# Patient Record
Sex: FEMALE | Race: BLACK OR AFRICAN AMERICAN | NOT HISPANIC OR LATINO | Employment: OTHER | URBAN - METROPOLITAN AREA
[De-identification: names, ages, dates, MRNs, and addresses within clinical notes are randomized per-mention and may not be internally consistent; named-entity substitution may affect disease eponyms.]

---

## 2017-01-10 ENCOUNTER — TELEPHONE (OUTPATIENT)
Dept: HEMATOLOGY/ONCOLOGY | Facility: CLINIC | Age: 67
End: 2017-01-10

## 2017-01-10 ENCOUNTER — OFFICE VISIT (OUTPATIENT)
Dept: INTERNAL MEDICINE | Facility: CLINIC | Age: 67
End: 2017-01-10
Payer: MEDICARE

## 2017-01-10 VITALS
HEART RATE: 66 BPM | DIASTOLIC BLOOD PRESSURE: 65 MMHG | SYSTOLIC BLOOD PRESSURE: 117 MMHG | WEIGHT: 202 LBS | BODY MASS INDEX: 31.71 KG/M2 | HEIGHT: 67 IN

## 2017-01-10 DIAGNOSIS — G47.30 SLEEP APNEA, UNSPECIFIED TYPE: Primary | ICD-10-CM

## 2017-01-10 DIAGNOSIS — D64.9 ANEMIA, UNSPECIFIED TYPE: ICD-10-CM

## 2017-01-10 DIAGNOSIS — F32.A DEPRESSION, UNSPECIFIED DEPRESSION TYPE: Chronic | ICD-10-CM

## 2017-01-10 DIAGNOSIS — R87.629 ABNORMAL PAP SMEAR OF VAGINA: ICD-10-CM

## 2017-01-10 DIAGNOSIS — F41.9 ANXIETY: Chronic | ICD-10-CM

## 2017-01-10 DIAGNOSIS — Z13.9 SCREENING: ICD-10-CM

## 2017-01-10 DIAGNOSIS — I10 ESSENTIAL HYPERTENSION: Chronic | ICD-10-CM

## 2017-01-10 DIAGNOSIS — R87.613 HGSIL (HIGH GRADE SQUAMOUS INTRAEPITHELIAL LESION) ON PAP SMEAR OF CERVIX: ICD-10-CM

## 2017-01-10 PROCEDURE — 1160F RVW MEDS BY RX/DR IN RCRD: CPT | Mod: S$GLB,,, | Performed by: INTERNAL MEDICINE

## 2017-01-10 PROCEDURE — 1159F MED LIST DOCD IN RCRD: CPT | Mod: S$GLB,,, | Performed by: INTERNAL MEDICINE

## 2017-01-10 PROCEDURE — 99214 OFFICE O/P EST MOD 30 MIN: CPT | Mod: S$GLB,,, | Performed by: INTERNAL MEDICINE

## 2017-01-10 PROCEDURE — 3074F SYST BP LT 130 MM HG: CPT | Mod: S$GLB,,, | Performed by: INTERNAL MEDICINE

## 2017-01-10 PROCEDURE — 1126F AMNT PAIN NOTED NONE PRSNT: CPT | Mod: S$GLB,,, | Performed by: INTERNAL MEDICINE

## 2017-01-10 PROCEDURE — 99999 PR PBB SHADOW E&M-EST. PATIENT-LVL IV: CPT | Mod: PBBFAC,,, | Performed by: INTERNAL MEDICINE

## 2017-01-10 PROCEDURE — 3078F DIAST BP <80 MM HG: CPT | Mod: S$GLB,,, | Performed by: INTERNAL MEDICINE

## 2017-01-10 PROCEDURE — 1157F ADVNC CARE PLAN IN RCRD: CPT | Mod: S$GLB,,, | Performed by: INTERNAL MEDICINE

## 2017-01-10 RX ORDER — LOSARTAN POTASSIUM 50 MG/1
50 TABLET ORAL DAILY
Qty: 90 TABLET | Refills: 3 | Status: SHIPPED | OUTPATIENT
Start: 2017-01-10 | End: 2017-12-22

## 2017-01-10 RX ORDER — ESCITALOPRAM OXALATE 20 MG/1
TABLET ORAL
Qty: 30 TABLET | Refills: 1 | Status: SHIPPED | OUTPATIENT
Start: 2017-01-10 | End: 2017-12-22 | Stop reason: SDUPTHER

## 2017-01-10 RX ORDER — METOPROLOL SUCCINATE 50 MG/1
50 TABLET, EXTENDED RELEASE ORAL DAILY
Qty: 90 TABLET | Refills: 3 | Status: SHIPPED | OUTPATIENT
Start: 2017-01-10 | End: 2017-12-22 | Stop reason: SDUPTHER

## 2017-01-10 RX ORDER — LORAZEPAM 0.5 MG/1
0.5 TABLET ORAL DAILY PRN
Qty: 30 TABLET | Refills: 0 | Status: SHIPPED | OUTPATIENT
Start: 2017-01-10 | End: 2017-10-19 | Stop reason: SDUPTHER

## 2017-01-10 RX ORDER — MELOXICAM 7.5 MG/1
TABLET ORAL
Qty: 60 TABLET | Refills: 0 | Status: SHIPPED | OUTPATIENT
Start: 2017-01-10 | End: 2017-12-22 | Stop reason: SDUPTHER

## 2017-01-10 NOTE — TELEPHONE ENCOUNTER
----- Message from Marleny Bridges MA sent at 1/10/2017  8:55 AM CST -----  Contact: [phone]    BRIDGET CANTU Provider: Bridget Cantu MD  Diagnosis: Anemia, unspecified type      Coded Diagnosis: Anemia, unspecified type [D64.9]     Nurse-Simi ext  [phone]

## 2017-01-10 NOTE — MR AVS SNAPSHOT
[facility] [address]  [address]  [address]  [facility] [address]  Phone: [phone]                  Marleny Guzman   1/10/2017 7:30 AM   Office Visit    Description:  Female : 1950   Provider:  Bridget Cantu MD   Department:  [facility] [address]           Reason for Visit     Follow-up           Diagnoses this Visit        Comments    Sleep apnea, unspecified type    -  Primary     Depression, unspecified depression type         Essential hypertension         Abnormal Pap smear of vagina         HGSIL (high grade squamous intraepithelial lesion) on Pap smear of cervix         Anxiety         Anemia, unspecified type         Screening                To Do List           Future Appointments        Provider Department Dept Phone    2017 8:30 AM MD Jose R Reeves [facility] - GYN Oncology [phone]    2017 1:30 PM EUSEBIO [facility] - Optometry [phone]    2017 7:30 AM Bridget Cantu MD [facility] [address] [phone]      Goals (5 Years of Data)     None      Follow-Up and Disposition     Return in about 4 months (around 5/10/2017).       These Medications        Disp Refills Start End    losartan (COZAAR) 50 MG tablet 90 tablet 3 1/10/2017 1/10/2018    Take 1 tablet (50 mg total) by mouth once daily. - Oral    Pharmacy: [facility]/pharmacy #[id number] [place] [address] [address] Ph #: [phone]       escitalopram oxalate (LEXAPRO) 20 MG tablet 30 tablet 1 1/10/2017     TAKE 1 TABLET ONE TIME DAILY    Pharmacy: [facility]/pharmacy #[address] [place] [address] [address] Ph #: [phone]       meloxicam (MOBIC) 7.5 MG tablet 60 tablet 0 1/10/2017     One twice daily as needed for neck pain    Pharmacy: [facility]/pharmacy #[address] [place] [address] [address] Ph #: [phone]       lorazepam (ATIVAN) 0.5 MG tablet 30 tablet 0 1/10/2017     Take 1 tablet (0.5 mg total) by mouth daily as  needed. - Oral    [id number] [address] Ph #: [phone]       metoprolol succinate (TOPROL-XL) 50 MG 24 hr tablet 90 tablet 3 1/10/2017     Take 1 tablet (50 mg total) by mouth once daily. - Oral    Pharmacy: [facility][id number] [address] Ph #: [phone]       Notes to Pharmacy: **Patient requests 90 day supply**      [facility]     [facility] On Call Nurse Care Line - 24/7 Assistance  Registered nurses in the [facility] On Call Center provide clinical advisement, health education, appointment booking, and other advisory services.  Call for this free service at [phone].             Medications           Message regarding Medications     Verify the changes and/or additions to your medication regime listed below are the same as discussed with your clinician today.  If any of these changes or additions are incorrect, please notify your healthcare provider.        START taking these NEW medications        Refills    losartan (COZAAR) 50 MG tablet 3    Sig: Take 1 tablet (50 mg total) by mouth once daily.    Class: Normal    Route: Oral      STOP taking these medications     triamterene-hydrochlorothiazide 37.5-25 mg (MAXZIDE-25) 37.5-25 mg per tablet Take 1 tablet by mouth once daily.           Verify that the below list of medications is an accurate representation of the medications you are currently taking.  If none reported, the list may be blank. If incorrect, please contact your healthcare provider. Carry this list with you in case of emergency.           Current Medications     aspirin 81 MG Chew Take 81 mg by mouth once daily.    cetirizine (ZYRTEC) 10 MG tablet Take 1 tablet (10 mg total) by mouth once daily.    ergocalciferol (ERGOCALCIFEROL) 50,000 unit Cap Take 1 capsule (50,000 Units total) by mouth every 30 days.    escitalopram oxalate (LEXAPRO) 20 MG tablet TAKE 1 TABLET ONE TIME DAILY    estradiol (ESTRACE) 0.01 %  "(0.1 mg/gram) vaginal cream Place 1 g vaginally twice a week.    IRON, FERROUS SULFATE, ORAL Take 1 tablet by mouth once daily.    lorazepam (ATIVAN) 0.5 MG tablet Take 1 tablet (0.5 mg total) by mouth daily as needed.    losartan (COZAAR) 50 MG tablet Take 1 tablet (50 mg total) by mouth once daily.    meloxicam (MOBIC) 7.5 MG tablet One twice daily as needed for neck pain    metoprolol succinate (TOPROL-XL) 50 MG 24 hr tablet Take 1 tablet (50 mg total) by mouth once daily.           Clinical Reference Information           Vital Signs - Last Recorded  Most recent update: 1/10/2017  7:50 AM by Simi Chavez MA    BP Pulse Ht Wt BMI    117/65 66 5' 7" (1.702 m) 91.6 kg (202 lb) 31.64 kg/m2      Blood Pressure          Most Recent Value    BP  117/65      Allergies as of 1/10/2017     No Known Allergies      Immunizations Administered on Date of Encounter - 1/10/2017     Name Date Dose VIS Date Route    Zoster  Incomplete 0.65 mL 10/6/2009 Subcutaneous      Orders Placed During Today's Visit      Normal Orders This Visit    Ambulatory consult to Hematology     Ambulatory consult to Optometry     Zoster Vaccine - Live       "

## 2017-01-18 ENCOUNTER — PATIENT MESSAGE (OUTPATIENT)
Dept: HEMATOLOGY/ONCOLOGY | Facility: CLINIC | Age: 67
End: 2017-01-18

## 2017-01-18 ENCOUNTER — LAB VISIT (OUTPATIENT)
Dept: LAB | Facility: HOSPITAL | Age: 67
End: 2017-01-18
Attending: INTERNAL MEDICINE
Payer: MEDICARE

## 2017-01-18 ENCOUNTER — INITIAL CONSULT (OUTPATIENT)
Dept: HEMATOLOGY/ONCOLOGY | Facility: CLINIC | Age: 67
End: 2017-01-18
Payer: MEDICARE

## 2017-01-18 VITALS
SYSTOLIC BLOOD PRESSURE: 133 MMHG | DIASTOLIC BLOOD PRESSURE: 65 MMHG | HEIGHT: 70 IN | HEART RATE: 64 BPM | OXYGEN SATURATION: 95 % | BODY MASS INDEX: 28.72 KG/M2 | WEIGHT: 200.63 LBS | TEMPERATURE: 98 F

## 2017-01-18 DIAGNOSIS — D50.9 IRON DEFICIENCY ANEMIA, UNSPECIFIED IRON DEFICIENCY ANEMIA TYPE: Primary | ICD-10-CM

## 2017-01-18 DIAGNOSIS — D50.9 IRON DEFICIENCY ANEMIA, UNSPECIFIED IRON DEFICIENCY ANEMIA TYPE: ICD-10-CM

## 2017-01-18 LAB
BASOPHILS # BLD AUTO: 0.02 K/UL
BASOPHILS NFR BLD: 0.2 %
DIFFERENTIAL METHOD: ABNORMAL
EOSINOPHIL # BLD AUTO: 0.4 K/UL
EOSINOPHIL NFR BLD: 4.5 %
ERYTHROCYTE [DISTWIDTH] IN BLOOD BY AUTOMATED COUNT: 14.6 %
FERRITIN SERPL-MCNC: 62 NG/ML
HCT VFR BLD AUTO: 35.7 %
HGB BLD-MCNC: 12 G/DL
LYMPHOCYTES # BLD AUTO: 2.3 K/UL
LYMPHOCYTES NFR BLD: 29.2 %
MCH RBC QN AUTO: 28.5 PG
MCHC RBC AUTO-ENTMCNC: 33.6 %
MCV RBC AUTO: 85 FL
MONOCYTES # BLD AUTO: 0.3 K/UL
MONOCYTES NFR BLD: 3.6 %
NEUTROPHILS # BLD AUTO: 5 K/UL
NEUTROPHILS NFR BLD: 62.4 %
PLATELET # BLD AUTO: 308 K/UL
PMV BLD AUTO: 10.4 FL
RBC # BLD AUTO: 4.21 M/UL
WBC # BLD AUTO: 8.02 K/UL

## 2017-01-18 PROCEDURE — 3078F DIAST BP <80 MM HG: CPT | Mod: S$GLB,,, | Performed by: INTERNAL MEDICINE

## 2017-01-18 PROCEDURE — 85025 COMPLETE CBC W/AUTO DIFF WBC: CPT

## 2017-01-18 PROCEDURE — 99999 PR PBB SHADOW E&M-EST. PATIENT-LVL III: CPT | Mod: PBBFAC,,, | Performed by: INTERNAL MEDICINE

## 2017-01-18 PROCEDURE — 1157F ADVNC CARE PLAN IN RCRD: CPT | Mod: S$GLB,,, | Performed by: INTERNAL MEDICINE

## 2017-01-18 PROCEDURE — 99203 OFFICE O/P NEW LOW 30 MIN: CPT | Mod: S$GLB,,, | Performed by: INTERNAL MEDICINE

## 2017-01-18 PROCEDURE — 3075F SYST BP GE 130 - 139MM HG: CPT | Mod: S$GLB,,, | Performed by: INTERNAL MEDICINE

## 2017-01-18 PROCEDURE — 1160F RVW MEDS BY RX/DR IN RCRD: CPT | Mod: S$GLB,,, | Performed by: INTERNAL MEDICINE

## 2017-01-18 PROCEDURE — 1159F MED LIST DOCD IN RCRD: CPT | Mod: S$GLB,,, | Performed by: INTERNAL MEDICINE

## 2017-01-18 PROCEDURE — 36415 COLL VENOUS BLD VENIPUNCTURE: CPT

## 2017-01-18 PROCEDURE — 1126F AMNT PAIN NOTED NONE PRSNT: CPT | Mod: S$GLB,,, | Performed by: INTERNAL MEDICINE

## 2017-01-18 PROCEDURE — 82728 ASSAY OF FERRITIN: CPT

## 2017-01-18 NOTE — PROGRESS NOTES
Subjective:       Patient ID: Marleny Guzman is a 66 y.o. female.    Chief Complaint: Anemia    Marleny presents today for evaluation of anemia present since surgery, total abdominal hysterectomy, in April/May 2015. Minimal blood loss is described in the operative report but the pattern of anemia is consistent with mari-operative blood loss. Hemoglobin was normal before surgery and low immediately after surgery. Marleny's iron levels and dietary heme iron content are moderate. She was placed in oral iron, once daily, tablets from September to Nov/December 2016. She denies and GI upset with replacement therapy. She eats white meat chicken, thought dietary heme content is lower than usual for the patient. She has a history of iron deficiency anemia also when younger and remembers taking iron supplements previously. This may have been related to menstrual cycles at the time. Her hysterectomy was performed for cervical dysplasia, not dysfunctional uterine bleeding. She denies any current blood loss. GI endoscopy was performed in March 2016.    HPI  Review of Systems   Constitutional: Negative.    HENT: Negative.    Eyes: Negative.    Respiratory: Negative.    Cardiovascular: Negative.    Gastrointestinal: Negative.    Endocrine: Negative.    Genitourinary: Negative.    Musculoskeletal: Negative.    Skin: Negative.    Allergic/Immunologic: Negative for environmental allergies, food allergies and immunocompromised state.   Neurological: Negative.    Hematological: Negative for adenopathy. Does not bruise/bleed easily.   Psychiatric/Behavioral: Negative.        Objective:      Physical Exam   Constitutional: She is oriented to person, place, and time. She appears well-developed and well-nourished.   HENT:   Head: Normocephalic and atraumatic.   Eyes: Conjunctivae are normal. Pupils are equal, round, and reactive to light. No scleral icterus.   Neck: Normal range of motion.   Cardiovascular: Normal rate and intact distal  pulses.    Pulmonary/Chest: Effort normal. No respiratory distress.   Abdominal: She exhibits no distension. There is no tenderness.   Musculoskeletal: Normal range of motion.   Neurological: She is alert and oriented to person, place, and time. No cranial nerve deficit.   Skin: Skin is warm and dry. No rash noted.   Psychiatric: She has a normal mood and affect. Her behavior is normal. Judgment and thought content normal.   Nursing note and vitals reviewed.      Assessment:       1. Iron deficiency anemia, unspecified iron deficiency anemia type        Plan:       Anemia consistent with operative loss and prolonged recovery  Iron levels including ferritin are currently normal.  CBC is improving over past year  Recommend updated CBC today  Recommended increased dietary heme iron    CBC is now normal.  Patient may continue normal dietary heme iron.

## 2017-01-18 NOTE — LETTER
January 18, 2017      Bridget Cantu MD  [address]  [facility] [address]           [facility]  [address]  [facility] [address]  Phone: [phone]          Patient: Marleny Guzman   MR Number: [id number]   YOB: 1950   Date of Visit: 1/18/2017       Dear Dr. Bridget Cantu:    Thank you for referring Marleny Guzman to me for evaluation. Attached you will find relevant portions of my assessment and plan of care.    If you have questions, please do not hesitate to call me. I look forward to following Marleny Guzman along with you.    Sincerely,    Agata Hodgson MD    Enclosure  CC:  No Recipients    If you would like to receive this communication electronically, please contact [email or web address] or [phone] to request more information on [employer] Link access.    For providers and/or their staff who would like to refer a patient to [facility], please contact us through our one-stop-shop provider referral line, [facility], at [phone].    If you feel you have received this communication in error or would no longer like to receive these types of communications, please e-mail [email or web address]

## 2017-01-23 NOTE — PROGRESS NOTES
Subjective:       Patient ID: Marleny Guzman is a 66 y.o. female.    Chief Complaint: Follow-up    HPIPt is feeling well with no CP or SOB.  Review of Systems   Respiratory: Negative for shortness of breath (PND or orthopnea).    Cardiovascular: Negative for chest pain (arm pain or jaw pain).   Gastrointestinal: Negative for abdominal pain, diarrhea, nausea and vomiting.   Genitourinary: Negative for dysuria.   Neurological: Negative for seizures, syncope and headaches.       Objective:      Physical Exam   Constitutional: She is oriented to person, place, and time. She appears well-developed and well-nourished. No distress.   HENT:   Head: Normocephalic.   Mouth/Throat: Oropharynx is clear and moist.   Neck: Neck supple. No JVD present. No thyromegaly present.   Cardiovascular: Normal rate, regular rhythm, normal heart sounds and intact distal pulses.  Exam reveals no gallop and no friction rub.    No murmur heard.  Pulmonary/Chest: Effort normal and breath sounds normal. She has no wheezes. She has no rales.   Abdominal: Soft. Bowel sounds are normal. She exhibits no distension and no mass. There is no tenderness. There is no rebound and no guarding.   Musculoskeletal: She exhibits no edema.   Lymphadenopathy:     She has no cervical adenopathy.   Neurological: She is alert and oriented to person, place, and time.   Skin: Skin is warm and dry.   Psychiatric: She has a normal mood and affect. Her behavior is normal. Judgment and thought content normal.       Assessment:       1. Sleep apnea, unspecified type    2. Depression, unspecified depression type    3. Essential hypertension    4. Abnormal Pap smear of vagina    5. HGSIL (high grade squamous intraepithelial lesion) on Pap smear of cervix    6. Anxiety    7. Anemia, unspecified type    8. Screening        Plan:   Sleep apnea, unspecified type  Pt uses faithfully and derives great benenfit  Depression, unspecified depression type  -     escitalopram oxalate  (LEXAPRO) 20 MG tablet; TAKE 1 TABLET ONE TIME DAILY  Dispense: 30 tablet; Refill: 1  Controlled - continue current meds    Essential hypertension  -     metoprolol succinate (TOPROL-XL) 50 MG 24 hr tablet; Take 1 tablet (50 mg total) by mouth once daily.  Dispense: 90 tablet; Refill: 3  Controlled - continue current meds    Abnormal Pap smear of vagina/HGSIL (high grade squamous intraepithelial lesion) on Pap smear of cervix  Follows closely with gyn  Anxiety  -     escitalopram oxalate (LEXAPRO) 20 MG tablet; TAKE 1 TABLET ONE TIME DAILY  Dispense: 30 tablet; Refill: 1  -     lorazepam (ATIVAN) 0.5 MG tablet; Take 1 tablet (0.5 mg total) by mouth daily as needed.  Dispense: 30 tablet; Refill: 0    Anemia, unspecified type  -     Ambulatory consult to Hematology    Screening  -     Ambulatory consult to Optometry    Other orders  -     Zoster Vaccine - Live  -     losartan (COZAAR) 50 MG tablet; Take 1 tablet (50 mg total) by mouth once daily.  Dispense: 90 tablet; Refill: 3  -     meloxicam (MOBIC) 7.5 MG tablet; One twice daily as needed for neck pain  Dispense: 60 tablet; Refill: 0

## 2017-01-30 ENCOUNTER — OFFICE VISIT (OUTPATIENT)
Dept: GYNECOLOGIC ONCOLOGY | Facility: CLINIC | Age: 67
End: 2017-01-30
Payer: MEDICARE

## 2017-01-30 VITALS
DIASTOLIC BLOOD PRESSURE: 59 MMHG | HEART RATE: 74 BPM | BODY MASS INDEX: 28.37 KG/M2 | WEIGHT: 198.19 LBS | HEIGHT: 70 IN | SYSTOLIC BLOOD PRESSURE: 127 MMHG

## 2017-01-30 DIAGNOSIS — N89.3 VAGINAL DYSPLASIA: ICD-10-CM

## 2017-01-30 DIAGNOSIS — R87.613 HGSIL (HIGH GRADE SQUAMOUS INTRAEPITHELIAL LESION) ON PAP SMEAR OF CERVIX: Primary | ICD-10-CM

## 2017-01-30 DIAGNOSIS — R87.629 ABNORMAL PAP SMEAR OF VAGINA: ICD-10-CM

## 2017-01-30 PROCEDURE — 1126F AMNT PAIN NOTED NONE PRSNT: CPT | Mod: S$GLB,,, | Performed by: OBSTETRICS & GYNECOLOGY

## 2017-01-30 PROCEDURE — 1157F ADVNC CARE PLAN IN RCRD: CPT | Mod: S$GLB,,, | Performed by: OBSTETRICS & GYNECOLOGY

## 2017-01-30 PROCEDURE — 88175 CYTOPATH C/V AUTO FLUID REDO: CPT | Performed by: PATHOLOGY

## 2017-01-30 PROCEDURE — 3078F DIAST BP <80 MM HG: CPT | Mod: S$GLB,,, | Performed by: OBSTETRICS & GYNECOLOGY

## 2017-01-30 PROCEDURE — 99999 PR PBB SHADOW E&M-EST. PATIENT-LVL III: CPT | Mod: PBBFAC,,, | Performed by: OBSTETRICS & GYNECOLOGY

## 2017-01-30 PROCEDURE — 99214 OFFICE O/P EST MOD 30 MIN: CPT | Mod: S$GLB,,, | Performed by: OBSTETRICS & GYNECOLOGY

## 2017-01-30 PROCEDURE — 88141 CYTOPATH C/V INTERPRET: CPT | Mod: ,,, | Performed by: PATHOLOGY

## 2017-01-30 PROCEDURE — 1159F MED LIST DOCD IN RCRD: CPT | Mod: S$GLB,,, | Performed by: OBSTETRICS & GYNECOLOGY

## 2017-01-30 PROCEDURE — 1160F RVW MEDS BY RX/DR IN RCRD: CPT | Mod: S$GLB,,, | Performed by: OBSTETRICS & GYNECOLOGY

## 2017-01-30 PROCEDURE — 3074F SYST BP LT 130 MM HG: CPT | Mod: S$GLB,,, | Performed by: OBSTETRICS & GYNECOLOGY

## 2017-01-30 RX ORDER — ESTRADIOL 0.1 MG/G
1 CREAM VAGINAL
Qty: 1 TUBE | Refills: 3 | Status: SHIPPED | OUTPATIENT
Start: 2017-01-30 | End: 2017-07-24 | Stop reason: SDUPTHER

## 2017-01-30 NOTE — PROGRESS NOTES
Subjective:      Patient ID: Marleny Guzman is a 66 y.o. female.    Chief Complaint: Vaginal Dysplasia (6 month follow up)      HPI  Patient s/p RATLH/BSO for persistent HGSIL of cervix 5/12/15 with benign pathology with Dr. Gonzalez. Vaginal pap smear 6/15/16 HSIL. She was referred for consult by Dr. Valles. 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    Presents today for follow up. Has not been using vaginal estrogen cream as prescribed. Reports doing well. Denies vaginal bleeding, pruritis, or other concerning symptoms.       Review of Systems   Constitutional: Negative for appetite change, chills, fatigue and fever.   HENT: Negative for mouth sores.    Respiratory: Negative for cough and shortness of breath.    Cardiovascular: Negative for leg swelling.   Gastrointestinal: Negative for abdominal pain, blood in stool, constipation and diarrhea.   Endocrine: Negative for cold intolerance.   Genitourinary: Negative for dysuria and vaginal bleeding.   Musculoskeletal: Negative for myalgias.   Skin: Negative for rash.   Allergic/Immunologic: Negative.    Neurological: Negative for weakness and numbness.   Hematological: Negative for adenopathy. Does not bruise/bleed easily.   Psychiatric/Behavioral: Negative for confusion.        Objective:   Physical Exam:   Constitutional: She is oriented to person, place, and time. She appears well-developed and well-nourished.    HENT:   Head: Normocephalic and atraumatic.    Eyes: EOM are normal. Pupils are equal, round, and reactive to light.    Neck: Normal range of motion. Neck supple. No thyromegaly present.    Cardiovascular: Normal rate, regular rhythm and intact distal pulses.     Pulmonary/Chest: Effort normal and breath sounds normal. No respiratory distress. She has no wheezes.        Abdominal: Soft. Bowel sounds are normal. She exhibits no distension, no ascites and no mass. There is no tenderness.     Genitourinary: Rectum normal and vagina  normal. Pelvic exam was performed with patient supine. There is no lesion on the right labia. There is no lesion on the left labia. Uterus is absent. There is an absent adnexa. Right adnexum displays no mass. Left adnexum displays no mass. Vaginal cuff normal.Cervix exhibits absence. Additional cervical findings: pap smear done          Musculoskeletal: Normal range of motion and moves all extremeties.      Lymphadenopathy:     She has no cervical adenopathy.        Right: No inguinal and no supraclavicular adenopathy present.        Left: No inguinal and no supraclavicular adenopathy present.    Neurological: She is alert and oriented to person, place, and time.    Skin: Skin is warm and dry. No rash noted.    Psychiatric: She has a normal mood and affect.       Assessment:     1. HGSIL (high grade squamous intraepithelial lesion) on Pap smear of cervix    2. Abnormal Pap smear of vagina    3. Vaginal dysplasia      - there are no gross abnormalities noted on today's exam  - pap collected  Plan:   No orders of the defined types were placed in this encounter.    1. RTC 6 months for follow up or sooner if needed pending pap results.

## 2017-02-02 ENCOUNTER — OFFICE VISIT (OUTPATIENT)
Dept: OPTOMETRY | Facility: CLINIC | Age: 67
End: 2017-02-02
Payer: MEDICARE

## 2017-02-02 DIAGNOSIS — H26.493 PCO (POSTERIOR CAPSULAR OPACIFICATION), BILATERAL: ICD-10-CM

## 2017-02-02 DIAGNOSIS — H52.4 PRESBYOPIA: ICD-10-CM

## 2017-02-02 DIAGNOSIS — Z46.0 FITTING AND ADJUSTMENT OF SPECTACLES AND CONTACT LENSES: ICD-10-CM

## 2017-02-02 DIAGNOSIS — H10.13 CONJUNCTIVITIS, ALLERGIC, BILATERAL: ICD-10-CM

## 2017-02-02 DIAGNOSIS — Z96.1 PSEUDOPHAKIA OF BOTH EYES: ICD-10-CM

## 2017-02-02 DIAGNOSIS — I10 ESSENTIAL HYPERTENSION: Primary | Chronic | ICD-10-CM

## 2017-02-02 DIAGNOSIS — Z46.0 FITTING AND ADJUSTMENT OF SPECTACLES AND CONTACT LENSES: Primary | ICD-10-CM

## 2017-02-02 PROCEDURE — 92310 CONTACT LENS FITTING OU: CPT | Mod: ,,, | Performed by: OPTOMETRIST

## 2017-02-02 PROCEDURE — 99999 PR PBB SHADOW E&M-EST. PATIENT-LVL II: CPT | Mod: PBBFAC,,, | Performed by: OPTOMETRIST

## 2017-02-02 PROCEDURE — 92015 DETERMINE REFRACTIVE STATE: CPT | Mod: S$GLB,,, | Performed by: OPTOMETRIST

## 2017-02-02 PROCEDURE — 92014 COMPRE OPH EXAM EST PT 1/>: CPT | Mod: S$GLB,,, | Performed by: OPTOMETRIST

## 2017-02-02 NOTE — LETTER
February 3, 2017      Bridget Cantu MD  [address]  [facility] [address]           [facility]  [address]  [facility] [address]  Phone: [phone]  Fax: [phone]          Patient: Marleny Guzman   MR Number: [id number]   YOB: 1950   Date of Visit: 2/2/2017       Dear Dr. Bridget Cantu:    Thank you for referring Marleny Guzman to me for evaluation. Attached you will find relevant portions of my assessment and plan of care.    If you have questions, please do not hesitate to call me. I look forward to following Marleny Guzman along with you.    Sincerely,    Ivy Borrego, OD    Enclosure  CC:  No Recipients    If you would like to receive this communication electronically, please contact [email or web address] or [phone] to request more information on [employer] Link access.    For providers and/or their staff who would like to refer a patient to [facility], please contact us through our one-stop-shop provider referral line, [facility], at [phone].    If you feel you have received this communication in error or would no longer like to receive these types of communications, please e-mail [email or web address]

## 2017-02-02 NOTE — PROGRESS NOTES
Contact lens exam done, see exam of same date for documentation.   Contact lens exam done, see exam of same date for documentation.

## 2017-02-03 NOTE — PROGRESS NOTES
HPI     66 year old female   DLS: 02/29/2016    Complete and update contact lens  Daily wear/colored contact lens   Pt not happy with current contact lens  States that the are uncomfortable and that her eyes are always itchy  Pt does not have any glasses  PC IOL OU       Last edited by Valentine Abdi on 2/2/2017  8:44 AM.         Assessment /Plan     For exam results, see Encounter Report.        Essential hypertension  No retinopathy, monitor yearly     Pseudophakia of both eyes  PCO (posterior capsular opacification), bilateral  Stable     Dry eye syndrome, bilateral  Conjunctivitis, allergic, bilateral  Use Zaditor or Alaway BID OU     Refractive error  Presbyopia  Fitting and adjustment of spectacles and contact lenses    Rx specs  CL fit today: dispensed trials   OK to order in colors if happy     RTC 1 year, sooner PRN

## 2017-02-09 ENCOUNTER — TELEPHONE (OUTPATIENT)
Dept: GYNECOLOGIC ONCOLOGY | Facility: CLINIC | Age: 67
End: 2017-02-09

## 2017-02-09 NOTE — TELEPHONE ENCOUNTER
Inform pt per dr araujo that pap smear was abnormal and that pt needs a colpo pt is scheduled for 02/13/217 at 8:15 am pt knows to come for 8:30

## 2017-02-09 NOTE — TELEPHONE ENCOUNTER
----- Message from Francia Mosley MD sent at 2/9/2017  3:47 PM CST -----  Pap smear was abnormal as it has been in the past. Please bring patient in for colposcopy. Thank you.

## 2017-02-13 ENCOUNTER — TELEPHONE (OUTPATIENT)
Dept: INTERNAL MEDICINE | Facility: CLINIC | Age: 67
End: 2017-02-13

## 2017-02-13 ENCOUNTER — OFFICE VISIT (OUTPATIENT)
Dept: GYNECOLOGIC ONCOLOGY | Facility: CLINIC | Age: 67
End: 2017-02-13
Payer: MEDICARE

## 2017-02-13 VITALS
BODY MASS INDEX: 28 KG/M2 | DIASTOLIC BLOOD PRESSURE: 58 MMHG | SYSTOLIC BLOOD PRESSURE: 122 MMHG | HEIGHT: 70 IN | HEART RATE: 65 BPM | WEIGHT: 195.56 LBS

## 2017-02-13 DIAGNOSIS — R87.629 ABNORMAL PAP SMEAR OF VAGINA: Primary | ICD-10-CM

## 2017-02-13 DIAGNOSIS — N89.3 VAGINAL DYSPLASIA: ICD-10-CM

## 2017-02-13 DIAGNOSIS — G47.30 SLEEP APNEA, UNSPECIFIED TYPE: Primary | ICD-10-CM

## 2017-02-13 PROCEDURE — 57421 EXAM/BIOPSY OF VAG W/SCOPE: CPT | Mod: S$GLB,,, | Performed by: OBSTETRICS & GYNECOLOGY

## 2017-02-13 PROCEDURE — 1157F ADVNC CARE PLAN IN RCRD: CPT | Mod: S$GLB,,, | Performed by: OBSTETRICS & GYNECOLOGY

## 2017-02-13 PROCEDURE — 1159F MED LIST DOCD IN RCRD: CPT | Mod: S$GLB,,, | Performed by: OBSTETRICS & GYNECOLOGY

## 2017-02-13 PROCEDURE — 99214 OFFICE O/P EST MOD 30 MIN: CPT | Mod: 25,S$GLB,, | Performed by: OBSTETRICS & GYNECOLOGY

## 2017-02-13 PROCEDURE — 3074F SYST BP LT 130 MM HG: CPT | Mod: S$GLB,,, | Performed by: OBSTETRICS & GYNECOLOGY

## 2017-02-13 PROCEDURE — 88305 TISSUE EXAM BY PATHOLOGIST: CPT | Performed by: PATHOLOGY

## 2017-02-13 PROCEDURE — 3078F DIAST BP <80 MM HG: CPT | Mod: S$GLB,,, | Performed by: OBSTETRICS & GYNECOLOGY

## 2017-02-13 PROCEDURE — 88342 IMHCHEM/IMCYTCHM 1ST ANTB: CPT | Mod: 26,,, | Performed by: PATHOLOGY

## 2017-02-13 PROCEDURE — 1126F AMNT PAIN NOTED NONE PRSNT: CPT | Mod: S$GLB,,, | Performed by: OBSTETRICS & GYNECOLOGY

## 2017-02-13 PROCEDURE — 1160F RVW MEDS BY RX/DR IN RCRD: CPT | Mod: S$GLB,,, | Performed by: OBSTETRICS & GYNECOLOGY

## 2017-02-13 PROCEDURE — 88305 TISSUE EXAM BY PATHOLOGIST: CPT | Mod: 26,,, | Performed by: PATHOLOGY

## 2017-02-13 PROCEDURE — 99999 PR PBB SHADOW E&M-EST. PATIENT-LVL III: CPT | Mod: PBBFAC,,, | Performed by: OBSTETRICS & GYNECOLOGY

## 2017-02-13 NOTE — PROGRESS NOTES
Subjective:      Patient ID: Marleny Guzman is a 66 y.o. female.    Chief Complaint: Cervical Dysplasia (POSS COLPO)      HPI  Patient s/p RATLH/BSO for persistent HGSIL of cervix 5/12/15 with benign pathology with Dr. Gonzalez. Vaginal pap smear 6/15/16 HSIL. She was referred for consult by Dr. Valles. 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 Has not been using vaginal estrogen cream as prescribed.     Seen for follow up and pap 1/30/17 HSIL. No gross visible lesions on exam. Presents today for colposcopy.      Review of Systems   Constitutional: Negative for appetite change, chills, fatigue and fever.   HENT: Negative for mouth sores.    Respiratory: Negative for cough and shortness of breath.    Cardiovascular: Negative for leg swelling.   Gastrointestinal: Negative for abdominal pain, blood in stool, constipation and diarrhea.   Endocrine: Negative for cold intolerance.   Genitourinary: Negative for dysuria and vaginal bleeding.   Musculoskeletal: Negative for myalgias.   Skin: Negative for rash.   Allergic/Immunologic: Negative.    Neurological: Negative for weakness and numbness.   Hematological: Negative for adenopathy. Does not bruise/bleed easily.   Psychiatric/Behavioral: Negative for confusion.        Objective:   Physical Exam:   Constitutional: She is oriented to person, place, and time. She appears well-developed and well-nourished.    HENT:   Head: Normocephalic and atraumatic.    Eyes: EOM are normal. Pupils are equal, round, and reactive to light.    Neck: Normal range of motion. Neck supple. No thyromegaly present.    Cardiovascular: Normal rate, regular rhythm and intact distal pulses.     Pulmonary/Chest: Effort normal and breath sounds normal. No respiratory distress. She has no wheezes.        Abdominal: Soft. Bowel sounds are normal. She exhibits no distension, no ascites and no mass. There is no tenderness.     Genitourinary: Rectum normal and vagina normal. Pelvic  exam was performed with patient supine. There is no lesion on the right labia. There is no lesion on the left labia. Uterus is absent. There is an absent adnexa. Right adnexum displays no mass. Left adnexum displays no mass. Vaginal cuff normal.Cervix exhibits absence.           Musculoskeletal: Normal range of motion and moves all extremeties.      Lymphadenopathy:     She has no cervical adenopathy.        Right: No inguinal and no supraclavicular adenopathy present.        Left: No inguinal and no supraclavicular adenopathy present.    Neurological: She is alert and oriented to person, place, and time.    Skin: Skin is warm and dry. No rash noted.    Psychiatric: She has a normal mood and affect.       Assessment:     1. Abnormal Pap smear of vagina    2. Vaginal dysplasia      - colposcopy of the vagina performed today, thin aceotwhite changes, no gross lesions, biopsy taken    Plan:     Orders Placed This Encounter   Procedures    Colposcopy     1. RTC 6 months for follow up or sooner if needed pending biopsy results

## 2017-02-13 NOTE — PROCEDURES
Colposcopy  Date/Time: 2/13/2017 1:25 PM  Performed by: PATTI HARDIN  Authorized by: PATTI HARDIN     Consent Done?:  Yes (Written)  Assistants?: No      Colposcopy Site:  Vagina  Position:  Supine  Acrowhite Lesion? Yes (thin acetowhite changes at the vaginal apex, no gross lesions)    Atypical Vessels: No    Biopsy?: Yes       Location:  Vagina ()  Estimated blood loss (cc):  0   Patient tolerated the procedure well with no immediate complications.   Post-operative instructions were provided for the patient.   Patient was discharged and will follow up if any complications occur

## 2017-02-13 NOTE — TELEPHONE ENCOUNTER
----- Message from Willie Steven sent at 2/13/2017 10:42 AM CST -----  Contact: Alejandra - Soicial Worker w/ [employer] at [phone]  Alejandra requesting orders to be put in for pt CPAP supplies. Pt is currently out of CPAP supplies.     Fax number if needed: [phone]

## 2017-02-21 ENCOUNTER — TELEPHONE (OUTPATIENT)
Dept: GYNECOLOGIC ONCOLOGY | Facility: CLINIC | Age: 67
End: 2017-02-21

## 2017-02-21 NOTE — TELEPHONE ENCOUNTER
Called and discussed pathology results with patient showing VAIN1. Recommend follow up in 6 months. She voiced understanding.

## 2017-04-17 ENCOUNTER — PATIENT MESSAGE (OUTPATIENT)
Dept: INTERNAL MEDICINE | Facility: CLINIC | Age: 67
End: 2017-04-17

## 2017-04-17 ENCOUNTER — TELEPHONE (OUTPATIENT)
Dept: INTERNAL MEDICINE | Facility: CLINIC | Age: 67
End: 2017-04-17

## 2017-04-17 RX ORDER — ERGOCALCIFEROL 1.25 MG/1
50000 CAPSULE ORAL
Qty: 3 CAPSULE | Refills: 3 | Status: SHIPPED | OUTPATIENT
Start: 2017-04-17 | End: 2018-01-29 | Stop reason: SDUPTHER

## 2017-04-17 RX ORDER — CYCLOBENZAPRINE HCL 10 MG
10 TABLET ORAL 3 TIMES DAILY PRN
Qty: 30 TABLET | Refills: 0 | Status: SHIPPED | OUTPATIENT
Start: 2017-04-17 | End: 2017-04-27

## 2017-05-09 ENCOUNTER — OFFICE VISIT (OUTPATIENT)
Dept: INTERNAL MEDICINE | Facility: CLINIC | Age: 67
End: 2017-05-09
Payer: MEDICARE

## 2017-05-09 VITALS
HEART RATE: 62 BPM | SYSTOLIC BLOOD PRESSURE: 136 MMHG | WEIGHT: 196 LBS | HEIGHT: 70 IN | DIASTOLIC BLOOD PRESSURE: 75 MMHG | BODY MASS INDEX: 28.06 KG/M2

## 2017-05-09 DIAGNOSIS — M25.50 ARTHRALGIA, UNSPECIFIED JOINT: ICD-10-CM

## 2017-05-09 DIAGNOSIS — G47.30 SLEEP APNEA, UNSPECIFIED TYPE: Primary | ICD-10-CM

## 2017-05-09 DIAGNOSIS — I10 ESSENTIAL HYPERTENSION: Chronic | ICD-10-CM

## 2017-05-09 PROCEDURE — 99999 PR PBB SHADOW E&M-EST. PATIENT-LVL III: CPT | Mod: PBBFAC,,, | Performed by: INTERNAL MEDICINE

## 2017-05-09 PROCEDURE — 1126F AMNT PAIN NOTED NONE PRSNT: CPT | Mod: S$GLB,,, | Performed by: INTERNAL MEDICINE

## 2017-05-09 PROCEDURE — 3078F DIAST BP <80 MM HG: CPT | Mod: S$GLB,,, | Performed by: INTERNAL MEDICINE

## 2017-05-09 PROCEDURE — 1159F MED LIST DOCD IN RCRD: CPT | Mod: S$GLB,,, | Performed by: INTERNAL MEDICINE

## 2017-05-09 PROCEDURE — 1157F ADVNC CARE PLAN IN RCRD: CPT | Mod: S$GLB,,, | Performed by: INTERNAL MEDICINE

## 2017-05-09 PROCEDURE — 1160F RVW MEDS BY RX/DR IN RCRD: CPT | Mod: S$GLB,,, | Performed by: INTERNAL MEDICINE

## 2017-05-09 PROCEDURE — 3075F SYST BP GE 130 - 139MM HG: CPT | Mod: S$GLB,,, | Performed by: INTERNAL MEDICINE

## 2017-05-09 PROCEDURE — 99214 OFFICE O/P EST MOD 30 MIN: CPT | Mod: S$GLB,,, | Performed by: INTERNAL MEDICINE

## 2017-05-09 NOTE — MR AVS SNAPSHOT
[facility] [address]  [address]  [address]  [facility] [address]  Phone: [phone]                  Marleny Guzman   2017 7:30 AM   Office Visit    Description:  Female : 1950   Provider:  Bridget Cantu MD   Department:  [facility] [address]           Reason for Visit     Follow-up           Diagnoses this Visit        Comments    Sleep apnea, unspecified type    -  Primary     Arthralgia, unspecified joint         Essential hypertension                To Do List           Future Appointments        Provider Department Dept Phone    2017 7:30 AM LAB, APPOINTMENT [place] [facility] [phone]    2017 8:00 AM [facility] [id number] 485 LB LIMIT [facility] [phone]      Goals (5 Years of Data)     None      Follow-Up and Disposition     Return in about 6 months (around 2017).      [facility]     [facility] Care [facility] -  Assistance  Unless otherwise directed by your provider, please contact [facility], our nurse care line that is available for  assistance.     Registered nurses in the [facility] provide: appointment scheduling, clinical advisement, health education, and other advisory services.  Call: [phone] (toll free)               Medications           Message regarding Medications     Verify the changes and/or additions to your medication regime listed below are the same as discussed with your clinician today.  If any of these changes or additions are incorrect, please notify your healthcare provider.             Verify that the below list of medications is an accurate representation of the medications you are currently taking.  If none reported, the list may be blank. If incorrect, please contact your healthcare provider. Carry this list with you in case of emergency.           Current Medications     aspirin 81 MG Chew Take 81 mg by mouth once  "daily.    ergocalciferol (ERGOCALCIFEROL) 50,000 unit Cap Take 1 capsule (50,000 Units total) by mouth every 30 days.    escitalopram oxalate (LEXAPRO) 20 MG tablet TAKE 1 TABLET ONE TIME DAILY    estradiol (ESTRACE) 0.01 % (0.1 mg/gram) vaginal cream Place 1 g vaginally twice a week.    IRON, FERROUS SULFATE, ORAL Take 1 tablet by mouth once daily.    lorazepam (ATIVAN) 0.5 MG tablet Take 1 tablet (0.5 mg total) by mouth daily as needed.    losartan (COZAAR) 50 MG tablet Take 1 tablet (50 mg total) by mouth once daily.    meloxicam (MOBIC) 7.5 MG tablet One twice daily as needed for neck pain    metoprolol succinate (TOPROL-XL) 50 MG 24 hr tablet Take 1 tablet (50 mg total) by mouth once daily.           Clinical Reference Information           Your Vitals Were     BP Pulse Height Weight BMI    136/75 62 5' 10" (1.778 m) 88.9 kg (196 lb) 28.12 kg/m2      Blood Pressure          Most Recent Value    BP  136/75      Allergies as of 5/9/2017     No Known Allergies      Immunizations Administered on Date of Encounter - 5/9/2017     None      Orders Placed During Today's Visit     Future Labs/Procedures Expected by Expires    KEE  5/9/2017 5/9/2018    C-reactive protein  5/9/2017 5/9/2018    Cyclic citrul peptide antibody, IgG  5/9/2017 5/9/2018    Rheumatoid factor  5/9/2017 5/9/2018    Sedimentation rate, manual  5/9/2017 5/9/2018    X-Ray Wrist 2 View Left  5/9/2017 8/7/2017      Language Assistance Services     ATTENTION: Language assistance services are available, free of charge. Please call [phone].      ATENCIÓN: Si habla español, tiene a giron disposición servicios gratuitos de asistencia lingüística. Llame al [phone].     CHÚ Ý: N?u b?n nói Ti?ng Vi?t, có các d?ch v? h? tr? ngôn ng? mi?n phí dành cho b?n. G?i s? [phone].         [facility] [address] complies with applicable Federal civil rights laws and does not discriminate on the basis of race, color, national origin, age, " disability, or sex.

## 2017-05-18 ENCOUNTER — HOSPITAL ENCOUNTER (OUTPATIENT)
Dept: RADIOLOGY | Facility: HOSPITAL | Age: 67
Discharge: HOME | End: 2017-05-18
Attending: INTERNAL MEDICINE
Payer: MEDICARE

## 2017-05-18 DIAGNOSIS — M25.50 ARTHRALGIA, UNSPECIFIED JOINT: ICD-10-CM

## 2017-05-18 PROCEDURE — 73100 X-RAY EXAM OF WRIST: CPT | Mod: 26,LT,, | Performed by: RADIOLOGY

## 2017-05-18 PROCEDURE — 73100 X-RAY EXAM OF WRIST: CPT | Mod: TC,LT

## 2017-05-20 NOTE — PROGRESS NOTES
Subjective:       Patient ID: Marleny Guzman is a 66 y.o. female.    Chief Complaint: Follow-up    HPIPt is feeling well with no CP or SOB.  She is working.  She has some arthralgia.    Review of Systems   Respiratory: Negative for shortness of breath (PND or orthopnea).    Cardiovascular: Negative for chest pain (arm pain or jaw pain).   Gastrointestinal: Negative for abdominal pain, diarrhea, nausea and vomiting.   Genitourinary: Negative for dysuria.   Neurological: Negative for seizures, syncope and headaches.       Objective:      Physical Exam   Constitutional: She is oriented to person, place, and time. She appears well-developed and well-nourished. No distress.   HENT:   Head: Normocephalic.   Mouth/Throat: Oropharynx is clear and moist.   Neck: Neck supple. No JVD present. No thyromegaly present.   Cardiovascular: Normal rate, regular rhythm, normal heart sounds and intact distal pulses.  Exam reveals no gallop and no friction rub.    No murmur heard.  Pulmonary/Chest: Effort normal and breath sounds normal. She has no wheezes. She has no rales.   Abdominal: Soft. Bowel sounds are normal. She exhibits no distension and no mass. There is no tenderness. There is no rebound and no guarding.   Musculoskeletal: She exhibits no edema.   Lymphadenopathy:     She has no cervical adenopathy.   Neurological: She is alert and oriented to person, place, and time.   Skin: Skin is warm and dry.   Psychiatric: She has a normal mood and affect. Her behavior is normal. Judgment and thought content normal.       Assessment:       1. Sleep apnea, unspecified type    2. Arthralgia, unspecified joint    3. Essential hypertension        Plan:   Sleep apnea, unspecified type  Pt uses faithfully with great benefit  Arthralgia, unspecified joint  -     Sedimentation rate, manual; Future; Expected date: 5/9/17  -     C-reactive protein; Future; Expected date: 5/9/17  -     KEE; Future; Expected date: 5/9/17  -     Rheumatoid  factor; Future; Expected date: 5/9/17  -     Cyclic citrul peptide antibody, IgG; Future; Expected date: 5/9/17  -     X-Ray Wrist 2 View Left; Future; Expected date: 5/9/17    Essential hypertension  Controlled - continue current meds

## 2017-05-23 ENCOUNTER — TELEPHONE (OUTPATIENT)
Dept: INTERNAL MEDICINE | Facility: CLINIC | Age: 67
End: 2017-05-23

## 2017-05-23 NOTE — TELEPHONE ENCOUNTER
----- Message from Bear Barragan sent at 5/23/2017 10:11 AM CDT -----  Contact: Self [phone]  Pt called and stated that she got stung by Wasp, the area is red and swollen and have fever, please call and advice    Thanks

## 2017-07-06 ENCOUNTER — LAB VISIT (OUTPATIENT)
Dept: LAB | Facility: HOSPITAL | Age: 67
End: 2017-07-06
Attending: INTERNAL MEDICINE
Payer: MEDICARE

## 2017-07-06 ENCOUNTER — TELEPHONE (OUTPATIENT)
Dept: INTERNAL MEDICINE | Facility: CLINIC | Age: 67
End: 2017-07-06

## 2017-07-06 ENCOUNTER — OFFICE VISIT (OUTPATIENT)
Dept: INTERNAL MEDICINE | Facility: CLINIC | Age: 67
End: 2017-07-06
Payer: MEDICARE

## 2017-07-06 VITALS
WEIGHT: 199.31 LBS | HEART RATE: 66 BPM | TEMPERATURE: 99 F | BODY MASS INDEX: 28.53 KG/M2 | SYSTOLIC BLOOD PRESSURE: 174 MMHG | DIASTOLIC BLOOD PRESSURE: 92 MMHG | HEIGHT: 70 IN | OXYGEN SATURATION: 98 %

## 2017-07-06 DIAGNOSIS — M54.50 ACUTE BILATERAL LOW BACK PAIN WITHOUT SCIATICA: Primary | ICD-10-CM

## 2017-07-06 DIAGNOSIS — N28.9 ABNORMAL RENAL FUNCTION: ICD-10-CM

## 2017-07-06 LAB
ALBUMIN SERPL BCP-MCNC: 3.3 G/DL
ALP SERPL-CCNC: 113 U/L
ALT SERPL W/O P-5'-P-CCNC: 17 U/L
ANION GAP SERPL CALC-SCNC: 5 MMOL/L
AST SERPL-CCNC: 20 U/L
BILIRUB SERPL-MCNC: 0.2 MG/DL
BUN SERPL-MCNC: 21 MG/DL
CALCIUM SERPL-MCNC: 9 MG/DL
CHLORIDE SERPL-SCNC: 105 MMOL/L
CO2 SERPL-SCNC: 28 MMOL/L
CREAT SERPL-MCNC: 1 MG/DL
EST. GFR  (AFRICAN AMERICAN): >60 ML/MIN/1.73 M^2
EST. GFR  (NON AFRICAN AMERICAN): 58.4 ML/MIN/1.73 M^2
GLUCOSE SERPL-MCNC: 83 MG/DL
POTASSIUM SERPL-SCNC: 4.3 MMOL/L
PROT SERPL-MCNC: 7 G/DL
SODIUM SERPL-SCNC: 138 MMOL/L

## 2017-07-06 PROCEDURE — 99213 OFFICE O/P EST LOW 20 MIN: CPT | Mod: S$GLB,,, | Performed by: INTERNAL MEDICINE

## 2017-07-06 PROCEDURE — 36415 COLL VENOUS BLD VENIPUNCTURE: CPT

## 2017-07-06 PROCEDURE — 99999 PR PBB SHADOW E&M-EST. PATIENT-LVL IV: CPT | Mod: PBBFAC,,, | Performed by: INTERNAL MEDICINE

## 2017-07-06 PROCEDURE — 1159F MED LIST DOCD IN RCRD: CPT | Mod: S$GLB,,, | Performed by: INTERNAL MEDICINE

## 2017-07-06 PROCEDURE — 1125F AMNT PAIN NOTED PAIN PRSNT: CPT | Mod: S$GLB,,, | Performed by: INTERNAL MEDICINE

## 2017-07-06 PROCEDURE — 80053 COMPREHEN METABOLIC PANEL: CPT

## 2017-07-06 RX ORDER — OXYCODONE AND ACETAMINOPHEN 10; 325 MG/1; MG/1
1 TABLET ORAL EVERY 4 HOURS PRN
Qty: 30 TABLET | Refills: 0 | Status: SHIPPED | OUTPATIENT
Start: 2017-07-06 | End: 2017-07-16

## 2017-07-06 RX ORDER — CYCLOBENZAPRINE HCL 10 MG
10 TABLET ORAL NIGHTLY
Qty: 30 TABLET | Refills: 1 | Status: SHIPPED | OUTPATIENT
Start: 2017-07-06 | End: 2017-08-05

## 2017-07-06 NOTE — TELEPHONE ENCOUNTER
Called pt back and she states that she will go to UC today with Dr Cash at 640pm. But she would also like to have Dr Cantu place an order in for her to get an epidural injection again. Told her she should go to UC today and get a torodal shot for the pain and I would send this request to Dr Cantu and see what she suggests.

## 2017-07-06 NOTE — TELEPHONE ENCOUNTER
Pt experienced a fall she is having severe back pain she is requesting injections to help should she see UC or specialist no openings this week please advise thanks

## 2017-07-06 NOTE — PROGRESS NOTES
Subjective:       Patient ID: Marleny Guzman is a 66 y.o. female.    Chief Complaint: Fall (x 2 wks- back pain- ibuprofen provided minimal relief )    Has history of back problems requiring epidural injections about 2 years ago      Fall   The accident occurred more than 1 week ago. The fall occurred while walking. She landed on concrete. There was no blood loss. The point of impact was the buttocks. The pain is present in the buttocks. The pain is at a severity of 4/10. The pain is moderate. The symptoms are aggravated by flexion and ambulation. Pertinent negatives include no abdominal pain, fever, headaches, nausea or vomiting. She has tried ice and NSAID for the symptoms. The treatment provided no relief.     Review of Systems   Constitutional: Negative for activity change, chills, fatigue and fever.   HENT: Negative for congestion, ear pain, nosebleeds, postnasal drip, sinus pressure and sore throat.    Eyes: Negative.  Negative for visual disturbance.   Respiratory: Negative for cough, chest tightness, shortness of breath and wheezing.    Cardiovascular: Negative for chest pain.   Gastrointestinal: Negative for abdominal pain, diarrhea, nausea and vomiting.   Genitourinary: Negative for difficulty urinating, dysuria, frequency and urgency.   Musculoskeletal: Negative for arthralgias and neck stiffness.   Skin: Negative for rash.   Neurological: Negative for dizziness, weakness and headaches.   Psychiatric/Behavioral: Negative for sleep disturbance. The patient is not nervous/anxious.        Objective:      Physical Exam   Musculoskeletal:        Arms:      Assessment:       1. Acute bilateral low back pain without sciatica    2. Abnormal renal function        Plan:   Marleny was seen today for fall.    Diagnoses and all orders for this visit:    Acute bilateral low back pain without sciatica  -     Ambulatory Consult to [facility]  -     Ambulatory consult to Physical Therapy    Abnormal renal  function  -     Comprehensive metabolic panel; Future    Other orders  -     cyclobenzaprine (FLEXERIL) 10 MG tablet; Take 1 tablet (10 mg total) by mouth every evening.  -     oxycodone-acetaminophen (PERCOCET)  mg per tablet; Take 1 tablet by mouth every 4 (four) hours as needed for Pain.

## 2017-07-06 NOTE — TELEPHONE ENCOUNTER
----- Message from Mansi Peres sent at 7/6/2017 10:54 AM CDT -----  Contact: Patient, phone [phone]  Type: Sooner appointment than  is able to schedule    When is the first available appointment? 9/14/17    What is the nature of the appointment? Patient fell a week ago and hurt her back    What appointment type: EP     Comments: The patient needs an early morning appointment.  She says she has had trouble with her back and the fall reinjured her.  She would like to get injections in her back.  Please give her a call.     Thanks!

## 2017-07-14 ENCOUNTER — TELEPHONE (OUTPATIENT)
Dept: INTERNAL MEDICINE | Facility: CLINIC | Age: 67
End: 2017-07-14

## 2017-07-14 NOTE — TELEPHONE ENCOUNTER
----- Message from Marizol Merida sent at 7/14/2017  3:07 PM CDT -----  Contact: patient- [phone]  Sooner appointment than the  can schedule.  When is the first available appointment: September  What is the nature of the appointment: back pain severe  What visit type: ep  Patient preference of timeframe to be scheduled:  asap emergency  Comments: patient is having intense back pain

## 2017-07-14 NOTE — TELEPHONE ENCOUNTER
Patient would like to know if you are able to set her up to get injections in her back. She states she was seen last week, and given pain pills. She feels like the pain pills give her minimal relief, and make her sleepy all the time. Please advise

## 2017-07-18 ENCOUNTER — TELEPHONE (OUTPATIENT)
Dept: GYNECOLOGIC ONCOLOGY | Facility: CLINIC | Age: 67
End: 2017-07-18

## 2017-07-18 ENCOUNTER — TELEPHONE (OUTPATIENT)
Dept: INTERNAL MEDICINE | Facility: CLINIC | Age: 67
End: 2017-07-18

## 2017-07-18 NOTE — TELEPHONE ENCOUNTER
----- Message from Gillian Ferris sent at 7/17/2017  4:53 PM CDT -----  Contact: Self/[phone]  Requesting a Referral    Requesting to see: Orthopedics    Reason for appt: Pain in Back    Please advise.    Thank You

## 2017-07-18 NOTE — TELEPHONE ENCOUNTER
----- Message from Tova Bustamante sent at 7/17/2017  4:51 PM CDT -----  Contact: Self  Good afternoon,     Pt is requesting an appt due to 6 month f/u.    Pt can be reached at [phone].    Thank you!

## 2017-07-18 NOTE — TELEPHONE ENCOUNTER
Left voice message for return call, pt is requesting to see ortho , should pt make appt  With us please advise thanks

## 2017-07-18 NOTE — TELEPHONE ENCOUNTER
----- Message from Marcelle Noble sent at 7/18/2017 11:43 AM CDT -----  Contact: patient [phone]  Pt called to ask for a referral for a back doctor asap. She is experiencing a lot of pain and has been calling since last week. Pt would like to have the epidural as this is a re-injury. Please call pt today .

## 2017-07-20 NOTE — PROGRESS NOTES
Subjective:      Patient ID: Marleny Guzman is a 67 y.o. female.    Chief Complaint: Low-back Pain    Ms Guzman is a 66 yo female here for evaluation of low back pain.  She has had back pain off and on for years.  She has had L4-5 intralaminar CHANTALE with IR in 2011, 2012, and 2013.  She had a fall 6 weeks ago when she slipped on a ramp and slipped on her buttock.  She was doing well after her last injection.  She feels like her pain is worse at this point.  The pain is across the back.  The pain is constant.  She has increased pain with sitting on a soft surface, bending, standing.  Transition from sit to stand is the worst.  She feels better walking.  The pain is an achy pain.  There is no leg pain.  There is no numbness and no tingling.  She has been taking flexeril and mobic with some relief of the pain.  She is sleeping good.  She has been to PT for her back in 2013, but did not continue her HEP.  Pain is 4/10 now, worst 9/10 getting out of bed turning, best 2/10 walking and with med.  She will take flexeril nightly.  She has not been taking mobic.  She has been taking percocet and that helps.      She has neck pain, that has been constant.  She has pain across the neck.  She has left arm swelling.  She has been dealing with neck pain for many years.  The back pain is what is bothering ehr the most.      X-ray cervical 2014  Vertebral body height and alignment appear well-maintained.  Bulky anterior disk osteophyte complexes appear unchanged with minimal associated disk space narrowing.  Posterior elements appear intact.  No acute fractures identified.  Right C4-5   neuroforaminal narrowing again demonstrated.  No osseous neuroforaminal narrowing on the left.  The odontoid process and atlantoaxial articulations are within normal limits.  Prevertebral soft tissues are within normal limits.    MRI 2008 multilevel degenerative changes worse at L4-5     Past Medical History:  No date: Abnormal Pap smear  No date:  Abnormal Pap smear of cervix  7/20/2016: Abnormal Pap smear of vagina  No date: Allergy  No date: Anemia  No date: Anxiety  No date: Cataract  No date: Depression  No date: Depression with anxiety  No date: Hypertension  6/20/2013: Osteoarthritis  No date: Right rotator cuff tear  No date: Sleep apnea      Comment: Cipap machine at home  No date: Trouble in sleeping  No date: Urinary incontinence      Comment: Leaks urine with laughing/ coughing/ sneezing    Past Surgical History:  6/18/12: CATARACT EXTRACTION      Comment: OS  7/16/12: CATARACT EXTRACTION      Comment: OD  No date: CERVIX SURGERY      Comment: [facility]  No date: COLONOSCOPY  3/4/2016: COLONOSCOPY N/A      Comment: Procedure: COLONOSCOPY;  Surgeon: Tenzin Thakkar MD;  Location: [address]);                 Service: Endoscopy;  Laterality: N/A;  No date: EYE SURGERY Bilateral      Comment: cataract  5-12-15: HYSTERECTOMY  No date: LEEP  No date: LUMBAR EPIDURAL INJECTION  5-12-15: WA REMOVAL OF OVARY/TUBE(S)  No date: SALPINGECTOMY Left  No date: SHOULDER SURGERY Right    Review of patient's family history indicates:    Hypertension                   Father                    Cataracts                      Father                    Cancer                         Mother                      Comment: THYROID CANCER    Stroke                         Mother                    Breast cancer                  Cousin                    Heart disease                  Sister                    Pacemaker/defibrilator         Sister                    Hypertension                   Sister                    Deep vein thrombosis           Sister                    Heart disease                  Sister                      Comment: CABG    Osteoarthritis                 Sister                    Edema                          Sister                    Eczema                         Sister                    Hypertension                    "Sister                    Anuerysm                       Sister                    Drug abuse                     Sister                    Hypertension                   Sister                    No Known Problems              Brother                   No Known Problems              Sister                    Cancer                         Sister                      Comment: Cancer cells or "growth in her stomach"    No Known Problems              Sister                    Hypertension                   Daughter                  Other                          Daughter                    Comment: Heart palpitations    Depression                     Maternal Grandmother      Ulcers                         Maternal Grandfather        Comment: Legs    No Known Problems              Paternal Grandmother      No Known Problems              Paternal Grandfather      Cancer                         Maternal Aunt             ADD / ADHD                     Neg Hx                    Alcohol abuse                  Neg Hx                    Anxiety disorder               Neg Hx                    Bipolar disorder               Neg Hx                    Dementia                       Neg Hx                    OCD                            Neg Hx                    Paranoid behavior              Neg Hx                    Physical abuse                 Neg Hx                    Schizophrenia                  Neg Hx                    Seizures                       Neg Hx                    Sexual abuse                   Neg Hx                    Amblyopia                      Neg Hx                    Blindness                      Neg Hx                    Glaucoma                       Neg Hx                    Macular degeneration           Neg Hx                    Retinal detachment             Neg Hx                    Strabismus                     Neg Hx                      Social History    Marital status:        "      Spouse name:                       Years of education:                 Number of children:               Occupational History  Occupation          Employer            Comment               Disability                              Depression                      DISABLED                Social History Main Topics    Smoking status: Former Smoker                                                                Packs/day: 0.50      Years: 22.00          Types: Cigarettes       Quit date: 2/7/1998    Smokeless tobacco: Never Used                        Alcohol use: Yes           0.0 oz/week       Comment: Occassionally for social events will have                a glass of wine    Drug use: No              Sexual activity: Not Currently           Birth control/protection: None, Surgical    Other Topics            Concern  Caffeine Use            Yes  Financial Status: Disa* Yes  Firearms: Does patient* No  Home situation: lives * Yes  Spirituality: Active P* Yes  Education: Unfinished * Yes  Spirituality: Organize* Yes  Legal: Arrest history   No    Social History Narrative    Marleny currently does operate an automobile.        Current Outpatient Prescriptions:  aspirin 81 MG Chew, Take 81 mg by mouth once daily., Disp: , Rfl:   cyclobenzaprine (FLEXERIL) 10 MG tablet, Take 1 tablet (10 mg total) by mouth every evening., Disp: 30 tablet, Rfl: 1  ergocalciferol (ERGOCALCIFEROL) 50,000 unit Cap, Take 1 capsule (50,000 Units total) by mouth every 30 days., Disp: 3 capsule, Rfl: 3  escitalopram oxalate (LEXAPRO) 20 MG tablet, TAKE 1 TABLET ONE TIME DAILY, Disp: 30 tablet, Rfl: 1  estradiol (ESTRACE) 0.01 % (0.1 mg/gram) vaginal cream, Place 1 g vaginally twice a week., Disp: 1 Tube, Rfl: 3  IRON, FERROUS SULFATE, ORAL, Take 1 tablet by mouth once daily., Disp: , Rfl:   lorazepam (ATIVAN) 0.5 MG tablet, Take 1 tablet (0.5 mg total) by mouth daily as needed., Disp: 30 tablet, Rfl: 0  losartan (COZAAR) 50 MG tablet, Take 1  tablet (50 mg total) by mouth once daily., Disp: 90 tablet, Rfl: 3  meloxicam (MOBIC) 7.5 MG tablet, One twice daily as needed for neck pain, Disp: 60 tablet, Rfl: 0  metoprolol succinate (TOPROL-XL) 50 MG 24 hr tablet, Take 1 tablet (50 mg total) by mouth once daily., Disp: 90 tablet, Rfl: 3    No current facility-administered medications for this visit.       Review of patient's allergies indicates:  No Known Allergies'        Review of Systems   Constitution: Negative for weight gain and weight loss.   Cardiovascular: Negative for chest pain.   Respiratory: Negative for shortness of breath.    Musculoskeletal: Positive for back pain and neck pain. Negative for joint pain and joint swelling.   Gastrointestinal: Negative for abdominal pain and bowel incontinence.   Genitourinary: Negative for bladder incontinence.   Neurological: Negative for numbness.         Objective:        General: Marleny is well-developed, well-nourished, appears stated age, in no acute distress, alert and oriented to time, place and person.     General    Vitals reviewed.  Constitutional: She is oriented to person, place, and time. She appears well-developed and well-nourished.   HENT:   Head: Normocephalic and atraumatic.   Pulmonary/Chest: Effort normal.   Neurological: She is alert and oriented to person, place, and time.   Psychiatric: She has a normal mood and affect. Her behavior is normal. Judgment and thought content normal.     General Musculoskeletal Exam   Gait: normal     Right Ankle/Foot Exam     Tests   Heel Walk: able to perform  Tiptoe Walk: able to perform    Left Ankle/Foot Exam     Tests   Heel Walk: able to perform  Tiptoe Walk: able to perform  Back (L-Spine & T-Spine) / Neck (C-Spine) Exam     Tenderness   The patient is tender to palpation of the right trapezial and left trapezial.     Back (L-Spine & T-Spine) Range of Motion   Extension: 10   Flexion: 40   Lateral Bend Right: 10   Lateral Bend Left: 10   Rotation  Right: 20   Rotation Left: 20     Spinal Sensation   Right Side Sensation  C-Spine Level: normal   L-Spine Level: normal  S-Spine Level: normal  Left Side Sensation  C-Spine Level: normal  L-Spine Level: normal  S-Spine Level: normal    Back (L-Spine & T-Spine) Tests   Right Side Tests  Straight leg raise:      Sitting SLR: > 70 degrees      Left Side Tests  Straight leg raise:     Sitting SLR: > 70 degrees          Other She has no scoliosis .  Spinal Kyphosis:  Absent    Comments:  No lumbar tenderness    Pos ophelia bilateral back pain      Muscle Strength   Right Upper Extremity   Biceps: 5/5/5   Deltoid:  5/5  Triceps:  5/5  Wrist Extension: 5/5/5   Finger Flexors:  5/5  Left Upper Extremity  Biceps: 5/5/5   Deltoid:  5/5  Triceps:  5/5  Wrist Extension: 5/5/5   Finger Flexors:  5/5  Right Lower Extremity   Hip Flexion: 5/5   Quadriceps:  5/5   Anterior tibial:  5/5/5  EHL:  5/5  Left Lower Extremity   Hip Flexion: 5/5   Quadriceps:  5/5   Anterior tibial:  5/5/5   EHL:  5/5    Reflexes     Left Side  Biceps:  2+  Triceps:  2+  Brachioradialis:  2+  Quadriceps:  2+  Achilles:  2+  Left Auguste's Sign:  Absent  Babinski Sign:  absent    Right Side   Biceps:  2+  Triceps:  2+  Brachioradialis:  2+  Quadriceps:  2+  Achilles:  2+  Right Auguste's Sign:  absent  Babinski Sign:  absent    Vascular Exam     Right Pulses        Carotid:                  2+    Left Pulses        Carotid:                  2+              Assessment:       1. DDD (degenerative disc disease), lumbar    2. Chronic bilateral low back pain without sciatica    3. Neck pain    4. Muscle spasm           Plan:       Orders Placed This Encounter    Procedure Order to [facility]    X-Ray Lumbar Complete With Flex And Ext    Ambulatory Referral to Physical/Occupational Therapy     More than 50% of the total time of 45 minutes was spent in counseling on diagnosis and treatment options.  We discussed back pain and the nature of back  pain.  We discussed that it will likely improve and that it is not one thing that causes the pain but an accumulation of multiple things that we do.  We discussed different treatment options.  We discussed posture sitting and the importance of trying to sit better.  We discussed the benefits of therapy and exercise and continuing to move.  She does not like therapy but we discussed it is important to move and do the exercises even when she does not have the pain.  We discussed that there are several different injections.  She ahs not had a recent MRI, however she has gotten great relief in past from L4-5 Intralaminar.  If no relief might need to repeat MRI, but she would like to proceed with the injection first, because it has helped  1.  L4-5 intralaminar CHANTALE  2.  X-ray lof the lumbar spine  3.  PT for her neck and abck.  Posture training, scapula stabilization, retraction, extension exercised, core strengthening, Si joint mobilization at vets  4.  Encourage her to take mobic  5.  We discussed taking half or 1/4 flexeril during the day  6.  RTC 6 weeks        Follow-up: Return in about 6 weeks (around 9/1/2017). If there are any questions prior to this, the patient was instructed to contact the office.

## 2017-07-21 ENCOUNTER — OFFICE VISIT (OUTPATIENT)
Dept: SPINE | Facility: CLINIC | Age: 67
End: 2017-07-21
Attending: PHYSICAL MEDICINE & REHABILITATION
Payer: MEDICARE

## 2017-07-21 ENCOUNTER — HOSPITAL ENCOUNTER (OUTPATIENT)
Dept: RADIOLOGY | Facility: OTHER | Age: 67
Discharge: HOME | End: 2017-07-21
Attending: PHYSICAL MEDICINE & REHABILITATION
Payer: MEDICARE

## 2017-07-21 VITALS
BODY MASS INDEX: 28.35 KG/M2 | HEART RATE: 63 BPM | WEIGHT: 198 LBS | HEIGHT: 70 IN | SYSTOLIC BLOOD PRESSURE: 171 MMHG | DIASTOLIC BLOOD PRESSURE: 70 MMHG

## 2017-07-21 DIAGNOSIS — M62.838 MUSCLE SPASM: ICD-10-CM

## 2017-07-21 DIAGNOSIS — G89.29 CHRONIC BILATERAL LOW BACK PAIN WITHOUT SCIATICA: ICD-10-CM

## 2017-07-21 DIAGNOSIS — M54.2 NECK PAIN: ICD-10-CM

## 2017-07-21 DIAGNOSIS — M51.36 DDD (DEGENERATIVE DISC DISEASE), LUMBAR: Primary | ICD-10-CM

## 2017-07-21 DIAGNOSIS — M51.36 DDD (DEGENERATIVE DISC DISEASE), LUMBAR: ICD-10-CM

## 2017-07-21 DIAGNOSIS — M54.50 CHRONIC BILATERAL LOW BACK PAIN WITHOUT SCIATICA: ICD-10-CM

## 2017-07-21 PROCEDURE — 1125F AMNT PAIN NOTED PAIN PRSNT: CPT | Mod: S$GLB,,, | Performed by: PHYSICAL MEDICINE & REHABILITATION

## 2017-07-21 PROCEDURE — 72114 X-RAY EXAM L-S SPINE BENDING: CPT | Mod: TC

## 2017-07-21 PROCEDURE — 99999 PR PBB SHADOW E&M-EST. PATIENT-LVL III: CPT | Mod: PBBFAC,,, | Performed by: PHYSICAL MEDICINE & REHABILITATION

## 2017-07-21 PROCEDURE — 99214 OFFICE O/P EST MOD 30 MIN: CPT | Mod: S$GLB,,, | Performed by: PHYSICAL MEDICINE & REHABILITATION

## 2017-07-21 PROCEDURE — 72114 X-RAY EXAM L-S SPINE BENDING: CPT | Mod: 26,,, | Performed by: RADIOLOGY

## 2017-07-21 PROCEDURE — 1159F MED LIST DOCD IN RCRD: CPT | Mod: S$GLB,,, | Performed by: PHYSICAL MEDICINE & REHABILITATION

## 2017-07-24 ENCOUNTER — OFFICE VISIT (OUTPATIENT)
Dept: GYNECOLOGIC ONCOLOGY | Facility: CLINIC | Age: 67
End: 2017-07-24
Payer: MEDICARE

## 2017-07-24 VITALS
DIASTOLIC BLOOD PRESSURE: 70 MMHG | SYSTOLIC BLOOD PRESSURE: 150 MMHG | BODY MASS INDEX: 28.28 KG/M2 | WEIGHT: 197.06 LBS | HEART RATE: 61 BPM

## 2017-07-24 DIAGNOSIS — Z01.419 WELL FEMALE EXAM WITH ROUTINE GYNECOLOGICAL EXAM: ICD-10-CM

## 2017-07-24 DIAGNOSIS — R87.629 ABNORMAL PAP SMEAR OF VAGINA: ICD-10-CM

## 2017-07-24 DIAGNOSIS — N89.3 VAGINAL DYSPLASIA: Primary | ICD-10-CM

## 2017-07-24 PROCEDURE — 1126F AMNT PAIN NOTED NONE PRSNT: CPT | Mod: S$GLB,,, | Performed by: OBSTETRICS & GYNECOLOGY

## 2017-07-24 PROCEDURE — 99999 PR PBB SHADOW E&M-EST. PATIENT-LVL III: CPT | Mod: PBBFAC,,, | Performed by: OBSTETRICS & GYNECOLOGY

## 2017-07-24 PROCEDURE — 99214 OFFICE O/P EST MOD 30 MIN: CPT | Mod: S$GLB,,, | Performed by: OBSTETRICS & GYNECOLOGY

## 2017-07-24 PROCEDURE — 88175 CYTOPATH C/V AUTO FLUID REDO: CPT | Performed by: PATHOLOGY

## 2017-07-24 PROCEDURE — 87624 HPV HI-RISK TYP POOLED RSLT: CPT

## 2017-07-24 PROCEDURE — 88141 CYTOPATH C/V INTERPRET: CPT | Mod: ,,, | Performed by: PATHOLOGY

## 2017-07-24 PROCEDURE — 1159F MED LIST DOCD IN RCRD: CPT | Mod: S$GLB,,, | Performed by: OBSTETRICS & GYNECOLOGY

## 2017-07-24 RX ORDER — ESTRADIOL 0.1 MG/G
1 CREAM VAGINAL
Qty: 1 TUBE | Refills: 3 | Status: SHIPPED | OUTPATIENT
Start: 2017-07-24 | End: 2018-01-22 | Stop reason: SDUPTHER

## 2017-07-24 NOTE — PROGRESS NOTES
Subjective:      Patient ID: Marleny Guzman is a 67 y.o. female.    Chief Complaint: Follow-up (6mth)      HPI  Patient s/p RATLH/BSO for persistent HGSIL of cervix 5/12/15 with benign pathology with Dr. Gonzalez. Vaginal pap smear 6/15/16 HSIL. She was referred for consult by Dr. Valles. 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 Noncompliant with vaginal estrogen cream as prescribed.      Follow up pap 1/30/17 HSIL. 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     Presents today for follow up. Without gynecologic complaints. Denies bleeding.     MMG 10/26/16     Review of Systems   Constitutional: Negative for appetite change, chills, fatigue and fever.   HENT: Negative for mouth sores.    Respiratory: Negative for cough and shortness of breath.    Cardiovascular: Negative for leg swelling.   Gastrointestinal: Negative for abdominal pain, blood in stool, constipation and diarrhea.   Endocrine: Negative for cold intolerance.   Genitourinary: Negative for dysuria and vaginal bleeding.   Musculoskeletal: Negative for myalgias.   Skin: Negative for rash.   Allergic/Immunologic: Negative.    Neurological: Negative for weakness and numbness.   Hematological: Negative for adenopathy. Does not bruise/bleed easily.   Psychiatric/Behavioral: Negative for confusion.        Objective:   Physical Exam:   Constitutional: She is oriented to person, place, and time. She appears well-developed and well-nourished.    HENT:   Head: Normocephalic and atraumatic.    Eyes: EOM are normal. Pupils are equal, round, and reactive to light.    Neck: Normal range of motion. Neck supple. No thyromegaly present.    Cardiovascular: Normal rate, regular rhythm and intact distal pulses.     Pulmonary/Chest: Effort normal and breath sounds normal. No respiratory distress. She has no wheezes.        Abdominal: Soft. Bowel sounds are normal. She exhibits no distension, no ascites and no mass. There is no tenderness.      Genitourinary: Rectum normal and vagina normal. Pelvic exam was performed with patient supine. There is no lesion on the right labia. There is no lesion on the left labia. Uterus is absent. There is an absent adnexa. Right adnexum displays no mass. Left adnexum displays no mass. No bleeding in the vagina. Vaginal cuff normal.Cervix exhibits absence.   Genitourinary Comments: No lesions noted on exam today.            Musculoskeletal: Normal range of motion and moves all extremeties.      Lymphadenopathy:     She has no cervical adenopathy.        Right: No inguinal and no supraclavicular adenopathy present.        Left: No inguinal and no supraclavicular adenopathy present.    Neurological: She is alert and oriented to person, place, and time.    Skin: Skin is warm and dry. No rash noted.    Psychiatric: She has a normal mood and affect.       Assessment:     1. Vaginal dysplasia    2. Abnormal Pap smear of vagina    3. Well female exam with routine gynecological exam      - no gross evidence of disease today  - pap smear collected    Plan:     Orders Placed This Encounter   Procedures    Mammo Digital Screening Bilat with [employer]os[employer]_CAD     1. Continue vaginal estrogen twice weekly  2. Will communicate results of pap smear with patient  3. Order placed for routine MMG screening due in October 4. RTC 6 months for surveillance of vaginal dysplasia

## 2017-08-03 ENCOUNTER — HOSPITAL ENCOUNTER (OUTPATIENT)
Facility: OTHER | Age: 67
Discharge: HOME | End: 2017-08-03
Attending: ANESTHESIOLOGY | Admitting: ANESTHESIOLOGY
Payer: MEDICARE

## 2017-08-03 ENCOUNTER — SURGERY (OUTPATIENT)
Age: 67
End: 2017-08-03

## 2017-08-03 VITALS
HEIGHT: 70 IN | SYSTOLIC BLOOD PRESSURE: 190 MMHG | BODY MASS INDEX: 28.35 KG/M2 | RESPIRATION RATE: 18 BRPM | WEIGHT: 198 LBS | DIASTOLIC BLOOD PRESSURE: 86 MMHG | TEMPERATURE: 98 F | HEART RATE: 60 BPM | OXYGEN SATURATION: 97 %

## 2017-08-03 DIAGNOSIS — M51.36 DDD (DEGENERATIVE DISC DISEASE), LUMBAR: Primary | ICD-10-CM

## 2017-08-03 PROBLEM — M51.369 DDD (DEGENERATIVE DISC DISEASE), LUMBAR: Status: ACTIVE | Noted: 2017-08-03

## 2017-08-03 PROCEDURE — 62322 NJX INTERLAMINAR LMBR/SAC: CPT | Performed by: ANESTHESIOLOGY

## 2017-08-03 PROCEDURE — 62323 NJX INTERLAMINAR LMBR/SAC: CPT | Mod: ,,, | Performed by: ANESTHESIOLOGY

## 2017-08-03 PROCEDURE — 63600175 PHARM REV CODE 636 W HCPCS: Performed by: ANESTHESIOLOGY

## 2017-08-03 PROCEDURE — 25500020 PHARM REV CODE 255: Performed by: ANESTHESIOLOGY

## 2017-08-03 PROCEDURE — 25000003 PHARM REV CODE 250: Performed by: ANESTHESIOLOGY

## 2017-08-03 PROCEDURE — 62323 NJX INTERLAMINAR LMBR/SAC: CPT | Performed by: ANESTHESIOLOGY

## 2017-08-03 RX ORDER — METHYLPREDNISOLONE ACETATE 80 MG/ML
80 INJECTION, SUSPENSION INTRA-ARTICULAR; INTRALESIONAL; INTRAMUSCULAR; SOFT TISSUE ONCE
Status: COMPLETED | OUTPATIENT
Start: 2017-08-04 | End: 2017-08-03

## 2017-08-03 RX ORDER — LIDOCAINE HYDROCHLORIDE 10 MG/ML
10 INJECTION INFILTRATION; PERINEURAL ONCE
Status: COMPLETED | OUTPATIENT
Start: 2017-08-04 | End: 2017-08-03

## 2017-08-03 RX ORDER — BUPIVACAINE HYDROCHLORIDE 2.5 MG/ML
10 INJECTION, SOLUTION EPIDURAL; INFILTRATION; INTRACAUDAL ONCE
Status: DISCONTINUED | OUTPATIENT
Start: 2017-08-04 | End: 2017-08-03 | Stop reason: HOSPADM

## 2017-08-03 RX ORDER — BUPIVACAINE HYDROCHLORIDE 2.5 MG/ML
INJECTION, SOLUTION EPIDURAL; INFILTRATION; INTRACAUDAL
Status: DISCONTINUED | OUTPATIENT
Start: 2017-08-03 | End: 2017-08-03 | Stop reason: HOSPADM

## 2017-08-03 RX ORDER — ALPRAZOLAM 0.5 MG/1
0.5 TABLET, ORALLY DISINTEGRATING ORAL ONCE
Status: COMPLETED | OUTPATIENT
Start: 2017-08-03 | End: 2017-08-03

## 2017-08-03 RX ADMIN — ALPRAZOLAM 0.5 MG: 0.5 TABLET, ORALLY DISINTEGRATING ORAL at 09:08

## 2017-08-03 RX ADMIN — METHYLPREDNISOLONE ACETATE 80 MG: 80 INJECTION, SUSPENSION INTRA-ARTICULAR; INTRALESIONAL; INTRAMUSCULAR; SOFT TISSUE at 10:08

## 2017-08-03 RX ADMIN — BUPIVACAINE HYDROCHLORIDE 10 ML: 2.5 INJECTION, SOLUTION EPIDURAL; INFILTRATION; INTRACAUDAL; PERINEURAL at 10:08

## 2017-08-03 RX ADMIN — SODIUM BICARBONATE 4 MEQ: 0.2 INJECTION, SOLUTION INTRAVENOUS at 10:08

## 2017-08-03 RX ADMIN — IOHEXOL 5 ML: 300 INJECTION, SOLUTION INTRAVENOUS at 10:08

## 2017-08-03 RX ADMIN — LIDOCAINE HYDROCHLORIDE 10 ML: 10 INJECTION, SOLUTION INFILTRATION; PERINEURAL at 10:08

## 2017-08-03 NOTE — OP NOTE
Date of Procedure: 08/03/2017    Procedure: L4/5 Interlaminar Epidural Steroid Injection    Referring Provider: Dr. Gill    Pre-op diagnosis: Lumbar radiculopathy [M54.16]    Post-op diagnosis: Lumbar radiculopathy [M54.16]    Physician: Dr. Luzmaria Feldman     Assistant: Dr. Hollingsworth    Anesthestia: local    EBL: None    Specimens: None    All medications, allergies, and relevant histories were reviewed. No recent antibiotics or infections.  A time-out was taken to verify the correct patient, procedure, laterality, and appropriate medications/allergies.    Fluoroscopically-Guided, Contrast-Controlled Lumbar Interlaminar Epidural Steroid Injection:     Following denial of allergy and review of potential side effects and complications including but not necessarily limited to infection, allergic reaction, local tissue breakdown, nerve injury, paresis, paralysis, spinal cord injury, and seizure, the patient indicated they understood and agreed to proceed.     Patient was placed in prone position. Lumbar area was prepped and draped in sterile manner. Local Xylocaine 1% was given subcutaneously at the level L4/5. An 18-gauge Tuohy needle was introduced atraumatically in the interspinous space and advanced up to the epidural space using fluoroscopic guidance in the AP position. Loss of resistance to air was used to identify the epidural space using fluoroscopic guidance in the lateral position. Following negative aspiration for blood and CSF and confirming the absence of paresthesias, injection of approximately 1 cc of Omnipaque 300 demonstrated excellent epidural spread without vascular or intrathecal uptake. At this point, 2cc of 0.25% marcaine with 80 mg of Depo-Medrol was injected without complication. The needle was flushed with 1% lidocaine and withdrawn.     Patient tolerated the procedure well without any side effects. No noted complications.     The patient was followed post procedure and discharged under their  own power in excellent condition in the company of a responsible adult.     Future Management:   If helpful, can repeat as needed.    Follow up with Dr. Crain    I certify that I provided the above services.  I was present for the entire procedure, which was performed by myself with the assistance of the resident physician.  There were no parts of the procedure that were performed not by myself or without my direct supervision.

## 2017-08-03 NOTE — DISCHARGE INSTRUCTIONS
Home Care Instructions Pain Management:    1. DIET:   You may resume your normal diet today.   2. BATHING:   You may shower with luke warm water. No tub baths or anything that will soak injection sites under water for 24 hours.  3. DRESSING:   You may remove your bandage today.   4. ACTIVITY LEVEL:   You may resume your normal activities 24 hrs after your procedure. Nothing strenuous today.  5. MEDICATIONS:   You may resume your normal medications today. To restart blood thinners, ask your doctor.  6. SPECIAL INSTRUCTIONS:   No heat to the injection site for 24 hrs including, bath or shower, heating pad, moist heat, or hot tubs.    Use ice pack to injection site for any pain or discomfort.  Apply ice packs for 20 minute intervals as needed.     If you have received any sedatives by mouth today you may not drive for 12 hours.    If you have received any sedation through your IV, you may not drive for 24 hrs.     PLEASE CALL YOUR DOCTOR IF:  1. Redness or swelling around the injection site.  2. Fever of 101 degrees or more  3. Drainage (pus) from the injection site.  4. For any continuous bleeding (some dried blood over the incision is normal.)    FOR EMERGENCIES:   If any unusual problems or difficulties occur during clinic hours, call [phone] or [phone].

## 2017-08-03 NOTE — PLAN OF CARE
Problem: Patient Care Overview  Goal: Plan of Care Review  Pt tolerated procedure well. Pt reports 6/10 pain after procedure. Complains of pain at injection site. Assisted pt up for first time. Steady on feet. All discharge instructions given.

## 2017-08-08 LAB
HPV HR 12 DNA CVX QL NAA+PROBE: POSITIVE
HPV16 DNA SPEC QL NAA+PROBE: NEGATIVE
HPV18 DNA SPEC QL NAA+PROBE: POSITIVE

## 2017-08-28 ENCOUNTER — OFFICE VISIT (OUTPATIENT)
Dept: INTERNAL MEDICINE | Facility: CLINIC | Age: 67
End: 2017-08-28
Payer: MEDICARE

## 2017-08-28 ENCOUNTER — HOSPITAL ENCOUNTER (OUTPATIENT)
Dept: RADIOLOGY | Facility: HOSPITAL | Age: 67
Discharge: HOME | End: 2017-08-28
Attending: INTERNAL MEDICINE
Payer: MEDICARE

## 2017-08-28 VITALS
HEART RATE: 86 BPM | HEIGHT: 70 IN | TEMPERATURE: 99 F | WEIGHT: 194.44 LBS | SYSTOLIC BLOOD PRESSURE: 134 MMHG | DIASTOLIC BLOOD PRESSURE: 64 MMHG | BODY MASS INDEX: 27.84 KG/M2

## 2017-08-28 DIAGNOSIS — R05.9 COUGH: ICD-10-CM

## 2017-08-28 DIAGNOSIS — J20.9 ACUTE BRONCHITIS, UNSPECIFIED ORGANISM: ICD-10-CM

## 2017-08-28 DIAGNOSIS — J32.9 SINUSITIS, UNSPECIFIED CHRONICITY, UNSPECIFIED LOCATION: Primary | ICD-10-CM

## 2017-08-28 PROCEDURE — 3075F SYST BP GE 130 - 139MM HG: CPT | Mod: S$GLB,,, | Performed by: INTERNAL MEDICINE

## 2017-08-28 PROCEDURE — 99213 OFFICE O/P EST LOW 20 MIN: CPT | Mod: S$GLB,,, | Performed by: INTERNAL MEDICINE

## 2017-08-28 PROCEDURE — 71020 XR CHEST PA AND LATERAL: CPT | Mod: 26,,, | Performed by: RADIOLOGY

## 2017-08-28 PROCEDURE — 71020 XR CHEST PA AND LATERAL: CPT | Mod: TC

## 2017-08-28 PROCEDURE — 3008F BODY MASS INDEX DOCD: CPT | Mod: S$GLB,,, | Performed by: INTERNAL MEDICINE

## 2017-08-28 PROCEDURE — 3078F DIAST BP <80 MM HG: CPT | Mod: S$GLB,,, | Performed by: INTERNAL MEDICINE

## 2017-08-28 PROCEDURE — 1159F MED LIST DOCD IN RCRD: CPT | Mod: S$GLB,,, | Performed by: INTERNAL MEDICINE

## 2017-08-28 PROCEDURE — 99999 PR PBB SHADOW E&M-EST. PATIENT-LVL III: CPT | Mod: PBBFAC,,, | Performed by: INTERNAL MEDICINE

## 2017-08-28 PROCEDURE — 1125F AMNT PAIN NOTED PAIN PRSNT: CPT | Mod: S$GLB,,, | Performed by: INTERNAL MEDICINE

## 2017-08-28 RX ORDER — AMOXICILLIN AND CLAVULANATE POTASSIUM 875; 125 MG/1; MG/1
1 TABLET, FILM COATED ORAL 2 TIMES DAILY
Qty: 20 TABLET | Refills: 0 | Status: SHIPPED | OUTPATIENT
Start: 2017-08-28 | End: 2018-01-08

## 2017-08-28 RX ORDER — ALBUTEROL SULFATE 90 UG/1
2 AEROSOL, METERED RESPIRATORY (INHALATION) EVERY 6 HOURS PRN
Qty: 6.7 G | Refills: 1 | Status: SHIPPED | OUTPATIENT
Start: 2017-08-28 | End: 2018-01-08

## 2017-08-28 RX ORDER — CODEINE PHOSPHATE AND GUAIFENESIN 10; 100 MG/5ML; MG/5ML
5 SOLUTION ORAL EVERY 8 HOURS PRN
Qty: 120 ML | Refills: 0 | Status: SHIPPED | OUTPATIENT
Start: 2017-08-28 | End: 2017-09-04

## 2017-08-28 NOTE — PROGRESS NOTES
"Subjective:       Patient ID: Marleny Guzman is a 67 y.o. female.    Chief Complaint: Cough and Chills   HPI   This is a 67-year-old who presents today with upper respiratory symptoms. she reported started with a sore throat sinus congestion and cough over the weekend.  Now the cough is becoming increasingly productive .she's had some fever and chills at time some increased fatigue associated with her symptoms.  She reports she was around her sister who was sick recently with some ALLERGIES.  Patient has had some nasal congestion facial pressure and initially had a her sore throat has improved she is taking some over-the-counter medications for her cough with minimal improvement    Review of Systems   Constitutional: Positive for chills, fatigue and fever.   HENT: Positive for congestion.    Respiratory: Positive for cough and wheezing.        Objective:     Blood pressure 134/64, pulse 86, temperature 98.9 °F (37.2 °C), height 5' 10" (1.778 m), weight 88.2 kg (194 lb 7.1 oz).    Physical Exam   Constitutional: No distress.   HENT:   Head: Normocephalic.   Mouth/Throat: Oropharynx is clear and moist.   Congestion    Eyes: No scleral icterus.   Neck: Neck supple.   Cardiovascular: Normal rate, regular rhythm and normal heart sounds.  Exam reveals no gallop and no friction rub.    No murmur heard.  Pulmonary/Chest: Effort normal. No respiratory distress.   Course rare wheeze    Abdominal: Soft. Bowel sounds are normal. She exhibits no mass. There is no tenderness.   Musculoskeletal: She exhibits no edema.   Neurological: She is alert.   Skin: No erythema.   Vitals reviewed.      Assessment:       1. Sinusitis, unspecified chronicity, unspecified location    2. Cough    3. Acute bronchitis, unspecified organism        Plan:       Marleny was seen today for cough and chills.    Diagnoses and all orders for this visit:    Sinusitis, unspecified chronicity, unspecified location  Will treat     Cough  -     X-Ray Chest PA " And Lateral; Future    Acute bronchitis, unspecified organism  rx risk benefits reviewed   -     guaifenesin-codeine 100-10 mg/5 ml (TUSSI-ORGANIDIN NR)  mg/5 mL syrup; Take 5 mLs by mouth every 8 (eight) hours as needed for Cough.      -     albuterol 90 mcg/actuation inhaler; Inhale 2 puffs into the lungs every 6 (six) hours as needed for Wheezing. Rescue  -     amoxicillin-clavulanate 875-125mg (AUGMENTIN) 875-125 mg per tablet; Take 1 tablet by mouth 2 (two) times daily.    Chest xray no sign of pneumonia she will call if no improvement or symptoms progress

## 2017-08-31 ENCOUNTER — TELEPHONE (OUTPATIENT)
Dept: INTERNAL MEDICINE | Facility: CLINIC | Age: 67
End: 2017-08-31

## 2017-08-31 NOTE — TELEPHONE ENCOUNTER
----- Message from Shelia Vazquez sent at 8/30/2017 12:27 PM CDT -----  Contact: Self/ [phone]   Pt want to have the doctor call in a medication for a cough. Please call and advise     Thank you

## 2017-08-31 NOTE — TELEPHONE ENCOUNTER
Pt is requesting so medication for the cough she is experiencing please advise thanks no fever or chills

## 2017-09-04 ENCOUNTER — NURSE TRIAGE (OUTPATIENT)
Dept: ADMINISTRATIVE | Facility: CLINIC | Age: 67
End: 2017-09-04

## 2017-09-04 NOTE — TELEPHONE ENCOUNTER
Reason for Disposition   Caller has medication question about med not prescribed by PCP and triager unable to answer question (e.g., compatibility with other med, storage)    Protocols used: ST MEDICATION QUESTION CALL-A-    Calling to request recommendations for OTC meds.  Will discuss with pharmacist.

## 2017-10-19 ENCOUNTER — TELEPHONE (OUTPATIENT)
Dept: INTERNAL MEDICINE | Facility: CLINIC | Age: 67
End: 2017-10-19

## 2017-10-19 DIAGNOSIS — F41.9 ANXIETY: Chronic | ICD-10-CM

## 2017-10-19 RX ORDER — LORAZEPAM 0.5 MG/1
0.5 TABLET ORAL DAILY PRN
Qty: 30 TABLET | Refills: 0 | Status: SHIPPED | OUTPATIENT
Start: 2017-10-19 | End: 2018-07-23

## 2017-10-19 NOTE — TELEPHONE ENCOUNTER
----- Message from Marcelle Goodman sent at 10/18/2017  4:52 PM CDT -----  Contact: self/[phone]  Pt called in regards to talking to the office about having anxiety.         Please advise

## 2017-10-25 ENCOUNTER — TELEPHONE (OUTPATIENT)
Dept: INTERNAL MEDICINE | Facility: CLINIC | Age: 67
End: 2017-10-25

## 2017-10-25 NOTE — TELEPHONE ENCOUNTER
----- Message from Gillian Ferris sent at 10/25/2017 11:58 AM CDT -----  Lab Orders Needed    I have scheduled the above patient's annual physical. Lab orders need to be placed and scheduled.    Date of Annual Physical: 01/29/2018    Thank You

## 2017-10-28 ENCOUNTER — OFFICE VISIT (OUTPATIENT)
Dept: URGENT CARE | Facility: CLINIC | Age: 67
End: 2017-10-28
Payer: MEDICARE

## 2017-10-28 VITALS
SYSTOLIC BLOOD PRESSURE: 144 MMHG | HEART RATE: 94 BPM | HEIGHT: 70 IN | BODY MASS INDEX: 27.2 KG/M2 | WEIGHT: 190 LBS | DIASTOLIC BLOOD PRESSURE: 83 MMHG | TEMPERATURE: 99 F

## 2017-10-28 DIAGNOSIS — J40 BRONCHITIS: Primary | ICD-10-CM

## 2017-10-28 PROCEDURE — 99214 OFFICE O/P EST MOD 30 MIN: CPT | Mod: S$GLB,,, | Performed by: FAMILY MEDICINE

## 2017-10-28 RX ORDER — DOXYCYCLINE 100 MG/1
100 CAPSULE ORAL 2 TIMES DAILY
Qty: 14 CAPSULE | Refills: 0 | Status: SHIPPED | OUTPATIENT
Start: 2017-10-28 | End: 2017-11-04

## 2017-10-28 NOTE — PATIENT INSTRUCTIONS
Take a probiotic daily while on the antibiotic to prevent diarrhea and vaginitis. For example Align, Culturelle, etc. They are available over the counter.    Take doxycycline with breakfast and dinner. Do not lie down for an hour after taking it. Avoid sun exposure.        Acute Bronchitis  Your healthcare provider has told you that you have acute bronchitis. Bronchitis is infection or inflammation of the bronchial tubes (airways in the lungs). Normally, air moves easily in and out of the airways. Bronchitis narrows the airways, making it harder for air to flow in and out of the lungs. This causes symptoms such as shortness of breath, coughing up yellow or green mucus, and wheezing. Bronchitis can be acute or chronic. Acute means the condition comes on quickly and goes away in a short time, usually within 3 to 10 days. Chronic means a condition lasts a long time and often comes back.    What causes acute bronchitis?  Acute bronchitis almost always starts as a viral respiratory infection, such as a cold or the flu. Certain factors make it more likely for a cold or flu to turn into bronchitis. These include being very young, being elderly, having a heart or lung problem, or having a weak immune system. Cigarette smoking also makes bronchitis more likely.  When bronchitis develops, the airways become swollen. The airways may also become infected with bacteria. This is known as a secondary infection.  Diagnosing acute bronchitis  Your healthcare provider will examine you and ask about your symptoms and health history. You may also have a sputum culture to test the fluid in your lungs. Chest X-rays may be done to look for infection in the lungs.  Treating acute bronchitis  Bronchitis usually clears up as the cold or flu goes away. You can help feel better faster by doing the following:  · Take medicine as directed. You may be told to take ibuprofen or other over-the-counter medicines. These help relieve inflammation in  your bronchial tubes. Your healthcare provider may prescribe an inhaler to help open up the bronchial tubes. Most of the time, acute bronchitis is caused by a viral infection. Antibiotics are usually not prescribed for viral infections.  · Drink plenty of fluids, such as water, juice, or warm soup. Fluids loosen mucus so that you can cough it up. This helps you breathe more easily. Fluids also prevent dehydration.  · Make sure you get plenty of rest.  · Do not smoke. Do not allow anyone else to smoke in your home.  Recovery and follow-up  Follow up with your doctor as you are told. You will likely feel better in a week or two. But a dry cough can linger beyond that time. Let your doctor know if you still have symptoms (other than a dry cough) after 2 weeks, or if youre prone to getting bronchial infections. Take steps to protect yourself from future infections. These steps include stopping smoking and avoiding tobacco smoke, washing your hands often, and getting a yearly flu shot.  When to call your healthcare provider  Call the healthcare provider if you have any of the following:  · Fever of 100.4°F (38.0°C) or higher, or as advised  · Symptoms that get worse, or new symptoms  · Trouble breathing  · Symptoms that dont start to improve within a week, or within 3 days of taking antibiotics   Date Last Reviewed: 12/1/2016  © 7408-7597 [employer]?. [address] [place] [address], [place] [address]. All rights reserved. This information is not intended as a substitute for professional medical care. Always follow your healthcare professional's instructions.

## 2017-10-28 NOTE — PROGRESS NOTES
"Subjective:       Patient ID: Marleny Guzman is a 67 y.o. female.    Vitals:  height is 5' 10" (1.778 m) and weight is 86.2 kg (190 lb). Her temperature is 98.9 °F (37.2 °C). Her blood pressure is 144/83 (abnormal) and her pulse is 94.     Chief Complaint: Cough    1 weeks of cough nonproductive without dyspnea or chest pain.  Nonsmoker.      Cough   This is a new problem. The current episode started in the past 7 days. The problem has been gradually worsening. The problem occurs every few minutes. The cough is non-productive. Associated symptoms include chills, postnasal drip and wheezing. Pertinent negatives include no chest pain, fever, headaches, rash, sore throat or shortness of breath. The symptoms are aggravated by lying down. She has tried nothing for the symptoms. The treatment provided no relief.     Review of Systems   Constitution: Positive for chills. Negative for fever.   HENT: Positive for postnasal drip. Negative for sore throat.    Eyes: Negative for blurred vision.   Cardiovascular: Negative for chest pain.   Respiratory: Positive for cough, sputum production and wheezing. Negative for shortness of breath.    Skin: Negative for rash.   Musculoskeletal: Negative for back pain and joint pain.   Gastrointestinal: Negative for abdominal pain, diarrhea, nausea and vomiting.   Neurological: Negative for headaches.   Psychiatric/Behavioral: The patient is not nervous/anxious.        Objective:      Physical Exam   Constitutional: She appears well-developed and well-nourished. No distress.   HENT:   Head: Normocephalic.   Right Ear: External ear normal.   Left Ear: External ear normal.   Nose: Nose normal.   Mouth/Throat: Oropharynx is clear and moist. No oropharyngeal exudate.   TM's normal.   Eyes: Conjunctivae are normal.   Neck: No thyromegaly present.   Cardiovascular: Normal rate and regular rhythm.    No murmur heard.  Pulmonary/Chest: Effort normal and breath sounds normal. No respiratory " distress. She has no wheezes. She has no rales.   Lymphadenopathy:     She has no cervical adenopathy.   Skin: She is not diaphoretic.   Psychiatric: She has a normal mood and affect. Her behavior is normal.   Nursing note and vitals reviewed.      Assessment:       1. Bronchitis        Plan:         Bronchitis  -     doxycycline (VIBRAMYCIN) 100 MG Cap; Take 1 capsule (100 mg total) by mouth 2 (two) times daily.  Dispense: 14 capsule; Refill: 0

## 2017-12-12 ENCOUNTER — HOSPITAL ENCOUNTER (OUTPATIENT)
Dept: RADIOLOGY | Facility: HOSPITAL | Age: 67
Discharge: HOME | End: 2017-12-12
Attending: OBSTETRICS & GYNECOLOGY
Payer: MEDICARE

## 2017-12-12 VITALS — HEIGHT: 70 IN | WEIGHT: 190 LBS | BODY MASS INDEX: 27.2 KG/M2

## 2017-12-12 DIAGNOSIS — Z12.31 ENCOUNTER FOR SCREENING MAMMOGRAM FOR BREAST CANCER: ICD-10-CM

## 2017-12-12 PROCEDURE — 77067 SCR MAMMO BI INCL CAD: CPT | Mod: 26,,, | Performed by: RADIOLOGY

## 2017-12-12 PROCEDURE — 77063 BREAST TOMOSYNTHESIS BI: CPT | Mod: 26,,, | Performed by: RADIOLOGY

## 2017-12-12 PROCEDURE — 77067 SCR MAMMO BI INCL CAD: CPT | Mod: TC

## 2017-12-22 ENCOUNTER — OFFICE VISIT (OUTPATIENT)
Dept: INTERNAL MEDICINE | Facility: CLINIC | Age: 67
End: 2017-12-22
Payer: MEDICARE

## 2017-12-22 ENCOUNTER — LAB VISIT (OUTPATIENT)
Dept: LAB | Facility: HOSPITAL | Age: 67
End: 2017-12-22
Attending: INTERNAL MEDICINE
Payer: MEDICARE

## 2017-12-22 VITALS
DIASTOLIC BLOOD PRESSURE: 85 MMHG | SYSTOLIC BLOOD PRESSURE: 168 MMHG | HEIGHT: 70 IN | WEIGHT: 198 LBS | HEART RATE: 58 BPM | BODY MASS INDEX: 28.35 KG/M2

## 2017-12-22 DIAGNOSIS — R73.9 HYPERGLYCEMIA: ICD-10-CM

## 2017-12-22 DIAGNOSIS — M79.602 PAIN OF LEFT UPPER EXTREMITY: ICD-10-CM

## 2017-12-22 DIAGNOSIS — M54.9 BACK PAIN, UNSPECIFIED BACK LOCATION, UNSPECIFIED BACK PAIN LATERALITY, UNSPECIFIED CHRONICITY: Primary | ICD-10-CM

## 2017-12-22 DIAGNOSIS — I10 ESSENTIAL HYPERTENSION: Chronic | ICD-10-CM

## 2017-12-22 DIAGNOSIS — E55.9 VITAMIN D DEFICIENCY: ICD-10-CM

## 2017-12-22 DIAGNOSIS — F32.A DEPRESSION, UNSPECIFIED DEPRESSION TYPE: Chronic | ICD-10-CM

## 2017-12-22 DIAGNOSIS — E78.2 HYPERLIPIDEMIA, MIXED: ICD-10-CM

## 2017-12-22 DIAGNOSIS — F41.9 ANXIETY: Chronic | ICD-10-CM

## 2017-12-22 LAB
25(OH)D3+25(OH)D2 SERPL-MCNC: 25 NG/ML
ALBUMIN SERPL BCP-MCNC: 3.4 G/DL
ALP SERPL-CCNC: 114 U/L
ALT SERPL W/O P-5'-P-CCNC: 14 U/L
ANION GAP SERPL CALC-SCNC: 6 MMOL/L
AST SERPL-CCNC: 18 U/L
BASOPHILS # BLD AUTO: 0.04 K/UL
BASOPHILS NFR BLD: 0.4 %
BILIRUB SERPL-MCNC: 0.6 MG/DL
BUN SERPL-MCNC: 17 MG/DL
CALCIUM SERPL-MCNC: 9.3 MG/DL
CHLORIDE SERPL-SCNC: 105 MMOL/L
CHOLEST SERPL-MCNC: 134 MG/DL
CHOLEST/HDLC SERPL: 3 {RATIO}
CO2 SERPL-SCNC: 30 MMOL/L
CREAT SERPL-MCNC: 0.9 MG/DL
DIFFERENTIAL METHOD: ABNORMAL
EOSINOPHIL # BLD AUTO: 0.2 K/UL
EOSINOPHIL NFR BLD: 1.7 %
ERYTHROCYTE [DISTWIDTH] IN BLOOD BY AUTOMATED COUNT: 15.3 %
EST. GFR  (AFRICAN AMERICAN): >60 ML/MIN/1.73 M^2
EST. GFR  (NON AFRICAN AMERICAN): >60 ML/MIN/1.73 M^2
ESTIMATED AVG GLUCOSE: 111 MG/DL
GLUCOSE SERPL-MCNC: 85 MG/DL
HBA1C MFR BLD HPLC: 5.5 %
HCT VFR BLD AUTO: 38.3 %
HDLC SERPL-MCNC: 44 MG/DL
HDLC SERPL: 32.8 %
HGB BLD-MCNC: 12.3 G/DL
LDLC SERPL CALC-MCNC: 79 MG/DL
LYMPHOCYTES # BLD AUTO: 2.9 K/UL
LYMPHOCYTES NFR BLD: 30.5 %
MCH RBC QN AUTO: 28 PG
MCHC RBC AUTO-ENTMCNC: 32.1 G/DL
MCV RBC AUTO: 87 FL
MONOCYTES # BLD AUTO: 0.5 K/UL
MONOCYTES NFR BLD: 5.5 %
NEUTROPHILS # BLD AUTO: 6 K/UL
NEUTROPHILS NFR BLD: 61.9 %
NONHDLC SERPL-MCNC: 90 MG/DL
PLATELET # BLD AUTO: 313 K/UL
PMV BLD AUTO: 10.4 FL
POTASSIUM SERPL-SCNC: 4.2 MMOL/L
PROT SERPL-MCNC: 7.2 G/DL
RBC # BLD AUTO: 4.39 M/UL
SODIUM SERPL-SCNC: 141 MMOL/L
TRIGL SERPL-MCNC: 55 MG/DL
TSH SERPL DL<=0.005 MIU/L-ACNC: 1.63 UIU/ML
WBC # BLD AUTO: 9.65 K/UL

## 2017-12-22 PROCEDURE — 84443 ASSAY THYROID STIM HORMONE: CPT

## 2017-12-22 PROCEDURE — 80061 LIPID PANEL: CPT

## 2017-12-22 PROCEDURE — 99999 PR PBB SHADOW E&M-EST. PATIENT-LVL IV: CPT | Mod: PBBFAC,,, | Performed by: INTERNAL MEDICINE

## 2017-12-22 PROCEDURE — 85025 COMPLETE CBC W/AUTO DIFF WBC: CPT | Mod: PO

## 2017-12-22 PROCEDURE — 82306 VITAMIN D 25 HYDROXY: CPT

## 2017-12-22 PROCEDURE — 36415 COLL VENOUS BLD VENIPUNCTURE: CPT | Mod: PO

## 2017-12-22 PROCEDURE — 80053 COMPREHEN METABOLIC PANEL: CPT

## 2017-12-22 PROCEDURE — 83036 HEMOGLOBIN GLYCOSYLATED A1C: CPT

## 2017-12-22 PROCEDURE — 99214 OFFICE O/P EST MOD 30 MIN: CPT | Mod: S$GLB,,, | Performed by: INTERNAL MEDICINE

## 2017-12-22 RX ORDER — LOSARTAN POTASSIUM 100 MG/1
100 TABLET ORAL DAILY
Qty: 90 TABLET | Refills: 3 | Status: SHIPPED | OUTPATIENT
Start: 2017-12-22 | End: 2018-01-11

## 2017-12-22 RX ORDER — METOPROLOL SUCCINATE 50 MG/1
50 TABLET, EXTENDED RELEASE ORAL DAILY
Qty: 90 TABLET | Refills: 3 | Status: SHIPPED | OUTPATIENT
Start: 2017-12-22 | End: 2019-01-07 | Stop reason: SDUPTHER

## 2017-12-22 RX ORDER — MELOXICAM 7.5 MG/1
TABLET ORAL
Qty: 60 TABLET | Refills: 0 | Status: SHIPPED | OUTPATIENT
Start: 2017-12-22 | End: 2018-01-23 | Stop reason: SDUPTHER

## 2017-12-22 RX ORDER — GABAPENTIN 100 MG/1
CAPSULE ORAL
Qty: 90 CAPSULE | Refills: 6 | Status: SHIPPED | OUTPATIENT
Start: 2017-12-22 | End: 2018-07-23

## 2017-12-22 RX ORDER — ESCITALOPRAM OXALATE 20 MG/1
TABLET ORAL
Qty: 30 TABLET | Refills: 1 | Status: SHIPPED | OUTPATIENT
Start: 2017-12-22 | End: 2018-02-22 | Stop reason: SDUPTHER

## 2017-12-31 NOTE — PROGRESS NOTES
Subjective:       Patient ID: Marleny Guzman is a 67 y.o. female.    Chief Complaint: Follow-up    HPI  Pt is feeling well except for worsening back pain.  No new numbness or weakness - no bowel or bladder issues.  Pt reports some L wrist or arm pain.  Review of Systems   Respiratory: Negative for shortness of breath (PND or orthopnea).    Cardiovascular: Negative for chest pain (arm pain or jaw pain).   Gastrointestinal: Negative for abdominal pain, diarrhea, nausea and vomiting.   Genitourinary: Negative for dysuria.   Neurological: Negative for seizures, syncope and headaches.       Objective:      Physical Exam   Constitutional: She is oriented to person, place, and time. She appears well-developed and well-nourished. No distress.   HENT:   Head: Normocephalic.   Mouth/Throat: Oropharynx is clear and moist.   Neck: Neck supple. No JVD present. No thyromegaly present.   Cardiovascular: Normal rate, regular rhythm, normal heart sounds and intact distal pulses.  Exam reveals no gallop and no friction rub.    No murmur heard.  Pulmonary/Chest: Effort normal and breath sounds normal. She has no wheezes. She has no rales.   Abdominal: Soft. Bowel sounds are normal. She exhibits no distension and no mass. There is no tenderness. There is no rebound and no guarding.   Musculoskeletal: She exhibits no edema.   Lymphadenopathy:     She has no cervical adenopathy.   Neurological: She is alert and oriented to person, place, and time. She has normal reflexes.   Skin: Skin is warm and dry.   Psychiatric: She has a normal mood and affect. Her behavior is normal. Judgment and thought content normal.       Assessment:       1. Back pain, unspecified back location, unspecified back pain laterality, unspecified chronicity    2. Essential hypertension    3. Anxiety    4. Depression, unspecified depression type    5. Pain of left upper extremity    6. Hyperglycemia    7. Hyperlipidemia, mixed    8. Vitamin D deficiency        Plan:    Back pain, unspecified back location, unspecified back pain laterality, unspecified chronicity  -     Ambulatory consult to [facility]    Essential hypertension  -     metoprolol succinate (TOPROL-XL) 50 MG 24 hr tablet; Take 1 tablet (50 mg total) by mouth once daily.  Dispense: 90 tablet; Refill: 3  -     CBC auto differential; Future; Expected date: 12/22/2017  -     Comprehensive metabolic panel; Future; Expected date: 12/22/2017  -     TSH; Future; Expected date: 12/22/2017  Uncontrolled increase losartan  Anxiety  -     escitalopram oxalate (LEXAPRO) 20 MG tablet; TAKE 1 TABLET ONE TIME DAILY  Dispense: 30 tablet; Refill: 1    Depression, unspecified depression type  -     escitalopram oxalate (LEXAPRO) 20 MG tablet; TAKE 1 TABLET ONE TIME DAILY  Dispense: 30 tablet; Refill: 1    Pain of left upper extremity  -     X-Ray Radius Ulna Bilateral AP And Lateral; Future; Expected date: 12/22/2017    Hyperglycemia  -     Hemoglobin A1c; Future; Expected date: 12/22/2017    Hyperlipidemia, mixed  -     Lipid panel; Future; Expected date: 12/22/2017    Vitamin D deficiency  -     Vitamin D; Future; Expected date: 12/22/2017    Other orders  -     losartan (COZAAR) 100 MG tablet; Take 1 tablet (100 mg total) by mouth once daily.  Dispense: 90 tablet; Refill: 3  -     meloxicam (MOBIC) 7.5 MG tablet; One twice daily as needed for neck pain  Dispense: 60 tablet; Refill: 0  -     gabapentin (NEURONTIN) 100 MG capsule; One at bedtime for 4 days then two at bedtime for 4 days then three at bedtime indefinitely  Dispense: 90 capsule; Refill: 6

## 2018-01-08 ENCOUNTER — HOSPITAL ENCOUNTER (EMERGENCY)
Facility: OTHER | Age: 68
Discharge: HOME | End: 2018-01-08
Attending: EMERGENCY MEDICINE
Payer: MEDICARE

## 2018-01-08 ENCOUNTER — TELEPHONE (OUTPATIENT)
Dept: INTERNAL MEDICINE | Facility: CLINIC | Age: 68
End: 2018-01-08

## 2018-01-08 ENCOUNTER — NURSE TRIAGE (OUTPATIENT)
Dept: ADMINISTRATIVE | Facility: CLINIC | Age: 68
End: 2018-01-08

## 2018-01-08 VITALS
HEIGHT: 70 IN | DIASTOLIC BLOOD PRESSURE: 91 MMHG | HEART RATE: 68 BPM | RESPIRATION RATE: 18 BRPM | TEMPERATURE: 98 F | SYSTOLIC BLOOD PRESSURE: 194 MMHG | WEIGHT: 196 LBS | BODY MASS INDEX: 28.06 KG/M2 | OXYGEN SATURATION: 98 %

## 2018-01-08 DIAGNOSIS — I10 HYPERTENSION, UNSPECIFIED TYPE: Primary | ICD-10-CM

## 2018-01-08 DIAGNOSIS — I10 HYPERTENSION: ICD-10-CM

## 2018-01-08 LAB
BACTERIA #/AREA URNS HPF: ABNORMAL /HPF
BILIRUB UR QL STRIP: NEGATIVE
CLARITY UR: CLEAR
COLOR UR: YELLOW
GLUCOSE UR QL STRIP: NEGATIVE
HGB UR QL STRIP: ABNORMAL
KETONES UR QL STRIP: ABNORMAL
LEUKOCYTE ESTERASE UR QL STRIP: NEGATIVE
MICROSCOPIC COMMENT: ABNORMAL
NITRITE UR QL STRIP: NEGATIVE
PH UR STRIP: 6 [PH] (ref 5–8)
PROT UR QL STRIP: ABNORMAL
RBC #/AREA URNS HPF: 2 /HPF (ref 0–4)
SP GR UR STRIP: 1.02 (ref 1–1.03)
SQUAMOUS #/AREA URNS HPF: 9 /HPF
URN SPEC COLLECT METH UR: ABNORMAL
UROBILINOGEN UR STRIP-ACNC: 1 EU/DL

## 2018-01-08 PROCEDURE — 93010 ELECTROCARDIOGRAM REPORT: CPT | Mod: ,,, | Performed by: INTERNAL MEDICINE

## 2018-01-08 PROCEDURE — 25000003 PHARM REV CODE 250: Performed by: EMERGENCY MEDICINE

## 2018-01-08 PROCEDURE — 99284 EMERGENCY DEPT VISIT MOD MDM: CPT

## 2018-01-08 PROCEDURE — 81000 URINALYSIS NONAUTO W/SCOPE: CPT

## 2018-01-08 RX ORDER — LOSARTAN POTASSIUM 50 MG/1
100 TABLET ORAL
Status: COMPLETED | OUTPATIENT
Start: 2018-01-08 | End: 2018-01-08

## 2018-01-08 RX ADMIN — LOSARTAN POTASSIUM 100 MG: 50 TABLET, FILM COATED ORAL at 10:01

## 2018-01-08 NOTE — TELEPHONE ENCOUNTER
----- Message from Virginia Armando sent at 1/8/2018 12:22 PM CST -----  Contact: self [phone]  Patient is calling asking if someone can call her, she would like to be seen for her blood pressure which it's been running high this morning it read 189/110. Please call patient back and advise    Thank you

## 2018-01-08 NOTE — TELEPHONE ENCOUNTER
Pt stated she has been checking bp everday her sergio has been in 180/110 and also been up 200/ 100 please advise thanks

## 2018-01-09 NOTE — ED PROVIDER NOTES
Encounter Date: 1/8/2018    SCRIBE #1 NOTE: I, Edilma Doshi, am scribing for, and in the presence of, Dr. Dias.       History     Chief Complaint   Patient presents with    Hypertension     Has been checking it at home... states that it has been up to 210/130.   States that front of head and sides of head are hurting.      Time seen by provider: 9:19 PM    This is a 67 y.o. female who presents with complaint of high blood pressure. She saw her doctor for same complaint two weeks ago, and her Losartin was incrased from 50 mg to 100 mg. She also takes Metoprolol (50mg). She called her doctor today after taking her blood pressure and was advised to come here. She reports headache, but denies fever, chills, SOB, chest pain, or light headedness.           Review of patient's allergies indicates:  No Known Allergies  Past Medical History:   Diagnosis Date    Abnormal Pap smear     Abnormal Pap smear of cervix     Abnormal Pap smear of vagina 7/20/2016    Allergy     Anemia     Anxiety     Cataract     Depression     Depression with anxiety     Hypertension     Osteoarthritis 6/20/2013    Right rotator cuff tear     Sleep apnea     Cipap machine at home    Trouble in sleeping     Urinary incontinence     Leaks urine with laughing/ coughing/ sneezing     Past Surgical History:   Procedure Laterality Date    CATARACT EXTRACTION  6/18/12    OS    CATARACT EXTRACTION  7/16/12    OD    CERVIX SURGERY      Conization    COLONOSCOPY      COLONOSCOPY N/A 3/4/2016    Procedure: COLONOSCOPY;  Surgeon: Tenzin Thakkar MD;  Location: [address];  Service: Endoscopy;  Laterality: N/A;    EYE SURGERY Bilateral     cataract    HYSTERECTOMY  5-12-15    LEEP      LUMBAR EPIDURAL INJECTION      [place] REMOVAL OF OVARY/TUBE(S)  5-12-15    SALPINGECTOMY Left     SHOULDER SURGERY Right      Family History   Problem Relation Age of Onset    Hypertension Father     Cataracts Father     Cancer Mother       "THYROID CANCER    Stroke Mother     Breast cancer Cousin     Heart disease Sister     Pacemaker/defibrilator Sister     Hypertension Sister     Deep vein thrombosis Sister     Heart disease Sister      CABG    Osteoarthritis Sister     Edema Sister     Eczema Sister     Hypertension Sister     Anuerysm Sister     Drug abuse Sister     Hypertension Sister     No Known Problems Brother     No Known Problems Sister     Cancer Sister      Cancer cells or "growth in her stomach"    No Known Problems Sister     Hypertension Daughter     Other Daughter      Heart palpitations    Depression Maternal Grandmother     Ulcers Maternal Grandfather      Legs    No Known Problems Paternal Grandmother     No Known Problems Paternal Grandfather     Cancer Maternal Aunt     ADD / ADHD Neg Hx     Alcohol abuse Neg Hx     Anxiety disorder Neg Hx     Bipolar disorder Neg Hx     Dementia Neg Hx     OCD Neg Hx     Paranoid behavior Neg Hx     Physical abuse Neg Hx     Schizophrenia Neg Hx     Seizures Neg Hx     Sexual abuse Neg Hx     Amblyopia Neg Hx     Blindness Neg Hx     Glaucoma Neg Hx     Macular degeneration Neg Hx     Retinal detachment Neg Hx     Strabismus Neg Hx      Social History   Substance Use Topics    Smoking status: Former Smoker     Packs/day: 0.50     Years: 22.00     Types: Cigarettes     Quit date: 2/7/1998    Smokeless tobacco: Never Used    Alcohol use 0.0 oz/week      Comment: Occassionally for social events will have a glass of wine     Review of Systems   Constitutional: Negative for chills, diaphoresis and fever.        Positive for high blood pressure.   HENT: Negative for sore throat.    Respiratory: Negative for shortness of breath.    Cardiovascular: Negative for chest pain.   Gastrointestinal: Negative for nausea.   Genitourinary: Negative for dysuria.   Musculoskeletal: Negative for back pain.   Skin: Negative for rash.   Neurological: Positive for headaches. " Negative for weakness and light-headedness.   Hematological: Does not bruise/bleed easily.       Physical Exam     Initial Vitals [01/08/18 1937]   BP Pulse Resp Temp SpO2   (!) 176/79 65 19 98.8 °F (37.1 °C) 97 %      MAP       111.33         Physical Exam    Nursing note and vitals reviewed.  Constitutional: She appears well-developed and well-nourished. She is not diaphoretic. No distress.   HENT:   Head: Atraumatic.   Eyes: Conjunctivae and EOM are normal.   Neck: Normal range of motion. Neck supple.   Cardiovascular: Normal rate, regular rhythm and normal heart sounds. Exam reveals no gallop and no friction rub.    No murmur heard.  Pulmonary/Chest: Breath sounds normal. No respiratory distress. She has no wheezes. She has no rhonchi. She has no rales.   Abdominal: Soft. Bowel sounds are normal. She exhibits no distension. There is no tenderness. There is no rebound and no guarding.   Musculoskeletal: Normal range of motion. She exhibits no edema or tenderness.   Neurological: She is alert and oriented to person, place, and time.   Cranial nerves II through XII grossly intact.  5/5 motor strength all 4 extremities.  Sensation is normal.  Finger to nose normal.  Gait normal.  Speech and cognition is normal.  No focal neurologic deficit.   Skin: No rash noted. No pallor.   Psychiatric: She has a normal mood and affect. Her behavior is normal. Judgment and thought content normal.         ED Course   Procedures  Labs Reviewed   URINALYSIS - Abnormal; Notable for the following:        Result Value    Protein, UA Trace (*)     Ketones, UA Trace (*)     Occult Blood UA 1+ (*)     All other components within normal limits   URINALYSIS MICROSCOPIC - Abnormal; Notable for the following:     Bacteria, UA Few (*)     All other components within normal limits     EKG Readings: (Independently Interpreted)   Initial Reading: No STEMI.   Normal sinus rhythm with a rate of 62 bpm. Enlarged P waves diffusely.          Medical  Decision Making:   Clinical Tests:   Lab Tests: Ordered and Reviewed  Medical Tests: Ordered and Reviewed  ED Management:  Asymptomatic patient presents complaining of hypertension.  EKG without acute concerning findings.  Neuro exam normal.  Blood pressure improved without intervention.  Given extra dose of her blood pressure medicine.  Encouraged follow-up with primary care morning return here if worse.    I did have an extensive talk regarding signs to return for and need for follow up. Patient expressed understanding and will monitor symptoms closely and follow-up as needed.    JIMMIE Dias M.D.  01/09/2018  5:51 AM              Scribe Attestation:   Scribe #1: I performed the above scribed service and the documentation accurately describes the services I performed. I attest to the accuracy of the note.    Attending Attestation:           Physician Attestation for Scribe:  Physician Attestation Statement for Scribe #1: I, Dr. Dias, reviewed documentation, as scribed by Edilma Doshi in my presence, and it is both accurate and complete.                 ED Course      Clinical Impression:     1. Hypertension, unspecified type    2. Hypertension                                 Chan Dias MD  01/09/18 0551

## 2018-01-09 NOTE — ED TRIAGE NOTES
Patient presents to ED with c/o elevated BP today. She reports having losartan increased to 100 mg 2 weeks ago. She denies chest pain, dizziness, or SOB. Does report mild intermittent headache she describes as a squeezing pain.

## 2018-01-09 NOTE — TELEPHONE ENCOUNTER
"  Reason for Disposition   [1] BP  >= 200/120  AND [2] having NO cardiac or neurologic symptoms    Answer Assessment - Initial Assessment Questions  1. BLOOD PRESSURE: "What is the blood pressure?" "Did you take at least two measurements 5 minutes apart?"    210/130 about an hr ago- current is 202/121 HR 71  2. ONSET: "When did you take your blood pressure?"      As above  3. HOW: "How did you obtain the blood pressure?" (e.g., visiting nurse, automatic home BP monitor)      Home b/p   4. HISTORY: "Do you have a history of high blood pressure?"      yes  5. MEDICATIONS: "Are you taking any medications for blood pressure?" "Have you missed any doses recently?"      denied  6. OTHER SYMPTOMS: "Do you have any symptoms?" (e.g., headache, chest pain, blurred vision, difficulty breathing, weakness)      No sx's at this time  7. PREGNANCY: "Is there any chance you are pregnant?" "When was your last menstrual period?"      n/a    Protocols used: ST HIGH BLOOD PRESSURE-A-    "

## 2018-01-10 ENCOUNTER — TELEPHONE (OUTPATIENT)
Dept: INTERNAL MEDICINE | Facility: CLINIC | Age: 68
End: 2018-01-10

## 2018-01-10 NOTE — TELEPHONE ENCOUNTER
Pt would like to speak with you in regards to her ER visit please advise thanks she states her bp was 200/110

## 2018-01-10 NOTE — TELEPHONE ENCOUNTER
----- Message from Kiara Wilder sent at 1/10/2018 11:57 AM CST -----  Contact: Self [phone]  Pt states she had to go to the ER regarding her BP has been high and she would like to discuss it with her PCP,please call

## 2018-01-11 RX ORDER — VALSARTAN AND HYDROCHLOROTHIAZIDE 160; 12.5 MG/1; MG/1
1 TABLET, FILM COATED ORAL DAILY
Qty: 90 TABLET | Refills: 3 | Status: SHIPPED | OUTPATIENT
Start: 2018-01-11 | End: 2018-04-30

## 2018-01-12 NOTE — TELEPHONE ENCOUNTER
Pt informed - stop losartan change to valsartan HCTZ 160-12.5 - appt in 2 weeks - digital HTn program.

## 2018-01-14 RX ORDER — LOSARTAN POTASSIUM 50 MG/1
TABLET ORAL
Qty: 90 TABLET | Refills: 2 | OUTPATIENT
Start: 2018-01-14

## 2018-01-19 ENCOUNTER — HOSPITAL ENCOUNTER (EMERGENCY)
Facility: OTHER | Age: 68
Discharge: HOME | End: 2018-01-19
Attending: EMERGENCY MEDICINE
Payer: MEDICARE

## 2018-01-19 VITALS
DIASTOLIC BLOOD PRESSURE: 89 MMHG | WEIGHT: 192 LBS | BODY MASS INDEX: 27.49 KG/M2 | HEART RATE: 81 BPM | SYSTOLIC BLOOD PRESSURE: 119 MMHG | HEIGHT: 70 IN | TEMPERATURE: 98 F | OXYGEN SATURATION: 97 % | RESPIRATION RATE: 18 BRPM
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
## 2018-01-19 DIAGNOSIS — S92.415A CLOSED NONDISPLACED FRACTURE OF PROXIMAL PHALANX OF LEFT GREAT TOE, INITIAL ENCOUNTER: Primary | ICD-10-CM

## 2018-01-19 DIAGNOSIS — M79.672 LEFT FOOT PAIN: ICD-10-CM

## 2018-01-19 DIAGNOSIS — S93.401A SPRAIN OF RIGHT ANKLE, UNSPECIFIED LIGAMENT, INITIAL ENCOUNTER: ICD-10-CM

## 2018-01-19 DIAGNOSIS — M25.571 RIGHT ANKLE PAIN: ICD-10-CM

## 2018-01-19 DIAGNOSIS — M79.672 FOOT PAIN, BILATERAL: ICD-10-CM

## 2018-01-19 DIAGNOSIS — M79.671 FOOT PAIN, BILATERAL: ICD-10-CM

## 2018-01-19 PROCEDURE — 28490 TREAT BIG TOE FRACTURE: CPT | Mod: TA

## 2018-01-19 PROCEDURE — 99284 EMERGENCY DEPT VISIT MOD MDM: CPT | Mod: 25

## 2018-01-19 PROCEDURE — 96372 THER/PROPH/DIAG INJ SC/IM: CPT | Mod: 59

## 2018-01-19 PROCEDURE — 25000003 PHARM REV CODE 250: Performed by: EMERGENCY MEDICINE

## 2018-01-19 PROCEDURE — 63600175 PHARM REV CODE 636 W HCPCS: Performed by: EMERGENCY MEDICINE

## 2018-01-19 RX ORDER — OXYCODONE AND ACETAMINOPHEN 5; 325 MG/1; MG/1
1 TABLET ORAL
Status: COMPLETED | OUTPATIENT
Start: 2018-01-19 | End: 2018-01-19

## 2018-01-19 RX ORDER — KETOROLAC TROMETHAMINE 30 MG/ML
30 INJECTION, SOLUTION INTRAMUSCULAR; INTRAVENOUS
Status: COMPLETED | OUTPATIENT
Start: 2018-01-19 | End: 2018-01-19

## 2018-01-19 RX ORDER — OXYCODONE AND ACETAMINOPHEN 5; 325 MG/1; MG/1
1 TABLET ORAL EVERY 8 HOURS PRN
Qty: 15 TABLET | Refills: 0 | Status: SHIPPED | OUTPATIENT
Start: 2018-01-19 | End: 2018-04-30

## 2018-01-19 RX ADMIN — KETOROLAC TROMETHAMINE 30 MG: 30 INJECTION, SOLUTION INTRAMUSCULAR at 11:01

## 2018-01-19 RX ADMIN — OXYCODONE AND ACETAMINOPHEN 1 TABLET: 5; 325 TABLET ORAL at 01:01

## 2018-01-19 NOTE — ED NOTES
NEURO: Pt AAO x 4. Behavior and speech appropriate to situation.   CARDIAC: Regular rhythm auscultated  RESPIRATORY: Respirations even and unlabored.   MUSCULOSKELETAL: Active ROM noted to extremities. Edema noted to rt ankle.

## 2018-01-19 NOTE — ED PROVIDER NOTES
Encounter Date: 1/19/2018    SCRIBE #1 NOTE: I, Anabell Love, am scribing for, and in the presence of, Dr. Campbell.       History     Chief Complaint   Patient presents with    Fall     + slip and fall last night, + right ankle pain, left great toe pain, right wrist and right third finger pain, + reported of hitting posterior oh head, denies LOC or N/V after fall. Pt reports taking 81mg aspirin daily     Time seen by provider: 11:12 AM    This is a 67 y.o. Female with a history of HTN who presents with complaint of fall that occurred yesterday. Patient reports she was walking down a staircase in the dark when she missed a step and fell, landing on her R shoulder and buttocks; she also hit her R posterior head but denies any LOC. She reports requiring assistance to get up after the fall. Patient reports associated headache, L neck pain and back pain, right ankle pain, left great toe pain, and right wrist pain. She states right ankle pain and left great toe pain worsen with weight bearing. Patient reports taking a muscle relaxer for symptoms, she has chronic neck and back pain, with relief of this but not HA or extremity pain. She denies any blurred vision, nausea, vomiting, dizziness, numbness, weakness, or confusion. Was at normal baseline prior to fall      The history is provided by the patient.     Review of patient's allergies indicates:  No Known Allergies  Past Medical History:   Diagnosis Date    Abnormal Pap smear     Abnormal Pap smear of cervix     Abnormal Pap smear of vagina 7/20/2016    Allergy     Anemia     Anxiety     Cataract     Depression     Depression with anxiety     Hypertension     Osteoarthritis 6/20/2013    Right rotator cuff tear     Sleep apnea     Cipap machine at home    Trouble in sleeping     Urinary incontinence     Leaks urine with laughing/ coughing/ sneezing     Past Surgical History:   Procedure Laterality Date    CATARACT EXTRACTION  6/18/12    OS     "CATARACT EXTRACTION  7/16/12    OD    CERVIX SURGERY      Conization    COLONOSCOPY      COLONOSCOPY N/A 3/4/2016    Procedure: COLONOSCOPY;  Surgeon: Tenzin Thakkar MD;  Location: [facility] ([address] [place] [address]);  Service: Endoscopy;  Laterality: N/A;    EYE SURGERY Bilateral     cataract    HYSTERECTOMY  5-12-15    LEEP      LUMBAR EPIDURAL INJECTION      HI REMOVAL OF OVARY/TUBE(S)  5-12-15    SALPINGECTOMY Left     SHOULDER SURGERY Right      Family History   Problem Relation Age of Onset    Hypertension Father     Cataracts Father     Cancer Mother      THYROID CANCER    Stroke Mother     Breast cancer Cousin     Heart disease Sister     Pacemaker/defibrilator Sister     Hypertension Sister     Deep vein thrombosis Sister     Heart disease Sister      CABG    Osteoarthritis Sister     Edema Sister     Eczema Sister     Hypertension Sister     Anuerysm Sister     Drug abuse Sister     Hypertension Sister     No Known Problems Brother     No Known Problems Sister     Cancer Sister      Cancer cells or "growth in her stomach"    No Known Problems Sister     Hypertension Daughter     Other Daughter      Heart palpitations    Depression Maternal Grandmother     Ulcers Maternal Grandfather      Legs    No Known Problems Paternal Grandmother     No Known Problems Paternal Grandfather     Cancer Maternal Aunt     ADD / ADHD Neg Hx     Alcohol abuse Neg Hx     Anxiety disorder Neg Hx     Bipolar disorder Neg Hx     Dementia Neg Hx     OCD Neg Hx     Paranoid behavior Neg Hx     Physical abuse Neg Hx     Schizophrenia Neg Hx     Seizures Neg Hx     Sexual abuse Neg Hx     Amblyopia Neg Hx     Blindness Neg Hx     Glaucoma Neg Hx     Macular degeneration Neg Hx     Retinal detachment Neg Hx     Strabismus Neg Hx      Social History   Substance Use Topics    Smoking status: Former Smoker     Packs/day: 0.50     Years: 22.00     Types: Cigarettes     Quit date: 2/7/1998    " Smokeless tobacco: Never Used    Alcohol use 0.0 oz/week      Comment: Occassionally for social events will have a glass of wine     Review of Systems   Constitutional: Negative for fever.   HENT: Negative for sore throat.    Eyes: Negative for visual disturbance.   Respiratory: Negative for shortness of breath.    Cardiovascular: Negative for chest pain.   Gastrointestinal: Negative for nausea and vomiting.   Genitourinary: Negative for dysuria.   Musculoskeletal: Positive for back pain and neck pain.        Positive for right wrist pain. Positive for right ankle pain. Positive for left great toe pain.    Skin: Negative for rash.   Neurological: Positive for headaches. Negative for dizziness, weakness, light-headedness and numbness.   Hematological: Does not bruise/bleed easily.   Psychiatric/Behavioral: Negative for confusion.       Physical Exam     Initial Vitals [01/19/18 0935]   BP Pulse Resp Temp SpO2   130/74 84 16 98.3 °F (36.8 °C) 98 %      MAP       92.67         Physical Exam    Nursing note and vitals reviewed.  Constitutional: She appears well-developed and well-nourished. She is not diaphoretic. No distress.   HENT:   Head: Normocephalic and atraumatic.   Right Ear: External ear normal.   Left Ear: External ear normal.   Eyes: EOM are normal. Right eye exhibits no discharge. Left eye exhibits no discharge.   Neck: Normal range of motion.   Cardiovascular: Normal rate, regular rhythm and normal heart sounds. Exam reveals no gallop and no friction rub.    No murmur heard.  Pulmonary/Chest: Breath sounds normal. No respiratory distress. She has no wheezes. She has no rhonchi. She has no rales.   Abdominal: Soft. There is no tenderness. There is no rebound and no guarding.   Musculoskeletal: Normal range of motion. She exhibits no edema or tenderness.   Left sided paraspinal cervical and lumbar tenderness. No midline tenderness. No right shoulder bony tenderness with full ROM. Right ankle lateral  malleolus edema and tenderness extending to midfoot. Left 1 st toe diffuse tenderness without ecchymosis.    Neurological: She is alert and oriented to person, place, and time.   Skin: Skin is warm and dry. No rash and no abscess noted. No erythema. No pallor.   Psychiatric: She has a normal mood and affect. Her behavior is normal. Judgment and thought content normal.         ED Course   Procedures  Labs Reviewed - No data to display         X-Rays:   Independently Interpreted Readings:   Other Readings:  Left foot: fracture of 1 st toe proximal phalanx.     Medical Decision Making:   Initial Assessment:       67F with HTN presents s/p mechanical fall yesterday, slipped down stairs with minor head trauma no LOC, landing on buttocks, with multiple areas of pain. She has exacerbation of chronic L neck and back pain, no midline tenderness or neuro deficits to suggest fracture or spinal cord injury, likely paraspinal muscle strain. She has mild HA, with no sign external head injury or concerning exam findings; Ct head (-). No sign R shoulder fracture where she had some impact, she c/o R wrist and hand pain with no sign fracture on xray.   Also with R ankle pain and diffuse edema over lateral malleolus and proximal lateral foot, with no sign fracture on Xray; likely ligament strain, will place in ACE, take NSAIDs and RICE therapy.     Finally, she has L 1st toe tenderness, with Xray showing proximal phalanx fracture. She will be placed in walking boot, with WBAT and pain control. She was given crutches as well and will limit ambulation due to R ankle sprain and L 1st toe fx, f/u PCP or Ortho within 2 weeks.      Independently Interpreted Test(s):   I have ordered and independently interpreted X-rays - see prior notes.  Clinical Tests:   Radiological Study: Ordered and Reviewed              Attending Attestation:           Physician Attestation for Scribe:  Physician Attestation Statement for Scribe #1: Dr. GRACIELA  Shannan, reviewed documentation, as scribed by Anabell Love in my presence, and it is both accurate and complete.                 ED Course      Clinical Impression:     1. Closed nondisplaced fracture of proximal phalanx of left great toe, initial encounter    2. Right ankle pain    3. Left foot pain    4. Foot pain, bilateral    5. Sprain of right ankle, unspecified ligament, initial encounter    6. Fall, initial encounter                                 Lambert Campbell MD  01/19/18 5985

## 2018-01-19 NOTE — ED TRIAGE NOTES
Pt presents with fall, onset last night. Pt reports she missed a step and fell and landed on right side. reports right sided head shoulder ankle, foot and hip pain. Pt denies LOC. Pt denies lacerations or abrasions, ecchymosis. Pt rates pain 10/10, described as constant and aching. Pt denies NV. Pt reports she urinated on self at time of incident. Pt reports blurry vision. Pt in wheelchair. Pt in NAD.

## 2018-01-22 ENCOUNTER — OFFICE VISIT (OUTPATIENT)
Dept: GYNECOLOGIC ONCOLOGY | Facility: CLINIC | Age: 68
End: 2018-01-22
Payer: MEDICARE

## 2018-01-22 VITALS
WEIGHT: 202.81 LBS | SYSTOLIC BLOOD PRESSURE: 144 MMHG | DIASTOLIC BLOOD PRESSURE: 65 MMHG | BODY MASS INDEX: 29.1 KG/M2 | HEART RATE: 60 BPM

## 2018-01-22 DIAGNOSIS — N89.0 MILD VAGINAL DYSPLASIA: Primary | ICD-10-CM

## 2018-01-22 DIAGNOSIS — R87.629 ABNORMAL PAP SMEAR OF VAGINA: ICD-10-CM

## 2018-01-22 PROCEDURE — 88141 CYTOPATH C/V INTERPRET: CPT | Mod: ,,, | Performed by: PATHOLOGY

## 2018-01-22 PROCEDURE — 99999 PR PBB SHADOW E&M-EST. PATIENT-LVL III: CPT | Mod: PBBFAC,,, | Performed by: OBSTETRICS & GYNECOLOGY

## 2018-01-22 PROCEDURE — 88175 CYTOPATH C/V AUTO FLUID REDO: CPT | Performed by: PATHOLOGY

## 2018-01-22 PROCEDURE — 99213 OFFICE O/P EST LOW 20 MIN: CPT | Mod: S$GLB,,, | Performed by: OBSTETRICS & GYNECOLOGY

## 2018-01-22 RX ORDER — ESTRADIOL 0.1 MG/G
1 CREAM VAGINAL
Qty: 1 TUBE | Refills: 3 | Status: SHIPPED | OUTPATIENT
Start: 2018-01-22 | End: 2019-08-14 | Stop reason: SDUPTHER

## 2018-01-23 RX ORDER — MELOXICAM 7.5 MG/1
TABLET ORAL
Qty: 60 TABLET | Refills: 0 | Status: SHIPPED | OUTPATIENT
Start: 2018-01-23 | End: 2019-02-25

## 2018-01-29 ENCOUNTER — HOSPITAL ENCOUNTER (OUTPATIENT)
Dept: RADIOLOGY | Facility: HOSPITAL | Age: 68
Discharge: HOME | End: 2018-01-29
Attending: INTERNAL MEDICINE
Payer: MEDICARE

## 2018-01-29 ENCOUNTER — CLINICAL SUPPORT (OUTPATIENT)
Dept: INFECTIOUS DISEASES | Facility: CLINIC | Age: 68
End: 2018-01-29
Payer: MEDICARE

## 2018-01-29 ENCOUNTER — PATIENT MESSAGE (OUTPATIENT)
Dept: INTERNAL MEDICINE | Facility: CLINIC | Age: 68
End: 2018-01-29

## 2018-01-29 ENCOUNTER — OFFICE VISIT (OUTPATIENT)
Dept: INTERNAL MEDICINE | Facility: CLINIC | Age: 68
End: 2018-01-29
Payer: MEDICARE

## 2018-01-29 ENCOUNTER — OFFICE VISIT (OUTPATIENT)
Dept: ORTHOPEDICS | Facility: CLINIC | Age: 68
End: 2018-01-29
Payer: MEDICARE

## 2018-01-29 VITALS
HEART RATE: 63 BPM | WEIGHT: 200 LBS | SYSTOLIC BLOOD PRESSURE: 126 MMHG | BODY MASS INDEX: 28.63 KG/M2 | HEIGHT: 70 IN | DIASTOLIC BLOOD PRESSURE: 78 MMHG

## 2018-01-29 DIAGNOSIS — M54.50 CHRONIC MIDLINE LOW BACK PAIN WITHOUT SCIATICA: ICD-10-CM

## 2018-01-29 DIAGNOSIS — G89.29 CHRONIC MIDLINE LOW BACK PAIN WITHOUT SCIATICA: ICD-10-CM

## 2018-01-29 DIAGNOSIS — M25.532 LEFT WRIST PAIN: ICD-10-CM

## 2018-01-29 DIAGNOSIS — M79.602 PAIN OF LEFT UPPER EXTREMITY: ICD-10-CM

## 2018-01-29 DIAGNOSIS — H53.9 VISUAL DISTURBANCE: ICD-10-CM

## 2018-01-29 DIAGNOSIS — I10 ESSENTIAL HYPERTENSION: Chronic | ICD-10-CM

## 2018-01-29 DIAGNOSIS — S93.401A SPRAIN OF RIGHT ANKLE, UNSPECIFIED LIGAMENT, INITIAL ENCOUNTER: ICD-10-CM

## 2018-01-29 DIAGNOSIS — S92.492A OTHER FRACTURE OF LEFT GREAT TOE, INITIAL ENCOUNTER FOR CLOSED FRACTURE: Primary | ICD-10-CM

## 2018-01-29 DIAGNOSIS — G47.30 SLEEP APNEA, UNSPECIFIED TYPE: ICD-10-CM

## 2018-01-29 DIAGNOSIS — I25.10 CVD (CARDIOVASCULAR DISEASE): Primary | ICD-10-CM

## 2018-01-29 PROCEDURE — 99999 PR PBB SHADOW E&M-EST. PATIENT-LVL II: CPT | Mod: PBBFAC,,,

## 2018-01-29 PROCEDURE — 99203 OFFICE O/P NEW LOW 30 MIN: CPT | Mod: S$GLB,,, | Performed by: PHYSICIAN ASSISTANT

## 2018-01-29 PROCEDURE — 90736 HZV VACCINE LIVE SUBQ: CPT | Mod: S$GLB,,, | Performed by: INTERNAL MEDICINE

## 2018-01-29 PROCEDURE — 73090 X-RAY EXAM OF FOREARM: CPT | Mod: 50,TC

## 2018-01-29 PROCEDURE — 99999 PR PBB SHADOW E&M-EST. PATIENT-LVL V: CPT | Mod: PBBFAC,,, | Performed by: INTERNAL MEDICINE

## 2018-01-29 PROCEDURE — 99214 OFFICE O/P EST MOD 30 MIN: CPT | Mod: S$GLB,,, | Performed by: INTERNAL MEDICINE

## 2018-01-29 PROCEDURE — 90471 IMMUNIZATION ADMIN: CPT | Mod: S$GLB,,, | Performed by: INTERNAL MEDICINE

## 2018-01-29 PROCEDURE — 73090 X-RAY EXAM OF FOREARM: CPT | Mod: 26,RT,, | Performed by: RADIOLOGY

## 2018-01-29 PROCEDURE — 99999 PR PBB SHADOW E&M-EST. PATIENT-LVL II: CPT | Mod: PBBFAC,,, | Performed by: PHYSICIAN ASSISTANT

## 2018-01-29 PROCEDURE — 3008F BODY MASS INDEX DOCD: CPT | Mod: S$GLB,,, | Performed by: PHYSICIAN ASSISTANT

## 2018-01-29 PROCEDURE — 1159F MED LIST DOCD IN RCRD: CPT | Mod: S$GLB,,, | Performed by: PHYSICIAN ASSISTANT

## 2018-01-29 PROCEDURE — 73090 X-RAY EXAM OF FOREARM: CPT | Mod: 26,LT,, | Performed by: RADIOLOGY

## 2018-01-29 RX ORDER — ERGOCALCIFEROL 1.25 MG/1
50000 CAPSULE ORAL
Qty: 3 CAPSULE | Refills: 3 | Status: SHIPPED | OUTPATIENT
Start: 2018-01-29 | End: 2019-04-07 | Stop reason: SDUPTHER

## 2018-01-29 NOTE — PROGRESS NOTES
Subjective:       Patient ID: Marleny Guzman is a 67 y.o. female.    Chief Complaint: Follow-up    HPIPt is slipped on steps and fractured toe - seeing Ortho today.  R ankle and R wrist are much better.  No CP or SOB.  BP improved.  L wrist has been painful for about 6 months intermittent - burning pain.  Back is better with gabapentin.  Review of Systems   Respiratory: Negative for shortness of breath (PND or orthopnea).    Cardiovascular: Negative for chest pain (arm pain or jaw pain).   Gastrointestinal: Negative for abdominal pain, diarrhea, nausea and vomiting.   Genitourinary: Negative for dysuria.   Neurological: Negative for seizures, syncope and headaches.       Objective:      Physical Exam   Constitutional: She is oriented to person, place, and time. She appears well-developed and well-nourished. No distress.   HENT:   Head: Normocephalic.   Mouth/Throat: Oropharynx is clear and moist.   Neck: Neck supple. No JVD present. No thyromegaly present.   Cardiovascular: Normal rate, regular rhythm, normal heart sounds and intact distal pulses.  Exam reveals no gallop and no friction rub.    No murmur heard.  Pulmonary/Chest: Effort normal and breath sounds normal. She has no wheezes. She has no rales.   Abdominal: Soft. Bowel sounds are normal. She exhibits no distension and no mass. There is no tenderness. There is no rebound and no guarding.   Genitourinary: Vagina normal and uterus normal. Rectal exam shows guaiac negative stool.   Musculoskeletal: She exhibits no edema.   L wrist no overt synovitis and FROM   Lymphadenopathy:     She has no cervical adenopathy.   Neurological: She is alert and oriented to person, place, and time. She has normal reflexes.   Skin: Skin is warm and dry.   Psychiatric: She has a normal mood and affect. Her behavior is normal. Judgment and thought content normal.       Assessment:       1. CVD (cardiovascular disease)    2. Visual disturbance    3. Essential hypertension    4.  Sleep apnea, unspecified type    5. Chronic midline low back pain without sciatica    6. Left wrist pain        Plan:   CVD (cardiovascular disease)  -     US Carotid Bilateral; Future; Expected date: 01/29/2018    Visual disturbance  -     Ambulatory consult to Optometry    Essential hypertension  -     Basic metabolic panel; Future; Expected date: 01/29/2018  Controlled - continue current meds    Sleep apnea, unspecified type  Patient is using faithfully and derives great benefit from it    Chronic midline low back pain without sciatica  -     Ambulatory consult to Pain Clinic  Continue gabapentin  Left wrist pain  -     Ambulatory consult to Rheumatology  If they do not find anything will do MRI cervical spine  Other orders  -     (In Office Administered) Zoster Vaccine - Live  -     ergocalciferol (ERGOCALCIFEROL) 50,000 unit Cap; Take 1 capsule (50,000 Units total) by mouth every 30 days.  Dispense: 3 capsule; Refill: 3

## 2018-01-29 NOTE — PROGRESS NOTES
Subjective:      Patient ID: Marleny Guzman is a 67 y.o. female.    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 Gonzalez. Vaginal pap smear 6/15/16 HSIL. She was referred for consult by Dr. Valles. 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 Noncompliant with vaginal estrogen cream as prescribed.      Follow up pap 1/30/17 HSIL. 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     Pap 7/24/17 ASCUS HPV 18+      Presents today for follow up. Without gynecologic complaints. Denies bleeding. Using vaginal estrogen.      MMG 12/12/17   Review of Systems   Constitutional: Negative for appetite change, chills, fatigue and fever.   HENT: Negative for mouth sores.    Respiratory: Negative for cough and shortness of breath.    Cardiovascular: Negative for leg swelling.   Gastrointestinal: Negative for abdominal pain, blood in stool, constipation and diarrhea.   Endocrine: Negative for cold intolerance.   Genitourinary: Negative for dysuria and vaginal bleeding.   Musculoskeletal: Negative for myalgias.   Skin: Negative for rash.   Allergic/Immunologic: Negative.    Neurological: Negative for weakness and numbness.   Hematological: Negative for adenopathy. Does not bruise/bleed easily.   Psychiatric/Behavioral: Negative for confusion.       Objective:   Physical Exam:   Constitutional: She is oriented to person, place, and time. She appears well-developed and well-nourished.    HENT:   Head: Normocephalic and atraumatic.    Eyes: EOM are normal. Pupils are equal, round, and reactive to light.    Neck: Normal range of motion. Neck supple. No thyromegaly present.    Cardiovascular: Normal rate, regular rhythm and intact distal pulses.     Pulmonary/Chest: Effort normal and breath sounds normal. No respiratory distress. She has no wheezes.        Abdominal: Soft. Bowel sounds are normal. She exhibits no distension, no  ascites and no mass. There is no tenderness.     Genitourinary: Rectum normal and vagina normal. Pelvic exam was performed with patient supine. There is no lesion on the right labia. There is no lesion on the left labia. Uterus is absent. Vaginal cuff normal.Cervix exhibits absence.   Genitourinary Comments: No gross lesions on exam today.            Musculoskeletal: Normal range of motion and moves all extremeties.      Lymphadenopathy:     She has no cervical adenopathy.        Right: No inguinal and no supraclavicular adenopathy present.        Left: No inguinal and no supraclavicular adenopathy present.    Neurological: She is alert and oriented to person, place, and time.    Skin: Skin is warm and dry. No rash noted.    Psychiatric: She has a normal mood and affect.       Assessment:     1. Mild vaginal dysplasia    2. Abnormal Pap smear of vagina        Plan:   No orders of the defined types were placed in this encounter.    Continue monitoring. No gross lesions today. Pap collected. RTC 6 months or sooner if needed.

## 2018-01-30 NOTE — PROGRESS NOTES
Subjective:      Patient ID: Marleny Guzamn is a 67 y.o. female.    Chief Complaint: No chief complaint on file.    HPI    Patient is a 67 year old female who presents to clinic for follow up from the ED for left great toe fracture and right ankle sprain that occurred on 01/19/2018 secondary to mis-setpping while walking down some stairs. Patient has been treated with weight bearing as tolerated and range of motion as tolerated in a regular shoe. She was given a tall CAM boot, but reports this increased her pain. She has elevated and iced to reduce swelling and pain which helps. She denied numbness or tingling.     Review of Systems   Constitution: Negative for chills and fever.   Cardiovascular: Negative for chest pain.   Respiratory: Negative for cough and shortness of breath.    Skin: Negative for color change, dry skin, itching, nail changes, poor wound healing and rash.   Musculoskeletal:        Left great toe.    Neurological: Negative for dizziness.   Psychiatric/Behavioral: Negative for altered mental status. The patient is not nervous/anxious.    All other systems reviewed and are negative.        Objective:      General    Constitutional: She is oriented to person, place, and time. She appears well-developed and well-nourished. No distress.   HENT:   Head: Atraumatic.   Eyes: Conjunctivae are normal.   Cardiovascular: Normal rate.    Pulmonary/Chest: Effort normal.   Neurological: She is alert and oriented to person, place, and time.   Psychiatric: She has a normal mood and affect. Her behavior is normal.         Right Ankle/Foot Exam     Tenderness   The patient is tender to palpation of the ATF and peroneals.    Range of Motion   Ankle Joint   Dorsiflexion: abnormal   Plantar flexion: abnormal   Subtalar Joint   Inversion: abnormal   Eversion: abnormal     Other   Sensation: normal    Left Ankle/Foot Exam     Tenderness   The patient is tender to palpation of the great toe interphalangeal  joint.    Range of Motion   The patient has normal left ankle ROM.     Other   Sensation: normal      Vascular Exam     Right Pulses  Dorsalis Pedis:      2+          Left Pulses  Dorsalis Pedis:      2+              RADS:  TOE LEFT:  Nondisplaced fracture proximal phalanx left great toe  ANKLE RIGHT: There is no evidence of displaced fracture, dislocation, or bone destruction.  Degenerative changes with spurring of the calcaneus.  No abnormal radiopaque retained foreign body.  Findings suggest mild soft tissue swelling over the needle malleolus.  Osseous density adjacent to the cuboid on the lateral view may represent an accessory ossicle.        Assessment:       Encounter Diagnoses   Name Primary?    Other fracture of left great toe, initial encounter for closed fracture Yes    Sprain of right ankle, unspecified ligament, initial encounter           Plan:       Discussed treatment plan with patient,.   - rest ice and elevation to reduce swelling  LEFT: post op shoe, weight bearing as tolerated, range of motion as tolerated  RIGHT: lace up ankle brace, will remove for daily range of motion and weight bearing as tolerated  - return to clinic in 4 weeks at time x-ray of her left foot is needed,.

## 2018-02-09 ENCOUNTER — TELEPHONE (OUTPATIENT)
Dept: PAIN MEDICINE | Facility: CLINIC | Age: 68
End: 2018-02-09

## 2018-02-09 NOTE — TELEPHONE ENCOUNTER
----- Message from Nurys Leija sent at 2/9/2018 12:13 PM CST -----  Contact: pt  _  1st Request  _  2nd Request  _  3rd Request        Who: pt    Why: Requesting a call back in regards to pt requests to schedule US on 2/2018. Nothing available to schedule. Please call pt     What Number to Call Back:[phone]    When to Expect a call back: (Within 24 hours)    Please return the call at earliest convenience. Thanks!

## 2018-02-19 DIAGNOSIS — M79.672 LEFT FOOT PAIN: Primary | ICD-10-CM

## 2018-02-21 ENCOUNTER — OFFICE VISIT (OUTPATIENT)
Dept: ORTHOPEDICS | Facility: CLINIC | Age: 68
End: 2018-02-21
Payer: MEDICARE

## 2018-02-21 ENCOUNTER — HOSPITAL ENCOUNTER (OUTPATIENT)
Dept: RADIOLOGY | Facility: HOSPITAL | Age: 68
Discharge: HOME | End: 2018-02-21
Attending: PHYSICIAN ASSISTANT
Payer: MEDICARE

## 2018-02-21 DIAGNOSIS — S92.405D: Primary | ICD-10-CM

## 2018-02-21 DIAGNOSIS — M79.672 LEFT FOOT PAIN: ICD-10-CM

## 2018-02-21 DIAGNOSIS — S93.401A SPRAIN OF RIGHT ANKLE, UNSPECIFIED LIGAMENT, INITIAL ENCOUNTER: ICD-10-CM

## 2018-02-21 PROCEDURE — 1159F MED LIST DOCD IN RCRD: CPT | Mod: S$GLB,,, | Performed by: PHYSICIAN ASSISTANT

## 2018-02-21 PROCEDURE — 73630 X-RAY EXAM OF FOOT: CPT | Mod: TC,LT

## 2018-02-21 PROCEDURE — 99213 OFFICE O/P EST LOW 20 MIN: CPT | Mod: S$GLB,,, | Performed by: PHYSICIAN ASSISTANT

## 2018-02-21 PROCEDURE — 3008F BODY MASS INDEX DOCD: CPT | Mod: S$GLB,,, | Performed by: PHYSICIAN ASSISTANT

## 2018-02-21 PROCEDURE — 99999 PR PBB SHADOW E&M-EST. PATIENT-LVL II: CPT | Mod: PBBFAC,,, | Performed by: PHYSICIAN ASSISTANT

## 2018-02-21 PROCEDURE — 73630 X-RAY EXAM OF FOOT: CPT | Mod: 26,LT,, | Performed by: RADIOLOGY

## 2018-02-21 NOTE — PROGRESS NOTES
Subjective:      Patient ID: Marleny Guzman is a 67 y.o. female.    Chief Complaint: No chief complaint on file.    HPI    Patient is a 67 year old female who presents to clinic for follow up for left great toe fracture and right ankle sprain that occurred on 01/19/2018 secondary to mis-setpping while walking down some stairs. Patient has been treated with weight bearing as tolerated and range of motion as tolerated in a regular shoe on the right and a post op shoe on the left. She has elevated and iced to reduce swelling and pain which helps.  She stated that she is doing much better and can wear regular shoes on boot feet without much pain. She denied numbness or tingling.     Review of Systems   Constitution: Negative for chills and fever.   Cardiovascular: Negative for chest pain.   Respiratory: Negative for cough and shortness of breath.    Skin: Negative for color change, dry skin, itching, nail changes, poor wound healing and rash.   Musculoskeletal:        Left great toe.    Neurological: Negative for dizziness.   Psychiatric/Behavioral: Negative for altered mental status. The patient is not nervous/anxious.    All other systems reviewed and are negative.        Objective:      General    Constitutional: She is oriented to person, place, and time. She appears well-developed and well-nourished. No distress.   HENT:   Head: Atraumatic.   Eyes: Conjunctivae are normal.   Cardiovascular: Normal rate.    Pulmonary/Chest: Effort normal.   Neurological: She is alert and oriented to person, place, and time.   Psychiatric: She has a normal mood and affect. Her behavior is normal.         Right Ankle/Foot Exam     Tenderness   The patient is tender to palpation of the ATF and peroneals.    Range of Motion   Ankle Joint   Dorsiflexion: normal   Plantar flexion: normal   Subtalar Joint   Inversion: normal   Eversion: normal     Muscle Strength   The patient has normal right ankle strength.    Other   Sensation:  normal    Left Ankle/Foot Exam     Tenderness   The patient is tender to palpation of the great toe interphalangeal joint.    Range of Motion   The patient has normal left ankle ROM.   First MTP Joint: normal    Muscle Strength   The patient has normal left ankle strength.    Other   Sensation: normal      Vascular Exam     Right Pulses  Dorsalis Pedis:      2+          Left Pulses  Dorsalis Pedis:      2+              RADS:  TOE LEFT:  Nondisplaced fracture proximal phalanx left great toe similar to previous exams.         Assessment:       Encounter Diagnoses   Name Primary?    Nondisplaced unspecified fracture of left great toe, subsequent encounter for fracture with routine healing Yes    Sprain of right ankle, unspecified ligament, initial encounter           Plan:       Discussed treatment plan with patient,.   - rest ice and elevation to reduce swelling  LEFT: post op shoe, weight bearing as tolerated, range of motion as tolerated, may wean to regular shoe as pain allows  RIGHT: activity as tolerated, ice and elevate to reduce swelling.   - return to clinic in 6 weeks if continued pain at time x-ray of her left foot is needed,.

## 2018-02-22 DIAGNOSIS — F41.9 ANXIETY: Chronic | ICD-10-CM

## 2018-02-22 DIAGNOSIS — F32.A DEPRESSION, UNSPECIFIED DEPRESSION TYPE: Chronic | ICD-10-CM

## 2018-02-22 RX ORDER — ESCITALOPRAM OXALATE 20 MG/1
TABLET ORAL
Qty: 30 TABLET | Refills: 1 | Status: SHIPPED | OUTPATIENT
Start: 2018-02-22 | End: 2018-04-30 | Stop reason: SDUPTHER

## 2018-02-28 ENCOUNTER — OFFICE VISIT (OUTPATIENT)
Dept: PAIN MEDICINE | Facility: CLINIC | Age: 68
End: 2018-02-28
Attending: ANESTHESIOLOGY
Payer: MEDICARE

## 2018-02-28 VITALS
BODY MASS INDEX: 28.75 KG/M2 | DIASTOLIC BLOOD PRESSURE: 61 MMHG | WEIGHT: 200.81 LBS | HEART RATE: 73 BPM | TEMPERATURE: 98 F | SYSTOLIC BLOOD PRESSURE: 106 MMHG | HEIGHT: 70 IN

## 2018-02-28 DIAGNOSIS — M54.9 INTRACTABLE BACK PAIN: ICD-10-CM

## 2018-02-28 DIAGNOSIS — M47.816 SPONDYLOSIS OF LUMBAR REGION WITHOUT MYELOPATHY OR RADICULOPATHY: Primary | ICD-10-CM

## 2018-02-28 DIAGNOSIS — M53.3 SACROILIAC DYSFUNCTION: ICD-10-CM

## 2018-02-28 PROCEDURE — 99999 PR PBB SHADOW E&M-EST. PATIENT-LVL III: CPT | Mod: PBBFAC,,, | Performed by: ANESTHESIOLOGY

## 2018-02-28 PROCEDURE — 99204 OFFICE O/P NEW MOD 45 MIN: CPT | Mod: S$GLB,,, | Performed by: ANESTHESIOLOGY

## 2018-02-28 NOTE — PROGRESS NOTES
Subjective:      Patient ID: Marleny Guzman is a 67 y.o. female.    Chief Complaint: Low-back Pain    Referred by: Self, Aaareferral     Pain Scales  Best: 8/10  Worst: 10/10  Usually: 7/10  Today: 8/10    Low-back Pain   Pertinent negatives include no abdominal pain, chest pain, numbness, pelvic pain or weakness.     HPI  Patient is a 67 year old female here today for evaluation of low back pain.  Patient received L4-L5 ILESI on 8/3/17, which provided 100% relief until she suffered a fall in January.  She presented to the Emergency department, where imaging revealed a fracture in her left foot.  No imaging of the spine or pelvis was done at that time.  Patient states that she is now having low back pain again, but it feels different than before.  She describes it as an aching pain in the lumbar region, left worse than right.  It is intermittent, worse with sitting, bending.  Patient is taking meloxicam for pain relief.  She is also using heat packs for her back pain.  She denies any radicular pain in BLE.      Patient denies new onset numbness/weakness in BLE, saddle anesthesia, bowel/bladder incontinence.  Patient does note increased urgency with her bowel and bladder however.    Interventional Pain History  8/3/17 L4-L5 ILESI    Review of Systems   Constitution: Negative for weakness, malaise/fatigue and weight gain.   HENT: Negative for ear pain.    Eyes: Negative for blurred vision, double vision and pain.   Cardiovascular: Negative for chest pain and leg swelling.   Respiratory: Negative for cough, shortness of breath and wheezing.    Hematologic/Lymphatic: Does not bruise/bleed easily.   Skin: Negative for itching and rash.   Musculoskeletal: Positive for back pain. Negative for neck pain and stiffness.   Gastrointestinal: Negative for abdominal pain, diarrhea, nausea and vomiting.   Genitourinary: Negative for flank pain, frequency, pelvic pain and urgency.   Neurological: Negative for dizziness, loss of  balance, numbness and vertigo.   Psychiatric/Behavioral: Negative for depression and hallucinations.     Past Medical History:   Diagnosis Date    Abnormal Pap smear     Abnormal Pap smear of cervix     Abnormal Pap smear of vagina 7/20/2016    Allergy     Anemia     Anxiety     Cataract     Depression     Depression with anxiety     Hypertension     Osteoarthritis 6/20/2013    Right rotator cuff tear     Sleep apnea     Cipap machine at home    Trouble in sleeping     Urinary incontinence     Leaks urine with laughing/ coughing/ sneezing       Past Surgical History:   Procedure Laterality Date    CATARACT EXTRACTION  6/18/12    OS    CATARACT EXTRACTION  7/16/12    OD    CERVIX SURGERY      Conization    COLONOSCOPY      COLONOSCOPY N/A 3/4/2016    Procedure: COLONOSCOPY;  Surgeon: Tenzin Thakkar MD;  Location: [address]);  Service: Endoscopy;  Laterality: N/A;    EYE SURGERY Bilateral     cataract    HYSTERECTOMY  5-12-15    LEEP      LUMBAR EPIDURAL INJECTION      [place] REMOVAL OF OVARY/TUBE(S)  5-12-15    SALPINGECTOMY Left     SHOULDER SURGERY Right        Review of patient's allergies indicates:  No Known Allergies    Current Outpatient Prescriptions   Medication Sig Dispense Refill    aspirin 81 MG Chew Take 81 mg by mouth once daily.      ergocalciferol (ERGOCALCIFEROL) 50,000 unit Cap Take 1 capsule (50,000 Units total) by mouth every 30 days. 3 capsule 3    escitalopram oxalate (LEXAPRO) 20 MG tablet TAKE 1 TABLET BY MOUTH EVERY DAY 30 tablet 1    estradiol (ESTRACE) 0.01 % (0.1 mg/gram) vaginal cream Place 1 g vaginally twice a week. 1 Tube 3    lorazepam (ATIVAN) 0.5 MG tablet Take 1 tablet (0.5 mg total) by mouth daily as needed. 30 tablet 0    meloxicam (MOBIC) 7.5 MG tablet TAKE 1 TABLET BY MOUTH TWICE A DAY AS NEEDED FOR NECK PAIN 60 tablet 0    metoprolol succinate (TOPROL-XL) 50 MG 24 hr tablet Take 1 tablet (50 mg total) by mouth once daily. 90 tablet 3  "   oxyCODONE-acetaminophen (PERCOCET) 5-325 mg per tablet Take 1 tablet by mouth every 8 (eight) hours as needed for Pain. 15 tablet 0    valsartan-hydrochlorothiazide (DIOVAN-HCT) 160-12.5 mg per tablet Take 1 tablet by mouth once daily. 90 tablet 3    gabapentin (NEURONTIN) 100 MG capsule One at bedtime for 4 days then two at bedtime for 4 days then three at bedtime indefinitely 90 capsule 6     No current facility-administered medications for this visit.        Family History   Problem Relation Age of Onset    Hypertension Father     Cataracts Father     Cancer Mother      THYROID CANCER    Stroke Mother     Breast cancer Cousin     Heart disease Sister     Pacemaker/defibrilator Sister     Hypertension Sister     Deep vein thrombosis Sister     Heart disease Sister      CABG    Osteoarthritis Sister     Edema Sister     Eczema Sister     Hypertension Sister     Anuerysm Sister     Drug abuse Sister     Hypertension Sister     No Known Problems Brother     No Known Problems Sister     Cancer Sister      Cancer cells or "growth in her stomach"    No Known Problems Sister     Hypertension Daughter     Other Daughter      Heart palpitations    Depression Maternal Grandmother     Ulcers Maternal Grandfather      Legs    No Known Problems Paternal Grandmother     No Known Problems Paternal Grandfather     Cancer Maternal Aunt     ADD / ADHD Neg Hx     Alcohol abuse Neg Hx     Anxiety disorder Neg Hx     Bipolar disorder Neg Hx     Dementia Neg Hx     OCD Neg Hx     Paranoid behavior Neg Hx     Physical abuse Neg Hx     Schizophrenia Neg Hx     Seizures Neg Hx     Sexual abuse Neg Hx     Amblyopia Neg Hx     Blindness Neg Hx     Glaucoma Neg Hx     Macular degeneration Neg Hx     Retinal detachment Neg Hx     Strabismus Neg Hx        Social History     Social History    Marital status:      Spouse name: N/A    Number of children: N/A    Years of education: N/A " "    Occupational History    Disability      Depression     Disabled     Social History Main Topics    Smoking status: Former Smoker     Packs/day: 0.50     Years: 22.00     Types: Cigarettes     Quit date: 2/7/1998    Smokeless tobacco: Never Used    Alcohol use 0.0 oz/week      Comment: Occassionally for social events will have a glass of wine    Drug use: No    Sexual activity: Not on file     Other Topics Concern    Caffeine Use Yes    Financial Status: Disabled Yes    Firearms: Does Patient Have Access To A Firearm? No    Home Situation: Lives Alone Yes    Spirituality: Active Participation Yes    Education: Unfinished College Yes    Spirituality: Organized [employer] Yes    Legal: Arrest History No     Social History Narrative    Marleny currently does operate an automobile.             Objective:      /61   Pulse 73   Temp 98.2 °F (36.8 °C)   Ht 5' 10" (1.778 m)   Wt 91.1 kg (200 lb 13.4 oz)   BMI 28.82 kg/m²       Ortho/SPM Exam      GEN:  Well developed, well nourished.  No acute distress.   HEENT:  No trauma.  Mucous membranes moist.  Nares patent bilaterally.  PSYCH: Normal affect. Thought content appropriate.  CHEST:  Breathing symmetric.  No audible wheezing.  ABD: Soft, non-distended.  SKIN:  Warm, pink, dry.  No rash on exposed areas.    EXT:  No cyanosis, clubbing, or edema.  No color change or changes in nail or hair growth.  NEURO/MUSCULOSKELETAL:  Fully alert, oriented, and appropriate. Speech normal peng. No cranial nerve deficits.     Motor Strength: 5/5 motor strength throughout lower extremities.   Sensory: No sensory deficit in the lower extremities.   Reflexes:  2+ and symmetric throughout.   No clonus on right, left foot and ankle is in brace due to a fracture  L-Spine:  Full ROM with pain on flexion. + facet loading bilaterally.  No SLR bilaterally.    SI Joint/Hip: No JAYLEEN bilaterally.  No FADIR bilaterally.  + TTP over lumbar paraspinals, bilateral SI " joints    Imaging:  [facility]- Outpatient Diagnostic Testing DDD (degenerative disc disease), lumbar [M51.36 (ICD-10-CM)]  Chronic bilateral low back pain without sciatica [M54.5, G89.29 (ICD-10-CM)]   Procedure:  Exam Date: Reason for Exam:   X-Ray Lumbar Complete With Flex And Ext 07/21/2017 None Specified             RESULTS:  Technique: AP, oblique, and lateral views including flexion/extension views of the lumbar spine.     Comparison: None     Findings:   There is transitional anatomy at S1. Mild lumbar levoscoliosis. Multilevel degenerative disc disease throughout the lumbar spine. No instability. Multilevel facet arthropathy, which is most significant at L5-S1 bilaterally. Vertebral body heights are maintained. Paravertebral soft tissues are unremarkable.  IMPRESSION:      Multilevel degenerative disc disease and facet arthropathy without instability.       Assessment:       Encounter Diagnoses   Name Primary?    Spondylosis of lumbar region without myelopathy or radiculopathy Yes    Intractable back pain     Sacroiliac dysfunction          Plan:       Marleny was seen today for low-back pain.    Diagnoses and all orders for this visit:    Spondylosis of lumbar region without myelopathy or radiculopathy  -     X-Ray Pelvis Complete min 3 views; Future  -     X-Ray Lumbar Complete With Flex And Ext; Future    Intractable back pain  -     X-Ray Pelvis Complete min 3 views; Future  -     X-Ray Lumbar Complete With Flex And Ext; Future    Sacroiliac dysfunction  -     X-Ray Pelvis Complete min 3 views; Future  -     X-Ray Lumbar Complete With Flex And Ext; Future         We discussed with the patient the assessment and recommendations. The following is the plan we agreed on:    1. Due to patient's back pain changing after a fall in January, will order xray of lumbar spine and pelvis for further evaluation  2. Patient to follow up in 1 week to review Xray results.    Ludwig Duarte MD  PGY 2 PMR  Resident    I have personally taken the history and examined this patient and agree with the resident's note as stated above.

## 2018-03-01 ENCOUNTER — HOSPITAL ENCOUNTER (OUTPATIENT)
Dept: RADIOLOGY | Facility: OTHER | Age: 68
Discharge: HOME | End: 2018-03-01
Attending: ANESTHESIOLOGY
Payer: MEDICARE

## 2018-03-01 DIAGNOSIS — M53.3 SACROILIAC DYSFUNCTION: ICD-10-CM

## 2018-03-01 DIAGNOSIS — M54.9 INTRACTABLE BACK PAIN: ICD-10-CM

## 2018-03-01 DIAGNOSIS — M47.816 SPONDYLOSIS OF LUMBAR REGION WITHOUT MYELOPATHY OR RADICULOPATHY: ICD-10-CM

## 2018-03-01 PROCEDURE — 73521 X-RAY EXAM HIPS BI 2 VIEWS: CPT | Mod: 26,,, | Performed by: RADIOLOGY

## 2018-03-01 PROCEDURE — 72114 X-RAY EXAM L-S SPINE BENDING: CPT | Mod: TC,FY

## 2018-03-01 PROCEDURE — 73521 X-RAY EXAM HIPS BI 2 VIEWS: CPT | Mod: TC,FY

## 2018-03-01 PROCEDURE — 72114 X-RAY EXAM L-S SPINE BENDING: CPT | Mod: 26,,, | Performed by: RADIOLOGY

## 2018-03-06 ENCOUNTER — OFFICE VISIT (OUTPATIENT)
Dept: PAIN MEDICINE | Facility: CLINIC | Age: 68
End: 2018-03-06
Attending: ANESTHESIOLOGY
Payer: MEDICARE

## 2018-03-06 VITALS
SYSTOLIC BLOOD PRESSURE: 132 MMHG | HEIGHT: 70 IN | HEART RATE: 65 BPM | DIASTOLIC BLOOD PRESSURE: 61 MMHG | BODY MASS INDEX: 28.41 KG/M2 | WEIGHT: 198.44 LBS | TEMPERATURE: 98 F | RESPIRATION RATE: 18 BRPM

## 2018-03-06 DIAGNOSIS — M54.9 INTRACTABLE BACK PAIN: ICD-10-CM

## 2018-03-06 DIAGNOSIS — M53.3 SACROILIAC DYSFUNCTION: Primary | ICD-10-CM

## 2018-03-06 PROCEDURE — 99999 PR PBB SHADOW E&M-EST. PATIENT-LVL III: CPT | Mod: PBBFAC,,, | Performed by: ANESTHESIOLOGY

## 2018-03-06 PROCEDURE — 3075F SYST BP GE 130 - 139MM HG: CPT | Mod: S$GLB,,, | Performed by: ANESTHESIOLOGY

## 2018-03-06 PROCEDURE — 3078F DIAST BP <80 MM HG: CPT | Mod: S$GLB,,, | Performed by: ANESTHESIOLOGY

## 2018-03-06 PROCEDURE — 99214 OFFICE O/P EST MOD 30 MIN: CPT | Mod: S$GLB,,, | Performed by: ANESTHESIOLOGY

## 2018-03-06 NOTE — PROGRESS NOTES
Subjective:      Patient ID: Marleny Guzman is a 67 y.o. female.    Chief Complaint: Back Pain and Hip Pain    Referred by: No ref. provider found     67 year old female here today for follow up low back pain. Patient received L4-L5 ILESI on 8/3/17, which provided 100% relief until she suffered a fall in January.  She presented to the Emergency department, where imaging revealed a fracture in her left foot. Continued to have low back pain again, and x rays obtained to rule out fracture (read as normal). She describes pain as an aching pain in the lumbar region, left worse than right.  It is intermittent, worse with sitting, bending.  Patient is taking meloxicam for pain relief.  She is also using heat packs for her back pain.  She denies any radicular pain in BLE.       Patient denies new onset numbness/weakness in BLE, saddle anesthesia, bowel/bladder incontinence.  Patient does note increased urgency with her bowel and bladder however.     Interventional Pain History  8/3/17 L4-L5 ILESI       Pain Scales  Best:/10  Worst:  Usually: 2/10  Today: 3/10    Back Pain   Pertinent negatives include no chest pain, fever, headaches or weight loss.   Hip Pain    Pertinent negatives include no fever or itching.       Interventional Pain History  L4-5 ILESI  - good benefit    Past Medical History:   Diagnosis Date    Abnormal Pap smear     Abnormal Pap smear of cervix     Abnormal Pap smear of vagina 7/20/2016    Allergy     Anemia     Anxiety     Cataract     Depression     Depression with anxiety     Hypertension     Osteoarthritis 6/20/2013    Right rotator cuff tear     Sleep apnea     Cipap machine at home    Trouble in sleeping     Urinary incontinence     Leaks urine with laughing/ coughing/ sneezing       Past Surgical History:   Procedure Laterality Date    CATARACT EXTRACTION  6/18/12    OS    CATARACT EXTRACTION  7/16/12    OD    CERVIX SURGERY      Conization    COLONOSCOPY      COLONOSCOPY  N/A 3/4/2016    Procedure: COLONOSCOPY;  Surgeon: Tenzin Thakkar MD;  Location: [facility] ([address] [place] [address]);  Service: Endoscopy;  Laterality: N/A;    EYE SURGERY Bilateral     cataract    HYSTERECTOMY  5-12-15    LEEP      LUMBAR EPIDURAL INJECTION      [place] REMOVAL OF OVARY/TUBE(S)  5-12-15    SALPINGECTOMY Left     SHOULDER SURGERY Right        Review of patient's allergies indicates:  No Known Allergies    Current Outpatient Prescriptions   Medication Sig Dispense Refill    aspirin 81 MG Chew Take 81 mg by mouth once daily.      ergocalciferol (ERGOCALCIFEROL) 50,000 unit Cap Take 1 capsule (50,000 Units total) by mouth every 30 days. 3 capsule 3    escitalopram oxalate (LEXAPRO) 20 MG tablet TAKE 1 TABLET BY MOUTH EVERY DAY 30 tablet 1    estradiol (ESTRACE) 0.01 % (0.1 mg/gram) vaginal cream Place 1 g vaginally twice a week. 1 Tube 3    gabapentin (NEURONTIN) 100 MG capsule One at bedtime for 4 days then two at bedtime for 4 days then three at bedtime indefinitely 90 capsule 6    lorazepam (ATIVAN) 0.5 MG tablet Take 1 tablet (0.5 mg total) by mouth daily as needed. 30 tablet 0    meloxicam (MOBIC) 7.5 MG tablet TAKE 1 TABLET BY MOUTH TWICE A DAY AS NEEDED FOR NECK PAIN 60 tablet 0    metoprolol succinate (TOPROL-XL) 50 MG 24 hr tablet Take 1 tablet (50 mg total) by mouth once daily. 90 tablet 3    oxyCODONE-acetaminophen (PERCOCET) 5-325 mg per tablet Take 1 tablet by mouth every 8 (eight) hours as needed for Pain. 15 tablet 0    valsartan-hydrochlorothiazide (DIOVAN-HCT) 160-12.5 mg per tablet Take 1 tablet by mouth once daily. 90 tablet 3     No current facility-administered medications for this visit.        Family History   Problem Relation Age of Onset    Hypertension Father     Cataracts Father     Cancer Mother      THYROID CANCER    Stroke Mother     Breast cancer Cousin     Heart disease Sister     Pacemaker/defibrilator Sister     Hypertension Sister     Deep vein thrombosis Sister  "    Heart disease Sister      CABG    Osteoarthritis Sister     Edema Sister     Eczema Sister     Hypertension Sister     Anuerysm Sister     Drug abuse Sister     Hypertension Sister     No Known Problems Brother     No Known Problems Sister     Cancer Sister      Cancer cells or "growth in her stomach"    No Known Problems Sister     Hypertension Daughter     Other Daughter      Heart palpitations    Depression Maternal Grandmother     Ulcers Maternal Grandfather      Legs    No Known Problems Paternal Grandmother     No Known Problems Paternal Grandfather     Cancer Maternal Aunt     ADD / ADHD Neg Hx     Alcohol abuse Neg Hx     Anxiety disorder Neg Hx     Bipolar disorder Neg Hx     Dementia Neg Hx     OCD Neg Hx     Paranoid behavior Neg Hx     Physical abuse Neg Hx     Schizophrenia Neg Hx     Seizures Neg Hx     Sexual abuse Neg Hx     Amblyopia Neg Hx     Blindness Neg Hx     Glaucoma Neg Hx     Macular degeneration Neg Hx     Retinal detachment Neg Hx     Strabismus Neg Hx        Social History     Social History    Marital status:      Spouse name: N/A    Number of children: N/A    Years of education: N/A     Occupational History    Disability      Depression     Disabled     Social History Main Topics    Smoking status: Former Smoker     Packs/day: 0.50     Years: 22.00     Types: Cigarettes     Quit date: 2/7/1998    Smokeless tobacco: Never Used    Alcohol use 0.0 oz/week      Comment: Occassionally for social events will have a glass of wine    Drug use: No    Sexual activity: Not on file     Other Topics Concern    Caffeine Use Yes    Financial Status: Disabled Yes    Firearms: Does Patient Have Access To A Firearm? No    Home Situation: Lives Alone Yes    Spirituality: Active Participation Yes    Education: Unfinished College Yes    Spirituality: Organized Worship Yes    Legal: Arrest History No     Social History Narrative    Marleny " "currently does operate an automobile.           Review of Systems   Constitution: Negative for chills, fever, malaise/fatigue, weight gain and weight loss.   HENT: Negative for ear pain and hoarse voice.    Eyes: Negative for blurred vision, pain and visual disturbance.   Cardiovascular: Negative for chest pain, dyspnea on exertion and irregular heartbeat.   Respiratory: Negative for cough, shortness of breath and wheezing.    Endocrine: Negative for cold intolerance and heat intolerance.   Hematologic/Lymphatic: Negative for adenopathy and bleeding problem. Does not bruise/bleed easily.   Skin: Negative for color change, itching and rash.   Musculoskeletal: Positive for back pain. Negative for neck pain.   Gastrointestinal: Negative for change in bowel habit, diarrhea, hematemesis, hematochezia, melena and vomiting.   Genitourinary: Negative for flank pain, frequency, hematuria and urgency.   Neurological: Negative for difficulty with concentration, dizziness, headaches, loss of balance and seizures.   Psychiatric/Behavioral: Negative for altered mental status, depression and suicidal ideas. The patient is not nervous/anxious.    Allergic/Immunologic: Negative for HIV exposure.           Objective:      /61   Pulse 65   Temp 98.3 °F (36.8 °C) (Oral)   Resp 18   Ht 5' 10" (1.778 m)   Wt 90 kg (198 lb 6.6 oz)   BMI 28.47 kg/m²   GEN: No apparent distress. Affect normal.   HEENT: Normocephalic, Atraumatic  CV: S1 and S2 present  Resp:  no increased work of breathing  SKIN: intact, No rashes    Lumbar Spine Exam:  INSPECTION: skin intact. No kyphosis, lordosis or scoliosis noted  PALPATION:    Lumbar paraspinals: + TTP bilaterally   SIJ:     Right: + TTP    Left: + TTP  ROM:    LUMBAR FLEXION:  Limited due to pain   LUMBAR EXTENSION: FROM   FACET LOADING:  Neg bilaterally   HIP EXAM: no pain with internal and external rotation of the hip joint.   MOTOR:    RLE: 5 / 5    LLE: 5 / 5; patient with ankle boot " on Left foot.   SENSORY: grossly Intact to light touch in bilateral lower extremities   DEEP TENDON REFLEXES:   PATELLAR: 2+ and symmetrical.    ACHILLES: 2+ and symmetrical.   PULSES: 2+ distal Bilateral lower extremities  BABINSKI:  down going toes bilat   ANKLE CLONUS:   Absent.     STRAIGHT LEG RAISE: neg right side; neg left side  PIRIFORMIS MUSCLE STRETCH: neg right side; neg left side  FEMORAL NERVE STRETCH TEST:  neg right side; neg left side  SPHINX TEST:  Causes bilateral SIJ pain  FABERE'S TEST:   Causes right sided SIJ pain when performed on right side; mild pain left SIJ when performed on left side  MILGRAM'S TEST: causes LBP    GAIT AND KRISTEN:  normal  STEP LENGTH:  normal    Ortho/SPM Exam        Imaging:  L spine x ray:  FINDINGS:  There is mild levoconvex scoliotic curvature of the lumbar spine with apex at L3-L4. Sagittal alignment is normal without instability on flexion/extension positioning. Multilevel disc/endplate degeneration is unchanged. Facet degeneration throughout the lower lumbar spine is unchanged.   Impression       Unchanged disc/endplate degeneration throughout the lumbar spine and facet degeneration in the lower lumbar spine.         Hip x ray:  FINDINGS:  Femoral heads are well aligned within the acetabula. No fracture or dislocation. Remainder of the imaged bony pelvis is intact.  Facet degeneration of the lower lumbar spine is partially imaged.   Impression       No acute abnormality.       Assessment:       Encounter Diagnoses   Name Primary?    Sacroiliac dysfunction Yes    Intractable back pain          Plan:       Marleny was seen today for back pain and hip pain.    Diagnoses and all orders for this visit:    Sacroiliac dysfunction  -     Ambulatory consult to Physical Therapy  -     NM Bone Scan Spect; Future    Intractable back pain  -     Ambulatory consult to Physical Therapy  -     NM Bone Scan Spect; Future         We discussed with the patient the assessment and  recommendations. The following is the plan we agreed on:    1. Reviewed x rays hips/pelvis and L spine - both read as normal. However, given her history of a fall, will obtain SPECT scan of pelvis to rule out fracture as patient continues to have pain.     2. IF SPECT scan negative for fracture, will perform bilateral sacroiliac joint injections with fluoro guidance. Consent obtained today.     3. RTC 2 weeks after sacroiliac joint injections for follow up.     Mick Fang MD, Pain Fellow      I have personally taken the history and examined this patient and agree with the fellow's note as stated above.

## 2018-03-13 ENCOUNTER — CLINICAL SUPPORT (OUTPATIENT)
Dept: REHABILITATION | Facility: HOSPITAL | Age: 68
End: 2018-03-13
Attending: ANESTHESIOLOGY
Payer: MEDICARE

## 2018-03-13 DIAGNOSIS — G89.29 CHRONIC BILATERAL LOW BACK PAIN WITHOUT SCIATICA: ICD-10-CM

## 2018-03-13 DIAGNOSIS — M54.50 CHRONIC BILATERAL LOW BACK PAIN WITHOUT SCIATICA: ICD-10-CM

## 2018-03-13 DIAGNOSIS — R29.898 WEAKNESS OF LEFT LOWER EXTREMITY: ICD-10-CM

## 2018-03-13 PROCEDURE — G8978 MOBILITY CURRENT STATUS: HCPCS | Mod: CL,PO

## 2018-03-13 PROCEDURE — 97161 PT EVAL LOW COMPLEX 20 MIN: CPT | Mod: PO

## 2018-03-13 PROCEDURE — G8979 MOBILITY GOAL STATUS: HCPCS | Mod: CK,PO

## 2018-03-13 PROCEDURE — 97110 THERAPEUTIC EXERCISES: CPT | Mod: PO

## 2018-03-13 NOTE — PLAN OF CARE
OUTPATIENT PHYSICAL THERAPY  PHYSICAL THERAPY EVALUATION    Name: Marleny Guzman  Clinic Number: [id number]    Diagnosis:   1. Chronic bilateral low back pain without sciatica     2. Weakness of left lower extremity        Physician: Rishabh Dimas MD  Treatment Orders: PT Eval and Treat  Past Medical History:   Diagnosis Date    Abnormal Pap smear     Abnormal Pap smear of cervix     Abnormal Pap smear of vagina 7/20/2016    Allergy     Anemia     Anxiety     Cataract     Chronic bilateral low back pain without sciatica 3/13/2018    Depression     Depression with anxiety     Hypertension     Osteoarthritis 6/20/2013    Right rotator cuff tear     Sleep apnea     Cipap machine at home    Trouble in sleeping     Urinary incontinence     Leaks urine with laughing/ coughing/ sneezing     Current Outpatient Prescriptions   Medication Sig    aspirin 81 MG Chew Take 81 mg by mouth once daily.    ergocalciferol (ERGOCALCIFEROL) 50,000 unit Cap Take 1 capsule (50,000 Units total) by mouth every 30 days.    escitalopram oxalate (LEXAPRO) 20 MG tablet TAKE 1 TABLET BY MOUTH EVERY DAY    estradiol (ESTRACE) 0.01 % (0.1 mg/gram) vaginal cream Place 1 g vaginally twice a week.    gabapentin (NEURONTIN) 100 MG capsule One at bedtime for 4 days then two at bedtime for 4 days then three at bedtime indefinitely    lorazepam (ATIVAN) 0.5 MG tablet Take 1 tablet (0.5 mg total) by mouth daily as needed.    meloxicam (MOBIC) 7.5 MG tablet TAKE 1 TABLET BY MOUTH TWICE A DAY AS NEEDED FOR NECK PAIN    metoprolol succinate (TOPROL-XL) 50 MG 24 hr tablet Take 1 tablet (50 mg total) by mouth once daily.    oxyCODONE-acetaminophen (PERCOCET) 5-325 mg per tablet Take 1 tablet by mouth every 8 (eight) hours as needed for Pain.    valsartan-hydrochlorothiazide (DIOVAN-HCT) 160-12.5 mg per tablet Take 1 tablet by mouth once daily.     No current facility-administered medications for this visit.      Review of patient's  "allergies indicates:  No Known Allergies    Time in: 3:00 pm   Time Out: 4:00 pm   Total Treatment Time: 60 minutes    Date of eval: 3/13/2018  Visit #: 1/1  Auth expiration:3/6/19  POC expiration: 5/31/18     Precautions: standard    Subjective   History of Present Illness: pt reports having back pain for a long time but in the past 4 yrs but then in 2015, she tripped and fell on a sidewalk. She reports falling again going down on a wet step in 2016. Finally, the pt reports falling going down steps due to missing a step this past January. Following this fall, the pt reports having sever back and neck pain since and having a broken L great toe and finally getting out of the boot last week. Pt reports that with each fall, her back pain gets worse. Pt describes this pain as "achey" and "burning." Pt reports that it hurts on the L > R but will stretch across her low back.  Onset: gradual   Patient c/o: intermittent symptoms  Pain Scale: Marleny rates pain on a scale of 0-10 to be5 currently; 9 at worst; 0 at best .  Aggravating factors: sitting, driving, vacuuming, mopping  Relieving factors: heating pad, aleve, meloxicam    Previous treatment: lumbar injections 3-4 yrs ago, currently scheduled for additional   Imaging: bone scan scheduled  Past surgical history: rotator cuff repair in L shoulder, hysterectomy     Prior level of function: same  Functional deficits: none noted  Occupation: care taker, work duties include: currently on light duty  Environment: single story home with no steps to enter    No cultural or spiritual barriers identified to treatment or learning.  Patient's goals: The patients goal is to return to PLOF without pain or risk of re-injury.      Objective   Mental status: oriented x3  Posture/ Alignment: Fair, Protruded Head, Protracted Scapula    GAIT DEVIATIONS: Marleny amb with decreased peng. Ambulates with majorit of weightbearing towards lateral side of L foot due to recent L great toe " "fracture    FUNCTION:   - Trunk Flexion: WFL, "pulling" present  - Trunk Ext: WFL, repeated motion improves symptoms  - Trunk Rot R: WFL, pain free  - Trunk Rot L: WFL, pain free  - SLS R: 8 sec  - SLS L: 2 sec  - Sit <--> Stand: uses B UE support throughout transfer  - Bed Mobility: mod I, increased time required for movement  - Stairs: able to perform s/ UE support, demonstrates decreased stability and symptom reproduction upon descent    ROM:  B LE ROM grossly pain-free, WFL    Strength:      Right Left Comment   Hip Flexion: 4/5 4/5    Hip ABD: 4+/5 4-/5    Hip ADD: 4+/5 4+/5    Hip Extension: 4+/5 4+/5    Knee Ext: 5/5 5/5    Knee Flex: 5/5 5/5    B ankle strength grossly 4+/5      Special Tests:    - JAYLEEN: R negative, L negative  - FADIR: R negative, L positive  - Scour: R negative, L negative    Palpation: tenderness upon palpation of B glute med region L>R, tenderness and bruising visible at base of 1st metatarsal and tarsal in L LE    Joint Play: hypomobilie thoracic spine and hypermobile lumbar spine c/ [place]    Treatment: pt participated in therapeutic exercises including figure 4 stretch, piriformis stretch, and clamshells c/ OTB    Pt/family was provided educational information, including: role of glute med in ambulation and SLS, self-trigger point release with lacrosse ball, importance of exercise in symptom management, role of PT, goals for PT, scheduling - pt verbalized understanding. Discussed insurance plan with pt.     Assessment   Marleny is a 67 y.o. female referred to outpatient physical therapy with a medical diagnosis of low back pain due to chronic LE mm imbalance. Pt demonstrates symptoms and deficits consistent with gluteus medius mm dysfunction. Demonstrates impairments including limitations as described in the problem list. Pt prognosis is Good. Positive prognostic factors include motivation to improve, reproduction of symptoms during initial evaluation. Negative prognostic factors " include chronicity of symptoms. Pt will benefit from skilled outpatient physical therapy to address the above stated deficits, provide pt/family education, and to maximize pt's level of independence.     History  Co-morbidities and personal factors that may impact the plan of care Examination  Body Structures and Functions, activity limitations and participation restrictions that may impact the plan of care    Clinical Presentation   Co-morbidities:   chronic back and neck pain        Personal Factors:   coping style  lifestyle Body Regions:   back  lower extremities    Body Systems:   ROM  strength  gait  motor control        Participation Restrictions:   None noted     Activity limitations:   Mobility  driving (bike, car, motorcycle)    Self care  no deficits    Domestic Life  doing house work (cleaning house, washing dishes, laundry)    Community and Social Life  community life  recreation and leisure         evolving clinical presentation with changing clinical characteristics                      moderate   low  high Decision Making/ Complexity Score:  low     Pt's spiritual, cultural and educational needs considered and pt agreeable to plan of care and goals as stated below:     Anticipated Barriers for physical therapy: none noted    GOALS:  Pt will be independent with HEP and symptom management.   Long term goal development pending pt progress.    G-Codes:  Intake 32% 68% Current Status CL - At least 60 percent but less than 80 percent  Predicted 46% 54% Goal Status+ CK - At least 40 percent but less than 60 percent      Plan   Pt scheduled to receive injection for pain relief in next few weeks. Pt has been instructed to perform HEP daily leading up to injection and to resume HEP once cleared from referring provider to continue. Pt has been instructed to call and schedule further appointments I if symptoms remain following lumbar injection. Outpatient physical therapy 1- 2 times weekly to include: pt ed,  HEP, therapeutic exercises, therapeutic activities, neuromuscular re-education/ balance exercises, manual therapy, and modalities prn. Cont PT for 2-3 months. Pt may be seen by PTA as part of the rehabilitation team.     I certify the need for these services furnished under this plan of treatment and while under my care.    Bonilla Naqvi, PT

## 2018-03-13 NOTE — PATIENT INSTRUCTIONS
Outer Hip Stretch: Figure Four (Chair)        Place your Left ankle on top of your Right knee and gently push your Left knee down towards the floor until you feel a stretch in your Left hip.   Hold for __30 secs. Repeat __2-3__ times.      Piriformis Stretch - Supine        Pull your Left knee across body toward your Right shoulder. Hold this stretch for _30__ seconds. Repeat _2-3__ times.          Clam Shells    Lie on your Right side with knees bent. Raise top leg, keeping knees bent and ankles together. Do not let your hips roll back as you raise your leg.  Repeat 10 times per set each side. Do 2-3 sets per session.         Copyright © I. All rights reserved.

## 2018-03-15 ENCOUNTER — PATIENT MESSAGE (OUTPATIENT)
Dept: OPTOMETRY | Facility: CLINIC | Age: 68
End: 2018-03-15

## 2018-03-20 ENCOUNTER — HOSPITAL ENCOUNTER (OUTPATIENT)
Dept: RADIOLOGY | Facility: HOSPITAL | Age: 68
Discharge: HOME | End: 2018-03-20
Attending: ANESTHESIOLOGY
Payer: MEDICARE

## 2018-03-20 DIAGNOSIS — M54.9 INTRACTABLE BACK PAIN: ICD-10-CM

## 2018-03-20 DIAGNOSIS — M53.3 SACROILIAC DYSFUNCTION: ICD-10-CM

## 2018-03-20 PROCEDURE — A9503 TC99M MEDRONATE: HCPCS

## 2018-03-20 PROCEDURE — 78320 NM BONE SCAN SPECT: CPT | Mod: 26,,, | Performed by: RADIOLOGY

## 2018-03-22 ENCOUNTER — SURGERY (OUTPATIENT)
Age: 68
End: 2018-03-22

## 2018-03-22 ENCOUNTER — HOSPITAL ENCOUNTER (OUTPATIENT)
Facility: OTHER | Age: 68
Discharge: HOME | End: 2018-03-22
Attending: ANESTHESIOLOGY | Admitting: ANESTHESIOLOGY
Payer: MEDICARE

## 2018-03-22 VITALS
SYSTOLIC BLOOD PRESSURE: 181 MMHG | WEIGHT: 198 LBS | HEART RATE: 61 BPM | RESPIRATION RATE: 18 BRPM | BODY MASS INDEX: 28.35 KG/M2 | HEIGHT: 70 IN | DIASTOLIC BLOOD PRESSURE: 88 MMHG | TEMPERATURE: 98 F | OXYGEN SATURATION: 97 %

## 2018-03-22 DIAGNOSIS — G89.29 CHRONIC PAIN: ICD-10-CM

## 2018-03-22 DIAGNOSIS — M46.1 BILATERAL SACROILIITIS: Primary | ICD-10-CM

## 2018-03-22 PROCEDURE — 27096 INJECT SACROILIAC JOINT: CPT | Mod: 50 | Performed by: ANESTHESIOLOGY

## 2018-03-22 PROCEDURE — 63600175 PHARM REV CODE 636 W HCPCS: Performed by: ANESTHESIOLOGY

## 2018-03-22 PROCEDURE — 25500020 PHARM REV CODE 255: Performed by: ANESTHESIOLOGY

## 2018-03-22 PROCEDURE — 25000003 PHARM REV CODE 250: Performed by: ANESTHESIOLOGY

## 2018-03-22 PROCEDURE — 27096 INJECT SACROILIAC JOINT: CPT | Mod: 50,,, | Performed by: ANESTHESIOLOGY

## 2018-03-22 RX ORDER — SODIUM CHLORIDE 9 MG/ML
500 INJECTION, SOLUTION INTRAVENOUS CONTINUOUS
Status: DISCONTINUED | OUTPATIENT
Start: 2018-03-22 | End: 2018-03-22 | Stop reason: HOSPADM

## 2018-03-22 RX ORDER — BUPIVACAINE HYDROCHLORIDE 2.5 MG/ML
INJECTION, SOLUTION EPIDURAL; INFILTRATION; INTRACAUDAL
Status: DISCONTINUED | OUTPATIENT
Start: 2018-03-22 | End: 2018-03-22 | Stop reason: HOSPADM

## 2018-03-22 RX ORDER — LIDOCAINE HYDROCHLORIDE 10 MG/ML
INJECTION INFILTRATION; PERINEURAL
Status: DISCONTINUED | OUTPATIENT
Start: 2018-03-22 | End: 2018-03-22 | Stop reason: HOSPADM

## 2018-03-22 RX ORDER — ALPRAZOLAM 0.5 MG/1
0.5 TABLET, ORALLY DISINTEGRATING ORAL
Status: COMPLETED | OUTPATIENT
Start: 2018-03-22 | End: 2018-03-22

## 2018-03-22 RX ORDER — TRIAMCINOLONE ACETONIDE 40 MG/ML
INJECTION, SUSPENSION INTRA-ARTICULAR; INTRAMUSCULAR
Status: DISCONTINUED | OUTPATIENT
Start: 2018-03-22 | End: 2018-03-22 | Stop reason: HOSPADM

## 2018-03-22 RX ADMIN — TRIAMCINOLONE ACETONIDE 40 MG: 40 INJECTION, SUSPENSION INTRA-ARTICULAR; INTRAMUSCULAR at 11:03

## 2018-03-22 RX ADMIN — ALPRAZOLAM 0.5 MG: 0.5 TABLET, ORALLY DISINTEGRATING ORAL at 11:03

## 2018-03-22 RX ADMIN — LIDOCAINE HYDROCHLORIDE 10 ML: 10 INJECTION, SOLUTION INFILTRATION; PERINEURAL at 11:03

## 2018-03-22 RX ADMIN — IOHEXOL 5 ML: 300 INJECTION, SOLUTION INTRAVENOUS at 11:03

## 2018-03-22 RX ADMIN — BUPIVACAINE HYDROCHLORIDE 10 ML: 2.5 INJECTION, SOLUTION EPIDURAL; INFILTRATION; INTRACAUDAL; PERINEURAL at 11:03

## 2018-03-22 NOTE — DISCHARGE INSTRUCTIONS
Thank you for allowing us to care for you today. You may receive a survey about the care we provided. Your feedback is valuable and helps us provide excellent care throughout the community. Home Care Instructions Pain Management:    1. DIET:   You may resume your normal diet today.   2. BATHING:   You may shower with luke warm water. No soaking in tub.  3. DRESSING:   You may remove your bandage today.   4. ACTIVITY LEVEL:   You may resume your normal activities 24 hrs after your procedure.  5. MEDICATIONS:   You may resume your normal medications today.   6. SPECIAL INSTRUCTIONS:   No heat to the injection site for 24 hrs including, bath or shower, heating pad, moist heat, or hot tubs.    Use ice pack to injection site for any pain or discomfort.  Apply ice packs for 20 minute intervals as needed.   If you have received any sedatives by mouth today you may not drive for 12 hours.    If you have received any sedation through your IV, you may not drive for 24 hrs.     PLEASE CALL YOUR DOCTOR IF:  1. Redness or swelling around the injection site.  2. Fever of 101 degrees  3. Drainage (pus) from the injection site.  4. For any continuous bleeding (some dried blood over the incision is normal.)  5. For severe headache that is relieved when lying flat.    FOR EMERGENCIES:   If any unusual problems or difficulties occur during clinic hours, call [phone] or [phone].

## 2018-03-22 NOTE — OP NOTE
Sacroiliac Joint Injection under Fluoroscopy  Time-out taken to identify patient and procedure side prior to starting the procedure.   I attest that I have reviewed the patient's home medications prior to the procedure and no contraindication have been identified. I  re-evaluated the patient after the patient was positioned for the procedure in the procedure room immediately before the procedural time-out. The vital signs are current and represent the current state of the patient which has not significantly changed since the preprocedure assessment.               Date of Service: 03/22/2018    PCP: Bridget Cantu MD                                                    PROCEDURE:  bilateralsacroiliac joint injection under fluoroscopy.    REASON FOR PROCEDURE: Bilateral sacroiliitis [M46.1]  1. Bilateral sacroiliitis    2. Chronic pain        PHYSICIAN: Rishabh Dimas MD  ASSISTANTS: Esau Colindres MD    MEDICATIONS INJECTED:  Kenalog 20mg and Bupivacaine 0.25% (1.5ml of bupivicaine per side).    LOCAL ANESTHETIC USED: Xylocaine 1% 9ml with Sodium Bicarbonate 1ml. 3ml per site.  SEDATION MEDICATIONS: none    ESTIMATED BLOOD LOSS:  None.    COMPLICATIONS:  None.    TECHNIQUE:   Laying in the prone position, the patient was prepped and draped in the usual sterile fashion using ChloraPrep and fenestrated drape.  The area was determined under fluoroscopy.  Local Xylocaine was injected by raising a wheel and going down to the periosteum using a 27-gauge hypodermic needle.  The 3.5 inch 22-gauge spinal needle was introduce into the bilateral sacroiliac joint.  Negative pressure applied to confirm no intravascular placement.  Omnipaque was injected to confirm placement and to confirm that there was no vascular runoff.  The medication was then injected slowly.  The patient tolerated the procedure well.    PAIN BEFORE THE PROCEDURE:  5/10.    PAIN AFTER THE PROCEDURE: 0/10.    The patient was monitored for approximately 30  minutes after the procedure.  Patient was given post procedure and discharge instructions to follow at home.  We will see the patient back in two weeks or the patient may call to inform of status. The patient was discharged in a stable condition    Esau Colindres MD, PGY-2  03/22/2018

## 2018-04-30 ENCOUNTER — OFFICE VISIT (OUTPATIENT)
Dept: INTERNAL MEDICINE | Facility: CLINIC | Age: 68
End: 2018-04-30
Payer: MEDICARE

## 2018-04-30 ENCOUNTER — HOSPITAL ENCOUNTER (OUTPATIENT)
Dept: RADIOLOGY | Facility: HOSPITAL | Age: 68
Discharge: HOME | End: 2018-04-30
Attending: INTERNAL MEDICINE
Payer: MEDICARE

## 2018-04-30 ENCOUNTER — TELEPHONE (OUTPATIENT)
Dept: GYNECOLOGIC ONCOLOGY | Facility: CLINIC | Age: 68
End: 2018-04-30

## 2018-04-30 VITALS
TEMPERATURE: 100 F | SYSTOLIC BLOOD PRESSURE: 138 MMHG | HEART RATE: 68 BPM | BODY MASS INDEX: 29.07 KG/M2 | HEIGHT: 70 IN | DIASTOLIC BLOOD PRESSURE: 66 MMHG | OXYGEN SATURATION: 98 % | WEIGHT: 203.06 LBS

## 2018-04-30 DIAGNOSIS — F41.9 ANXIETY: Chronic | ICD-10-CM

## 2018-04-30 DIAGNOSIS — M25.551 PAIN OF RIGHT HIP JOINT: ICD-10-CM

## 2018-04-30 DIAGNOSIS — G47.30 SLEEP APNEA, UNSPECIFIED TYPE: ICD-10-CM

## 2018-04-30 DIAGNOSIS — F32.A DEPRESSION, UNSPECIFIED DEPRESSION TYPE: Chronic | ICD-10-CM

## 2018-04-30 DIAGNOSIS — I10 ESSENTIAL HYPERTENSION: Primary | Chronic | ICD-10-CM

## 2018-04-30 PROCEDURE — 3075F SYST BP GE 130 - 139MM HG: CPT | Mod: CPTII,S$GLB,, | Performed by: INTERNAL MEDICINE

## 2018-04-30 PROCEDURE — 3008F BODY MASS INDEX DOCD: CPT | Mod: CPTII,S$GLB,, | Performed by: INTERNAL MEDICINE

## 2018-04-30 PROCEDURE — 73502 X-RAY EXAM HIP UNI 2-3 VIEWS: CPT | Mod: TC,FY,PO,RT

## 2018-04-30 PROCEDURE — 99214 OFFICE O/P EST MOD 30 MIN: CPT | Mod: S$GLB,,, | Performed by: INTERNAL MEDICINE

## 2018-04-30 PROCEDURE — 3078F DIAST BP <80 MM HG: CPT | Mod: CPTII,S$GLB,, | Performed by: INTERNAL MEDICINE

## 2018-04-30 PROCEDURE — 73502 X-RAY EXAM HIP UNI 2-3 VIEWS: CPT | Mod: 26,RT,, | Performed by: RADIOLOGY

## 2018-04-30 PROCEDURE — 99999 PR PBB SHADOW E&M-EST. PATIENT-LVL V: CPT | Mod: PBBFAC,,, | Performed by: INTERNAL MEDICINE

## 2018-04-30 RX ORDER — LORAZEPAM 0.5 MG/1
TABLET ORAL
Qty: 30 TABLET | Refills: 0 | Status: SHIPPED | OUTPATIENT
Start: 2018-04-30 | End: 2018-10-16 | Stop reason: SDUPTHER

## 2018-04-30 RX ORDER — VALSARTAN AND HYDROCHLOROTHIAZIDE 320; 12.5 MG/1; MG/1
1 TABLET, FILM COATED ORAL DAILY
Qty: 90 TABLET | Refills: 3 | Status: SHIPPED | OUTPATIENT
Start: 2018-04-30 | End: 2018-07-23

## 2018-04-30 RX ORDER — ESCITALOPRAM OXALATE 20 MG/1
20 TABLET ORAL DAILY
Qty: 90 TABLET | Refills: 1 | Status: SHIPPED | OUTPATIENT
Start: 2018-04-30 | End: 2019-04-03 | Stop reason: SDUPTHER

## 2018-04-30 NOTE — TELEPHONE ENCOUNTER
----- Message from Francia Mosley MD sent at 4/30/2018 10:02 AM CDT -----  Contact: Bridget Cantu MD  Hi--thank you for message. A 6 months follow from Jan is appropriate. I will have my staff reach out to her as I don't see her scheduled.     Angy, please schedule patient for 6 month follow up from Jan.     ----- Message -----  From: Bridget Cantu MD  Sent: 4/30/2018   9:54 AM  To: Francia Mosley MD    Hi,    Does this patient need to see you before 6 months for a LGSIL on pap Jan 2018?  Thanks have a great week!    Bridget

## 2018-05-02 NOTE — PROGRESS NOTES
Subjective:       Patient ID: Marleny Guzman is a 67 y.o. female.    Chief Complaint: Follow-up    HPIPt is feeling somewhat more depressed.  Not suicidal or homicidal.  Has new R hip pain.  Denies CP or SOB.  No GI sx.  Review of Systems   Respiratory: Negative for shortness of breath (PND or orthopnea).    Cardiovascular: Negative for chest pain (arm pain or jaw pain).   Gastrointestinal: Negative for abdominal pain, diarrhea, nausea and vomiting.   Genitourinary: Negative for dysuria.   Neurological: Negative for seizures, syncope and headaches.       Objective:      Physical Exam   Constitutional: She is oriented to person, place, and time. She appears well-developed and well-nourished. No distress.   HENT:   Head: Normocephalic.   Mouth/Throat: Oropharynx is clear and moist.   Neck: Neck supple. No JVD present. No thyromegaly present.   Cardiovascular: Normal rate, regular rhythm, normal heart sounds and intact distal pulses.  Exam reveals no gallop and no friction rub.    No murmur heard.  Pulmonary/Chest: Effort normal and breath sounds normal. She has no wheezes. She has no rales.   Abdominal: Soft. Bowel sounds are normal. She exhibits no distension and no mass. There is no tenderness. There is no rebound and no guarding.   Musculoskeletal: She exhibits no edema.   Lymphadenopathy:     She has no cervical adenopathy.   Neurological: She is alert and oriented to person, place, and time. She has normal reflexes.   Skin: Skin is warm and dry.   Psychiatric: She has a normal mood and affect. Her behavior is normal. Judgment and thought content normal.     Some mild pain on ROM of R hip  Assessment:       1. Essential hypertension    2. Anxiety    3. Depression, unspecified depression type    4. Sleep apnea, unspecified type    5. Pain of right hip joint        Plan:   Essential hypertension  -     Ambulatory consult to Optometry  -     Hypertension Digital Medicine (HDMP) Enrollment Order  -     Hypertension  Digital Medicine (HDMP): Assign Onboarding Questionnaires  Uncontrolled - increase valsartan to 320/12.5 daily    Anxiety  -     escitalopram oxalate (LEXAPRO) 20 MG tablet; Take 1 tablet (20 mg total) by mouth once daily.  Dispense: 90 tablet; Refill: 1  See psych as cannot push dose  Depression, unspecified depression type  -     escitalopram oxalate (LEXAPRO) 20 MG tablet; Take 1 tablet (20 mg total) by mouth once daily.  Dispense: 90 tablet; Refill: 1  -     Ambulatory consult to Psychiatry    Sleep apnea, unspecified type    Pain of right hip joint  -     X-Ray Hip 2 or 3 views Right; Future; Expected date: 04/30/2018  -     Ambulatory consult to Orthopedics    Other orders  -     valsartan-hydrochlorothiazide (DIOVAN-HCT) 320-12.5 mg per tablet; Take 1 tablet by mouth once daily.  Dispense: 90 tablet; Refill: 3  -     LORazepam (ATIVAN) 0.5 MG tablet; Half - one daily as needed for anxiety  Dispense: 30 tablet; Refill: 0

## 2018-05-11 ENCOUNTER — DOCUMENTATION ONLY (OUTPATIENT)
Dept: REHABILITATION | Facility: HOSPITAL | Age: 68
End: 2018-05-11

## 2018-05-11 NOTE — PROGRESS NOTES
Pt did not present for scheduled appointments. Pt was seen for 1 visit on 3/13/18. Goal achievement unable to be assessed secondary to patient not returning. Pt discharged from plan of care at this time.

## 2018-05-16 ENCOUNTER — PATIENT MESSAGE (OUTPATIENT)
Dept: OPTOMETRY | Facility: CLINIC | Age: 68
End: 2018-05-16

## 2018-05-29 ENCOUNTER — TELEPHONE (OUTPATIENT)
Dept: INTERNAL MEDICINE | Facility: CLINIC | Age: 68
End: 2018-05-29

## 2018-07-23 ENCOUNTER — LAB VISIT (OUTPATIENT)
Dept: LAB | Facility: HOSPITAL | Age: 68
End: 2018-07-23
Attending: INTERNAL MEDICINE
Payer: MEDICARE

## 2018-07-23 ENCOUNTER — OFFICE VISIT (OUTPATIENT)
Dept: INTERNAL MEDICINE | Facility: CLINIC | Age: 68
End: 2018-07-23
Payer: MEDICARE

## 2018-07-23 VITALS
OXYGEN SATURATION: 94 % | TEMPERATURE: 99 F | SYSTOLIC BLOOD PRESSURE: 124 MMHG | BODY MASS INDEX: 29.63 KG/M2 | HEIGHT: 70 IN | WEIGHT: 207 LBS | HEART RATE: 68 BPM | DIASTOLIC BLOOD PRESSURE: 60 MMHG

## 2018-07-23 DIAGNOSIS — D22.9 NEVUS: ICD-10-CM

## 2018-07-23 DIAGNOSIS — R73.9 HYPERGLYCEMIA: ICD-10-CM

## 2018-07-23 DIAGNOSIS — I10 HYPERTENSION, UNSPECIFIED TYPE: ICD-10-CM

## 2018-07-23 DIAGNOSIS — I10 HYPERTENSION, UNSPECIFIED TYPE: Primary | ICD-10-CM

## 2018-07-23 LAB
ALBUMIN SERPL BCP-MCNC: 3.4 G/DL
ALP SERPL-CCNC: 109 U/L
ALT SERPL W/O P-5'-P-CCNC: 14 U/L
ANION GAP SERPL CALC-SCNC: 8 MMOL/L
AST SERPL-CCNC: 24 U/L
BILIRUB SERPL-MCNC: 0.3 MG/DL
BUN SERPL-MCNC: 20 MG/DL
CALCIUM SERPL-MCNC: 9.4 MG/DL
CHLORIDE SERPL-SCNC: 105 MMOL/L
CO2 SERPL-SCNC: 29 MMOL/L
CREAT SERPL-MCNC: 1 MG/DL
EST. GFR  (AFRICAN AMERICAN): >60 ML/MIN/1.73 M^2
EST. GFR  (NON AFRICAN AMERICAN): 57.6 ML/MIN/1.73 M^2
ESTIMATED AVG GLUCOSE: 120 MG/DL
GLUCOSE SERPL-MCNC: 102 MG/DL
HBA1C MFR BLD HPLC: 5.8 %
POTASSIUM SERPL-SCNC: 3.8 MMOL/L
PROT SERPL-MCNC: 7.1 G/DL
SODIUM SERPL-SCNC: 142 MMOL/L

## 2018-07-23 PROCEDURE — 99213 OFFICE O/P EST LOW 20 MIN: CPT | Mod: S$GLB,,, | Performed by: INTERNAL MEDICINE

## 2018-07-23 PROCEDURE — 3074F SYST BP LT 130 MM HG: CPT | Mod: CPTII,S$GLB,, | Performed by: INTERNAL MEDICINE

## 2018-07-23 PROCEDURE — 99999 PR PBB SHADOW E&M-EST. PATIENT-LVL IV: CPT | Mod: PBBFAC,,, | Performed by: INTERNAL MEDICINE

## 2018-07-23 PROCEDURE — 80053 COMPREHEN METABOLIC PANEL: CPT

## 2018-07-23 PROCEDURE — 83036 HEMOGLOBIN GLYCOSYLATED A1C: CPT

## 2018-07-23 PROCEDURE — 36415 COLL VENOUS BLD VENIPUNCTURE: CPT | Mod: PO

## 2018-07-23 PROCEDURE — 3078F DIAST BP <80 MM HG: CPT | Mod: CPTII,S$GLB,, | Performed by: INTERNAL MEDICINE

## 2018-07-23 RX ORDER — IRBESARTAN AND HYDROCHLOROTHIAZIDE 300; 12.5 MG/1; MG/1
1 TABLET, FILM COATED ORAL DAILY
Qty: 90 TABLET | Refills: 3 | Status: SHIPPED | OUTPATIENT
Start: 2018-07-23 | End: 2019-07-14 | Stop reason: SDUPTHER

## 2018-07-25 ENCOUNTER — TELEPHONE (OUTPATIENT)
Dept: GYNECOLOGIC ONCOLOGY | Facility: CLINIC | Age: 68
End: 2018-07-25

## 2018-07-31 NOTE — PROGRESS NOTES
Subjective:       Patient ID: Marleny Guzman is a 68 y.o. female.    Chief Complaint: Follow-up    HPIPt is doing well - no CP or SOB.  BP controlled.  Review of Systems   Respiratory: Negative for shortness of breath (PND or orthopnea).    Cardiovascular: Negative for chest pain (arm pain or jaw pain).   Gastrointestinal: Negative for abdominal pain, diarrhea, nausea and vomiting.   Genitourinary: Negative for dysuria.   Neurological: Negative for seizures, syncope and headaches.       Objective:      Physical Exam   Constitutional: She is oriented to person, place, and time. She appears well-developed and well-nourished. No distress.   HENT:   Head: Normocephalic.   Mouth/Throat: Oropharynx is clear and moist.   Neck: Neck supple. No JVD present. No thyromegaly present.   Cardiovascular: Normal rate, regular rhythm, normal heart sounds and intact distal pulses.  Exam reveals no gallop and no friction rub.    No murmur heard.  Pulmonary/Chest: Effort normal and breath sounds normal. She has no wheezes. She has no rales.   Abdominal: Soft. Bowel sounds are normal. She exhibits no distension and no mass. There is no tenderness. There is no rebound and no guarding.   Musculoskeletal: She exhibits no edema.   Lymphadenopathy:     She has no cervical adenopathy.   Neurological: She is alert and oriented to person, place, and time. She has normal reflexes.   Skin: Skin is warm and dry.   Psychiatric: She has a normal mood and affect. Her behavior is normal. Judgment and thought content normal.       Assessment:       1. Hypertension, unspecified type    2. Hyperglycemia    3. Nevus        Plan:   Hypertension, unspecified type  -     Comprehensive metabolic panel; Future; Expected date: 07/23/2018  Controlled - continue current meds  Change valsartan to irbesartan due to recall  Hyperglycemia  -     Hemoglobin A1c; Future; Expected date: 07/23/2018    Nevus  -     Ambulatory consult to Dermatology    Other orders  -      irbesartan-hydrochlorothiazide (AVALIDE) 300-12.5 mg per tablet; Take 1 tablet by mouth once daily.  Dispense: 90 tablet; Refill: 3

## 2018-08-07 ENCOUNTER — TELEPHONE (OUTPATIENT)
Dept: GYNECOLOGIC ONCOLOGY | Facility: CLINIC | Age: 68
End: 2018-08-07

## 2018-08-07 NOTE — TELEPHONE ENCOUNTER
Spoke with pt. Pt has been rescheduled to Monday 8/20/18 at 10:45 at the main location. She voiced understanding

## 2018-08-07 NOTE — TELEPHONE ENCOUNTER
----- Message from Mayank Francisco sent at 8/7/2018  4:03 PM CDT -----  Contact: pt   Pt would like a call back regarding  Rescheduling appointment   Pt can be reached at [phone]

## 2018-08-24 ENCOUNTER — TELEPHONE (OUTPATIENT)
Dept: GYNECOLOGIC ONCOLOGY | Facility: CLINIC | Age: 68
End: 2018-08-24

## 2018-08-27 ENCOUNTER — OFFICE VISIT (OUTPATIENT)
Dept: GYNECOLOGIC ONCOLOGY | Facility: CLINIC | Age: 68
End: 2018-08-27
Payer: MEDICARE

## 2018-08-27 VITALS
DIASTOLIC BLOOD PRESSURE: 61 MMHG | WEIGHT: 202.81 LBS | HEART RATE: 66 BPM | BODY MASS INDEX: 29.03 KG/M2 | HEIGHT: 70 IN | SYSTOLIC BLOOD PRESSURE: 125 MMHG

## 2018-08-27 DIAGNOSIS — N89.3 VAGINAL DYSPLASIA: Primary | ICD-10-CM

## 2018-08-27 DIAGNOSIS — L29.2 VULVOVAGINAL PRURITUS: ICD-10-CM

## 2018-08-27 PROCEDURE — 99213 OFFICE O/P EST LOW 20 MIN: CPT | Mod: PBBFAC | Performed by: OBSTETRICS & GYNECOLOGY

## 2018-08-27 PROCEDURE — 99213 OFFICE O/P EST LOW 20 MIN: CPT | Mod: S$PBB,,, | Performed by: OBSTETRICS & GYNECOLOGY

## 2018-08-27 PROCEDURE — 3078F DIAST BP <80 MM HG: CPT | Mod: CPTII,,, | Performed by: OBSTETRICS & GYNECOLOGY

## 2018-08-27 PROCEDURE — 1101F PT FALLS ASSESS-DOCD LE1/YR: CPT | Mod: CPTII,,, | Performed by: OBSTETRICS & GYNECOLOGY

## 2018-08-27 PROCEDURE — 88175 CYTOPATH C/V AUTO FLUID REDO: CPT

## 2018-08-27 PROCEDURE — 99999 PR PBB SHADOW E&M-EST. PATIENT-LVL III: CPT | Mod: PBBFAC,,, | Performed by: OBSTETRICS & GYNECOLOGY

## 2018-08-27 PROCEDURE — 3074F SYST BP LT 130 MM HG: CPT | Mod: CPTII,,, | Performed by: OBSTETRICS & GYNECOLOGY

## 2018-08-27 RX ORDER — NYSTATIN AND TRIAMCINOLONE ACETONIDE 100000; 1 [USP'U]/G; MG/G
OINTMENT TOPICAL 2 TIMES DAILY PRN
Qty: 30 G | Refills: 1 | Status: ON HOLD | OUTPATIENT
Start: 2018-08-27 | End: 2020-02-01

## 2018-08-30 ENCOUNTER — TELEPHONE (OUTPATIENT)
Dept: GYNECOLOGIC ONCOLOGY | Facility: CLINIC | Age: 68
End: 2018-08-30

## 2018-08-30 NOTE — TELEPHONE ENCOUNTER
----- Message from Francia Mosley MD sent at 8/30/2018 10:35 AM CDT -----  Please let patient know pap is normal.

## 2018-09-08 ENCOUNTER — PATIENT MESSAGE (OUTPATIENT)
Dept: GYNECOLOGIC ONCOLOGY | Facility: CLINIC | Age: 68
End: 2018-09-08

## 2018-09-09 NOTE — PROGRESS NOTES
Subjective:      Patient ID: Marleny Guzman is a 68 y.o. female.    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 Gonzalez. Vaginal pap smear 6/15/16 HSIL. She was referred for consult by Dr. Valles. 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 Noncompliant with vaginal estrogen cream as prescribed.      Follow up pap 1/30/17 HSIL. 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      Pap 7/24/17 ASCUS HPV 18+   Pap 1/22/18 LSIL, normal exam     Presents today for follow up. Without gynecologic complaints. Denies bleeding. Using vaginal estrogen.      MMG 12/12/17   Review of Systems   Constitutional: Negative for appetite change, chills, fatigue and fever.   HENT: Negative for mouth sores.    Respiratory: Negative for cough and shortness of breath.    Cardiovascular: Negative for leg swelling.   Gastrointestinal: Negative for abdominal pain, blood in stool, constipation and diarrhea.   Endocrine: Negative for cold intolerance.   Genitourinary: Negative for dysuria and vaginal bleeding.   Musculoskeletal: Negative for myalgias.   Skin: Negative for rash.   Allergic/Immunologic: Negative.    Neurological: Negative for weakness and numbness.   Hematological: Negative for adenopathy. Does not bruise/bleed easily.   Psychiatric/Behavioral: Negative for confusion.       Objective:   Physical Exam:   Constitutional: She is oriented to person, place, and time. She appears well-developed and well-nourished.    HENT:   Head: Normocephalic and atraumatic.    Eyes: EOM are normal. Pupils are equal, round, and reactive to light.    Neck: Normal range of motion. Neck supple. No thyromegaly present.    Cardiovascular: Normal rate, regular rhythm and intact distal pulses.     Pulmonary/Chest: Effort normal and breath sounds normal. No respiratory distress. She has no wheezes.        Abdominal: Soft. Bowel sounds are normal.  She exhibits no distension, no ascites and no mass. There is no tenderness.     Genitourinary: Rectum normal and vagina normal. Pelvic exam was performed with patient supine. There is no lesion on the right labia. There is no lesion on the left labia. Uterus is absent. There is an absent adnexa. Right adnexum displays no mass. Left adnexum displays no mass. Vaginal cuff normal.Cervix exhibits absence. Additional cervical findings: pap smear done  Genitourinary Comments: No visible lesions           Musculoskeletal: Normal range of motion and moves all extremeties.      Lymphadenopathy:     She has no cervical adenopathy.        Right: No inguinal and no supraclavicular adenopathy present.        Left: No inguinal and no supraclavicular adenopathy present.    Neurological: She is alert and oriented to person, place, and time.    Skin: Skin is warm and dry. No rash noted.    Psychiatric: She has a normal mood and affect.       Assessment:     1. Vaginal dysplasia    2. Vulvovaginal pruritus        Plan:   No orders of the defined types were placed in this encounter.    Recommend continued surveillance. No gross lesions on exam today. Pap collected. RTC 6 months or sooner if needed.     I spent approximately 15 minutes reviewing the available records and evaluating the patient, out of which over 50% of the time was spent face to face with the patient in counseling and coordinating this patient's care.

## 2018-10-16 ENCOUNTER — IMMUNIZATION (OUTPATIENT)
Dept: INTERNAL MEDICINE | Facility: CLINIC | Age: 68
End: 2018-10-16
Payer: MEDICARE

## 2018-10-16 ENCOUNTER — LAB VISIT (OUTPATIENT)
Dept: LAB | Facility: HOSPITAL | Age: 68
End: 2018-10-16
Attending: INTERNAL MEDICINE
Payer: MEDICARE

## 2018-10-16 ENCOUNTER — OFFICE VISIT (OUTPATIENT)
Dept: INTERNAL MEDICINE | Facility: CLINIC | Age: 68
End: 2018-10-16
Payer: MEDICARE

## 2018-10-16 VITALS
TEMPERATURE: 98 F | BODY MASS INDEX: 29.35 KG/M2 | DIASTOLIC BLOOD PRESSURE: 64 MMHG | WEIGHT: 205 LBS | OXYGEN SATURATION: 98 % | SYSTOLIC BLOOD PRESSURE: 124 MMHG | HEART RATE: 64 BPM | HEIGHT: 70 IN

## 2018-10-16 DIAGNOSIS — Z12.31 SCREENING MAMMOGRAM, ENCOUNTER FOR: ICD-10-CM

## 2018-10-16 DIAGNOSIS — R07.9 CHEST PAIN: ICD-10-CM

## 2018-10-16 DIAGNOSIS — R07.9 CHEST PAIN, UNSPECIFIED TYPE: Primary | ICD-10-CM

## 2018-10-16 DIAGNOSIS — R49.0 HOARSENESS: ICD-10-CM

## 2018-10-16 LAB
ALBUMIN SERPL BCP-MCNC: 3.5 G/DL
ALP SERPL-CCNC: 109 U/L
ALT SERPL W/O P-5'-P-CCNC: 18 U/L
ANION GAP SERPL CALC-SCNC: 8 MMOL/L
AST SERPL-CCNC: 20 U/L
BASOPHILS # BLD AUTO: 0.07 K/UL
BASOPHILS NFR BLD: 0.7 %
BILIRUB SERPL-MCNC: 0.3 MG/DL
BUN SERPL-MCNC: 25 MG/DL
CALCIUM SERPL-MCNC: 9.5 MG/DL
CHLORIDE SERPL-SCNC: 106 MMOL/L
CK MB SERPL-MCNC: 3.3 NG/ML
CK MB SERPL-RTO: 1.2 %
CK SERPL-CCNC: 285 U/L
CO2 SERPL-SCNC: 27 MMOL/L
CREAT SERPL-MCNC: 1.1 MG/DL
DIFFERENTIAL METHOD: ABNORMAL
EOSINOPHIL # BLD AUTO: 0.3 K/UL
EOSINOPHIL NFR BLD: 3 %
ERYTHROCYTE [DISTWIDTH] IN BLOOD BY AUTOMATED COUNT: 15.1 %
EST. GFR  (AFRICAN AMERICAN): 59.2 ML/MIN/1.73 M^2
EST. GFR  (NON AFRICAN AMERICAN): 51.3 ML/MIN/1.73 M^2
GLUCOSE SERPL-MCNC: 81 MG/DL
HCT VFR BLD AUTO: 38.5 %
HGB BLD-MCNC: 12 G/DL
IMM GRANULOCYTES # BLD AUTO: 0.02 K/UL
IMM GRANULOCYTES NFR BLD AUTO: 0.2 %
LYMPHOCYTES # BLD AUTO: 3.5 K/UL
LYMPHOCYTES NFR BLD: 32.8 %
MCH RBC QN AUTO: 28.4 PG
MCHC RBC AUTO-ENTMCNC: 31.2 G/DL
MCV RBC AUTO: 91 FL
MONOCYTES # BLD AUTO: 0.6 K/UL
MONOCYTES NFR BLD: 5.6 %
NEUTROPHILS # BLD AUTO: 6.2 K/UL
NEUTROPHILS NFR BLD: 57.7 %
NRBC BLD-RTO: 0 /100 WBC
PLATELET # BLD AUTO: 346 K/UL
PMV BLD AUTO: 10.7 FL
POTASSIUM SERPL-SCNC: 3.8 MMOL/L
PROT SERPL-MCNC: 7.1 G/DL
RBC # BLD AUTO: 4.22 M/UL
SODIUM SERPL-SCNC: 141 MMOL/L
TROPONIN I SERPL DL<=0.01 NG/ML-MCNC: <0.006 NG/ML
WBC # BLD AUTO: 10.68 K/UL

## 2018-10-16 PROCEDURE — 90662 IIV NO PRSV INCREASED AG IM: CPT | Mod: PBBFAC,PO

## 2018-10-16 PROCEDURE — 93005 ELECTROCARDIOGRAM TRACING: CPT | Mod: PBBFAC,PO | Performed by: INTERNAL MEDICINE

## 2018-10-16 PROCEDURE — 99215 OFFICE O/P EST HI 40 MIN: CPT | Mod: PBBFAC,25,PO | Performed by: INTERNAL MEDICINE

## 2018-10-16 PROCEDURE — 3074F SYST BP LT 130 MM HG: CPT | Mod: CPTII,,, | Performed by: INTERNAL MEDICINE

## 2018-10-16 PROCEDURE — 84484 ASSAY OF TROPONIN QUANT: CPT

## 2018-10-16 PROCEDURE — 99214 OFFICE O/P EST MOD 30 MIN: CPT | Mod: S$PBB,,, | Performed by: INTERNAL MEDICINE

## 2018-10-16 PROCEDURE — 99999 PR PBB SHADOW E&M-EST. PATIENT-LVL V: CPT | Mod: PBBFAC,,, | Performed by: INTERNAL MEDICINE

## 2018-10-16 PROCEDURE — 3078F DIAST BP <80 MM HG: CPT | Mod: CPTII,,, | Performed by: INTERNAL MEDICINE

## 2018-10-16 PROCEDURE — 36415 COLL VENOUS BLD VENIPUNCTURE: CPT | Mod: PO

## 2018-10-16 PROCEDURE — 80053 COMPREHEN METABOLIC PANEL: CPT

## 2018-10-16 PROCEDURE — 82553 CREATINE MB FRACTION: CPT

## 2018-10-16 PROCEDURE — 93010 ELECTROCARDIOGRAM REPORT: CPT | Mod: ,,, | Performed by: INTERNAL MEDICINE

## 2018-10-16 PROCEDURE — 85025 COMPLETE CBC W/AUTO DIFF WBC: CPT

## 2018-10-16 PROCEDURE — 82550 ASSAY OF CK (CPK): CPT

## 2018-10-16 PROCEDURE — 1101F PT FALLS ASSESS-DOCD LE1/YR: CPT | Mod: CPTII,,, | Performed by: INTERNAL MEDICINE

## 2018-10-16 RX ORDER — LORAZEPAM 0.5 MG/1
TABLET ORAL
Qty: 30 TABLET | Refills: 0 | Status: SHIPPED | OUTPATIENT
Start: 2018-10-16 | End: 2019-04-03 | Stop reason: SDUPTHER

## 2018-10-18 NOTE — PROGRESS NOTES
Subjective:       Patient ID: Marleny Guzman is a 68 y.o. female.    Chief Complaint: Medication Refill and Anxiety    HPI Pt reports some CP not with activity but when she thinks about her dad who  last year at 89.  There is some strife among her siblings regarding his care and his will and this is upsetting to her.  She does report some chronic hoarseness that is new.  Review of Systems   Respiratory: Negative for shortness of breath (PND or orthopnea).    Cardiovascular: Positive for chest pain (arm pain or jaw pain).   Gastrointestinal: Negative for abdominal pain, diarrhea, nausea and vomiting.   Genitourinary: Negative for dysuria.   Neurological: Negative for seizures, syncope and headaches.       Objective:      Physical Exam   Constitutional: She is oriented to person, place, and time. She appears well-developed and well-nourished. No distress.   HENT:   Head: Normocephalic.   Mouth/Throat: Oropharynx is clear and moist.   Neck: Neck supple. No JVD present. No thyromegaly present.   Cardiovascular: Normal rate, regular rhythm, normal heart sounds and intact distal pulses. Exam reveals no gallop and no friction rub.   No murmur heard.  Pulmonary/Chest: Effort normal and breath sounds normal. She has no wheezes. She has no rales.   Abdominal: Soft. Bowel sounds are normal. She exhibits no distension and no mass. There is no tenderness. There is no rebound and no guarding.   Musculoskeletal: She exhibits no edema.   Lymphadenopathy:     She has no cervical adenopathy.   Neurological: She is alert and oriented to person, place, and time. She has normal reflexes.   Skin: Skin is warm and dry.   Psychiatric: She has a normal mood and affect. Her behavior is normal. Judgment and thought content normal.       Assessment:       1. Chest pain, unspecified type    2. Chest pain    3. Screening mammogram, encounter for    4. Hoarseness        Plan:   Chest pain, unspecified type  -     EKG 12-lead - no acute  changes    Chest pain  -     EKG 12-lead  -     Cardiac PET Scan Stress; Future  -     CBC auto differential; Future; Expected date: 10/16/2018  -     Comprehensive metabolic panel; Future; Expected date: 10/16/2018  -     CK-MB; Future; Expected date: 10/16/2018  -     Troponin I; Future; Expected date: 10/16/2018    Screening mammogram, encounter for  -     Mammo Digital Screening Bilat with Tomosynthesis CAD; Future; Expected date: 10/16/2018    Hoarseness  -     Ambulatory consult to ENT    Other orders  -     LORazepam (ATIVAN) 0.5 MG tablet; Half - one daily as needed for anxiety  Dispense: 30 tablet; Refill: 0

## 2018-11-06 ENCOUNTER — TELEPHONE (OUTPATIENT)
Dept: CARDIOLOGY | Facility: CLINIC | Age: 68
End: 2018-11-06

## 2018-11-07 ENCOUNTER — OFFICE VISIT (OUTPATIENT)
Dept: INTERNAL MEDICINE | Facility: CLINIC | Age: 68
End: 2018-11-07
Payer: MEDICARE

## 2018-11-07 ENCOUNTER — HOSPITAL ENCOUNTER (OUTPATIENT)
Dept: RADIOLOGY | Facility: HOSPITAL | Age: 68
Discharge: HOME | End: 2018-11-07
Attending: INTERNAL MEDICINE
Payer: MEDICARE

## 2018-11-07 ENCOUNTER — CLINICAL SUPPORT (OUTPATIENT)
Dept: CARDIOLOGY | Facility: CLINIC | Age: 68
End: 2018-11-07
Attending: INTERNAL MEDICINE
Payer: MEDICARE

## 2018-11-07 VITALS
TEMPERATURE: 98 F | BODY MASS INDEX: 29.42 KG/M2 | DIASTOLIC BLOOD PRESSURE: 60 MMHG | WEIGHT: 205.5 LBS | HEIGHT: 70 IN | OXYGEN SATURATION: 96 % | SYSTOLIC BLOOD PRESSURE: 130 MMHG | HEART RATE: 67 BPM

## 2018-11-07 DIAGNOSIS — J18.9 PNEUMONIA OF RIGHT LOWER LOBE DUE TO INFECTIOUS ORGANISM: ICD-10-CM

## 2018-11-07 DIAGNOSIS — J18.9 PNEUMONIA OF RIGHT LOWER LOBE DUE TO INFECTIOUS ORGANISM: Primary | ICD-10-CM

## 2018-11-07 DIAGNOSIS — R07.9 CHEST PAIN: ICD-10-CM

## 2018-11-07 DIAGNOSIS — R07.9 CHEST PAIN, UNSPECIFIED TYPE: ICD-10-CM

## 2018-11-07 PROCEDURE — 99499 UNLISTED E&M SERVICE: CPT | Mod: S$GLB,,, | Performed by: INTERNAL MEDICINE

## 2018-11-07 PROCEDURE — 3078F DIAST BP <80 MM HG: CPT | Mod: CPTII,S$GLB,, | Performed by: INTERNAL MEDICINE

## 2018-11-07 PROCEDURE — 99999 PR PBB SHADOW E&M-EST. PATIENT-LVL IV: CPT | Mod: PBBFAC,,, | Performed by: INTERNAL MEDICINE

## 2018-11-07 PROCEDURE — 71046 X-RAY EXAM CHEST 2 VIEWS: CPT | Mod: TC

## 2018-11-07 PROCEDURE — 99214 OFFICE O/P EST MOD 30 MIN: CPT | Mod: S$GLB,,, | Performed by: INTERNAL MEDICINE

## 2018-11-07 PROCEDURE — 3075F SYST BP GE 130 - 139MM HG: CPT | Mod: CPTII,S$GLB,, | Performed by: INTERNAL MEDICINE

## 2018-11-07 PROCEDURE — 1101F PT FALLS ASSESS-DOCD LE1/YR: CPT | Mod: CPTII,S$GLB,, | Performed by: INTERNAL MEDICINE

## 2018-11-07 PROCEDURE — 71046 X-RAY EXAM CHEST 2 VIEWS: CPT | Mod: 26,,, | Performed by: RADIOLOGY

## 2018-11-07 RX ORDER — ALBUTEROL SULFATE 90 UG/1
2 AEROSOL, METERED RESPIRATORY (INHALATION) EVERY 6 HOURS PRN
Qty: 1 EACH | Refills: 11 | Status: SHIPPED | OUTPATIENT
Start: 2018-11-07 | End: 2020-02-08

## 2018-11-07 RX ORDER — CODEINE PHOSPHATE AND GUAIFENESIN 10; 100 MG/5ML; MG/5ML
5 SOLUTION ORAL 3 TIMES DAILY PRN
Qty: 118 ML | Refills: 0 | Status: SHIPPED | OUTPATIENT
Start: 2018-11-07 | End: 2018-11-17

## 2018-11-07 RX ORDER — AMOXICILLIN AND CLAVULANATE POTASSIUM 875; 125 MG/1; MG/1
1 TABLET, FILM COATED ORAL 2 TIMES DAILY
Qty: 20 TABLET | Refills: 1 | Status: SHIPPED | OUTPATIENT
Start: 2018-11-07 | End: 2019-02-25

## 2018-11-14 NOTE — PROGRESS NOTES
Subjective:       Patient ID: Marleny Guzman is a 68 y.o. female.    Chief Complaint: Follow-up (since 10-); Cough; Headache; Nausea; Nasal Congestion; and Sore Throat   Urgent visit  The patient complains of fever sensations, chills and sweats for the past 2 nights.  She has been coughing producing yellow sputum.  All started with cough.  She has some right-sided chest pain. She feels very fatigued.  HPI  Review of Systems   Constitutional: Positive for appetite change, chills and fatigue. Negative for activity change, fever and unexpected weight change.   HENT: Negative for hearing loss.    Eyes: Negative for visual disturbance.   Respiratory: Positive for cough. Negative for chest tightness, shortness of breath and wheezing.    Cardiovascular: Negative for chest pain, palpitations and leg swelling.   Gastrointestinal: Negative for abdominal pain, constipation, nausea and vomiting.   Genitourinary: Negative for dysuria, frequency and urgency.   Musculoskeletal: Negative for arthralgias, back pain, gait problem, joint swelling and myalgias.   Skin: Negative for rash.   Neurological: Positive for weakness. Negative for light-headedness and headaches.   Psychiatric/Behavioral: Negative for dysphoric mood and sleep disturbance. The patient is not nervous/anxious.        Objective:      Physical Exam   Constitutional: She is oriented to person, place, and time. She appears well-developed and well-nourished. No distress.   HENT:   Head: Normocephalic and atraumatic.   Right Ear: External ear normal.   Left Ear: External ear normal.   Nose: Nose normal.   Mouth/Throat: Oropharynx is clear and moist. No oropharyngeal exudate.   Eyes: Conjunctivae and EOM are normal. Pupils are equal, round, and reactive to light. Right eye exhibits no discharge. No scleral icterus.   Neck: Normal range of motion and full passive range of motion without pain. Neck supple. No spinous process tenderness and no muscular tenderness  present. Carotid bruit is not present. No thyromegaly present.   Cardiovascular: Normal rate, regular rhythm, S1 normal, S2 normal, normal heart sounds and intact distal pulses. Exam reveals no gallop and no friction rub.   No murmur heard.  Pulmonary/Chest: Effort normal. No respiratory distress. She has no wheezes. She has rales. She exhibits no tenderness.   Rales RLL   Abdominal: Soft. Bowel sounds are normal. She exhibits no distension and no mass. There is no tenderness. There is no rebound and no guarding.   Genitourinary: Pelvic exam was performed with patient supine. Uterus is not deviated, not enlarged, not fixed and not tender. Cervix exhibits no motion tenderness, no discharge and no friability. Right adnexum displays no mass, no tenderness and no fullness. Left adnexum displays no mass, no tenderness and no fullness.   Musculoskeletal: Normal range of motion. She exhibits no edema or tenderness.   Lymphadenopathy:        Head (right side): No submental and no submandibular adenopathy present.        Head (left side): No submental and no submandibular adenopathy present.     She has no cervical adenopathy.        Right cervical: No superficial cervical, no deep cervical and no posterior cervical adenopathy present.       Left cervical: No superficial cervical, no deep cervical and no posterior cervical adenopathy present.        Right axillary: No pectoral and no lateral adenopathy present.        Left axillary: No pectoral and no lateral adenopathy present.       Right: No supraclavicular adenopathy present.        Left: No supraclavicular adenopathy present.   Neurological: She is alert and oriented to person, place, and time. She has normal reflexes. No cranial nerve deficit. She exhibits normal muscle tone. Coordination normal.   Skin: Skin is warm and dry. No rash noted.   Psychiatric: She has a normal mood and affect. Her behavior is normal. Her mood appears not anxious. Her speech is not rapid  and/or pressured. She is not agitated. She does not exhibit a depressed mood.   Normal behavior, thought content, insight and judgement.       Assessment:       1. Pneumonia of right lower lobe due to infectious organism        Plan:   Marleny was seen today for follow-up, cough, headache, nausea, nasal congestion and sore throat.    Diagnoses and all orders for this visit:    Pneumonia of right lower lobe due to infectious organism.  If not better within 2 days, call  -     X-Ray Chest PA And Lateral; Future    Other orders  -     amoxicillin-clavulanate 875-125mg (AUGMENTIN) 875-125 mg per tablet; Take 1 tablet by mouth 2 (two) times daily.  -     guaifenesin-codeine 100-10 mg/5 ml (TUSSI-ORGANIDIN NR)  mg/5 mL syrup; Take 5 mLs by mouth 3 (three) times daily as needed for Cough.  -     albuterol (PROVENTIL/VENTOLIN HFA) 90 mcg/actuation inhaler; Inhale 2 puffs into the lungs every 6 (six) hours as needed for Wheezing.

## 2018-11-16 ENCOUNTER — TELEPHONE (OUTPATIENT)
Dept: CARDIOLOGY | Facility: CLINIC | Age: 68
End: 2018-11-16

## 2018-11-16 NOTE — TELEPHONE ENCOUNTER
Left message on voice mail regarding stress test scheduled for Monday fasting instructions and arrival time

## 2018-11-19 ENCOUNTER — CLINICAL SUPPORT (OUTPATIENT)
Dept: CARDIOLOGY | Facility: CLINIC | Age: 68
End: 2018-11-19
Attending: INTERNAL MEDICINE
Payer: MEDICARE

## 2018-11-19 VITALS — WEIGHT: 205 LBS | HEIGHT: 70 IN | BODY MASS INDEX: 29.35 KG/M2

## 2018-11-19 LAB
CV STRESS BASE HR: 65
DIASTOLIC BLOOD PRESSURE: 67
NUC REST DIASTOLIC VOLUME INDEX: 77
NUC REST EJECTION FRACTION: 79
NUC REST SYSTOLIC VOLUME INDEX: 14
NUC STRESS DIASTOLIC VOLUME INDEX: 71
NUC STRESS EJECTION FRACTION: 79 %
NUC STRESS SYSTOLIC VOLUME INDEX: 15
OHS CV CPX 85 PERCENT MAX PREDICTED HEART RATE MALE: 123
OHS CV CPX MAX PREDICTED HEART RATE: 145
OHS CV CPX PATIENT IS FEMALE: 1
OHS CV CPX PATIENT IS MALE: 0
OHS CV CPX PEAK DIASTOLIC BLOOD PRESSURE: 52 MMHG
OHS CV CPX PEAK HEAR RATE: 78
OHS CV CPX PEAK RATE PRESSURE PRODUCT: NORMAL
OHS CV CPX PEAK SYSTOLIC BLOOD PRESSURE: 148
OHS CV CPX PERCENT MAX PREDICTED HEART RATE ACHIEVED: 54
OHS CV CPX RATE PRESSURE PRODUCT PRESENTING: 9425
SYSTOLIC BLOOD PRESSURE: 145

## 2018-11-19 PROCEDURE — 78492 MYOCRD IMG PET MLT RST&STRS: CPT | Mod: S$GLB,,, | Performed by: INTERNAL MEDICINE

## 2018-11-19 PROCEDURE — A9555 RB82 RUBIDIUM: HCPCS | Mod: S$GLB,,, | Performed by: INTERNAL MEDICINE

## 2018-11-19 PROCEDURE — 99999 PR PBB SHADOW E&M-EST. PATIENT-LVL I: CPT | Mod: PBBFAC,,,

## 2018-11-19 RX ORDER — DIPYRIDAMOLE 5 MG/ML
52.18 INJECTION INTRAVENOUS
Status: COMPLETED | OUTPATIENT
Start: 2018-11-19 | End: 2018-11-19

## 2018-11-19 RX ADMIN — DIPYRIDAMOLE 52.2 MG: 5 INJECTION INTRAVENOUS at 03:11

## 2019-01-07 DIAGNOSIS — I10 ESSENTIAL HYPERTENSION: Chronic | ICD-10-CM

## 2019-01-07 RX ORDER — METOPROLOL SUCCINATE 50 MG/1
50 TABLET, EXTENDED RELEASE ORAL DAILY
Qty: 90 TABLET | Refills: 3 | Status: SHIPPED | OUTPATIENT
Start: 2019-01-07 | End: 2019-11-13 | Stop reason: SDUPTHER

## 2019-01-22 ENCOUNTER — HOSPITAL ENCOUNTER (OUTPATIENT)
Dept: RADIOLOGY | Facility: HOSPITAL | Age: 69
Discharge: HOME | End: 2019-01-22
Attending: INTERNAL MEDICINE
Payer: MEDICARE

## 2019-01-22 ENCOUNTER — OFFICE VISIT (OUTPATIENT)
Dept: OTOLARYNGOLOGY | Facility: CLINIC | Age: 69
End: 2019-01-22
Payer: MEDICARE

## 2019-01-22 VITALS
TEMPERATURE: 98 F | BODY MASS INDEX: 28.98 KG/M2 | WEIGHT: 202 LBS | DIASTOLIC BLOOD PRESSURE: 79 MMHG | SYSTOLIC BLOOD PRESSURE: 149 MMHG | HEART RATE: 62 BPM

## 2019-01-22 DIAGNOSIS — Z91.81 HISTORY OF FALLING: ICD-10-CM

## 2019-01-22 DIAGNOSIS — J38.3 SULCUS VOCALIS OF VOCAL FOLD: ICD-10-CM

## 2019-01-22 DIAGNOSIS — G47.33 OSA ON CPAP: ICD-10-CM

## 2019-01-22 DIAGNOSIS — R49.0 HOARSENESS: Primary | ICD-10-CM

## 2019-01-22 DIAGNOSIS — Z12.31 SCREENING MAMMOGRAM, ENCOUNTER FOR: ICD-10-CM

## 2019-01-22 DIAGNOSIS — Z87.01 HISTORY OF PNEUMONIA: ICD-10-CM

## 2019-01-22 DIAGNOSIS — L29.9 ITCHING OF EAR: ICD-10-CM

## 2019-01-22 PROCEDURE — 3288F PR FALLS RISK ASSESSMENT DOCUMENTED: ICD-10-PCS | Mod: CPTII,S$GLB,, | Performed by: OTOLARYNGOLOGY

## 2019-01-22 PROCEDURE — 99203 PR OFFICE/OUTPT VISIT, NEW, LEVL III, 30-44 MIN: ICD-10-PCS | Mod: 25,S$GLB,, | Performed by: OTOLARYNGOLOGY

## 2019-01-22 PROCEDURE — 99499 UNLISTED E&M SERVICE: CPT | Mod: S$GLB,,, | Performed by: OTOLARYNGOLOGY

## 2019-01-22 PROCEDURE — 31575 PR LARYNGOSCOPY, FLEXIBLE; DIAGNOSTIC: ICD-10-PCS | Mod: S$GLB,,, | Performed by: OTOLARYNGOLOGY

## 2019-01-22 PROCEDURE — 77063 MAMMO DIGITAL SCREENING BILAT WITH TOMOSYNTHESIS_CAD: ICD-10-PCS | Mod: 26,,, | Performed by: RADIOLOGY

## 2019-01-22 PROCEDURE — 77063 BREAST TOMOSYNTHESIS BI: CPT | Mod: 26,,, | Performed by: RADIOLOGY

## 2019-01-22 PROCEDURE — 99999 PR PBB SHADOW E&M-EST. PATIENT-LVL IV: ICD-10-PCS | Mod: PBBFAC,,, | Performed by: OTOLARYNGOLOGY

## 2019-01-22 PROCEDURE — 99203 OFFICE O/P NEW LOW 30 MIN: CPT | Mod: 25,S$GLB,, | Performed by: OTOLARYNGOLOGY

## 2019-01-22 PROCEDURE — 1100F PR PT FALLS ASSESS DOC 2+ FALLS/FALL W/INJURY/YR: ICD-10-PCS | Mod: CPTII,S$GLB,, | Performed by: OTOLARYNGOLOGY

## 2019-01-22 PROCEDURE — 77067 SCR MAMMO BI INCL CAD: CPT | Mod: 26,,, | Performed by: RADIOLOGY

## 2019-01-22 PROCEDURE — 77067 MAMMO DIGITAL SCREENING BILAT WITH TOMOSYNTHESIS_CAD: ICD-10-PCS | Mod: 26,,, | Performed by: RADIOLOGY

## 2019-01-22 PROCEDURE — 1100F PTFALLS ASSESS-DOCD GE2>/YR: CPT | Mod: CPTII,S$GLB,, | Performed by: OTOLARYNGOLOGY

## 2019-01-22 PROCEDURE — 31575 DIAGNOSTIC LARYNGOSCOPY: CPT | Mod: S$GLB,,, | Performed by: OTOLARYNGOLOGY

## 2019-01-22 PROCEDURE — 3288F FALL RISK ASSESSMENT DOCD: CPT | Mod: CPTII,S$GLB,, | Performed by: OTOLARYNGOLOGY

## 2019-01-22 PROCEDURE — 99999 PR PBB SHADOW E&M-EST. PATIENT-LVL IV: CPT | Mod: PBBFAC,,, | Performed by: OTOLARYNGOLOGY

## 2019-01-22 PROCEDURE — 99499 RISK ADDL DX/OHS AUDIT: ICD-10-PCS | Mod: S$GLB,,, | Performed by: OTOLARYNGOLOGY

## 2019-01-22 PROCEDURE — 77067 SCR MAMMO BI INCL CAD: CPT | Mod: TC

## 2019-01-22 RX ORDER — AZELASTINE 1 MG/ML
SPRAY, METERED NASAL
Qty: 30 ML | Refills: 1 | Status: SHIPPED | OUTPATIENT
Start: 2019-01-22 | End: 2019-03-06

## 2019-01-22 NOTE — PROGRESS NOTES
Subjective:       Patient ID: Marleny Guzman is a 68 y.o. female.    Chief Complaint: Hoarse and Sore Throat (itchy)    HPI: Ms. Guzman is a 68-year-old  female who speaks with hoarse voicing today.  Her hand written reason for the visit today is throat (hoarseness itching ears itching pain pressure).  She has been hoarse for many years now. She was 1 of 10 or 11 children.    She denies any significant history of neck surgery or anterior neck trauma.  She denies a history of neck radiation.  She denies a history of recent intubations.  She indicates rare GERD symptoms.   She indicates a history of ALVAREZ treated with CPAP.  She was treated one month ago with Augmentin antibiotics as well as albuterol and testing again in by Dr. Lacey Mobley.  She was diagnosed with right lower lobe pneumonia her symptoms included cough, headache, nausea, nasal congestion and sore throat as well as fever, chills and sweats for 2 nights.  She has been coughing/expect during yellow sputum.  She indicates a probable history of allergies especially when her sinuses act up.    She denies previous allergy testing.  She indicates constant itching in her throat.  She lives with 2 dogs.  She also indicates an itching sensation in her ears.  She utilizes cotton swabs for cleaning/scratching.    PMH:  High blood pressure, hearing loss, imbalance/vertigo  Past Surgical History:   Procedure Laterality Date    CATARACT EXTRACTION  6/18/12    OS    CATARACT EXTRACTION  7/16/12    OD    CERVIX SURGERY      Conization    COLONOSCOPY      COLONOSCOPY N/A 3/4/2016    Performed by [facility]kar [facility] at [facility] ENDO (4TH FLR)    CONIZATION-CERVIX ([facility]) N/A 8/22/2014    Performed by Marizol Gonzalez MD at [facility] OR 2ND FLR    ESOPHAGOGASTRODUODENOSCOPY (EGD) N/A 3/4/2016    Performed by Tenzin Thakkar MD at [facility] ENDO (4TH FLR)    EYE SURGERY Bilateral     cataract    HYSTERECTOMY  5-12-15    INJECTION-JOINT Bilateral 3/22/2018     Performed by [facility]h [facility]as MD at [facility] PAIN T    INJECTION-STEROID-EPIDURAL-LUMBAR N/A 8/3/2017    Performed by Luzmaria Feldman MD at [facility] PAIN T    LEEP      LEEP (LOOP ELECTROSURGICAL EXCISION PROCEDURE) N/A 11/27/2013    Performed by Betsey Valles MD at [facility] OR    LUMBAR EPIDURAL INJECTION      OOPHORECTOMY      MS REMOVAL OF OVARY/TUBE(S)  5-12-15    REPAIR ROTATOR CUFF ARTHROSCOPIC Right 6/20/2014    Performed by Whit Mazariegos MD at [facility] OR    ROBOT ASSISTED LAPAROSCOPIC SALPINGO-OOPHERECTOMY Bilateral 5/12/2015    [facility] 2ND F[facility]    ROBOTIC ASSISTED LAPAROSCOPIC HYSTERECTOMY N/A 5/12/2015    [facility] OR 2ND FLR    SALPINGECTOMY Left     SHOULDER SURGERY Right     Family history:  High blood pressure, thyroid disease  Allergies:  None known  Occupation:  Caregiver    Review of Systems   Ears: Positive for hearing loss, ear pain, ear pressure and family history of hearing loss.    Nose:  Positive for snoring.    Mouth/Throat: Positive for hoarse voice.   Cardiovascular:  Positive for history of high blood pressure.   Other:  Positive for depression and anxiety. Negative for rash.     Current Outpatient Medications on File Prior to Visit   Medication Sig Dispense Refill    albuterol (PROVENTIL/VENTOLIN HFA) 90 mcg/actuation inhaler Inhale 2 puffs into the lungs every 6 (six) hours as needed for Wheezing. 1 each 11    amoxicillin-clavulanate 875-125mg (AUGMENTIN) 875-125 mg per tablet Take 1 tablet by mouth 2 (two) times daily. 20 tablet 1    aspirin 81 MG Chew Take 81 mg by mouth once daily.      ergocalciferol (ERGOCALCIFEROL) 50,000 unit Cap Take 1 capsule (50,000 Units total) by mouth every 30 days. 3 capsule 3    escitalopram oxalate (LEXAPRO) 20 MG tablet Take 1 tablet (20 mg total) by mouth once daily. 90 tablet 1    estradiol (ESTRACE) 0.01 % (0.1 mg/gram) vaginal cream Place 1 g vaginally twice a  week. 1 Tube 3    irbesartan-hydrochlorothiazide (AVALIDE) 300-12.5 mg per tablet Take 1 tablet by mouth once daily. 90 tablet 3    LORazepam (ATIVAN) 0.5 MG tablet Half - one daily as needed for anxiety 30 tablet 0    meloxicam (MOBIC) 7.5 MG tablet TAKE 1 TABLET BY MOUTH TWICE A DAY AS NEEDED FOR NECK PAIN 60 tablet 0    metoprolol succinate (TOPROL-XL) 50 MG 24 hr tablet TAKE 1 TABLET (50 MG TOTAL) BY MOUTH ONCE DAILY. 90 tablet 3    nystatin-triamcinolone (MYCOLOG) ointment Apply topically 2 (two) times daily as needed. 30 g 1     No current facility-administered medications on file prior to visit.              Objective:     Blood pressure 149/79 pulse 62 temperature 97.5° height 5 ft 10 in weight 202 lb  General:  Alert and oriented lady in no acute distress  The patient is consented to an endoscopic study today which has been explained to her in detail.  She is transferred into the endoscopy suite.  Scope 1.  909964 was used.  Pictures are recorded through the device.  Procedure:  4% xylocaine and Perry-Synephrine spray in the right nasal passage x2.  Cetacaine was applied to the posterior pharynx x2.  After waiting several minutes scoping is performed.  There is evidence for copious significant adenoid tissue in the nasopharynx, bulkier and more prominent on the right side right side.   Laryngoscopy reveals evidence slightly thickened vocal cords with what appears to be a sulcus deformity of each.  There is no specific evidence of true vocal cord paresis or paralysis pole of the right cord appears to a duct to the midline easier than the left.    Piriform sinuses are clear.  The supraglottic tissues appear within normal limits.  There is evidence for a left superior septal deviation with narrowing of the left nasal passage.  There is no evidence of polypoid disease or purulent discharge in either passage.  The scope is withdrawn.    Both ears were examined under the microscope in the micro procedure room.   Both ear canals are devoid of wax.  There is no evidence of otitis externa of either canal.    Physical Exam   Constitutional: She is oriented to person, place, and time. She appears well-developed and well-nourished.   HENT:   Head: Normocephalic.   Right Ear: Tympanic membrane and external ear normal. No drainage. No foreign bodies. No mastoid tenderness. Tympanic membrane is not perforated. No decreased hearing is noted.   Left Ear: Tympanic membrane and external ear normal. No drainage. No foreign bodies. No mastoid tenderness. Tympanic membrane is not perforated. No decreased hearing is noted.   Nose: Nose normal. No nasal deformity, septal deviation or nasal septal hematoma. No epistaxis. Right sinus exhibits no maxillary sinus tenderness and no frontal sinus tenderness. Left sinus exhibits no maxillary sinus tenderness and no frontal sinus tenderness.   Mouth/Throat: Uvula is midline, oropharynx is clear and moist and mucous membranes are normal. No oral lesions. No trismus in the jaw. No uvula swelling. No oropharyngeal exudate or tonsillar abscesses.   Neck: Neck supple. No tracheal deviation present. No thyromegaly present.   Pulmonary/Chest: Effort normal. No stridor.   Lymphadenopathy:     She has no cervical adenopathy.   Neurological: She is alert and oriented to person, place, and time.   Skin: No rash noted.       Assessment:       1. Hoarseness    2. History of pneumonia    3. History of falling    4. ALVAREZ on CPAP    5. Sulcus vocalis of vocal fold    6. Itching of ear      7.    Adenoid hypertrophy, right np > left  Plan:     Office endoscopy performed   Anti GERD regimen may help  Vocal hygiene instruction sheets provided  Trial of otc Allegra 180 mg once a day for allergies  Trial of Astelin nasal spray( Rx prescribed) ; 1 spray @ nostril BID prn allergic rhinitis  Speech evaluation ordered;   Consider follow up consultation [facility] laryngologist Dr. Jose R Valera pending course  Dropper supplied for  ear canal instillation of baby oil or mineral oil or white vinegar ( 1-2 drops prn)   Avoid cotton swab use in canals

## 2019-01-22 NOTE — PATIENT INSTRUCTIONS
Office endoscopy performed   Anti GERD regimen may help  Vocal hygiene instruction sheets provided  Trial of otc Allegra 180 mg once a day for allergies  Trial of Astelin nasal spray( Rx prescribed) ; 1 spray @ nostril BID prn allergic rhinitis  Speech evaluation ordered;   Consider follow up consultation [facility] laryngologist Dr. Jose R Valera pending course  Dropper supplied for ear canal instillation of baby oil or mineral oil or white vinegar ( 1-2 drops prn)   Avoid cotton swab use in canals

## 2019-01-22 NOTE — LETTER
January 23, 2019      Bridget Cantu MD  [address]  [facility] [address]           [facility] - Otorhinolaryngology  [address]  [facility] [address]  Phone: [phone]  Fax: [phone]          Patient: Marleny Guzman   MR Number: [id number]   YOB: 1950   Date of Visit: 1/22/2019       Dear Dr. Bridget Cantu:    Thank you for referring Marleny Guzman to me for evaluation. Attached you will find relevant portions of my assessment and plan of care.    If you have questions, please do not hesitate to call me. I look forward to following Marleny Guzman along with you.    Sincerely,    Balwinder Varela III, MD    Enclosure  CC:  No Recipients    If you would like to receive this communication electronically, please contact [email or web address] or [phone] to request more information on [facility] Link access.    For providers and/or their staff who would like to refer a patient to [facility], please contact us through our one-stop-shop provider referral line, [facility], at [phone].    If you feel you have received this communication in error or would no longer like to receive these types of communications, please e-mail [email or web address]

## 2019-01-31 ENCOUNTER — TELEPHONE (OUTPATIENT)
Dept: GYNECOLOGIC ONCOLOGY | Facility: CLINIC | Age: 69
End: 2019-01-31

## 2019-01-31 ENCOUNTER — TELEPHONE (OUTPATIENT)
Dept: SPEECH THERAPY | Facility: HOSPITAL | Age: 69
End: 2019-01-31

## 2019-01-31 ENCOUNTER — TELEPHONE (OUTPATIENT)
Dept: INTERNAL MEDICINE | Facility: CLINIC | Age: 69
End: 2019-01-31

## 2019-01-31 NOTE — TELEPHONE ENCOUNTER
----- Message from Marcelle Goodman sent at 1/31/2019  7:40 AM CST -----  Contact: self/[phone]  Pt called in regards to getting her epp lab orders put into the system. Pt would like her appointment on April 11 at 11:00 am.      Please advise

## 2019-01-31 NOTE — TELEPHONE ENCOUNTER
----- Message from Angy Simon MA sent at 1/31/2019  8:52 AM CST -----  Contact: pt  Can you schedule this pt for a 6 month follow up      ----- Message -----  From: Kelly Thomas  Sent: 1/31/2019   7:48 AM  To: Dimit[facility] Staff    Pt would like to be called back regarding scheduling an appt for 6months  follow up    Pt can be reached at [phone]

## 2019-01-31 NOTE — TELEPHONE ENCOUNTER
----- Message from Kelly Thomas sent at 1/31/2019  7:48 AM CST -----  Contact: pt  Pt would like to be called back regarding scheduling an appt for 6months  follow up    Pt can be reached at [phone]

## 2019-02-22 ENCOUNTER — TELEPHONE (OUTPATIENT)
Dept: GYNECOLOGIC ONCOLOGY | Facility: CLINIC | Age: 69
End: 2019-02-22

## 2019-02-25 ENCOUNTER — OFFICE VISIT (OUTPATIENT)
Dept: GYNECOLOGIC ONCOLOGY | Facility: CLINIC | Age: 69
End: 2019-02-25
Payer: MEDICARE

## 2019-02-25 VITALS
BODY MASS INDEX: 29.61 KG/M2 | DIASTOLIC BLOOD PRESSURE: 63 MMHG | HEART RATE: 71 BPM | WEIGHT: 206.81 LBS | SYSTOLIC BLOOD PRESSURE: 132 MMHG | HEIGHT: 70 IN

## 2019-02-25 DIAGNOSIS — N39.3 STRESS INCONTINENCE OF URINE: Primary | ICD-10-CM

## 2019-02-25 DIAGNOSIS — N89.3 VAGINAL DYSPLASIA: ICD-10-CM

## 2019-02-25 DIAGNOSIS — N89.8 VAGINAL PRURITUS: ICD-10-CM

## 2019-02-25 PROCEDURE — 3078F PR MOST RECENT DIASTOLIC BLOOD PRESSURE < 80 MM HG: ICD-10-PCS | Mod: CPTII,S$GLB,, | Performed by: OBSTETRICS & GYNECOLOGY

## 2019-02-25 PROCEDURE — 1100F PTFALLS ASSESS-DOCD GE2>/YR: CPT | Mod: CPTII,S$GLB,, | Performed by: OBSTETRICS & GYNECOLOGY

## 2019-02-25 PROCEDURE — 3288F PR FALLS RISK ASSESSMENT DOCUMENTED: ICD-10-PCS | Mod: CPTII,S$GLB,, | Performed by: OBSTETRICS & GYNECOLOGY

## 2019-02-25 PROCEDURE — 3288F FALL RISK ASSESSMENT DOCD: CPT | Mod: CPTII,S$GLB,, | Performed by: OBSTETRICS & GYNECOLOGY

## 2019-02-25 PROCEDURE — 99999 PR PBB SHADOW E&M-EST. PATIENT-LVL III: CPT | Mod: PBBFAC,,, | Performed by: OBSTETRICS & GYNECOLOGY

## 2019-02-25 PROCEDURE — 99214 PR OFFICE/OUTPT VISIT, EST, LEVL IV, 30-39 MIN: ICD-10-PCS | Mod: S$GLB,,, | Performed by: OBSTETRICS & GYNECOLOGY

## 2019-02-25 PROCEDURE — 3078F DIAST BP <80 MM HG: CPT | Mod: CPTII,S$GLB,, | Performed by: OBSTETRICS & GYNECOLOGY

## 2019-02-25 PROCEDURE — 1100F PR PT FALLS ASSESS DOC 2+ FALLS/FALL W/INJURY/YR: ICD-10-PCS | Mod: CPTII,S$GLB,, | Performed by: OBSTETRICS & GYNECOLOGY

## 2019-02-25 PROCEDURE — 3075F PR MOST RECENT SYSTOLIC BLOOD PRESS GE 130-139MM HG: ICD-10-PCS | Mod: CPTII,S$GLB,, | Performed by: OBSTETRICS & GYNECOLOGY

## 2019-02-25 PROCEDURE — 99999 PR PBB SHADOW E&M-EST. PATIENT-LVL III: ICD-10-PCS | Mod: PBBFAC,,, | Performed by: OBSTETRICS & GYNECOLOGY

## 2019-02-25 PROCEDURE — 3075F SYST BP GE 130 - 139MM HG: CPT | Mod: CPTII,S$GLB,, | Performed by: OBSTETRICS & GYNECOLOGY

## 2019-02-25 PROCEDURE — 99214 OFFICE O/P EST MOD 30 MIN: CPT | Mod: S$GLB,,, | Performed by: OBSTETRICS & GYNECOLOGY

## 2019-02-25 RX ORDER — CLOBETASOL PROPIONATE 0.5 MG/G
OINTMENT TOPICAL 2 TIMES DAILY
Qty: 45 G | Refills: 1 | Status: SHIPPED | OUTPATIENT
Start: 2019-02-25 | End: 2019-08-14 | Stop reason: SDUPTHER

## 2019-02-25 NOTE — PROGRESS NOTES
Subjective:      Patient ID: Marleny Guzman is a 68 y.o. female.    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 Gonzalez. Vaginal pap smear 6/15/16 HSIL. She was referred for consult by Dr. Valles. 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 Noncompliant with vaginal estrogen cream as prescribed.      Follow up pap 1/30/17 HSIL. 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      Pap 7/24/17 ASCUS HPV 18+   Pap 1/22/18 LSIL, normal exam  Pap 8/2018 normal, normal exam.      Presents today for follow up. Denies bleeding. Using vaginal estrogen. Endorses vulvar pruritis and urinary incontinence that is bothersome.     Review of Systems   Constitutional: Negative for appetite change, chills, fatigue and fever.   HENT: Negative for mouth sores.    Respiratory: Negative for cough and shortness of breath.    Cardiovascular: Negative for leg swelling.   Gastrointestinal: Negative for abdominal pain, blood in stool, constipation and diarrhea.   Endocrine: Negative for cold intolerance.   Genitourinary: Negative for dysuria and vaginal bleeding.   Musculoskeletal: Negative for myalgias.   Skin: Negative for rash.   Allergic/Immunologic: Negative.    Neurological: Negative for weakness and numbness.   Hematological: Negative for adenopathy. Does not bruise/bleed easily.   Psychiatric/Behavioral: Negative for confusion.       Objective:   Physical Exam:   Constitutional: She is oriented to person, place, and time. She appears well-developed and well-nourished.    HENT:   Head: Normocephalic and atraumatic.    Eyes: EOM are normal. Pupils are equal, round, and reactive to light.    Neck: Normal range of motion. Neck supple. No thyromegaly present.    Cardiovascular: Normal rate, regular rhythm and intact distal pulses.     Pulmonary/Chest: Effort normal and breath sounds normal. No respiratory distress. She has  no wheezes.        Abdominal: Soft. Bowel sounds are normal. She exhibits no distension, no ascites and no mass. There is no tenderness.     Genitourinary: Rectum normal and vagina normal. Pelvic exam was performed with patient supine. There is no lesion on the right labia. There is no lesion on the left labia. Uterus is absent. There is an absent adnexa. Right adnexum displays no mass. Left adnexum displays no mass. Vaginal cuff normal.Cervix exhibits absence.           Musculoskeletal: Normal range of motion and moves all extremeties.      Lymphadenopathy:     She has no cervical adenopathy.        Right: No inguinal and no supraclavicular adenopathy present.        Left: No inguinal and no supraclavicular adenopathy present.    Neurological: She is alert and oriented to person, place, and time.    Skin: Skin is warm and dry. No rash noted.    Psychiatric: She has a normal mood and affect.       Assessment:     1. Stress incontinence of urine    2. Vaginal pruritus    3. Vaginal dysplasia        Plan:     Orders Placed This Encounter   Procedures    Ambulatory Referral to Urogynecology     No evidence of disease on today's exam.   Refer to urogyn for evaluation of incontinence.   Vulvar pruritis without lesion on exam, will trial clobetasol.   RTC 6 months for follow up.

## 2019-03-06 ENCOUNTER — OFFICE VISIT (OUTPATIENT)
Dept: URGENT CARE | Facility: CLINIC | Age: 69
End: 2019-03-06
Payer: MEDICARE

## 2019-03-06 VITALS
HEART RATE: 97 BPM | HEIGHT: 70 IN | DIASTOLIC BLOOD PRESSURE: 70 MMHG | RESPIRATION RATE: 16 BRPM | WEIGHT: 205 LBS | BODY MASS INDEX: 29.35 KG/M2 | OXYGEN SATURATION: 99 % | TEMPERATURE: 100 F | SYSTOLIC BLOOD PRESSURE: 147 MMHG

## 2019-03-06 DIAGNOSIS — R50.9 FEVER, UNSPECIFIED FEVER CAUSE: ICD-10-CM

## 2019-03-06 DIAGNOSIS — R09.81 NASAL SINUS CONGESTION: ICD-10-CM

## 2019-03-06 DIAGNOSIS — J06.9 UPPER RESPIRATORY TRACT INFECTION, UNSPECIFIED TYPE: ICD-10-CM

## 2019-03-06 DIAGNOSIS — R05.9 COUGH: ICD-10-CM

## 2019-03-06 LAB
CTP QC/QA: YES
FLUAV AG NPH QL: NEGATIVE
FLUBV AG NPH QL: NEGATIVE

## 2019-03-06 PROCEDURE — 3078F PR MOST RECENT DIASTOLIC BLOOD PRESSURE < 80 MM HG: ICD-10-PCS | Mod: CPTII,S$GLB,, | Performed by: PHYSICIAN ASSISTANT

## 2019-03-06 PROCEDURE — 3288F FALL RISK ASSESSMENT DOCD: CPT | Mod: CPTII,S$GLB,, | Performed by: PHYSICIAN ASSISTANT

## 2019-03-06 PROCEDURE — 3077F PR MOST RECENT SYSTOLIC BLOOD PRESSURE >= 140 MM HG: ICD-10-PCS | Mod: CPTII,S$GLB,, | Performed by: PHYSICIAN ASSISTANT

## 2019-03-06 PROCEDURE — 1100F PTFALLS ASSESS-DOCD GE2>/YR: CPT | Mod: CPTII,S$GLB,, | Performed by: PHYSICIAN ASSISTANT

## 2019-03-06 PROCEDURE — 3077F SYST BP >= 140 MM HG: CPT | Mod: CPTII,S$GLB,, | Performed by: PHYSICIAN ASSISTANT

## 2019-03-06 PROCEDURE — 87804 POCT INFLUENZA A/B: ICD-10-PCS | Mod: QW,S$GLB,, | Performed by: PHYSICIAN ASSISTANT

## 2019-03-06 PROCEDURE — 99213 PR OFFICE/OUTPT VISIT, EST, LEVL III, 20-29 MIN: ICD-10-PCS | Mod: S$GLB,,, | Performed by: PHYSICIAN ASSISTANT

## 2019-03-06 PROCEDURE — 99213 OFFICE O/P EST LOW 20 MIN: CPT | Mod: S$GLB,,, | Performed by: PHYSICIAN ASSISTANT

## 2019-03-06 PROCEDURE — 3288F PR FALLS RISK ASSESSMENT DOCUMENTED: ICD-10-PCS | Mod: CPTII,S$GLB,, | Performed by: PHYSICIAN ASSISTANT

## 2019-03-06 PROCEDURE — 1100F PR PT FALLS ASSESS DOC 2+ FALLS/FALL W/INJURY/YR: ICD-10-PCS | Mod: CPTII,S$GLB,, | Performed by: PHYSICIAN ASSISTANT

## 2019-03-06 PROCEDURE — 87804 INFLUENZA ASSAY W/OPTIC: CPT | Mod: QW,S$GLB,, | Performed by: PHYSICIAN ASSISTANT

## 2019-03-06 PROCEDURE — 3078F DIAST BP <80 MM HG: CPT | Mod: CPTII,S$GLB,, | Performed by: PHYSICIAN ASSISTANT

## 2019-03-06 RX ORDER — PROMETHAZINE HYDROCHLORIDE 6.25 MG/5ML
6.25 SYRUP ORAL 4 TIMES DAILY PRN
Qty: 120 ML | Refills: 0 | Status: SHIPPED | OUTPATIENT
Start: 2019-03-06 | End: 2020-03-05

## 2019-03-06 RX ORDER — FLUTICASONE PROPIONATE 50 MCG
1 SPRAY, SUSPENSION (ML) NASAL DAILY
Qty: 1 BOTTLE | Refills: 0 | Status: SHIPPED | OUTPATIENT
Start: 2019-03-06 | End: 2020-09-16

## 2019-03-06 RX ORDER — LORATADINE 10 MG/1
10 TABLET ORAL DAILY
Qty: 30 TABLET | Refills: 0 | Status: SHIPPED | OUTPATIENT
Start: 2019-03-06 | End: 2020-09-16

## 2019-03-06 RX ORDER — GUAIFENESIN 600 MG/1
600 TABLET, EXTENDED RELEASE ORAL 2 TIMES DAILY
Qty: 60 TABLET | Refills: 2 | Status: SHIPPED | OUTPATIENT
Start: 2019-03-06 | End: 2020-03-05

## 2019-03-06 RX ORDER — BENZONATATE 100 MG/1
100 CAPSULE ORAL EVERY 6 HOURS PRN
Qty: 30 CAPSULE | Refills: 0 | Status: SHIPPED | OUTPATIENT
Start: 2019-03-06 | End: 2019-03-11

## 2019-03-06 NOTE — PROGRESS NOTES
"Subjective:       Patient ID: Marleny Guzman is a 68 y.o. female.    Vitals:  height is 5' 10" (1.778 m) and weight is 93 kg (205 lb). Her oral temperature is 100.3 °F (37.9 °C). Her blood pressure is 147/70 (abnormal) and her pulse is 97. Her respiration is 16 and oxygen saturation is 99%.     Chief Complaint: Cough    Patient presents cough & chest congestion that started 2 days ago.  She has been taking OTC benadryl, and states no relief. Lying down makes her cough worse.       Cough   This is a new problem. The current episode started in the past 7 days. The problem has been gradually worsening. The problem occurs constantly. The cough is productive of sputum. Associated symptoms include chills, a fever, headaches, myalgias, nasal congestion, rhinorrhea and wheezing. Pertinent negatives include no chest pain, ear congestion, ear pain, eye redness, heartburn, hemoptysis, postnasal drip, rash, sore throat, shortness of breath, sweats or weight loss. The symptoms are aggravated by lying down. Treatments tried: Antihistamines. The treatment provided no relief. Her past medical history is significant for bronchitis and pneumonia. There is no history of asthma, bronchiectasis, COPD, emphysema or environmental allergies.       Constitution: Positive for chills, fatigue and fever. Negative for sweating.   HENT: Positive for congestion and voice change. Negative for ear pain, postnasal drip, sinus pain, sinus pressure and sore throat.    Neck: Negative for painful lymph nodes.   Cardiovascular: Negative for chest pain.   Eyes: Negative for eye redness.   Respiratory: Positive for chest tightness (from coughing), cough, sputum production and wheezing. Negative for bloody sputum, COPD, shortness of breath, stridor and asthma.    Gastrointestinal: Negative for nausea, vomiting and heartburn.   Musculoskeletal: Positive for muscle ache.   Skin: Negative for rash.   Allergic/Immunologic: Negative for environmental allergies, " seasonal allergies and asthma.   Neurological: Positive for headaches.   Hematologic/Lymphatic: Negative for swollen lymph nodes.       Patient c/o head congestion with headache, runny nose, NP cough, and fatigue for the past few days. Denies fevers, chills, or body aches.    Objective:       Results for orders placed or performed in visit on 03/06/19   POCT Influenza A/B   Result Value Ref Range    Rapid Influenza A Ag Negative Negative    Rapid Influenza B Ag Negative Negative     Acceptable Yes        Physical Exam    Assessment:       1. Upper respiratory tract infection, unspecified type    2. Fever, unspecified fever cause    3. Cough    4. Nasal sinus congestion        Plan:         Upper respiratory tract infection, unspecified type    Fever, unspecified fever cause  -     POCT Influenza A/B    Cough  -     guaiFENesin (MUCINEX) 600 mg 12 hr tablet; Take 1 tablet (600 mg total) by mouth 2 (two) times daily.  Dispense: 60 tablet; Refill: 2  -     promethazine (PHENERGAN) 6.25 mg/5 mL syrup; Take 5 mLs (6.25 mg total) by mouth 4 (four) times daily as needed.  Dispense: 120 mL; Refill: 0  -     vit A-vit C-zinc-propolis (ZINC, WITH A AND C, LOZENGES) Lozg; Take 1 lozenge by mouth every 2 (two) hours as needed.  -     benzonatate (TESSALON PERLES) 100 MG capsule; Take 1 capsule (100 mg total) by mouth every 6 (six) hours as needed for Cough.  Dispense: 30 capsule; Refill: 0    Nasal sinus congestion  -     fluticasone (FLONASE ALLERGY RELIEF) 50 mcg/actuation nasal spray; 1 spray (50 mcg total) by Each Nare route once daily.  Dispense: 1 Bottle; Refill: 0  -     loratadine (CLARITIN) 10 mg tablet; Take 1 tablet (10 mg total) by mouth once daily.  Dispense: 30 tablet; Refill: 0    Patient will be treated supportively for her sx.    Patient Instructions     Viral Upper Respiratory Illness (Adult)  You have a viral upper respiratory illness (URI), which is another term for the common cold. This  illness is contagious during the first few days. It is spread through the air by coughing and sneezing. It may also be spread by direct contact (touching the sick person and then touching your own eyes, nose, or mouth). Frequent handwashing will decrease risk of spread. Most viral illnesses go away within 7 to 10 days with rest and simple home remedies. Sometimes the illness may last for several weeks. Antibiotics will not kill a virus, and they are generally not prescribed for this condition.    Home care  · If symptoms are severe, rest at home for the first 2 to 3 days. When you resume activity, don't let yourself get too tired.  · Avoid being exposed to cigarette smoke (yours or others).  · You may use acetaminophen or ibuprofen to control pain and fever, unless another medicine was prescribed. (Note: If you have chronic liver or kidney disease, have ever had a stomach ulcer or gastrointestinal bleeding, or are taking blood-thinning medicines, talk with your healthcare provider before using these medicines.) Aspirin should never be given to anyone under 18 years of age who is ill with a viral infection or fever. It may cause severe liver or brain damage.  · Your appetite may be poor, so a light diet is fine. Avoid dehydration by drinking 6 to 8 glasses of fluids per day (water, soft drinks, juices, tea, or soup). Extra fluids will help loosen secretions in the nose and lungs.  · Over-the-counter cold medicines will not shorten the length of time youre sick, but they may be helpful for the following symptoms: cough, sore throat, and nasal and sinus congestion. (Note: Do not use decongestants if you have high blood pressure.)  Follow-up care  Follow up with your healthcare provider, or as advised.  When to seek medical advice  Call your healthcare provider right away if any of these occur:  · Cough with lots of colored sputum (mucus)  · Severe headache; face, neck, or ear pain  · Difficulty swallowing due to  throat pain  · Fever of 100.4°F (38°C)  Call 911, or get immediate medical care  Call emergency services right away if any of these occur:  · Chest pain, shortness of breath, wheezing, or difficulty breathing  · Coughing up blood  · Inability to swallow due to throat pain  Date Last Reviewed: 9/13/2015  © 3149-0552 [employer]. [address] [place] [address]. All rights reserved. This information is not intended as a substitute for professional medical care. Always follow your healthcare professional's instructions.          Please follow up with your Primary care provider within 2-5 days if your signs and symptoms have not resolved or worsen.     If your condition worsens or fails to improve we recommend that you receive another evaluation at the emergency room immediately or contact your primary medical clinic to discuss your concerns.   You must understand that you have received an Urgent Care treatment only and that you may be released before all of your medical problems are known or treated. You, the patient, will arrange for follow up care as instructed.     RED FLAGS/WARNING SYMPTOMS DISCUSSED WITH PATIENT THAT WOULD WARRANT EMERGENT MEDICAL ATTENTION. PATIENT VERBALIZED UNDERSTANDING.

## 2019-03-06 NOTE — PATIENT INSTRUCTIONS
Viral Upper Respiratory Illness (Adult)  You have a viral upper respiratory illness (URI), which is another term for the common cold. This illness is contagious during the first few days. It is spread through the air by coughing and sneezing. It may also be spread by direct contact (touching the sick person and then touching your own eyes, nose, or mouth). Frequent handwashing will decrease risk of spread. Most viral illnesses go away within 7 to 10 days with rest and simple home remedies. Sometimes the illness may last for several weeks. Antibiotics will not kill a virus, and they are generally not prescribed for this condition.    Home care  · If symptoms are severe, rest at home for the first 2 to 3 days. When you resume activity, don't let yourself get too tired.  · Avoid being exposed to cigarette smoke (yours or others).  · You may use acetaminophen or ibuprofen to control pain and fever, unless another medicine was prescribed. (Note: If you have chronic liver or kidney disease, have ever had a stomach ulcer or gastrointestinal bleeding, or are taking blood-thinning medicines, talk with your healthcare provider before using these medicines.) Aspirin should never be given to anyone under 18 years of age who is ill with a viral infection or fever. It may cause severe liver or brain damage.  · Your appetite may be poor, so a light diet is fine. Avoid dehydration by drinking 6 to 8 glasses of fluids per day (water, soft drinks, juices, tea, or soup). Extra fluids will help loosen secretions in the nose and lungs.  · Over-the-counter cold medicines will not shorten the length of time youre sick, but they may be helpful for the following symptoms: cough, sore throat, and nasal and sinus congestion. (Note: Do not use decongestants if you have high blood pressure.)  Follow-up care  Follow up with your healthcare provider, or as advised.  When to seek medical advice  Call your healthcare provider right away if any  of these occur:  · Cough with lots of colored sputum (mucus)  · Severe headache; face, neck, or ear pain  · Difficulty swallowing due to throat pain  · Fever of 100.4°F (38°C)  Call 911, or get immediate medical care  Call emergency services right away if any of these occur:  · Chest pain, shortness of breath, wheezing, or difficulty breathing  · Coughing up blood  · Inability to swallow due to throat pain  Date Last Reviewed: 9/13/2015  © 9198-2634 [employer]. [address] [place] [address]. All rights reserved. This information is not intended as a substitute for professional medical care. Always follow your healthcare professional's instructions.

## 2019-03-26 ENCOUNTER — TELEPHONE (OUTPATIENT)
Dept: INTERNAL MEDICINE | Facility: CLINIC | Age: 69
End: 2019-03-26

## 2019-04-03 ENCOUNTER — OFFICE VISIT (OUTPATIENT)
Dept: INTERNAL MEDICINE | Facility: CLINIC | Age: 69
End: 2019-04-03
Payer: MEDICARE

## 2019-04-03 VITALS
TEMPERATURE: 100 F | DIASTOLIC BLOOD PRESSURE: 70 MMHG | BODY MASS INDEX: 29.35 KG/M2 | HEART RATE: 71 BPM | OXYGEN SATURATION: 98 % | SYSTOLIC BLOOD PRESSURE: 126 MMHG | HEIGHT: 70 IN | WEIGHT: 205 LBS

## 2019-04-03 DIAGNOSIS — I10 ESSENTIAL HYPERTENSION: Chronic | ICD-10-CM

## 2019-04-03 DIAGNOSIS — E55.9 MILD VITAMIN D DEFICIENCY: ICD-10-CM

## 2019-04-03 DIAGNOSIS — G47.33 OBSTRUCTIVE SLEEP APNEA: ICD-10-CM

## 2019-04-03 DIAGNOSIS — F41.9 ANXIETY: Chronic | ICD-10-CM

## 2019-04-03 DIAGNOSIS — M89.9 DISORDER OF BONE: Primary | ICD-10-CM

## 2019-04-03 DIAGNOSIS — R73.9 HYPERGLYCEMIA: ICD-10-CM

## 2019-04-03 DIAGNOSIS — N39.0 URINARY TRACT INFECTION WITHOUT HEMATURIA, SITE UNSPECIFIED: ICD-10-CM

## 2019-04-03 DIAGNOSIS — F32.A DEPRESSION, UNSPECIFIED DEPRESSION TYPE: Chronic | ICD-10-CM

## 2019-04-03 DIAGNOSIS — E78.5 HYPERLIPIDEMIA, UNSPECIFIED HYPERLIPIDEMIA TYPE: ICD-10-CM

## 2019-04-03 DIAGNOSIS — R06.00 DYSPNEA, UNSPECIFIED TYPE: ICD-10-CM

## 2019-04-03 LAB
BILIRUB UR QL STRIP: NEGATIVE
CLARITY UR REFRACT.AUTO: CLEAR
COLOR UR AUTO: YELLOW
GLUCOSE UR QL STRIP: NEGATIVE
HGB UR QL STRIP: NEGATIVE
KETONES UR QL STRIP: NEGATIVE
LEUKOCYTE ESTERASE UR QL STRIP: NEGATIVE
MICROSCOPIC COMMENT: NORMAL
NITRITE UR QL STRIP: NEGATIVE
PH UR STRIP: 6 [PH] (ref 5–8)
PROT UR QL STRIP: NEGATIVE
SP GR UR STRIP: 1.03 (ref 1–1.03)
URN SPEC COLLECT METH UR: NORMAL

## 2019-04-03 PROCEDURE — 81001 URINALYSIS AUTO W/SCOPE: CPT

## 2019-04-03 PROCEDURE — 3074F PR MOST RECENT SYSTOLIC BLOOD PRESSURE < 130 MM HG: ICD-10-PCS | Mod: CPTII,S$GLB,, | Performed by: INTERNAL MEDICINE

## 2019-04-03 PROCEDURE — 99999 PR PBB SHADOW E&M-EST. PATIENT-LVL V: ICD-10-PCS | Mod: PBBFAC,,, | Performed by: INTERNAL MEDICINE

## 2019-04-03 PROCEDURE — 82043 UR ALBUMIN QUANTITATIVE: CPT

## 2019-04-03 PROCEDURE — 1100F PTFALLS ASSESS-DOCD GE2>/YR: CPT | Mod: CPTII,S$GLB,, | Performed by: INTERNAL MEDICINE

## 2019-04-03 PROCEDURE — 87086 URINE CULTURE/COLONY COUNT: CPT

## 2019-04-03 PROCEDURE — 99999 PR PBB SHADOW E&M-EST. PATIENT-LVL V: CPT | Mod: PBBFAC,,, | Performed by: INTERNAL MEDICINE

## 2019-04-03 PROCEDURE — 99214 PR OFFICE/OUTPT VISIT, EST, LEVL IV, 30-39 MIN: ICD-10-PCS | Mod: S$GLB,,, | Performed by: INTERNAL MEDICINE

## 2019-04-03 PROCEDURE — 99499 UNLISTED E&M SERVICE: CPT | Mod: S$GLB,,, | Performed by: INTERNAL MEDICINE

## 2019-04-03 PROCEDURE — 3074F SYST BP LT 130 MM HG: CPT | Mod: CPTII,S$GLB,, | Performed by: INTERNAL MEDICINE

## 2019-04-03 PROCEDURE — 3288F PR FALLS RISK ASSESSMENT DOCUMENTED: ICD-10-PCS | Mod: CPTII,S$GLB,, | Performed by: INTERNAL MEDICINE

## 2019-04-03 PROCEDURE — 3078F DIAST BP <80 MM HG: CPT | Mod: CPTII,S$GLB,, | Performed by: INTERNAL MEDICINE

## 2019-04-03 PROCEDURE — 99499 RISK ADDL DX/OHS AUDIT: ICD-10-PCS | Mod: S$GLB,,, | Performed by: INTERNAL MEDICINE

## 2019-04-03 PROCEDURE — 99214 OFFICE O/P EST MOD 30 MIN: CPT | Mod: S$GLB,,, | Performed by: INTERNAL MEDICINE

## 2019-04-03 PROCEDURE — 3288F FALL RISK ASSESSMENT DOCD: CPT | Mod: CPTII,S$GLB,, | Performed by: INTERNAL MEDICINE

## 2019-04-03 PROCEDURE — 3078F PR MOST RECENT DIASTOLIC BLOOD PRESSURE < 80 MM HG: ICD-10-PCS | Mod: CPTII,S$GLB,, | Performed by: INTERNAL MEDICINE

## 2019-04-03 PROCEDURE — 1100F PR PT FALLS ASSESS DOC 2+ FALLS/FALL W/INJURY/YR: ICD-10-PCS | Mod: CPTII,S$GLB,, | Performed by: INTERNAL MEDICINE

## 2019-04-03 RX ORDER — LORAZEPAM 0.5 MG/1
TABLET ORAL
Qty: 30 TABLET | Refills: 0 | Status: SHIPPED | OUTPATIENT
Start: 2019-04-03 | End: 2019-08-19 | Stop reason: SDUPTHER

## 2019-04-03 RX ORDER — ESCITALOPRAM OXALATE 20 MG/1
TABLET ORAL
Qty: 135 TABLET | Refills: 3 | Status: ON HOLD | OUTPATIENT
Start: 2019-04-03 | End: 2021-06-10 | Stop reason: SDUPTHER

## 2019-04-04 ENCOUNTER — TELEPHONE (OUTPATIENT)
Dept: INTERNAL MEDICINE | Facility: CLINIC | Age: 69
End: 2019-04-04

## 2019-04-04 LAB
ALBUMIN/CREAT UR: 4.2 UG/MG (ref 0–30)
BACTERIA UR CULT: NO GROWTH
CREAT UR-MCNC: 238 MG/DL (ref 15–325)
MICROALBUMIN UR DL<=1MG/L-MCNC: 10 UG/ML

## 2019-04-04 NOTE — TELEPHONE ENCOUNTER
----- Message from Eliana Anderson sent at 4/3/2019  4:52 PM CDT -----  Contact: Patient [phone]  Pt states that she is calling to speak with someone in the office.She states that she would like for someone to give her a call back. When asked what the call was in regards to she stated that she just wants someone to give her a call. Please advise.      Thanks

## 2019-04-05 ENCOUNTER — OFFICE VISIT (OUTPATIENT)
Dept: OPTOMETRY | Facility: CLINIC | Age: 69
End: 2019-04-05
Payer: MEDICARE

## 2019-04-05 ENCOUNTER — OFFICE VISIT (OUTPATIENT)
Dept: OPTOMETRY | Facility: CLINIC | Age: 69
End: 2019-04-05

## 2019-04-05 DIAGNOSIS — H52.13 MYOPIA, BILATERAL: ICD-10-CM

## 2019-04-05 DIAGNOSIS — H10.13 CONJUNCTIVITIS, ALLERGIC, BILATERAL: Primary | ICD-10-CM

## 2019-04-05 DIAGNOSIS — Z96.1 PSEUDOPHAKIA OF BOTH EYES: ICD-10-CM

## 2019-04-05 DIAGNOSIS — Z46.0 FITTING AND ADJUSTMENT OF SPECTACLES AND CONTACT LENSES: ICD-10-CM

## 2019-04-05 DIAGNOSIS — Z46.0 FITTING AND ADJUSTMENT OF SPECTACLES AND CONTACT LENSES: Primary | ICD-10-CM

## 2019-04-05 PROCEDURE — 92310 PR CONTACT LENS FITTING (NO CHANGE): ICD-10-PCS | Mod: S$GLB,,, | Performed by: OPTOMETRIST

## 2019-04-05 PROCEDURE — 92012 PR EYE EXAM, EST PATIENT,INTERMED: ICD-10-PCS | Mod: S$GLB,,, | Performed by: OPTOMETRIST

## 2019-04-05 PROCEDURE — 99999 PR PBB SHADOW E&M-EST. PATIENT-LVL II: ICD-10-PCS | Mod: PBBFAC,,, | Performed by: OPTOMETRIST

## 2019-04-05 PROCEDURE — 92012 INTRM OPH EXAM EST PATIENT: CPT | Mod: S$GLB,,, | Performed by: OPTOMETRIST

## 2019-04-05 PROCEDURE — 99999 PR PBB SHADOW E&M-EST. PATIENT-LVL II: CPT | Mod: PBBFAC,,, | Performed by: OPTOMETRIST

## 2019-04-05 PROCEDURE — 92310 CONTACT LENS FITTING OU: CPT | Mod: S$GLB,,, | Performed by: OPTOMETRIST

## 2019-04-05 PROCEDURE — 92015 PR REFRACTION: ICD-10-PCS | Mod: S$GLB,,, | Performed by: OPTOMETRIST

## 2019-04-05 PROCEDURE — 92015 DETERMINE REFRACTIVE STATE: CPT | Mod: S$GLB,,, | Performed by: OPTOMETRIST

## 2019-04-05 NOTE — PROGRESS NOTES
HPI     Pt here for annual eye exam. She says she has noticed some decrease in   near vision since last visit. She hasn't noticed a change in distance. She   has been using +2.00 otc readers. Pt says she is out of contacts right now   and plan to get more after today with her new rx. Pt has some itching OU   about once a week and she uses AT's OU prn. Pt denies flashes/floaters,   burning, eye pain, or double vision.     Last edited by Clau Akhtar on 4/5/2019  1:59 PM. (History)            Assessment /Plan     For exam results, see Encounter Report.    Conjunctivitis, allergic, bilateral   Zaditor BID OU (before and after contacts)    Pseudophakia of both eyes  Myopia, bilateral   Rx specs    Fitting and adjustment of spectacles and contact lenses   CL fit today: dispensed trials of monovision OD distance OS near   `Remove nightly, replace monthly   OK to order in colors  if happy with comfort and vision    RTC 1 year, DFE

## 2019-04-08 DIAGNOSIS — F32.A DEPRESSION, UNSPECIFIED DEPRESSION TYPE: Chronic | ICD-10-CM

## 2019-04-08 DIAGNOSIS — F41.9 ANXIETY: Chronic | ICD-10-CM

## 2019-04-08 NOTE — PROGRESS NOTES
Subjective:       Patient ID: Marleny Guzman is a 68 y.o. female.    Chief Complaint: Annual Exam    HPIPt is feeling somewhat depressed since the death of her sister and cousin.  Not wearing sleep apnea mask as much.  Has some mild dyspnea.  Review of Systems   Respiratory: Negative for shortness of breath (PND or orthopnea).    Cardiovascular: Negative for chest pain (arm pain or jaw pain).   Gastrointestinal: Negative for abdominal pain, diarrhea, nausea and vomiting.   Genitourinary: Negative for dysuria.   Neurological: Negative for seizures, syncope and headaches.       Objective:      Physical Exam   Constitutional: She is oriented to person, place, and time. She appears well-developed and well-nourished. No distress.   HENT:   Head: Normocephalic.   Mouth/Throat: Oropharynx is clear and moist.   Neck: Neck supple. No JVD present. No thyromegaly present.   Cardiovascular: Normal rate, regular rhythm, normal heart sounds and intact distal pulses. Exam reveals no gallop and no friction rub.   No murmur heard.  Pulmonary/Chest: Effort normal and breath sounds normal. She has no wheezes. She has no rales.   Abdominal: Soft. Bowel sounds are normal. She exhibits no distension and no mass. There is no tenderness. There is no rebound and no guarding.   Musculoskeletal: She exhibits no edema.   Lymphadenopathy:     She has no cervical adenopathy.   Neurological: She is alert and oriented to person, place, and time. She has normal reflexes.   Skin: Skin is warm and dry.   Psychiatric: She has a normal mood and affect. Her behavior is normal. Judgment and thought content normal.       Assessment:       1. Disorder of bone    2. Essential hypertension    3. Hyperlipidemia, unspecified hyperlipidemia type    4. Hyperglycemia    5. Mild vitamin D deficiency    6. Urinary tract infection without hematuria, site unspecified    7. Dyspnea, unspecified type    8. Obstructive sleep apnea    9. Anxiety    10. Depression,  unspecified depression type        Plan:   Disorder of bone  -     DXA Bone Density Spine And Hip; Future; Expected date: 04/03/2019    Essential hypertension  -     CBC auto differential; Future; Expected date: 04/03/2019  -     Comprehensive metabolic panel; Future; Expected date: 04/03/2019  -     TSH; Future; Expected date: 04/03/2019  -     Urinalysis  -     Microalbumin/creatinine urine ratio  Controlled - continue current meds    Hyperlipidemia, unspecified hyperlipidemia type  -     Lipid panel; Future; Expected date: 04/03/2019    Hyperglycemia  -     Hemoglobin A1c; Future; Expected date: 04/03/2019    Mild vitamin D deficiency  -     Vitamin D; Future; Expected date: 04/03/2019    Urinary tract infection without hematuria, site unspecified  -     Urine culture    Dyspnea, unspecified type  -     Complete PFT with bronchodilator; Future    Obstructive sleep apnea  -     CPAP/BIPAP SUPPLIES    Anxiety  -     escitalopram oxalate (LEXAPRO) 20 MG tablet; 1.5 tablets daily  Dispense: 135 tablet; Refill: 3    Depression, unspecified depression type  -     escitalopram oxalate (LEXAPRO) 20 MG tablet; 1.5 tablets daily  Dispense: 135 tablet; Refill: 3  Not suicidal or homicidal increase to 30mg daily  Other orders  -     LORazepam (ATIVAN) 0.5 MG tablet; Half - one daily as needed for anxiety  Dispense: 30 tablet; Refill: 0  -     Urinalysis Microscopic

## 2019-04-10 RX ORDER — ERGOCALCIFEROL 1.25 MG/1
CAPSULE ORAL
Qty: 3 CAPSULE | Refills: 3 | Status: SHIPPED | OUTPATIENT
Start: 2019-04-10 | End: 2020-04-27

## 2019-04-10 RX ORDER — ESCITALOPRAM OXALATE 20 MG/1
TABLET ORAL
Qty: 90 TABLET | Refills: 1 | OUTPATIENT
Start: 2019-04-10

## 2019-04-23 ENCOUNTER — TELEPHONE (OUTPATIENT)
Dept: INTERNAL MEDICINE | Facility: CLINIC | Age: 69
End: 2019-04-23

## 2019-04-23 NOTE — TELEPHONE ENCOUNTER
----- Message from Eliana Anderson sent at 4/23/2019  9:27 AM CDT -----  Contact: Patient [phone]  Type: Returning a call    Who left a message?'s Office    When did the practice call?Today    Comments:Please call back.      Thanks

## 2019-04-23 NOTE — TELEPHONE ENCOUNTER
patient thought someone from the office called her, I advised I did not see anything in her chart that would indicate some one called. She understood and is not concerned.

## 2019-05-02 ENCOUNTER — HOSPITAL ENCOUNTER (OUTPATIENT)
Dept: PULMONOLOGY | Facility: CLINIC | Age: 69
Discharge: HOME | End: 2019-05-02
Payer: MEDICARE

## 2019-05-02 ENCOUNTER — HOSPITAL ENCOUNTER (OUTPATIENT)
Dept: RADIOLOGY | Facility: CLINIC | Age: 69
Discharge: HOME | End: 2019-05-02
Attending: INTERNAL MEDICINE
Payer: MEDICARE

## 2019-05-02 DIAGNOSIS — M89.9 DISORDER OF BONE: ICD-10-CM

## 2019-05-02 DIAGNOSIS — R06.00 DYSPNEA, UNSPECIFIED TYPE: ICD-10-CM

## 2019-05-02 LAB
POST FEV1 FVC: 0.77
POST FEV1: 1.79
POST FVC: 2.33
PRE FEV1 FVC: 72
PRE FEV1: 1.76
PRE FVC: 2.44
PREDICTED FEV1 FVC: 75
PREDICTED FEV1: 2.71
PREDICTED FVC: 3.52

## 2019-05-02 PROCEDURE — 94060 EVALUATION OF WHEEZING: CPT | Mod: S$GLB,,, | Performed by: INTERNAL MEDICINE

## 2019-05-02 PROCEDURE — 77080 DEXA BONE DENSITY SPINE HIP: ICD-10-PCS | Mod: 26,,, | Performed by: INTERNAL MEDICINE

## 2019-05-02 PROCEDURE — 94729 PR C02/MEMBANE DIFFUSE CAPACITY: ICD-10-PCS | Mod: S$GLB,,, | Performed by: INTERNAL MEDICINE

## 2019-05-02 PROCEDURE — 94060 PR EVAL OF BRONCHOSPASM: ICD-10-PCS | Mod: S$GLB,,, | Performed by: INTERNAL MEDICINE

## 2019-05-02 PROCEDURE — 77080 DXA BONE DENSITY AXIAL: CPT | Mod: 26,,, | Performed by: INTERNAL MEDICINE

## 2019-05-02 PROCEDURE — 77080 DXA BONE DENSITY AXIAL: CPT | Mod: TC

## 2019-05-02 PROCEDURE — 94729 DIFFUSING CAPACITY: CPT | Mod: S$GLB,,, | Performed by: INTERNAL MEDICINE

## 2019-06-03 ENCOUNTER — TELEPHONE (OUTPATIENT)
Dept: GYNECOLOGIC ONCOLOGY | Facility: CLINIC | Age: 69
End: 2019-06-03

## 2019-07-14 RX ORDER — IRBESARTAN AND HYDROCHLOROTHIAZIDE 300; 12.5 MG/1; MG/1
TABLET, FILM COATED ORAL
Qty: 90 TABLET | Refills: 3 | Status: SHIPPED | OUTPATIENT
Start: 2019-07-14 | End: 2019-08-19

## 2019-07-30 ENCOUNTER — TELEPHONE (OUTPATIENT)
Dept: INTERNAL MEDICINE | Facility: CLINIC | Age: 69
End: 2019-07-30

## 2019-07-30 NOTE — TELEPHONE ENCOUNTER
----- Message from Sarahi Cho sent at 7/30/2019 11:15 AM CDT -----  Contact: Self            Name of Who is Calling: DOMINICK SENA [[id number]]      What is the request in detail: Pt states she missed her appt on 07/8 and needs to reschedule an appt sooner than the available on 10/28. Please contact to further discuss and advise.        Can the clinic reply by [facility]: N      What Number to [facility] Back [facility]: [phone]

## 2019-08-02 ENCOUNTER — TELEPHONE (OUTPATIENT)
Dept: INTERNAL MEDICINE | Facility: CLINIC | Age: 69
End: 2019-08-02

## 2019-08-02 RX ORDER — BENZONATATE 100 MG/1
100 CAPSULE ORAL 3 TIMES DAILY PRN
Qty: 30 CAPSULE | Refills: 0 | Status: SHIPPED | OUTPATIENT
Start: 2019-08-02 | End: 2019-08-12

## 2019-08-02 RX ORDER — DOXYCYCLINE HYCLATE 100 MG
TABLET ORAL
Qty: 20 TABLET | Refills: 0 | Status: SHIPPED | OUTPATIENT
Start: 2019-08-02 | End: 2019-08-19

## 2019-08-02 NOTE — TELEPHONE ENCOUNTER
No fever or chills but cough and congestion, no SOB, doxy and tessalon sent - appt/ED for worsening sx.

## 2019-08-02 NOTE — TELEPHONE ENCOUNTER
----- Message from Alisa Manzanares sent at 8/2/2019  9:32 AM CDT -----  Contact: Pt [phone]  Patient would like to get medical advice.  Symptoms (please be specific):  Cough up yellow mucous, nasal. chest congestion. hoarse  How long has patient had these symptoms:  3 days  Pharmacy name and phone #[id number] - [place], [place] - [address] [phone] (Phone) [phone] (Fax)    Comments:  This is a different pharmacy

## 2019-08-02 NOTE — TELEPHONE ENCOUNTER
----- Message from Elianaroland Anderson sent at 8/2/2019  9:05 AM CDT -----  Contact: Patient [phone]  Pt states that she is calling to speak with someone in the office in regards to getting a prescription for a cold and cough that she has. Please call back and advise.      Thanks

## 2019-08-14 ENCOUNTER — INITIAL CONSULT (OUTPATIENT)
Dept: UROGYNECOLOGY | Facility: CLINIC | Age: 69
End: 2019-08-14
Payer: MEDICARE

## 2019-08-14 VITALS
WEIGHT: 198.88 LBS | BODY MASS INDEX: 28.47 KG/M2 | HEIGHT: 70 IN | SYSTOLIC BLOOD PRESSURE: 134 MMHG | DIASTOLIC BLOOD PRESSURE: 60 MMHG

## 2019-08-14 DIAGNOSIS — N39.46 MIXED STRESS AND URGE URINARY INCONTINENCE: Primary | ICD-10-CM

## 2019-08-14 DIAGNOSIS — R27.8 DYSSYNERGIA: ICD-10-CM

## 2019-08-14 DIAGNOSIS — N95.2 VAGINAL ATROPHY: ICD-10-CM

## 2019-08-14 DIAGNOSIS — R87.629 ABNORMAL PAP SMEAR OF VAGINA: ICD-10-CM

## 2019-08-14 DIAGNOSIS — N90.89 VULVAR IRRITATION: ICD-10-CM

## 2019-08-14 DIAGNOSIS — N81.89 PELVIC FLOOR WEAKNESS: ICD-10-CM

## 2019-08-14 DIAGNOSIS — N89.8 VAGINAL PRURITUS: ICD-10-CM

## 2019-08-14 PROCEDURE — 51701 PR INSERTION OF NON-INDWELLING BLADDER CATHETERIZATION FOR RESIDUAL UR: ICD-10-PCS | Mod: S$GLB,,, | Performed by: NURSE PRACTITIONER

## 2019-08-14 PROCEDURE — 3078F PR MOST RECENT DIASTOLIC BLOOD PRESSURE < 80 MM HG: ICD-10-PCS | Mod: CPTII,S$GLB,, | Performed by: NURSE PRACTITIONER

## 2019-08-14 PROCEDURE — 1101F PT FALLS ASSESS-DOCD LE1/YR: CPT | Mod: CPTII,S$GLB,, | Performed by: NURSE PRACTITIONER

## 2019-08-14 PROCEDURE — 99999 PR PBB SHADOW E&M-EST. PATIENT-LVL IV: CPT | Mod: PBBFAC,,, | Performed by: NURSE PRACTITIONER

## 2019-08-14 PROCEDURE — 99214 PR OFFICE/OUTPT VISIT, EST, LEVL IV, 30-39 MIN: ICD-10-PCS | Mod: 25,S$GLB,, | Performed by: NURSE PRACTITIONER

## 2019-08-14 PROCEDURE — 3075F PR MOST RECENT SYSTOLIC BLOOD PRESS GE 130-139MM HG: ICD-10-PCS | Mod: CPTII,S$GLB,, | Performed by: NURSE PRACTITIONER

## 2019-08-14 PROCEDURE — 1101F PR PT FALLS ASSESS DOC 0-1 FALLS W/OUT INJ PAST YR: ICD-10-PCS | Mod: CPTII,S$GLB,, | Performed by: NURSE PRACTITIONER

## 2019-08-14 PROCEDURE — 87086 URINE CULTURE/COLONY COUNT: CPT

## 2019-08-14 PROCEDURE — 51701 INSERT BLADDER CATHETER: CPT | Mod: S$GLB,,, | Performed by: NURSE PRACTITIONER

## 2019-08-14 PROCEDURE — 99214 OFFICE O/P EST MOD 30 MIN: CPT | Mod: 25,S$GLB,, | Performed by: NURSE PRACTITIONER

## 2019-08-14 PROCEDURE — 99999 PR PBB SHADOW E&M-EST. PATIENT-LVL IV: ICD-10-PCS | Mod: PBBFAC,,, | Performed by: NURSE PRACTITIONER

## 2019-08-14 PROCEDURE — 3075F SYST BP GE 130 - 139MM HG: CPT | Mod: CPTII,S$GLB,, | Performed by: NURSE PRACTITIONER

## 2019-08-14 PROCEDURE — 3078F DIAST BP <80 MM HG: CPT | Mod: CPTII,S$GLB,, | Performed by: NURSE PRACTITIONER

## 2019-08-14 RX ORDER — ESTRADIOL 0.1 MG/G
CREAM VAGINAL
Qty: 1 TUBE | Refills: 3 | Status: SHIPPED | OUTPATIENT
Start: 2019-08-14 | End: 2019-11-15 | Stop reason: SDUPTHER

## 2019-08-14 RX ORDER — CLOBETASOL PROPIONATE 0.5 MG/G
OINTMENT TOPICAL
Qty: 45 G | Refills: 3 | Status: SHIPPED | OUTPATIENT
Start: 2019-08-14 | End: 2019-11-15 | Stop reason: SDUPTHER

## 2019-08-14 NOTE — PROGRESS NOTES
[facility] UROGYN [facility] 4   [address] [address]    Marleny Guzman  [id number]  1950 August 17, 2019    Consulting Physician: Francia Mosley MD   Gyn: Dr. Reena Mike MJH.: Bridget Cantu MD    Chief Complaint   Patient presents with    Consult    Urinary Incontinence    Vulvar Itch       HPI:     1)  UI:  (+) RACHELLE < (+) UUI  X 2years.  (+) pads:4/day, usually severe wetness and 1/night usually minimum wetness.  Daytime frequency: Q 2 hours.  Nocturia: Yes: 3-4/night.  Does not limit fluids prior to bedtime.  (--) dysuria,  (--) hematuria,  (--) frequent UTIs.  (+) complete bladder emptying. Not using CPAP machine.    2)  POP:  Absent.  Symptoms:(--)  .  (--) vaginal bleeding. (--) vaginal discharge. (--) sexually active. (+)  Vaginal dryness.  (--) vaginal estrogen use.     3)  BM:  (--) constipation/straining.  (--) chronic diarrhea. (--) hematochezia.  (--) fecal incontinence.  (--) fecal smearing/urgency.  (+) complete evacuation.      4)vulvar itching  --has not used clobetasol-- did not seem to help    5)Vain  --followed by Dr. Mosley  --02/25/2019  No evidence of disease on today's exam.     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 Gonzlaez. Vaginal pap smear 6/15/16 HSIL. She was referred for consult by Dr. Valles. 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 Noncompliant with vaginal estrogen cream as prescribed.      Follow up pap 1/30/17 HSIL. 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      Pap 7/24/17 ASCUS HPV 18+   Pap 1/22/18 LSIL, normal exam  Pap 8/2018 normal, normal exam.     Past Medical History  Past Medical History:   Diagnosis Date    Abnormal Pap smear     Abnormal Pap smear of cervix     Abnormal Pap smear of vagina 7/20/2016    Allergy     Anemia     Anxiety     Cataract     Chronic bilateral low back pain without sciatica 3/13/2018    Depression     Depression  with anxiety     Hypertension     Osteoarthritis 2013    Right rotator cuff tear     Sleep apnea     Cipap machine at home    Trouble in sleeping     Urinary incontinence     Leaks urine with laughing/ coughing/ sneezing        Past Surgical History  Past Surgical History:   Procedure Laterality Date    CATARACT EXTRACTION  12    OS    CATARACT EXTRACTION  12    OD    CERVIX SURGERY      Conization    COLONOSCOPY      COLONOSCOPY N/A 3/4/2016    Performed by [facility]in Thakkar MD at [facility] ENDO (4TH FLR)    CONIZATION-CERVIX ([facility]) N/A 2014    Performed by Marizol Gonzalez MD at [facility] OR 2ND FLR    ESOPHAGOGASTRODUODENOSCOPY (EGD) N/A 3/4/2016    Performed by T[facility]in [facility]akkar MD at [facility] ENDO (4TH FLR)    EYE SURGERY Bilateral     cataract    HYSTERECTOMY  5-12-15    INJECTION-JOINT Bilateral 3/22/2018    Performed by [facility]h [facility]as MD at [facility] PAIN MGT    INJECTION-STEROID-EPIDURAL-LUMBAR N/A 8/3/2017    Performed by Luzmaria Feldman MD at [facility] PAIN MGT    LEEP      LEEP (LOOP ELECTROSURGICAL EXCISION PROCEDURE) N/A 2013    Performed by Betsey Valles MD at [facility] OR    LUMBAR EPIDURAL INJECTION      OOPHORECTOMY      MI REMOVAL OF OVARY/TUBE(S)  5-12-15    REPAIR ROTATOR CUFF ARTHROSCOPIC Right 2014    Performed by Whit [facility]azariegos MD at [facility] OR    ROBOT ASSISTED LAPAROSCOPIC SALPINGO-OOPHERECTOMY Bilateral 2015    Performed by [facility]ol Gonzalez MD at [facility] OR 2ND FLR    ROBOTIC ASSISTED LAPAROSCOPIC HYSTERECTOMY N/A 2015    Performed by Marizol Gonzalez MD at [facility] OR 2ND FLR    SALPINGECTOMY Left     SHOULDER SURGERY Right         Hysterectomy: Yes -   Date: 2015.  Indication: persistant hgsil.    Type: robotic  Cervix present: No  Ovaries present: Yes   Other procedures at time of hysterectomy:  none    Past Ob History     x 1.    Largest infant weight: 7 # 16 oz  no FAVD. no episiotomy.      Gynecologic History  LMP: No LMP recorded.  "Patient has had a hysterectomy.  Age of menarche: 13  Age of menopause: 37  Menstrual history: normal Pap test:8/2018 normal  History of abnormal paps: Yes - post hyst hx vain.  History of STIs:  No  Mammogram: Date of last: 01/2019.  Result: Normal  Colonoscopy: Date of last: 03/2016.  Result:  Melanosis in the colon.                        - The examined portion of the ileum was normal.                        - One 2 mm polyp in the descending colon. Resected                         and retrieved.                        - One 3 mm polyp in the sigmoid colon. Resected and                         retrieved..    Repeat due:  5 years.    DEXA:  Date of last: 05/2019    Result:  Elevated BMD of the lumbar spine.  Elevated BMD may be associated with elevated BMI.  RECOMMENDATIONS of [facility]:  1.  Calcium 1691-2443 mg daily and vitamin D 800-1000 units daily, adequate exercise.  2.  Repeat BMD in 4-5 years       Family History  Family History   Problem Relation Age of Onset    Hypertension Father     Cataracts Father     Cancer Mother         THYROID CANCER    Stroke Mother     Breast cancer Cousin     Heart disease Sister     Pacemaker/defibrilator Sister     Hypertension Sister     Deep vein thrombosis Sister     Heart disease Sister         CABG    Osteoarthritis Sister     Edema Sister     Eczema Sister     Hypertension Sister     Anuerysm Sister     Drug abuse Sister     Hypertension Sister     No Known Problems Brother     No Known Problems Sister     Cancer Sister         Cancer cells or "growth in her stomach"    No Known Problems Sister     Hypertension Daughter     Other Daughter         Heart palpitations    Depression Maternal Grandmother     Ulcers Maternal Grandfather         Legs    No Known Problems Paternal Grandmother     No Known Problems Paternal Grandfather     Cancer Maternal Aunt     ADD / ADHD Neg Hx     Alcohol abuse Neg Hx  "    Anxiety disorder Neg Hx     Bipolar disorder Neg Hx     Dementia Neg Hx     OCD Neg Hx     Paranoid behavior Neg Hx     Physical abuse Neg Hx     Schizophrenia Neg Hx     Seizures Neg Hx     Sexual abuse Neg Hx     Amblyopia Neg Hx     Blindness Neg Hx     Glaucoma Neg Hx     Macular degeneration Neg Hx     Retinal detachment Neg Hx     Strabismus Neg Hx         Social History  Social History     Tobacco Use   Smoking Status Former Smoker    Packs/day: 0.50    Years: 22.00    Pack years: 11.00    Types: Cigarettes    Last attempt to quit: 1998    Years since quittin.5   Smokeless Tobacco Never Used   .  Social History     Substance and Sexual Activity   Alcohol Use Yes    Alcohol/week: 0.0 oz    Comment: Occassionally for social events will have a glass of wine   .    Social History     Substance and Sexual Activity   Drug Use No   .  The patient is .  Resides in [place] [address].  Employment status: currently employed in patient care.    Allergies  Review of patient's allergies indicates:  No Known Allergies    Medications  Current Outpatient Medications on File Prior to Visit   Medication Sig Dispense Refill    aspirin 81 MG Chew Take 81 mg by mouth once daily.      doxycycline (VIBRA-TABS) 100 MG tablet One tablet twice daily with food and a full glass of water 20 tablet 0    escitalopram oxalate (LEXAPRO) 20 MG tablet 1.5 tablets daily 135 tablet 3    irbesartan-hydrochlorothiazide (AVALIDE) 300-12.5 mg per tablet TAKE 1 TABLET BY MOUTH EVERY DAY 90 tablet 3    LORazepam (ATIVAN) 0.5 MG tablet Half - one daily as needed for anxiety 30 tablet 0    metoprolol succinate (TOPROL-XL) 50 MG 24 hr tablet TAKE 1 TABLET (50 MG TOTAL) BY MOUTH ONCE DAILY. 90 tablet 3    VITAMIN D2 50,000 unit capsule TAKE 1 CAPSULE (50,000 UNITS TOTAL) BY MOUTH EVERY 30 DAYS. 3 capsule 3    albuterol (PROVENTIL/VENTOLIN HFA) 90 mcg/actuation inhaler Inhale 2 puffs into the lungs every 6  "(six) hours as needed for Wheezing. 1 each 11    fluticasone (FLONASE ALLERGY RELIEF) 50 mcg/actuation nasal spray 1 spray (50 mcg total) by Each Nare route once daily. 1 Bottle 0    guaiFENesin (MUCINEX) 600 mg 12 hr tablet Take 1 tablet (600 mg total) by mouth 2 (two) times daily. 60 tablet 2    loratadine (CLARITIN) 10 mg tablet Take 1 tablet (10 mg total) by mouth once daily. 30 tablet 0    nystatin-triamcinolone (MYCOLOG) ointment Apply topically 2 (two) times daily as needed. 30 g 1    promethazine (PHENERGAN) 6.25 mg/5 mL syrup Take 5 mLs (6.25 mg total) by mouth 4 (four) times daily as needed. 120 mL 0    vit A-vit C-zinc-propolis (ZINC, WITH A AND C, LOZENGES) Lozg Take 1 lozenge by mouth every 2 (two) hours as needed.       No current facility-administered medications on file prior to visit.        Review of Systems A 14 point ROS was reviewed with pertinent positives as noted above in the history of present illness.      Constitutional: negative  Eyes: negative  Endocrine: negative  Gastrointestinal: negative  Cardiovascular: negative  Respiratory: negative  Allergic/Immunologic: negative  Integumentary: negative  Psychiatric: negative  Musculoskeletal: negative   Ear/Nose/Throat: negative  Neurologic: negative  Genitourinary: SEE HPI  Hematologic/Lymphatic: negative   Breast: negative    Urogynecologic Exam  /60 (BP Location: Right arm, Patient Position: Sitting, BP Method: Large (Manual))   Ht 5' 10" (1.778 m)   Wt 90.2 kg (198 lb 13.7 oz)   BMI 28.53 kg/m²     GENERAL APPEARANCE:  The patient is well-developed, well-nourished.   Neck:  Supple with no thyromegaly, no carotid bruits.  Heart:  Regular rate and rhythm, no murmurs, rubs or gallops.  Lungs:  Clear.  No CVA tenderness.  Abdomen:  Soft, nontender, nondistended, no hepatosplenomegaly.      PELVIC:    External genitalia:  Normal Bartholins, Skenes and labia bilaterally.  Labia minora and major fuses especially near clitoral mcgowan. "   Urethra:  No caruncle, diverticulum or masses.  (+) hypermobility.    Vagina:  Atrophy (+) , no bladder masses or tender, no discharge.    Cervix:  absent  Uterus: uterus absent  Adnexa: Not palpable.    POP-Q:  No obvious POP present with valsalva.     NEUROLOGIC:  Cranial nerves 2 through 12 intact.  Strength 5/5.  DTRs 2+ lower extremities.  S2 through 4 normal.  Sacral reflexes intact.    EXT: DAHL, 2+ pulses bilaterally, no C/C/E    COUGH STRESS TEST:  negative  KEGEL: 1 /5--valsalva prior to kegel    RECTAL:    External:  Normal, (--) hemorrhoids, (--) dovetailing.   Internal:  deferred.    PVR: 30 mL    Impression    1. Mixed stress and urge urinary incontinence    2. Pelvic floor weakness    3. Dyssynergia    4. Abnormal Pap smear of vagina    5. Vaginal pruritus    6. Vulvar irritation    7. Vaginal atrophy        Initial Plan  The patient was counseled regarding these issues. The patient was given a summary sheet containing each of these issues with possible options for evaluation and management. When appropriate, we also reviewed computer-generated diagrams specific to their diagnoses..  All questions were addressed to the patient's satisfaction.     1)  Mixed urinary incontinence, urge > stress:    --Empty bladder every 3 hours.  Empty well: wait a minute, lean forward on toilet.    --Avoid dietary irritants (see sheet).  Keep diary x 3-5 days to determine your irritants.  --KEGELS: do 10 in AM and 10 in PM, holding each x 10 seconds.  When you feel urge to go, STOP, KEGEL, and when urge has passed, then go to bathroom.    --start pelvic floor PT with  Emily Keita ([facility] [place]): (p) [phone].  (f) [phone].  --URGE: consider anticholinergic.  Takes 2-4 weeks to see if will have effect.  For dry mouth: get sour, sugar free lozenge or gum.    --STRESS:  Pessary vs. Sling.   --urine culture today    2)vulvar itching  --clobetasol every AM x 2 weeks then twice  weekly  --estrogen cream every PM x 2 weeks then twice weekly    3)Vaginal atrophy (dryness):  Use 1 gram of estrogen cream in vagina nightly x 2 weeks, then twice a week thereafter.      4)--Nocturia (nighttime urination): stop fluids 2 hours before bed.  If have leg swelling:  Elevate feet above chest x 1 hour before bed to get excess fluid off.  Can also use support hose (knee highs).      5)pelvic floor weakness/dyssynergia  --pelvic floor PT will help    6)RTC 4 months for follow up      Approximately 30 min were spent in consult, 90 % in discussion.     Thank you for requesting consultation of your patient.  I look forward to participating in their care.    Carrie Read  Female Pelvic Medicine and Reconstructive Surgery  [facility] [place], [place]

## 2019-08-14 NOTE — PATIENT INSTRUCTIONS
1)  Mixed urinary incontinence, urge > stress:    --Empty bladder every 3 hours.  Empty well: wait a minute, lean forward on toilet.    --Avoid dietary irritants (see sheet).  Keep diary x 3-5 days to determine your irritants.  --KEGELS: do 10 in AM and 10 in PM, holding each x 10 seconds.  When you feel urge to go, STOP, KEGEL, and when urge has passed, then go to bathroom.    --start pelvic floor PT with  Emily Keita ([facility] [place]): (p) [phone].  (f) [phone].  --URGE: consider anticholinergic.  Takes 2-4 weeks to see if will have effect.  For dry mouth: get sour, sugar free lozenge or gum.    --STRESS:  Pessary vs. Sling.   --urine culture today    2)vulvar itching  --clobetasol every AM x 2 weeks then twice weekly  --estrogen cream every PM x 2 weeks then twice weekly    3)Vaginal atrophy (dryness):  Use 1 gram of estrogen cream in vagina nightly x 2 weeks, then twice a week thereafter.      4)--Nocturia (nighttime urination): stop fluids 2 hours before bed.  If have leg swelling:  Elevate feet above chest x 1 hour before bed to get excess fluid off.  Can also use support hose (knee highs).      5)pelvic floor weakness/dyssynergia  --pelvic floor PT will help    6)RTC 4 months for follow up      ____________________________________________________________________________    Bladder Irritants  Certain foods and drinks have been associated with worsening symptoms of urinary frequency, urgency, urge incontinence, or  bladder pain. If you suffer from any of these conditions, you may wish to try eliminating one or more of these foods from your  diet and see if your symptoms improve. If bladder symptoms are related to dietary factors, strict adherence to a diet that  eliminates the food should bring marked relief in 10 days. Once you are feeling better, you can begin to add foods back into  your diet, one at a time. If symptoms return, you will be able to identify the irritant. As  you add foods back to your diet it is  very important that you drink significant amounts of water.  Low-acid fruit substitutions include apricots, papaya, pears and watermelon. Coffee drinkers can drink Kava or other lowacid  instant drinks. Tea drinkers can substitute non-citrus herbal and sun brewed teas. Calcium carbonate co-buffered with  calcium ascorbate can be substituted for Vitamin C. Prelief is a dietary supplement that works as an acid blocker for the  bladder.  Where to get more information:   Overcoming Bladder Disorders by Sparkle Miranda and Gill Nieto, 1990   You Dont Have to Live with Cystitis! By Kassy Bhandari, 1988  List of Common Bladder Irritants*  Alcoholic beverages  Apples and apple juice  Cantaloupe  Carbonated beverages  Chili and spicy foods  Chocolate  Citrus fruit  Coffee (including decaffeinated)  Cranberries and cranberry juice  Grapes  Guava  Milk Products: milk, cheese, cottage cheese, yogurt, ice cream  Peaches  Pineapple  Plums  Strawberries  Sugar especially artificial sweeteners, saccharin, aspartame, corn sweeteners, honey, fructose, sucrose, lactose  Tea  Tomatoes and tomato juice  Vitamin B complex  Vinegar  *Most people are not sensitive to ALL of these products; your goal is to find the foods that make YOUR  symptoms worse

## 2019-08-14 NOTE — LETTER
August 17, 2019      Francia Mosley MD  [address]  [facility] [address]           [facility] [place], operated by [facility] 4   [address] [place] [id number]  Phone: [phone]          Patient: Marleny Guzman   MR Number: [id number]   YOB: 1950   Date of Visit: 8/14/2019       Dear Dr. Francia Mosley:    Thank you for referring Marleny Guzman to me for evaluation. Attached you will find relevant portions of my assessment and plan of care.    If you have questions, please do not hesitate to call me. I look forward to following Marleny Guzman along with you.    Sincerely,    Carrie Read, ELVIA    Enclosure  CC:  No Recipients    If you would like to receive this communication electronically, please contact [email or web address] or [phone] to request more information on [facility] access.    For providers and/or their staff who would like to refer a patient to [facility], please contact us through our one-stop-shop provider referral line, [facility], [place], at [phone].    If you feel you have received this communication in error or would no longer like to receive these types of communications, please e-mail [email or web address]

## 2019-08-15 LAB — BACTERIA UR CULT: NO GROWTH

## 2019-08-16 ENCOUNTER — TELEPHONE (OUTPATIENT)
Dept: UROGYNECOLOGY | Facility: CLINIC | Age: 69
End: 2019-08-16

## 2019-08-16 NOTE — TELEPHONE ENCOUNTER
----- Message from Julia Beard sent at 8/16/2019  1:53 PM CDT -----  Contact: [facility]   [facility] pharmacy called to request an alternative to recent prescription (Clobetasol) because the patient's Insurance does not cover this prescription. [facility] can be reached at [phone].

## 2019-08-19 ENCOUNTER — OFFICE VISIT (OUTPATIENT)
Dept: INTERNAL MEDICINE | Facility: CLINIC | Age: 69
End: 2019-08-19
Payer: MEDICARE

## 2019-08-19 VITALS
OXYGEN SATURATION: 97 % | TEMPERATURE: 99 F | SYSTOLIC BLOOD PRESSURE: 136 MMHG | HEIGHT: 70 IN | BODY MASS INDEX: 28.41 KG/M2 | DIASTOLIC BLOOD PRESSURE: 72 MMHG | WEIGHT: 198.44 LBS | HEART RATE: 62 BPM

## 2019-08-19 DIAGNOSIS — G47.33 OBSTRUCTIVE SLEEP APNEA: Primary | ICD-10-CM

## 2019-08-19 DIAGNOSIS — R73.9 HYPERGLYCEMIA: ICD-10-CM

## 2019-08-19 DIAGNOSIS — I10 ESSENTIAL HYPERTENSION: Chronic | ICD-10-CM

## 2019-08-19 DIAGNOSIS — R60.9 EDEMA, UNSPECIFIED TYPE: ICD-10-CM

## 2019-08-19 PROCEDURE — 3078F PR MOST RECENT DIASTOLIC BLOOD PRESSURE < 80 MM HG: ICD-10-PCS | Mod: CPTII,S$GLB,, | Performed by: INTERNAL MEDICINE

## 2019-08-19 PROCEDURE — 3078F DIAST BP <80 MM HG: CPT | Mod: CPTII,S$GLB,, | Performed by: INTERNAL MEDICINE

## 2019-08-19 PROCEDURE — 1101F PT FALLS ASSESS-DOCD LE1/YR: CPT | Mod: CPTII,S$GLB,, | Performed by: INTERNAL MEDICINE

## 2019-08-19 PROCEDURE — 99214 OFFICE O/P EST MOD 30 MIN: CPT | Mod: S$GLB,,, | Performed by: INTERNAL MEDICINE

## 2019-08-19 PROCEDURE — 99214 PR OFFICE/OUTPT VISIT, EST, LEVL IV, 30-39 MIN: ICD-10-PCS | Mod: S$GLB,,, | Performed by: INTERNAL MEDICINE

## 2019-08-19 PROCEDURE — 1101F PR PT FALLS ASSESS DOC 0-1 FALLS W/OUT INJ PAST YR: ICD-10-PCS | Mod: CPTII,S$GLB,, | Performed by: INTERNAL MEDICINE

## 2019-08-19 PROCEDURE — 3075F SYST BP GE 130 - 139MM HG: CPT | Mod: CPTII,S$GLB,, | Performed by: INTERNAL MEDICINE

## 2019-08-19 PROCEDURE — 99999 PR PBB SHADOW E&M-EST. PATIENT-LVL V: CPT | Mod: PBBFAC,,, | Performed by: INTERNAL MEDICINE

## 2019-08-19 PROCEDURE — 99999 PR PBB SHADOW E&M-EST. PATIENT-LVL V: ICD-10-PCS | Mod: PBBFAC,,, | Performed by: INTERNAL MEDICINE

## 2019-08-19 PROCEDURE — 3075F PR MOST RECENT SYSTOLIC BLOOD PRESS GE 130-139MM HG: ICD-10-PCS | Mod: CPTII,S$GLB,, | Performed by: INTERNAL MEDICINE

## 2019-08-19 RX ORDER — IRBESARTAN AND HYDROCHLOROTHIAZIDE 300; 12.5 MG/1; MG/1
1 TABLET, FILM COATED ORAL DAILY
Qty: 90 TABLET | Refills: 3
Start: 2019-08-19 | End: 2020-06-18

## 2019-08-19 RX ORDER — HYDROCHLOROTHIAZIDE 12.5 MG/1
12.5 TABLET ORAL DAILY
Qty: 90 TABLET | Refills: 3 | Status: SHIPPED | OUTPATIENT
Start: 2019-08-19 | End: 2020-02-20

## 2019-08-19 RX ORDER — LORAZEPAM 0.5 MG/1
TABLET ORAL
Qty: 30 TABLET | Refills: 0 | Status: SHIPPED | OUTPATIENT
Start: 2019-08-19 | End: 2019-11-26 | Stop reason: SDUPTHER

## 2019-08-26 ENCOUNTER — TELEPHONE (OUTPATIENT)
Dept: UROGYNECOLOGY | Facility: CLINIC | Age: 69
End: 2019-08-26

## 2019-08-26 DIAGNOSIS — N90.89 VULVAR IRRITATION: Primary | ICD-10-CM

## 2019-08-26 RX ORDER — BETAMETHASONE DIPROPIONATE 0.5 MG/G
OINTMENT TOPICAL
Qty: 45 G | Refills: 3 | Status: SHIPPED | OUTPATIENT
Start: 2019-08-26 | End: 2020-09-16

## 2019-08-26 NOTE — TELEPHONE ENCOUNTER
----- Message from Kamlesh Chairez sent at 8/26/2019 10:10 AM CDT -----  Contact: DOMINICK SENA [[id number]]  Name of Who is Calling: DOMINICK SENA [[id number]]      What is the request in detail: Would like to follow up on alternative for clobetasol 0.05% (TEMOVATE) 0.05 % Oint.      Can the clinic reply by [facility]: yes      What Number to Call Back if not in [facility]: [phone]

## 2019-08-26 NOTE — TELEPHONE ENCOUNTER
Pt is requesting an alternative for clobetasol 0.05% ointment. Spoke with [facility] pharmacist in which she stated the betamethasone 0.05% is approved by insurance.

## 2019-08-27 ENCOUNTER — HOSPITAL ENCOUNTER (OUTPATIENT)
Dept: CARDIOLOGY | Facility: CLINIC | Age: 69
Discharge: HOME | End: 2019-08-27
Attending: INTERNAL MEDICINE
Payer: MEDICARE

## 2019-08-27 ENCOUNTER — HOSPITAL ENCOUNTER (OUTPATIENT)
Dept: RADIOLOGY | Facility: HOSPITAL | Age: 69
Discharge: HOME | End: 2019-08-27
Attending: INTERNAL MEDICINE
Payer: MEDICARE

## 2019-08-27 VITALS
HEIGHT: 70 IN | BODY MASS INDEX: 28.35 KG/M2 | HEART RATE: 83 BPM | SYSTOLIC BLOOD PRESSURE: 112 MMHG | DIASTOLIC BLOOD PRESSURE: 70 MMHG | WEIGHT: 198 LBS

## 2019-08-27 DIAGNOSIS — R60.9 EDEMA, UNSPECIFIED TYPE: ICD-10-CM

## 2019-08-27 LAB
ASCENDING AORTA: 3.01 CM
AV INDEX (PROSTH): 0.9
AV MEAN GRADIENT: 3 MMHG
AV PEAK GRADIENT: 5 MMHG
AV VALVE AREA: 3.2 CM2
AV VELOCITY RATIO: 0.95
BSA FOR ECHO PROCEDURE: 2.11 M2
CV ECHO LV RWT: 0.6 CM
DOP CALC AO PEAK VEL: 1.11 M/S
DOP CALC AO VTI: 25.39 CM
DOP CALC LVOT AREA: 3.6 CM2
DOP CALC LVOT DIAMETER: 2.13 CM
DOP CALC LVOT PEAK VEL: 1.05 M/S
DOP CALC LVOT STROKE VOLUME: 81.34 CM3
DOP CALCLVOT PEAK VEL VTI: 22.84 CM
E WAVE DECELERATION TIME: 194.78 MSEC
E/A RATIO: 1.06
E/E' RATIO: 10.13 M/S
ECHO LV POSTERIOR WALL: 1.31 CM (ref 0.6–1.1)
FRACTIONAL SHORTENING: 34 % (ref 28–44)
INTERVENTRICULAR SEPTUM: 0.78 CM (ref 0.6–1.1)
IVRT: 0.1 MSEC
LA MAJOR: 5.48 CM
LA MINOR: 5.61 CM
LA WIDTH: 4.16 CM
LEFT ATRIUM SIZE: 3.7 CM
LEFT ATRIUM VOLUME INDEX: 34.9 ML/M2
LEFT ATRIUM VOLUME: 72.54 CM3
LEFT INTERNAL DIMENSION IN SYSTOLE: 2.86 CM (ref 2.1–4)
LEFT VENTRICLE DIASTOLIC VOLUME INDEX: 41.18 ML/M2
LEFT VENTRICLE DIASTOLIC VOLUME: 85.58 ML
LEFT VENTRICLE MASS INDEX: 74 G/M2
LEFT VENTRICLE SYSTOLIC VOLUME INDEX: 14.9 ML/M2
LEFT VENTRICLE SYSTOLIC VOLUME: 31 ML
LEFT VENTRICULAR INTERNAL DIMENSION IN DIASTOLE: 4.35 CM (ref 3.5–6)
LEFT VENTRICULAR MASS: 154.33 G
LV LATERAL E/E' RATIO: 9.5 M/S
LV SEPTAL E/E' RATIO: 10.86 M/S
MV PEAK A VEL: 0.72 M/S
MV PEAK E VEL: 0.76 M/S
PISA TR MAX VEL: 2.92 M/S
PULM VEIN S/D RATIO: 1.14
PV PEAK D VEL: 0.49 M/S
PV PEAK S VEL: 0.56 M/S
PV PEAK VELOCITY: 0.92 CM/S
RA MAJOR: 5.21 CM
RA PRESSURE: 3 MMHG
RA WIDTH: 4.03 CM
RIGHT VENTRICULAR END-DIASTOLIC DIMENSION: 3.39 CM
RV TISSUE DOPPLER FREE WALL SYSTOLIC VELOCITY 1 (APICAL 4 CHAMBER VIEW): 15.33 CM/S
SINUS: 2.93 CM
STJ: 2.39 CM
TDI LATERAL: 0.08 M/S
TDI SEPTAL: 0.07 M/S
TDI: 0.08 M/S
TR MAX PG: 34 MMHG
TRICUSPID ANNULAR PLANE SYSTOLIC EXCURSION: 2.64 CM
TV REST PULMONARY ARTERY PRESSURE: 37 MMHG

## 2019-08-27 PROCEDURE — 93306 TTE W/DOPPLER COMPLETE: CPT | Mod: S$GLB,,, | Performed by: INTERNAL MEDICINE

## 2019-08-27 PROCEDURE — 93970 EXTREMITY STUDY: CPT | Mod: TC

## 2019-08-27 PROCEDURE — 93970 US LOWER EXTREMITY VEINS BILATERAL: ICD-10-PCS | Mod: 26,,, | Performed by: RADIOLOGY

## 2019-08-27 PROCEDURE — 93970 EXTREMITY STUDY: CPT | Mod: 26,,, | Performed by: RADIOLOGY

## 2019-08-27 PROCEDURE — 93306 TRANSTHORACIC ECHO (TTE) COMPLETE (CUPID ONLY): ICD-10-PCS | Mod: S$GLB,,, | Performed by: INTERNAL MEDICINE

## 2019-08-27 NOTE — NURSING
Patient identified by 2 identifiers. Denies previous reactions to blood transfusions, allergies reviewed & procedure explained.  22 g IV placed to Rt AC, flushed w/ 10cc NS pre & post contrast administration.  3cc Optison administered per echo tech, echo images obtained.  Pt tolerated procedure well.  IV D/C'ed, preasure dsg applied.  Pt D/C'ed to home.

## 2019-09-03 ENCOUNTER — TELEPHONE (OUTPATIENT)
Dept: ADMINISTRATIVE | Facility: OTHER | Age: 69
End: 2019-09-03

## 2019-09-03 NOTE — PROGRESS NOTES
Subjective:       Patient ID: Marleny Guzman is a 69 y.o. female.    Chief Complaint: Follow-up    HPIPt is feeling well - no CP or SOB.  Just had a 69th birthday.    Review of Systems   Respiratory: Negative for shortness of breath (PND or orthopnea).    Cardiovascular: Negative for chest pain (arm pain or jaw pain).   Gastrointestinal: Negative for abdominal pain, diarrhea, nausea and vomiting.   Genitourinary: Negative for dysuria.   Neurological: Negative for seizures, syncope and headaches.       Objective:      Physical Exam   Constitutional: She is oriented to person, place, and time. She appears well-developed and well-nourished. No distress.   HENT:   Head: Normocephalic.   Mouth/Throat: Oropharynx is clear and moist.   Neck: Neck supple. No JVD present. No thyromegaly present.   Cardiovascular: Normal rate, regular rhythm, normal heart sounds and intact distal pulses. Exam reveals no gallop and no friction rub.   No murmur heard.  Pulmonary/Chest: Effort normal and breath sounds normal. She has no wheezes. She has no rales.   Abdominal: Soft. Bowel sounds are normal. She exhibits no distension and no mass. There is no tenderness. There is no rebound and no guarding.   Musculoskeletal: She exhibits no edema.   Lymphadenopathy:     She has no cervical adenopathy.   Neurological: She is alert and oriented to person, place, and time. She has normal reflexes.   Skin: Skin is warm and dry.   Psychiatric: She has a normal mood and affect. Her behavior is normal. Judgment and thought content normal.       Assessment:       1. Obstructive sleep apnea    2. Essential hypertension    3. Edema, unspecified type    4. Hyperglycemia         Plan:   Obstructive sleep apnea  -     Polysomnography 4 or more parameters with CPAP; Future  -     CPAP FOR HOME USE    Essential hypertension  Controlled - continue current meds    Edema, unspecified type  -     Comprehensive metabolic panel; Future; Expected date: 08/19/2019  -      TSH; Future; Expected date: 08/19/2019  -     Hemoglobin A1c; Future; Expected date: 08/19/2019  -     Brain natriuretic peptide; Future; Expected date: 08/19/2019  -     Transthoracic echo (TTE) 2D with Color Flow; Future  -     US Lower Extremity Veins Bilateral; Future; Expected date: 08/19/2019    Hyperglycemia   -     Hemoglobin A1c; Future; Expected date: 08/19/2019    Other orders  -     varicella-zoster [id number], PF, (SHINGRIX, PF,) 50 mcg/0.5 mL injection; Inject 0.5 mLs into the muscle once. for 1 dose  Dispense: 0.5 mL; Refill: 0  -     irbesartan-hydrochlorothiazide (AVALIDE) 300-12.5 mg per tablet; Take 1 tablet by mouth once daily.  Dispense: 90 tablet; Refill: 3  -     hydroCHLOROthiazide (HYDRODIURIL) 12.5 MG Tab; Take 1 tablet (12.5 mg total) by mouth once daily.  Dispense: 90 tablet; Refill: 3  -     LORazepam (ATIVAN) 0.5 MG tablet; Half - one daily as needed for anxiety  Dispense: 30 tablet; Refill: 0

## 2019-09-04 ENCOUNTER — OFFICE VISIT (OUTPATIENT)
Dept: GYNECOLOGIC ONCOLOGY | Facility: CLINIC | Age: 69
End: 2019-09-04
Payer: MEDICARE

## 2019-09-04 VITALS
SYSTOLIC BLOOD PRESSURE: 150 MMHG | WEIGHT: 202.38 LBS | HEART RATE: 80 BPM | HEIGHT: 70 IN | DIASTOLIC BLOOD PRESSURE: 83 MMHG | BODY MASS INDEX: 28.97 KG/M2

## 2019-09-04 DIAGNOSIS — N89.3 VAGINAL DYSPLASIA: Primary | ICD-10-CM

## 2019-09-04 PROCEDURE — 3077F PR MOST RECENT SYSTOLIC BLOOD PRESSURE >= 140 MM HG: ICD-10-PCS | Mod: CPTII,S$GLB,, | Performed by: OBSTETRICS & GYNECOLOGY

## 2019-09-04 PROCEDURE — 99999 PR PBB SHADOW E&M-EST. PATIENT-LVL III: CPT | Mod: PBBFAC,,, | Performed by: OBSTETRICS & GYNECOLOGY

## 2019-09-04 PROCEDURE — 3077F SYST BP >= 140 MM HG: CPT | Mod: CPTII,S$GLB,, | Performed by: OBSTETRICS & GYNECOLOGY

## 2019-09-04 PROCEDURE — 88175 CYTOPATH C/V AUTO FLUID REDO: CPT | Performed by: PATHOLOGY

## 2019-09-04 PROCEDURE — 87624 HPV HI-RISK TYP POOLED RSLT: CPT

## 2019-09-04 PROCEDURE — 3079F DIAST BP 80-89 MM HG: CPT | Mod: CPTII,S$GLB,, | Performed by: OBSTETRICS & GYNECOLOGY

## 2019-09-04 PROCEDURE — 88141 LIQUID-BASED PAP SMEAR, SCREENING: ICD-10-PCS | Mod: ,,, | Performed by: PATHOLOGY

## 2019-09-04 PROCEDURE — 99999 PR PBB SHADOW E&M-EST. PATIENT-LVL III: ICD-10-PCS | Mod: PBBFAC,,, | Performed by: OBSTETRICS & GYNECOLOGY

## 2019-09-04 PROCEDURE — 99214 OFFICE O/P EST MOD 30 MIN: CPT | Mod: S$GLB,,, | Performed by: OBSTETRICS & GYNECOLOGY

## 2019-09-04 PROCEDURE — 99214 PR OFFICE/OUTPT VISIT, EST, LEVL IV, 30-39 MIN: ICD-10-PCS | Mod: S$GLB,,, | Performed by: OBSTETRICS & GYNECOLOGY

## 2019-09-04 PROCEDURE — 88141 CYTOPATH C/V INTERPRET: CPT | Mod: ,,, | Performed by: PATHOLOGY

## 2019-09-04 PROCEDURE — 1101F PT FALLS ASSESS-DOCD LE1/YR: CPT | Mod: CPTII,S$GLB,, | Performed by: OBSTETRICS & GYNECOLOGY

## 2019-09-04 PROCEDURE — 3079F PR MOST RECENT DIASTOLIC BLOOD PRESSURE 80-89 MM HG: ICD-10-PCS | Mod: CPTII,S$GLB,, | Performed by: OBSTETRICS & GYNECOLOGY

## 2019-09-04 PROCEDURE — 1101F PR PT FALLS ASSESS DOC 0-1 FALLS W/OUT INJ PAST YR: ICD-10-PCS | Mod: CPTII,S$GLB,, | Performed by: OBSTETRICS & GYNECOLOGY

## 2019-09-07 NOTE — PROGRESS NOTES
Subjective:      Patient ID: Marleny Guzman is a 69 y.o. female.    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 Gonzalez. Vaginal pap smear 6/15/16 HSIL. She was referred for consult by Dr. Valles. 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 Noncompliant with vaginal estrogen cream as prescribed.      Follow up pap 1/30/17 HSIL. 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      Pap 7/24/17 ASCUS HPV 18+   Pap 1/22/18 LSIL, normal exam  Pap 8/2018 normal, normal exam.      Presents today for follow up. Denies bleeding. Using vaginal estrogen. Has established with urogyn and appreciate their assistance.      Review of Systems   Constitutional: Negative for appetite change, chills, fatigue and fever.   HENT: Negative for mouth sores.    Respiratory: Negative for cough and shortness of breath.    Cardiovascular: Negative for leg swelling.   Gastrointestinal: Negative for abdominal pain, blood in stool, constipation and diarrhea.   Endocrine: Negative for cold intolerance.   Genitourinary: Negative for dysuria and vaginal bleeding.   Musculoskeletal: Negative for myalgias.   Skin: Negative for rash.   Allergic/Immunologic: Negative.    Neurological: Negative for weakness and numbness.   Hematological: Negative for adenopathy. Does not bruise/bleed easily.   Psychiatric/Behavioral: Negative for confusion.       Objective:   Physical Exam:   Constitutional: She is oriented to person, place, and time. She appears well-developed and well-nourished.    HENT:   Head: Normocephalic and atraumatic.    Eyes: Pupils are equal, round, and reactive to light. EOM are normal.    Neck: Normal range of motion. Neck supple. No thyromegaly present.    Cardiovascular: Normal rate, regular rhythm and intact distal pulses.     Pulmonary/Chest: Effort normal and breath sounds normal. No respiratory distress. She has no wheezes.         Abdominal: Soft. Bowel sounds are normal. She exhibits no distension, no ascites and no mass. There is no tenderness.     Genitourinary: Rectum normal and vagina normal. Pelvic exam was performed with patient supine. There is no lesion on the right labia. There is no lesion on the left labia. Uterus is absent. There is an absent adnexa. Right adnexum displays no mass. Left adnexum displays no mass. Vaginal cuff normal.Cervix exhibits absence. Additional cervical findings: pap smear done          Musculoskeletal: Normal range of motion and moves all extremeties.      Lymphadenopathy:     She has no cervical adenopathy.        Right: No inguinal and no supraclavicular adenopathy present.        Left: No inguinal and no supraclavicular adenopathy present.    Neurological: She is alert and oriented to person, place, and time.    Skin: Skin is warm and dry. No rash noted.    Psychiatric: She has a normal mood and affect.       Assessment:     1. Vaginal dysplasia        Plan:   No orders of the defined types were placed in this encounter.    No evidence of disease on today's exam.   Surveillance pap collected.  RTC 6 months for follow up or sooner if needed.

## 2019-09-17 LAB
HPV HR 12 DNA CVX QL NAA+PROBE: NEGATIVE
HPV16 AG SPEC QL: NEGATIVE
HPV18 DNA SPEC QL NAA+PROBE: POSITIVE

## 2019-09-19 ENCOUNTER — TELEPHONE (OUTPATIENT)
Dept: GYNECOLOGIC ONCOLOGY | Facility: CLINIC | Age: 69
End: 2019-09-19

## 2019-09-19 NOTE — TELEPHONE ENCOUNTER
Called to review pap ASCUS and HR HPV 18 remains +. HR HPV other has cleared. Vaginal exam is normal. Will plan for continued follow up in 6 months as scheduled.    No answer. Unable to leave voicemail.

## 2019-09-26 ENCOUNTER — TELEPHONE (OUTPATIENT)
Dept: INTERNAL MEDICINE | Facility: CLINIC | Age: 69
End: 2019-09-26

## 2019-09-26 NOTE — TELEPHONE ENCOUNTER
Patient was calling to try and get her sister an appointment. With pcp. I advised her sister did not have a record or chart with us, and she would need to set up account with phone staff first. patient agreed to call and set up. She will call back when ready to scheduled.

## 2019-09-26 NOTE — TELEPHONE ENCOUNTER
----- Message from Shwetha Kat sent at 9/26/2019  2:06 PM CDT -----  Contact: [phone]  Caller is requesting a sooner appointment. Caller declined first available appointment listed below. Caller will not accept being placed on the wait list and is requesting a message be sent to the provider.    When is the next available appointment:    Did you offer to schedule the next available appt and put the patient on the wait list?:     What visit type: ep  Symptoms:  Follow up  Patient preference of timeframe to be scheduled:  asap  What is the reason the patient is requesting a sooner appointment? (insurance terminating, changing jobs):    Would the patient rather a call back or a response via [facility]?: call back   Comments:  Please advise, thanks

## 2019-11-01 ENCOUNTER — TELEPHONE (OUTPATIENT)
Dept: INTERNAL MEDICINE | Facility: CLINIC | Age: 69
End: 2019-11-01

## 2019-11-01 NOTE — TELEPHONE ENCOUNTER
Appointment scheduled----- Message from Marcelle Goodman sent at 11/1/2019  1:47 PM CDT -----  Contact: self/[phone]  Pt called to talking to the office about a sooner appointment.       Please advise

## 2019-11-11 ENCOUNTER — PATIENT OUTREACH (OUTPATIENT)
Dept: ADMINISTRATIVE | Facility: OTHER | Age: 69
End: 2019-11-11

## 2019-11-12 ENCOUNTER — PATIENT OUTREACH (OUTPATIENT)
Dept: ADMINISTRATIVE | Facility: HOSPITAL | Age: 69
End: 2019-11-12

## 2019-11-13 DIAGNOSIS — I10 ESSENTIAL HYPERTENSION: Chronic | ICD-10-CM

## 2019-11-14 RX ORDER — METOPROLOL SUCCINATE 50 MG/1
TABLET, EXTENDED RELEASE ORAL
Qty: 90 TABLET | Refills: 3 | Status: SHIPPED | OUTPATIENT
Start: 2019-11-14 | End: 2020-10-25

## 2019-11-15 ENCOUNTER — OFFICE VISIT (OUTPATIENT)
Dept: UROGYNECOLOGY | Facility: CLINIC | Age: 69
End: 2019-11-15
Payer: MEDICARE

## 2019-11-15 VITALS
SYSTOLIC BLOOD PRESSURE: 132 MMHG | DIASTOLIC BLOOD PRESSURE: 60 MMHG | BODY MASS INDEX: 29.35 KG/M2 | HEIGHT: 70 IN | WEIGHT: 205 LBS

## 2019-11-15 DIAGNOSIS — N89.8 VAGINAL PRURITUS: ICD-10-CM

## 2019-11-15 DIAGNOSIS — N39.46 MIXED STRESS AND URGE URINARY INCONTINENCE: ICD-10-CM

## 2019-11-15 DIAGNOSIS — N95.2 VAGINAL ATROPHY: ICD-10-CM

## 2019-11-15 DIAGNOSIS — R87.629 ABNORMAL PAP SMEAR OF VAGINA: ICD-10-CM

## 2019-11-15 DIAGNOSIS — N90.89 VULVAR IRRITATION: Primary | ICD-10-CM

## 2019-11-15 PROCEDURE — 1101F PT FALLS ASSESS-DOCD LE1/YR: CPT | Mod: CPTII,S$GLB,, | Performed by: NURSE PRACTITIONER

## 2019-11-15 PROCEDURE — 99999 PR PBB SHADOW E&M-EST. PATIENT-LVL IV: CPT | Mod: PBBFAC,,, | Performed by: NURSE PRACTITIONER

## 2019-11-15 PROCEDURE — 1101F PR PT FALLS ASSESS DOC 0-1 FALLS W/OUT INJ PAST YR: ICD-10-PCS | Mod: CPTII,S$GLB,, | Performed by: NURSE PRACTITIONER

## 2019-11-15 PROCEDURE — 99214 OFFICE O/P EST MOD 30 MIN: CPT | Mod: S$GLB,,, | Performed by: NURSE PRACTITIONER

## 2019-11-15 PROCEDURE — 3078F DIAST BP <80 MM HG: CPT | Mod: CPTII,S$GLB,, | Performed by: NURSE PRACTITIONER

## 2019-11-15 PROCEDURE — 99214 PR OFFICE/OUTPT VISIT, EST, LEVL IV, 30-39 MIN: ICD-10-PCS | Mod: S$GLB,,, | Performed by: NURSE PRACTITIONER

## 2019-11-15 PROCEDURE — 99999 PR PBB SHADOW E&M-EST. PATIENT-LVL IV: ICD-10-PCS | Mod: PBBFAC,,, | Performed by: NURSE PRACTITIONER

## 2019-11-15 PROCEDURE — 3078F PR MOST RECENT DIASTOLIC BLOOD PRESSURE < 80 MM HG: ICD-10-PCS | Mod: CPTII,S$GLB,, | Performed by: NURSE PRACTITIONER

## 2019-11-15 PROCEDURE — 3075F PR MOST RECENT SYSTOLIC BLOOD PRESS GE 130-139MM HG: ICD-10-PCS | Mod: CPTII,S$GLB,, | Performed by: NURSE PRACTITIONER

## 2019-11-15 PROCEDURE — 3075F SYST BP GE 130 - 139MM HG: CPT | Mod: CPTII,S$GLB,, | Performed by: NURSE PRACTITIONER

## 2019-11-15 RX ORDER — ESTRADIOL 0.1 MG/G
CREAM VAGINAL
Qty: 42.5 G | Refills: 4 | Status: SHIPPED | OUTPATIENT
Start: 2019-11-15 | End: 2021-09-23 | Stop reason: SDUPTHER

## 2019-11-15 RX ORDER — CLOBETASOL PROPIONATE 0.5 MG/G
OINTMENT TOPICAL
Qty: 45 G | Refills: 3 | Status: SHIPPED | OUTPATIENT
Start: 2019-11-15 | End: 2020-09-16

## 2019-11-15 NOTE — PATIENT INSTRUCTIONS
1)  Mixed urinary incontinence, urge > stress:    --Empty bladder every 3 hours.  Empty well: wait a minute, lean forward on toilet.    --Avoid dietary irritants (see sheet).  Keep diary x 3-5 days to determine your irritants.  --KEGELS: do 10 in AM and 10 in PM, holding each x 10 seconds.  When you feel urge to go, STOP, KEGEL, and when urge has passed, then go to bathroom.    --URGE: consider anticholinergic.  Takes 2-4 weeks to see if will have effect.  For dry mouth: get sour, sugar free lozenge or gum.    --STRESS:  Pessary vs. Sling.      2)vulvar itching  --clobetasol every AM twice weekly  --estrogen cream every PM x  twice weekly     3)Vaginal atrophy (dryness):  Use 1 gram of estrogen cream in vagina twice a week thereafter.       4)--Nocturia (nighttime urination): stop fluids 2 hours before bed.  If have leg swelling:  Elevate feet above chest x 1 hour before bed to get excess fluid off.  Can also use support hose (knee highs).       5)RTC 1 year for follow up

## 2019-11-15 NOTE — PROGRESS NOTES
Urogyn follow up  11/15/2019  .  [facility] UROGYN [facility] [address]   [address] [address]    Marleny Guzman  [id number]  1950      Marleny Guzman is a 69 y.o.  here for a urogyn follow up for vulvar irritation.    Last HPI from 08/14/2019  1)  UI:  (+) RACHELLE < (+) UUI  X 2years.  (+) pads:4/day, usually severe wetness and 1/night usually minimum wetness.  Daytime frequency: Q 2 hours.  Nocturia: Yes: 3-4/night.  Does not limit fluids prior to bedtime.  (--) dysuria,  (--) hematuria,  (--) frequent UTIs.  (+) complete bladder emptying. Not using CPAP machine.     2)  POP:  Absent.  Symptoms:(--)  .  (--) vaginal bleeding. (--) vaginal discharge. (--) sexually active. (+)  Vaginal dryness.  (--) vaginal estrogen use.      3)  BM:  (--) constipation/straining.  (--) chronic diarrhea. (--) hematochezia.  (--) fecal incontinence.  (--) fecal smearing/urgency.  (+) complete evacuation.       4)vulvar itching  --has not used clobetasol-- did not seem to help     5)Vain  --followed by Dr. Mosley  --02/25/2019  No evidence of disease on today's exam.      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 Gonzalez. Vaginal pap smear 6/15/16 HSIL. She was referred for consult by Dr. Valles. 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 Noncompliant with vaginal estrogen cream as prescribed.      Follow up pap 1/30/17 HSIL. 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      Pap 7/24/17 ASCUS HPV 18+   Pap 1/22/18 LSIL, normal exam  Pap 8/2018 normal, normal exam.     Changes from last visit:  1)  Mixed urinary incontinence, urge > stress:    --denies sofía  --did not go to pelvic floor PT       2)vulvar itching  --mostly improved  --occasioanally has irritation     3)Vaginal atrophy (dryness):    --has not been using vaginal estrogen      4)pelvic floor weakness/dyssynergia  --did not go to pelvic floor PT      Past Medical History:   Diagnosis  Date    Abnormal Pap smear     Abnormal Pap smear of cervix     Abnormal Pap smear of vagina 7/20/2016    Allergy     Anemia     Anxiety     Cataract     Chronic bilateral low back pain without sciatica 3/13/2018    Depression     Depression with anxiety     Hypertension     Osteoarthritis 6/20/2013    Right rotator cuff tear     Sleep apnea     Cipap machine at home    Trouble in sleeping     Urinary incontinence     Leaks urine with laughing/ coughing/ sneezing       Past Surgical History:   Procedure Laterality Date    CATARACT EXTRACTION  6/18/12    OS    CATARACT EXTRACTION  7/16/12    OD    CERVIX SURGERY      Conization    COLONOSCOPY      COLONOSCOPY N/A 3/4/2016    Procedure: COLONOSCOPY;  Surgeon: Tenzin Thakkar MD;  Location: [address]);  Service: Endoscopy;  Laterality: N/A;    EYE SURGERY Bilateral     cataract    HYSTERECTOMY  5-12-15    LEEP      LUMBAR EPIDURAL INJECTION      OOPHORECTOMY      MI REMOVAL OF OVARY/TUBE(S)  5-12-15    SALPINGECTOMY Left     SHOULDER SURGERY Right        Current Outpatient Medications   Medication Sig    aspirin 81 MG Chew Take 81 mg by mouth once daily.    clobetasol 0.05% (TEMOVATE) 0.05 % Oint Use daily in AM twice weekly    escitalopram oxalate (LEXAPRO) 20 MG tablet 1.5 tablets daily    estradiol (ESTRACE) 0.01 % (0.1 mg/gram) vaginal cream Use 1 GM nightly vaginally and dime size amount on vulvatwice weekly    hydroCHLOROthiazide (HYDRODIURIL) 12.5 MG Tab Take 1 tablet (12.5 mg total) by mouth once daily.    irbesartan-hydrochlorothiazide (AVALIDE) 300-12.5 mg per tablet Take 1 tablet by mouth once daily.    LORazepam (ATIVAN) 0.5 MG tablet Half - one daily as needed for anxiety    metoprolol succinate (TOPROL-XL) 50 MG 24 hr tablet TAKE 1 TABLET BY MOUTH EVERY DAY    VITAMIN D2 50,000 unit capsule TAKE 1 CAPSULE (50,000 UNITS TOTAL) BY MOUTH EVERY 30 DAYS.    albuterol (PROVENTIL/VENTOLIN HFA) 90 mcg/actuation  inhaler Inhale 2 puffs into the lungs every 6 (six) hours as needed for Wheezing.    betamethasone dipropionate (DIPROLENE) 0.05 % ointment Use every AM x 2 weeks, then twice weekly (Patient not taking: Reported on 11/15/2019)    fluticasone (FLONASE ALLERGY RELIEF) 50 mcg/actuation nasal spray 1 spray (50 mcg total) by Each Nare route once daily. (Patient not taking: Reported on 11/15/2019)    guaiFENesin (MUCINEX) 600 mg 12 hr tablet Take 1 tablet (600 mg total) by mouth 2 (two) times daily. (Patient not taking: Reported on 11/15/2019)    loratadine (CLARITIN) 10 mg tablet Take 1 tablet (10 mg total) by mouth once daily.    nystatin-triamcinolone (MYCOLOG) ointment Apply topically 2 (two) times daily as needed. (Patient not taking: Reported on 11/15/2019)    promethazine (PHENERGAN) 6.25 mg/5 mL syrup Take 5 mLs (6.25 mg total) by mouth 4 (four) times daily as needed. (Patient not taking: Reported on 11/15/2019)    vit A-vit C-zinc-propolis (ZINC, WITH A AND C, LOZENGES) Lozg Take 1 lozenge by mouth every 2 (two) hours as needed. (Patient not taking: Reported on 11/15/2019)     No current facility-administered medications for this visit.        Well woman:  Pap test:09/2019 ASCUS + hpv-- followed by Dr. Mosley  History of abnormal paps: Yes - post hyst hx vain.  History of STIs:  No  Mammogram: Date of last: 01/2019.  Result: Normal  Colonoscopy: Date of last: 03/2016.  Result:  Melanosis in the colon.                        - The examined portion of the ileum was normal.                        - One 2 mm polyp in the descending colon. Resected                         and retrieved.                        - One 3 mm polyp in the sigmoid colon. Resected and                         retrieved..    Repeat due:  5 years.    DEXA:  Date of last: 05/2019    Result:  Elevated BMD of the lumbar spine.  Elevated BMD may be associated with elevated BMI.  RECOMMENDATIONS of [facility]  "Departments:  1.  Calcium 6052-9138 mg daily and vitamin D 800-1000 units daily, adequate exercise.  2.  Repeat BMD in 4-5 years    ROS:  As per HPI.      Exam  /60 (BP Location: Right arm, Patient Position: Sitting, BP Method: Large (Manual))   Ht 5' 10" (1.778 m)   Wt 93 kg (205 lb 0.4 oz)   BMI 29.42 kg/m²   General: alert and oriented, no acute distress  Respiratory: normal respiratory effort  Abd: soft, non-tender, non-distended    Pelvic  Ext. Genitalia: normal external genitalia. Normal bartholin's and skeens glands  Vagina: + atrophy. Normal vaginal mucosa without lesions. No discharge noted.   Non-tender bladder base without palpable mass.  Cervix: absent  Uterus:  absent   Urethra: no masses or tenderness  Urethral meatus: no lesions, caruncle or prolapse.      Impression  1. Vulvar irritation  estradiol (ESTRACE) 0.01 % (0.1 mg/gram) vaginal cream    clobetasol 0.05% (TEMOVATE) 0.05 % Oint   2. Vaginal atrophy  estradiol (ESTRACE) 0.01 % (0.1 mg/gram) vaginal cream   3. Vaginal pruritus  clobetasol 0.05% (TEMOVATE) 0.05 % Oint   4. Mixed stress and urge urinary incontinence     5. Abnormal Pap smear of vagina  estradiol (ESTRACE) 0.01 % (0.1 mg/gram) vaginal cream     We reviewed the above issues and discussed options for short-term versus long-term management of her problems.   Plan:   1)  Mixed urinary incontinence, urge > stress:    --Empty bladder every 3 hours.  Empty well: wait a minute, lean forward on toilet.    --Avoid dietary irritants (see sheet).  Keep diary x 3-5 days to determine your irritants.  --KEGELS: do 10 in AM and 10 in PM, holding each x 10 seconds.  When you feel urge to go, STOP, KEGEL, and when urge has passed, then go to bathroom.    --URGE: consider anticholinergic.  Takes 2-4 weeks to see if will have effect.  For dry mouth: get sour, sugar free lozenge or gum.    --STRESS:  Pessary vs. Sling.      2)vulvar itching  --clobetasol every AM twice weekly  --estrogen cream " every PM x  twice weekly     3)Vaginal atrophy (dryness):  Use 1 gram of estrogen cream in vagina twice a week thereafter.       4)--Nocturia (nighttime urination): stop fluids 2 hours before bed.  If have leg swelling:  Elevate feet above chest x 1 hour before bed to get excess fluid off.  Can also use support hose (knee highs).       5)RTC 1 year for follow up    30 minutes were spent in face to face time with this patient  90 % of this time was spent in counseling and/or coordination of care      MARGIE Bower-BC [facility]  Division of Female Pelvic Medicine and Reconstructive Surgery  Department of Obstetrics & Gynecology

## 2019-11-26 ENCOUNTER — OFFICE VISIT (OUTPATIENT)
Dept: INTERNAL MEDICINE | Facility: CLINIC | Age: 69
End: 2019-11-26
Payer: MEDICARE

## 2019-11-26 ENCOUNTER — HOSPITAL ENCOUNTER (OUTPATIENT)
Dept: RADIOLOGY | Facility: HOSPITAL | Age: 69
Discharge: HOME | End: 2019-11-26
Attending: INTERNAL MEDICINE
Payer: MEDICARE

## 2019-11-26 ENCOUNTER — IMMUNIZATION (OUTPATIENT)
Dept: INTERNAL MEDICINE | Facility: CLINIC | Age: 69
End: 2019-11-26
Payer: MEDICARE

## 2019-11-26 VITALS
TEMPERATURE: 99 F | HEART RATE: 70 BPM | DIASTOLIC BLOOD PRESSURE: 70 MMHG | BODY MASS INDEX: 29.03 KG/M2 | SYSTOLIC BLOOD PRESSURE: 130 MMHG | HEIGHT: 70 IN | OXYGEN SATURATION: 97 % | WEIGHT: 202.81 LBS

## 2019-11-26 DIAGNOSIS — M54.2 NECK PAIN: ICD-10-CM

## 2019-11-26 DIAGNOSIS — E55.9 MILD VITAMIN D DEFICIENCY: ICD-10-CM

## 2019-11-26 DIAGNOSIS — R73.9 HYPERGLYCEMIA: ICD-10-CM

## 2019-11-26 DIAGNOSIS — N39.0 URINARY TRACT INFECTION WITHOUT HEMATURIA, SITE UNSPECIFIED: Primary | ICD-10-CM

## 2019-11-26 DIAGNOSIS — I10 ESSENTIAL HYPERTENSION: Chronic | ICD-10-CM

## 2019-11-26 LAB
BILIRUB UR QL STRIP: NEGATIVE
CLARITY UR REFRACT.AUTO: CLEAR
COLOR UR AUTO: YELLOW
GLUCOSE UR QL STRIP: NEGATIVE
HGB UR QL STRIP: NEGATIVE
KETONES UR QL STRIP: NEGATIVE
LEUKOCYTE ESTERASE UR QL STRIP: NEGATIVE
NITRITE UR QL STRIP: NEGATIVE
PH UR STRIP: 7 [PH] (ref 5–8)
PROT UR QL STRIP: NEGATIVE
SP GR UR STRIP: 1.02 (ref 1–1.03)
URN SPEC COLLECT METH UR: NORMAL

## 2019-11-26 PROCEDURE — 72070 X-RAY EXAM THORAC SPINE 2VWS: CPT | Mod: TC

## 2019-11-26 PROCEDURE — 99214 PR OFFICE/OUTPT VISIT, EST, LEVL IV, 30-39 MIN: ICD-10-PCS | Mod: 25,S$GLB,, | Performed by: INTERNAL MEDICINE

## 2019-11-26 PROCEDURE — 90662 FLU VACCINE - HIGH DOSE (65+) PRESERVATIVE FREE IM: ICD-10-PCS | Mod: S$GLB,,, | Performed by: INTERNAL MEDICINE

## 2019-11-26 PROCEDURE — 99999 PR PBB SHADOW E&M-EST. PATIENT-LVL V: CPT | Mod: PBBFAC,,, | Performed by: INTERNAL MEDICINE

## 2019-11-26 PROCEDURE — 72040 XR CERVICAL SPINE AP LATERAL: ICD-10-PCS | Mod: 26,,, | Performed by: RADIOLOGY

## 2019-11-26 PROCEDURE — 99999 PR PBB SHADOW E&M-EST. PATIENT-LVL V: ICD-10-PCS | Mod: PBBFAC,,, | Performed by: INTERNAL MEDICINE

## 2019-11-26 PROCEDURE — 87086 URINE CULTURE/COLONY COUNT: CPT

## 2019-11-26 PROCEDURE — 3075F SYST BP GE 130 - 139MM HG: CPT | Mod: CPTII,S$GLB,, | Performed by: INTERNAL MEDICINE

## 2019-11-26 PROCEDURE — 81003 URINALYSIS AUTO W/O SCOPE: CPT

## 2019-11-26 PROCEDURE — G0008 ADMIN INFLUENZA VIRUS VAC: HCPCS | Mod: S$GLB,,, | Performed by: INTERNAL MEDICINE

## 2019-11-26 PROCEDURE — 1159F PR MEDICATION LIST DOCUMENTED IN MEDICAL RECORD: ICD-10-PCS | Mod: S$GLB,,, | Performed by: INTERNAL MEDICINE

## 2019-11-26 PROCEDURE — 1101F PR PT FALLS ASSESS DOC 0-1 FALLS W/OUT INJ PAST YR: ICD-10-PCS | Mod: CPTII,S$GLB,, | Performed by: INTERNAL MEDICINE

## 2019-11-26 PROCEDURE — 96372 THER/PROPH/DIAG INJ SC/IM: CPT | Mod: S$GLB,,, | Performed by: INTERNAL MEDICINE

## 2019-11-26 PROCEDURE — 1101F PT FALLS ASSESS-DOCD LE1/YR: CPT | Mod: CPTII,S$GLB,, | Performed by: INTERNAL MEDICINE

## 2019-11-26 PROCEDURE — 72070 X-RAY EXAM THORAC SPINE 2VWS: CPT | Mod: 26,,, | Performed by: RADIOLOGY

## 2019-11-26 PROCEDURE — 1125F AMNT PAIN NOTED PAIN PRSNT: CPT | Mod: S$GLB,,, | Performed by: INTERNAL MEDICINE

## 2019-11-26 PROCEDURE — 1125F PR PAIN SEVERITY QUANTIFIED, PAIN PRESENT: ICD-10-PCS | Mod: S$GLB,,, | Performed by: INTERNAL MEDICINE

## 2019-11-26 PROCEDURE — 72040 X-RAY EXAM NECK SPINE 2-3 VW: CPT | Mod: 26,,, | Performed by: RADIOLOGY

## 2019-11-26 PROCEDURE — 72040 X-RAY EXAM NECK SPINE 2-3 VW: CPT | Mod: TC

## 2019-11-26 PROCEDURE — G0008 FLU VACCINE - HIGH DOSE (65+) PRESERVATIVE FREE IM: ICD-10-PCS | Mod: S$GLB,,, | Performed by: INTERNAL MEDICINE

## 2019-11-26 PROCEDURE — 99214 OFFICE O/P EST MOD 30 MIN: CPT | Mod: 25,S$GLB,, | Performed by: INTERNAL MEDICINE

## 2019-11-26 PROCEDURE — 3078F PR MOST RECENT DIASTOLIC BLOOD PRESSURE < 80 MM HG: ICD-10-PCS | Mod: CPTII,S$GLB,, | Performed by: INTERNAL MEDICINE

## 2019-11-26 PROCEDURE — 72070 XR THORACIC SPINE AP LATERAL: ICD-10-PCS | Mod: 26,,, | Performed by: RADIOLOGY

## 2019-11-26 PROCEDURE — 96372 PR INJECTION,THERAP/PROPH/DIAG2ST, IM OR SUBCUT: ICD-10-PCS | Mod: S$GLB,,, | Performed by: INTERNAL MEDICINE

## 2019-11-26 PROCEDURE — 3078F DIAST BP <80 MM HG: CPT | Mod: CPTII,S$GLB,, | Performed by: INTERNAL MEDICINE

## 2019-11-26 PROCEDURE — 3075F PR MOST RECENT SYSTOLIC BLOOD PRESS GE 130-139MM HG: ICD-10-PCS | Mod: CPTII,S$GLB,, | Performed by: INTERNAL MEDICINE

## 2019-11-26 PROCEDURE — 90662 IIV NO PRSV INCREASED AG IM: CPT | Mod: S$GLB,,, | Performed by: INTERNAL MEDICINE

## 2019-11-26 PROCEDURE — 1159F MED LIST DOCD IN RCRD: CPT | Mod: S$GLB,,, | Performed by: INTERNAL MEDICINE

## 2019-11-26 RX ORDER — LORAZEPAM 0.5 MG/1
TABLET ORAL
Qty: 30 TABLET | Refills: 0 | Status: SHIPPED | OUTPATIENT
Start: 2019-11-26 | End: 2020-02-20 | Stop reason: SDUPTHER

## 2019-11-26 RX ORDER — TRIAMCINOLONE ACETONIDE 40 MG/ML
40 INJECTION, SUSPENSION INTRA-ARTICULAR; INTRAMUSCULAR
Status: COMPLETED | OUTPATIENT
Start: 2019-11-26 | End: 2019-11-26

## 2019-11-26 RX ORDER — MELOXICAM 15 MG/1
15 TABLET ORAL DAILY
Qty: 15 TABLET | Refills: 0 | Status: ON HOLD | OUTPATIENT
Start: 2019-11-26 | End: 2020-10-13 | Stop reason: HOSPADM

## 2019-11-26 RX ORDER — TIZANIDINE 2 MG/1
TABLET ORAL
Qty: 20 TABLET | Refills: 0 | Status: SHIPPED | OUTPATIENT
Start: 2019-11-26 | End: 2020-02-20

## 2019-11-26 RX ADMIN — TRIAMCINOLONE ACETONIDE 40 MG: 40 INJECTION, SUSPENSION INTRA-ARTICULAR; INTRAMUSCULAR at 01:11

## 2019-11-28 LAB
BACTERIA UR CULT: NORMAL
BACTERIA UR CULT: NORMAL

## 2019-12-02 ENCOUNTER — TELEPHONE (OUTPATIENT)
Dept: INTERNAL MEDICINE | Facility: CLINIC | Age: 69
End: 2019-12-02

## 2019-12-02 DIAGNOSIS — E78.5 HYPERLIPIDEMIA, UNSPECIFIED HYPERLIPIDEMIA TYPE: ICD-10-CM

## 2019-12-02 DIAGNOSIS — R73.9 HYPERGLYCEMIA: ICD-10-CM

## 2019-12-02 DIAGNOSIS — I10 ESSENTIAL HYPERTENSION: Primary | Chronic | ICD-10-CM

## 2019-12-02 DIAGNOSIS — E55.9 MILD VITAMIN D DEFICIENCY: ICD-10-CM

## 2019-12-02 NOTE — PROGRESS NOTES
Subjective:       Patient ID: Marleny Guzman is a 69 y.o. female.    Chief Complaint: Follow-up    HPIPt with increased neck and R arm pain. No numbness or weakness. No bowel or bladder incontinence.  No CP or SOB.  Review of Systems   Respiratory: Negative for shortness of breath (PND or orthopnea).    Cardiovascular: Negative for chest pain (arm pain or jaw pain).   Gastrointestinal: Negative for abdominal pain, diarrhea, nausea and vomiting.   Genitourinary: Negative for dysuria.   Neurological: Negative for seizures, syncope and headaches.       Objective:      Physical Exam   Constitutional: She is oriented to person, place, and time. She appears well-developed and well-nourished. No distress.   HENT:   Head: Normocephalic.   Mouth/Throat: Oropharynx is clear and moist.   Neck: Neck supple. No JVD present. No thyromegaly present.   Cardiovascular: Normal rate, regular rhythm, normal heart sounds and intact distal pulses. Exam reveals no gallop and no friction rub.   No murmur heard.  Pulmonary/Chest: Effort normal and breath sounds normal. She has no wheezes. She has no rales.   Abdominal: Soft. Bowel sounds are normal. She exhibits no distension and no mass. There is no tenderness. There is no rebound and no guarding.   Musculoskeletal: She exhibits no edema.   5/5 strength in BUE, 2+reflexes at biceps and brachioradialis bilaterally   Lymphadenopathy:     She has no cervical adenopathy.   Neurological: She is alert and oriented to person, place, and time. She has normal reflexes.   Skin: Skin is warm and dry.   Psychiatric: She has a normal mood and affect. Her behavior is normal. Judgment and thought content normal.       Assessment:       1. Urinary tract infection without hematuria, site unspecified    2. Hyperglycemia    3. Essential hypertension    4. Mild vitamin D deficiency    5. Neck pain        Plan:   Urinary tract infection without hematuria, site unspecified  -     Urinalysis  -     Urine  culture    Hyperglycemia  -     Hemoglobin A1c; Future; Expected date: 11/26/2019    Essential hypertension  -     TSH; Future; Expected date: 11/26/2019  -     Comprehensive metabolic panel; Future; Expected date: 11/26/2019  Controlled - continue current meds    Mild vitamin D deficiency  -     Vitamin D; Future; Expected date: 11/26/2019    Neck pain  -     X-Ray Cervical Spine AP And Lateral; Future; Expected date: 11/26/2019  -     X-Ray Thoracic Spine AP Lateral; Future; Expected date: 11/26/2019  -     triamcinolone acetonide injection 40 mg  -     meloxicam (MOBIC) 15 MG tablet; Take 1 tablet (15 mg total) by mouth once daily.  Dispense: 15 tablet; Refill: 0  -     tiZANidine (ZANAFLEX) 2 MG tablet; One at bedtime as needed for neck pain  Dispense: 20 tablet; Refill: 0  If sx do not iris over next 2 weeks - do MRI  Anxiety  -     LORazepam (ATIVAN) 0.5 MG tablet; Half - one daily as needed for anxiety  Dispense: 30 tablet; Refill: 0

## 2019-12-02 NOTE — TELEPHONE ENCOUNTER
----- Message from Bessie Gaston sent at 12/2/2019 10:20 AM CST -----  Contact: Self [phone]  Patient is returning a phone call.  Who left a message for the patient: Unknown  Does patient know what this is regarding:  NORMA full  Comments:

## 2019-12-04 ENCOUNTER — TELEPHONE (OUTPATIENT)
Dept: UROGYNECOLOGY | Facility: CLINIC | Age: 69
End: 2019-12-04

## 2019-12-04 NOTE — TELEPHONE ENCOUNTER
"----- Message from Guillermo White sent at 12/4/2019  2:46 PM CST -----  Contact: DOMINICK SENA [[id number]]  Name of Who is Calling:       What is the request in detail: Pt is requesting a call back from clinical team in regard to   clobetasol 0.05% (TEMOVATE) 0.05 % Oint.Pt state medication is 300 dollars an she can"t afford it right Now  Please contact to further discuss and advise.          Can the clinic reply by [facility]:       What Number to [facility] Back [facility]: [phone]                                    "

## 2019-12-05 ENCOUNTER — TELEPHONE (OUTPATIENT)
Dept: INTERNAL MEDICINE | Facility: CLINIC | Age: 69
End: 2019-12-05

## 2020-01-31 ENCOUNTER — HOSPITAL ENCOUNTER (OUTPATIENT)
Facility: HOSPITAL | Age: 70
Discharge: HOME | End: 2020-02-02
Attending: EMERGENCY MEDICINE | Admitting: EMERGENCY MEDICINE
Payer: MEDICARE

## 2020-01-31 DIAGNOSIS — S01.111A LACERATION OF RIGHT EYEBROW, INITIAL ENCOUNTER: ICD-10-CM

## 2020-01-31 DIAGNOSIS — R79.89 ELEVATED TROPONIN: ICD-10-CM

## 2020-01-31 DIAGNOSIS — R55 SYNCOPE: Primary | ICD-10-CM

## 2020-01-31 DIAGNOSIS — R40.20 LOSS OF CONSCIOUSNESS: ICD-10-CM

## 2020-01-31 DIAGNOSIS — R65.10 SIRS (SYSTEMIC INFLAMMATORY RESPONSE SYNDROME): ICD-10-CM

## 2020-01-31 DIAGNOSIS — S09.90XA TRAUMATIC INJURY OF HEAD, INITIAL ENCOUNTER: ICD-10-CM

## 2020-01-31 DIAGNOSIS — D72.829 LEUKOCYTOSIS, UNSPECIFIED TYPE: ICD-10-CM

## 2020-01-31 LAB
ALBUMIN SERPL BCP-MCNC: 3.2 G/DL (ref 3.5–5.2)
ALP SERPL-CCNC: 129 U/L (ref 55–135)
ALT SERPL W/O P-5'-P-CCNC: 21 U/L (ref 10–44)
ANION GAP SERPL CALC-SCNC: 11 MMOL/L (ref 8–16)
AST SERPL-CCNC: 22 U/L (ref 10–40)
BACTERIA #/AREA URNS AUTO: ABNORMAL /HPF
BASOPHILS # BLD AUTO: 0.03 K/UL (ref 0–0.2)
BASOPHILS NFR BLD: 0.2 % (ref 0–1.9)
BILIRUB SERPL-MCNC: 0.5 MG/DL (ref 0.1–1)
BILIRUB UR QL STRIP: NEGATIVE
BUN SERPL-MCNC: 17 MG/DL (ref 8–23)
CALCIUM SERPL-MCNC: 9.4 MG/DL (ref 8.7–10.5)
CHLORIDE SERPL-SCNC: 102 MMOL/L (ref 95–110)
CLARITY UR REFRACT.AUTO: ABNORMAL
CO2 SERPL-SCNC: 26 MMOL/L (ref 23–29)
COLOR UR AUTO: ABNORMAL
CREAT SERPL-MCNC: 1 MG/DL (ref 0.5–1.4)
D DIMER PPP IA.FEU-MCNC: 1.29 MG/L FEU
DIFFERENTIAL METHOD: ABNORMAL
EOSINOPHIL # BLD AUTO: 0.3 K/UL (ref 0–0.5)
EOSINOPHIL NFR BLD: 1.5 % (ref 0–8)
ERYTHROCYTE [DISTWIDTH] IN BLOOD BY AUTOMATED COUNT: 15.3 % (ref 11.5–14.5)
EST. GFR  (AFRICAN AMERICAN): >60 ML/MIN/1.73 M^2
EST. GFR  (NON AFRICAN AMERICAN): 57.2 ML/MIN/1.73 M^2
GLUCOSE SERPL-MCNC: 144 MG/DL (ref 70–110)
GLUCOSE UR QL STRIP: NEGATIVE
HCT VFR BLD AUTO: 37.7 % (ref 37–48.5)
HGB BLD-MCNC: 12 G/DL (ref 12–16)
HGB UR QL STRIP: NEGATIVE
HYALINE CASTS UR QL AUTO: 28 /LPF
IMM GRANULOCYTES # BLD AUTO: 0.06 K/UL (ref 0–0.04)
IMM GRANULOCYTES NFR BLD AUTO: 0.3 % (ref 0–0.5)
INR PPP: 1.1 (ref 0.8–1.2)
KETONES UR QL STRIP: NEGATIVE
LEUKOCYTE ESTERASE UR QL STRIP: NEGATIVE
LYMPHOCYTES # BLD AUTO: 0.6 K/UL (ref 1–4.8)
LYMPHOCYTES NFR BLD: 3.2 % (ref 18–48)
MCH RBC QN AUTO: 28.4 PG (ref 27–31)
MCHC RBC AUTO-ENTMCNC: 31.8 G/DL (ref 32–36)
MCV RBC AUTO: 89 FL (ref 82–98)
MICROSCOPIC COMMENT: ABNORMAL
MONOCYTES # BLD AUTO: 0.7 K/UL (ref 0.3–1)
MONOCYTES NFR BLD: 3.4 % (ref 4–15)
NEUTROPHILS # BLD AUTO: 17.8 K/UL (ref 1.8–7.7)
NEUTROPHILS NFR BLD: 91.4 % (ref 38–73)
NITRITE UR QL STRIP: NEGATIVE
NRBC BLD-RTO: 0 /100 WBC
PH UR STRIP: 7 [PH] (ref 5–8)
PLATELET # BLD AUTO: 432 K/UL (ref 150–350)
PMV BLD AUTO: 10.4 FL (ref 9.2–12.9)
POCT GLUCOSE: 107 MG/DL (ref 70–110)
POTASSIUM SERPL-SCNC: 3.5 MMOL/L (ref 3.5–5.1)
PROT SERPL-MCNC: 7.7 G/DL (ref 6–8.4)
PROT UR QL STRIP: ABNORMAL
PROTHROMBIN TIME: 11.2 SEC (ref 9–12.5)
RBC # BLD AUTO: 4.22 M/UL (ref 4–5.4)
RBC #/AREA URNS AUTO: 4 /HPF (ref 0–4)
SODIUM SERPL-SCNC: 139 MMOL/L (ref 136–145)
SP GR UR STRIP: 1.01 (ref 1–1.03)
SQUAMOUS #/AREA URNS AUTO: 10 /HPF
TROPONIN I SERPL DL<=0.01 NG/ML-MCNC: 0.17 NG/ML (ref 0–0.03)
TSH SERPL DL<=0.005 MIU/L-ACNC: 1.92 UIU/ML (ref 0.4–4)
URN SPEC COLLECT METH UR: ABNORMAL
WBC # BLD AUTO: 19.43 K/UL (ref 3.9–12.7)
WBC #/AREA URNS AUTO: 6 /HPF (ref 0–5)

## 2020-01-31 PROCEDURE — 63600175 PHARM REV CODE 636 W HCPCS: Performed by: STUDENT IN AN ORGANIZED HEALTH CARE EDUCATION/TRAINING PROGRAM

## 2020-01-31 PROCEDURE — 85379 FIBRIN DEGRADATION QUANT: CPT

## 2020-01-31 PROCEDURE — 93010 ELECTROCARDIOGRAM REPORT: CPT | Mod: 76,,, | Performed by: INTERNAL MEDICINE

## 2020-01-31 PROCEDURE — 81001 URINALYSIS AUTO W/SCOPE: CPT

## 2020-01-31 PROCEDURE — 96360 HYDRATION IV INFUSION INIT: CPT | Mod: 59

## 2020-01-31 PROCEDURE — 93005 ELECTROCARDIOGRAM TRACING: CPT

## 2020-01-31 PROCEDURE — 85025 COMPLETE CBC W/AUTO DIFF WBC: CPT

## 2020-01-31 PROCEDURE — 99285 EMERGENCY DEPT VISIT HI MDM: CPT | Mod: 25

## 2020-01-31 PROCEDURE — 25500020 PHARM REV CODE 255: Performed by: EMERGENCY MEDICINE

## 2020-01-31 PROCEDURE — G0378 HOSPITAL OBSERVATION PER HR: HCPCS

## 2020-01-31 PROCEDURE — 96361 HYDRATE IV INFUSION ADD-ON: CPT

## 2020-01-31 PROCEDURE — 85610 PROTHROMBIN TIME: CPT

## 2020-01-31 PROCEDURE — 84443 ASSAY THYROID STIM HORMONE: CPT

## 2020-01-31 PROCEDURE — 82962 GLUCOSE BLOOD TEST: CPT

## 2020-01-31 PROCEDURE — 84484 ASSAY OF TROPONIN QUANT: CPT

## 2020-01-31 PROCEDURE — 93010 ELECTROCARDIOGRAM REPORT: CPT | Mod: ,,, | Performed by: INTERNAL MEDICINE

## 2020-01-31 PROCEDURE — 80053 COMPREHEN METABOLIC PANEL: CPT

## 2020-01-31 PROCEDURE — 93010 EKG 12-LEAD: ICD-10-PCS | Mod: ,,, | Performed by: INTERNAL MEDICINE

## 2020-01-31 PROCEDURE — 99285 PR EMERGENCY DEPT VISIT,LEVEL V: ICD-10-PCS | Mod: ,,, | Performed by: EMERGENCY MEDICINE

## 2020-01-31 PROCEDURE — 99285 EMERGENCY DEPT VISIT HI MDM: CPT | Mod: ,,, | Performed by: EMERGENCY MEDICINE

## 2020-01-31 RX ORDER — BACITRACIN ZINC 500 UNIT/G
OINTMENT IN PACKET (EA) TOPICAL
Status: COMPLETED | OUTPATIENT
Start: 2020-01-31 | End: 2020-02-01

## 2020-01-31 RX ORDER — LIDOCAINE HYDROCHLORIDE 10 MG/ML
10 INJECTION INFILTRATION; PERINEURAL
Status: DISCONTINUED | OUTPATIENT
Start: 2020-01-31 | End: 2020-02-01

## 2020-01-31 RX ORDER — SODIUM CHLORIDE 0.9 % (FLUSH) 0.9 %
10 SYRINGE (ML) INJECTION
Status: DISCONTINUED | OUTPATIENT
Start: 2020-01-31 | End: 2020-02-02 | Stop reason: HOSPADM

## 2020-01-31 RX ADMIN — IOHEXOL 100 ML: 350 INJECTION, SOLUTION INTRAVENOUS at 06:01

## 2020-01-31 RX ADMIN — SODIUM CHLORIDE, SODIUM LACTATE, POTASSIUM CHLORIDE, AND CALCIUM CHLORIDE 1000 ML: .6; .31; .03; .02 INJECTION, SOLUTION INTRAVENOUS at 05:01

## 2020-01-31 NOTE — ED TRIAGE NOTES
Marleny Guzman, a 69 y.o. female presents to the ED w/ complaint of syncopal episode at home and hit the right temple and pt is not on blood thinner.  Pt c/o right sided CP that radiates to the neck and down the arm.  Pt endorse SOB for the last 3-6 months.  Pt denies fever but states chills since arrival.  No n/v, dysuria and constipation.      Triage note:  Chief Complaint   Patient presents with    Fatigue     Pt with generalized weakness x one week, syncopal episode today, lac to head (bleeding controlled). Patient denies anticoagulants.    Fall    Loss of Consciousness     Review of patient's allergies indicates:  No Known Allergies  Past Medical History:   Diagnosis Date    Abnormal Pap smear     Abnormal Pap smear of cervix     Abnormal Pap smear of vagina 7/20/2016    Allergy     Anemia     Anxiety     Cataract     Chronic bilateral low back pain without sciatica 3/13/2018    Depression     Depression with anxiety     Hypertension     Osteoarthritis 6/20/2013    Right rotator cuff tear     Sleep apnea     Cipap machine at home    Trouble in sleeping     Urinary incontinence     Leaks urine with laughing/ coughing/ sneezing

## 2020-01-31 NOTE — ED NOTES
Patient identifiers verified and correct for Marleny Guzman.    LOC: The patient is awake, alert and aware of environment with an appropriate affect, the patient is oriented x 3 and speaking appropriately.  APPEARANCE: Patient resting comfortably and in no acute distress, patient is clean and well groomed, patient's clothing is properly fastened.  SKIN: The skin is warm and dry, color consistent with ethnicity, patient has normal skin turgor and moist mucus membranes, skin intact, no breakdown or bruising noted. Lac to the right eyebrow.   MUSCULOSKELETAL: Patient moving all extremities spontaneously, no obvious swelling or deformities noted. + generalized weakness and fatigue  RESPIRATORY: Airway is open and patent, respirations are spontaneous, patient has a normal effort and rate, no accessory muscle use noted, bilateral breath sounds clear but endorses SOB at rest and exertion.   CARDIAC: Patient has a normal rate and regular rhythm, no periphreal edema noted, capillary refill < 3 seconds.  Right sided chest pain that radiates to the neck and down the arm.   ABDOMEN: Soft and non tender to palpation, no distention noted, normoactive bowel sounds present in all four quadrants.  NEUROLOGIC: Eyes open spontaneously, behavior appropriate to situation, follows commands.  +syncopal episode.

## 2020-02-01 PROBLEM — R65.10 SIRS (SYSTEMIC INFLAMMATORY RESPONSE SYNDROME): Status: ACTIVE | Noted: 2020-02-01

## 2020-02-01 PROBLEM — D72.829 LEUKOCYTOSIS: Status: ACTIVE | Noted: 2020-02-01

## 2020-02-01 LAB
BASOPHILS # BLD AUTO: 0.07 K/UL (ref 0–0.2)
BASOPHILS NFR BLD: 0.5 % (ref 0–1.9)
DIFFERENTIAL METHOD: ABNORMAL
EOSINOPHIL # BLD AUTO: 1.3 K/UL (ref 0–0.5)
EOSINOPHIL NFR BLD: 8.6 % (ref 0–8)
ERYTHROCYTE [DISTWIDTH] IN BLOOD BY AUTOMATED COUNT: 15.3 % (ref 11.5–14.5)
HCT VFR BLD AUTO: 32.6 % (ref 37–48.5)
HGB BLD-MCNC: 10.6 G/DL (ref 12–16)
IMM GRANULOCYTES # BLD AUTO: 0.07 K/UL (ref 0–0.04)
IMM GRANULOCYTES NFR BLD AUTO: 0.5 % (ref 0–0.5)
INFLUENZA A, MOLECULAR: NEGATIVE
INFLUENZA B, MOLECULAR: NEGATIVE
LACTATE SERPL-SCNC: 0.8 MMOL/L (ref 0.5–2.2)
LYMPHOCYTES # BLD AUTO: 1.6 K/UL (ref 1–4.8)
LYMPHOCYTES NFR BLD: 10.9 % (ref 18–48)
MCH RBC QN AUTO: 28.2 PG (ref 27–31)
MCHC RBC AUTO-ENTMCNC: 32.5 G/DL (ref 32–36)
MCV RBC AUTO: 87 FL (ref 82–98)
MONOCYTES # BLD AUTO: 0.9 K/UL (ref 0.3–1)
MONOCYTES NFR BLD: 5.8 % (ref 4–15)
NEUTROPHILS # BLD AUTO: 11.1 K/UL (ref 1.8–7.7)
NEUTROPHILS NFR BLD: 73.7 % (ref 38–73)
NRBC BLD-RTO: 0 /100 WBC
PLATELET # BLD AUTO: 385 K/UL (ref 150–350)
PMV BLD AUTO: 10.2 FL (ref 9.2–12.9)
PROCALCITONIN SERPL IA-MCNC: 1.06 NG/ML
RBC # BLD AUTO: 3.76 M/UL (ref 4–5.4)
SPECIMEN SOURCE: NORMAL
TROPONIN I SERPL DL<=0.01 NG/ML-MCNC: 0.25 NG/ML (ref 0–0.03)
TROPONIN I SERPL DL<=0.01 NG/ML-MCNC: 0.26 NG/ML (ref 0–0.03)
WBC # BLD AUTO: 15.1 K/UL (ref 3.9–12.7)

## 2020-02-01 PROCEDURE — G0378 HOSPITAL OBSERVATION PER HR: HCPCS

## 2020-02-01 PROCEDURE — 93005 ELECTROCARDIOGRAM TRACING: CPT

## 2020-02-01 PROCEDURE — 99226 PR SUBSEQUENT OBSERVATION CARE,LEVEL III: CPT | Mod: ,,, | Performed by: PHYSICIAN ASSISTANT

## 2020-02-01 PROCEDURE — 87651 STREP A DNA AMP PROBE: CPT

## 2020-02-01 PROCEDURE — 99226 PR SUBSEQUENT OBSERVATION CARE,LEVEL III: ICD-10-PCS | Mod: ,,, | Performed by: PHYSICIAN ASSISTANT

## 2020-02-01 PROCEDURE — 84145 PROCALCITONIN (PCT): CPT

## 2020-02-01 PROCEDURE — 25000003 PHARM REV CODE 250: Performed by: PHYSICIAN ASSISTANT

## 2020-02-01 PROCEDURE — 99900035 HC TECH TIME PER 15 MIN (STAT)

## 2020-02-01 PROCEDURE — 87040 BLOOD CULTURE FOR BACTERIA: CPT | Mod: 59

## 2020-02-01 PROCEDURE — 85025 COMPLETE CBC W/AUTO DIFF WBC: CPT

## 2020-02-01 PROCEDURE — 25000003 PHARM REV CODE 250: Performed by: STUDENT IN AN ORGANIZED HEALTH CARE EDUCATION/TRAINING PROGRAM

## 2020-02-01 PROCEDURE — 27000190 HC CPAP FULL FACE MASK W/VALVE

## 2020-02-01 PROCEDURE — 83605 ASSAY OF LACTIC ACID: CPT

## 2020-02-01 PROCEDURE — 93010 ELECTROCARDIOGRAM REPORT: CPT | Mod: ,,, | Performed by: INTERNAL MEDICINE

## 2020-02-01 PROCEDURE — 63600175 PHARM REV CODE 636 W HCPCS: Performed by: STUDENT IN AN ORGANIZED HEALTH CARE EDUCATION/TRAINING PROGRAM

## 2020-02-01 PROCEDURE — 96361 HYDRATE IV INFUSION ADD-ON: CPT

## 2020-02-01 PROCEDURE — 87502 INFLUENZA DNA AMP PROBE: CPT

## 2020-02-01 PROCEDURE — 63600175 PHARM REV CODE 636 W HCPCS: Performed by: PHYSICIAN ASSISTANT

## 2020-02-01 PROCEDURE — 94660 CPAP INITIATION&MGMT: CPT

## 2020-02-01 PROCEDURE — 90471 IMMUNIZATION ADMIN: CPT | Performed by: STUDENT IN AN ORGANIZED HEALTH CARE EDUCATION/TRAINING PROGRAM

## 2020-02-01 PROCEDURE — 90715 TDAP VACCINE 7 YRS/> IM: CPT | Performed by: STUDENT IN AN ORGANIZED HEALTH CARE EDUCATION/TRAINING PROGRAM

## 2020-02-01 PROCEDURE — 93010 EKG 12-LEAD: ICD-10-PCS | Mod: ,,, | Performed by: INTERNAL MEDICINE

## 2020-02-01 PROCEDURE — 84484 ASSAY OF TROPONIN QUANT: CPT

## 2020-02-01 RX ORDER — DEXTROSE MONOHYDRATE 100 MG/ML
25 INJECTION, SOLUTION INTRAVENOUS
Status: DISCONTINUED | OUTPATIENT
Start: 2020-02-01 | End: 2020-02-02 | Stop reason: HOSPADM

## 2020-02-01 RX ORDER — POLYETHYLENE GLYCOL 3350 17 G/17G
17 POWDER, FOR SOLUTION ORAL DAILY
Status: DISCONTINUED | OUTPATIENT
Start: 2020-02-01 | End: 2020-02-02 | Stop reason: HOSPADM

## 2020-02-01 RX ORDER — IPRATROPIUM BROMIDE AND ALBUTEROL SULFATE 2.5; .5 MG/3ML; MG/3ML
3 SOLUTION RESPIRATORY (INHALATION) EVERY 4 HOURS PRN
Status: DISCONTINUED | OUTPATIENT
Start: 2020-02-01 | End: 2020-02-02 | Stop reason: HOSPADM

## 2020-02-01 RX ORDER — METOPROLOL SUCCINATE 25 MG/1
50 TABLET, EXTENDED RELEASE ORAL DAILY
Status: DISCONTINUED | OUTPATIENT
Start: 2020-02-01 | End: 2020-02-01

## 2020-02-01 RX ORDER — METOPROLOL SUCCINATE 25 MG/1
25 TABLET, EXTENDED RELEASE ORAL DAILY
Status: DISCONTINUED | OUTPATIENT
Start: 2020-02-01 | End: 2020-02-02 | Stop reason: HOSPADM

## 2020-02-01 RX ORDER — ACETAMINOPHEN 325 MG/1
650 TABLET ORAL EVERY 4 HOURS PRN
Status: DISCONTINUED | OUTPATIENT
Start: 2020-02-01 | End: 2020-02-02 | Stop reason: HOSPADM

## 2020-02-01 RX ORDER — ONDANSETRON 4 MG/1
4 TABLET, ORALLY DISINTEGRATING ORAL EVERY 8 HOURS PRN
Status: DISCONTINUED | OUTPATIENT
Start: 2020-02-01 | End: 2020-02-02 | Stop reason: HOSPADM

## 2020-02-01 RX ORDER — LORAZEPAM 0.5 MG/1
0.5 TABLET ORAL DAILY PRN
Status: DISCONTINUED | OUTPATIENT
Start: 2020-02-01 | End: 2020-02-02 | Stop reason: HOSPADM

## 2020-02-01 RX ORDER — IBUPROFEN 200 MG
24 TABLET ORAL
Status: DISCONTINUED | OUTPATIENT
Start: 2020-02-01 | End: 2020-02-02 | Stop reason: HOSPADM

## 2020-02-01 RX ORDER — IBUPROFEN 200 MG
16 TABLET ORAL
Status: DISCONTINUED | OUTPATIENT
Start: 2020-02-01 | End: 2020-02-02 | Stop reason: HOSPADM

## 2020-02-01 RX ORDER — NAPROXEN SODIUM 220 MG/1
81 TABLET, FILM COATED ORAL DAILY
Status: DISCONTINUED | OUTPATIENT
Start: 2020-02-01 | End: 2020-02-02 | Stop reason: HOSPADM

## 2020-02-01 RX ORDER — AMOXICILLIN 250 MG
1 CAPSULE ORAL 2 TIMES DAILY PRN
Status: DISCONTINUED | OUTPATIENT
Start: 2020-02-01 | End: 2020-02-02 | Stop reason: HOSPADM

## 2020-02-01 RX ORDER — SODIUM CHLORIDE 9 MG/ML
INJECTION, SOLUTION INTRAVENOUS CONTINUOUS
Status: DISCONTINUED | OUTPATIENT
Start: 2020-02-01 | End: 2020-02-01

## 2020-02-01 RX ORDER — HYDROCODONE BITARTRATE AND ACETAMINOPHEN 5; 325 MG/1; MG/1
1 TABLET ORAL EVERY 6 HOURS PRN
Status: DISCONTINUED | OUTPATIENT
Start: 2020-02-01 | End: 2020-02-02 | Stop reason: HOSPADM

## 2020-02-01 RX ORDER — ERGOCALCIFEROL 1.25 MG/1
50000 CAPSULE ORAL ONCE
Status: COMPLETED | OUTPATIENT
Start: 2020-02-01 | End: 2020-02-01

## 2020-02-01 RX ORDER — ACETAMINOPHEN 325 MG/1
650 TABLET ORAL EVERY 8 HOURS PRN
Status: DISCONTINUED | OUTPATIENT
Start: 2020-02-01 | End: 2020-02-02 | Stop reason: HOSPADM

## 2020-02-01 RX ORDER — ACETAMINOPHEN 500 MG
1000 TABLET ORAL
Status: COMPLETED | OUTPATIENT
Start: 2020-02-01 | End: 2020-02-01

## 2020-02-01 RX ORDER — ESTRADIOL 0.1 MG/G
1 CREAM VAGINAL NIGHTLY
Status: DISCONTINUED | OUTPATIENT
Start: 2020-02-01 | End: 2020-02-01

## 2020-02-01 RX ORDER — TALC
6 POWDER (GRAM) TOPICAL NIGHTLY PRN
Status: DISCONTINUED | OUTPATIENT
Start: 2020-02-01 | End: 2020-02-02 | Stop reason: HOSPADM

## 2020-02-01 RX ORDER — HYDROCHLOROTHIAZIDE 12.5 MG/1
12.5 TABLET ORAL DAILY
Status: DISCONTINUED | OUTPATIENT
Start: 2020-02-01 | End: 2020-02-01

## 2020-02-01 RX ORDER — DEXTROSE MONOHYDRATE 100 MG/ML
12.5 INJECTION, SOLUTION INTRAVENOUS
Status: DISCONTINUED | OUTPATIENT
Start: 2020-02-01 | End: 2020-02-02 | Stop reason: HOSPADM

## 2020-02-01 RX ORDER — MELOXICAM 15 MG/1
15 TABLET ORAL DAILY
Status: DISCONTINUED | OUTPATIENT
Start: 2020-02-01 | End: 2020-02-02 | Stop reason: HOSPADM

## 2020-02-01 RX ORDER — GLUCAGON 1 MG
1 KIT INJECTION
Status: DISCONTINUED | OUTPATIENT
Start: 2020-02-01 | End: 2020-02-02 | Stop reason: HOSPADM

## 2020-02-01 RX ORDER — SODIUM CHLORIDE 0.9 % (FLUSH) 0.9 %
5 SYRINGE (ML) INJECTION
Status: DISCONTINUED | OUTPATIENT
Start: 2020-02-01 | End: 2020-02-02 | Stop reason: HOSPADM

## 2020-02-01 RX ADMIN — CLOSTRIDIUM TETANI TOXOID ANTIGEN (FORMALDEHYDE INACTIVATED), CORYNEBACTERIUM DIPHTHERIAE TOXOID ANTIGEN (FORMALDEHYDE INACTIVATED), BORDETELLA PERTUSSIS TOXOID ANTIGEN (GLUTARALDEHYDE INACTIVATED), BORDETELLA PERTUSSIS FILAMENTOUS HEMAGGLUTININ ANTIGEN (FORMALDEHYDE INACTIVATED), BORDETELLA PERTUSSIS PERTACTIN ANTIGEN, AND BORDETELLA PERTUSSIS FIMBRIAE 2/3 ANTIGEN 0.5 ML: 5; 2; 2.5; 5; 3; 5 INJECTION, SUSPENSION INTRAMUSCULAR at 12:02

## 2020-02-01 RX ADMIN — SODIUM CHLORIDE, SODIUM LACTATE, POTASSIUM CHLORIDE, AND CALCIUM CHLORIDE 1000 ML: 600; 310; 30; 20 INJECTION, SOLUTION INTRAVENOUS at 07:02

## 2020-02-01 RX ADMIN — ERGOCALCIFEROL 50000 UNITS: 1.25 CAPSULE ORAL at 10:02

## 2020-02-01 RX ADMIN — ACETAMINOPHEN 650 MG: 325 TABLET ORAL at 09:02

## 2020-02-01 RX ADMIN — SODIUM CHLORIDE, SODIUM LACTATE, POTASSIUM CHLORIDE, AND CALCIUM CHLORIDE 1000 ML: 600; 310; 30; 20 INJECTION, SOLUTION INTRAVENOUS at 04:02

## 2020-02-01 RX ADMIN — ASPIRIN 81 MG CHEWABLE TABLET 81 MG: 81 TABLET CHEWABLE at 10:02

## 2020-02-01 RX ADMIN — BACITRACIN 1 EACH: 500 OINTMENT TOPICAL at 12:02

## 2020-02-01 RX ADMIN — ACETAMINOPHEN 1000 MG: 500 TABLET ORAL at 12:02

## 2020-02-01 RX ADMIN — METOPROLOL SUCCINATE 25 MG: 25 TABLET, EXTENDED RELEASE ORAL at 10:02

## 2020-02-01 RX ADMIN — ACETAMINOPHEN 650 MG: 325 TABLET ORAL at 02:02

## 2020-02-01 RX ADMIN — MELOXICAM 15 MG: 15 TABLET ORAL at 11:02

## 2020-02-01 NOTE — ED PROVIDER NOTES
[place] of Care Note  I assumed care of this patient from Dr. Jeffery at shift change pending CT PE study, CT head and CT max face. See initial note for full details.     In brief, this is a 69 y.o. F who presented on prior shift after a syncopal episode. On arrival was tachypneic to mid 20s, on workup was found to have elevated troponin to 0.165.     Physical Exam: ***    Shasta Medina MD  1/31/20, 7:30PM

## 2020-02-01 NOTE — H&P
ED Observation Unit  History and Physical      I assumed care of this patient from the Main ED at onset of observation time, 10:17 on 02/01/2020.       History of Present Illness:    70 yo female presenting to ER after unwitnessed syncopal episode today with reported 2 weeks of generalized weakness. History pertinent for weakness after dental procedure two weeks ago, syncope without preceded chest pain, palpitations, or shortness of breath. Syncopal episode preceded by vaso vagal symptoms of warmth, nausea, and lightheadedness. On arrival patient is AAO x3, speaking in full sentences, afebrile, normal HR, mildly hypertensive (secondary to not taking her antihypertensives this morning), small forehead laceration, right chest wall tenderness that follows costal margin to back, no signs of infection at site of tooth extraction, no focal neuro deficits, cardiopulmonary exam wnl, no extremity tenderness. EKG shows no ischemic changes. Positive lab findings of leukocytosis at 19 and elevated troponin 0.165. Plan to repeat troponin and monitor. Small forehead laceration repaired with dermabond.       I reviewed the ED Provider Note dated 1/31/20 prior to my evaluation of this patient.  I reviewed all labs and imaging performed in the Main ED, prior to patient being placed in Observation. Patient was placed in the ED Observation Unit for syncope, weakness, and elevated troponin.     PMHx   Past Medical History:   Diagnosis Date    Abnormal Pap smear     Abnormal Pap smear of cervix     Abnormal Pap smear of vagina 7/20/2016    Allergy     Anemia     Anxiety     Cataract     Chronic bilateral low back pain without sciatica 3/13/2018    Depression     Depression with anxiety     Hypertension     Osteoarthritis 6/20/2013    Right rotator cuff tear     Sleep apnea     Cipap machine at home    Trouble in sleeping     Urinary incontinence     Leaks urine with laughing/ coughing/ sneezing      Past Surgical  "History:   Procedure Laterality Date    CATARACT EXTRACTION  6/18/12    OS    CATARACT EXTRACTION  7/16/12    OD    CERVIX SURGERY      Conization    COLONOSCOPY      COLONOSCOPY N/A 3/4/2016    Procedure: COLONOSCOPY;  Surgeon: Tenzin Thakkar MD;  Location: [facility] ([address] [place] [address]);  Service: Endoscopy;  Laterality: N/A;    EYE SURGERY Bilateral     cataract    HYSTERECTOMY  5-12-15    LEEP      LUMBAR EPIDURAL INJECTION      OOPHORECTOMY      [place] REMOVAL OF OVARY/TUBE(S)  5-12-15    SALPINGECTOMY Left     SHOULDER SURGERY Right         Family Hx   Family History   Problem Relation Age of Onset    Hypertension Father     Cataracts Father     Cancer Mother         THYROID CANCER    Stroke Mother     Breast cancer Cousin     Heart disease Sister     Pacemaker/defibrilator Sister     Hypertension Sister     Deep vein thrombosis Sister     Heart disease Sister         CABG    Osteoarthritis Sister     Edema Sister     Eczema Sister     Hypertension Sister     Anuerysm Sister     Drug abuse Sister     Hypertension Sister     No Known Problems Brother     No Known Problems Sister     Cancer Sister         Cancer cells or "growth in her stomach"    No Known Problems Sister     Hypertension Daughter     Other Daughter         Heart palpitations    Depression Maternal Grandmother     Ulcers Maternal Grandfather         Legs    No Known Problems Paternal Grandmother     No Known Problems Paternal Grandfather     Cancer Maternal Aunt     ADD / ADHD Neg Hx     Alcohol abuse Neg Hx     Anxiety disorder Neg Hx     Bipolar disorder Neg Hx     Dementia Neg Hx     OCD Neg Hx     Paranoid behavior Neg Hx     Physical abuse Neg Hx     Schizophrenia Neg Hx     Seizures Neg Hx     Sexual abuse Neg Hx     Amblyopia Neg Hx     Blindness Neg Hx     Glaucoma Neg Hx     Macular degeneration Neg Hx     Retinal detachment Neg Hx     Strabismus Neg Hx         Social Hx   Social History "     Socioeconomic History    Marital status:      Spouse name: Not on file    Number of children: Not on file    Years of education: Not on file    Highest education level: Not on file   Occupational History    Occupation: Disability     Comment: Depression     Employer: DISABLED   Social Needs    Financial resource strain: Not on file    Food insecurity:     Worry: Not on file     Inability: Not on file    Transportation needs:     Medical: Not on file     Non-medical: Not on file   Tobacco Use    Smoking status: Former Smoker     Packs/day: 0.50     Years: 22.00     Pack years: 11.00     Types: Cigarettes     Last attempt to quit: 1998     Years since quittin.9    Smokeless tobacco: Never Used   Substance and Sexual Activity    Alcohol use: Yes     Alcohol/week: 0.0 standard drinks     Comment: Occassionally for social events will have a glass of wine    Drug use: No    Sexual activity: Not on file   Lifestyle    Physical activity:     Days per week: Not on file     Minutes per session: Not on file    Stress: Not on file   Relationships    Social connections:     Talks on phone: Not on file     Gets together: Not on file     Attends Mu-ism service: Not on file     Active member of club or organization: Not on file     Attends meetings of clubs or organizations: Not on file     Relationship status: Not on file   Other Topics Concern    Caffeine Use Yes    Financial Status: Disabled Yes    Legal: Involved in criminal litigation Not Asked    Caffeine Use: Frequent Not Asked    Financial Status: Employed Not Asked    Legal: Other Not Asked    Caffeine Use: Moderate Not Asked    Financial Status: Unemployed Not Asked    Leisure: Exercise Not Asked    Caffeine Use: Substantial Not Asked    Financial Status: Other Not Asked    Leisure: Fishing Not Asked    Childhood History: Adopted Not Asked    Firearms: Does patient have access to a firearm? No    Leisure: Hunting Not  Asked    Childhood History: Early trauma Not Asked    Home situation: homeless Not Asked    Leisure: Movie Watching Not Asked    Childhood History: Raised by parents Not Asked    Home situation: lives alone Yes    Leisure: Shopping Not Asked    Childhood History: Uneventful Not Asked    Home situation: lives in group home Not Asked    Leisure: Sports Not Asked    Childhood History: Other Not Asked    Home situation: lives in nursing home Not Asked    Leisure: Time with family Not Asked    Education: Unfinished High School Not Asked    Home situation: lives in shelter Not Asked     Service Not Asked    Education: High School Graduate Not Asked    Home situation: lives with family Not Asked    Spirituality: Active Participation Yes    Education: Unfinished college Yes    Home situation: lives with friends Not Asked    Spirituality: Organized Oriental orthodox Yes    Education: Trade School Not Asked    Home situation: lives with significant other Not Asked    Spirituality: Private Participation Not Asked    Education: Associate's Degree Not Asked    Home situation: lives with spouse Not Asked    Patient feels they ought to cut down on drinking/drug use Not Asked    Education: Bachelor's Degree Not Asked    Legal consequences of chemical use Not Asked    Patient annoyed by others criticizing their drinking/drug use Not Asked    Education: More than one Bachelor's or Professional Not Asked    Legal: Arrest history No    Patient has felt bad or guilty about drinking/drug use Not Asked    Education: Master's, PhD Not Asked    Legal: Involved in civil litigation Not Asked    Patient has had a drink/used drugs as an eye opener in the AM Not Asked   Social History Narrative    Marleny currently does operate an automobile.        Vital Signs   Vitals:    01/31/20 1732 01/31/20 1837 01/31/20 1932 01/31/20 2304   BP: (!) 148/68 (!) 150/66 (!) 156/66 (!) 151/67   BP Location:    Right leg    Patient Position:    Lying   Pulse: 91 92 91 96   Resp: 20 20 (!) 28 18   Temp:    98.8 °F (37.1 °C)   TempSrc:    Oral   SpO2: 98% 95% 100% 100%   Weight:       Height:            Review of Systems  Review of Systems   Constitutional: Positive for malaise/fatigue. Negative for chills, fever and weight loss.   HENT: Negative for congestion and sore throat.    Eyes: Negative for blurred vision, double vision, pain and redness.   Respiratory: Negative for cough, shortness of breath and wheezing.    Cardiovascular: Negative for chest pain, palpitations and leg swelling.   Gastrointestinal: Negative for blood in stool, diarrhea and vomiting.   Genitourinary: Negative for dysuria, flank pain and urgency.   Musculoskeletal: Negative for back pain, myalgias and neck pain.   Skin: Negative for rash.   Neurological: Positive for loss of consciousness. Negative for dizziness, sensory change, speech change, focal weakness, seizures and headaches.   Psychiatric/Behavioral: The patient is not nervous/anxious.        Physical Exam  Physical Exam   Constitutional: She is oriented to person, place, and time and well-developed, well-nourished, and in no distress. No distress.   HENT:   Head: Normocephalic.   Acute superficial laceration measuring approximately 1.5 cm to left brow closed with dermabond.  No swelling of face/jaw. No obvious dental abscess. No induration to oral floor. No drooling. No gingival edema, fluctuance, or purulent drainage. Normal oropharyngeal exam. No muffled voice.    Eyes: Pupils are equal, round, and reactive to light. Conjunctivae are normal. Right eye exhibits no discharge. Left eye exhibits no discharge.   Neck: Normal range of motion. Neck supple.   Cardiovascular: Normal rate, regular rhythm and intact distal pulses.   Pulmonary/Chest: Effort normal and breath sounds normal. No respiratory distress. She has no wheezes. She has no rales.   Abdominal: Soft. She exhibits no distension. There is no  tenderness. There is no rebound and no guarding.   Benign abdomen.    Musculoskeletal: Normal range of motion. She exhibits no edema or tenderness.   Neurological: She is alert and oriented to person, place, and time. Coordination normal. GCS score is 15.   Skin: Skin is warm and dry. She is not diaphoretic.   Psychiatric: Judgment normal.   Nursing note and vitals reviewed.      Medications:   Scheduled Meds:   bacitracin zinc   Topical (Top) ED 1 Time    lidocaine HCL 10 mg/ml (1%)  10 mL Infiltration ED 1 Time     Continuous Infusions:  PRN Meds:.sodium chloride 0.9%, DIPH,PERTUS (ADACEL),TETANUS PF VAC (ADULT)      Assessment/Plan:  Syncopal episodes, Leukocytosis, and generalized weakness of unknown etiology   CT head, face, and chest unremarkable   Initial Troponin elevated - will trend   Cardiology evaluated pt and completed bedside Echo   Will keep patient overnight for serial troponin and cardiac monitoring        Case was discussed with the ED provider, Dr Lauren

## 2020-02-01 NOTE — ASSESSMENT & PLAN NOTE
- Patient seen and examined  - Likely secondary to supply demand mismatch from suspected sepsis (WBC 19K Left shift subjective fevers, tooth etc)  - Would not trend further and would NOT treat for ACS

## 2020-02-01 NOTE — CONSULTS
[facility]  Cardiology  Consult Note    Patient Name: Marleny Guzman  MRN: [id number]  Admission Date: 1/31/2020  Hospital Length of Stay: 0 days  Code Status: No Order   Attending Provider: Geovany Panda MD   Consulting Provider: Rodolfo Galloway MD  Primary Care Physician: Bridget Cantu MD  Principal Problem:<principal problem not specified>    Patient information was obtained from patient and ER records.     Consults  Subjective:     Chief Complaint:  Syncope     HPI:   68 yo F with PMH of HTN, Anxiety, presenting with syncopal episode x 2. She has been having chills and subjective fevers since tooth fixed with draining from nose. 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 She has some right sided chest pain from falling and is tender on touch. CTA PE is -ve. Troponin 0.165, Bedside TTE 65% EF, IVC flat. , Tachycardic sinus. Non-specific TWI.     Past Medical History:   Diagnosis Date    Abnormal Pap smear     Abnormal Pap smear of cervix     Abnormal Pap smear of vagina 7/20/2016    Allergy     Anemia     Anxiety     Cataract     Chronic bilateral low back pain without sciatica 3/13/2018    Depression     Depression with anxiety     Hypertension     Osteoarthritis 6/20/2013    Right rotator cuff tear     Sleep apnea     Cipap machine at home    Trouble in sleeping     Urinary incontinence     Leaks urine with laughing/ coughing/ sneezing       Past Surgical History:   Procedure Laterality Date    CATARACT EXTRACTION  6/18/12    OS    CATARACT EXTRACTION  7/16/12    OD    CERVIX SURGERY      Conization    COLONOSCOPY      COLONOSCOPY N/A 3/4/2016    Procedure: COLONOSCOPY;  Surgeon: Tenzin Thakkar MD;  Location: [address];  Service: Endoscopy;  Laterality: N/A;    EYE SURGERY Bilateral     cataract    HYSTERECTOMY  5-12-15    LEEP      LUMBAR EPIDURAL INJECTION       OOPHORECTOMY      [place] REMOVAL OF OVARY/TUBE(S)  5-12-15    SALPINGECTOMY Left     SHOULDER SURGERY Right        Review of patient's allergies indicates:  No Known Allergies    No current facility-administered medications on file prior to encounter.      Current Outpatient Medications on File Prior to Encounter   Medication Sig    aspirin 81 MG Chew Take 81 mg by mouth once daily.    escitalopram oxalate (LEXAPRO) 20 MG tablet 1.5 tablets daily    estradiol (ESTRACE) 0.01 % (0.1 mg/gram) vaginal cream Use 1 GM nightly vaginally and dime size amount on vulvatwice weekly    hydroCHLOROthiazide (HYDRODIURIL) 12.5 MG Tab Take 1 tablet (12.5 mg total) by mouth once daily.    irbesartan-hydrochlorothiazide (AVALIDE) 300-12.5 mg per tablet Take 1 tablet by mouth once daily.    LORazepam (ATIVAN) 0.5 MG tablet Half - one daily as needed for anxiety    metoprolol succinate (TOPROL-XL) 50 MG 24 hr tablet TAKE 1 TABLET BY MOUTH EVERY DAY    tiZANidine (ZANAFLEX) 2 MG tablet One at bedtime as needed for neck pain    vit A-vit C-zinc-propolis (ZINC, WITH A AND C, LOZENGES) Lozg Take 1 lozenge by mouth every 2 (two) hours as needed.    VITAMIN D2 50,000 unit capsule TAKE 1 CAPSULE (50,000 UNITS TOTAL) BY MOUTH EVERY 30 DAYS.    albuterol (PROVENTIL/VENTOLIN HFA) 90 mcg/actuation inhaler Inhale 2 puffs into the lungs every 6 (six) hours as needed for Wheezing. (Patient not taking: Reported on 11/26/2019)    betamethasone dipropionate (DIPROLENE) 0.05 % ointment Use every AM x 2 weeks, then twice weekly    clobetasol 0.05% (TEMOVATE) 0.05 % Oint Use daily in AM twice weekly    fluticasone (FLONASE ALLERGY RELIEF) 50 mcg/actuation nasal spray 1 spray (50 mcg total) by Each Nare route once daily. (Patient not taking: Reported on 11/15/2019)    guaiFENesin (MUCINEX) 600 mg 12 hr tablet Take 1 tablet (600 mg total) by mouth 2 (two) times daily. (Patient not taking: Reported on 11/15/2019)    loratadine (CLARITIN)  10 mg tablet Take 1 tablet (10 mg total) by mouth once daily. (Patient not taking: Reported on 2019)    meloxicam (MOBIC) 15 MG tablet Take 1 tablet (15 mg total) by mouth once daily.    nystatin-triamcinolone (MYCOLOG) ointment Apply topically 2 (two) times daily as needed. (Patient not taking: Reported on 11/15/2019)    promethazine (PHENERGAN) 6.25 mg/5 mL syrup Take 5 mLs (6.25 mg total) by mouth 4 (four) times daily as needed. (Patient not taking: Reported on 11/15/2019)     Family History     Problem Relation (Age of Onset)    Anuerysm Sister    Breast cancer Cousin    Cancer Mother, Sister, Maternal Aunt    Cataracts Father    Deep vein thrombosis Sister    Depression Maternal Grandmother    Drug abuse Sister    Eczema Sister    Edema Sister    Heart disease Sister, Sister    Hypertension Father, Sister, Sister, Sister, Daughter    No Known Problems Brother, Sister, Sister, Paternal Grandmother, Paternal Grandfather    Osteoarthritis Sister    Other Daughter    Pacemaker/defibrilator Sister    Stroke Mother    Ulcers Maternal Grandfather        Tobacco Use    Smoking status: Former Smoker     Packs/day: 0.50     Years: 22.00     Pack years: 11.00     Types: Cigarettes     Last attempt to quit: 1998     Years since quittin.9    Smokeless tobacco: Never Used   Substance and Sexual Activity    Alcohol use: Yes     Alcohol/week: 0.0 standard drinks     Comment: Occassionally for social events will have a glass of wine    Drug use: No    Sexual activity: Not on file     Review of Systems   Constitution: Negative for chills, decreased appetite and diaphoresis.   HENT: Negative for congestion and ear discharge.    Eyes: Negative for blurred vision and discharge.   Cardiovascular: Negative for chest pain, dyspnea on exertion, irregular heartbeat, leg swelling and paroxysmal nocturnal dyspnea.   Respiratory: Negative for cough, hemoptysis and shortness of breath.    Gastrointestinal: Negative  for abdominal pain.     Objective:     Vital Signs (Most Recent):  Temp: 98 °F (36.7 °C) (01/31/20 1455)  Pulse: 91 (01/31/20 1932)  Resp: (!) 28 (01/31/20 1932)  BP: (!) 156/66 (01/31/20 1932)  SpO2: 100 % (01/31/20 1932) Vital Signs (24h Range):  Temp:  [98 °F (36.7 °C)] 98 °F (36.7 °C)  Pulse:  [90-95] 91  Resp:  [14-28] 28  SpO2:  [95 %-100 %] 100 %  BP: (145-161)/(65-73) 156/66     Weight: 93 kg (205 lb)  Body mass index is 29.41 kg/m².    SpO2: 100 %  O2 Device (Oxygen Therapy): room air    No intake or output data in the 24 hours ending 01/31/20 2017    Lines/Drains/Airways     Peripheral Intravenous Line                 Peripheral IV - Single Lumen 01/29/16 1107 Left Hand 1463 days         Peripheral IV - Single Lumen 01/31/20 1544 18 G Left Antecubital less than 1 day                Physical Exam   Constitutional: She is oriented to person, place, and time. She appears well-developed and well-nourished. No distress.   Eyes: Pupils are equal, round, and reactive to light. Conjunctivae are normal.   Neck: No tracheal deviation present. No thyromegaly present.   Cardiovascular: Normal rate, regular rhythm, normal heart sounds and intact distal pulses. Exam reveals no gallop and no friction rub.   No murmur heard.  Pulses:       Radial pulses are 2+ on the right side, and 2+ on the left side.        Femoral pulses are 2+ on the right side, and 2+ on the left side.  Pulmonary/Chest: Effort normal and breath sounds normal. No respiratory distress. She has no wheezes. She has no rales.   Abdominal: Soft. Bowel sounds are normal. She exhibits no distension. There is no tenderness.   Musculoskeletal: She exhibits no edema or deformity.   Neurological: She is alert and oriented to person, place, and time. No cranial nerve deficit. Coordination normal.   Skin: Skin is warm and dry. She is not diaphoretic.   Psychiatric: She has a normal mood and affect. Her behavior is normal.       Significant Labs:   BMP:   Recent  Labs   Lab 01/31/20  1627   *      K 3.5      CO2 26   BUN 17   CREATININE 1.0   CALCIUM 9.4       Significant Imaging: Echocardiogram:   Transthoracic echo (TTE) complete (Cupid Only):   Results for orders placed or performed during the hospital encounter of 08/27/19   Transthoracic echo (TTE) 2D with Color Flow   Result Value Ref Range    Ascending aorta 3.01 cm    STJ 2.39 cm    AV mean gradient 3 mmHg    Ao peak yahir 1.11 m/s    Ao VTI 25.39 cm    IVRT 0.10 msec    IVS 0.78 0.6 - 1.1 cm    LA size 3.70 cm    Left Atrium Major Axis 5.48 cm    Left Atrium Minor Axis 5.61 cm    LVIDD 4.35 3.5 - 6.0 cm    LVIDS 2.86 2.1 - 4.0 cm    LVOT diameter 2.13 cm    LVOT peak VTI 22.84 cm    PW 1.31 (A) 0.6 - 1.1 cm    MV Peak A Yahir 0.72 m/s    E wave decelartion time 194.78 msec    MV Peak E Yahir 0.76 m/s    PV Peak D Yahir 0.49 m/s    PV Peak S Yahir 0.56 m/s    RA Major Axis 5.21 cm    RA Width 4.03 cm    RVDD 3.39 cm    Sinus 2.93 cm    TAPSE 2.64 cm    TR Max Yahir 2.92 m/s    TDI LATERAL 0.08 m/s    TDI SEPTAL 0.07 m/s    LA WIDTH 4.16 cm    PV PEAK VELOCITY 0.92 cm/s    LV Diastolic Volume 85.58 mL    LV Systolic Volume 31.00 mL    RV S' 15.33 cm/s    LVOT peak yahir 1.05 m/s    LV LATERAL E/E' RATIO 9.50 m/s    LV SEPTAL E/E' RATIO 10.86 m/s    FS 34 %    LA volume 72.54 cm3    LV mass 154.33 g    Left Ventricle Relative Wall Thickness 0.60 cm    AV valve area 3.20 cm2    AV Velocity Ratio 0.95     AV index (prosthetic) 0.90     E/A ratio 1.06     Mean e' 0.08 m/s    Pulm vein S/D ratio 1.14     LVOT area 3.6 cm2    LVOT stroke volume 81.34 cm3    AV peak gradient 5 mmHg    E/E' ratio 10.13 m/s    Triscuspid Valve Regurgitation Peak Gradient 34 mmHg    BSA 2.11 m2    LV Systolic Volume Index 14.9 mL/m2    LV Diastolic Volume Index 41.18 mL/m2    LA Volume Index 34.9 mL/m2    LV Mass Index 74 g/m2    Right Atrial Pressure (from IVC) 3 mmHg    TV rest pulmonary artery pressure 37 mmHg    Narrative    · Normal  left ventricular systolic function. The estimated ejection   fraction is 65%  · Concentric left ventricular remodeling.  · No wall motion abnormalities.  · Grade II (moderate) left ventricular diastolic dysfunction consistent   with pseudonormalization.  · Elevated left atrial pressure.  · Normal right ventricular systolic function.  · Mild left atrial enlargement.  · Mild right atrial enlargement.  · Mild mitral regurgitation.  · Mild pulmonic regurgitation.  · Normal central venous pressure (3 mm Hg).  · The estimated PA systolic pressure is 37 mm Hg        Assessment and Plan:     Syncope  - Likely vasovagal syncope and orthostatic from dehydration and sepsis  - Get orthostatics  - Give IV fluids  - PE CTA -ve  - Unlikeyl arrhythmic from History and physical    Elevated troponin  - Patient seen and examined  - Likely secondary to supply demand mismatch from suspected sepsis (WBC 19K Left shift subjective fevers, tooth etc)  - Would not trend further and would NOT treat for ACS          VTE Risk Mitigation (From admission, onward)    None          Thank you for your consult. I will sign off. Please contact us if you have any additional questions.    Rodolfo Galloway MD  Cardiology   [facility]

## 2020-02-01 NOTE — ASSESSMENT & PLAN NOTE
- Likely vasovagal syncope and orthostatic from dehydration and sepsis  - Get orthostatics  - Give IV fluids  - PE CTA -ve  - Unlikeyl arrhythmic from History and physical

## 2020-02-01 NOTE — ASSESSMENT & PLAN NOTE
- troponin .165--> .254-->.259   - D-dimer elevated, CTA negative  - Cardiology evaluated patient in ED  - likely 2/2 to supply demand mismatch from suspected sepsis  - do not recommending trending or treating for ACS

## 2020-02-01 NOTE — HOSPITAL COURSE
Mrs. Guzman was admitted to observation for ED OU for elevated troponin and syncope. Cardiology evaluated patient for elevated troponin (.165-->.254-->.259), do not suspect ACS, do not recommend continuing to trend. CTA negative for PE. Suspect 2/2 to supply demand mismatch from suspected Sepsis. Syncope likely vasovagal in setting of dehydration and sepsis. Orthostatics negative. Given IVF. Leukocytosis 19--> 15, but suspect hemoconcentrated on admit. Patient spiked temperature of 100.5F. Given 2L of IVF. No evidence of infection, abscess, swelling in mouth from recent dental work. Blood cultures NGTD, CXR clear, UA clean, flu negative. Patient with one episode of loose stool but none since. Leukocytosis resolved, no further fevers. Suspect caused by viral syndrome. Patient discharged on home medications. PCP follow up in 1 week. Patient verbalized understanding. All questions answered.

## 2020-02-01 NOTE — ED PROVIDER NOTES
Encounter Date: 1/31/2020       History     Chief Complaint   Patient presents with    Fatigue     Pt with generalized weakness x one week, syncopal episode today, lac to head (bleeding controlled). Patient denies anticoagulants.    Fall    Loss of Consciousness     HPI     70 yo F with history of HTN and tooth extraction 2 weeks ago presenting after syncopal episode today. She reports that for the past 2 weeks she has been feeling weak with subjective fevers and decreased appetite. Today the weakness worsened while at work and she experienced an unwitnessed syncopal episode (described as feeling hot and nauseated before everything going black) hitting her right forehead on a door handle. After the fall she had a right sided headache and right sided thoracic pain. She preceded to go to the [facility] for an appointment and while there someone called her daughter to let her know that the patient was having trouble balancing. At that point she was brought to the ED. On arrival she endorses headache, weakness, right sided chest pain that follows the costal margin to the back. She denies visual changes, pressure like chest pain, abdominal pain, bloody or dark stool, vomiting, shortness of breath, worsening lower extremity edema.     Of note after the tooth (upper right sided molar) extraction she has had drainage of consumed liquids out of her nose. No purulent drainage. She completed a course of antibiotics after the procedure.     Review of patient's allergies indicates:  No Known Allergies  Past Medical History:   Diagnosis Date    Abnormal Pap smear     Abnormal Pap smear of cervix     Abnormal Pap smear of vagina 7/20/2016    Allergy     Anemia     Anxiety     Cataract     Chronic bilateral low back pain without sciatica 3/13/2018    Depression     Depression with anxiety     Hypertension     Osteoarthritis 6/20/2013    Right rotator cuff tear     Sleep apnea     Cipap machine at home    Trouble in  "sleeping     Urinary incontinence     Leaks urine with laughing/ coughing/ sneezing     Past Surgical History:   Procedure Laterality Date    CATARACT EXTRACTION  6/18/12    OS    CATARACT EXTRACTION  7/16/12    OD    CERVIX SURGERY      Conization    COLONOSCOPY      COLONOSCOPY N/A 3/4/2016    Procedure: COLONOSCOPY;  Surgeon: Tenzin Thakkar MD;  Location: [facility] ([address] [place] [address]);  Service: Endoscopy;  Laterality: N/A;    EYE SURGERY Bilateral     cataract    HYSTERECTOMY  5-12-15    LEEP      LUMBAR EPIDURAL INJECTION      OOPHORECTOMY      [place] REMOVAL OF OVARY/TUBE(S)  5-12-15    SALPINGECTOMY Left     SHOULDER SURGERY Right      Family History   Problem Relation Age of Onset    Hypertension Father     Cataracts Father     Cancer Mother         THYROID CANCER    Stroke Mother     Breast cancer Cousin     Heart disease Sister     Pacemaker/defibrilator Sister     Hypertension Sister     Deep vein thrombosis Sister     Heart disease Sister         CABG    Osteoarthritis Sister     Edema Sister     Eczema Sister     Hypertension Sister     Anuerysm Sister     Drug abuse Sister     Hypertension Sister     No Known Problems Brother     No Known Problems Sister     Cancer Sister         Cancer cells or "growth in her stomach"    No Known Problems Sister     Hypertension Daughter     Other Daughter         Heart palpitations    Depression Maternal Grandmother     Ulcers Maternal Grandfather         Legs    No Known Problems Paternal Grandmother     No Known Problems Paternal Grandfather     Cancer Maternal Aunt     ADD / ADHD Neg Hx     Alcohol abuse Neg Hx     Anxiety disorder Neg Hx     Bipolar disorder Neg Hx     Dementia Neg Hx     OCD Neg Hx     Paranoid behavior Neg Hx     Physical abuse Neg Hx     Schizophrenia Neg Hx     Seizures Neg Hx     Sexual abuse Neg Hx     Amblyopia Neg Hx     Blindness Neg Hx     Glaucoma Neg Hx     Macular degeneration Neg Hx     " Retinal detachment Neg Hx     Strabismus Neg Hx      Social History     Tobacco Use    Smoking status: Former Smoker     Packs/day: 0.50     Years: 22.00     Pack years: 11.00     Types: Cigarettes     Last attempt to quit: 1998     Years since quittin.0    Smokeless tobacco: Never Used   Substance Use Topics    Alcohol use: Yes     Alcohol/week: 0.0 standard drinks     Comment: Occassionally for social events will have a glass of wine    Drug use: No     Review of Systems   Constitutional: Positive for appetite change and diaphoresis. Negative for fever.   HENT: Positive for dental problem. Negative for facial swelling and sore throat.    Eyes: Negative for photophobia.   Respiratory: Positive for cough. Negative for shortness of breath.    Cardiovascular: Positive for chest pain. Negative for palpitations.   Gastrointestinal: Positive for nausea. Negative for abdominal pain, blood in stool and vomiting.   Genitourinary: Negative for dysuria and hematuria.   Musculoskeletal: Negative for back pain and neck pain.   Skin: Negative for rash.   Neurological: Positive for syncope, light-headedness and headaches. Negative for dizziness, facial asymmetry, speech difficulty, weakness and numbness.   Hematological: Does not bruise/bleed easily.       Physical Exam     Initial Vitals [20 1455]   BP Pulse Resp Temp SpO2   (!) 159/67 95 14 98 °F (36.7 °C) 99 %      MAP       --         Physical Exam    Nursing note and vitals reviewed.  Constitutional: She appears well-developed and well-nourished. No distress.   HENT:   Head: Normocephalic.   Mouth/Throat: Oropharynx is clear and moist. No oropharyngeal exudate.   Small 0.5 cm laceration to right brow.    No abscess or drainage visualized from area of tooth extraction   Eyes: EOM are normal. Pupils are equal, round, and reactive to light.   Neck: Neck supple.   No midline c-spine tenderness   Cardiovascular: Normal rate, regular rhythm, normal heart sounds  and intact distal pulses. Exam reveals no gallop and no friction rub.    No murmur heard.  Pulmonary/Chest: Breath sounds normal. No respiratory distress. She has no wheezes. She has no rhonchi. She has no rales. She exhibits tenderness.   Reproducible chest wall tenderness over right sternal border   Abdominal: Soft. Bowel sounds are normal. She exhibits no distension. There is no tenderness. There is no rebound and no guarding.   Musculoskeletal: Normal range of motion. She exhibits no tenderness.   Neurological: She is alert and oriented to person, place, and time. She has normal strength. No cranial nerve deficit or sensory deficit. Coordination normal. GCS eye subscore is 4. GCS verbal subscore is 5. GCS motor subscore is 6.   Skin: Skin is warm and dry. Capillary refill takes less than 2 seconds.         ED Course   Procedures  Labs Reviewed   CBC W/ AUTO DIFFERENTIAL - Abnormal; Notable for the following components:       Result Value    WBC 19.43 (*)     Mean Corpuscular Hemoglobin Conc 31.8 (*)     RDW 15.3 (*)     Platelets 432 (*)     Gran # (ANC) 17.8 (*)     Immature Grans (Abs) 0.06 (*)     Lymph # 0.6 (*)     Gran% 91.4 (*)     Lymph% 3.2 (*)     Mono% 3.4 (*)     All other components within normal limits   COMPREHENSIVE METABOLIC PANEL - Abnormal; Notable for the following components:    Glucose 144 (*)     Albumin 3.2 (*)     eGFR if non  57.2 (*)     All other components within normal limits   TROPONIN I - Abnormal; Notable for the following components:    Troponin I 0.165 (*)     All other components within normal limits   URINALYSIS, REFLEX TO URINE CULTURE - Abnormal; Notable for the following components:    Appearance, UA Cloudy (*)     Protein, UA 1+ (*)     All other components within normal limits    Narrative:     Preferred Collection Type->Urine, Clean Catch   URINALYSIS MICROSCOPIC - Abnormal; Notable for the following components:    WBC, UA 6 (*)     Hyaline Casts, UA 28  (*)     All other components within normal limits    Narrative:     Preferred Collection Type->Urine, Clean Catch   TROPONIN I - Abnormal; Notable for the following components:    Troponin I 0.254 (*)     All other components within normal limits   D DIMER, QUANTITATIVE - Abnormal; Notable for the following components:    D-Dimer 1.29 (*)     All other components within normal limits    Narrative:     ADD-ON DDIMR #[id number] PER GORDO HANSEN MD 17:56  01/31/2020    TROPONIN I - Abnormal; Notable for the following components:    Troponin I 0.259 (*)     All other components within normal limits   PROCALCITONIN - Abnormal; Notable for the following components:    Procalcitonin 1.06 (*)     All other components within normal limits   CBC W/ AUTO DIFFERENTIAL - Abnormal; Notable for the following components:    WBC 15.10 (*)     RBC 3.76 (*)     Hemoglobin 10.6 (*)     Hematocrit 32.6 (*)     RDW 15.3 (*)     Platelets 385 (*)     Gran # (ANC) 11.1 (*)     Immature Grans (Abs) 0.07 (*)     Eos # 1.3 (*)     Gran% 73.7 (*)     Lymph% 10.9 (*)     Eosinophil% 8.6 (*)     All other components within normal limits   TSH   PROTIME-INR   D DIMER, QUANTITATIVE   LACTIC ACID, PLASMA   POCT GLUCOSE        ECG Results          EKG 12-lead (Final result)  Result time 02/01/20 12:12:13    Final result by Interface, Lab In [facility] (02/01/20 12:12:13)                 Narrative:    Test Reason : R55,    Vent. Rate : 089 BPM     Atrial Rate : 089 BPM     P-R Int : 160 ms          QRS Dur : 080 ms      QT Int : 368 ms       P-R-T Axes : 087 039 024 degrees     QTc Int : 447 ms    Normal sinus rhythm with sinus arrhythmia  Possible Left atrial abnormality/enlargement  Voltage criteria for left ventricular hypertrophy  When compared with ECG of 31-JAN-2020 18:34,  No significant change was found  Confirmed by Wayne Felix MD ([id number]) on 2/1/2020 12:12:08 PM    Referred By: [email or web address]   SELF           Confirmed By:Wayne Felix MD                              EKG 12-lead (Final result)  Result time 02/01/20 11:42:18    Final result by Interface, Lab In [facility] (02/01/20 11:42:18)                 Narrative:    Test Reason : R55,    Vent. Rate : 087 BPM     Atrial Rate : 087 BPM     P-R Int : 158 ms          QRS Dur : 074 ms      QT Int : 362 ms       P-R-T Axes : 086 044 019 degrees     QTc Int : 435 ms    Normal sinus rhythm  Possible Left atrial enlargement  Voltage criteria for left ventricular hypertrophy  Abnormal ECG  When compared with ECG of 31-JAN-2020 15:04,  Premature atrial complexes are no longer Present  Confirmed by Wayne Felix MD ([id number]) on 2/1/2020 11:42:11 AM    Referred By: [email or web address]   SELF           Confirmed By:Wayne Felix MD                             EKG 12-lead (Final result)  Result time 01/31/20 16:26:43    Final result by Interface, Lab In [facility] (01/31/20 16:26:43)                 Narrative:    Test Reason : R40.20,    Vent. Rate : 102 BPM     Atrial Rate : 102 BPM     P-R Int : 142 ms          QRS Dur : 080 ms      QT Int : 346 ms       P-R-T Axes : 081 042 028 degrees     QTc Int : 450 ms    Sinus tachycardia with Premature atrial complexes  Possible Left atrial enlargement  Borderline Abnormal ECG  When compared with ECG of 19-NOV-2018 14:44,  Premature atrial complexes Now present  Nonspecific T wave abnormality now evident in Inferior leads  Confirmed by Wayne Felix MD ([id number]) on 1/31/2020 4:26:37 PM    Referred By:             Confirmed By:Wayne Felix MD                            Imaging Results          CT Head Without Contrast (Final result)  Result time 01/31/20 19:18:29    Final result by Briana Morales MD (01/31/20 19:18:29)                 Impression:      No evidence of acute intracranial pathology.  Chronic microangiopathic ischemic changes, slightly progressed compared to prior exam.    No evidence of an acute displaced fracture.      Electronically signed by: Briana Morales  Date:    01/31/2020  Time:    19:18              Narrative:    EXAMINATION:  CT HEAD WITHOUT CONTRAST; CT MAXILLOFACIAL WITHOUT CONTRAST    CLINICAL HISTORY:  Syncope/fainting;; Facial fracture(s);    TECHNIQUE:  Low dose CT images throughout the region of the head and facial bones.  Axial, sagittal and coronal reformations were obtained.  Contrast was not administered.    COMPARISON:  CT head 01/19/2018    FINDINGS:  Head:    Ventricles are normal in size for age.  No hydrocephalus.    Periventricular and patchy white hypoattenuation slightly worse compared to prior exam, in keeping with chronic microangiopathic ischemic changes.  No parenchymal mass, hemorrhage or recent or remote major vascular distribution infarct.    No extra-axial blood or fluid collections.    The calvarium appears intact, without evidence of depressed skull fracture.  Atherosclerotic calcification the cavernous and supraclinoid ICAs.    Facial bones:    No focal soft tissue swelling identified.  The globes and intraorbital contents are within normal limits.  No orbital fracture.    The remainder of the facial bones appear intact without evidence of an acute displaced fracture.  No osseous destructive lesions.  Edentulous maxilla.  Circumferential right maxillary mucosal thickening.  Otherwise the paranasal and mastoid air cells are clear.    Temporomandibular joints appropriately position without evidence of dislocation.                               CT Maxillofacial Without Contrast (Final result)  Result time 01/31/20 19:18:29   Procedure changed from CT Maxillofacial With Contrast     Final result by Briana Morales MD (01/31/20 19:18:29)                 Impression:      No evidence of acute intracranial pathology.  Chronic microangiopathic ischemic changes, slightly progressed compared to prior exam.    No evidence of an acute displaced fracture.      Electronically signed by: Briana Morales  Date:    01/31/2020  Time:    19:18             Narrative:    EXAMINATION:  CT HEAD WITHOUT  CONTRAST; CT MAXILLOFACIAL WITHOUT CONTRAST    CLINICAL HISTORY:  Syncope/fainting;; Facial fracture(s);    TECHNIQUE:  Low dose CT images throughout the region of the head and facial bones.  Axial, sagittal and coronal reformations were obtained.  Contrast was not administered.    COMPARISON:  CT head 01/19/2018    FINDINGS:  Head:    Ventricles are normal in size for age.  No hydrocephalus.    Periventricular and patchy white hypoattenuation slightly worse compared to prior exam, in keeping with chronic microangiopathic ischemic changes.  No parenchymal mass, hemorrhage or recent or remote major vascular distribution infarct.    No extra-axial blood or fluid collections.    The calvarium appears intact, without evidence of depressed skull fracture.  Atherosclerotic calcification the cavernous and supraclinoid ICAs.    Facial bones:    No focal soft tissue swelling identified.  The globes and intraorbital contents are within normal limits.  No orbital fracture.    The remainder of the facial bones appear intact without evidence of an acute displaced fracture.  No osseous destructive lesions.  Edentulous maxilla.  Circumferential right maxillary mucosal thickening.  Otherwise the paranasal and mastoid air cells are clear.    Temporomandibular joints appropriately position without evidence of dislocation.                               CTA Chest Non-Coronary (PE Study) (Final result)  Result time 01/31/20 18:38:15    Final result by Noe Connor MD (01/31/20 18:38:15)                 Impression:      1. No evidence of pulmonary embolism.  2. No acute abnormality.      Electronically signed by: Noe Connor  Date:    01/31/2020  Time:    18:38             Narrative:    EXAMINATION:  CTA CHEST NON CORONARY    CLINICAL HISTORY:  Chest pain, acute, PE suspected, low pretest prob;    TECHNIQUE:  Low dose axial images, sagittal and coronal reformations were obtained from the thoracic inlet to the lung bases following  the IV administration of 100 mL of Omnipaque 350.  Contrast timing was optimized to evaluate the pulmonary arteries.  MIP images were performed.    COMPARISON:  None    FINDINGS:  Pulmonary arteries are suboptimally enhanced.  No filling defects to indicate pulmonary embolism.    Heart is normal size.    No focal mass, significant infiltrate or consolidation.  No effusion or pneumothorax.  No acute osseous abnormality.                               X-Ray Chest PA And Lateral (Final result)  Result time 01/31/20 16:59:31    Final result by Noe Connor MD (01/31/20 16:59:31)                 Impression:      No acute radiographic abnormality with no significant change.      Electronically signed by: Noe Connor  Date:    01/31/2020  Time:    16:59             Narrative:    EXAMINATION:  XR CHEST PA AND LATERAL    CLINICAL HISTORY:  Syncope and collapse    TECHNIQUE:  PA and lateral views of the chest were performed.    COMPARISON:  11/07/2018    FINDINGS:  Based on multiple other prior studies I suspect the image is inverted in regards to the left and right.  Recommend clinical correlation.    Heart is normal size.  No edema.  Mild aortic atherosclerosis.    No focal mass, significant infiltrate or consolidation.  No acute osseous abnormality.    Mild apical pleural thickening or scarring similar to the prior study.    Marginal bridging osteophytes in the thoracic spine.    No significant change.                                 Medical Decision Making:   History:   Old Medical Records: I decided to obtain old medical records.  Initial Assessment:   68 yo F with PMH of HTN and recent tooth extraction presenting with syncope   Differential Diagnosis:   Includes but is not limited to intracranial ischemia, intracranial hemorrhage, ACS, cardiac arrhythmia, PE, sepsis, hypoglycemia, vasovagal syncope   Clinical Tests:   Lab Tests: Ordered and Reviewed  Radiological Study: Ordered and Reviewed  ED Management:  68 yo  presenting after unwitnessed syncopal episode today with 2 weeks of generalized weakness. History pertinent for weakness after dental procedure two weeks ago, syncope without preceded chest pain, palpitations, or shortness of breath. Syncopal episode preceded by vaso vagal symptoms of warmth, nausea, and lightheadedness. On arrival patient is AAO x3, speaking in full sentences, afebrile, normal HR, mildly hypertensive (secondary to not taking her antihypertensives this morning), small forehead laceration, right chest wall tenderness that follows costal margin to back, no signs of infection at site of tooth extraction, no focal neuro deficits, cardiopulmonary exam wnl, no extremity tenderness. EKG shows no ischemic changes. Positive lab findings of leukocytosis at 19 and elevated troponin 0.165. Plan to repeat troponin and EKG at 20:30. Small forehead laceration to be repaired with dermabond. Anticipate admission. Final dispo pending CT PE, CT head, CT Maxface.       HO-I Update:   All imaging without signs of acute changes or disease process. Cardiology consulted for evaluation. Plan to admit. Final dispo pending cardiology recommendations. Patient care handed off to oncoming team.               Attending Attestation:   Physician Attestation Statement for Resident:  As the supervising MD   Physician Attestation Statement: I have personally seen and examined this patient.   I agree with the above history. -:   As the supervising MD I agree with the above PE.    As the supervising MD I agree with the above treatment, course, plan, and disposition.            Attending ED Notes:   STAFF ATTENDING PHYSICIAN NOTE:  I have individually/jointly evaluated Marleny Guzman and discussed their ED management with the resident physician. I have also reviewed their notes, assessments, and procedures documented.  I was present during all critical portions of any procedure(s) performed on Marleny Guzman.    ____________________  Mehrdad Panda MD, [facility]  Emergency Medicine Staff                        Clinical Impression:       ICD-10-CM ICD-9-CM   1. Syncope R55 780.2   2. Loss of consciousness R40.20 780.09   3. Traumatic injury of head, initial encounter S09.90XA 959.01   4. Laceration of right eyebrow, initial encounter S01.111A 873.42   5. Elevated troponin R79.89 790.6   6. Leukocytosis, unspecified type D72.829 288.60   7. SIRS (systemic inflammatory response syndrome) R65.10 995.90         Disposition:   Disposition: Admitted  Condition: Stable                     Azucena Jeffery MD  Resident  01/31/20 1934       Geovany Panda MD  02/03/20 0633

## 2020-02-01 NOTE — ED NOTES
Pt resting quietly on hospital bed; remains on continuous cardiac   VS stable; NSR noted. Bed locked in lowest position; side rails up and locked x 2; call light, bedside table, and personal belongings within reach.  Pt instructed to alert nurse for assistance and before attempting to get out of bed; verbalizes understanding.  Pt denies needs or complaints at this time; will continue to monitor.

## 2020-02-01 NOTE — SUBJECTIVE & OBJECTIVE
Past Medical History:   Diagnosis Date    Abnormal Pap smear     Abnormal Pap smear of cervix     Abnormal Pap smear of vagina 7/20/2016    Allergy     Anemia     Anxiety     Cataract     Chronic bilateral low back pain without sciatica 3/13/2018    Depression     Depression with anxiety     Hypertension     Osteoarthritis 6/20/2013    Right rotator cuff tear     Sleep apnea     Cipap machine at home    Trouble in sleeping     Urinary incontinence     Leaks urine with laughing/ coughing/ sneezing       Past Surgical History:   Procedure Laterality Date    CATARACT EXTRACTION  6/18/12    OS    CATARACT EXTRACTION  7/16/12    OD    CERVIX SURGERY      Conization    COLONOSCOPY      COLONOSCOPY N/A 3/4/2016    Procedure: COLONOSCOPY;  Surgeon: Tenzin Thakkar MD;  Location: [address]);  Service: Endoscopy;  Laterality: N/A;    EYE SURGERY Bilateral     cataract    HYSTERECTOMY  5-12-15    LEEP      LUMBAR EPIDURAL INJECTION      OOPHORECTOMY      VT REMOVAL OF OVARY/TUBE(S)  5-12-15    SALPINGECTOMY Left     SHOULDER SURGERY Right        Review of patient's allergies indicates:  No Known Allergies    No current facility-administered medications on file prior to encounter.      Current Outpatient Medications on File Prior to Encounter   Medication Sig    aspirin 81 MG Chew Take 81 mg by mouth once daily.    escitalopram oxalate (LEXAPRO) 20 MG tablet 1.5 tablets daily    estradiol (ESTRACE) 0.01 % (0.1 mg/gram) vaginal cream Use 1 GM nightly vaginally and dime size amount on vulvatwice weekly    hydroCHLOROthiazide (HYDRODIURIL) 12.5 MG Tab Take 1 tablet (12.5 mg total) by mouth once daily.    irbesartan-hydrochlorothiazide (AVALIDE) 300-12.5 mg per tablet Take 1 tablet by mouth once daily.    LORazepam (ATIVAN) 0.5 MG tablet Half - one daily as needed for anxiety    metoprolol succinate (TOPROL-XL) 50 MG 24 hr tablet TAKE 1 TABLET BY MOUTH EVERY DAY    tiZANidine  (ZANAFLEX) 2 MG tablet One at bedtime as needed for neck pain    vit A-vit C-zinc-propolis (ZINC, WITH A AND C, LOZENGES) Lozg Take 1 lozenge by mouth every 2 (two) hours as needed.    VITAMIN D2 50,000 unit capsule TAKE 1 CAPSULE (50,000 UNITS TOTAL) BY MOUTH EVERY 30 DAYS.    albuterol (PROVENTIL/VENTOLIN HFA) 90 mcg/actuation inhaler Inhale 2 puffs into the lungs every 6 (six) hours as needed for Wheezing. (Patient not taking: Reported on 11/26/2019)    betamethasone dipropionate (DIPROLENE) 0.05 % ointment Use every AM x 2 weeks, then twice weekly    clobetasol 0.05% (TEMOVATE) 0.05 % Oint Use daily in AM twice weekly    fluticasone (FLONASE ALLERGY RELIEF) 50 mcg/actuation nasal spray 1 spray (50 mcg total) by Each Nare route once daily. (Patient not taking: Reported on 11/15/2019)    guaiFENesin (MUCINEX) 600 mg 12 hr tablet Take 1 tablet (600 mg total) by mouth 2 (two) times daily. (Patient not taking: Reported on 11/15/2019)    loratadine (CLARITIN) 10 mg tablet Take 1 tablet (10 mg total) by mouth once daily. (Patient not taking: Reported on 11/26/2019)    meloxicam (MOBIC) 15 MG tablet Take 1 tablet (15 mg total) by mouth once daily.    nystatin-triamcinolone (MYCOLOG) ointment Apply topically 2 (two) times daily as needed. (Patient not taking: Reported on 11/15/2019)    promethazine (PHENERGAN) 6.25 mg/5 mL syrup Take 5 mLs (6.25 mg total) by mouth 4 (four) times daily as needed. (Patient not taking: Reported on 11/15/2019)     Family History     Problem Relation (Age of Onset)    Anuerysm Sister    Breast cancer Cousin    Cancer Mother, Sister, Maternal Aunt    Cataracts Father    Deep vein thrombosis Sister    Depression Maternal Grandmother    Drug abuse Sister    Eczema Sister    Edema Sister    Heart disease Sister, Sister    Hypertension Father, Sister, Sister, Sister, Daughter    No Known Problems Brother, Sister, Sister, Paternal Grandmother, Paternal Grandfather    Osteoarthritis  Sister    Other Daughter    Pacemaker/defibrilator Sister    Stroke Mother    Ulcers Maternal Grandfather        Tobacco Use    Smoking status: Former Smoker     Packs/day: 0.50     Years: 22.00     Pack years: 11.00     Types: Cigarettes     Last attempt to quit: 1998     Years since quittin.9    Smokeless tobacco: Never Used   Substance and Sexual Activity    Alcohol use: Yes     Alcohol/week: 0.0 standard drinks     Comment: Occassionally for social events will have a glass of wine    Drug use: No    Sexual activity: Not on file     Review of Systems   Constitution: Negative for chills, decreased appetite and diaphoresis.   HENT: Negative for congestion and ear discharge.    Eyes: Negative for blurred vision and discharge.   Cardiovascular: Negative for chest pain, dyspnea on exertion, irregular heartbeat, leg swelling and paroxysmal nocturnal dyspnea.   Respiratory: Negative for cough, hemoptysis and shortness of breath.    Gastrointestinal: Negative for abdominal pain.     Objective:     Vital Signs (Most Recent):  Temp: 98 °F (36.7 °C) (20 1455)  Pulse: 91 (20)  Resp: (!) 28 (20)  BP: (!) 156/66 (20)  SpO2: 100 % (20) Vital Signs (24h Range):  Temp:  [98 °F (36.7 °C)] 98 °F (36.7 °C)  Pulse:  [90-95] 91  Resp:  [14-28] 28  SpO2:  [95 %-100 %] 100 %  BP: (145-161)/(65-73) 156/66     Weight: 93 kg (205 lb)  Body mass index is 29.41 kg/m².    SpO2: 100 %  O2 Device (Oxygen Therapy): room air    No intake or output data in the 24 hours ending 20    Lines/Drains/Airways     Peripheral Intravenous Line                 Peripheral IV - Single Lumen 16 1107 Left Hand 1463 days         Peripheral IV - Single Lumen 20 1544 18 G Left Antecubital less than 1 day                Physical Exam   Constitutional: She is oriented to person, place, and time. She appears well-developed and well-nourished. No distress.   Eyes: Pupils are equal,  round, and reactive to light. Conjunctivae are normal.   Neck: No tracheal deviation present. No thyromegaly present.   Cardiovascular: Normal rate, regular rhythm, normal heart sounds and intact distal pulses. Exam reveals no gallop and no friction rub.   No murmur heard.  Pulses:       Radial pulses are 2+ on the right side, and 2+ on the left side.        Femoral pulses are 2+ on the right side, and 2+ on the left side.  Pulmonary/Chest: Effort normal and breath sounds normal. No respiratory distress. She has no wheezes. She has no rales.   Abdominal: Soft. Bowel sounds are normal. She exhibits no distension. There is no tenderness.   Musculoskeletal: She exhibits no edema or deformity.   Neurological: She is alert and oriented to person, place, and time. No cranial nerve deficit. Coordination normal.   Skin: Skin is warm and dry. She is not diaphoretic.   Psychiatric: She has a normal mood and affect. Her behavior is normal.       Significant Labs:   BMP:   Recent Labs   Lab 01/31/20  1627   *      K 3.5      CO2 26   BUN 17   CREATININE 1.0   CALCIUM 9.4       Significant Imaging: Echocardiogram:   Transthoracic echo (TTE) complete (Cupid Only):   Results for orders placed or performed during the hospital encounter of 08/27/19   Transthoracic echo (TTE) 2D with Color Flow   Result Value Ref Range    Ascending aorta 3.01 cm    STJ 2.39 cm    AV mean gradient 3 mmHg    Ao peak yahir 1.11 m/s    Ao VTI 25.39 cm    IVRT 0.10 msec    IVS 0.78 0.6 - 1.1 cm    LA size 3.70 cm    Left Atrium Major Axis 5.48 cm    Left Atrium Minor Axis 5.61 cm    LVIDD 4.35 3.5 - 6.0 cm    LVIDS 2.86 2.1 - 4.0 cm    LVOT diameter 2.13 cm    LVOT peak VTI 22.84 cm    PW 1.31 (A) 0.6 - 1.1 cm    MV Peak A Yahir 0.72 m/s    E wave decelartion time 194.78 msec    MV Peak E Yahir 0.76 m/s    PV Peak D Yahir 0.49 m/s    PV Peak S Yahir 0.56 m/s    RA Major Axis 5.21 cm    RA Width 4.03 cm    RVDD 3.39 cm    Sinus 2.93 cm    TAPSE  2.64 cm    TR Max Yahir 2.92 m/s    TDI LATERAL 0.08 m/s    TDI SEPTAL 0.07 m/s    LA WIDTH 4.16 cm    PV PEAK VELOCITY 0.92 cm/s    LV Diastolic Volume 85.58 mL    LV Systolic Volume 31.00 mL    RV S' 15.33 cm/s    LVOT peak yahir 1.05 m/s    LV LATERAL E/E' RATIO 9.50 m/s    LV SEPTAL E/E' RATIO 10.86 m/s    FS 34 %    LA volume 72.54 cm3    LV mass 154.33 g    Left Ventricle Relative Wall Thickness 0.60 cm    AV valve area 3.20 cm2    AV Velocity Ratio 0.95     AV index (prosthetic) 0.90     E/A ratio 1.06     Mean e' 0.08 m/s    Pulm vein S/D ratio 1.14     LVOT area 3.6 cm2    LVOT stroke volume 81.34 cm3    AV peak gradient 5 mmHg    E/E' ratio 10.13 m/s    Triscuspid Valve Regurgitation Peak Gradient 34 mmHg    BSA 2.11 m2    LV Systolic Volume Index 14.9 mL/m2    LV Diastolic Volume Index 41.18 mL/m2    LA Volume Index 34.9 mL/m2    LV Mass Index 74 g/m2    Right Atrial Pressure (from IVC) 3 mmHg    TV rest pulmonary artery pressure 37 mmHg    Narrative    · Normal left ventricular systolic function. The estimated ejection   fraction is 65%  · Concentric left ventricular remodeling.  · No wall motion abnormalities.  · Grade II (moderate) left ventricular diastolic dysfunction consistent   with pseudonormalization.  · Elevated left atrial pressure.  · Normal right ventricular systolic function.  · Mild left atrial enlargement.  · Mild right atrial enlargement.  · Mild mitral regurgitation.  · Mild pulmonic regurgitation.  · Normal central venous pressure (3 mm Hg).  · The estimated PA systolic pressure is 37 mm Hg

## 2020-02-01 NOTE — HPI
70 yo F with history of HTN and tooth extraction 2 weeks ago presenting after syncopal episode today. She reports that for the past 2 weeks she has been feeling weak with subjective fevers and decreased appetite. Today the weakness worsened while at work and she experienced an unwitnessed syncopal episode (described as feeling hot and nauseated before everything going black) hitting her right forehead on a door handle. After the fall she had a right sided headache and right sided thoracic pain. She preceded to go to the [facility] for an appointment and while there someone called her daughter to let her know that the patient was having trouble balancing. At that point she was brought to the ED. On arrival she endorses headache, weakness, right sided chest pain that follows the costal margin to the back. She denies visual changes, pressure like chest pain, abdominal pain, bloody or dark stool, vomiting, shortness of breath, worsening lower extremity edema.      Of note after the tooth (upper right sided molar) extraction she has had drainage of consumed liquids out of her nose. No purulent drainage. She completed a course of antibiotics after the procedure.

## 2020-02-01 NOTE — ASSESSMENT & PLAN NOTE
Patient with recent dental work (tooth extraction, edentulous now) 3 weeks ago. She completed 7 day course of ampicillin post procedure. Since procedure, patient has been experiencing chills, fatigue, subjective fevers, decreased appetite, resulting in two syncopal episodes prompting ED visit.    - 2/4 SIRS (WBC 17-->15, Temp 100.5)  - Giving 2 L IVF, will hold off on antibiotics for now unless temp > 101F  - Unclear eitology  - CTA maxillary sinuses unremarkable, No abscess, discharge, swelling noted in mouth  - Given recent dental work, always concern for bacteremia/ endocarditis but no new murmur, or physical exam findings  - Blood cultures NGTD, if positive will get TTE  - CXR/ CTA without evidence of infection  - Procal elevated at 1.06 but limited utility as CXR negative  - Lactate WNL  - UA clear   - Flu, respiratory infection panel, strep throat pending  - 1 episode of foul smelling loose stool today, if any further episodes will order C diff (recent ampicillin use) and stool studies  - continue to monitor

## 2020-02-01 NOTE — ASSESSMENT & PLAN NOTE
Patient with two episodes of syncope with aura of tunnel vision, diaphoresis, nausea, and heat prior to episodes, consistent with vasovagal.   - likely from dehydration and sepsis  - orthostatics negative  - small .5cm  laceration on right brow, no stitches needed  - continue IVF

## 2020-02-01 NOTE — PROVIDER PROGRESS NOTES - EMERGENCY DEPT.
"Encounter Date: 1/31/2020    ED Physician Progress Notes        Physician Note:   ED Physician Hand-off Note:    ED Course: I assumed care of patient from off-going ED physician team. Briefly, Patient is a 69 F with generalized weakenss for 1 week with syncopal episode today. No preceding symptoms. Likely vasovagal as patient has had decreased PO intake from recent tooth extraction. Of note, Labs reviewed with elevated WBC at 19,000- No obvious source of infection. NO gum abscess.  UA negative. CXR negative.  Elevated trop with TWI.       At the time of signout plan was pending ct head, max face, and ct pe.    Imaging negative.     Cards consult:    "Syncope  - Likely vasovagal syncope and orthostatic from dehydration and sepsis  - Get orthostatics  - Give IV fluids  - PE CTA -ve  - Unlikeyl arrhythmic from History and physical     Elevated troponin  - Patient seen and examined  - Likely secondary to supply demand mismatch from suspected sepsis (WBC 19K Left shift subjective fevers, tooth etc)  - Would not trend further and would NOT treat for ACS"    Medications given in the ED:    Medications  lidocaine HCL 10 mg/ml (1%) injection 10 mL (10 mLs Infiltration Not Given 1/31/20 1630)  bacitracin zinc ointment (has no administration in time range)  Tdap vaccine injection 0.5 mL (has no administration in time range)  lactated ringers bolus 1,000 mL (1,000 mLs Intravenous New Bag 1/31/20 1715)   iohexol (OMNIPAQUE 350) injection 100 mL (100 mLs Intravenous Given 1/31/20 1819)      Agree with holding treatment for ACS but given hx of age, PMHx and syncopal event, will trend trop.    Place on EDOU for repeat trop.  Cardiac monitoring, IVF.           Disposition: observation      Impression:syncope      3:00 AM    Repeat trop upward trending to 0.245.  Case discussed with Dr. Galloway, Cardiology fellow.  Plan is to repeat trop in 6 hrs, if elevated consult interventional.          "

## 2020-02-01 NOTE — PLAN OF CARE
Plan of care discussed with patient. Respiratory and strep throat pending cultures. Patient had a temp of 100.4 administered LR running at 500 ml/hr. Patient is free of fall/trauma/injury. Denies CP, SOB, or pain/discomfort. All questions addressed. Will continue to monitor

## 2020-02-01 NOTE — PROGRESS NOTES
[facility] Hospital Medicine  Progress Note    Patient Name: Marleny Guzman  MRN: [id number]  Patient Class: OP- Observation   Admission Date: 1/31/2020  Length of Stay: 0 days  Attending Physician: Christine Abel MD  Primary Care Provider: Bridget Cantu MD    [facility] Medicine Team: [facility] F Ana Smith PA-C    Subjective:     Principal Problem:SIRS (systemic inflammatory response syndrome)        HPI:  68 yo F with history of HTN and tooth extraction 2 weeks ago presenting after syncopal episode today. She reports that for the past 2 weeks she has been feeling weak with subjective fevers and decreased appetite. Today the weakness worsened while at work and she experienced an unwitnessed syncopal episode (described as feeling hot and nauseated before everything going black) hitting her right forehead on a door handle. After the fall she had a right sided headache and right sided thoracic pain. She preceded to go to the salon for an appointment and while there someone called her daughter to let her know that the patient was having trouble balancing. At that point she was brought to the ED. On arrival she endorses headache, weakness, right sided chest pain that follows the costal margin to the back. She denies visual changes, pressure like chest pain, abdominal pain, bloody or dark stool, vomiting, shortness of breath, worsening lower extremity edema.      Of note after the tooth (upper right sided molar) extraction she has had drainage of consumed liquids out of her nose. No purulent drainage. She completed a course of antibiotics after the procedure.      Overview/Hospital Course:  Mrs. Guzman was admitted to observation for ED OU for elevated troponin and syncope. Cardiology evaluated patient for elevated troponin (.165-->.254-->.259), do not suspect ACS, do not recommend continuing to trend. CTA negative for PE. Suspect 2/2 to supply demand mismatch from suspected Sepsis.  Syncope likely vasovagal in setting of dehydration and sepsis. Orthostatics negative. Given IVF. Leukocytosis 19--> 15, but suspect hemoconcentrated on admit.     Interval History: Patient with fever of 100.5, leukocytosis 15. Given 1L IVF, will continue. Will hold off on antibiotics unless fever over 101. Unclear etiology. Patient reports generalized fatigue, weakness, decreased appetite since dental procedure 3 weeks prior. Flu, respiratory panel, strep, blood cultures pending. Patient with 1 episode of foul smelling liquidy stool, will order C diff and stool studies if any further episodes.     Review of Systems   Constitutional: Positive for activity change, chills, fatigue and fever.   HENT: Positive for dental problem and sore throat. Negative for facial swelling, sinus pressure and sinus pain.    Respiratory: Positive for cough (dry). Negative for chest tightness, shortness of breath and wheezing.    Cardiovascular: Positive for leg swelling. Negative for chest pain and palpitations.   Gastrointestinal: Positive for diarrhea and nausea. Negative for abdominal distention, abdominal pain, blood in stool and vomiting.   Genitourinary: Negative for difficulty urinating, dysuria, flank pain, hematuria and urgency.   Musculoskeletal: Positive for neck pain (chronic). Negative for arthralgias and back pain.   Neurological: Positive for weakness. Negative for syncope, light-headedness and headaches.   Psychiatric/Behavioral: Negative for agitation, behavioral problems, confusion and decreased concentration.     Objective:     Vital Signs (Most Recent):  Temp: (!) 100.5 °F (38.1 °C) (02/01/20 1143)  Pulse: 90 (02/01/20 1143)  Resp: 18 (02/01/20 1143)  BP: 131/62 (02/01/20 1143)  SpO2: 100 % (02/01/20 1143) Vital Signs (24h Range):  Temp:  [96.9 °F (36.1 °C)-100.5 °F (38.1 °C)] 100.5 °F (38.1 °C)  Pulse:  [85-96] 90  Resp:  [14-28] 18  SpO2:  [93 %-100 %] 100 %  BP: (106-161)/(52-73) 131/62     Weight: 88.2 kg (194 lb  7.1 oz)  Body mass index is 27.9 kg/m².    Intake/Output Summary (Last 24 hours) at 2/1/2020 1336  Last data filed at 1/31/2020 1915  Gross per 24 hour   Intake 0 ml   Output --   Net 0 ml      Physical Exam   Constitutional: She is oriented to person, place, and time. She appears well-developed and well-nourished. No distress.   HENT:   Head: Normocephalic and atraumatic.   Mouth/Throat: Uvula is midline. She has dentures. Abnormal dentition. No uvula swelling.   edentulous after recent denture procedure  Open pocket on right upper gum from recent tooth extraction, no discharge, erythema, abscess, obvious infection  .5cm laceration on right brow   Cardiovascular: Normal rate and regular rhythm.   No murmur heard.  Pulmonary/Chest: Effort normal and breath sounds normal. No stridor. No respiratory distress. She has no wheezes.   Abdominal: Soft. Bowel sounds are normal. She exhibits no distension and no mass. There is no tenderness. There is no guarding.   Musculoskeletal: Normal range of motion. She exhibits edema (faint non-pitting BL ankles). She exhibits no tenderness.   Lymphadenopathy:     She has cervical adenopathy (mild, L sided).   Neurological: She is alert and oriented to person, place, and time.   Skin: Skin is warm and dry. Capillary refill takes less than 2 seconds. No rash noted. She is not diaphoretic. No erythema.   Psychiatric: She has a normal mood and affect. Her behavior is normal.   Nursing note and vitals reviewed.      Significant Labs:   Blood Culture:   Recent Labs   Lab 02/01/20  0442   LABBLOO No Growth to date  No Growth to date     CBC:   Recent Labs   Lab 01/31/20  1627 02/01/20  0544   WBC 19.43* 15.10*   HGB 12.0 10.6*   HCT 37.7 32.6*   * 385*     CMP:   Recent Labs   Lab 01/31/20  1627      K 3.5      CO2 26   *   BUN 17   CREATININE 1.0   CALCIUM 9.4   PROT 7.7   ALBUMIN 3.2*   BILITOT 0.5   ALKPHOS 129   AST 22   ALT 21   ANIONGAP 11   EGFRNONAA 57.2*      Lactic Acid:   Recent Labs   Lab 02/01/20  0544   LACTATE 0.8     Magnesium: No results for input(s): MG in the last 48 hours.  POCT Glucose:   Recent Labs   Lab 01/31/20  1458   POCTGLUCOSE 107     Urine Studies:   Recent Labs   Lab 01/31/20  1628   COLORU Tish   APPEARANCEUA Cloudy*   PHUR 7.0   SPECGRAV 1.015   PROTEINUA 1+*   GLUCUA Negative   KETONESU Negative   BILIRUBINUA Negative   OCCULTUA Negative   NITRITE Negative   LEUKOCYTESUR Negative   RBCUA 4   WBCUA 6*   BACTERIA Rare   SQUAMEPITHEL 10   HYALINECASTS 28*       Significant Imaging: I have reviewed all pertinent imaging results/findings within the past 24 hours.      Assessment/Plan:      * SIRS (systemic inflammatory response syndrome)  Patient with recent dental work (tooth extraction, edentulous now) 3 weeks ago. She completed 7 day course of ampicillin post procedure. Since procedure, patient has been experiencing chills, fatigue, subjective fevers, decreased appetite, resulting in two syncopal episodes prompting ED visit.    - 2/4 SIRS (WBC 17-->15, Temp 100.5)  - Giving 2 L IVF, will hold off on antibiotics for now unless temp > 101F  - Unclear eitology  - CTA maxillary sinuses unremarkable, No abscess, discharge, swelling noted in mouth  - Given recent dental work, always concern for bacteremia/ endocarditis but no new murmur, or physical exam findings  - Blood cultures NGTD, if positive will get TTE  - CXR/ CTA without evidence of infection  - Procal elevated at 1.06 but limited utility as CXR negative  - Lactate WNL  - UA clear   - Flu, respiratory infection panel, strep throat pending  - 1 episode of foul smelling loose stool today, if any further episodes will order C diff (recent ampicillin use) and stool studies  - continue to monitor      Syncope  Patient with two episodes of syncope with aura of tunnel vision, diaphoresis, nausea, and heat prior to episodes, consistent with vasovagal.   - likely from dehydration and sepsis  -  orthostatics negative  - small .5cm  laceration on right brow, no stitches needed  - continue IVF    Elevated troponin  - troponin .165--> .254-->.259   - D-dimer elevated, CTA negative  - Cardiology evaluated patient in ED  - likely 2/2 to supply demand mismatch from suspected sepsis  - do not recommending trending or treating for ACS    Sleep apnea  - CPAP qhs    Depression  - continue lexapro daily    HTN (hypertension)  - continue metoprolol 50 daily  - will hold HCTZ-irbesartan in setting of SIRS    Anxiety  - ativan PRN      VTE Risk Mitigation (From admission, onward)         Ordered     IP VTE LOW RISK PATIENT  Once      01/31/20 2728                      Ana Smith PA-C  Department of Hospital Medicine   [facility]

## 2020-02-01 NOTE — SUBJECTIVE & OBJECTIVE
Interval History: Patient with fever of 100.5, leukocytosis 15. Given 1L IVF, will continue. Will hold off on antibiotics unless fever over 101. Unclear etiology. Patient reports generalized fatigue, weakness, decreased appetite since dental procedure 3 weeks prior. Flu, respiratory panel, strep, blood cultures pending. Patient with 1 episode of foul smelling liquidy stool, will order C diff and stool studies if any further episodes.     Review of Systems   Constitutional: Positive for activity change, chills, fatigue and fever.   HENT: Positive for dental problem and sore throat. Negative for facial swelling, sinus pressure and sinus pain.    Respiratory: Positive for cough (dry). Negative for chest tightness, shortness of breath and wheezing.    Cardiovascular: Positive for leg swelling. Negative for chest pain and palpitations.   Gastrointestinal: Positive for diarrhea and nausea. Negative for abdominal distention, abdominal pain, blood in stool and vomiting.   Genitourinary: Negative for difficulty urinating, dysuria, flank pain, hematuria and urgency.   Musculoskeletal: Positive for neck pain (chronic). Negative for arthralgias and back pain.   Neurological: Positive for weakness. Negative for syncope, light-headedness and headaches.   Psychiatric/Behavioral: Negative for agitation, behavioral problems, confusion and decreased concentration.     Objective:     Vital Signs (Most Recent):  Temp: (!) 100.5 °F (38.1 °C) (02/01/20 1143)  Pulse: 90 (02/01/20 1143)  Resp: 18 (02/01/20 1143)  BP: 131/62 (02/01/20 1143)  SpO2: 100 % (02/01/20 1143) Vital Signs (24h Range):  Temp:  [96.9 °F (36.1 °C)-100.5 °F (38.1 °C)] 100.5 °F (38.1 °C)  Pulse:  [85-96] 90  Resp:  [14-28] 18  SpO2:  [93 %-100 %] 100 %  BP: (106-161)/(52-73) 131/62     Weight: 88.2 kg (194 lb 7.1 oz)  Body mass index is 27.9 kg/m².    Intake/Output Summary (Last 24 hours) at 2/1/2020 1336  Last data filed at 1/31/2020 1915  Gross per 24 hour   Intake 0  ml   Output --   Net 0 ml      Physical Exam   Constitutional: She is oriented to person, place, and time. She appears well-developed and well-nourished. No distress.   HENT:   Head: Normocephalic and atraumatic.   Mouth/Throat: Uvula is midline. She has dentures. Abnormal dentition. No uvula swelling.   edentulous after recent denture procedure  Open pocket on right upper gum from recent tooth extraction, no discharge, erythema, abscess, obvious infection  .5cm laceration on right brow   Cardiovascular: Normal rate and regular rhythm.   No murmur heard.  Pulmonary/Chest: Effort normal and breath sounds normal. No stridor. No respiratory distress. She has no wheezes.   Abdominal: Soft. Bowel sounds are normal. She exhibits no distension and no mass. There is no tenderness. There is no guarding.   Musculoskeletal: Normal range of motion. She exhibits edema (faint non-pitting BL ankles). She exhibits no tenderness.   Lymphadenopathy:     She has cervical adenopathy (mild, L sided).   Neurological: She is alert and oriented to person, place, and time.   Skin: Skin is warm and dry. Capillary refill takes less than 2 seconds. No rash noted. She is not diaphoretic. No erythema.   Psychiatric: She has a normal mood and affect. Her behavior is normal.   Nursing note and vitals reviewed.      Significant Labs:   Blood Culture:   Recent Labs   Lab 02/01/20  0442   LABBLOO No Growth to date  No Growth to date     CBC:   Recent Labs   Lab 01/31/20  1627 02/01/20  0544   WBC 19.43* 15.10*   HGB 12.0 10.6*   HCT 37.7 32.6*   * 385*     CMP:   Recent Labs   Lab 01/31/20  1627      K 3.5      CO2 26   *   BUN 17   CREATININE 1.0   CALCIUM 9.4   PROT 7.7   ALBUMIN 3.2*   BILITOT 0.5   ALKPHOS 129   AST 22   ALT 21   ANIONGAP 11   EGFRNONAA 57.2*     Lactic Acid:   Recent Labs   Lab 02/01/20  0544   LACTATE 0.8     Magnesium: No results for input(s): MG in the last 48 hours.  POCT Glucose:   Recent Labs    Lab 01/31/20  1458   POCTGLUCOSE 107     Urine Studies:   Recent Labs   Lab 01/31/20  1628   COLORU Tish   APPEARANCEUA Cloudy*   PHUR 7.0   SPECGRAV 1.015   PROTEINUA 1+*   GLUCUA Negative   KETONESU Negative   BILIRUBINUA Negative   OCCULTUA Negative   NITRITE Negative   LEUKOCYTESUR Negative   RBCUA 4   WBCUA 6*   BACTERIA Rare   SQUAMEPITHEL 10   HYALINECASTS 28*       Significant Imaging: I have reviewed all pertinent imaging results/findings within the past 24 hours.

## 2020-02-01 NOTE — HPI
68 yo F with PMH of HTN, Anxiety, presenting with syncopal episode x 2. She has been having chills and subjective fevers since tooth fixed with draining from nose. 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 She has some right sided chest pain from falling and is tender on touch. CTA PE is -ve. Troponin 0.165, Bedside TTE 65% EF, IVC flat. , Tachycardic sinus. Non-specific TWI.

## 2020-02-01 NOTE — ED NOTES
Pt resting quietly on hospital bed; remains on continuous cardiac and pulse ox monitoring.  VS stable; NSR noted. Bed locked in lowest position; side rails up and locked x 2; call light, bedside table, and personal belongings within reach.  Pt instructed to alert nurse for assistance and before attempting to get out of bed; verbalizes understanding.  Pt denies needs or complaints at this time; will continue to monitor.

## 2020-02-02 VITALS
HEART RATE: 67 BPM | OXYGEN SATURATION: 96 % | HEIGHT: 70 IN | TEMPERATURE: 98 F | RESPIRATION RATE: 17 BRPM | DIASTOLIC BLOOD PRESSURE: 71 MMHG | BODY MASS INDEX: 27.84 KG/M2 | WEIGHT: 194.44 LBS | SYSTOLIC BLOOD PRESSURE: 159 MMHG

## 2020-02-02 LAB
ANION GAP SERPL CALC-SCNC: 5 MMOL/L (ref 8–16)
BASOPHILS # BLD AUTO: 0.06 K/UL (ref 0–0.2)
BASOPHILS NFR BLD: 0.7 % (ref 0–1.9)
BUN SERPL-MCNC: 11 MG/DL (ref 8–23)
CALCIUM SERPL-MCNC: 8.4 MG/DL (ref 8.7–10.5)
CHLORIDE SERPL-SCNC: 107 MMOL/L (ref 95–110)
CO2 SERPL-SCNC: 30 MMOL/L (ref 23–29)
CREAT SERPL-MCNC: 0.9 MG/DL (ref 0.5–1.4)
DIFFERENTIAL METHOD: ABNORMAL
EOSINOPHIL # BLD AUTO: 1.5 K/UL (ref 0–0.5)
EOSINOPHIL NFR BLD: 17 % (ref 0–8)
ERYTHROCYTE [DISTWIDTH] IN BLOOD BY AUTOMATED COUNT: 15.7 % (ref 11.5–14.5)
EST. GFR  (AFRICAN AMERICAN): >60 ML/MIN/1.73 M^2
EST. GFR  (NON AFRICAN AMERICAN): >60 ML/MIN/1.73 M^2
GLUCOSE SERPL-MCNC: 95 MG/DL (ref 70–110)
HCT VFR BLD AUTO: 33 % (ref 37–48.5)
HGB BLD-MCNC: 10.4 G/DL (ref 12–16)
IMM GRANULOCYTES # BLD AUTO: 0.02 K/UL (ref 0–0.04)
IMM GRANULOCYTES NFR BLD AUTO: 0.2 % (ref 0–0.5)
LYMPHOCYTES # BLD AUTO: 2.2 K/UL (ref 1–4.8)
LYMPHOCYTES NFR BLD: 25.3 % (ref 18–48)
MAGNESIUM SERPL-MCNC: 1.8 MG/DL (ref 1.6–2.6)
MCH RBC QN AUTO: 27.8 PG (ref 27–31)
MCHC RBC AUTO-ENTMCNC: 31.5 G/DL (ref 32–36)
MCV RBC AUTO: 88 FL (ref 82–98)
MONOCYTES # BLD AUTO: 1 K/UL (ref 0.3–1)
MONOCYTES NFR BLD: 11 % (ref 4–15)
NEUTROPHILS # BLD AUTO: 4 K/UL (ref 1.8–7.7)
NEUTROPHILS NFR BLD: 45.8 % (ref 38–73)
NRBC BLD-RTO: 0 /100 WBC
PHOSPHATE SERPL-MCNC: 3.3 MG/DL (ref 2.7–4.5)
PLATELET # BLD AUTO: 408 K/UL (ref 150–350)
PMV BLD AUTO: 10.4 FL (ref 9.2–12.9)
POTASSIUM SERPL-SCNC: 4 MMOL/L (ref 3.5–5.1)
RBC # BLD AUTO: 3.74 M/UL (ref 4–5.4)
SODIUM SERPL-SCNC: 142 MMOL/L (ref 136–145)
WBC # BLD AUTO: 8.69 K/UL (ref 3.9–12.7)

## 2020-02-02 PROCEDURE — 25000003 PHARM REV CODE 250: Performed by: PHYSICIAN ASSISTANT

## 2020-02-02 PROCEDURE — 25000003 PHARM REV CODE 250: Performed by: STUDENT IN AN ORGANIZED HEALTH CARE EDUCATION/TRAINING PROGRAM

## 2020-02-02 PROCEDURE — G0378 HOSPITAL OBSERVATION PER HR: HCPCS

## 2020-02-02 PROCEDURE — 99217 PR OBSERVATION CARE DISCHARGE: CPT | Mod: ,,, | Performed by: PHYSICIAN ASSISTANT

## 2020-02-02 PROCEDURE — 99900035 HC TECH TIME PER 15 MIN (STAT)

## 2020-02-02 PROCEDURE — 84100 ASSAY OF PHOSPHORUS: CPT

## 2020-02-02 PROCEDURE — 94761 N-INVAS EAR/PLS OXIMETRY MLT: CPT

## 2020-02-02 PROCEDURE — 99217 PR OBSERVATION CARE DISCHARGE: ICD-10-PCS | Mod: ,,, | Performed by: PHYSICIAN ASSISTANT

## 2020-02-02 PROCEDURE — 85025 COMPLETE CBC W/AUTO DIFF WBC: CPT

## 2020-02-02 PROCEDURE — 80048 BASIC METABOLIC PNL TOTAL CA: CPT

## 2020-02-02 PROCEDURE — 83735 ASSAY OF MAGNESIUM: CPT

## 2020-02-02 RX ADMIN — MELOXICAM 15 MG: 15 TABLET ORAL at 09:02

## 2020-02-02 RX ADMIN — ASPIRIN 81 MG CHEWABLE TABLET 81 MG: 81 TABLET CHEWABLE at 09:02

## 2020-02-02 RX ADMIN — METOPROLOL SUCCINATE 25 MG: 25 TABLET, EXTENDED RELEASE ORAL at 09:02

## 2020-02-02 NOTE — DISCHARGE SUMMARY
[facility] Hospital Medicine  Discharge Summary      Patient Name: Marleny Guzman  MRN: [id number]  Admission Date: 1/31/2020  Hospital Length of Stay: 0 days  Discharge Date and Time:  02/02/2020 2:27 PM  Attending Physician: Christine Abel MD   Discharging Provider: Ana Smith PA-C  Primary Care Provider: Bridget Cantu MD  [facility] Medicine Team: [facility] MED F Ana Smith PA-C    HPI:   70 yo F with history of HTN and tooth extraction 2 weeks ago presenting after syncopal episode today. She reports that for the past 2 weeks she has been feeling weak with subjective fevers and decreased appetite. Today the weakness worsened while at work and she experienced an unwitnessed syncopal episode (described as feeling hot and nauseated before everything going black) hitting her right forehead on a door handle. After the fall she had a right sided headache and right sided thoracic pain. She preceded to go to the [facility] for an appointment and while there someone called her daughter to let her know that the patient was having trouble balancing. At that point she was brought to the ED. On arrival she endorses headache, weakness, right sided chest pain that follows the costal margin to the back. She denies visual changes, pressure like chest pain, abdominal pain, bloody or dark stool, vomiting, shortness of breath, worsening lower extremity edema.      Of note after the tooth (upper right sided molar) extraction she has had drainage of consumed liquids out of her nose. No purulent drainage. She completed a course of antibiotics after the procedure.      * No surgery found *      Hospital Course:   Mrs. Guzman was admitted to observation for ED OU for elevated troponin and syncope. Cardiology evaluated patient for elevated troponin (.165-->.254-->.259), do not suspect ACS, do not recommend continuing to trend. CTA negative for PE. Suspect 2/2 to supply demand mismatch from suspected  Sepsis. Syncope likely vasovagal in setting of dehydration and sepsis. Orthostatics negative. Given IVF. Leukocytosis 19--> 15, but suspect hemoconcentrated on admit. Patient spiked temperature of 100.5F. Given 2L of IVF. No evidence of infection, abscess, swelling in mouth from recent dental work. Blood cultures NGTD, CXR clear, UA clean, flu negative. Patient with one episode of loose stool but none since. Leukocytosis resolved, no further fevers. Suspect caused by viral syndrome. Patient discharged on home medications. PCP follow up in 1 week. Patient verbalized understanding. All questions answered.     Consults:     * SIRS (systemic inflammatory response syndrome)  Patient with recent dental work (tooth extraction, edentulous now) 3 weeks ago. She completed 7 day course of ampicillin post procedure. Since procedure, patient has been experiencing chills, fatigue, subjective fevers, decreased appetite, resulting in two syncopal episodes prompting ED visit.    - 2/4 SIRS (WBC 17-->15, Temp 100.5)  - Giving 2 L IVF, will hold off on antibiotics for now unless temp > 101F  - Unclear eitology  - CTA maxillary sinuses unremarkable, No abscess, discharge, swelling noted in mouth  - Given recent dental work, always concern for bacteremia/ endocarditis but no new murmur, or physical exam findings  - Blood cultures NGTD, if positive will get TTE  - CXR/ CTA without evidence of infection  - Procal elevated at 1.06 but limited utility as CXR negative  - Lactate WNL  - UA clear   - Flu negative, strep throat pending  - 1 episode of foul smelling loose stool today, if any further episodes will order C diff (recent ampicillin use) and stool studies- no further episodes  - leukocytosis resolved and no further fevers  - suspect viral infection  - PCP follow up in 1 week  - will follow up blood cultures      Syncope  Patient with two episodes of syncope with aura of tunnel vision, diaphoresis, nausea, and heat prior to episodes,  consistent with vasovagal.   - likely from dehydration and sepsis  - orthostatics negative  - small .5cm  laceration on right brow, no stitches needed  - given IVF    Elevated troponin  - troponin .165--> .254-->.259   - D-dimer elevated, CTA negative  - Cardiology evaluated patient in ED  - likely 2/2 to supply demand mismatch from suspected sepsis  - do not recommending trending or treating for ACS      Final Active Diagnoses:    Diagnosis Date Noted POA    PRINCIPAL PROBLEM:  SIRS (systemic inflammatory response syndrome) [R65.10] 02/01/2020 Yes    Elevated troponin [R79.89] 01/31/2020 Yes    Syncope [R55] 01/31/2020 Yes    Sleep apnea [G47.30] 10/22/2013 Yes    Depression [F32.9] 09/26/2013 Yes     Chronic    HTN (hypertension) [I10] 04/04/2013 Yes     Chronic    Anxiety [F41.9] 07/23/2012 Yes     Chronic      Problems Resolved During this Admission:       Discharged Condition: good    Disposition: Home or Self Care    Follow Up:  Follow-up Information     Bridget Cantu MD In 1 week.    Specialty:  Internal Medicine  Contact information:  [address]  [place] [address]  [phone]                 Patient Instructions:      Diet Cardiac     Notify your health care provider if you experience any of the following:  temperature >100.4     Notify your health care provider if you experience any of the following:  severe uncontrolled pain     Notify your health care provider if you experience any of the following:  persistent nausea and vomiting or diarrhea     Notify your health care provider if you experience any of the following:  increased confusion or weakness     Activity as tolerated       Significant Diagnostic Studies: Labs:   CMP   Recent Labs   Lab 01/31/20  1627 02/02/20  0403    142   K 3.5 4.0    107   CO2 26 30*   * 95   BUN 17 11   CREATININE 1.0 0.9   CALCIUM 9.4 8.4*   PROT 7.7  --    ALBUMIN 3.2*  --    BILITOT 0.5  --    ALKPHOS 129  --    AST 22  --     ALT 21  --    ANIONGAP 11 5*   ESTGFRAFRICA >60.0 >60.0   EGFRNONAA 57.2* >60.0   , CBC   Recent Labs   Lab 01/31/20  1627 02/01/20  0544 02/02/20  0403   WBC 19.43* 15.10* 8.69   HGB 12.0 10.6* 10.4*   HCT 37.7 32.6* 33.0*   * 385* 408*   , Troponin   Recent Labs   Lab 02/01/20  0432   TROPONINI 0.259*    and A1C:   Recent Labs   Lab 11/26/19  1344   HGBA1C 6.0*     Microbiology:   Blood Culture   Lab Results   Component Value Date    LABBLOO No Growth to date 02/01/2020    LABBLOO No Growth to date 02/01/2020    LABBLOO No Growth to date 02/01/2020    LABBLOO No Growth to date 02/01/2020       Pending Diagnostic Studies:     Procedure Component Value Units Date/Time    CT Maxillofacial Without Contrast [[id number]]     Order Status:  Sent Lab Status:  No result          Medications:  Reconciled Home Medications:      Medication List      CONTINUE taking these medications    albuterol 90 mcg/actuation inhaler  Commonly known as:  PROVENTIL/VENTOLIN HFA  Inhale 2 puffs into the lungs every 6 (six) hours as needed for Wheezing.     aspirin 81 MG Chew  Take 81 mg by mouth once daily.     betamethasone dipropionate 0.05 % ointment  Commonly known as:  DIPROLENE  Use every AM x 2 weeks, then twice weekly     clobetasol 0.05% 0.05 % Oint  Commonly known as:  TEMOVATE  Use daily in AM twice weekly     escitalopram oxalate 20 MG tablet  Commonly known as:  LEXAPRO  1.5 tablets daily     estradiol 0.01 % (0.1 mg/gram) vaginal cream  Commonly known as:  ESTRACE  Use 1 GM nightly vaginally and dime size amount on vulvatwice weekly     fluticasone propionate 50 mcg/actuation nasal spray  Commonly known as:  Flonase Allergy Relief  1 spray (50 mcg total) by Each Nare route once daily.     guaiFENesin 600 mg 12 hr tablet  Commonly known as:  Mucinex  Take 1 tablet (600 mg total) by mouth 2 (two) times daily.     hydroCHLOROthiazide 12.5 MG Tab  Commonly known as:  HYDRODIURIL  Take 1 tablet (12.5 mg total) by mouth once  daily.     irbesartan-hydrochlorothiazide 300-12.5 mg per tablet  Commonly known as:  AVALIDE  Take 1 tablet by mouth once daily.     loratadine 10 mg tablet  Commonly known as:  Claritin  Take 1 tablet (10 mg total) by mouth once daily.     LORazepam 0.5 MG tablet  Commonly known as:  ATIVAN  Half - one daily as needed for anxiety     meloxicam 15 MG tablet  Commonly known as:  MOBIC  Take 1 tablet (15 mg total) by mouth once daily.     metoprolol succinate 50 MG 24 hr tablet  Commonly known as:  TOPROL-XL  TAKE 1 TABLET BY MOUTH EVERY DAY     promethazine 6.25 mg/5 mL syrup  Commonly known as:  PHENERGAN  Take 5 mLs (6.25 mg total) by mouth 4 (four) times daily as needed.     tiZANidine 2 MG tablet  Commonly known as:  ZANAFLEX  One at bedtime as needed for neck pain     vit A-vit C-zinc-propolis Lozg  Commonly known as:  Zinc (with A and C) Lozenges  Take 1 lozenge by mouth every 2 (two) hours as needed.     Vitamin D2 50,000 unit Cap  Generic drug:  ergocalciferol  TAKE 1 CAPSULE (50,000 UNITS TOTAL) BY MOUTH EVERY 30 DAYS.            Indwelling Lines/Drains at time of discharge:   Lines/Drains/Airways     None                 Time spent on the discharge of patient: 36 minutes  Patient was seen and examined on the date of discharge and determined to be suitable for discharge.         Ana Smith PA-C  Department of Hospital Medicine  [facility]

## 2020-02-02 NOTE — ASSESSMENT & PLAN NOTE
Patient with two episodes of syncope with aura of tunnel vision, diaphoresis, nausea, and heat prior to episodes, consistent with vasovagal.   - likely from dehydration and sepsis  - orthostatics negative  - small .5cm  laceration on right brow, no stitches needed  - given IVF

## 2020-02-02 NOTE — ASSESSMENT & PLAN NOTE
Patient with recent dental work (tooth extraction, edentulous now) 3 weeks ago. She completed 7 day course of ampicillin post procedure. Since procedure, patient has been experiencing chills, fatigue, subjective fevers, decreased appetite, resulting in two syncopal episodes prompting ED visit.    - 2/4 SIRS (WBC 17-->15, Temp 100.5)  - Giving 2 L IVF, will hold off on antibiotics for now unless temp > 101F  - Unclear eitology  - CTA maxillary sinuses unremarkable, No abscess, discharge, swelling noted in mouth  - Given recent dental work, always concern for bacteremia/ endocarditis but no new murmur, or physical exam findings  - Blood cultures NGTD, if positive will get TTE  - CXR/ CTA without evidence of infection  - Procal elevated at 1.06 but limited utility as CXR negative  - Lactate WNL  - UA clear   - Flu negative, strep throat pending  - 1 episode of foul smelling loose stool today, if any further episodes will order C diff (recent ampicillin use) and stool studies- no further episodes  - leukocytosis resolved and no further fevers  - suspect viral infection  - PCP follow up in 1 week  - will follow up blood cultures

## 2020-02-03 NOTE — NURSING
Pt discharged per MD orders.  Tele discontinued and returned to station.  IV discontinued; catheter tip intact x.  Medication list and prescriptions reviewed; prescriptions sent to pt preferred pharmacy and printed prescriptions provided.  Pt verbalizes understanding of all written and verbal discharge instructions.  Pt awaiting family/escort arrival.

## 2020-02-04 ENCOUNTER — PATIENT OUTREACH (OUTPATIENT)
Dept: ADMINISTRATIVE | Facility: HOSPITAL | Age: 70
End: 2020-02-04

## 2020-02-06 LAB
BACTERIA BLD CULT: NORMAL
BACTERIA BLD CULT: NORMAL

## 2020-02-08 ENCOUNTER — HOSPITAL ENCOUNTER (EMERGENCY)
Facility: HOSPITAL | Age: 70
Discharge: HOME | End: 2020-02-08
Attending: EMERGENCY MEDICINE
Payer: MEDICARE

## 2020-02-08 VITALS
DIASTOLIC BLOOD PRESSURE: 75 MMHG | HEART RATE: 92 BPM | RESPIRATION RATE: 20 BRPM | OXYGEN SATURATION: 94 % | TEMPERATURE: 98 F | HEIGHT: 70 IN | SYSTOLIC BLOOD PRESSURE: 190 MMHG | WEIGHT: 198 LBS | BODY MASS INDEX: 28.35 KG/M2

## 2020-02-08 DIAGNOSIS — J20.9 ACUTE BRONCHITIS, UNSPECIFIED ORGANISM: Primary | ICD-10-CM

## 2020-02-08 DIAGNOSIS — R05.9 COUGH: ICD-10-CM

## 2020-02-08 LAB
ALBUMIN SERPL BCP-MCNC: 3.5 G/DL (ref 3.5–5.2)
ALP SERPL-CCNC: 94 U/L (ref 55–135)
ALT SERPL W/O P-5'-P-CCNC: 39 U/L (ref 10–44)
ANION GAP SERPL CALC-SCNC: 8 MMOL/L (ref 8–16)
APTT PPP: 32.9 SEC (ref 23.6–33.3)
AST SERPL-CCNC: 44 U/L (ref 10–40)
BACTERIA #/AREA URNS HPF: NORMAL /HPF
BASOPHILS # BLD AUTO: 0.13 K/UL (ref 0–0.2)
BASOPHILS NFR BLD: 1.3 % (ref 0–1.9)
BILIRUB SERPL-MCNC: 0.5 MG/DL (ref 0.1–1)
BILIRUB UR QL STRIP: ABNORMAL
BUN SERPL-MCNC: 20 MG/DL (ref 8–23)
CALCIUM SERPL-MCNC: 8.9 MG/DL (ref 8.7–10.5)
CHLORIDE SERPL-SCNC: 103 MMOL/L (ref 95–110)
CK MB SERPL-MCNC: 2.1 NG/ML (ref 0.1–6.5)
CK SERPL-CCNC: 90 U/L (ref 20–180)
CLARITY UR: CLEAR
CO2 SERPL-SCNC: 27 MMOL/L (ref 23–29)
COLOR UR: YELLOW
CREAT SERPL-MCNC: 1.1 MG/DL (ref 0.5–1.4)
DIFFERENTIAL METHOD: ABNORMAL
EOSINOPHIL # BLD AUTO: 0.4 K/UL (ref 0–0.5)
EOSINOPHIL NFR BLD: 4.4 % (ref 0–8)
ERYTHROCYTE [DISTWIDTH] IN BLOOD BY AUTOMATED COUNT: 15.8 % (ref 11.5–14.5)
EST. GFR  (AFRICAN AMERICAN): 58.8 ML/MIN/1.73 M^2
EST. GFR  (NON AFRICAN AMERICAN): 51 ML/MIN/1.73 M^2
GLUCOSE SERPL-MCNC: 97 MG/DL (ref 70–110)
GLUCOSE UR QL STRIP: NEGATIVE
HCT VFR BLD AUTO: 37.3 % (ref 37–48.5)
HGB BLD-MCNC: 12 G/DL (ref 12–16)
HGB UR QL STRIP: ABNORMAL
HYALINE CASTS #/AREA URNS LPF: 0 /LPF
IMM GRANULOCYTES # BLD AUTO: 0.02 K/UL (ref 0–0.04)
IMM GRANULOCYTES NFR BLD AUTO: 0.2 % (ref 0–0.5)
INFLUENZA A, MOLECULAR: NEGATIVE
INFLUENZA B, MOLECULAR: NEGATIVE
KETONES UR QL STRIP: ABNORMAL
LEUKOCYTE ESTERASE UR QL STRIP: NEGATIVE
LYMPHOCYTES # BLD AUTO: 3.1 K/UL (ref 1–4.8)
LYMPHOCYTES NFR BLD: 31.2 % (ref 18–48)
MAGNESIUM SERPL-MCNC: 1.9 MG/DL (ref 1.6–2.6)
MCH RBC QN AUTO: 27.6 PG (ref 27–31)
MCHC RBC AUTO-ENTMCNC: 32.2 G/DL (ref 32–36)
MCV RBC AUTO: 86 FL (ref 82–98)
MICROSCOPIC COMMENT: NORMAL
MONOCYTES # BLD AUTO: 0.9 K/UL (ref 0.3–1)
MONOCYTES NFR BLD: 8.9 % (ref 4–15)
NEUTROPHILS # BLD AUTO: 5.3 K/UL (ref 1.8–7.7)
NEUTROPHILS NFR BLD: 54 % (ref 38–73)
NITRITE UR QL STRIP: NEGATIVE
NRBC BLD-RTO: 0 /100 WBC
PH UR STRIP: 6 [PH] (ref 5–8)
PLATELET # BLD AUTO: 474 K/UL (ref 150–350)
PMV BLD AUTO: 9.6 FL (ref 9.2–12.9)
POTASSIUM SERPL-SCNC: 3.6 MMOL/L (ref 3.5–5.1)
PROCALCITONIN SERPL IA-MCNC: 0.17 NG/ML (ref 0–0.5)
PROT SERPL-MCNC: 7.8 G/DL (ref 6–8.4)
PROT UR QL STRIP: ABNORMAL
RBC # BLD AUTO: 4.34 M/UL (ref 4–5.4)
RBC #/AREA URNS HPF: 2 /HPF (ref 0–4)
SODIUM SERPL-SCNC: 138 MMOL/L (ref 136–145)
SP GR UR STRIP: 1.02 (ref 1–1.03)
SPECIMEN SOURCE: NORMAL
TROPONIN I SERPL DL<=0.01 NG/ML-MCNC: <0.03 NG/ML
URN SPEC COLLECT METH UR: ABNORMAL
UROBILINOGEN UR STRIP-ACNC: 1 EU/DL
WBC # BLD AUTO: 9.8 K/UL (ref 3.9–12.7)
WBC #/AREA URNS HPF: 0 /HPF (ref 0–5)

## 2020-02-08 PROCEDURE — 25000242 PHARM REV CODE 250 ALT 637 W/ HCPCS: Performed by: EMERGENCY MEDICINE

## 2020-02-08 PROCEDURE — 99284 EMERGENCY DEPT VISIT MOD MDM: CPT | Mod: 25

## 2020-02-08 PROCEDURE — 85730 THROMBOPLASTIN TIME PARTIAL: CPT

## 2020-02-08 PROCEDURE — 94640 AIRWAY INHALATION TREATMENT: CPT | Mod: 76

## 2020-02-08 PROCEDURE — 84484 ASSAY OF TROPONIN QUANT: CPT

## 2020-02-08 PROCEDURE — 85025 COMPLETE CBC W/AUTO DIFF WBC: CPT

## 2020-02-08 PROCEDURE — 84145 PROCALCITONIN (PCT): CPT

## 2020-02-08 PROCEDURE — 25000003 PHARM REV CODE 250: Performed by: EMERGENCY MEDICINE

## 2020-02-08 PROCEDURE — 87502 INFLUENZA DNA AMP PROBE: CPT

## 2020-02-08 PROCEDURE — 99900035 HC TECH TIME PER 15 MIN (STAT)

## 2020-02-08 PROCEDURE — 36415 COLL VENOUS BLD VENIPUNCTURE: CPT

## 2020-02-08 PROCEDURE — 82553 CREATINE MB FRACTION: CPT

## 2020-02-08 PROCEDURE — 94761 N-INVAS EAR/PLS OXIMETRY MLT: CPT

## 2020-02-08 PROCEDURE — 80053 COMPREHEN METABOLIC PANEL: CPT

## 2020-02-08 PROCEDURE — 63600175 PHARM REV CODE 636 W HCPCS: Performed by: EMERGENCY MEDICINE

## 2020-02-08 PROCEDURE — 81001 URINALYSIS AUTO W/SCOPE: CPT

## 2020-02-08 PROCEDURE — 83735 ASSAY OF MAGNESIUM: CPT

## 2020-02-08 PROCEDURE — 82550 ASSAY OF CK (CPK): CPT

## 2020-02-08 RX ORDER — BENZONATATE 100 MG/1
100 CAPSULE ORAL 3 TIMES DAILY PRN
Status: DISCONTINUED | OUTPATIENT
Start: 2020-02-08 | End: 2020-02-08 | Stop reason: HOSPADM

## 2020-02-08 RX ORDER — PREDNISONE 50 MG/1
50 TABLET ORAL DAILY
Qty: 10 TABLET | Refills: 0 | Status: SHIPPED | OUTPATIENT
Start: 2020-02-08 | End: 2020-02-13

## 2020-02-08 RX ORDER — ALBUTEROL SULFATE 90 UG/1
1-2 AEROSOL, METERED RESPIRATORY (INHALATION) EVERY 6 HOURS PRN
Qty: 1 G | Refills: 1 | Status: SHIPPED | OUTPATIENT
Start: 2020-02-08 | End: 2021-06-07

## 2020-02-08 RX ORDER — BENZONATATE 100 MG/1
200 CAPSULE ORAL 3 TIMES DAILY PRN
Qty: 30 CAPSULE | Refills: 1 | Status: SHIPPED | OUTPATIENT
Start: 2020-02-08 | End: 2020-03-09

## 2020-02-08 RX ORDER — LEVALBUTEROL 1.25 MG/.5ML
1.25 SOLUTION, CONCENTRATE RESPIRATORY (INHALATION)
Status: COMPLETED | OUTPATIENT
Start: 2020-02-08 | End: 2020-02-08

## 2020-02-08 RX ORDER — AZITHROMYCIN 250 MG/1
250 TABLET, FILM COATED ORAL DAILY
Qty: 5 TABLET | Refills: 0 | Status: SHIPPED | OUTPATIENT
Start: 2020-02-08 | End: 2020-02-13

## 2020-02-08 RX ORDER — AZITHROMYCIN 250 MG/1
500 TABLET, FILM COATED ORAL
Status: DISCONTINUED | OUTPATIENT
Start: 2020-02-08 | End: 2020-02-08 | Stop reason: HOSPADM

## 2020-02-08 RX ORDER — IPRATROPIUM BROMIDE AND ALBUTEROL SULFATE 2.5; .5 MG/3ML; MG/3ML
3 SOLUTION RESPIRATORY (INHALATION)
Status: COMPLETED | OUTPATIENT
Start: 2020-02-08 | End: 2020-02-08

## 2020-02-08 RX ORDER — PREDNISONE 20 MG/1
60 TABLET ORAL
Status: COMPLETED | OUTPATIENT
Start: 2020-02-08 | End: 2020-02-08

## 2020-02-08 RX ORDER — IPRATROPIUM BROMIDE 0.5 MG/2.5ML
0.5 SOLUTION RESPIRATORY (INHALATION)
Status: COMPLETED | OUTPATIENT
Start: 2020-02-08 | End: 2020-02-08

## 2020-02-08 RX ADMIN — PREDNISONE 60 MG: 20 TABLET ORAL at 07:02

## 2020-02-08 RX ADMIN — LEVALBUTEROL HYDROCHLORIDE 1.25 MG: 1.25 SOLUTION, CONCENTRATE RESPIRATORY (INHALATION) at 06:02

## 2020-02-08 RX ADMIN — IPRATROPIUM BROMIDE AND ALBUTEROL SULFATE 3 ML: .5; 3 SOLUTION RESPIRATORY (INHALATION) at 08:02

## 2020-02-08 RX ADMIN — IPRATROPIUM BROMIDE 0.5 MG: 0.5 SOLUTION RESPIRATORY (INHALATION) at 06:02

## 2020-02-08 RX ADMIN — BENZONATATE 100 MG: 100 CAPSULE ORAL at 07:02

## 2020-02-09 NOTE — ED PROVIDER NOTES
Encounter Date: 2/8/2020       History     Chief Complaint   Patient presents with    Cough     X 2 WEEKS     69-year-old female presented emergency department with 3 days productive cough.  Patient denies chest pain or abdominal pain or fever chills.  Patient has cough and wheezing.  Patient denies dysuria or hematuria weakness or numbness.        Review of patient's allergies indicates:  No Known Allergies  Past Medical History:   Diagnosis Date    Abnormal Pap smear     Abnormal Pap smear of cervix     Abnormal Pap smear of vagina 7/20/2016    Allergy     Anemia     Anxiety     Anxiety     Cataract     Chronic bilateral low back pain without sciatica 3/13/2018    Depression     Depression with anxiety     Hypertension     Osteoarthritis 6/20/2013    Right rotator cuff tear     Sleep apnea     Cipap machine at home    Trouble in sleeping     Urinary incontinence     Leaks urine with laughing/ coughing/ sneezing     Past Surgical History:   Procedure Laterality Date    CATARACT EXTRACTION  6/18/12    OS    CATARACT EXTRACTION  7/16/12    OD    CERVIX SURGERY      Conization    COLONOSCOPY      COLONOSCOPY N/A 3/4/2016    Procedure: COLONOSCOPY;  Surgeon: Tenzin Thakkar MD;  Location: [facility] ([address] [place] [address]);  Service: Endoscopy;  Laterality: N/A;    EYE SURGERY Bilateral     cataract    HYSTERECTOMY  5-12-15    LEEP      LUMBAR EPIDURAL INJECTION      OOPHORECTOMY      OR REMOVAL OF OVARY/TUBE(S)  5-12-15    SALPINGECTOMY Left     SHOULDER SURGERY Right      Family History   Problem Relation Age of Onset    Hypertension Father     Cataracts Father     Cancer Mother         THYROID CANCER    Stroke Mother     Breast cancer Cousin     Heart disease Sister     Pacemaker/defibrilator Sister     Hypertension Sister     Deep vein thrombosis Sister     Heart disease Sister         CABG    Osteoarthritis Sister     Edema Sister     Eczema Sister     Hypertension Sister      "Anuerysm Sister     Drug abuse Sister     Hypertension Sister     No Known Problems Brother     No Known Problems Sister     Cancer Sister         Cancer cells or "growth in her stomach"    No Known Problems Sister     Hypertension Daughter     Other Daughter         Heart palpitations    Depression Maternal Grandmother     Ulcers Maternal Grandfather         Legs    No Known Problems Paternal Grandmother     No Known Problems Paternal Grandfather     Cancer Maternal Aunt     ADD / ADHD Neg Hx     Alcohol abuse Neg Hx     Anxiety disorder Neg Hx     Bipolar disorder Neg Hx     Dementia Neg Hx     OCD Neg Hx     Paranoid behavior Neg Hx     Physical abuse Neg Hx     Schizophrenia Neg Hx     Seizures Neg Hx     Sexual abuse Neg Hx     Amblyopia Neg Hx     Blindness Neg Hx     Glaucoma Neg Hx     Macular degeneration Neg Hx     Retinal detachment Neg Hx     Strabismus Neg Hx      Social History     Tobacco Use    Smoking status: Former Smoker     Packs/day: 0.50     Years: 22.00     Pack years: 11.00     Types: Cigarettes     Last attempt to quit: 1998     Years since quittin.0    Smokeless tobacco: Never Used   Substance Use Topics    Alcohol use: Yes     Alcohol/week: 0.0 standard drinks     Comment: Occassionally for social events will have a glass of wine    Drug use: No     Review of Systems   Constitutional: Negative.    HENT: Negative.    Eyes: Negative.    Respiratory: Positive for choking and wheezing.    Cardiovascular: Negative.  Negative for chest pain.   Gastrointestinal: Negative.    Endocrine: Negative.    Genitourinary: Negative.    Musculoskeletal: Negative.    Skin: Negative.    Allergic/Immunologic: Negative.    Neurological: Negative.    Hematological: Negative.    Psychiatric/Behavioral: Negative.    All other systems reviewed and are negative.      Physical Exam     Initial Vitals [20 1621]   BP Pulse Resp Temp SpO2   131/71 93 18 98.3 °F (36.8 °C) " 95 %      MAP       --         Physical Exam    Nursing note and vitals reviewed.  Constitutional: She appears well-developed and well-nourished.   HENT:   Head: Normocephalic and atraumatic.   Nose: Nose normal.   Mouth/Throat: Oropharynx is clear and moist.   Eyes: Conjunctivae and EOM are normal. Pupils are equal, round, and reactive to light.   Neck: Normal range of motion. Neck supple. No thyromegaly present. No tracheal deviation present. No JVD present.   Cardiovascular: Normal rate, regular rhythm, normal heart sounds and intact distal pulses.   No murmur heard.  Pulmonary/Chest: No stridor. No respiratory distress. She has wheezes. She has no rales.   Abdominal: Soft. Bowel sounds are normal.   Musculoskeletal: Normal range of motion. She exhibits no edema.   Neurological: She is alert and oriented to person, place, and time.   Skin: Skin is warm. Capillary refill takes less than 2 seconds.   Psychiatric: She has a normal mood and affect. Thought content normal.         ED Course   Procedures  Labs Reviewed   CBC W/ AUTO DIFFERENTIAL - Abnormal; Notable for the following components:       Result Value    RDW 15.8 (*)     Platelets 474 (*)     All other components within normal limits   COMPREHENSIVE METABOLIC PANEL - Abnormal; Notable for the following components:    AST 44 (*)     eGFR if  58.8 (*)     eGFR if non  51.0 (*)     All other components within normal limits   URINALYSIS, REFLEX TO URINE CULTURE - Abnormal; Notable for the following components:    Protein, UA 1+ (*)     Ketones, UA Trace (*)     Bilirubin (UA) 1+ (*)     Occult Blood UA Trace (*)     All other components within normal limits    Narrative:     Preferred Collection Type->Urine, Clean Catch  Specimen Source->Urine   URINALYSIS MICROSCOPIC    Narrative:     Preferred Collection Type->Urine, Clean Catch  Specimen Source->Urine   PROTIME-INR   APTT   MAGNESIUM   TROPONIN I   CK-MB   CK   PROCALCITONIN    INFLUENZA A AND B ANTIGEN          Imaging Results          X-Ray Chest PA And Lateral (In process)               X-Rays:   Independently Interpreted Readings:   Other Readings:  Chest x-ray does not show any acute changes    Medical Decision Making:   Initial Assessment:   Care transferred to Dr. Vee  Differential Diagnosis:   69-year-old female with cough and wheezing and mild shortness of breath secondary to cough and wheezing.  Patient's symptoms resolved with treatment.  No clear infiltrates on chest x-ray.  Screening labs unremarkable and as patient feeling better will discharge with instructions and follow up with primary care provider.  Will start treatment with antibiotics.  Clinical Tests:   Lab Tests: Reviewed  Radiological Study: Reviewed                                 Clinical Impression:       ICD-10-CM ICD-9-CM   1. Acute bronchitis, unspecified organism J20.9 466.0   2. Cough R05 786.2                             Breanne Vee MD  02/08/20 205

## 2020-02-09 NOTE — CARE UPDATE
02/08/20 2057   Patient Assessment/Suction   Level of Consciousness (AVPU) alert   Respiratory Effort Normal;Unlabored   Expansion/Accessory Muscles/Retractions expansion symmetric;no retractions;no use of accessory muscles   All Lung Fields Breath Sounds clear   Cough Frequency infrequent   Cough Type congested;fair;nonproductive   PRE-TX-O2   O2 Device (Oxygen Therapy) room air   SpO2 (!) 94 %   Pulse Oximetry Type Intermittent   $ Pulse Oximetry - Multiple Charge Pulse Oximetry - Multiple   Pulse 91   Resp 16   Aerosol Therapy   $ Aerosol Therapy Charges Aerosol Treatment;Mouth rinsed post treatment   Daily Review of Necessity (SVN) completed   Respiratory Treatment Status (SVN) given   Treatment Route (SVN) air;mask   Patient Position (SVN) Roberson's   Post Treatment Assessment (SVN) breath sounds unchanged   Signs of Intolerance (SVN) none   Breath Sounds Post-Respiratory Treatment   Throughout All Fields Post-Treatment All Fields   Throughout All Fields Post-Treatment no change   Post-treatment Heart Rate (beats/min) 81   Post-treatment Resp Rate (breaths/min) 16   Respiratory Interventions   Cough And Deep Breathing done with encouragement   Breathing Techniques/Airway Clearance deep/controlled cough encouraged;diaphragmatic breathing promoted   Respiratory Evaluation   $ Care Plan Tech Time 15 min   Evaluation For New Orders   Admitting Diagnosis Chest pain, coughing   Pulmonary Diagnosis ALVAREZ  (Wears CPAP at home.)   Home Oxygen   Has Home Oxygen? No   Home Aerosol, MDI, DPI, and Other Treatments/Therapies   Home Respiratory Therapy Per Patient/Review of Chart No

## 2020-02-19 ENCOUNTER — LAB VISIT (OUTPATIENT)
Dept: LAB | Facility: HOSPITAL | Age: 70
End: 2020-02-19
Attending: INTERNAL MEDICINE
Payer: MEDICARE

## 2020-02-19 DIAGNOSIS — E55.9 MILD VITAMIN D DEFICIENCY: ICD-10-CM

## 2020-02-19 DIAGNOSIS — I10 ESSENTIAL HYPERTENSION: Chronic | ICD-10-CM

## 2020-02-19 DIAGNOSIS — R73.9 HYPERGLYCEMIA: ICD-10-CM

## 2020-02-19 DIAGNOSIS — E78.5 HYPERLIPIDEMIA, UNSPECIFIED HYPERLIPIDEMIA TYPE: ICD-10-CM

## 2020-02-19 LAB
25(OH)D3+25(OH)D2 SERPL-MCNC: 32 NG/ML (ref 30–96)
ALBUMIN SERPL BCP-MCNC: 2.9 G/DL (ref 3.5–5.2)
ALP SERPL-CCNC: 110 U/L (ref 55–135)
ALT SERPL W/O P-5'-P-CCNC: 21 U/L (ref 10–44)
ANION GAP SERPL CALC-SCNC: 9 MMOL/L (ref 8–16)
AST SERPL-CCNC: 18 U/L (ref 10–40)
BASOPHILS # BLD AUTO: 0.03 K/UL (ref 0–0.2)
BASOPHILS NFR BLD: 0.3 % (ref 0–1.9)
BILIRUB SERPL-MCNC: 0.6 MG/DL (ref 0.1–1)
BUN SERPL-MCNC: 17 MG/DL (ref 8–23)
CALCIUM SERPL-MCNC: 9.2 MG/DL (ref 8.7–10.5)
CHLORIDE SERPL-SCNC: 104 MMOL/L (ref 95–110)
CHOLEST SERPL-MCNC: 154 MG/DL (ref 120–199)
CHOLEST/HDLC SERPL: 4.5 {RATIO} (ref 2–5)
CO2 SERPL-SCNC: 28 MMOL/L (ref 23–29)
CREAT SERPL-MCNC: 0.9 MG/DL (ref 0.5–1.4)
DIFFERENTIAL METHOD: ABNORMAL
EOSINOPHIL # BLD AUTO: 0.5 K/UL (ref 0–0.5)
EOSINOPHIL NFR BLD: 4.6 % (ref 0–8)
ERYTHROCYTE [DISTWIDTH] IN BLOOD BY AUTOMATED COUNT: 16 % (ref 11.5–14.5)
EST. GFR  (AFRICAN AMERICAN): >60 ML/MIN/1.73 M^2
EST. GFR  (NON AFRICAN AMERICAN): >60 ML/MIN/1.73 M^2
ESTIMATED AVG GLUCOSE: 123 MG/DL (ref 68–131)
GLUCOSE SERPL-MCNC: 94 MG/DL (ref 70–110)
HBA1C MFR BLD HPLC: 5.9 % (ref 4–5.6)
HCT VFR BLD AUTO: 36 % (ref 37–48.5)
HDLC SERPL-MCNC: 34 MG/DL (ref 40–75)
HDLC SERPL: 22.1 % (ref 20–50)
HGB BLD-MCNC: 11.3 G/DL (ref 12–16)
IMM GRANULOCYTES # BLD AUTO: 0.03 K/UL (ref 0–0.04)
IMM GRANULOCYTES NFR BLD AUTO: 0.3 % (ref 0–0.5)
LDLC SERPL CALC-MCNC: 100.4 MG/DL (ref 63–159)
LYMPHOCYTES # BLD AUTO: 2.1 K/UL (ref 1–4.8)
LYMPHOCYTES NFR BLD: 20.4 % (ref 18–48)
MCH RBC QN AUTO: 28 PG (ref 27–31)
MCHC RBC AUTO-ENTMCNC: 31.4 G/DL (ref 32–36)
MCV RBC AUTO: 89 FL (ref 82–98)
MONOCYTES # BLD AUTO: 0.7 K/UL (ref 0.3–1)
MONOCYTES NFR BLD: 6.7 % (ref 4–15)
NEUTROPHILS # BLD AUTO: 7 K/UL (ref 1.8–7.7)
NEUTROPHILS NFR BLD: 67.7 % (ref 38–73)
NONHDLC SERPL-MCNC: 120 MG/DL
NRBC BLD-RTO: 0 /100 WBC
PLATELET # BLD AUTO: 399 K/UL (ref 150–350)
PMV BLD AUTO: 10.5 FL (ref 9.2–12.9)
POTASSIUM SERPL-SCNC: 3.5 MMOL/L (ref 3.5–5.1)
PROT SERPL-MCNC: 6.9 G/DL (ref 6–8.4)
RBC # BLD AUTO: 4.03 M/UL (ref 4–5.4)
SODIUM SERPL-SCNC: 141 MMOL/L (ref 136–145)
TRIGL SERPL-MCNC: 98 MG/DL (ref 30–150)
TSH SERPL DL<=0.005 MIU/L-ACNC: 2.9 UIU/ML (ref 0.4–4)
WBC # BLD AUTO: 10.26 K/UL (ref 3.9–12.7)

## 2020-02-19 PROCEDURE — 83036 HEMOGLOBIN GLYCOSYLATED A1C: CPT

## 2020-02-19 PROCEDURE — 85025 COMPLETE CBC W/AUTO DIFF WBC: CPT

## 2020-02-19 PROCEDURE — 80061 LIPID PANEL: CPT

## 2020-02-19 PROCEDURE — 82306 VITAMIN D 25 HYDROXY: CPT

## 2020-02-19 PROCEDURE — 84443 ASSAY THYROID STIM HORMONE: CPT

## 2020-02-19 PROCEDURE — 80053 COMPREHEN METABOLIC PANEL: CPT

## 2020-02-19 PROCEDURE — 36415 COLL VENOUS BLD VENIPUNCTURE: CPT

## 2020-02-20 ENCOUNTER — OFFICE VISIT (OUTPATIENT)
Dept: INTERNAL MEDICINE | Facility: CLINIC | Age: 70
End: 2020-02-20
Payer: MEDICARE

## 2020-02-20 VITALS
TEMPERATURE: 98 F | DIASTOLIC BLOOD PRESSURE: 62 MMHG | BODY MASS INDEX: 27.77 KG/M2 | WEIGHT: 194 LBS | HEART RATE: 75 BPM | SYSTOLIC BLOOD PRESSURE: 120 MMHG | OXYGEN SATURATION: 98 % | HEIGHT: 70 IN

## 2020-02-20 DIAGNOSIS — R07.89 CHEST PRESSURE: ICD-10-CM

## 2020-02-20 DIAGNOSIS — F33.0 MILD EPISODE OF RECURRENT MAJOR DEPRESSIVE DISORDER: Primary | ICD-10-CM

## 2020-02-20 DIAGNOSIS — Z12.31 SCREENING MAMMOGRAM, ENCOUNTER FOR: ICD-10-CM

## 2020-02-20 PROCEDURE — 3078F PR MOST RECENT DIASTOLIC BLOOD PRESSURE < 80 MM HG: ICD-10-PCS | Mod: CPTII,S$GLB,, | Performed by: INTERNAL MEDICINE

## 2020-02-20 PROCEDURE — 99214 OFFICE O/P EST MOD 30 MIN: CPT | Mod: S$GLB,,, | Performed by: INTERNAL MEDICINE

## 2020-02-20 PROCEDURE — 3074F PR MOST RECENT SYSTOLIC BLOOD PRESSURE < 130 MM HG: ICD-10-PCS | Mod: CPTII,S$GLB,, | Performed by: INTERNAL MEDICINE

## 2020-02-20 PROCEDURE — 99499 RISK ADDL DX/OHS AUDIT: ICD-10-PCS | Mod: S$GLB,,, | Performed by: INTERNAL MEDICINE

## 2020-02-20 PROCEDURE — 99999 PR PBB SHADOW E&M-EST. PATIENT-LVL V: CPT | Mod: PBBFAC,,, | Performed by: INTERNAL MEDICINE

## 2020-02-20 PROCEDURE — 1159F PR MEDICATION LIST DOCUMENTED IN MEDICAL RECORD: ICD-10-PCS | Mod: S$GLB,,, | Performed by: INTERNAL MEDICINE

## 2020-02-20 PROCEDURE — 93010 EKG 12-LEAD: ICD-10-PCS | Mod: S$GLB,,, | Performed by: INTERNAL MEDICINE

## 2020-02-20 PROCEDURE — 99499 UNLISTED E&M SERVICE: CPT | Mod: S$GLB,,, | Performed by: INTERNAL MEDICINE

## 2020-02-20 PROCEDURE — 93005 ELECTROCARDIOGRAM TRACING: CPT | Mod: S$GLB,,, | Performed by: INTERNAL MEDICINE

## 2020-02-20 PROCEDURE — 93010 ELECTROCARDIOGRAM REPORT: CPT | Mod: S$GLB,,, | Performed by: INTERNAL MEDICINE

## 2020-02-20 PROCEDURE — 93005 EKG 12-LEAD: ICD-10-PCS | Mod: S$GLB,,, | Performed by: INTERNAL MEDICINE

## 2020-02-20 PROCEDURE — 3074F SYST BP LT 130 MM HG: CPT | Mod: CPTII,S$GLB,, | Performed by: INTERNAL MEDICINE

## 2020-02-20 PROCEDURE — 1101F PT FALLS ASSESS-DOCD LE1/YR: CPT | Mod: CPTII,S$GLB,, | Performed by: INTERNAL MEDICINE

## 2020-02-20 PROCEDURE — 3078F DIAST BP <80 MM HG: CPT | Mod: CPTII,S$GLB,, | Performed by: INTERNAL MEDICINE

## 2020-02-20 PROCEDURE — 99999 PR PBB SHADOW E&M-EST. PATIENT-LVL V: ICD-10-PCS | Mod: PBBFAC,,, | Performed by: INTERNAL MEDICINE

## 2020-02-20 PROCEDURE — 1101F PR PT FALLS ASSESS DOC 0-1 FALLS W/OUT INJ PAST YR: ICD-10-PCS | Mod: CPTII,S$GLB,, | Performed by: INTERNAL MEDICINE

## 2020-02-20 PROCEDURE — 99214 PR OFFICE/OUTPT VISIT, EST, LEVL IV, 30-39 MIN: ICD-10-PCS | Mod: S$GLB,,, | Performed by: INTERNAL MEDICINE

## 2020-02-20 PROCEDURE — 1159F MED LIST DOCD IN RCRD: CPT | Mod: S$GLB,,, | Performed by: INTERNAL MEDICINE

## 2020-02-20 RX ORDER — BUPROPION HYDROCHLORIDE 100 MG/1
TABLET ORAL
Qty: 30 TABLET | Refills: 3 | Status: SHIPPED | OUTPATIENT
Start: 2020-02-20 | End: 2020-06-22

## 2020-02-20 RX ORDER — PROMETHAZINE HYDROCHLORIDE AND DEXTROMETHORPHAN HYDROBROMIDE 6.25; 15 MG/5ML; MG/5ML
5 SYRUP ORAL NIGHTLY PRN
Qty: 118 ML | Refills: 0 | Status: SHIPPED | OUTPATIENT
Start: 2020-02-20 | End: 2020-03-01

## 2020-02-20 RX ORDER — LORAZEPAM 0.5 MG/1
TABLET ORAL
Qty: 30 TABLET | Refills: 0 | Status: SHIPPED | OUTPATIENT
Start: 2020-02-20 | End: 2020-05-05

## 2020-02-20 RX ORDER — DOXYCYCLINE HYCLATE 100 MG
TABLET ORAL
Qty: 20 TABLET | Refills: 0 | Status: SHIPPED | OUTPATIENT
Start: 2020-02-20 | End: 2020-09-16

## 2020-03-01 NOTE — PROGRESS NOTES
Subjective:       Patient ID: Marleny Guzman is a 69 y.o. female.    Chief Complaint: Follow-up    HPIPt had syncope after extraction - was not drinking fluids and had fever and a WBC - then she went to ED for bronchitis - symptoms still present with cough and chest congestion - some intermiittent chest pressure.  Some increased depression not suicidal or homicidal - since the death of her nephew.  Review of Systems   Respiratory: Negative for shortness of breath (PND or orthopnea).    Cardiovascular: Negative for chest pain (arm pain or jaw pain).   Gastrointestinal: Negative for abdominal pain, diarrhea, nausea and vomiting.   Genitourinary: Negative for dysuria.   Neurological: Negative for seizures, syncope and headaches.       Objective:      Physical Exam   Constitutional: She is oriented to person, place, and time. She appears well-developed and well-nourished. No distress.   HENT:   Head: Normocephalic.   Mouth/Throat: Oropharynx is clear and moist.   Neck: Neck supple. No JVD present. No thyromegaly present.   Cardiovascular: Normal rate, regular rhythm, normal heart sounds and intact distal pulses. Exam reveals no gallop and no friction rub.   No murmur heard.  Pulmonary/Chest: Effort normal and breath sounds normal. She has no wheezes. She has no rales.   Abdominal: Soft. Bowel sounds are normal. She exhibits no distension and no mass. There is no tenderness. There is no rebound and no guarding.   Musculoskeletal: She exhibits no edema.   Lymphadenopathy:     She has no cervical adenopathy.   Neurological: She is alert and oriented to person, place, and time. She has normal reflexes.   Skin: Skin is warm and dry.   Psychiatric: She has a normal mood and affect. Her behavior is normal. Judgment and thought content normal.       Assessment:       1. Mild episode of recurrent major depressive disorder    2. Screening mammogram, encounter for    3. Chest pressure        Plan:   Mild episode of recurrent  major depressive disorder  -     Ambulatory referral/consult to Psychiatry; Future; Expected date: 02/27/2020    Screening mammogram, encounter for  -     Mammo Digital Screening Bilat w/ Sky; Future; Expected date: 08/20/2020    Chest pressure  -     EKG 12-lead    Bronchitis  -     doxycycline (VIBRA-TABS) 100 MG tablet; One tablet twice daily with food and a full glass of water  Dispense: 20 tablet; Refill: 0  -     promethazine-dextromethorphan (PROMETHAZINE-DM) 6.25-15 mg/5 mL Syrp; Take 5 mLs by mouth nightly as needed (cough).  Dispense: 118 mL; Refill: 0  Depression/anxiety  -     LORazepam (ATIVAN) 0.5 MG tablet; Half - one daily as needed for anxiety  Dispense: 30 tablet; Refill: 0  -     buPROPion (WELLBUTRIN) 100 MG tablet; One daily in AM  Dispense: 30 tablet; Refill: 3 - add to lexapro for enhanced effect increased energy

## 2020-03-03 ENCOUNTER — TELEPHONE (OUTPATIENT)
Dept: ADMINISTRATIVE | Facility: OTHER | Age: 70
End: 2020-03-03

## 2020-03-04 ENCOUNTER — HOSPITAL ENCOUNTER (OUTPATIENT)
Dept: RADIOLOGY | Facility: HOSPITAL | Age: 70
Discharge: HOME | End: 2020-03-04
Attending: INTERNAL MEDICINE
Payer: MEDICARE

## 2020-03-04 ENCOUNTER — OFFICE VISIT (OUTPATIENT)
Dept: GYNECOLOGIC ONCOLOGY | Facility: CLINIC | Age: 70
End: 2020-03-04
Payer: MEDICARE

## 2020-03-04 VITALS
SYSTOLIC BLOOD PRESSURE: 140 MMHG | HEIGHT: 70 IN | WEIGHT: 193.81 LBS | BODY MASS INDEX: 27.75 KG/M2 | DIASTOLIC BLOOD PRESSURE: 64 MMHG | HEART RATE: 75 BPM

## 2020-03-04 DIAGNOSIS — Z12.31 SCREENING MAMMOGRAM, ENCOUNTER FOR: ICD-10-CM

## 2020-03-04 DIAGNOSIS — N89.3 VAGINAL DYSPLASIA: Primary | ICD-10-CM

## 2020-03-04 PROCEDURE — 1101F PR PT FALLS ASSESS DOC 0-1 FALLS W/OUT INJ PAST YR: ICD-10-PCS | Mod: CPTII,S$GLB,, | Performed by: OBSTETRICS & GYNECOLOGY

## 2020-03-04 PROCEDURE — 1159F PR MEDICATION LIST DOCUMENTED IN MEDICAL RECORD: ICD-10-PCS | Mod: S$GLB,,, | Performed by: OBSTETRICS & GYNECOLOGY

## 2020-03-04 PROCEDURE — 77067 SCR MAMMO BI INCL CAD: CPT | Mod: TC

## 2020-03-04 PROCEDURE — 1101F PT FALLS ASSESS-DOCD LE1/YR: CPT | Mod: CPTII,S$GLB,, | Performed by: OBSTETRICS & GYNECOLOGY

## 2020-03-04 PROCEDURE — 1126F PR PAIN SEVERITY QUANTIFIED, NO PAIN PRESENT: ICD-10-PCS | Mod: S$GLB,,, | Performed by: OBSTETRICS & GYNECOLOGY

## 2020-03-04 PROCEDURE — 3078F DIAST BP <80 MM HG: CPT | Mod: CPTII,S$GLB,, | Performed by: OBSTETRICS & GYNECOLOGY

## 2020-03-04 PROCEDURE — 99214 PR OFFICE/OUTPT VISIT, EST, LEVL IV, 30-39 MIN: ICD-10-PCS | Mod: S$GLB,,, | Performed by: OBSTETRICS & GYNECOLOGY

## 2020-03-04 PROCEDURE — 77067 MAMMO DIGITAL SCREENING BILAT WITH TOMOSYNTHESIS_CAD: ICD-10-PCS | Mod: 26,,, | Performed by: RADIOLOGY

## 2020-03-04 PROCEDURE — 88175 CYTOPATH C/V AUTO FLUID REDO: CPT

## 2020-03-04 PROCEDURE — 77063 BREAST TOMOSYNTHESIS BI: CPT | Mod: 26,,, | Performed by: RADIOLOGY

## 2020-03-04 PROCEDURE — 1159F MED LIST DOCD IN RCRD: CPT | Mod: S$GLB,,, | Performed by: OBSTETRICS & GYNECOLOGY

## 2020-03-04 PROCEDURE — 3078F PR MOST RECENT DIASTOLIC BLOOD PRESSURE < 80 MM HG: ICD-10-PCS | Mod: CPTII,S$GLB,, | Performed by: OBSTETRICS & GYNECOLOGY

## 2020-03-04 PROCEDURE — 77067 SCR MAMMO BI INCL CAD: CPT | Mod: 26,,, | Performed by: RADIOLOGY

## 2020-03-04 PROCEDURE — 1126F AMNT PAIN NOTED NONE PRSNT: CPT | Mod: S$GLB,,, | Performed by: OBSTETRICS & GYNECOLOGY

## 2020-03-04 PROCEDURE — 3077F SYST BP >= 140 MM HG: CPT | Mod: CPTII,S$GLB,, | Performed by: OBSTETRICS & GYNECOLOGY

## 2020-03-04 PROCEDURE — 99214 OFFICE O/P EST MOD 30 MIN: CPT | Mod: S$GLB,,, | Performed by: OBSTETRICS & GYNECOLOGY

## 2020-03-04 PROCEDURE — 99999 PR PBB SHADOW E&M-EST. PATIENT-LVL III: CPT | Mod: PBBFAC,,, | Performed by: OBSTETRICS & GYNECOLOGY

## 2020-03-04 PROCEDURE — 99999 PR PBB SHADOW E&M-EST. PATIENT-LVL III: ICD-10-PCS | Mod: PBBFAC,,, | Performed by: OBSTETRICS & GYNECOLOGY

## 2020-03-04 PROCEDURE — 3077F PR MOST RECENT SYSTOLIC BLOOD PRESSURE >= 140 MM HG: ICD-10-PCS | Mod: CPTII,S$GLB,, | Performed by: OBSTETRICS & GYNECOLOGY

## 2020-03-04 PROCEDURE — 77063 MAMMO DIGITAL SCREENING BILAT WITH TOMOSYNTHESIS_CAD: ICD-10-PCS | Mod: 26,,, | Performed by: RADIOLOGY

## 2020-03-09 NOTE — PROGRESS NOTES
Subjective:      Patient ID: Marleny Guzman is a 69 y.o. female.    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 Gonzalez. Vaginal pap smear 6/15/16 HSIL. She was referred for consult by Dr. Valles. 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 Noncompliant with vaginal estrogen cream as prescribed.      Follow up pap 1/30/17 HSIL. 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      Pap 7/24/17 ASCUS HPV 18+   Pap 1/22/18 LSIL, normal exam  Pap 8/2018 normal, normal exam.   Pap 9/2019 ASCUS, HPV 18+, normal exam      Presents today for follow up. Denies bleeding. Using vaginal estrogen.     Review of Systems   Constitutional: Negative for appetite change, chills, fatigue and fever.   HENT: Negative for mouth sores.    Respiratory: Negative for cough and shortness of breath.    Cardiovascular: Negative for leg swelling.   Gastrointestinal: Negative for abdominal pain, blood in stool, constipation and diarrhea.   Endocrine: Negative for cold intolerance.   Genitourinary: Negative for dysuria and vaginal bleeding.   Musculoskeletal: Negative for myalgias.   Skin: Negative for rash.   Allergic/Immunologic: Negative.    Neurological: Negative for weakness and numbness.   Hematological: Negative for adenopathy. Does not bruise/bleed easily.   Psychiatric/Behavioral: Negative for confusion.       Objective:   Physical Exam:   Constitutional: She is oriented to person, place, and time. She appears well-developed and well-nourished.    HENT:   Head: Normocephalic and atraumatic.    Eyes: Pupils are equal, round, and reactive to light. EOM are normal.    Neck: Normal range of motion. Neck supple. No thyromegaly present.    Cardiovascular: Normal rate, regular rhythm and intact distal pulses.     Pulmonary/Chest: Effort normal and breath sounds normal. No respiratory distress. She has no wheezes.        Abdominal: Soft.  Bowel sounds are normal. She exhibits no distension, no ascites and no mass. There is no tenderness.     Genitourinary: Vagina normal. Pelvic exam was performed with patient supine. Uterus is absent. Vaginal cuff normal.Cervix exhibits absence.   Genitourinary Comments: No visible lesions.            Musculoskeletal: Normal range of motion and moves all extremeties.      Lymphadenopathy:     She has no cervical adenopathy.        Right: No supraclavicular adenopathy present.        Left: No supraclavicular adenopathy present.    Neurological: She is alert and oriented to person, place, and time.    Skin: Skin is warm and dry. No rash noted.    Psychiatric: She has a normal mood and affect.       Assessment:     1. Vaginal dysplasia        Plan:   No orders of the defined types were placed in this encounter.    No evidence of disease on today's exam.   Surveillance pap collected.  RTC 6 months for follow up or sooner if needed.

## 2020-03-30 LAB
FINAL PATHOLOGIC DIAGNOSIS: NORMAL
Lab: NORMAL

## 2020-04-13 ENCOUNTER — TELEPHONE (OUTPATIENT)
Dept: INTERNAL MEDICINE | Facility: CLINIC | Age: 70
End: 2020-04-13

## 2020-04-13 RX ORDER — AMOXICILLIN AND CLAVULANATE POTASSIUM 875; 125 MG/1; MG/1
1 TABLET, FILM COATED ORAL 2 TIMES DAILY
Qty: 20 TABLET | Refills: 0 | Status: SHIPPED | OUTPATIENT
Start: 2020-04-13 | End: 2020-04-23

## 2020-04-13 NOTE — TELEPHONE ENCOUNTER
Pt states she has headache on right side , she states her sinus is acting up , she no fever she a bad sinus infection pt is requesting prescription to help with symptoms

## 2020-04-13 NOTE — TELEPHONE ENCOUNTER
----- Message from Trena Palmer sent at 4/13/2020  8:45 AM CDT -----  Contact: self/[phone]  .1 Patient would like to get medical advice.  Symptoms (please be specific): sinus, cough, headaches, low energy  How long has patient had these symptoms:  04/11/20  Pharmacy name and phone#:[id number] - [place] - [address] [phone] (Phone) [phone] (Fax)  Any drug allergies: onfile  Comments: Patient would like to get medical advice. Offered virtual visit, but patient preferred for office to call her first. Thank you

## 2020-04-13 NOTE — TELEPHONE ENCOUNTER
Pt with [id number] nasal drainage, and HA for one week - no fever or cough - augmentin called she will call for poor result.

## 2020-04-13 NOTE — TELEPHONE ENCOUNTER
----- Message from Marcelle Noble sent at 4/13/2020  9:26 AM CDT -----  Contact: patient [phone]  Patient is returning a phone call.  Who left a message for the patient: hoda  Does patient know what this is regarding:  Returning her call about sinus headache  Comments:

## 2020-04-27 RX ORDER — ERGOCALCIFEROL 1.25 MG/1
CAPSULE ORAL
Qty: 3 CAPSULE | Refills: 3 | Status: SHIPPED | OUTPATIENT
Start: 2020-04-27 | End: 2020-10-22 | Stop reason: SDUPTHER

## 2020-05-05 RX ORDER — LORAZEPAM 0.5 MG/1
TABLET ORAL
Qty: 30 TABLET | Refills: 0 | Status: SHIPPED | OUTPATIENT
Start: 2020-05-05 | End: 2020-09-29 | Stop reason: SDUPTHER

## 2020-06-04 ENCOUNTER — PATIENT OUTREACH (OUTPATIENT)
Dept: INTERNAL MEDICINE | Facility: CLINIC | Age: 70
End: 2020-06-04

## 2020-06-22 ENCOUNTER — OFFICE VISIT (OUTPATIENT)
Dept: INTERNAL MEDICINE | Facility: CLINIC | Age: 70
End: 2020-06-22
Payer: MEDICARE

## 2020-06-22 VITALS
HEIGHT: 70 IN | OXYGEN SATURATION: 98 % | HEART RATE: 80 BPM | DIASTOLIC BLOOD PRESSURE: 80 MMHG | WEIGHT: 193.31 LBS | BODY MASS INDEX: 27.67 KG/M2 | SYSTOLIC BLOOD PRESSURE: 130 MMHG

## 2020-06-22 DIAGNOSIS — H53.8 BLURRED VISION: Primary | ICD-10-CM

## 2020-06-22 PROCEDURE — 1101F PT FALLS ASSESS-DOCD LE1/YR: CPT | Mod: CPTII,S$GLB,, | Performed by: INTERNAL MEDICINE

## 2020-06-22 PROCEDURE — 3008F BODY MASS INDEX DOCD: CPT | Mod: CPTII,S$GLB,, | Performed by: INTERNAL MEDICINE

## 2020-06-22 PROCEDURE — 96372 THER/PROPH/DIAG INJ SC/IM: CPT | Mod: S$GLB,,, | Performed by: INTERNAL MEDICINE

## 2020-06-22 PROCEDURE — 1126F AMNT PAIN NOTED NONE PRSNT: CPT | Mod: S$GLB,,, | Performed by: INTERNAL MEDICINE

## 2020-06-22 PROCEDURE — 99999 PR PBB SHADOW E&M-EST. PATIENT-LVL V: ICD-10-PCS | Mod: PBBFAC,,, | Performed by: INTERNAL MEDICINE

## 2020-06-22 PROCEDURE — 1159F MED LIST DOCD IN RCRD: CPT | Mod: S$GLB,,, | Performed by: INTERNAL MEDICINE

## 2020-06-22 PROCEDURE — 1159F PR MEDICATION LIST DOCUMENTED IN MEDICAL RECORD: ICD-10-PCS | Mod: S$GLB,,, | Performed by: INTERNAL MEDICINE

## 2020-06-22 PROCEDURE — 3075F PR MOST RECENT SYSTOLIC BLOOD PRESS GE 130-139MM HG: ICD-10-PCS | Mod: CPTII,S$GLB,, | Performed by: INTERNAL MEDICINE

## 2020-06-22 PROCEDURE — 1101F PR PT FALLS ASSESS DOC 0-1 FALLS W/OUT INJ PAST YR: ICD-10-PCS | Mod: CPTII,S$GLB,, | Performed by: INTERNAL MEDICINE

## 2020-06-22 PROCEDURE — 3075F SYST BP GE 130 - 139MM HG: CPT | Mod: CPTII,S$GLB,, | Performed by: INTERNAL MEDICINE

## 2020-06-22 PROCEDURE — 99213 OFFICE O/P EST LOW 20 MIN: CPT | Mod: 25,S$GLB,, | Performed by: INTERNAL MEDICINE

## 2020-06-22 PROCEDURE — 96372 PR INJECTION,THERAP/PROPH/DIAG2ST, IM OR SUBCUT: ICD-10-PCS | Mod: S$GLB,,, | Performed by: INTERNAL MEDICINE

## 2020-06-22 PROCEDURE — 1126F PR PAIN SEVERITY QUANTIFIED, NO PAIN PRESENT: ICD-10-PCS | Mod: S$GLB,,, | Performed by: INTERNAL MEDICINE

## 2020-06-22 PROCEDURE — 99999 PR PBB SHADOW E&M-EST. PATIENT-LVL V: CPT | Mod: PBBFAC,,, | Performed by: INTERNAL MEDICINE

## 2020-06-22 PROCEDURE — 3079F PR MOST RECENT DIASTOLIC BLOOD PRESSURE 80-89 MM HG: ICD-10-PCS | Mod: CPTII,S$GLB,, | Performed by: INTERNAL MEDICINE

## 2020-06-22 PROCEDURE — 3079F DIAST BP 80-89 MM HG: CPT | Mod: CPTII,S$GLB,, | Performed by: INTERNAL MEDICINE

## 2020-06-22 PROCEDURE — 99213 PR OFFICE/OUTPT VISIT, EST, LEVL III, 20-29 MIN: ICD-10-PCS | Mod: 25,S$GLB,, | Performed by: INTERNAL MEDICINE

## 2020-06-22 PROCEDURE — 3008F PR BODY MASS INDEX (BMI) DOCUMENTED: ICD-10-PCS | Mod: CPTII,S$GLB,, | Performed by: INTERNAL MEDICINE

## 2020-06-22 RX ORDER — LEVOFLOXACIN 500 MG/1
500 TABLET, FILM COATED ORAL DAILY
Qty: 10 TABLET | Refills: 0 | Status: SHIPPED | OUTPATIENT
Start: 2020-06-22 | End: 2020-09-16

## 2020-06-22 RX ORDER — MELOXICAM 15 MG/1
15 TABLET ORAL DAILY
Qty: 20 TABLET | Refills: 0 | Status: SHIPPED | OUTPATIENT
Start: 2020-06-22 | End: 2020-07-12

## 2020-06-22 RX ORDER — TRIAMCINOLONE ACETONIDE 40 MG/ML
40 INJECTION, SUSPENSION INTRA-ARTICULAR; INTRAMUSCULAR ONCE
Status: COMPLETED | OUTPATIENT
Start: 2020-06-22 | End: 2020-06-22

## 2020-06-22 RX ORDER — TIZANIDINE 2 MG/1
4 TABLET ORAL EVERY 8 HOURS PRN
Qty: 30 TABLET | Refills: 0 | Status: SHIPPED | OUTPATIENT
Start: 2020-06-22 | End: 2020-07-02

## 2020-06-22 RX ADMIN — TRIAMCINOLONE ACETONIDE 40 MG: 40 INJECTION, SUSPENSION INTRA-ARTICULAR; INTRAMUSCULAR at 02:06

## 2020-06-22 NOTE — PROGRESS NOTES
Subjective:       Patient ID: Marleny Guzman is a 69 y.o. female.    Chief Complaint: Follow-up    HPIPt with cough and sinus congestion.  HA and discolored nasal drainage.  No CP ro SOB. Some mild blurry vision.  Review of Systems   Respiratory: Negative for shortness of breath (PND or orthopnea).    Cardiovascular: Negative for chest pain (arm pain or jaw pain).   Gastrointestinal: Negative for abdominal pain, diarrhea, nausea and vomiting.   Genitourinary: Negative for dysuria.   Neurological: Negative for seizures, syncope and headaches.       Objective:      Physical Exam  Constitutional:       General: She is not in acute distress.     Appearance: She is well-developed.   HENT:      Head: Normocephalic.   Eyes:      Pupils: Pupils are equal, round, and reactive to light.   Neck:      Musculoskeletal: Neck supple.      Thyroid: No thyromegaly.      Vascular: No JVD.   Cardiovascular:      Rate and Rhythm: Normal rate and regular rhythm.      Heart sounds: Normal heart sounds. No murmur. No friction rub. No gallop.    Pulmonary:      Effort: Pulmonary effort is normal.      Breath sounds: Normal breath sounds. No wheezing or rales.   Abdominal:      General: Bowel sounds are normal. There is no distension.      Palpations: Abdomen is soft. There is no mass.      Tenderness: There is no abdominal tenderness. There is no guarding or rebound.   Genitourinary:     Vagina: Normal.   Lymphadenopathy:      Cervical: No cervical adenopathy.   Skin:     General: Skin is warm and dry.   Neurological:      Mental Status: She is alert and oriented to person, place, and time.      Deep Tendon Reflexes: Reflexes are normal and symmetric.   Psychiatric:         Behavior: Behavior normal.         Thought Content: Thought content normal.         Judgment: Judgment normal.         Assessment:       1. Blurred vision        Plan:   Blurred vision  -     Ambulatory referral/consult to Optometry; Future; Expected date:  06/29/2020    Sinusitis  -     triamcinolone acetonide injection 40 mg  -     levoFLOXacin (LEVAQUIN) 500 MG tablet; Take 1 tablet (500 mg total) by mouth once daily.  Dispense: 10 tablet; Refill: 0    Neck Pain  -     meloxicam (MOBIC) 15 MG tablet; Take 1 tablet (15 mg total) by mouth once daily.  Dispense: 20 tablet; Refill: 0  -     tiZANidine (ZANAFLEX) 2 MG tablet; Take 2 tablets (4 mg total) by mouth every 8 (eight) hours as needed (back pain).  Dispense: 30 tablet; Refill: 0

## 2020-07-23 ENCOUNTER — OFFICE VISIT (OUTPATIENT)
Dept: URGENT CARE | Facility: CLINIC | Age: 70
End: 2020-07-23
Payer: MEDICARE

## 2020-07-23 VITALS
RESPIRATION RATE: 18 BRPM | OXYGEN SATURATION: 97 % | HEIGHT: 70 IN | DIASTOLIC BLOOD PRESSURE: 60 MMHG | HEART RATE: 82 BPM | TEMPERATURE: 98 F | BODY MASS INDEX: 27.2 KG/M2 | SYSTOLIC BLOOD PRESSURE: 132 MMHG | WEIGHT: 190 LBS

## 2020-07-23 DIAGNOSIS — R06.02 SHORTNESS OF BREATH: ICD-10-CM

## 2020-07-23 DIAGNOSIS — R05.9 COUGH: Primary | ICD-10-CM

## 2020-07-23 PROCEDURE — 71046 X-RAY EXAM CHEST 2 VIEWS: CPT | Mod: S$GLB,,, | Performed by: RADIOLOGY

## 2020-07-23 PROCEDURE — 93005 ELECTROCARDIOGRAM TRACING: CPT | Mod: S$GLB,,, | Performed by: STUDENT IN AN ORGANIZED HEALTH CARE EDUCATION/TRAINING PROGRAM

## 2020-07-23 PROCEDURE — 93010 ELECTROCARDIOGRAM REPORT: CPT | Mod: S$GLB,,, | Performed by: INTERNAL MEDICINE

## 2020-07-23 PROCEDURE — U0003 INFECTIOUS AGENT DETECTION BY NUCLEIC ACID (DNA OR RNA); SEVERE ACUTE RESPIRATORY SYNDROME CORONAVIRUS 2 (SARS-COV-2) (CORONAVIRUS DISEASE [COVID-19]), AMPLIFIED PROBE TECHNIQUE, MAKING USE OF HIGH THROUGHPUT TECHNOLOGIES AS DESCRIBED BY CMS-2020-01-R: HCPCS

## 2020-07-23 PROCEDURE — 71046 XR CHEST PA AND LATERAL: ICD-10-PCS | Mod: S$GLB,,, | Performed by: RADIOLOGY

## 2020-07-23 PROCEDURE — 93010 EKG 12-LEAD: ICD-10-PCS | Mod: S$GLB,,, | Performed by: INTERNAL MEDICINE

## 2020-07-23 PROCEDURE — 99214 PR OFFICE/OUTPT VISIT, EST, LEVL IV, 30-39 MIN: ICD-10-PCS | Mod: S$GLB,,, | Performed by: STUDENT IN AN ORGANIZED HEALTH CARE EDUCATION/TRAINING PROGRAM

## 2020-07-23 PROCEDURE — 93005 EKG 12-LEAD: ICD-10-PCS | Mod: S$GLB,,, | Performed by: STUDENT IN AN ORGANIZED HEALTH CARE EDUCATION/TRAINING PROGRAM

## 2020-07-23 PROCEDURE — 99214 OFFICE O/P EST MOD 30 MIN: CPT | Mod: S$GLB,,, | Performed by: STUDENT IN AN ORGANIZED HEALTH CARE EDUCATION/TRAINING PROGRAM

## 2020-07-23 RX ORDER — PROMETHAZINE HYDROCHLORIDE AND DEXTROMETHORPHAN HYDROBROMIDE 6.25; 15 MG/5ML; MG/5ML
5 SYRUP ORAL EVERY 8 HOURS PRN
Qty: 100 ML | Refills: 0 | Status: SHIPPED | OUTPATIENT
Start: 2020-07-23 | End: 2020-08-02

## 2020-07-23 NOTE — PATIENT INSTRUCTIONS
Instructions for Patients with Confirmed or Suspected COVID-19    If you are awaiting your test result, you will either be called or it will be released to the patient portal.  If you have any questions about your test, please visit [email or web address] or call our COVID-19 information line at [phone].      Instructions for non-hospitalized or discharged patients with confirmed or suspected COVID-19:       Stay home except to get medical care.    Separate yourself from other people and animals in your home.    Call ahead before visiting your doctor.    Wear a face mask.    Cover your coughs and sneezes.    Clean your hands often.    Avoid sharing personal household items.    Clean all high-touch surfaces every day.    Monitor your symptoms. Seek prompt medical attention if your illness is worsening (e.g., difficulty breathing). Before seeking care, call your healthcare provider.    If you have a medical emergency and must call 911, notify the dispatcher that you have or are being evaluated for COVID-19. If possible, put on a face mask before emergency medical services arrive.    Use the following symptom-based strategy to return to normal activity following a suspected or confirmed case of COVID-19. Continue isolation until:   o At least 3 days (72 hours) have passed since recovery defined as resolution of fever without the use of fever-reducing medications and improvement in respiratory symptoms (e.g. cough, shortness of breath), and   o At least 10 days have passed since the first positive test.       As one of the next steps, you will receive a call or text from the [place] Department of Health ([facility]) COVID-19 Tracing Team. See the contact information below so you know not to ignore the health departments call. It is important that you contact them back immediately so they can help.     Contact Tracer Number:  [phone]  Caller ID for most carriers: [place] [facility]    What is  contact tracing?   Contact tracing is a process that helps identify everyone who has been in close contact with an infected person. Contact tracers let those people know they may have been exposed and guide them on next steps. Confidentiality is important for everyone; no one will be told who may have exposed them to the virus.   Your involvement is important. The more we know about where and how this virus is spreading, the better chance we have at stopping it from spreading further.  What does exposure mean?   Exposure means you have been within 6 feet for more than 15 minutes with a person who has or had COVID-19.  What kind of questions do the contact tracers ask?   A contact tracer will confirm your basic contact information including name, address, phone number, and next of kin, as well as asking about any symptoms you may have had. Theyll also ask you how you think you may have gotten sick, such as places where you may have been exposed to the virus, and people you were with. Those names will never be shared with anyone outside of that call, and will only be used to help trace and stop the spread of the virus.   I have privacy concerns. How will the state use my information?   Your privacy about your health is important. All calls are completed using call centers that use the appropriate health privacy protection measures (HIPAA compliance), meaning that your patient information is safe. No one will ever ask you any questions related to immigration status. Your health comes first.   Do I have to participate?   You do not have to participate, but we strongly encourage you to. Contact tracing can help us catch and control new outbreaks as theyre developing to keep your friends and family safe.   What if I dont hear from anyone?   If you dont receive a call within 24 hours, you can call the number above right away to inquire about your status. That line is open from 8:00 am - 8:00 p.m., 7 days a  week.  Contact tracing saves lives! Together, we have the power to beat this virus and keep our loved ones and neighbors safe.       Instructions for household members, intimate partners and caregivers in a non-healthcare setting of a patient with confirmed or suspected COVID-19:         Close contacts should monitor their health and call their healthcare provider right away if they develop symptoms suggestive of COVID-19 (e.g., fever, cough, shortness of breath).    Stay home except to get medical care. Separate yourself from other people and animals in the home.   Monitor the patients symptoms. If the patient is getting sicker, call his or her healthcare provider. If the patient has a medical emergency and you need to call 911, notify the dispatch personnel that the patient has or is being evaluated for COVID-19.    Wear a facemask when around other people such as sharing a room or vehicle and before entering a healthcare provider's office.   Cover coughs and sneezes with a tissue. Throw used tissues in a lined trash can immediately and wash hands.   Clean hands often with soap and water for at least 20 seconds or with an alcohol-based hand , rubbing hands together until they feel dry. Avoid touching your eyes, nose, and mouth with unwashed hands.   Clean all high-touch; surfaces every day, including counters, tabletops, doorknobs, bathroom fixtures, toilets, phones, keyboards, tablets, bedside tables, etc. Use a household cleaning spray or wipe according to label instructions.   Avoid sharing personal household items such as dishes, drinking glasses, cups, towels, bedding, etc. After these items are used, they should be washed thoroughly with soap and water.   Continue isolation until:   At least 3 days (72 hours) have passed since recovery defined as resolution of fever without the use of fever-reducing medications and improvement in respiratory symptoms (e.g. cough, shortness of breath),  and    At least 10 days have passed since the patients first positive test.    https://www.cdc.gov/coronavirus/2019-ncov/your-health/index.htm
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
## 2020-07-24 LAB — SARS-COV-2 RNA RESP QL NAA+PROBE: NOT DETECTED

## 2020-07-25 ENCOUNTER — TELEPHONE (OUTPATIENT)
Dept: URGENT CARE | Facility: CLINIC | Age: 70
End: 2020-07-25

## 2020-07-25 NOTE — TELEPHONE ENCOUNTER
Counseled on negative COVID swab and to continue home isolation until 24h of symptom improvement in case of false negative. Pt reports no new fevers or lower respiratory sx's.

## 2020-09-15 ENCOUNTER — PATIENT OUTREACH (OUTPATIENT)
Dept: ADMINISTRATIVE | Facility: OTHER | Age: 70
End: 2020-09-15

## 2020-09-16 ENCOUNTER — OFFICE VISIT (OUTPATIENT)
Dept: GYNECOLOGIC ONCOLOGY | Facility: CLINIC | Age: 70
End: 2020-09-16
Payer: MEDICARE

## 2020-09-16 ENCOUNTER — OFFICE VISIT (OUTPATIENT)
Dept: OPTOMETRY | Facility: CLINIC | Age: 70
End: 2020-09-16
Payer: MEDICARE

## 2020-09-16 ENCOUNTER — OFFICE VISIT (OUTPATIENT)
Dept: OPTOMETRY | Facility: CLINIC | Age: 70
End: 2020-09-16

## 2020-09-16 VITALS
BODY MASS INDEX: 27.49 KG/M2 | HEART RATE: 62 BPM | DIASTOLIC BLOOD PRESSURE: 67 MMHG | SYSTOLIC BLOOD PRESSURE: 140 MMHG | WEIGHT: 191.56 LBS

## 2020-09-16 DIAGNOSIS — H10.13 CONJUNCTIVITIS, ALLERGIC, BILATERAL: Primary | ICD-10-CM

## 2020-09-16 DIAGNOSIS — H26.491 PCO (POSTERIOR CAPSULAR OPACIFICATION), RIGHT: ICD-10-CM

## 2020-09-16 DIAGNOSIS — Z46.0 FITTING AND ADJUSTMENT OF SPECTACLES AND CONTACT LENSES: Primary | ICD-10-CM

## 2020-09-16 DIAGNOSIS — Z46.0 FITTING AND ADJUSTMENT OF SPECTACLES AND CONTACT LENSES: ICD-10-CM

## 2020-09-16 DIAGNOSIS — N89.3 VAGINAL DYSPLASIA: Primary | ICD-10-CM

## 2020-09-16 DIAGNOSIS — H52.13 MYOPIA, BILATERAL: ICD-10-CM

## 2020-09-16 DIAGNOSIS — Z96.1 PSEUDOPHAKIA OF BOTH EYES: ICD-10-CM

## 2020-09-16 PROCEDURE — 3078F PR MOST RECENT DIASTOLIC BLOOD PRESSURE < 80 MM HG: ICD-10-PCS | Mod: CPTII,S$GLB,, | Performed by: OBSTETRICS & GYNECOLOGY

## 2020-09-16 PROCEDURE — 99214 PR OFFICE/OUTPT VISIT, EST, LEVL IV, 30-39 MIN: ICD-10-PCS | Mod: S$GLB,,, | Performed by: OBSTETRICS & GYNECOLOGY

## 2020-09-16 PROCEDURE — 92310 CONTACT LENS FITTING OU: CPT | Mod: CSM,S$GLB,, | Performed by: OPTOMETRIST

## 2020-09-16 PROCEDURE — 92015 DETERMINE REFRACTIVE STATE: CPT | Mod: S$GLB,,, | Performed by: OPTOMETRIST

## 2020-09-16 PROCEDURE — 3008F PR BODY MASS INDEX (BMI) DOCUMENTED: ICD-10-PCS | Mod: CPTII,S$GLB,, | Performed by: OBSTETRICS & GYNECOLOGY

## 2020-09-16 PROCEDURE — 99999 PR PBB SHADOW E&M-EST. PATIENT-LVL III: ICD-10-PCS | Mod: PBBFAC,,, | Performed by: OPTOMETRIST

## 2020-09-16 PROCEDURE — 1101F PT FALLS ASSESS-DOCD LE1/YR: CPT | Mod: CPTII,S$GLB,, | Performed by: OBSTETRICS & GYNECOLOGY

## 2020-09-16 PROCEDURE — 1126F PR PAIN SEVERITY QUANTIFIED, NO PAIN PRESENT: ICD-10-PCS | Mod: S$GLB,,, | Performed by: OBSTETRICS & GYNECOLOGY

## 2020-09-16 PROCEDURE — 99999 PR PBB SHADOW E&M-EST. PATIENT-LVL I: CPT | Mod: PBBFAC,,, | Performed by: OPTOMETRIST

## 2020-09-16 PROCEDURE — 1126F AMNT PAIN NOTED NONE PRSNT: CPT | Mod: S$GLB,,, | Performed by: OBSTETRICS & GYNECOLOGY

## 2020-09-16 PROCEDURE — 99999 PR PBB SHADOW E&M-EST. PATIENT-LVL I: ICD-10-PCS | Mod: PBBFAC,,, | Performed by: OPTOMETRIST

## 2020-09-16 PROCEDURE — 3077F PR MOST RECENT SYSTOLIC BLOOD PRESSURE >= 140 MM HG: ICD-10-PCS | Mod: CPTII,S$GLB,, | Performed by: OBSTETRICS & GYNECOLOGY

## 2020-09-16 PROCEDURE — 99214 OFFICE O/P EST MOD 30 MIN: CPT | Mod: S$GLB,,, | Performed by: OBSTETRICS & GYNECOLOGY

## 2020-09-16 PROCEDURE — 3078F DIAST BP <80 MM HG: CPT | Mod: CPTII,S$GLB,, | Performed by: OBSTETRICS & GYNECOLOGY

## 2020-09-16 PROCEDURE — 99999 PR PBB SHADOW E&M-EST. PATIENT-LVL III: CPT | Mod: PBBFAC,,, | Performed by: OBSTETRICS & GYNECOLOGY

## 2020-09-16 PROCEDURE — 92014 COMPRE OPH EXAM EST PT 1/>: CPT | Mod: S$GLB,,, | Performed by: OPTOMETRIST

## 2020-09-16 PROCEDURE — 92310 PR CONTACT LENS FITTING (NO CHANGE): ICD-10-PCS | Mod: CSM,S$GLB,, | Performed by: OPTOMETRIST

## 2020-09-16 PROCEDURE — 3077F SYST BP >= 140 MM HG: CPT | Mod: CPTII,S$GLB,, | Performed by: OBSTETRICS & GYNECOLOGY

## 2020-09-16 PROCEDURE — 3008F BODY MASS INDEX DOCD: CPT | Mod: CPTII,S$GLB,, | Performed by: OBSTETRICS & GYNECOLOGY

## 2020-09-16 PROCEDURE — 92014 PR EYE EXAM, EST PATIENT,COMPREHESV: ICD-10-PCS | Mod: S$GLB,,, | Performed by: OPTOMETRIST

## 2020-09-16 PROCEDURE — 1159F PR MEDICATION LIST DOCUMENTED IN MEDICAL RECORD: ICD-10-PCS | Mod: S$GLB,,, | Performed by: OBSTETRICS & GYNECOLOGY

## 2020-09-16 PROCEDURE — 1159F MED LIST DOCD IN RCRD: CPT | Mod: S$GLB,,, | Performed by: OBSTETRICS & GYNECOLOGY

## 2020-09-16 PROCEDURE — 92015 PR REFRACTION: ICD-10-PCS | Mod: S$GLB,,, | Performed by: OPTOMETRIST

## 2020-09-16 PROCEDURE — 99999 PR PBB SHADOW E&M-EST. PATIENT-LVL III: ICD-10-PCS | Mod: PBBFAC,,, | Performed by: OBSTETRICS & GYNECOLOGY

## 2020-09-16 PROCEDURE — 1101F PR PT FALLS ASSESS DOC 0-1 FALLS W/OUT INJ PAST YR: ICD-10-PCS | Mod: CPTII,S$GLB,, | Performed by: OBSTETRICS & GYNECOLOGY

## 2020-09-16 PROCEDURE — 99999 PR PBB SHADOW E&M-EST. PATIENT-LVL III: CPT | Mod: PBBFAC,,, | Performed by: OPTOMETRIST

## 2020-09-16 NOTE — PROGRESS NOTES
HPI     Pt here for annual visit and cl fit   Pt says she was not happy with last cls   Felt like she saw blurry with them  Did not update rx for glasses   Been using +2.50 readers to get by  Feels like dist va is getting worse and wants a new rx  Has itching and headaches sometimes  Uses clear eyes prn  Patient denies diplopia, flashes/floaters, pain, and burning/tearing.        Last edited by Clau Akhtar on 9/16/2020  1:39 PM. (History)            Assessment /Plan     For exam results, see Encounter Report.      Conjunctivitis, allergic, bilateral              Zaditor BID OU (before and after contacts)    Refresh or systane with contacts    Pseudophakia of both eyes  PCO (posterior capsular opacification), right   Stable, monitor    Myopia, bilateral              Rx specs     Fitting and adjustment of spectacles and contact lenses              CL fit today: dispensed trials of monovision OD distance OS near plano              `Remove nightly, replace monthly              OK to order in colors  if happy with comfort and vision     RTC 1 year, DFE

## 2020-09-16 NOTE — PROGRESS NOTES
Subjective:      Patient ID: Marleny Guzman is a 70 y.o. female.    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 Gonzalez. Vaginal pap smear 6/15/16 HSIL. She was referred for consult by Dr. Valles. 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 Noncompliant with vaginal estrogen cream as prescribed.      Follow up pap 1/30/17 HSIL. 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      Pap 7/24/17 ASCUS HPV 18+   Pap 1/22/18 LSIL, normal exam  Pap 8/2018 normal, normal exam.   Pap 9/2019 ASCUS, HPV 18+, normal exam   Pap 3/2020 negative      Presents today for follow up. Denies bleeding. Using vaginal estrogen.     Review of Systems   Constitutional: Negative for appetite change, chills, fatigue and fever.   HENT: Negative for mouth sores.    Respiratory: Negative for cough and shortness of breath.    Cardiovascular: Negative for leg swelling.   Gastrointestinal: Negative for abdominal pain, blood in stool, constipation and diarrhea.   Endocrine: Negative for cold intolerance.   Genitourinary: Negative for dysuria and vaginal bleeding.   Musculoskeletal: Negative for myalgias.   Skin: Negative for rash.   Allergic/Immunologic: Negative.    Neurological: Negative for weakness and numbness.   Hematological: Negative for adenopathy. Does not bruise/bleed easily.   Psychiatric/Behavioral: Negative for confusion.       Objective:   Physical Exam:   Constitutional: She is oriented to person, place, and time. She appears well-developed and well-nourished.    HENT:   Head: Normocephalic and atraumatic.    Eyes: Pupils are equal, round, and reactive to light. EOM are normal.    Neck: Normal range of motion. Neck supple. No thyromegaly present.    Cardiovascular: Normal rate, regular rhythm and intact distal pulses.     Pulmonary/Chest: Effort normal and breath sounds normal. No respiratory distress. She has no wheezes.         Abdominal: Soft. Bowel sounds are normal. She exhibits no distension and no mass. There is no abdominal tenderness.     Genitourinary:    Vagina and rectum normal.      Pelvic exam was performed with patient supine.   There is no lesion on the right labia. There is no lesion on the left labia. Uterus is absent. Right adnexum displays no mass. Left adnexum displays no mass. Vaginal cuff normal.Cervix exhibits absence.           Musculoskeletal: Normal range of motion and moves all extremeties.      Lymphadenopathy:     She has no cervical adenopathy.        Right: No supraclavicular adenopathy present.        Left: No supraclavicular adenopathy present.    Neurological: She is alert and oriented to person, place, and time.    Skin: Skin is warm and dry. No rash noted.    Psychiatric: She has a normal mood and affect.       Assessment:     1. Vaginal dysplasia        Plan:   No orders of the defined types were placed in this encounter.    Exam without lesions today.   Pap 3/2020 negative.   Will plan for surveillance in 6 months with pap and HPV testing or sooner if needed.     I spent approximately 30 minutes reviewing the available records and evaluating the patient, out of which over 50% of the time was spent face to face with the patient in counseling and coordinating this patient's care.

## 2020-09-24 RX ORDER — IRBESARTAN AND HYDROCHLOROTHIAZIDE 300; 12.5 MG/1; MG/1
1 TABLET, FILM COATED ORAL DAILY
Qty: 90 TABLET | Refills: 3 | Status: SHIPPED | OUTPATIENT
Start: 2020-09-24 | End: 2020-10-22 | Stop reason: SDUPTHER

## 2020-10-10 ENCOUNTER — HOSPITAL ENCOUNTER (OUTPATIENT)
Facility: HOSPITAL | Age: 70
Discharge: HOME | End: 2020-10-13
Attending: EMERGENCY MEDICINE | Admitting: INTERNAL MEDICINE
Payer: MEDICARE

## 2020-10-10 DIAGNOSIS — R50.9 FEVER: ICD-10-CM

## 2020-10-10 DIAGNOSIS — R68.89 FLU-LIKE SYMPTOMS: ICD-10-CM

## 2020-10-10 DIAGNOSIS — M79.10 MYALGIA: ICD-10-CM

## 2020-10-10 DIAGNOSIS — J32.0 CHRONIC MAXILLARY SINUSITIS: ICD-10-CM

## 2020-10-10 DIAGNOSIS — I74.9 EMBOLUS: ICD-10-CM

## 2020-10-10 DIAGNOSIS — R50.9 FEVER, UNSPECIFIED FEVER CAUSE: ICD-10-CM

## 2020-10-10 DIAGNOSIS — R07.9 CHEST PAIN: ICD-10-CM

## 2020-10-10 DIAGNOSIS — J01.01 ACUTE RECURRENT MAXILLARY SINUSITIS: Primary | ICD-10-CM

## 2020-10-10 PROBLEM — B37.0 ORAL THRUSH: Status: ACTIVE | Noted: 2020-10-10

## 2020-10-10 PROBLEM — N17.9 AKI (ACUTE KIDNEY INJURY): Status: ACTIVE | Noted: 2020-10-10

## 2020-10-10 LAB
ALBUMIN SERPL BCP-MCNC: 3.3 G/DL (ref 3.5–5.2)
ALP SERPL-CCNC: 130 U/L (ref 55–135)
ALT SERPL W/O P-5'-P-CCNC: 29 U/L (ref 10–44)
ANION GAP SERPL CALC-SCNC: 13 MMOL/L (ref 8–16)
AST SERPL-CCNC: 25 U/L (ref 10–40)
BACTERIA #/AREA URNS HPF: NEGATIVE /HPF
BASOPHILS # BLD AUTO: 0.03 K/UL (ref 0–0.2)
BASOPHILS NFR BLD: 0.2 % (ref 0–1.9)
BILIRUB SERPL-MCNC: 0.6 MG/DL (ref 0.1–1)
BILIRUB UR QL STRIP: NEGATIVE
BNP SERPL-MCNC: 60 PG/ML (ref 0–99)
BNP SERPL-MCNC: 60 PG/ML (ref 0–99)
BUN SERPL-MCNC: 22 MG/DL (ref 8–23)
CALCIUM SERPL-MCNC: 9.2 MG/DL (ref 8.7–10.5)
CHLORIDE SERPL-SCNC: 99 MMOL/L (ref 95–110)
CK SERPL-CCNC: 66 U/L (ref 20–180)
CLARITY UR: ABNORMAL
CO2 SERPL-SCNC: 27 MMOL/L (ref 23–29)
COLOR UR: YELLOW
CREAT SERPL-MCNC: 1.3 MG/DL (ref 0.5–1.4)
CRP SERPL-MCNC: 7.84 MG/DL
DIFFERENTIAL METHOD: ABNORMAL
EOSINOPHIL # BLD AUTO: 1.6 K/UL (ref 0–0.5)
EOSINOPHIL NFR BLD: 10.5 % (ref 0–8)
ERYTHROCYTE [DISTWIDTH] IN BLOOD BY AUTOMATED COUNT: 14.6 % (ref 11.5–14.5)
EST. GFR  (AFRICAN AMERICAN): 47.7 ML/MIN/1.73 M^2
EST. GFR  (NON AFRICAN AMERICAN): 41.4 ML/MIN/1.73 M^2
FERRITIN SERPL-MCNC: 78 NG/ML (ref 20–300)
GLUCOSE SERPL-MCNC: 114 MG/DL (ref 70–110)
GLUCOSE UR QL STRIP: NEGATIVE
HCT VFR BLD AUTO: 35.1 % (ref 37–48.5)
HGB BLD-MCNC: 11.6 G/DL (ref 12–16)
HGB UR QL STRIP: ABNORMAL
HYALINE CASTS #/AREA URNS LPF: 16 /LPF
IMM GRANULOCYTES # BLD AUTO: 0.07 K/UL (ref 0–0.04)
IMM GRANULOCYTES NFR BLD AUTO: 0.4 % (ref 0–0.5)
INFLUENZA A, MOLECULAR: NEGATIVE
INFLUENZA B, MOLECULAR: NEGATIVE
KETONES UR QL STRIP: ABNORMAL
LACTATE SERPL-SCNC: 1.4 MMOL/L (ref 0.5–1.9)
LDH SERPL L TO P-CCNC: 131 U/L (ref 110–260)
LDH SERPL L TO P-CCNC: 131 U/L (ref 110–260)
LEUKOCYTE ESTERASE UR QL STRIP: NEGATIVE
LYMPHOCYTES # BLD AUTO: 1.4 K/UL (ref 1–4.8)
LYMPHOCYTES NFR BLD: 9.2 % (ref 18–48)
MCH RBC QN AUTO: 28.2 PG (ref 27–31)
MCHC RBC AUTO-ENTMCNC: 33 G/DL (ref 32–36)
MCV RBC AUTO: 85 FL (ref 82–98)
MICROSCOPIC COMMENT: ABNORMAL
MONOCYTES # BLD AUTO: 0.7 K/UL (ref 0.3–1)
MONOCYTES NFR BLD: 4.3 % (ref 4–15)
NEUTROPHILS # BLD AUTO: 11.8 K/UL (ref 1.8–7.7)
NEUTROPHILS NFR BLD: 75.4 % (ref 38–73)
NITRITE UR QL STRIP: NEGATIVE
NRBC BLD-RTO: 0 /100 WBC
PH UR STRIP: 7 [PH] (ref 5–8)
PLATELET # BLD AUTO: 307 K/UL (ref 150–350)
PMV BLD AUTO: 10.8 FL (ref 9.2–12.9)
POTASSIUM SERPL-SCNC: 3.9 MMOL/L (ref 3.5–5.1)
PROCALCITONIN SERPL IA-MCNC: 11.68 NG/ML (ref 0–0.5)
PROT SERPL-MCNC: 6.8 G/DL (ref 6–8.4)
PROT UR QL STRIP: ABNORMAL
RBC # BLD AUTO: 4.11 M/UL (ref 4–5.4)
RBC #/AREA URNS HPF: 11 /HPF (ref 0–4)
SARS-COV-2 RDRP RESP QL NAA+PROBE: NEGATIVE
SODIUM SERPL-SCNC: 139 MMOL/L (ref 136–145)
SP GR UR STRIP: 1.02 (ref 1–1.03)
SPECIMEN SOURCE: NORMAL
SQUAMOUS #/AREA URNS HPF: 12 /HPF
TROPONIN I SERPL DL<=0.01 NG/ML-MCNC: <0.03 NG/ML
URN SPEC COLLECT METH UR: ABNORMAL
UROBILINOGEN UR STRIP-ACNC: NEGATIVE EU/DL
WBC # BLD AUTO: 15.65 K/UL (ref 3.9–12.7)
WBC #/AREA URNS HPF: 1 /HPF (ref 0–5)

## 2020-10-10 PROCEDURE — 83880 ASSAY OF NATRIURETIC PEPTIDE: CPT

## 2020-10-10 PROCEDURE — 82550 ASSAY OF CK (CPK): CPT

## 2020-10-10 PROCEDURE — 82728 ASSAY OF FERRITIN: CPT

## 2020-10-10 PROCEDURE — 63600175 PHARM REV CODE 636 W HCPCS: Performed by: EMERGENCY MEDICINE

## 2020-10-10 PROCEDURE — 96374 THER/PROPH/DIAG INJ IV PUSH: CPT

## 2020-10-10 PROCEDURE — 80053 COMPREHEN METABOLIC PANEL: CPT

## 2020-10-10 PROCEDURE — 87502 INFLUENZA DNA AMP PROBE: CPT

## 2020-10-10 PROCEDURE — 85025 COMPLETE CBC W/AUTO DIFF WBC: CPT

## 2020-10-10 PROCEDURE — 87040 BLOOD CULTURE FOR BACTERIA: CPT | Mod: 59

## 2020-10-10 PROCEDURE — 81001 URINALYSIS AUTO W/SCOPE: CPT

## 2020-10-10 PROCEDURE — 93005 ELECTROCARDIOGRAM TRACING: CPT | Performed by: INTERNAL MEDICINE

## 2020-10-10 PROCEDURE — U0002 COVID-19 LAB TEST NON-CDC: HCPCS

## 2020-10-10 PROCEDURE — 25000003 PHARM REV CODE 250: Performed by: EMERGENCY MEDICINE

## 2020-10-10 PROCEDURE — 84145 PROCALCITONIN (PCT): CPT

## 2020-10-10 PROCEDURE — 86140 C-REACTIVE PROTEIN: CPT

## 2020-10-10 PROCEDURE — 99285 EMERGENCY DEPT VISIT HI MDM: CPT | Mod: 25

## 2020-10-10 PROCEDURE — 25000003 PHARM REV CODE 250: Performed by: NURSE PRACTITIONER

## 2020-10-10 PROCEDURE — 96361 HYDRATE IV INFUSION ADD-ON: CPT | Mod: GZ

## 2020-10-10 PROCEDURE — G0378 HOSPITAL OBSERVATION PER HR: HCPCS

## 2020-10-10 PROCEDURE — 96375 TX/PRO/DX INJ NEW DRUG ADDON: CPT

## 2020-10-10 PROCEDURE — 93010 ELECTROCARDIOGRAM REPORT: CPT | Mod: ,,, | Performed by: INTERNAL MEDICINE

## 2020-10-10 PROCEDURE — 84484 ASSAY OF TROPONIN QUANT: CPT

## 2020-10-10 PROCEDURE — 25500020 PHARM REV CODE 255: Performed by: EMERGENCY MEDICINE

## 2020-10-10 PROCEDURE — 93010 EKG 12-LEAD: ICD-10-PCS | Mod: ,,, | Performed by: INTERNAL MEDICINE

## 2020-10-10 PROCEDURE — 96365 THER/PROPH/DIAG IV INF INIT: CPT

## 2020-10-10 PROCEDURE — 83615 LACTATE (LD) (LDH) ENZYME: CPT

## 2020-10-10 PROCEDURE — 83605 ASSAY OF LACTIC ACID: CPT

## 2020-10-10 RX ORDER — IBUPROFEN 200 MG
16 TABLET ORAL
Status: DISCONTINUED | OUTPATIENT
Start: 2020-10-10 | End: 2020-10-13 | Stop reason: HOSPADM

## 2020-10-10 RX ORDER — SODIUM CHLORIDE 9 MG/ML
INJECTION, SOLUTION INTRAVENOUS CONTINUOUS
Status: DISCONTINUED | OUTPATIENT
Start: 2020-10-10 | End: 2020-10-13 | Stop reason: HOSPADM

## 2020-10-10 RX ORDER — ACETAMINOPHEN 325 MG/1
650 TABLET ORAL
Status: COMPLETED | OUTPATIENT
Start: 2020-10-10 | End: 2020-10-10

## 2020-10-10 RX ORDER — NYSTATIN 100000 [USP'U]/ML
500000 SUSPENSION ORAL 4 TIMES DAILY
Status: DISCONTINUED | OUTPATIENT
Start: 2020-10-11 | End: 2020-10-13 | Stop reason: HOSPADM

## 2020-10-10 RX ORDER — METOPROLOL SUCCINATE 25 MG/1
50 TABLET, EXTENDED RELEASE ORAL DAILY
Status: DISCONTINUED | OUTPATIENT
Start: 2020-10-11 | End: 2020-10-13 | Stop reason: HOSPADM

## 2020-10-10 RX ORDER — IBUPROFEN 200 MG
24 TABLET ORAL
Status: DISCONTINUED | OUTPATIENT
Start: 2020-10-10 | End: 2020-10-13 | Stop reason: HOSPADM

## 2020-10-10 RX ORDER — ACETAMINOPHEN 325 MG/1
650 TABLET ORAL EVERY 4 HOURS PRN
Status: DISCONTINUED | OUTPATIENT
Start: 2020-10-10 | End: 2020-10-13 | Stop reason: HOSPADM

## 2020-10-10 RX ORDER — GLUCAGON 1 MG
1 KIT INJECTION
Status: DISCONTINUED | OUTPATIENT
Start: 2020-10-10 | End: 2020-10-13 | Stop reason: HOSPADM

## 2020-10-10 RX ORDER — FLUCONAZOLE 2 MG/ML
200 INJECTION, SOLUTION INTRAVENOUS
Status: DISCONTINUED | OUTPATIENT
Start: 2020-10-11 | End: 2020-10-11

## 2020-10-10 RX ORDER — DEXAMETHASONE SODIUM PHOSPHATE 4 MG/ML
6 INJECTION, SOLUTION INTRA-ARTICULAR; INTRALESIONAL; INTRAMUSCULAR; INTRAVENOUS; SOFT TISSUE
Status: COMPLETED | OUTPATIENT
Start: 2020-10-10 | End: 2020-10-10

## 2020-10-10 RX ORDER — ONDANSETRON 2 MG/ML
4 INJECTION INTRAMUSCULAR; INTRAVENOUS EVERY 8 HOURS PRN
Status: DISCONTINUED | OUTPATIENT
Start: 2020-10-10 | End: 2020-10-13 | Stop reason: HOSPADM

## 2020-10-10 RX ORDER — LORAZEPAM 0.5 MG/1
0.5 TABLET ORAL DAILY PRN
Status: DISCONTINUED | OUTPATIENT
Start: 2020-10-10 | End: 2020-10-13 | Stop reason: HOSPADM

## 2020-10-10 RX ORDER — ENOXAPARIN SODIUM 100 MG/ML
40 INJECTION SUBCUTANEOUS EVERY 24 HOURS
Status: DISCONTINUED | OUTPATIENT
Start: 2020-10-10 | End: 2020-10-13 | Stop reason: HOSPADM

## 2020-10-10 RX ORDER — ACETAMINOPHEN 325 MG/1
650 TABLET ORAL EVERY 8 HOURS PRN
Status: DISCONTINUED | OUTPATIENT
Start: 2020-10-10 | End: 2020-10-13 | Stop reason: HOSPADM

## 2020-10-10 RX ORDER — TALC
6 POWDER (GRAM) TOPICAL NIGHTLY PRN
Status: DISCONTINUED | OUTPATIENT
Start: 2020-10-10 | End: 2020-10-13 | Stop reason: HOSPADM

## 2020-10-10 RX ORDER — SODIUM CHLORIDE 0.9 % (FLUSH) 0.9 %
10 SYRINGE (ML) INJECTION EVERY 6 HOURS PRN
Status: DISCONTINUED | OUTPATIENT
Start: 2020-10-10 | End: 2020-10-13 | Stop reason: HOSPADM

## 2020-10-10 RX ORDER — ESCITALOPRAM OXALATE 10 MG/1
20 TABLET ORAL DAILY
Status: DISCONTINUED | OUTPATIENT
Start: 2020-10-11 | End: 2020-10-13 | Stop reason: HOSPADM

## 2020-10-10 RX ORDER — VANCOMYCIN HCL IN 5 % DEXTROSE 1G/250ML
1000 PLASTIC BAG, INJECTION (ML) INTRAVENOUS ONCE
Status: DISCONTINUED | OUTPATIENT
Start: 2020-10-10 | End: 2020-10-10

## 2020-10-10 RX ADMIN — PIPERACILLIN AND TAZOBACTAM 4.5 G: 4; .5 INJECTION, POWDER, LYOPHILIZED, FOR SOLUTION INTRAVENOUS; PARENTERAL at 10:10

## 2020-10-10 RX ADMIN — SODIUM CHLORIDE, SODIUM LACTATE, POTASSIUM CHLORIDE, AND CALCIUM CHLORIDE 1000 ML: .6; .31; .03; .02 INJECTION, SOLUTION INTRAVENOUS at 09:10

## 2020-10-10 RX ADMIN — IOHEXOL 100 ML: 350 INJECTION, SOLUTION INTRAVENOUS at 11:10

## 2020-10-10 RX ADMIN — SODIUM CHLORIDE, SODIUM LACTATE, POTASSIUM CHLORIDE, AND CALCIUM CHLORIDE: .6; .31; .03; .02 INJECTION, SOLUTION INTRAVENOUS at 11:10

## 2020-10-10 RX ADMIN — SODIUM CHLORIDE 500 ML: 0.9 INJECTION, SOLUTION INTRAVENOUS at 08:10

## 2020-10-10 RX ADMIN — DEXAMETHASONE SODIUM PHOSPHATE 6 MG: 4 INJECTION, SOLUTION INTRAMUSCULAR; INTRAVENOUS at 08:10

## 2020-10-10 RX ADMIN — ACETAMINOPHEN 650 MG: 325 TABLET ORAL at 07:10

## 2020-10-10 NOTE — FIRST PROVIDER EVALUATION
Medical screening exam completed.  I have conducted a focused provider triage encounter, findings are as follows:    Brief history of present illness:  Fever and cough since yesterday  Denies SOB or chest pain   Pt also reports dizziness and weakness     There were no vitals filed for this visit.    Pertinent physical exam:  HRR Lungs CTA      Brief workup plan: chest x-ray COVID  Cardiac work up    Preliminary workup initiated; this workup will be continued and followed by the physician or advanced practice provider that is assigned to the patient when roomed.

## 2020-10-11 PROBLEM — B49 INVASIVE FUNGAL SINUSITIS: Status: ACTIVE | Noted: 2020-10-11

## 2020-10-11 PROBLEM — J32.9 INVASIVE FUNGAL SINUSITIS: Status: ACTIVE | Noted: 2020-10-11

## 2020-10-11 PROBLEM — R93.89 ABNORMAL MRI: Status: ACTIVE | Noted: 2020-10-11

## 2020-10-11 LAB
ALBUMIN SERPL BCP-MCNC: 3 G/DL (ref 3.5–5.2)
ALP SERPL-CCNC: 121 U/L (ref 55–135)
ALT SERPL W/O P-5'-P-CCNC: 29 U/L (ref 10–44)
ANION GAP SERPL CALC-SCNC: 6 MMOL/L (ref 8–16)
AST SERPL-CCNC: 20 U/L (ref 10–40)
BASOPHILS # BLD AUTO: 0.07 K/UL (ref 0–0.2)
BASOPHILS NFR BLD: 0.3 % (ref 0–1.9)
BILIRUB SERPL-MCNC: 0.7 MG/DL (ref 0.1–1)
BUN SERPL-MCNC: 15 MG/DL (ref 8–23)
CALCIUM SERPL-MCNC: 8.6 MG/DL (ref 8.7–10.5)
CHLORIDE SERPL-SCNC: 105 MMOL/L (ref 95–110)
CO2 SERPL-SCNC: 26 MMOL/L (ref 23–29)
CREAT SERPL-MCNC: 1.1 MG/DL (ref 0.5–1.4)
DIFFERENTIAL METHOD: ABNORMAL
EOSINOPHIL # BLD AUTO: 1.6 K/UL (ref 0–0.5)
EOSINOPHIL NFR BLD: 6.7 % (ref 0–8)
ERYTHROCYTE [DISTWIDTH] IN BLOOD BY AUTOMATED COUNT: 14.5 % (ref 11.5–14.5)
EST. GFR  (AFRICAN AMERICAN): 58.4 ML/MIN/1.73 M^2
EST. GFR  (NON AFRICAN AMERICAN): 50.6 ML/MIN/1.73 M^2
GLUCOSE SERPL-MCNC: 199 MG/DL (ref 70–110)
HCT VFR BLD AUTO: 33.1 % (ref 37–48.5)
HGB BLD-MCNC: 11.1 G/DL (ref 12–16)
IMM GRANULOCYTES # BLD AUTO: 0.46 K/UL (ref 0–0.04)
IMM GRANULOCYTES NFR BLD AUTO: 2 % (ref 0–0.5)
LYMPHOCYTES # BLD AUTO: 1.3 K/UL (ref 1–4.8)
LYMPHOCYTES NFR BLD: 5.8 % (ref 18–48)
MCH RBC QN AUTO: 28.4 PG (ref 27–31)
MCHC RBC AUTO-ENTMCNC: 33.5 G/DL (ref 32–36)
MCV RBC AUTO: 85 FL (ref 82–98)
MONOCYTES # BLD AUTO: 0.6 K/UL (ref 0.3–1)
MONOCYTES NFR BLD: 2.7 % (ref 4–15)
NEUTROPHILS # BLD AUTO: 19.1 K/UL (ref 1.8–7.7)
NEUTROPHILS NFR BLD: 82.5 % (ref 38–73)
NRBC BLD-RTO: 0 /100 WBC
PLATELET # BLD AUTO: 264 K/UL (ref 150–350)
PMV BLD AUTO: 10.4 FL (ref 9.2–12.9)
POTASSIUM SERPL-SCNC: 3.8 MMOL/L (ref 3.5–5.1)
PROT SERPL-MCNC: 6.5 G/DL (ref 6–8.4)
RBC # BLD AUTO: 3.91 M/UL (ref 4–5.4)
SODIUM SERPL-SCNC: 137 MMOL/L (ref 136–145)
WBC # BLD AUTO: 23.17 K/UL (ref 3.9–12.7)

## 2020-10-11 PROCEDURE — 96375 TX/PRO/DX INJ NEW DRUG ADDON: CPT | Mod: 59

## 2020-10-11 PROCEDURE — 96372 THER/PROPH/DIAG INJ SC/IM: CPT | Mod: 59

## 2020-10-11 PROCEDURE — 85025 COMPLETE CBC W/AUTO DIFF WBC: CPT

## 2020-10-11 PROCEDURE — 87103 BLOOD FUNGUS CULTURE: CPT

## 2020-10-11 PROCEDURE — 36415 COLL VENOUS BLD VENIPUNCTURE: CPT

## 2020-10-11 PROCEDURE — 80053 COMPREHEN METABOLIC PANEL: CPT

## 2020-10-11 PROCEDURE — A9585 GADOBUTROL INJECTION: HCPCS | Performed by: INTERNAL MEDICINE

## 2020-10-11 PROCEDURE — G0378 HOSPITAL OBSERVATION PER HR: HCPCS

## 2020-10-11 PROCEDURE — 96376 TX/PRO/DX INJ SAME DRUG ADON: CPT | Mod: 59

## 2020-10-11 PROCEDURE — 25000003 PHARM REV CODE 250: Performed by: STUDENT IN AN ORGANIZED HEALTH CARE EDUCATION/TRAINING PROGRAM

## 2020-10-11 PROCEDURE — 63600175 PHARM REV CODE 636 W HCPCS: Mod: JG | Performed by: STUDENT IN AN ORGANIZED HEALTH CARE EDUCATION/TRAINING PROGRAM

## 2020-10-11 PROCEDURE — 63600175 PHARM REV CODE 636 W HCPCS: Performed by: INTERNAL MEDICINE

## 2020-10-11 PROCEDURE — 25500020 PHARM REV CODE 255: Performed by: INTERNAL MEDICINE

## 2020-10-11 PROCEDURE — 25000003 PHARM REV CODE 250: Performed by: INTERNAL MEDICINE

## 2020-10-11 RX ORDER — GADOBUTROL 604.72 MG/ML
6 INJECTION INTRAVENOUS
Status: COMPLETED | OUTPATIENT
Start: 2020-10-11 | End: 2020-10-11

## 2020-10-11 RX ADMIN — MEROPENEM 1 G: 1 INJECTION, POWDER, FOR SOLUTION INTRAVENOUS at 01:10

## 2020-10-11 RX ADMIN — VANCOMYCIN HYDROCHLORIDE 1500 MG: 1 INJECTION, POWDER, LYOPHILIZED, FOR SOLUTION INTRAVENOUS at 01:10

## 2020-10-11 RX ADMIN — GADOBUTROL 6 ML: 604.72 INJECTION INTRAVENOUS at 10:10

## 2020-10-11 RX ADMIN — AMPHOTERICIN B 450 MG: 50 INJECTION, POWDER, LYOPHILIZED, FOR SOLUTION INTRAVENOUS at 03:10

## 2020-10-11 RX ADMIN — NYSTATIN 500000 UNITS: 100000 SUSPENSION ORAL at 01:10

## 2020-10-11 RX ADMIN — ENOXAPARIN SODIUM 40 MG: 40 INJECTION SUBCUTANEOUS at 05:10

## 2020-10-11 RX ADMIN — ACETAMINOPHEN 650 MG: 325 TABLET, FILM COATED ORAL at 08:10

## 2020-10-11 RX ADMIN — ENOXAPARIN SODIUM 40 MG: 40 INJECTION SUBCUTANEOUS at 12:10

## 2020-10-11 RX ADMIN — SODIUM CHLORIDE: 0.9 INJECTION, SOLUTION INTRAVENOUS at 01:10

## 2020-10-11 RX ADMIN — NYSTATIN 500000 UNITS: 100000 SUSPENSION ORAL at 09:10

## 2020-10-11 RX ADMIN — ESCITALOPRAM OXALATE 20 MG: 10 TABLET ORAL at 09:10

## 2020-10-11 RX ADMIN — MEROPENEM 1 G: 1 INJECTION, POWDER, FOR SOLUTION INTRAVENOUS at 08:10

## 2020-10-11 RX ADMIN — MEROPENEM 1 G: 1 INJECTION, POWDER, FOR SOLUTION INTRAVENOUS at 05:10

## 2020-10-11 RX ADMIN — NYSTATIN 500000 UNITS: 100000 SUSPENSION ORAL at 08:10

## 2020-10-11 RX ADMIN — NYSTATIN 500000 UNITS: 100000 SUSPENSION ORAL at 05:10

## 2020-10-11 RX ADMIN — METOPROLOL SUCCINATE 50 MG: 25 TABLET, FILM COATED, EXTENDED RELEASE ORAL at 09:10
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
## 2020-10-11 NOTE — CONSULTS
Consult Note  Infectious Disease    Reason for Consult:      HPI: Marleny Guzman is a 70 y.o. 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 Claudio at [facility] was admitted for worsening of headache sinus pain and fevers.  She reports that after that surgery she then had a a FESS done byher orthodontist 1 week later.  She took 7 days of Augmentin after her surgery.  She completed the antibiotics.  Her symptoms never went away.  She continues have swelling of her face.  Denies any eye pain however has ongoing sinus pain and headaches.  She has some clear nasal discharge.  Denies any purulence drainage.  She has some ongoing coughing due to postnasal drainage.  Denies any shortness of breath, sore throat.  She denies any nausea vomiting diarrhea.  Denies any dysuria.  She lives with her daughter and son-in-law in a relatively new house no known mold exposure or issues with mold in the house.  She is not a diabetic.   She feels better today.  In the ED she had CT head and sinus that was was suspicious for invasive fungal infection due to asymmetric haziness within the posterior periAntral soft tissues.  Blood cultures were done.  She was started on  broad-spectrum antibiotics as well as AmBisome. Chart review shows that she grew Enterobacter and another Gram-negative organism from her sinus culture- per Dr. Yun.         Review of patient's allergies indicates:  No Known Allergies  Past Medical History:   Diagnosis Date    Abnormal Pap smear     Abnormal Pap smear of cervix     Abnormal Pap smear of vagina 7/20/2016    LEROY (acute kidney injury) 10/10/2020    Allergy     Anemia     Anxiety     Anxiety     Cataract     Chronic bilateral low back pain without sciatica 3/13/2018    Depression      Depression with anxiety     Hypertension     Osteoarthritis 2013    Right rotator cuff tear     Sleep apnea     Cipap machine at home    Trouble in sleeping     Urinary incontinence     Leaks urine with laughing/ coughing/ sneezing     Past Surgical History:   Procedure Laterality Date    CATARACT EXTRACTION  12    OS    CATARACT EXTRACTION  12    OD    CERVIX SURGERY      Conization    COLONOSCOPY      COLONOSCOPY N/A 3/4/2016    Procedure: COLONOSCOPY;  Surgeon: Tenzin Thakkar MD;  Location: [address]);  Service: Endoscopy;  Laterality: N/A;    EYE SURGERY Bilateral     cataract    HYSTERECTOMY  5-12-15    LEEP      LUMBAR EPIDURAL INJECTION      OOPHORECTOMY      CO REMOVAL OF OVARY/TUBE(S)  5-12-15    SALPINGECTOMY Left     SHOULDER SURGERY Right      Social History     Tobacco Use    Smoking status: Former Smoker     Packs/day: 0.50     Years: 22.00     Pack years: 11.00     Types: Cigarettes     Quit date: 1998     Years since quittin.6    Smokeless tobacco: Never Used   Substance Use Topics    Alcohol use: Yes     Alcohol/week: 0.0 standard drinks     Comment: Occassionally for social events will have a glass of wine     Social History     Occupational History    Occupation: Disability     Comment: Depression     Employer: DISABLED     Family History   Problem Relation Age of Onset    Hypertension Father     Cataracts Father     Cancer Mother         THYROID CANCER    Stroke Mother     Breast cancer Cousin     Heart disease Sister     Pacemaker/defibrilator Sister     Hypertension Sister     Deep vein thrombosis Sister     Heart disease Sister         CABG    Osteoarthritis Sister     Edema Sister     Eczema Sister     Hypertension Sister     Breast cancer Sister 72    Anuerysm Sister     Drug abuse Sister     Hypertension Sister     No Known Problems Brother     No Known Problems Sister     Cancer Sister         Cancer cells  "or "growth in her stomach"    No Known Problems Sister     Hypertension Daughter     Other Daughter         Heart palpitations    Depression Maternal Grandmother     Ulcers Maternal Grandfather         Legs    No Known Problems Paternal Grandmother     No Known Problems Paternal Grandfather     Cancer Maternal Aunt     ADD / ADHD Neg Hx     Alcohol abuse Neg Hx     Anxiety disorder Neg Hx     Bipolar disorder Neg Hx     Dementia Neg Hx     OCD Neg Hx     Paranoid behavior Neg Hx     Physical abuse Neg Hx     Schizophrenia Neg Hx     Seizures Neg Hx     Sexual abuse Neg Hx     Amblyopia Neg Hx     Blindness Neg Hx     Glaucoma Neg Hx     Macular degeneration Neg Hx     Retinal detachment Neg Hx     Strabismus Neg Hx        Pertinent medications noted:     Review of Systems:   Ability to give history is:   Yes chills, fever, sweats, weight loss  No change in vision, loss of vision or diplopia  Yes sinus congestion, clear nasal discharge, post nasal drip and facial pain  No pain in mouth or throat.  Extensive issues with maxillary teeth and mouth pain- no ulcers  No chest pain, palpitations, syncope  Yes cough, no sputum production, shortness of breath, dyspnea on exertion, pleurisy, hemoptysis  No nausea, vomiting, diarrhea, constipation, blood in stool, or focal abd pain,  No dysphagia, odynophagia  No dysuria, hematuria, strangury, retention, incontinence, nocturia, prostatism,   No vaginal discharge, genital ulcers, risk for STD  No swelling of joints, redness of joints, injuries, or new focal pain  Yes headaches, no dizziness, vertigo, numbness, paresthesias, neuropathy, falls  No anxiety, depression, substance abuse, sleep disturbance  No diabetes, thyroid, hypogonadal conditions  No bleeding, lymphadenopathy, anemia, malignancy, unusual bruising  No new rashes, lesions, or wounds  No TB exposure    Outdoor activities:  No  Travel:  No  Implants:  No  Antibiotic History:  Augmentin times " 14 days in the last 3 weeks    EXAM & DIAGNOSTICS REVIEWED:   Vitals:     Temp:  [97.6 °F (36.4 °C)-101.8 °F (38.8 °C)]   Temp: 97.9 °F (36.6 °C) (10/11/20 1204)  Pulse: 70 (10/11/20 1204)  Resp: 18 (10/11/20 1204)  BP: (!) 151/73 (10/11/20 1204)  SpO2: 96 % (10/11/20 1204)    Intake/Output Summary (Last 24 hours) at 10/11/2020 1506  Last data filed at 10/11/2020 0533  Gross per 24 hour   Intake 1967.5 ml   Output --   Net 1967.5 ml       General:  In NAD. Alert and attentive, cooperative, comfortable, family at bedside  Eyes:  Anicteric, PERRL, no periorbital swelling  ENT:  Status post extraction of all upper teeth and multiple lower jaw teeth- No ulcers, exudates, positive thrush, nares patent, dentition is poor- cannot appreciate any ulcers in the nares or discharge- tenderness of maxillary sinuses bilateral, slight facial swelling  Neck:  supple, cervical adenopathy appreciated  Lungs: Clear, no consolidation, rales, wheezes, rub  Heart:  RRR, no gallop/murmur noted  Abd:  Soft, NT, ND, normal BS, no masses or organomegaly appreciated.  :  No flank tenderness  Musc:  Joints without effusion, swelling,  erythema, synovitis, ambulatory  Skin:  No rashes. No palmar or plantar lesions. No subungual petechiae  Wound:   Neuro: Alert, attentive, speech fluent, face symmetric, moves all extremities, no focal weakness  Psych:  Calm, cooperative  Lymphatic:     No cervical, supraclavicular, axillary, or inguinal nodes  Extrem: No edema, erythema, phlebitis, cellulitis, warm and well perfused  VAD:  PIV       Isolation:  None      General Labs reviewed:  Recent Labs   Lab 10/10/20  1928 10/11/20  0516   WBC 15.65* 23.17*   HGB 11.6* 11.1*   HCT 35.1* 33.1*    264       Recent Labs   Lab 10/10/20  1928 10/11/20  0516    137   K 3.9 3.8   CL 99 105   CO2 27 26   BUN 22 15   CREATININE 1.3 1.1   CALCIUM 9.2 8.6*   PROT 6.8 6.5   BILITOT 0.6 0.7   ALKPHOS 130 121   ALT 29 29   AST 25 20          Micro:  Microbiology Results (last 7 days)     Procedure Component Value Units Date/Time    Fungus Culture, Blood or Bone Marrow [[id number]] Collected: 10/11/20 0911    Order Status: Sent Specimen: Blood Updated: 10/11/20 0914    Blood culture x two cultures. Draw prior to antibiotics. [[id number]] Collected: 10/10/20 2005    Order Status: Completed Specimen: Blood from Peripheral, Antecubital, Right Updated: 10/11/20 0317     Blood Culture, Routine No Growth to date    Narrative:      Aerobic and anaerobic    Blood culture x two cultures. Draw prior to antibiotics. [[id number]] Collected: 10/10/20 1930    Order Status: Completed Specimen: Blood from Peripheral, Antecubital, Right Updated: 10/11/20 0158     Blood Culture, Routine No Growth to date    Narrative:      Aerobic and anaerobic        Imaging Reviewed:  CT sinuses 10/10  Prominent mucosal thickening in the right maxillary sinus, with asymmetric mucosal thickening of the right nasal cavity and bilateral ethmoid sinus disease. There has been interval partial destruction of the walls of the right ethmoid bulla. The anterior and posterolateral walls of the right maxillary sinus appear slightly thickened and somewhat ill-defined. In addition, there is mild asymmetric haziness within the posterior periantral soft tissues on the right. The nasal septum appears intact and the pre-maxillary soft tissues are unremarkable. However, findings are worrisome for right-sided acute invasive fungal sinusitis    MRI brain 10/11   1. 2 small adjacent foci of subtle diffusion restriction at the corona  radiata on the left, suggesting small subacute white matter ischemic  insults. Additional findings of moderate chronic microvascular white  matter disease are noted.  2. Changes of chronic sinusitis as described, most pronounced at the  right maxillary sinus. No acute bone destruction is identified.    CT abdomen and pelvis 10/10  1.  Ventral wall hernia containing a  knuckle of colon. There is no evidence of obstruction.  2.  Suggestion of minimal gallbladder wall thickening/pericholecystic inflammation. No calcified gallstones or ductal dilatation. If there is clinical concern for acute gallbladder pathology, findings could be further evaluated with ultrasound or HIDA scan    IMPRESSION & PLAN     70 y.o. 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 Claudio at [facility] was admitted for worsening of headache sinus pain and fevers:    1- Acute on chronic right maxillary sinusitis-   -well CT sinuses were suggestive of invasive fungal sinusitis- perhaps changes from the recent sinus surgery,  the MRI does not show any destruction of the bone- this is not consistent with invasive fungal infection such as Mucor/zygpmycets  - T-max 102° last night, WBC 20918  - search in troughs cultures 9/17- per report Enterobacter and another GNR   - no no mold exposure, NOT a diabetic  - blood cultures in progress  - on AmBisome, vancomycin and meropenem    2- LEROY- improving    Recommendations:    Given presentation and the MRI, Invasive fungal infection of the sinuses on are unlikely- however will follow-up with current antimicrobials until ENT endoscopic examination with cultures  Obtain bacterial and fungal cultures from the recent ENT procedure at [facility]  Monitor renal function electrolytes including K, Mag while on AmBisome- premedicated with Benadryl and acetaminophen as well as IV fluids  Follow-up blood cultures    Discussed with Dr. Yun and family at bedside    Dr Tapia will assume care 10/12

## 2020-10-11 NOTE — SUBJECTIVE & OBJECTIVE
Past Medical History:   Diagnosis Date    Abnormal Pap smear     Abnormal Pap smear of cervix     Abnormal Pap smear of vagina 7/20/2016    Allergy     Anemia     Anxiety     Anxiety     Cataract     Chronic bilateral low back pain without sciatica 3/13/2018    Depression     Depression with anxiety     Hypertension     Osteoarthritis 6/20/2013    Right rotator cuff tear     Sleep apnea     Cipap machine at home    Trouble in sleeping     Urinary incontinence     Leaks urine with laughing/ coughing/ sneezing       Past Surgical History:   Procedure Laterality Date    CATARACT EXTRACTION  6/18/12    OS    CATARACT EXTRACTION  7/16/12    OD    CERVIX SURGERY      Conization    COLONOSCOPY      COLONOSCOPY N/A 3/4/2016    Procedure: COLONOSCOPY;  Surgeon: Tenzin Thakkar MD;  Location: [address]);  Service: Endoscopy;  Laterality: N/A;    EYE SURGERY Bilateral     cataract    HYSTERECTOMY  5-12-15    LEEP      LUMBAR EPIDURAL INJECTION      OOPHORECTOMY      NM REMOVAL OF OVARY/TUBE(S)  5-12-15    SALPINGECTOMY Left     SHOULDER SURGERY Right        Review of patient's allergies indicates:  No Known Allergies    No current facility-administered medications on file prior to encounter.      Current Outpatient Medications on File Prior to Encounter   Medication Sig    estradiol (ESTRACE) 0.01 % (0.1 mg/gram) vaginal cream Use 1 GM nightly vaginally and dime size amount on vulvatwice weekly (Patient taking differently: Place 1 g vaginally. Use 1 GM nightly vaginally and dime size amount on vulvatwice weekly)    flu vacc [id number] up,/PF (FLUZONE HIGHDOSE QUAD 20-21 PF IM) Inject 0.7 mLs into the muscle once.    irbesartan-hydrochlorothiazide (AVALIDE) 300-12.5 mg per tablet Take 1 tablet by mouth once daily.    LORazepam (ATIVAN) 0.5 MG tablet TAKE 1/2 TO 1 TABLET BY MOUTH DAILY AS NEEDED FOR ANXIETY (Patient taking differently: Take 0.5-1 mg by mouth daily as needed. TAKE  1/2 TO 1 TABLET BY MOUTH DAILY AS NEEDED FOR ANXIETY)    metoprolol succinate (TOPROL-XL) 50 MG 24 hr tablet TAKE 1 TABLET BY MOUTH EVERY DAY (Patient taking differently: Take 50 mg by mouth once daily. )    albuterol (PROVENTIL/VENTOLIN HFA) 90 mcg/actuation inhaler Inhale 1-2 puffs into the lungs every 6 (six) hours as needed for Wheezing. Rescue (Patient not taking: Reported on 2020)    ergocalciferol (ERGOCALCIFEROL) 50,000 unit Cap TAKE 1 CAPSULE (50,000 UNITS TOTAL) BY MOUTH EVERY 30 DAYS. (Patient taking differently: Take 50,000 Units by mouth every 30 days. )    escitalopram oxalate (LEXAPRO) 20 MG tablet 1.5 tablets daily (Patient taking differently: Take 30 mg by mouth once daily. 1.5 tablets daily)    meloxicam (MOBIC) 15 MG tablet Take 1 tablet (15 mg total) by mouth once daily.     Family History     Problem Relation (Age of Onset)    Anuerysm Sister    Breast cancer Cousin, Sister (72)    Cancer Mother, Sister, Maternal Aunt    Cataracts Father    Deep vein thrombosis Sister    Depression Maternal Grandmother    Drug abuse Sister    Eczema Sister    Edema Sister    Heart disease Sister, Sister    Hypertension Father, Sister, Sister, Sister, Daughter    No Known Problems Brother, Sister, Sister, Paternal Grandmother, Paternal Grandfather    Osteoarthritis Sister    Other Daughter    Pacemaker/defibrilator Sister    Stroke Mother    Ulcers Maternal Grandfather        Tobacco Use    Smoking status: Former Smoker     Packs/day: 0.50     Years: 22.00     Pack years: 11.00     Types: Cigarettes     Quit date: 1998     Years since quittin.6    Smokeless tobacco: Never Used   Substance and Sexual Activity    Alcohol use: Yes     Alcohol/week: 0.0 standard drinks     Comment: Occassionally for social events will have a glass of wine    Drug use: No    Sexual activity: Not on file     Review of Systems   Constitutional: Positive for chills, fatigue and fever.   HENT: Negative for sinus  pressure, sinus pain and sneezing.    Respiratory: Positive for cough. Negative for shortness of breath.    Cardiovascular: Negative for chest pain.   Gastrointestinal: Positive for abdominal pain. Negative for constipation, nausea and vomiting.   Genitourinary: Negative for difficulty urinating, dysuria and urgency.   Musculoskeletal: Positive for myalgias. Negative for neck stiffness.   Skin: Negative for wound.   Allergic/Immunologic: Negative for immunocompromised state.   Neurological: Positive for weakness. Negative for headaches.   Psychiatric/Behavioral: Negative for behavioral problems.     Objective:     Vital Signs (Most Recent):  Temp: 98 °F (36.7 °C) (10/10/20 2200)  Pulse: 80 (10/10/20 2200)  Resp: 20 (10/10/20 2200)  BP: 136/71 (10/10/20 1849)  SpO2: 97 % (10/10/20 2200) Vital Signs (24h Range):  Temp:  [98 °F (36.7 °C)-101.8 °F (38.8 °C)] 98 °F (36.7 °C)  Pulse:  [80-96] 80  Resp:  [20] 20  SpO2:  [97 %] 97 %  BP: (136)/(71) 136/71        There is no height or weight on file to calculate BMI.    Physical Exam  Vitals signs and nursing note reviewed.   Constitutional:       General: She is not in acute distress.     Appearance: She is not ill-appearing, toxic-appearing or diaphoretic.   HENT:      Head: Normocephalic and atraumatic.      Nose:      Comments: No sinus tenderness to palpation     Mouth/Throat:      Mouth: Mucous membranes are moist.      Comments: + oral candidiasis, does not seem to extend to the posterior pharynx.  Edentulous upper jaw.  Eyes:      General:         Right eye: No discharge.         Left eye: No discharge.      Extraocular Movements: Extraocular movements intact.      Conjunctiva/sclera: Conjunctivae normal.      Pupils: Pupils are equal, round, and reactive to light.   Neck:      Musculoskeletal: Neck supple. No neck rigidity.   Cardiovascular:      Rate and Rhythm: Normal rate and regular rhythm.      Pulses: Normal pulses.   Pulmonary:      Effort: Pulmonary effort  is normal. No respiratory distress.      Breath sounds: Normal breath sounds. No stridor. No wheezing or rhonchi.   Abdominal:      General: Bowel sounds are normal. There is no distension.      Palpations: Abdomen is soft.      Tenderness: There is abdominal tenderness. There is no guarding or rebound.      Comments: Abdominal tenderness epigastric and right upper quadrant.  No peritoneal signs.   Genitourinary:     Comments: No suprapubic fullness or tenderness.  No CVAT.  Musculoskeletal:         General: No swelling.   Skin:     General: Skin is warm.      Capillary Refill: Capillary refill takes less than 2 seconds.      Findings: No rash.   Neurological:      General: No focal deficit present.      Mental Status: She is alert and oriented to person, place, and time.      Cranial Nerves: No cranial nerve deficit.      Comments: Generalized nonfocal weakness   Psychiatric:         Mood and Affect: Mood normal.           CRANIAL NERVES     CN III, IV, VI   Pupils are equal, round, and reactive to light.       Significant Labs:   Blood Culture: No results for input(s): LABBLOO in the last 48 hours.  BMP:   Recent Labs   Lab 10/10/20  1928   *      K 3.9   CL 99   CO2 27   BUN 22   CREATININE 1.3   CALCIUM 9.2     CBC:   Recent Labs   Lab 10/10/20  1928   WBC 15.65*   HGB 11.6*   HCT 35.1*        CMP:   Recent Labs   Lab 10/10/20  1928      K 3.9   CL 99   CO2 27   *   BUN 22   CREATININE 1.3   CALCIUM 9.2   PROT 6.8   ALBUMIN 3.3*   BILITOT 0.6   ALKPHOS 130   AST 25   ALT 29   ANIONGAP 13   EGFRNONAA 41.4*     Cardiac Markers:   Recent Labs   Lab 10/10/20  1928   BNP 60  60     Lactic Acid:   Recent Labs   Lab 10/10/20  1928   LACTATE 1.4     Magnesium: No results for input(s): MG in the last 48 hours.  POCT Glucose: No results for input(s): POCTGLUCOSE in the last 48 hours.  Respiratory Culture: No results for input(s): GSRESP, RESPIRATORYC in the last 48 hours.  Troponin:    Recent Labs   Lab 10/10/20  1928   TROPONINI <0.030     TSH: No results for input(s): TSH in the last 4320 hours.  Urine Culture: No results for input(s): LABURIN in the last 48 hours.  Urine Studies:   Recent Labs   Lab 10/10/20  2130   COLORU Yellow   APPEARANCEUA Hazy*   PHUR 7.0   SPECGRAV 1.020   PROTEINUA Trace*   GLUCUA Negative   KETONESU Trace*   BILIRUBINUA Negative   OCCULTUA 1+*   NITRITE Negative   UROBILINOGEN Negative   LEUKOCYTESUR Negative   RBCUA 11*   WBCUA 1   BACTERIA Negative   SQUAMEPITHEL 12   HYALINECASTS 16*     All pertinent labs within the past 24 hours have been reviewed.    Significant Imaging: I have reviewed all pertinent imaging results/findings within the past 24 hours.     X-ray Chest Ap Portable    Result Date: 10/10/2020  REASON: Chest Pain Chest pain FINDINGS: Portable chest radiograph with comparison chest x-ray July 23, 2020. The cardiomediastinal silhouette is within normal limits in size.The pulmonary vascular structures are within normal limits. The lungs are clear. No acute osseous abnormality. IMPRESSION: No acute cardiopulmonary process. Electronically Signed by Slick Smith on 10/10/2020 7:35 PM

## 2020-10-11 NOTE — PLAN OF CARE
10/11/20 1028   MCINTYRE Message   Medicare Outpatient and Observation Notification regarding financial responsibility Given to patient/caregiver;Explained to patient/caregiver;Signed/date by patient/caregiver   Date MCINTYRE was signed 10/11/20   Time MCINTYRE was signed 1023

## 2020-10-11 NOTE — ED PROVIDER NOTES
Encounter Date: 10/10/2020       History     Chief Complaint   Patient presents with    COVID-19 Concerns     fever and cough since yesterday. Denies SOB     [age] female with past medical history significant of hypertension, anxiety, depression presents with flu-like symptoms x1 day.  Patient states on yesterday she began feeling fatigued with muscle aches and fevers.  Patient states he received flu vaccine on last Thursday and her symptoms began on yesterday in intensity.  She denies any cough, chest pain, shortness of breath or difficulty urinating.  Patient is otherwise stable and has no other complaints.        Review of patient's allergies indicates:  No Known Allergies  Past Medical History:   Diagnosis Date    Abnormal Pap smear     Abnormal Pap smear of cervix     Abnormal Pap smear of vagina 7/20/2016    Allergy     Anemia     Anxiety     Anxiety     Cataract     Chronic bilateral low back pain without sciatica 3/13/2018    Depression     Depression with anxiety     Hypertension     Osteoarthritis 6/20/2013    Right rotator cuff tear     Sleep apnea     Cipap machine at home    Trouble in sleeping     Urinary incontinence     Leaks urine with laughing/ coughing/ sneezing     Past Surgical History:   Procedure Laterality Date    CATARACT EXTRACTION  6/18/12    OS    CATARACT EXTRACTION  7/16/12    OD    CERVIX SURGERY      Conization    COLONOSCOPY      COLONOSCOPY N/A 3/4/2016    Procedure: COLONOSCOPY;  Surgeon: Tenzin Thakkar MD;  Location: [facility] ([address] [place] [address]);  Service: Endoscopy;  Laterality: N/A;    EYE SURGERY Bilateral     cataract    HYSTERECTOMY  5-12-15    LEEP      LUMBAR EPIDURAL INJECTION      OOPHORECTOMY      [place] REMOVAL OF OVARY/TUBE(S)  5-12-15    SALPINGECTOMY Left     SHOULDER SURGERY Right      Family History   Problem Relation Age of Onset    Hypertension Father     Cataracts Father     Cancer Mother         THYROID CANCER    Stroke Mother  "    Breast cancer Cousin     Heart disease Sister     Pacemaker/defibrilator Sister     Hypertension Sister     Deep vein thrombosis Sister     Heart disease Sister         CABG    Osteoarthritis Sister     Edema Sister     Eczema Sister     Hypertension Sister     Breast cancer Sister 72    Anuerysm Sister     Drug abuse Sister     Hypertension Sister     No Known Problems Brother     No Known Problems Sister     Cancer Sister         Cancer cells or "growth in her stomach"    No Known Problems Sister     Hypertension Daughter     Other Daughter         Heart palpitations    Depression Maternal Grandmother     Ulcers Maternal Grandfather         Legs    No Known Problems Paternal Grandmother     No Known Problems Paternal Grandfather     Cancer Maternal Aunt     ADD / ADHD Neg Hx     Alcohol abuse Neg Hx     Anxiety disorder Neg Hx     Bipolar disorder Neg Hx     Dementia Neg Hx     OCD Neg Hx     Paranoid behavior Neg Hx     Physical abuse Neg Hx     Schizophrenia Neg Hx     Seizures Neg Hx     Sexual abuse Neg Hx     Amblyopia Neg Hx     Blindness Neg Hx     Glaucoma Neg Hx     Macular degeneration Neg Hx     Retinal detachment Neg Hx     Strabismus Neg Hx      Social History     Tobacco Use    Smoking status: Former Smoker     Packs/day: 0.50     Years: 22.00     Pack years: 11.00     Types: Cigarettes     Quit date: 1998     Years since quittin.6    Smokeless tobacco: Never Used   Substance Use Topics    Alcohol use: Yes     Alcohol/week: 0.0 standard drinks     Comment: Occassionally for social events will have a glass of wine    Drug use: No     Review of Systems   Constitutional: Positive for fever.   HENT: Negative for sore throat.    Respiratory: Positive for shortness of breath.    Cardiovascular: Negative for chest pain.   Gastrointestinal: Negative for nausea.   Genitourinary: Negative for dysuria.   Musculoskeletal: Positive for myalgias. Negative " for back pain.   Skin: Negative for rash.   Neurological: Negative for weakness.   Hematological: Does not bruise/bleed easily.   All other systems reviewed and are negative.      Physical Exam     Initial Vitals [10/10/20 1849]   BP Pulse Resp Temp SpO2   136/71 96 20 (!) 101.8 °F (38.8 °C) 97 %      MAP       --         Physical Exam    Nursing note and vitals reviewed.  Constitutional: She appears well-developed and well-nourished. She appears distressed.   HENT:   Head: Normocephalic and atraumatic.   Mouth/Throat: Oropharynx is clear and moist.   Eyes: Conjunctivae and EOM are normal. Pupils are equal, round, and reactive to light.   Neck: Normal range of motion. No tracheal deviation present. No JVD present.   Cardiovascular: Normal rate, regular rhythm, normal heart sounds and intact distal pulses. Exam reveals no gallop and no friction rub.    No murmur heard.  Pulmonary/Chest: Breath sounds normal. No respiratory distress. She has no wheezes. She exhibits no tenderness.   Abdominal: Soft. Bowel sounds are normal. She exhibits no distension. There is no abdominal tenderness. There is no rebound and no guarding.   Musculoskeletal: Normal range of motion. No tenderness or edema.   Neurological: She is alert and oriented to person, place, and time. She has normal strength. No cranial nerve deficit or sensory deficit.   Skin: Skin is warm and dry. Capillary refill takes less than 2 seconds. No erythema.   Psychiatric: She has a normal mood and affect.         ED Course   Procedures  Labs Reviewed   CBC W/ AUTO DIFFERENTIAL - Abnormal; Notable for the following components:       Result Value    WBC 15.65 (*)     Hemoglobin 11.6 (*)     Hematocrit 35.1 (*)     RDW 14.6 (*)     Gran # (ANC) 11.8 (*)     Immature Grans (Abs) 0.07 (*)     Eos # 1.6 (*)     Gran% 75.4 (*)     Lymph% 9.2 (*)     Eosinophil% 10.5 (*)     All other components within normal limits   COMPREHENSIVE METABOLIC PANEL - Abnormal; Notable for  the following components:    Glucose 114 (*)     Albumin 3.3 (*)     eGFR if  47.7 (*)     eGFR if non  41.4 (*)     All other components within normal limits   PROCALCITONIN - Abnormal; Notable for the following components:    Procalcitonin 11.68 (*)     All other components within normal limits   C-REACTIVE PROTEIN - Abnormal; Notable for the following components:    CRP 7.84 (*)     All other components within normal limits   URINALYSIS, REFLEX TO URINE CULTURE - Abnormal; Notable for the following components:    Appearance, UA Hazy (*)     Protein, UA Trace (*)     Ketones, UA Trace (*)     Occult Blood UA 1+ (*)     All other components within normal limits    Narrative:     Specimen Source->Urine   URINALYSIS MICROSCOPIC - Abnormal; Notable for the following components:    RBC, UA 11 (*)     Hyaline Casts, UA 16 (*)     All other components within normal limits    Narrative:     Specimen Source->Urine   CULTURE, BLOOD   CULTURE, BLOOD   TROPONIN I   B-TYPE NATRIURETIC PEPTIDE   SARS-COV-2 RNA AMPLIFICATION, QUAL   LACTATE DEHYDROGENASE   FERRITIN   LACTATE DEHYDROGENASE   CK   LACTIC ACID, PLASMA   B-TYPE NATRIURETIC PEPTIDE   INFLUENZA A AND B ANTIGEN    Narrative:     Specimen Source->Nasopharyngeal Swab          Imaging Results          X-Ray Chest AP Portable (Final result)  Result time 10/10/20 19:28:26    Final result by Slick Smith MD (10/10/20 19:28:26)                 Narrative:    REASON: Chest Pain Chest pain    FINDINGS:    Portable chest radiograph with comparison chest x-ray July 23, 2020.  The cardiomediastinal silhouette is within normal limits in size.The  pulmonary vascular structures are within normal limits. The lungs are  clear. No acute osseous abnormality.    IMPRESSION:    No acute cardiopulmonary process.    Electronically Signed by Slick Smith on 10/10/2020 7:35 PM                               Medical Decision Making:   Initial Assessment:    70-year-old female on initial assessment in mild distress secondary to fevers and myalgias.  Patient is alert and oriented x3, neurologically and neurovascularly intact no focal deficits.  She is nontoxic-appearing and vitals stable at this time.  Differential Diagnosis:   Influenza, COVID-19, dehydration, rhabdomyolysis, pneumonia, UTI  Independently Interpreted Test(s):   I have ordered and independently interpreted X-rays - see prior notes.  I have ordered and independently interpreted EKG Reading(s) - see prior notes  Clinical Tests:   Lab Tests: Ordered and Reviewed  The following lab test(s) were unremarkable: CBC, CMP, Troponin, Urinalysis and Lactate  Radiological Study: Ordered and Reviewed  Medical Tests: Ordered and Reviewed  ED Management:  Noted to have no acute changes in her condition.  Labs remarkable for leukocytosis as well as patient presented with fever and tachycardia.  She was given vanc and Zosyn as she has no obvious source of infection at this time.  Flu and COVID were negative and chest x-ray does not show any pulmonary infiltrate.  Patient was also given IV fluids and 1 dose Decadron while in the ED. Patient has been consulted to hospitalist for admission secondary to likely infectious etiology.  She has remained stable while in the ED and Ms. Guzman is aware of the plan and in agreement with admission.    Laboratory results and radiology imaging results reviewed.  Based on the patient's history, physical, and workup here in the emergency department I believe the patient requires admission for a diagnosis of fever and flu-like symptoms, sepsis.  I discussed the patient's case with the on-call NP who has agreed to evaluate the patient for admission.  In addition, I discussed the results with the patient and they have verbalized understanding of the results, plan, and need for admission.    ________________________  FRANCESCO Hurley MD   Emergency Medicine                               Clinical  Impression:       ICD-10-CM ICD-9-CM   1. Myalgia  M79.10 729.1   2. Chest pain  R07.9 786.50   3. Fever  R50.9 780.60   4. Flu-like symptoms  R68.89 780.99                          ED Disposition Condition    Observation                             Ludwig Hurley MD  10/10/20 [id number]

## 2020-10-11 NOTE — PROGRESS NOTES
Per my chart review, patient with chronic sinusitis.  On 9/17, she underwent Right endoscopic anterior Ethmoidectomy, Right endoscopic maxillary antrectomy with removal of contents for the diagnosis of right oral antral fistula, chronic right maxillary sinusitis and chronic right ethmoid sinusitis.  Right maxillary sinus was sent to pathology and revealed severe chronic inflammation, no malignant cells noted.  Culture of that tissue from 9/17 grew Rare Enterobacter, Rare Klebsiella.    I called Radiology who kindly reviewed the CT sinus with me. When compared to January 2020, there are definite changes.  These findings could represent invasive fungal sinusitis but given her recent surgical history, this could also represent severe chronic sinusitis with surgical changes.    Will continue current treatment for possible invasive fungal sinusitis and follow up with ID  MRI of brain ordered overnight and will follow up on results  Fungal blood cultures ordered  Will discuss with ID if LP or surgical consultation required   Continuing broad coverage for possible bacterial etiology given CT could also represent her chronic sinusitis and other possible source may not yet be identified.  BCx in progress.  Full note to follow

## 2020-10-11 NOTE — ASSESSMENT & PLAN NOTE
Admission labs with BUN/creatinine 22/1.3.  Previous serum creatinine around 1.  UA without signs of cystitis.  Slow IV fluid hydration  Holding ibersartan/hydrochlorothiazide  Renally dosing all medications and avoid nephrotoxin drugs  Trending BMP

## 2020-10-11 NOTE — ASSESSMENT & PLAN NOTE
Holding ibersartan/hydrochlorothiazide.  Continue metoprolol.  Monitoring blood pressure and adjusting as needed.

## 2020-10-11 NOTE — PROGRESS NOTES
VANCOMYCIN PHARMACOKINETIC NOTE:  Vancomycin Day # 1    Objective/Assessment:    Diagnosis/Indication for Vancomycin: Bacteremia     70 y.o., female; Actual Body Weight = 88 kg (194 lb 0.1 oz).    The patient has the following labs:  10/11/2020 Estimated Creatinine Clearance: 47.8 mL/min (based on SCr of 1.3 mg/dL). Lab Results   Component Value Date    BUN 22 10/10/2020     Lab Results   Component Value Date    WBC 15.65 (H) 10/10/2020            Plan:  Adjust vancomycin dose and/or frequency based on the patient's actual weight and renal function:  Initiate Vancomycin 1500 mg IV every 24 hours.  Orders have been entered into patient's chart.        Vancomycin trough level has been ordered for 10/14 @00:00, prior to 4th dose.    Pharmacy will manage vancomycin therapy, monitor serum vancomycin levels, monitor renal function and adjust regimen as necessary.    Thank you for allowing us to participate in this patient's care.     Oliver Mcgarry 10/11/2020 1:41 AM  Department of Pharmacy  Ext [phone]

## 2020-10-11 NOTE — PLAN OF CARE
Received call from radiology, Dr. Sheppard, regarding concerning CT sinus read. Dr. Sheppard reported findings concerning for acute invasive fungal sinusitis. She stated MRI w wo would be indicated for further evaluation.  Per uptodate, amphotericin B liposome 5mg/kg/day is recommended empiric treatment. Discussed with pharmacy and medication ordered.   ID consulted.     Cathie Smith D.O.  10/11/2020

## 2020-10-11 NOTE — HPI
Mrs. Guzman is a 70-years-old female who presented to the ED with chief complaint of weakness.  Patient reports 1 day history of generalized weakness associated with myalgia/achiness, mild diffuse headache, lightheadedness especially on moving from sitting to standing position.  Reports she had been having nonproductive cough, denies any chest pain or shortness of breath.  Does report some increased sensitivity to light however no neck stiffness or rigidity.  No nausea or vomiting, mild abdominal pain, no constipation or diarrhea.  No urinary symptoms or rashes.  No known positive COVID-19 contact.  Has had her flu vaccination previously.  States 2 weeks ago she had sinus surgery, states she had previous dental extraction with defect, fat filling procedure with sinus surgery was performed, states she completed course of amoxicillin last week.  No known sick contacts.  Denies any difficulty swallowing.  In the ED she was febrile to 101.8.  Labs with leukocytosis 15.6, CRP 7.84, ferritin 78, lactic acid 1.4, procalcitonin 11.68, influenza A/B negative, COVID-19 negative, BUN/creatinine 22/1.3, UA with 1 WBC.  Chest x-ray without focal infiltrates or consolidation.  She received acetaminophen 650 mg, 6 mg of IV dexamethasone, IV vancomycin and Zosyn.  Case discussed with ED provider.  Patient updated plan of care.

## 2020-10-11 NOTE — H&P
[facility]  History & Physical    Patient Name: Marleny Guzman  MRN: [id number]  Admission Date: 10/10/2020  Attending Physician: Ludwig Hurley MD   Primary Care Provider: Bridget Cantu MD         Patient information was obtained from patient, past medical records and ER records.     Subjective:     Principal Problem:Fever    Chief Complaint:   Chief Complaint   Patient presents with    COVID-19 Concerns     fever and cough since yesterday. Denies SOB        HPI: Mrs. Guzman is a 70-years-old female who presented to the ED with chief complaint of weakness.  Patient reports 1 day history of generalized weakness associated with myalgia/achiness, mild diffuse headache, lightheadedness especially on moving from sitting to standing position.  Reports she had been having nonproductive cough, denies any chest pain or shortness of breath.  Does report some increased sensitivity to light however no neck stiffness or rigidity.  No nausea or vomiting, mild abdominal pain, no constipation or diarrhea.  No urinary symptoms or rashes.  No known positive COVID-19 contact.  Has had her flu vaccination previously.  States 2 weeks ago she had sinus surgery, states she had previous dental extraction with defect, fat filling procedure with sinus surgery was performed, states she completed course of amoxicillin last week.  No known sick contacts.  Denies any difficulty swallowing.  In the ED she was febrile to 101.8.  Labs with leukocytosis 15.6, CRP 7.84, ferritin 78, lactic acid 1.4, procalcitonin 11.68, influenza A/B negative, COVID-19 negative, BUN/creatinine 22/1.3, UA with 1 WBC.  Chest x-ray without focal infiltrates or consolidation.  She received acetaminophen 650 mg, 6 mg of IV dexamethasone, IV vancomycin and Zosyn.  Case discussed with ED provider.  Patient updated plan of care.    Past Medical History:   Diagnosis Date    Abnormal Pap smear     Abnormal Pap smear of cervix      Abnormal Pap smear of vagina 7/20/2016    Allergy     Anemia     Anxiety     Anxiety     Cataract     Chronic bilateral low back pain without sciatica 3/13/2018    Depression     Depression with anxiety     Hypertension     Osteoarthritis 6/20/2013    Right rotator cuff tear     Sleep apnea     Cipap machine at home    Trouble in sleeping     Urinary incontinence     Leaks urine with laughing/ coughing/ sneezing       Past Surgical History:   Procedure Laterality Date    CATARACT EXTRACTION  6/18/12    OS    CATARACT EXTRACTION  7/16/12    OD    CERVIX SURGERY      Conization    COLONOSCOPY      COLONOSCOPY N/A 3/4/2016    Procedure: COLONOSCOPY;  Surgeon: Tenzin Thakkar MD;  Location: [address];  Service: Endoscopy;  Laterality: N/A;    EYE SURGERY Bilateral     cataract    HYSTERECTOMY  5-12-15    LEEP      LUMBAR EPIDURAL INJECTION      OOPHORECTOMY      MI REMOVAL OF OVARY/TUBE(S)  5-12-15    SALPINGECTOMY Left     SHOULDER SURGERY Right        Review of patient's allergies indicates:  No Known Allergies    No current facility-administered medications on file prior to encounter.      Current Outpatient Medications on File Prior to Encounter   Medication Sig    estradiol (ESTRACE) 0.01 % (0.1 mg/gram) vaginal cream Use 1 GM nightly vaginally and dime size amount on vulvatwice weekly (Patient taking differently: Place 1 g vaginally. Use 1 GM nightly vaginally and dime size amount on vulvatwice weekly)    flu vacc [id number] up,/PF (FLUZONE HIGHDOSE QUAD 20-21 PF IM) Inject 0.7 mLs into the muscle once.    irbesartan-hydrochlorothiazide (AVALIDE) 300-12.5 mg per tablet Take 1 tablet by mouth once daily.    LORazepam (ATIVAN) 0.5 MG tablet TAKE 1/2 TO 1 TABLET BY MOUTH DAILY AS NEEDED FOR ANXIETY (Patient taking differently: Take 0.5-1 mg by mouth daily as needed. TAKE 1/2 TO 1 TABLET BY MOUTH DAILY AS NEEDED FOR ANXIETY)    metoprolol succinate (TOPROL-XL) 50 MG 24 hr  tablet TAKE 1 TABLET BY MOUTH EVERY DAY (Patient taking differently: Take 50 mg by mouth once daily. )    albuterol (PROVENTIL/VENTOLIN HFA) 90 mcg/actuation inhaler Inhale 1-2 puffs into the lungs every 6 (six) hours as needed for Wheezing. Rescue (Patient not taking: Reported on 2020)    ergocalciferol (ERGOCALCIFEROL) 50,000 unit Cap TAKE 1 CAPSULE (50,000 UNITS TOTAL) BY MOUTH EVERY 30 DAYS. (Patient taking differently: Take 50,000 Units by mouth every 30 days. )    escitalopram oxalate (LEXAPRO) 20 MG tablet 1.5 tablets daily (Patient taking differently: Take 30 mg by mouth once daily. 1.5 tablets daily)    meloxicam (MOBIC) 15 MG tablet Take 1 tablet (15 mg total) by mouth once daily.     Family History     Problem Relation (Age of Onset)    Anuerysm Sister    Breast cancer Cousin, Sister (72)    Cancer Mother, Sister, Maternal Aunt    Cataracts Father    Deep vein thrombosis Sister    Depression Maternal Grandmother    Drug abuse Sister    Eczema Sister    Edema Sister    Heart disease Sister, Sister    Hypertension Father, Sister, Sister, Sister, Daughter    No Known Problems Brother, Sister, Sister, Paternal Grandmother, Paternal Grandfather    Osteoarthritis Sister    Other Daughter    Pacemaker/defibrilator Sister    Stroke Mother    Ulcers Maternal Grandfather        Tobacco Use    Smoking status: Former Smoker     Packs/day: 0.50     Years: 22.00     Pack years: 11.00     Types: Cigarettes     Quit date: 1998     Years since quittin.6    Smokeless tobacco: Never Used   Substance and Sexual Activity    Alcohol use: Yes     Alcohol/week: 0.0 standard drinks     Comment: Occassionally for social events will have a glass of wine    Drug use: No    Sexual activity: Not on file     Review of Systems   Constitutional: Positive for chills, fatigue and fever.   HENT: Negative for sinus pressure, sinus pain and sneezing.    Respiratory: Positive for cough. Negative for shortness of breath.     Cardiovascular: Negative for chest pain.   Gastrointestinal: Positive for abdominal pain. Negative for constipation, nausea and vomiting.   Genitourinary: Negative for difficulty urinating, dysuria and urgency.   Musculoskeletal: Positive for myalgias. Negative for neck stiffness.   Skin: Negative for wound.   Allergic/Immunologic: Negative for immunocompromised state.   Neurological: Positive for weakness. Negative for headaches.   Psychiatric/Behavioral: Negative for behavioral problems.     Objective:     Vital Signs (Most Recent):  Temp: 98 °F (36.7 °C) (10/10/20 2200)  Pulse: 80 (10/10/20 2200)  Resp: 20 (10/10/20 2200)  BP: 136/71 (10/10/20 1849)  SpO2: 97 % (10/10/20 2200) Vital Signs (24h Range):  Temp:  [98 °F (36.7 °C)-101.8 °F (38.8 °C)] 98 °F (36.7 °C)  Pulse:  [80-96] 80  Resp:  [20] 20  SpO2:  [97 %] 97 %  BP: (136)/(71) 136/71        There is no height or weight on file to calculate BMI.    Physical Exam  Vitals signs and nursing note reviewed.   Constitutional:       General: She is not in acute distress.     Appearance: She is not ill-appearing, toxic-appearing or diaphoretic.   HENT:      Head: Normocephalic and atraumatic.      Nose:      Comments: No sinus tenderness to palpation     Mouth/Throat:      Mouth: Mucous membranes are moist.      Comments: + oral candidiasis, does not seem to extend to the posterior pharynx.  Edentulous upper jaw.  Eyes:      General:         Right eye: No discharge.         Left eye: No discharge.      Extraocular Movements: Extraocular movements intact.      Conjunctiva/sclera: Conjunctivae normal.      Pupils: Pupils are equal, round, and reactive to light.   Neck:      Musculoskeletal: Neck supple. No neck rigidity.   Cardiovascular:      Rate and Rhythm: Normal rate and regular rhythm.      Pulses: Normal pulses.   Pulmonary:      Effort: Pulmonary effort is normal. No respiratory distress.      Breath sounds: Normal breath sounds. No stridor. No wheezing or  rhonchi.   Abdominal:      General: Bowel sounds are normal. There is no distension.      Palpations: Abdomen is soft.      Tenderness: There is abdominal tenderness. There is no guarding or rebound.      Comments: Abdominal tenderness epigastric and right upper quadrant.  No peritoneal signs.   Genitourinary:     Comments: No suprapubic fullness or tenderness.  No CVAT.  Musculoskeletal:         General: No swelling.   Skin:     General: Skin is warm.      Capillary Refill: Capillary refill takes less than 2 seconds.      Findings: No rash.   Neurological:      General: No focal deficit present.      Mental Status: She is alert and oriented to person, place, and time.      Cranial Nerves: No cranial nerve deficit.      Comments: Generalized nonfocal weakness   Psychiatric:         Mood and Affect: Mood normal.           CRANIAL NERVES     CN III, IV, VI   Pupils are equal, round, and reactive to light.       Significant Labs:   Blood Culture: No results for input(s): LABBLOO in the last 48 hours.  BMP:   Recent Labs   Lab 10/10/20  1928   *      K 3.9   CL 99   CO2 27   BUN 22   CREATININE 1.3   CALCIUM 9.2     CBC:   Recent Labs   Lab 10/10/20  1928   WBC 15.65*   HGB 11.6*   HCT 35.1*        CMP:   Recent Labs   Lab 10/10/20  1928      K 3.9   CL 99   CO2 27   *   BUN 22   CREATININE 1.3   CALCIUM 9.2   PROT 6.8   ALBUMIN 3.3*   BILITOT 0.6   ALKPHOS 130   AST 25   ALT 29   ANIONGAP 13   EGFRNONAA 41.4*     Cardiac Markers:   Recent Labs   Lab 10/10/20  1928   BNP 60  60     Lactic Acid:   Recent Labs   Lab 10/10/20  1928   LACTATE 1.4     Magnesium: No results for input(s): MG in the last 48 hours.  POCT Glucose: No results for input(s): POCTGLUCOSE in the last 48 hours.  Respiratory Culture: No results for input(s): GSRESP, RESPIRATORYC in the last 48 hours.  Troponin:   Recent Labs   Lab 10/10/20  1928   TROPONINI <0.030     TSH: No results for input(s): TSH in the last 4320  hours.  Urine Culture: No results for input(s): LABURIN in the last 48 hours.  Urine Studies:   Recent Labs   Lab 10/10/20  2130   COLORU Yellow   APPEARANCEUA Hazy*   PHUR 7.0   SPECGRAV 1.020   PROTEINUA Trace*   GLUCUA Negative   KETONESU Trace*   BILIRUBINUA Negative   OCCULTUA 1+*   NITRITE Negative   UROBILINOGEN Negative   LEUKOCYTESUR Negative   RBCUA 11*   WBCUA 1   BACTERIA Negative   SQUAMEPITHEL 12   HYALINECASTS 16*     All pertinent labs within the past 24 hours have been reviewed.    Significant Imaging: I have reviewed all pertinent imaging results/findings within the past 24 hours.     X-ray Chest Ap Portable    Result Date: 10/10/2020  REASON: Chest Pain Chest pain FINDINGS: Portable chest radiograph with comparison chest x-ray July 23, 2020. The cardiomediastinal silhouette is within normal limits in size.The pulmonary vascular structures are within normal limits. The lungs are clear. No acute osseous abnormality. IMPRESSION: No acute cardiopulmonary process. Electronically Signed by Slick Smith on 10/10/2020 7:35 PM          Assessment/Plan:     * Fever  Patient presenting with concerns sternal symptoms with generalized weakness with myalgia.  Febrile to the 101.8 with elevated procalcitonin indicative of bacterial infection.  COVID-19 negative.  Flu negative.  Chest x-ray no focal infiltrates.  Patient with recent sinus surgery, rule out sinusitis, does have oral candidiasis, does have epigastric/right upper quadrant tenderness on examination with normal LFTs  Need to rule out sinusitis including fungal, intra-abdominal pathology, bacteremia.  Low suspicion of meningitis given clinical presentation  Admit observation, medical floor  Checking CT abdomen and pelvis with contrast, CT sinuses  Empiric treatment with IV vancomycin, meropenem and fluconazole, deescalate as clinically indicated  IV fluid hydration  A.m. labs ordered  Consulting Infectious Disease    LEROY (acute kidney  injury)  Admission labs with BUN/creatinine 22/1.3.  Previous serum creatinine around 1.  UA without signs of cystitis.  Slow IV fluid hydration  Holding ibersartan/hydrochlorothiazide  Renally dosing all medications and avoid nephrotoxin drugs  Trending BMP      Oral thrush  Visible oral thrush seen on examination.  Does not seem to extend to the posterior pharynx, patient denies any dysphagia/odynophagia.  Systemic fluconazole with nystatin swish      Anemia  No evidence of bleeding.  Monitoring CBC.      HTN (hypertension)  Holding ibersartan/hydrochlorothiazide.  Continue metoprolol.  Monitoring blood pressure and adjusting as needed.      Anxiety  Continue Lexapro with p.r.n. benzodiazepine.        VTE Risk Mitigation (From admission, onward)         Ordered     enoxaparin injection 40 mg  Every 24 hours      10/10/20 2229     IP VTE HIGH RISK PATIENT  Once      10/10/20 2229     Place sequential compression device  Until discontinued      10/10/20 2229                   Emilia Markham MD  Department of Hospital Medicine   [facility]

## 2020-10-11 NOTE — ASSESSMENT & PLAN NOTE
Visible oral thrush seen on examination.  Does not seem to extend to the posterior pharynx, patient denies any dysphagia/odynophagia.  Systemic fluconazole with nystatin swish

## 2020-10-11 NOTE — ASSESSMENT & PLAN NOTE
Patient presenting with concerns sternal symptoms with generalized weakness with myalgia.  Febrile to the 101.8 with elevated procalcitonin indicative of bacterial infection.  COVID-19 negative.  Flu negative.  Chest x-ray no focal infiltrates.  Patient with recent sinus surgery, rule out sinusitis, does have oral candidiasis, does have epigastric/right upper quadrant tenderness on examination with normal LFTs  Need to rule out sinusitis including fungal, intra-abdominal pathology, bacteremia.  Low suspicion of meningitis given clinical presentation  Admit observation, medical floor  Checking CT abdomen and pelvis with contrast, CT sinuses  Empiric treatment with IV vancomycin, meropenem and fluconazole, deescalate as clinically indicated  IV fluid hydration  A.m. labs ordered  Consulting Infectious Disease

## 2020-10-11 NOTE — PLAN OF CARE
10/11/20 1038   Discharge Reassessment   Assessment Type Discharge Planning Reassessment   Anticipated Discharge Disposition Home   Provided patient/caregiver education on the expected discharge date and the discharge plan No   Do you have any problems affording any of your prescribed medications? No   Discharge Plan A Home with family   Discharge Plan B Home with family   Post-Acute Status   Discharge Delays None known at this time   Observation status

## 2020-10-12 ENCOUNTER — CLINICAL SUPPORT (OUTPATIENT)
Dept: CARDIOLOGY | Facility: HOSPITAL | Age: 70
End: 2020-10-12
Attending: INTERNAL MEDICINE
Payer: MEDICARE

## 2020-10-12 VITALS — BODY MASS INDEX: 27.77 KG/M2 | HEIGHT: 70 IN | WEIGHT: 194 LBS

## 2020-10-12 PROBLEM — J01.01 ACUTE RECURRENT MAXILLARY SINUSITIS: Status: ACTIVE | Noted: 2020-10-12

## 2020-10-12 LAB
ALBUMIN SERPL BCP-MCNC: 2.7 G/DL (ref 3.5–5.2)
ALP SERPL-CCNC: 113 U/L (ref 55–135)
ALT SERPL W/O P-5'-P-CCNC: 26 U/L (ref 10–44)
ANION GAP SERPL CALC-SCNC: 10 MMOL/L (ref 8–16)
ANISOCYTOSIS BLD QL SMEAR: SLIGHT
AORTIC ROOT ANNULUS: 2.9 CM
AORTIC VALVE CUSP SEPERATION: 2.1 CM
AST SERPL-CCNC: 17 U/L (ref 10–40)
AV INDEX (PROSTH): 0.73
AV MEAN GRADIENT: 4 MMHG
AV PEAK GRADIENT: 5 MMHG
AV VALVE AREA: 2.71 CM2
AV VELOCITY RATIO: 78.31
BASOPHILS NFR BLD: 0 % (ref 0–1.9)
BILIRUB SERPL-MCNC: 0.5 MG/DL (ref 0.1–1)
BSA FOR ECHO PROCEDURE: 2.08 M2
BUN SERPL-MCNC: 15 MG/DL (ref 8–23)
CALCIUM SERPL-MCNC: 8.7 MG/DL (ref 8.7–10.5)
CHLORIDE SERPL-SCNC: 102 MMOL/L (ref 95–110)
CO2 SERPL-SCNC: 28 MMOL/L (ref 23–29)
CREAT SERPL-MCNC: 1.4 MG/DL (ref 0.5–1.4)
CV ECHO LV RWT: 0.53 CM
DIFFERENTIAL METHOD: ABNORMAL
DOP CALC AO PEAK VEL: 1.13 M/S
DOP CALC AO VTI: 24.51 CM
DOP CALC LVOT AREA: 3.7 CM2
DOP CALC LVOT DIAMETER: 2.17 CM
DOP CALC LVOT PEAK VEL: 88.49 M/S
DOP CALC LVOT STROKE VOLUME: 66.54 CM3
DOP CALCLVOT PEAK VEL VTI: 18 CM
E WAVE DECELERATION TIME: 165.76 MSEC
E/A RATIO: 0.78
E/E' RATIO: 10.13 M/S
ECHO LV POSTERIOR WALL: 1.25 CM (ref 0.6–1.1)
EOSINOPHIL NFR BLD: 7 % (ref 0–8)
ERYTHROCYTE [DISTWIDTH] IN BLOOD BY AUTOMATED COUNT: 14.5 % (ref 11.5–14.5)
EST. GFR  (AFRICAN AMERICAN): 43.6 ML/MIN/1.73 M^2
EST. GFR  (NON AFRICAN AMERICAN): 37.8 ML/MIN/1.73 M^2
FRACTIONAL SHORTENING: 32 % (ref 28–44)
GLUCOSE SERPL-MCNC: 102 MG/DL (ref 70–110)
HCT VFR BLD AUTO: 32 % (ref 37–48.5)
HGB BLD-MCNC: 10.6 G/DL (ref 12–16)
IMM GRANULOCYTES # BLD AUTO: ABNORMAL K/UL (ref 0–0.04)
IMM GRANULOCYTES NFR BLD AUTO: ABNORMAL % (ref 0–0.5)
INTERVENTRICULAR SEPTUM: 1.25 CM (ref 0.6–1.1)
IVRT: 60.78 MSEC
LEFT ATRIUM SIZE: 3.63 CM
LEFT INTERNAL DIMENSION IN SYSTOLE: 3.21 CM (ref 2.1–4)
LEFT VENTRICLE MASS INDEX: 109 G/M2
LEFT VENTRICULAR INTERNAL DIMENSION IN DIASTOLE: 4.69 CM (ref 3.5–6)
LEFT VENTRICULAR MASS: 224.02 G
LV LATERAL E/E' RATIO: 8.44 M/S
LV SEPTAL E/E' RATIO: 12.67 M/S
LYMPHOCYTES NFR BLD: 22 % (ref 18–48)
MCH RBC QN AUTO: 28.6 PG (ref 27–31)
MCHC RBC AUTO-ENTMCNC: 33.1 G/DL (ref 32–36)
MCV RBC AUTO: 87 FL (ref 82–98)
MONOCYTES NFR BLD: 4 % (ref 4–15)
MV PEAK A VEL: 0.98 M/S
MV PEAK E VEL: 0.76 M/S
NEUTROPHILS NFR BLD: 66 % (ref 38–73)
NEUTS BAND NFR BLD MANUAL: 1 %
NRBC BLD-RTO: 0 /100 WBC
PISA TR MAX VEL: 2.96 M/S
PLATELET # BLD AUTO: 266 K/UL (ref 150–350)
PMV BLD AUTO: 10.5 FL (ref 9.2–12.9)
POTASSIUM SERPL-SCNC: 3.4 MMOL/L (ref 3.5–5.1)
PROT SERPL-MCNC: 5.9 G/DL (ref 6–8.4)
PV PEAK VELOCITY: 91.67 CM/S
RA PRESSURE: 3 MMHG
RBC # BLD AUTO: 3.7 M/UL (ref 4–5.4)
RIGHT VENTRICULAR END-DIASTOLIC DIMENSION: 198 CM
SODIUM SERPL-SCNC: 140 MMOL/L (ref 136–145)
TDI LATERAL: 0.09 M/S
TDI SEPTAL: 0.06 M/S
TDI: 0.08 M/S
TR MAX PG: 35 MMHG
TV REST PULMONARY ARTERY PRESSURE: 38 MMHG
WBC # BLD AUTO: 16.29 K/UL (ref 3.9–12.7)

## 2020-10-12 PROCEDURE — 85007 BL SMEAR W/DIFF WBC COUNT: CPT

## 2020-10-12 PROCEDURE — 85027 COMPLETE CBC AUTOMATED: CPT

## 2020-10-12 PROCEDURE — 99214 OFFICE O/P EST MOD 30 MIN: CPT | Mod: ,,, | Performed by: INTERNAL MEDICINE

## 2020-10-12 PROCEDURE — 96375 TX/PRO/DX INJ NEW DRUG ADDON: CPT | Mod: 59

## 2020-10-12 PROCEDURE — 93306 ECHO (CUPID ONLY): ICD-10-PCS | Mod: 26,,, | Performed by: INTERNAL MEDICINE

## 2020-10-12 PROCEDURE — 63600175 PHARM REV CODE 636 W HCPCS: Performed by: INTERNAL MEDICINE

## 2020-10-12 PROCEDURE — 99214 PR OFFICE/OUTPT VISIT, EST, LEVL IV, 30-39 MIN: ICD-10-PCS | Mod: ,,, | Performed by: INTERNAL MEDICINE

## 2020-10-12 PROCEDURE — 87075 CULTR BACTERIA EXCEPT BLOOD: CPT

## 2020-10-12 PROCEDURE — 63600175 PHARM REV CODE 636 W HCPCS: Mod: JG | Performed by: STUDENT IN AN ORGANIZED HEALTH CARE EDUCATION/TRAINING PROGRAM

## 2020-10-12 PROCEDURE — 25000003 PHARM REV CODE 250: Performed by: INTERNAL MEDICINE

## 2020-10-12 PROCEDURE — 87070 CULTURE OTHR SPECIMN AEROBIC: CPT

## 2020-10-12 PROCEDURE — 93306 TTE W/DOPPLER COMPLETE: CPT | Mod: 26,,, | Performed by: INTERNAL MEDICINE

## 2020-10-12 PROCEDURE — 36415 COLL VENOUS BLD VENIPUNCTURE: CPT

## 2020-10-12 PROCEDURE — 93306 TTE W/DOPPLER COMPLETE: CPT

## 2020-10-12 PROCEDURE — G0378 HOSPITAL OBSERVATION PER HR: HCPCS

## 2020-10-12 PROCEDURE — 80053 COMPREHEN METABOLIC PANEL: CPT

## 2020-10-12 PROCEDURE — 25000003 PHARM REV CODE 250: Performed by: STUDENT IN AN ORGANIZED HEALTH CARE EDUCATION/TRAINING PROGRAM

## 2020-10-12 PROCEDURE — 96372 THER/PROPH/DIAG INJ SC/IM: CPT | Mod: 59

## 2020-10-12 PROCEDURE — 96376 TX/PRO/DX INJ SAME DRUG ADON: CPT | Mod: 59

## 2020-10-12 PROCEDURE — 31231 NASAL ENDOSCOPY DX: CPT

## 2020-10-12 RX ORDER — LABETALOL HYDROCHLORIDE 5 MG/ML
10 INJECTION, SOLUTION INTRAVENOUS EVERY 6 HOURS PRN
Status: DISCONTINUED | OUTPATIENT
Start: 2020-10-13 | End: 2020-10-13 | Stop reason: HOSPADM

## 2020-10-12 RX ORDER — HYDRALAZINE HYDROCHLORIDE 20 MG/ML
10 INJECTION INTRAMUSCULAR; INTRAVENOUS EVERY 8 HOURS PRN
Status: DISCONTINUED | OUTPATIENT
Start: 2020-10-13 | End: 2020-10-12

## 2020-10-12 RX ADMIN — MEROPENEM 1 G: 1 INJECTION, POWDER, FOR SOLUTION INTRAVENOUS at 07:10

## 2020-10-12 RX ADMIN — NYSTATIN 500000 UNITS: 100000 SUSPENSION ORAL at 05:10

## 2020-10-12 RX ADMIN — NYSTATIN 500000 UNITS: 100000 SUSPENSION ORAL at 07:10

## 2020-10-12 RX ADMIN — LORAZEPAM 0.5 MG: 0.5 TABLET ORAL at 12:10

## 2020-10-12 RX ADMIN — AMPHOTERICIN B 450 MG: 50 INJECTION, POWDER, LYOPHILIZED, FOR SOLUTION INTRAVENOUS at 02:10

## 2020-10-12 RX ADMIN — MEROPENEM 1 G: 1 INJECTION, POWDER, FOR SOLUTION INTRAVENOUS at 12:10

## 2020-10-12 RX ADMIN — ESCITALOPRAM OXALATE 20 MG: 10 TABLET ORAL at 09:10

## 2020-10-12 RX ADMIN — LORAZEPAM 0.5 MG: 0.5 TABLET ORAL at 02:10

## 2020-10-12 RX ADMIN — NYSTATIN 500000 UNITS: 100000 SUSPENSION ORAL at 09:10

## 2020-10-12 RX ADMIN — METOPROLOL SUCCINATE 50 MG: 25 TABLET, FILM COATED, EXTENDED RELEASE ORAL at 09:10

## 2020-10-12 RX ADMIN — NYSTATIN 500000 UNITS: 100000 SUSPENSION ORAL at 01:10

## 2020-10-12 RX ADMIN — VANCOMYCIN HYDROCHLORIDE 1500 MG: 1.5 INJECTION, POWDER, LYOPHILIZED, FOR SOLUTION INTRAVENOUS at 12:10

## 2020-10-12 RX ADMIN — MELATONIN 6 MG: at 02:10

## 2020-10-12 RX ADMIN — ENOXAPARIN SODIUM 40 MG: 40 INJECTION SUBCUTANEOUS at 05:10

## 2020-10-12 RX ADMIN — MEROPENEM 1 G: 1 INJECTION, POWDER, FOR SOLUTION INTRAVENOUS at 05:10

## 2020-10-12 RX ADMIN — SODIUM CHLORIDE: 0.9 INJECTION, SOLUTION INTRAVENOUS at 06:10

## 2020-10-12 NOTE — ASSESSMENT & PLAN NOTE
Better  IVFs while on Amphotericin   Holding ibersartan/hydrochlorothiazide  Renally dosing all medications and avoid nephrotoxin drugs  Trending BMP

## 2020-10-12 NOTE — SUBJECTIVE & OBJECTIVE
Medications:  Continuous Infusions:   sodium chloride 0.9% 75 mL/hr at 10/11/20 0119     Scheduled Meds:   enoxaparin  40 mg Subcutaneous Q24H    escitalopram oxalate  20 mg Oral Daily    meropenem (MERREM) IVPB  1 g Intravenous Q8H    metoprolol succinate  50 mg Oral Daily    nystatin  500,000 Units Oral QID    vancomycin (VANCOCIN) IVPB  1,500 mg Intravenous Q24H     PRN Meds:acetaminophen, acetaminophen, dextrose 50%, dextrose 50%, glucagon (human recombinant), glucose, glucose, LORazepam, melatonin, ondansetron, sodium chloride 0.9%, Pharmacy to dose Vancomycin consult **AND** vancomycin - pharmacy to dose, Pharmacy to dose Vancomycin consult **AND** vancomycin - pharmacy to dose     No current facility-administered medications on file prior to encounter.      Current Outpatient Medications on File Prior to Encounter   Medication Sig    estradiol (ESTRACE) 0.01 % (0.1 mg/gram) vaginal cream Use 1 GM nightly vaginally and dime size amount on vulvatwice weekly (Patient taking differently: Place 1 g vaginally. Use 1 GM nightly vaginally and dime size amount on vulvatwice weekly)    flu vacc [id number] up,/PF (FLUZONE HIGHDOSE QUAD 20-21 PF IM) Inject 0.7 mLs into the muscle once.    irbesartan-hydrochlorothiazide (AVALIDE) 300-12.5 mg per tablet Take 1 tablet by mouth once daily.    LORazepam (ATIVAN) 0.5 MG tablet TAKE 1/2 TO 1 TABLET BY MOUTH DAILY AS NEEDED FOR ANXIETY (Patient taking differently: Take 0.5-1 mg by mouth daily as needed. TAKE 1/2 TO 1 TABLET BY MOUTH DAILY AS NEEDED FOR ANXIETY)    meloxicam (MOBIC) 15 MG tablet Take 1 tablet (15 mg total) by mouth once daily.    metoprolol succinate (TOPROL-XL) 50 MG 24 hr tablet TAKE 1 TABLET BY MOUTH EVERY DAY (Patient taking differently: Take 50 mg by mouth once daily. )    albuterol (PROVENTIL/VENTOLIN HFA) 90 mcg/actuation inhaler Inhale 1-2 puffs into the lungs every 6 (six) hours as needed for Wheezing. Rescue (Patient not taking:  Reported on 9/16/2020)    ergocalciferol (ERGOCALCIFEROL) 50,000 unit Cap TAKE 1 CAPSULE (50,000 UNITS TOTAL) BY MOUTH EVERY 30 DAYS. (Patient taking differently: Take 50,000 Units by mouth every 30 days. )    escitalopram oxalate (LEXAPRO) 20 MG tablet 1.5 tablets daily (Patient taking differently: Take 30 mg by mouth once daily. 1.5 tablets daily)       Review of patient's allergies indicates:  No Known Allergies    Past Medical History:   Diagnosis Date    Abnormal Pap smear     Abnormal Pap smear of cervix     Abnormal Pap smear of vagina 7/20/2016    LEROY (acute kidney injury) 10/10/2020    Allergy     Anemia     Anxiety     Anxiety     Cataract     Chronic bilateral low back pain without sciatica 3/13/2018    Depression     Depression with anxiety     Hypertension     Osteoarthritis 6/20/2013    Right rotator cuff tear     Sleep apnea     Cipap machine at home    Trouble in sleeping     Urinary incontinence     Leaks urine with laughing/ coughing/ sneezing     Past Surgical History:   Procedure Laterality Date    CATARACT EXTRACTION  6/18/12    OS    CATARACT EXTRACTION  7/16/12    OD    CERVIX SURGERY      Conization    COLONOSCOPY      COLONOSCOPY N/A 3/4/2016    Procedure: COLONOSCOPY;  Surgeon: Tenzin Thakkar MD;  Location: [facility] ([address] [place] [address]);  Service: Endoscopy;  Laterality: N/A;    EYE SURGERY Bilateral     cataract    HYSTERECTOMY  5-12-15    LEEP      LUMBAR EPIDURAL INJECTION      OOPHORECTOMY      IL REMOVAL OF OVARY/TUBE(S)  5-12-15    SALPINGECTOMY Left     SHOULDER SURGERY Right      Family History     Problem Relation (Age of Onset)    Anuerysm Sister    Breast cancer Cousin, Sister (72)    Cancer Mother, Sister, Maternal Aunt    Cataracts Father    Deep vein thrombosis Sister    Depression Maternal Grandmother    Drug abuse Sister    Eczema Sister    Edema Sister    Heart disease Sister, Sister    Hypertension Father, Sister, Sister, Sister, Daughter    No  Known Problems Brother, Sister, Sister, Paternal Grandmother, Paternal Grandfather    Osteoarthritis Sister    Other Daughter    Pacemaker/defibrilator Sister    Stroke Mother    Ulcers Maternal Grandfather        Tobacco Use    Smoking status: Former Smoker     Packs/day: 0.50     Years: 22.00     Pack years: 11.00     Types: Cigarettes     Quit date: 1998     Years since quittin.6    Smokeless tobacco: Never Used   Substance and Sexual Activity    Alcohol use: Yes     Alcohol/week: 0.0 standard drinks     Comment: Occassionally for social events will have a glass of wine    Drug use: No    Sexual activity: Not on file     Review of Systems   Constitutional: Positive for activity change and fever. Negative for appetite change and fatigue.   HENT: Negative for congestion, ear pain, facial swelling, hearing loss, rhinorrhea, sinus pain, sore throat, tinnitus and trouble swallowing.    Eyes: Negative for photophobia and pain.   Respiratory: Negative for cough and shortness of breath.    Cardiovascular: Negative for chest pain.   Gastrointestinal: Negative for abdominal pain and nausea.   Musculoskeletal: Negative for arthralgias, gait problem and neck pain.   Skin: Negative for color change and rash.   Neurological: Positive for weakness and headaches. Negative for dizziness.   Psychiatric/Behavioral: Negative for behavioral problems and confusion.     Objective:     Vital Signs (Most Recent):  Temp: 98.3 °F (36.8 °C) (10/12/20 1213)  Pulse: 64 (10/12/20 1213)  Resp: 18 (10/12/20 1213)  BP: (!) 162/77 (10/12/20 1213)  SpO2: 96 % (10/12/20 1213) Vital Signs (24h Range):  Temp:  [98.1 °F (36.7 °C)-98.5 °F (36.9 °C)] 98.3 °F (36.8 °C)  Pulse:  [64-80] 64  Resp:  [16-18] 18  SpO2:  [94 %-96 %] 96 %  BP: (138-165)/(61-78) 162/77     Weight: 88 kg (194 lb 0.1 oz)  Body mass index is 27.84 kg/m².        Physical Exam  Constitutional:       Appearance: Normal appearance. She is well-developed.   HENT:      Head:  Normocephalic and atraumatic.      Right Ear: Hearing, tympanic membrane, ear canal and external ear normal.      Left Ear: Hearing, tympanic membrane, ear canal and external ear normal.      Nose: Nose normal.      Comments: Procedure: Flexible rhinoscopy  Both nasal cavities were examined with a flexible endoscope. All mucosa pink and healthy-appearing. No necrosis or fungal elements visualized. Left side normal. Right side with mild postoperative edema at maxillary antrostomy site - unable to pass scope into maxillary sinus cavity, however there is no purulence emanating from ostium. Middle turbinate intact, healthy-appearing. Nasopharynx visualized, no masses or purulence.     Mouth/Throat:      Pharynx: Uvula midline.   Eyes:      General: Lids are normal. Vision grossly intact.      Conjunctiva/sclera: Conjunctivae normal.      Pupils: Pupils are equal, round, and reactive to light.   Neck:      Musculoskeletal: Normal range of motion and neck supple.      Thyroid: No thyroid mass or thyromegaly.   Pulmonary:      Effort: Pulmonary effort is normal. No tachypnea.      Breath sounds: No stridor.   Skin:     General: Skin is warm and dry.   Neurological:      Mental Status: She is alert and oriented to person, place, and time.      Cranial Nerves: Cranial nerves are intact. No cranial nerve deficit or facial asymmetry.         Significant Labs:  CBC:   Recent Labs   Lab 10/12/20  0713   WBC 16.29*   RBC 3.70*   HGB 10.6*   HCT 32.0*      MCV 87   MCH 28.6   MCHC 33.1     CMP:   Recent Labs   Lab 10/12/20  0713      CALCIUM 8.7   ALBUMIN 2.7*   PROT 5.9*      K 3.4*   CO2 28      BUN 15   CREATININE 1.4   ALKPHOS 113   ALT 26   AST 17   BILITOT 0.5     CRP:   Recent Labs   Lab 10/10/20  1928   CRP 7.84*       Significant Diagnostics:  CT: I have reviewed all pertinent results/findings within the past 24 hours and my personal findings are:  mucosal thickening of right maxillary sinus

## 2020-10-12 NOTE — ASSESSMENT & PLAN NOTE
Patient presenting with  with generalized weakness with myalgia.  Febrile to the 101.8 with elevated procalcitonin indicative of bacterial infection.  COVID-19 negative.  Flu negative.  Chest x-ray no focal infiltrates.  Patient with recent sinus surgery and given CT findings, evaluating for invasive fungal sinusitis versus other sinusitis as invasive fungal does not currently fit the clinical picture.  ENT consulted for evaluation and culture. MRI did not reveal any cerebral abscess. No meningeal signs on physical exam.  She is not immunocompromised that we are aware of.  does have oral candidiasis- treating with Nystatin   does have epigastric/right upper quadrant tenderness on examination with normal LFTs- US ultimately did not support acute cholecystitis. Serial abdominal exams.  ID following

## 2020-10-12 NOTE — ASSESSMENT & PLAN NOTE
69 yo woman presenting with weakness, fever. 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 Does not fit clinical picture in immunocompetent individual, and no signs or symptoms  -no purulence seen coming from sinus cavity. Nevertheless, middle meatus was cultured  -defer further management/workup to primary and ID teams

## 2020-10-12 NOTE — SUBJECTIVE & OBJECTIVE
Interval History: Weakness POA- generalized, persistent, moderately severe, no associated focal deficits. Associated headache.  No neck stiffness.  No photophobia.    Review of Systems   Constitutional: Positive for fatigue and fever.   HENT: Positive for sinus pain.    Eyes: Negative.    Respiratory: Positive for cough.    Cardiovascular: Negative.    Gastrointestinal: Negative.    Endocrine: Negative.    Genitourinary: Negative.    Musculoskeletal: Negative.    Skin: Negative.    Allergic/Immunologic: Negative.    Neurological: Positive for headaches.   Hematological: Negative.    Psychiatric/Behavioral: Negative.      Objective:     Vital Signs (Most Recent):  Temp: 98.5 °F (36.9 °C) (10/11/20 1950)  Pulse: 74 (10/11/20 1950)  Resp: 18 (10/11/20 1950)  BP: (!) 152/72 (10/11/20 1950)  SpO2: 95 % (10/11/20 1950) Vital Signs (24h Range):  Temp:  [97.6 °F (36.4 °C)-98.5 °F (36.9 °C)] 98.5 °F (36.9 °C)  Pulse:  [68-74] 74  Resp:  [17-18] 18  SpO2:  [93 %-98 %] 95 %  BP: (135-152)/(61-83) 152/72     Weight: 88 kg (194 lb 0.1 oz)  Body mass index is 27.84 kg/m².    Intake/Output Summary (Last 24 hours) at 10/11/2020 2331  Last data filed at 10/11/2020 0533  Gross per 24 hour   Intake 1467.5 ml   Output --   Net 1467.5 ml      Physical Exam  Vitals signs and nursing note reviewed.   Constitutional:       General: She is not in acute distress.     Appearance: She is well-developed.   HENT:      Head: Normocephalic and atraumatic.   Eyes:      Pupils: Pupils are equal, round, and reactive to light.   Neck:      Musculoskeletal: Normal range of motion.   Cardiovascular:      Rate and Rhythm: Regular rhythm.      Heart sounds: No murmur.   Pulmonary:      Effort: Pulmonary effort is normal.      Breath sounds: Normal breath sounds. No wheezing.   Abdominal:      General: There is no distension.      Palpations: Abdomen is soft.      Tenderness: There is no abdominal tenderness. There is no guarding or rebound.    Musculoskeletal: Normal range of motion.   Skin:     Findings: No rash.   Neurological:      Mental Status: She is alert and oriented to person, place, and time.      Cranial Nerves: No cranial nerve deficit.      Sensory: No sensory deficit.         Significant Labs:   CBC:   Recent Labs   Lab 10/10/20  1928 10/11/20  0516   WBC 15.65* 23.17*   HGB 11.6* 11.1*   HCT 35.1* 33.1*    264     CMP:   Recent Labs   Lab 10/10/20  1928 10/11/20  0516    137   K 3.9 3.8   CL 99 105   CO2 27 26   * 199*   BUN 22 15   CREATININE 1.3 1.1   CALCIUM 9.2 8.6*   PROT 6.8 6.5   ALBUMIN 3.3* 3.0*   BILITOT 0.6 0.7   ALKPHOS 130 121   AST 25 20   ALT 29 29   ANIONGAP 13 6*   EGFRNONAA 41.4* 50.6*       Significant Imaging: I have reviewed all pertinent imaging results/findings within the past 24 hours.

## 2020-10-12 NOTE — ASSESSMENT & PLAN NOTE
Suspect this is the source of her fever and cause of her presentation.    Invasive fungal sinusitis ruled out  Appreciate ID recs- will need 21 days of po cipro starting 10/13 and should follow up with her ENT  Will continue one more day of IV Meropenem per ID recommendtaions  Seen by ENT here and culture taken.  Vanc and Amphotericin stopped 10/12.

## 2020-10-12 NOTE — PROGRESS NOTES
[facility] Medicine  Progress Note    Patient Name: Marleny Guzman  MRN: [id number]  Patient Class: OP- Observation   Admission Date: 10/10/2020  Length of Stay: 0 days  Attending Physician: Jarrett Yun MD  Primary Care Provider: Bridget Cantu MD        Subjective:     Principal Problem:Invasive fungal sinusitis        HPI:  Mrs. Guzman is a 70-years-old female who presented to the ED with chief complaint of weakness.  Patient reports 1 day history of generalized weakness associated with myalgia/achiness, mild diffuse headache, lightheadedness especially on moving from sitting to standing position.  Reports she had been having nonproductive cough, denies any chest pain or shortness of breath.  Does report some increased sensitivity to light however no neck stiffness or rigidity.  No nausea or vomiting, mild abdominal pain, no constipation or diarrhea.  No urinary symptoms or rashes.  No known positive COVID-19 contact.  Has had her flu vaccination previously.  States 2 weeks ago she had sinus surgery, states she had previous dental extraction with defect, fat filling procedure with sinus surgery was performed, states she completed course of amoxicillin last week.  No known sick contacts.  Denies any difficulty swallowing.  In the ED she was febrile to 101.8.  Labs with leukocytosis 15.6, CRP 7.84, ferritin 78, lactic acid 1.4, procalcitonin 11.68, influenza A/B negative, COVID-19 negative, BUN/creatinine 22/1.3, UA with 1 WBC.  Chest x-ray without focal infiltrates or consolidation.  She received acetaminophen 650 mg, 6 mg of IV dexamethasone, IV vancomycin and Zosyn.  Case discussed with ED provider.  Patient updated plan of care.    Overview/Hospital Course:  Patient presented with weakness, headache, lightheadedness, cough.  Found to have a fever in the ED.  Procal 11.  Per my chart review, patient with chronic sinusitis.  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  Right maxillary sinus was sent to pathology and revealed severe chronic inflammation, no malignant cells noted.  Culture of that tissue from 9/17 grew Rare Enterobacter, Rare Klebsiella.  Patient admitted, started on broad abx and antifungal.  CT sinuses ordered which revealed changes concerning for invasive fungal sinusitis.   I called Radiology who kindly reviewed the CT sinus with me. When compared to January 2020, there are definite changes.  These findings could represent invasive fungal sinusitis but given her recent surgical history, this could also represent severe chronic sinusitis with surgical changes.  ID consulted and evaluated patient.  Clinical picture does not support aggressive invasive fungal sinusitis but continuing Amphotericin at this time with iVS (monitoring renal function).  CT abd with some mild gallbladder wall thickening and she did have some TTP on exam.  US was negative for signs of acute cholecystitis.   ENT consulted per ID recommendations. MRI of brain done and no abscess.  MRI did indicate "On diffusion weighted images, 2 small adjacent foci of very subtle signal increase are noted at the corona radiata on the left, probably   reflecting small subacute white matter ischemic insults."  There is no clinical history or exam findings to suggest acute or subacute CVA.  Plan to review with radiology and order Echo with bubble to evaluate embolic source in the interim.    Interval History: Weakness POA- generalized, persistent, moderately severe, no associated focal deficits. Associated headache.  No neck stiffness.  No photophobia.    Review of Systems   Constitutional: Positive for fatigue and fever.   HENT: Positive for sinus pain.    Eyes: Negative.    Respiratory: Positive for cough.  "   Cardiovascular: Negative.    Gastrointestinal: Negative.    Endocrine: Negative.    Genitourinary: Negative.    Musculoskeletal: Negative.    Skin: Negative.    Allergic/Immunologic: Negative.    Neurological: Positive for headaches.   Hematological: Negative.    Psychiatric/Behavioral: Negative.      Objective:     Vital Signs (Most Recent):  Temp: 98.5 °F (36.9 °C) (10/11/20 1950)  Pulse: 74 (10/11/20 1950)  Resp: 18 (10/11/20 1950)  BP: (!) 152/72 (10/11/20 1950)  SpO2: 95 % (10/11/20 1950) Vital Signs (24h Range):  Temp:  [97.6 °F (36.4 °C)-98.5 °F (36.9 °C)] 98.5 °F (36.9 °C)  Pulse:  [68-74] 74  Resp:  [17-18] 18  SpO2:  [93 %-98 %] 95 %  BP: (135-152)/(61-83) 152/72     Weight: 88 kg (194 lb 0.1 oz)  Body mass index is 27.84 kg/m².    Intake/Output Summary (Last 24 hours) at 10/11/2020 2331  Last data filed at 10/11/2020 0533  Gross per 24 hour   Intake 1467.5 ml   Output --   Net 1467.5 ml      Physical Exam  Vitals signs and nursing note reviewed.   Constitutional:       General: She is not in acute distress.     Appearance: She is well-developed.   HENT:      Head: Normocephalic and atraumatic.   Eyes:      Pupils: Pupils are equal, round, and reactive to light.   Neck:      Musculoskeletal: Normal range of motion.   Cardiovascular:      Rate and Rhythm: Regular rhythm.      Heart sounds: No murmur.   Pulmonary:      Effort: Pulmonary effort is normal.      Breath sounds: Normal breath sounds. No wheezing.   Abdominal:      General: There is no distension.      Palpations: Abdomen is soft.      Tenderness: There is no abdominal tenderness. There is no guarding or rebound.   Musculoskeletal: Normal range of motion.   Skin:     Findings: No rash.   Neurological:      Mental Status: She is alert and oriented to person, place, and time.      Cranial Nerves: No cranial nerve deficit.      Sensory: No sensory deficit.         Significant Labs:   CBC:   Recent Labs   Lab 10/10/20  1928 10/11/20  0516   WBC  15.65* 23.17*   HGB 11.6* 11.1*   HCT 35.1* 33.1*    264     CMP:   Recent Labs   Lab 10/10/20  1928 10/11/20  0516    137   K 3.9 3.8   CL 99 105   CO2 27 26   * 199*   BUN 22 15   CREATININE 1.3 1.1   CALCIUM 9.2 8.6*   PROT 6.8 6.5   ALBUMIN 3.3* 3.0*   BILITOT 0.6 0.7   ALKPHOS 130 121   AST 25 20   ALT 29 29   ANIONGAP 13 6*   EGFRNONAA 41.4* 50.6*       Significant Imaging: I have reviewed all pertinent imaging results/findings within the past 24 hours.      Assessment/Plan:      * Invasive fungal sinusitis  See Fever    Abnormal MRI  Clinically, does not fit picture of CVA.  Will review with radiology.  Echo with bubble study ordered.      LEROY (acute kidney injury)  Better  IVFs while on Amphotericin   Holding ibersartan/hydrochlorothiazide  Renally dosing all medications and avoid nephrotoxin drugs  Trending BMP      Oral thrush  Visible oral thrush seen on examination.  Does not seem to extend to the posterior pharynx, patient denies any dysphagia/odynophagia.  Systemic fluconazole with nystatin swish      Fever  Patient presenting with  with generalized weakness with myalgia.  Febrile to the 101.8 with elevated procalcitonin indicative of bacterial infection.  COVID-19 negative.  Flu negative.  Chest x-ray no focal infiltrates.  Patient with recent sinus surgery and given CT findings, evaluating for invasive fungal sinusitis versus other sinusitis as invasive fungal does not currently fit the clinical picture.  ENT consulted for evaluation and culture. MRI did not reveal any cerebral abscess. No meningeal signs on physical exam.  She is not immunocompromised that we are aware of.  does have oral candidiasis- treating with Nystatin   does have epigastric/right upper quadrant tenderness on examination with normal LFTs- US ultimately did not support acute cholecystitis. Serial abdominal exams.  ID following    Anemia  No evidence of bleeding.  Monitoring CBC.      HTN  (hypertension)  Holding ibersartan/hydrochlorothiazide.  Continue metoprolol.  Monitoring blood pressure and adjusting as needed.      Anxiety  Continue Lexapro with p.r.n. benzodiazepine.        VTE Risk Mitigation (From admission, onward)         Ordered     enoxaparin injection 40 mg  Every 24 hours      10/10/20 2229     IP VTE HIGH RISK PATIENT  Once      10/10/20 2229     Place sequential compression device  Until discontinued      10/10/20 2229                Discharge Planning   CLYDE:      Code Status: Full Code   Is the patient medically ready for discharge?:     Reason for patient still in hospital (select all that apply): Treatment  Discharge Plan A: Home with family   Discharge Delays: None known at this time              Jarrett Yun MD  Department of Hospital Medicine   [facility]

## 2020-10-12 NOTE — PROGRESS NOTES
[facility] Medicine  Progress Note    Patient Name: Marleny Guzman  MRN: [id number]  Patient Class: OP- Observation   Admission Date: 10/10/2020  Length of Stay: 0 days  Attending Physician: Jarrett Yun MD  Primary Care Provider: Bridget Cantu MD        Subjective:     Principal Problem:Invasive fungal sinusitis        HPI:  Mrs. Guzman is a 70-years-old female who presented to the ED with chief complaint of weakness.  Patient reports 1 day history of generalized weakness associated with myalgia/achiness, mild diffuse headache, lightheadedness especially on moving from sitting to standing position.  Reports she had been having nonproductive cough, denies any chest pain or shortness of breath.  Does report some increased sensitivity to light however no neck stiffness or rigidity.  No nausea or vomiting, mild abdominal pain, no constipation or diarrhea.  No urinary symptoms or rashes.  No known positive COVID-19 contact.  Has had her flu vaccination previously.  States 2 weeks ago she had sinus surgery, states she had previous dental extraction with defect, fat filling procedure with sinus surgery was performed, states she completed course of amoxicillin last week.  No known sick contacts.  Denies any difficulty swallowing.  In the ED she was febrile to 101.8.  Labs with leukocytosis 15.6, CRP 7.84, ferritin 78, lactic acid 1.4, procalcitonin 11.68, influenza A/B negative, COVID-19 negative, BUN/creatinine 22/1.3, UA with 1 WBC.  Chest x-ray without focal infiltrates or consolidation.  She received acetaminophen 650 mg, 6 mg of IV dexamethasone, IV vancomycin and Zosyn.  Case discussed with ED provider.  Patient updated plan of care.    Overview/Hospital Course:  Patient presented with weakness, headache, lightheadedness, cough.  Found to have a fever in the ED.  Procal 11.  Per my chart review, patient with chronic sinusitis.  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  Right maxillary sinus was sent to pathology and revealed severe chronic inflammation, no malignant cells noted.  Culture of that tissue from 9/17 grew Rare Enterobacter, Rare Klebsiella.  Patient admitted, started on broad abx and antifungal.  CT sinuses ordered which revealed changes concerning for invasive fungal sinusitis. I called Radiology who kindly reviewed the CT sinus with me. When compared to January 2020, there are definite changes.  These findings could represent invasive fungal sinusitis but given her recent surgical history, this could also represent severe chronic sinusitis with surgical changes.  ID consulted and evaluated patient.  Clinical picture does not support aggressive invasive fungal sinusitis but continuing Amphotericin at this time with iVS (monitoring renal function).  CT abd with some mild gallbladder wall thickening and she did have some TTP on exam.  US was negative for signs of acute cholecystitis.   ENT consulted per ID recommendations. Dr. Scales saw Mrs. Guzman today and there is no evidence on exam of invasive fungal sinusitis.  Cultures taken from middle meatus. No pus encountered.  Ampothericin and Vanc stopped 10/12.  Will continue another day of Meropenem per ID recommendation and then patient can be changed to po cipro for a 21 day course for Gram negative sinusitis and discharged from an ID stand point. MRI of brain done and no abscess which is what was being evaluated.  MRI did indicate "On diffusion weighted images, 2 small adjacent foci of very subtle signal increase are noted at the corona radiata on the left, probably reflecting small subacute white matter ischemic insults."  There is no clinical history or exam findings to suggest acute or subacute CVA.  Plan to review " with radiology and order Echo with bubble to evaluate embolic source in the interim.  Radiology has gone from the evening 10/12 and thus will revisit tomorrow. Echo done with results pending.  No focal deficits on exam and no history given to suggest acute CVA and thus I do no think this represents acute CVA.    Interval History: Weakness POA- generalized, persistent, moderately severe, no associated focal deficits.  No facial droop.  No sensation changes.  Continues to have sinus pressure in her face.  Has some drainage that is white with some yellow.  Gets a little SOB ambulating to bathroom but denies chest pain. No fevers overnight.     Review of Systems   Constitutional: Negative.    HENT: Positive for sinus pressure.    Eyes: Negative.    Respiratory: Positive for cough and shortness of breath.    Cardiovascular: Negative.    Gastrointestinal: Negative.    Endocrine: Negative.    Genitourinary: Negative.    Musculoskeletal: Negative.    Skin: Negative.    Allergic/Immunologic: Negative.    Neurological: Negative.    Hematological: Negative.    Psychiatric/Behavioral: Negative.      Objective:     Vital Signs (Most Recent):  Temp: 98.2 °F (36.8 °C) (10/12/20 1650)  Pulse: 71 (10/12/20 1650)  Resp: 18 (10/12/20 1650)  BP: (!) 168/75 (10/12/20 1650)  SpO2: 96 % (10/12/20 1650) Vital Signs (24h Range):  Temp:  [98.1 °F (36.7 °C)-98.5 °F (36.9 °C)] 98.2 °F (36.8 °C)  Pulse:  [64-80] 71  Resp:  [16-18] 18  SpO2:  [94 %-96 %] 96 %  BP: (138-168)/(72-78) 168/75     Weight: 88 kg (194 lb 0.1 oz)  Body mass index is 27.84 kg/m².    Intake/Output Summary (Last 24 hours) at 10/12/2020 1745  Last data filed at 10/12/2020 0455  Gross per 24 hour   Intake 1732.5 ml   Output --   Net 1732.5 ml      Physical Exam  Vitals signs and nursing note reviewed.   Constitutional:       General: She is not in acute distress.     Appearance: She is well-developed.      Comments: Non toxic appearing   HENT:      Head: Normocephalic and  atraumatic.      Nose:      Comments: billateral maxillary sinus pressure  Eyes:      Pupils: Pupils are equal, round, and reactive to light.   Neck:      Musculoskeletal: Normal range of motion.   Cardiovascular:      Rate and Rhythm: Regular rhythm.      Heart sounds: No murmur.   Pulmonary:      Effort: Pulmonary effort is normal.      Breath sounds: Normal breath sounds. No wheezing.   Abdominal:      General: There is no distension.      Palpations: Abdomen is soft.      Tenderness: There is no abdominal tenderness. There is no guarding or rebound.   Musculoskeletal: Normal range of motion.   Skin:     Findings: No rash.   Neurological:      Mental Status: She is alert and oriented to person, place, and time.      Cranial Nerves: No cranial nerve deficit.      Sensory: No sensory deficit.         Significant Labs:   CBC:   Recent Labs   Lab 10/10/20  1928 10/11/20  0516 10/12/20  0713   WBC 15.65* 23.17* 16.29*   HGB 11.6* 11.1* 10.6*   HCT 35.1* 33.1* 32.0*    264 266     CMP:   Recent Labs   Lab 10/10/20  1928 10/11/20  0516 10/12/20  0713    137 140   K 3.9 3.8 3.4*   CL 99 105 102   CO2 27 26 28   * 199* 102   BUN 22 15 15   CREATININE 1.3 1.1 1.4   CALCIUM 9.2 8.6* 8.7   PROT 6.8 6.5 5.9*   ALBUMIN 3.3* 3.0* 2.7*   BILITOT 0.6 0.7 0.5   ALKPHOS 130 121 113   AST 25 20 17   ALT 29 29 26   ANIONGAP 13 6* 10   EGFRNONAA 41.4* 50.6* 37.8*       Significant Imaging: I have reviewed all pertinent imaging results/findings within the past 24 hours.      Assessment/Plan:      * Invasive fungal sinusitis  See Fever  Felt to be ruled out    Acute recurrent maxillary sinusitis  Suspect this is the source of her fever and cause of her presentation.    Invasive fungal sinusitis ruled out  Appreciate ID recs- will need 21 days of po cipro starting 10/13 and should follow up with her ENT  Will continue one more day of IV Meropenem per ID recommendtaions  Seen by ENT here and culture taken.  Vanc and  Amphotericin stopped 10/12.      Chronic maxillary sinusitis        Abnormal MRI  Clinically, does not fit picture of CVA.  Attempted to review with radiology but they had already left for today.  Will try again.    Echo with bubble study ordered with results pending.  Exam with no focal deficits  I have a low suspicion that this represents an acute CVA and thus not treating her as such at this time.  May re evaluate that pending discussion with Radiology or if Echo is revealing of an embolic source.  I think may require outpatient follow up with Neurology also pending the same.     LEROY (acute kidney injury)  Better  IVFs while on Amphotericin. Amphotericin stopped 10/12- will continue these fluids overnight for conservative's sake.  Holding ibersartan/hydrochlorothiazide  Renally dosing all medications and avoid nephrotoxin drugs  Trending BMP      Oral thrush  Visible oral thrush seen on examination.  Does not seem to extend to the posterior pharynx, patient denies any dysphagia/odynophagia.  Systemic fluconazole with nystatin swish      Fever  Patient presenting with  with generalized weakness with myalgia.  Febrile to the 101.8 with elevated procalcitonin indicative of bacterial infection.  COVID-19 negative.  Flu negative.  Chest x-ray no focal infiltrates.  Patient with recent sinus surgery and given CT findings, evaluating for invasive fungal sinusitis versus other sinusitis as invasive fungal does not currently fit the clinical picture.  ENT consulted for evaluation and culture. MRI did not reveal any cerebral abscess. No meningeal signs on physical exam.  She is not immunocompromised that we are aware of.  does have oral candidiasis- treating with Nystatin   does have epigastric/right upper quadrant tenderness on examination with normal LFTs- US ultimately did not support acute cholecystitis. Serial abdominal exams.  ID following    Anemia  No evidence of bleeding.  Monitoring CBC.      HTN  (hypertension)  Holding ibersartan/hydrochlorothiazide.  Continue metoprolol.  Monitoring blood pressure and adjusting as needed.      Anxiety  Continue Lexapro with p.r.n. benzodiazepine.        VTE Risk Mitigation (From admission, onward)         Ordered     enoxaparin injection 40 mg  Every 24 hours      10/10/20 2229     IP VTE HIGH RISK PATIENT  Once      10/10/20 2229     Place sequential compression device  Until discontinued      10/10/20 2229                Discharge Planning   CLYDE:      Code Status: Full Code   Is the patient medically ready for discharge?:     Reason for patient still in hospital (select all that apply): Treatment  Discharge Plan A: Home with family   Discharge Delays: None known at this time              Jarrett Yun MD  Department of Hospital Medicine   [facility]

## 2020-10-12 NOTE — HPI
Marleny Guzman is a 69 yo woman with h/o chronic maxillary sinusitis who is recently s/p right-sided endoscopic sinus surgery (9/27/20) who presented to [facility] 10/10 with complaints of weakness and fever. 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 Cultures from her sinus surgery grew out rare Enterobacter, rare Klebsiella. She had taken amoxicillin postop and completed the course. She was doing well and has seen her surgeon twice sine surgery, most recently last week, and was told that her sinuses and nasal cavities were healing appropriately. She had been using sinus rinses at home. She has no history of HIV, leukemia/lymphoma, or diabetes. A CT of her sinuses performed at [facility] demonstrates mucosal thickening of the right maxillary sinus. There was a concern for invasive fungal sinusitis, and thus ENT was consulted.

## 2020-10-12 NOTE — CONSULTS
[facility]  Otorhinolaryngology-Head & Neck Surgery  Consult Note    Patient Name: Marleny Guzman  MRN: [id number]  Code Status: Full Code  Admission Date: 10/10/2020  Hospital Length of Stay: 0 days  Attending Physician: Jarrett Yun MD  Primary Care Provider: Bridget Cantu MD    Patient information was obtained from patient, past medical records and ER records.     Inpatient consult to ENT  Consult performed by: Helga Scales MD  Consult ordered by: Jarrett Yun MD        Subjective:     Chief Complaint/Reason for Admission: weakness    History of Present Illness: Marleny Guzman is a 69 yo woman with h/o chronic maxillary sinusitis who is recently s/p right-sided endoscopic sinus surgery (9/27/20) who presented to [facility] 10/10 with complaints of weakness and fever. 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 Cultures from her sinus surgery grew out rare Enterobacter, rare Klebsiella. She had taken amoxicillin postop and completed the course. She was doing well and has seen her surgeon twice sine surgery, most recently last week, and was told that her sinuses and nasal cavities were healing appropriately. She had been using sinus rinses at home. She has no history of HIV, leukemia/lymphoma, or diabetes. A CT of her sinuses performed at [facility] demonstrates mucosal thickening of the right maxillary sinus. There was a concern for invasive fungal sinusitis, and thus ENT was consulted.    Medications:  Continuous Infusions:   sodium chloride 0.9% 75 mL/hr at 10/11/20 0119     Scheduled Meds:   enoxaparin  40 mg Subcutaneous Q24H    escitalopram oxalate  20 mg Oral Daily    meropenem (MERREM) IVPB  1 g Intravenous Q8H    metoprolol succinate  50 mg Oral Daily    nystatin  500,000 Units Oral QID    vancomycin (VANCOCIN) IVPB  1,500 mg Intravenous Q24H     PRN  Meds:acetaminophen, acetaminophen, dextrose 50%, dextrose 50%, glucagon (human recombinant), glucose, glucose, LORazepam, melatonin, ondansetron, sodium chloride 0.9%, Pharmacy to dose Vancomycin consult **AND** vancomycin - pharmacy to dose, Pharmacy to dose Vancomycin consult **AND** vancomycin - pharmacy to dose     No current facility-administered medications on file prior to encounter.      Current Outpatient Medications on File Prior to Encounter   Medication Sig    estradiol (ESTRACE) 0.01 % (0.1 mg/gram) vaginal cream Use 1 GM nightly vaginally and dime size amount on vulvatwice weekly (Patient taking differently: Place 1 g vaginally. Use 1 GM nightly vaginally and dime size amount on vulvatwice weekly)    flu vacc [id number] up,/PF (FLUZONE HIGHDOSE QUAD 20-21 PF IM) Inject 0.7 mLs into the muscle once.    irbesartan-hydrochlorothiazide (AVALIDE) 300-12.5 mg per tablet Take 1 tablet by mouth once daily.    LORazepam (ATIVAN) 0.5 MG tablet TAKE 1/2 TO 1 TABLET BY MOUTH DAILY AS NEEDED FOR ANXIETY (Patient taking differently: Take 0.5-1 mg by mouth daily as needed. TAKE 1/2 TO 1 TABLET BY MOUTH DAILY AS NEEDED FOR ANXIETY)    meloxicam (MOBIC) 15 MG tablet Take 1 tablet (15 mg total) by mouth once daily.    metoprolol succinate (TOPROL-XL) 50 MG 24 hr tablet TAKE 1 TABLET BY MOUTH EVERY DAY (Patient taking differently: Take 50 mg by mouth once daily. )    albuterol (PROVENTIL/VENTOLIN HFA) 90 mcg/actuation inhaler Inhale 1-2 puffs into the lungs every 6 (six) hours as needed for Wheezing. Rescue (Patient not taking: Reported on 9/16/2020)    ergocalciferol (ERGOCALCIFEROL) 50,000 unit Cap TAKE 1 CAPSULE (50,000 UNITS TOTAL) BY MOUTH EVERY 30 DAYS. (Patient taking differently: Take 50,000 Units by mouth every 30 days. )    escitalopram oxalate (LEXAPRO) 20 MG tablet 1.5 tablets daily (Patient taking differently: Take 30 mg by mouth once daily. 1.5 tablets daily)       Review of patient's allergies  indicates:  No Known Allergies    Past Medical History:   Diagnosis Date    Abnormal Pap smear     Abnormal Pap smear of cervix     Abnormal Pap smear of vagina 7/20/2016    LEROY (acute kidney injury) 10/10/2020    Allergy     Anemia     Anxiety     Anxiety     Cataract     Chronic bilateral low back pain without sciatica 3/13/2018    Depression     Depression with anxiety     Hypertension     Osteoarthritis 6/20/2013    Right rotator cuff tear     Sleep apnea     Cipap machine at home    Trouble in sleeping     Urinary incontinence     Leaks urine with laughing/ coughing/ sneezing     Past Surgical History:   Procedure Laterality Date    CATARACT EXTRACTION  6/18/12    OS    CATARACT EXTRACTION  7/16/12    OD    CERVIX SURGERY      Conization    COLONOSCOPY      COLONOSCOPY N/A 3/4/2016    Procedure: COLONOSCOPY;  Surgeon: Tenzin Thakkar MD;  Location: [facility] ([address] [place] [address]);  Service: Endoscopy;  Laterality: N/A;    EYE SURGERY Bilateral     cataract    HYSTERECTOMY  5-12-15    LEEP      LUMBAR EPIDURAL INJECTION      OOPHORECTOMY      CA REMOVAL OF OVARY/TUBE(S)  5-12-15    SALPINGECTOMY Left     SHOULDER SURGERY Right      Family History     Problem Relation (Age of Onset)    Anuerysm Sister    Breast cancer Cousin, Sister (72)    Cancer Mother, Sister, Maternal Aunt    Cataracts Father    Deep vein thrombosis Sister    Depression Maternal Grandmother    Drug abuse Sister    Eczema Sister    Edema Sister    Heart disease Sister, Sister    Hypertension Father, Sister, Sister, Sister, Daughter    No Known Problems Brother, Sister, Sister, Paternal Grandmother, Paternal Grandfather    Osteoarthritis Sister    Other Daughter    Pacemaker/defibrilator Sister    Stroke Mother    Ulcers Maternal Grandfather        Tobacco Use    Smoking status: Former Smoker     Packs/day: 0.50     Years: 22.00     Pack years: 11.00     Types: Cigarettes     Quit date: 2/7/1998     Years since quitting:  22.6    Smokeless tobacco: Never Used   Substance and Sexual Activity    Alcohol use: Yes     Alcohol/week: 0.0 standard drinks     Comment: Occassionally for social events will have a glass of wine    Drug use: No    Sexual activity: Not on file     Review of Systems   Constitutional: Positive for activity change and fever. Negative for appetite change and fatigue.   HENT: Negative for congestion, ear pain, facial swelling, hearing loss, rhinorrhea, sinus pain, sore throat, tinnitus and trouble swallowing.    Eyes: Negative for photophobia and pain.   Respiratory: Negative for cough and shortness of breath.    Cardiovascular: Negative for chest pain.   Gastrointestinal: Negative for abdominal pain and nausea.   Musculoskeletal: Negative for arthralgias, gait problem and neck pain.   Skin: Negative for color change and rash.   Neurological: Positive for weakness and headaches. Negative for dizziness.   Psychiatric/Behavioral: Negative for behavioral problems and confusion.     Objective:     Vital Signs (Most Recent):  Temp: 98.3 °F (36.8 °C) (10/12/20 1213)  Pulse: 64 (10/12/20 1213)  Resp: 18 (10/12/20 1213)  BP: (!) 162/77 (10/12/20 1213)  SpO2: 96 % (10/12/20 1213) Vital Signs (24h Range):  Temp:  [98.1 °F (36.7 °C)-98.5 °F (36.9 °C)] 98.3 °F (36.8 °C)  Pulse:  [64-80] 64  Resp:  [16-18] 18  SpO2:  [94 %-96 %] 96 %  BP: (138-165)/(61-78) 162/77     Weight: 88 kg (194 lb 0.1 oz)  Body mass index is 27.84 kg/m².        Physical Exam  Constitutional:       Appearance: Normal appearance. She is well-developed.   HENT:      Head: Normocephalic and atraumatic.      Right Ear: Hearing, tympanic membrane, ear canal and external ear normal.      Left Ear: Hearing, tympanic membrane, ear canal and external ear normal.      Nose: Nose normal.      Comments: Procedure: Flexible rhinoscopy  Both nasal cavities were examined with a flexible endoscope. All mucosa pink and healthy-appearing. No necrosis or fungal elements  visualized. Left side normal. Right side with mild postoperative edema at maxillary antrostomy site - unable to pass scope into maxillary sinus cavity, however there is no purulence emanating from ostium. Middle turbinate intact, healthy-appearing. Nasopharynx visualized, no masses or purulence.     Mouth/Throat:      Pharynx: Uvula midline.   Eyes:      General: Lids are normal. Vision grossly intact.      Conjunctiva/sclera: Conjunctivae normal.      Pupils: Pupils are equal, round, and reactive to light.   Neck:      Musculoskeletal: Normal range of motion and neck supple.      Thyroid: No thyroid mass or thyromegaly.   Pulmonary:      Effort: Pulmonary effort is normal. No tachypnea.      Breath sounds: No stridor.   Skin:     General: Skin is warm and dry.   Neurological:      Mental Status: She is alert and oriented to person, place, and time.      Cranial Nerves: Cranial nerves are intact. No cranial nerve deficit or facial asymmetry.         Significant Labs:  CBC:   Recent Labs   Lab 10/12/20  0713   WBC 16.29*   RBC 3.70*   HGB 10.6*   HCT 32.0*      MCV 87   MCH 28.6   MCHC 33.1     CMP:   Recent Labs   Lab 10/12/20  0713      CALCIUM 8.7   ALBUMIN 2.7*   PROT 5.9*      K 3.4*   CO2 28      BUN 15   CREATININE 1.4   ALKPHOS 113   ALT 26   AST 17   BILITOT 0.5     CRP:   Recent Labs   Lab 10/10/20  1928   CRP 7.84*       Significant Diagnostics:  CT: I have reviewed all pertinent results/findings within the past 24 hours and my personal findings are:  mucosal thickening of right maxillary sinus    Assessment/Plan:     Chronic maxillary sinusitis  71 yo woman presenting with weakness, fever. 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 Does not fit clinical picture in immunocompetent individual, and no signs or symptoms  -no purulence seen coming from sinus cavity. Nevertheless, middle  meatus was cultured  -defer further management/workup to primary and ID teams      VTE Risk Mitigation (From admission, onward)         Ordered     enoxaparin injection 40 mg  Every 24 hours      10/10/20 2229     IP VTE HIGH RISK PATIENT  Once      10/10/20 2229     Place sequential compression device  Until discontinued      10/10/20 2229                Thank you for your consult. I will sign off. Please contact us if you have any additional questions.    Helga Scales MD  Otorhinolaryngology-Head & Neck Surgery  [facility]

## 2020-10-12 NOTE — SUBJECTIVE & OBJECTIVE
Interval History: Weakness POA- generalized, persistent, moderately severe, no associated focal deficits.  No facial droop.  No sensation changes.  Continues to have sinus pressure in her face.  Has some drainage that is white with some yellow.  Gets a little SOB ambulating to bathroom but denies chest pain. No fevers overnight.     Review of Systems   Constitutional: Negative.    HENT: Positive for sinus pressure.    Eyes: Negative.    Respiratory: Positive for cough and shortness of breath.    Cardiovascular: Negative.    Gastrointestinal: Negative.    Endocrine: Negative.    Genitourinary: Negative.    Musculoskeletal: Negative.    Skin: Negative.    Allergic/Immunologic: Negative.    Neurological: Negative.    Hematological: Negative.    Psychiatric/Behavioral: Negative.      Objective:     Vital Signs (Most Recent):  Temp: 98.2 °F (36.8 °C) (10/12/20 1650)  Pulse: 71 (10/12/20 1650)  Resp: 18 (10/12/20 1650)  BP: (!) 168/75 (10/12/20 1650)  SpO2: 96 % (10/12/20 1650) Vital Signs (24h Range):  Temp:  [98.1 °F (36.7 °C)-98.5 °F (36.9 °C)] 98.2 °F (36.8 °C)  Pulse:  [64-80] 71  Resp:  [16-18] 18  SpO2:  [94 %-96 %] 96 %  BP: (138-168)/(72-78) 168/75     Weight: 88 kg (194 lb 0.1 oz)  Body mass index is 27.84 kg/m².    Intake/Output Summary (Last 24 hours) at 10/12/2020 1745  Last data filed at 10/12/2020 0455  Gross per 24 hour   Intake 1732.5 ml   Output --   Net 1732.5 ml      Physical Exam  Vitals signs and nursing note reviewed.   Constitutional:       General: She is not in acute distress.     Appearance: She is well-developed.      Comments: Non toxic appearing   HENT:      Head: Normocephalic and atraumatic.      Nose:      Comments: billateral maxillary sinus pressure  Eyes:      Pupils: Pupils are equal, round, and reactive to light.   Neck:      Musculoskeletal: Normal range of motion.   Cardiovascular:      Rate and Rhythm: Regular rhythm.      Heart sounds: No murmur.   Pulmonary:      Effort:  Pulmonary effort is normal.      Breath sounds: Normal breath sounds. No wheezing.   Abdominal:      General: There is no distension.      Palpations: Abdomen is soft.      Tenderness: There is no abdominal tenderness. There is no guarding or rebound.   Musculoskeletal: Normal range of motion.   Skin:     Findings: No rash.   Neurological:      Mental Status: She is alert and oriented to person, place, and time.      Cranial Nerves: No cranial nerve deficit.      Sensory: No sensory deficit.         Significant Labs:   CBC:   Recent Labs   Lab 10/10/20  1928 10/11/20  0516 10/12/20  0713   WBC 15.65* 23.17* 16.29*   HGB 11.6* 11.1* 10.6*   HCT 35.1* 33.1* 32.0*    264 266     CMP:   Recent Labs   Lab 10/10/20  1928 10/11/20  0516 10/12/20  0713    137 140   K 3.9 3.8 3.4*   CL 99 105 102   CO2 27 26 28   * 199* 102   BUN 22 15 15   CREATININE 1.3 1.1 1.4   CALCIUM 9.2 8.6* 8.7   PROT 6.8 6.5 5.9*   ALBUMIN 3.3* 3.0* 2.7*   BILITOT 0.6 0.7 0.5   ALKPHOS 130 121 113   AST 25 20 17   ALT 29 29 26   ANIONGAP 13 6* 10   EGFRNONAA 41.4* 50.6* 37.8*       Significant Imaging: I have reviewed all pertinent imaging results/findings within the past 24 hours.

## 2020-10-12 NOTE — ASSESSMENT & PLAN NOTE
Clinically, does not fit picture of CVA.  Will review with radiology.  Echo with bubble study ordered.

## 2020-10-12 NOTE — PROGRESS NOTES
Dr. Dumont, ENT, consulted but called and unfortunately is out of town.  Dr. Scales, ENT, consulted last night.  Per Night RN, initially some confusion if she provided services to [facility] but confirmed this AM that she fortunately does.  Consult placed.

## 2020-10-12 NOTE — HOSPITAL COURSE
"Patient presented with weakness, headache, lightheadedness, cough.  Found to have a fever in the ED.  Procal 11.  Per my chart review, patient with chronic sinusitis.  On 9/17, she underwent Right endoscopic anterior Ethmoidectomy, Right endoscopic maxillary antrectomy with removal of contents for the diagnosis of right oral antral fistula, chronic right maxillary sinusitis and chronic right ethmoid sinusitis.  Right maxillary sinus was sent to pathology and revealed severe chronic inflammation, no malignant cells noted.  Culture of that tissue from 9/17 grew Rare Enterobacter, Rare Klebsiella.  Patient admitted, started on broad abx and antifungal.  CT sinuses ordered which revealed changes concerning for invasive fungal sinusitis. I called Radiology who kindly reviewed the CT sinus with me. When compared to January 2020, there are definite changes.  These findings could represent invasive fungal sinusitis but given her recent surgical history, this could also represent severe chronic sinusitis with surgical changes.  ID consulted and evaluated patient.  Clinical picture does not support aggressive invasive fungal sinusitis but continuing Amphotericin at this time with iVS (monitoring renal function).  CT abd with some mild gallbladder wall thickening and she did have some TTP on exam.  US was negative for signs of acute cholecystitis.   ENT consulted per ID recommendations. Dr. Scales saw Mrs. Guzman today and there is no evidence on exam of invasive fungal sinusitis.  Cultures taken from middle meatus. No pus encountered.  Ampothericin and Vanc stopped 10/12.  Will continue another day of Meropenem per ID recommendation and then patient can be changed to po cipro for a 21 day course for Gram negative sinusitis and discharged from an ID stand point. MRI of brain done and no abscess which is what was being evaluated.  MRI did indicate "On diffusion weighted images, 2 small adjacent foci of very subtle signal " "increase are noted at the corona radiata on the left, probably reflecting small subacute white matter ischemic insults."  There is no clinical history or exam findings to suggest acute or subacute CVA.  Plan to review with radiology and order Echo with bubble to evaluate embolic source in the interim.  Radiology has gone from the evening 10/12 and thus will revisit tomorrow. Echo done with results pending.  No focal deficits on exam and no history given to suggest acute CVA and thus I do no think this represents acute CVA.  "

## 2020-10-12 NOTE — PROGRESS NOTES
Therapy with Vancomycin complete and / or consult / order discontinued by Dr. Yun on 10/12/2020 @ 12:58   Pharmacy will sign off, please re-consult as needed.  Thank you for allowing us to participate in this patient's care.  Ghazala Andres 10/12/2020 1:07 PM  Dept of Pharmacy  Ext [phone]

## 2020-10-12 NOTE — ASSESSMENT & PLAN NOTE
Better  IVFs while on Amphotericin. Amphotericin stopped 10/12- will continue these fluids overnight for conservative's sake.  Holding ibersartan/hydrochlorothiazide  Renally dosing all medications and avoid nephrotoxin drugs  Trending BMP

## 2020-10-12 NOTE — ASSESSMENT & PLAN NOTE
Clinically, does not fit picture of CVA.  Attempted to review with radiology but they had already left for today.  Will try again.    Echo with bubble study ordered with results pending.  Exam with no focal deficits  I have a low suspicion that this represents an acute CVA and thus not treating her as such at this time.  May re evaluate that pending discussion with Radiology or if Echo is revealing of an embolic source.  I think may require outpatient follow up with Neurology also pending the same.

## 2020-10-12 NOTE — PROGRESS NOTES
Consult Note  Infectious Disease    Reason for Consult:      HPI: Marleny Guzman is a 70 y.o. 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 Claudio at [facility] was admitted for worsening of headache sinus pain and fevers.  She reports that after that surgery she then had a a FESS done byher orthodontist 1 week later.  She took 7 days of Augmentin after her surgery.  She completed the antibiotics.  Her symptoms never went away.  She continues have swelling of her face.  Denies any eye pain however has ongoing sinus pain and headaches.  She has some clear nasal discharge.  Denies any purulence drainage.  She has some ongoing coughing due to postnasal drainage.  Denies any shortness of breath, sore throat.  She denies any nausea vomiting diarrhea.  Denies any dysuria.  She lives with her daughter and son-in-law in a relatively new house no known mold exposure or issues with mold in the house.  She is not a diabetic.   She feels better today.  In the ED she had CT head and sinus that was was suspicious for invasive fungal infection due to asymmetric haziness within the posterior periAntral soft tissues.  Blood cultures were done.  She was started on  broad-spectrum antibiotics as well as AmBisome. Chart review shows that she grew Enterobacter and another Gram-negative organism from her sinus culture- per Dr. Yun.     10/12([facility]) chart and imaging reviewed, cultures from [facility] in Care everywhere with Enterobacter and klebsiella. D/w Dr. Keith at the bedside who found no evidence of invasive fungal sinusitis. A culture of the nasopharynx was obtained by her(the sinus was not entered), no pus was encountered either. She is supposed to be using nasal saline. She has a mild cough. Mild facial  stuffiness.    EXAM & DIAGNOSTICS REVIEWED:   Vitals:     Temp:  [97.9 °F (36.6 °C)-98.5 °F (36.9 °C)]   Temp: 98.2 °F (36.8 °C) (10/12/20 0732)  Pulse: 80 (10/12/20 0732)  Resp: 16 (10/12/20 0732)  BP: 138/76 (10/12/20 0732)  SpO2: (!) 94 % (10/12/20 0732)    Intake/Output Summary (Last 24 hours) at 10/12/2020 1122  Last data filed at 10/12/2020 0455  Gross per 24 hour   Intake 1732.5 ml   Output --   Net 1732.5 ml       General:  In NAD. Alert and attentive, cooperative, comfortable,   Eyes:  Anicteric, PERRL, no periorbital swelling  ENT:  Status post extraction of all upper teeth and multiple lower jaw teeth- No ulcers, exudates, positive thrush,  dentition is poor-    Neck:  supple,    Lungs: Clear, no consolidation, rales, wheezes, rub  Heart:  RRR, no gallop/murmur noted  Abd:  Soft, NT, ND, normal BS, no masses or organomegaly appreciated.  :     Musc:  Joints without effusion, swelling,  erythema, synovitis, ambulatory  Skin:  No rashes. No palmar or plantar lesions. No subungual petechiae  Wound:   Neuro: Alert, attentive, speech fluent, face symmetric, moves all extremities, no focal weakness  Psych:  Calm, cooperative  Lymphatic:        Extrem: No edema, erythema, phlebitis, cellulitis, warm and well perfused  VAD:  PIV       Isolation:   None      General Labs reviewed:  Recent Labs   Lab 10/10/20  1928 10/11/20  0516 10/12/20  0713   WBC 15.65* 23.17* 16.29*   HGB 11.6* 11.1* 10.6*   HCT 35.1* 33.1* 32.0*    264 266       Recent Labs   Lab 10/10/20  1928 10/11/20  0516 10/12/20  0713    137 140   K 3.9 3.8 3.4*   CL 99 105 102   CO2 27 26 28   BUN 22 15 15   CREATININE 1.3 1.1 1.4   CALCIUM 9.2 8.6* 8.7   PROT 6.8 6.5 5.9*   BILITOT 0.6 0.7 0.5   ALKPHOS 130 121 113   ALT 29 29 26   AST 25 20 17         Micro:  Microbiology Results (last 7 days)     Procedure Component Value Units Date/Time    Aerobic culture [[id number]]     Order Status: No result Specimen: Body Fluid from Sinus      Culture, Anaerobe [[id number]]     Order Status: No result Specimen: Body Fluid from Nares, Right     Blood culture x two cultures. Draw prior to antibiotics. [[id number]] Collected: 10/10/20 2005    Order Status: Completed Specimen: Blood from Peripheral, Antecubital, Right Updated: 10/11/20 2232     Blood Culture, Routine No Growth to date      No Growth to date    Narrative:      Aerobic and anaerobic    Blood culture x two cultures. Draw prior to antibiotics. [[id number]] Collected: 10/10/20 1930    Order Status: Completed Specimen: Blood from Peripheral, Antecubital, Right Updated: 10/11/20 2032     Blood Culture, Routine No Growth to date      No Growth to date    Narrative:      Aerobic and anaerobic    Fungus Culture, Blood or Bone Marrow [[id number]] Collected: 10/11/20 0911    Order Status: Sent Specimen: Blood Updated: 10/11/20 0914        Imaging Reviewed:  CT sinuses 10/10  Prominent mucosal thickening in the right maxillary sinus, with asymmetric mucosal thickening of the right nasal cavity and bilateral ethmoid sinus disease. There has been interval partial destruction of the walls of the right ethmoid bulla. The anterior and posterolateral walls of the right maxillary sinus appear slightly thickened and somewhat ill-defined. In addition, there is mild asymmetric haziness within the posterior periantral soft tissues on the right. The nasal septum appears intact and the pre-maxillary soft tissues are unremarkable. However, findings are worrisome for right-sided acute invasive fungal sinusitis    MRI brain 10/11   1. 2 small adjacent foci of subtle diffusion restriction at the corona  radiata on the left, suggesting small subacute white matter ischemic  insults. Additional findings of moderate chronic microvascular white  matter disease are noted.  2. Changes of chronic sinusitis as described, most pronounced at the  right maxillary sinus. No acute bone destruction is identified.    CT abdomen and pelvis  10/10  1.  Ventral wall hernia containing a knuckle of colon. There is no evidence of obstruction.  2.  Suggestion of minimal gallbladder wall thickening/pericholecystic inflammation. No calcified gallstones or ductal dilatation. If there is clinical concern for acute gallbladder pathology, findings could be further evaluated with ultrasound or HIDA scan    IMPRESSION & PLAN     70 y.o. 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 Claudio at [facility] was admitted for worsening of headache sinus pain and fevers:    1- Acute on chronic right maxillary sinusitis- 9/17 Enterobacter and Klebsiella, both sensitive to cipro   - this is not consistent with invasive fungal infection and ENT exam at bedside did not reveal findings of fungal infection either         - no no mold exposure, NOT a diabetic  - blood cultures in progress  - on AmBisome, vancomycin and meropenem    2- LEROY- improving    Recommendations:   stop Ambisome, vancomycin  Continue merrem for another day. If all is well, home on 21 days of cipro for gram negative sinusitis. F/u with her primary ENT.    Discussed with Dr. Yun and Dr. Vicente-Smooth at bedside

## 2020-10-13 VITALS
WEIGHT: 194 LBS | OXYGEN SATURATION: 94 % | TEMPERATURE: 99 F | DIASTOLIC BLOOD PRESSURE: 75 MMHG | RESPIRATION RATE: 17 BRPM | SYSTOLIC BLOOD PRESSURE: 162 MMHG | HEIGHT: 70 IN | BODY MASS INDEX: 27.77 KG/M2 | HEART RATE: 80 BPM

## 2020-10-13 LAB
ALBUMIN SERPL BCP-MCNC: 2.9 G/DL (ref 3.5–5.2)
ALP SERPL-CCNC: 120 U/L (ref 55–135)
ALT SERPL W/O P-5'-P-CCNC: 22 U/L (ref 10–44)
ANION GAP SERPL CALC-SCNC: 11 MMOL/L (ref 8–16)
ANISOCYTOSIS BLD QL SMEAR: SLIGHT
AST SERPL-CCNC: 16 U/L (ref 10–40)
BASOPHILS NFR BLD: 0 % (ref 0–1.9)
BILIRUB SERPL-MCNC: 0.5 MG/DL (ref 0.1–1)
BUN SERPL-MCNC: 13 MG/DL (ref 8–23)
CALCIUM SERPL-MCNC: 8.8 MG/DL (ref 8.7–10.5)
CHLORIDE SERPL-SCNC: 103 MMOL/L (ref 95–110)
CO2 SERPL-SCNC: 28 MMOL/L (ref 23–29)
CREAT SERPL-MCNC: 1.2 MG/DL (ref 0.5–1.4)
DIFFERENTIAL METHOD: ABNORMAL
EOSINOPHIL NFR BLD: 15 % (ref 0–8)
ERYTHROCYTE [DISTWIDTH] IN BLOOD BY AUTOMATED COUNT: 14.5 % (ref 11.5–14.5)
EST. GFR  (AFRICAN AMERICAN): 52.5 ML/MIN/1.73 M^2
EST. GFR  (NON AFRICAN AMERICAN): 45.6 ML/MIN/1.73 M^2
GLUCOSE SERPL-MCNC: 98 MG/DL (ref 70–110)
HCT VFR BLD AUTO: 32.7 % (ref 37–48.5)
HGB BLD-MCNC: 10.9 G/DL (ref 12–16)
IMM GRANULOCYTES # BLD AUTO: ABNORMAL K/UL (ref 0–0.04)
IMM GRANULOCYTES NFR BLD AUTO: ABNORMAL % (ref 0–0.5)
LYMPHOCYTES NFR BLD: 32 % (ref 18–48)
MCH RBC QN AUTO: 28.2 PG (ref 27–31)
MCHC RBC AUTO-ENTMCNC: 33.3 G/DL (ref 32–36)
MCV RBC AUTO: 85 FL (ref 82–98)
MONOCYTES NFR BLD: 3 % (ref 4–15)
NEUTROPHILS NFR BLD: 50 % (ref 38–73)
NRBC BLD-RTO: 0 /100 WBC
PLATELET # BLD AUTO: 292 K/UL (ref 150–350)
PMV BLD AUTO: 10.7 FL (ref 9.2–12.9)
POTASSIUM SERPL-SCNC: 3.4 MMOL/L (ref 3.5–5.1)
PROT SERPL-MCNC: 6.2 G/DL (ref 6–8.4)
RBC # BLD AUTO: 3.87 M/UL (ref 4–5.4)
SODIUM SERPL-SCNC: 142 MMOL/L (ref 136–145)
WBC # BLD AUTO: 12.39 K/UL (ref 3.9–12.7)

## 2020-10-13 PROCEDURE — 36415 COLL VENOUS BLD VENIPUNCTURE: CPT

## 2020-10-13 PROCEDURE — 80053 COMPREHEN METABOLIC PANEL: CPT

## 2020-10-13 PROCEDURE — 99213 PR OFFICE/OUTPT VISIT, EST, LEVL III, 20-29 MIN: ICD-10-PCS | Mod: ,,, | Performed by: INTERNAL MEDICINE

## 2020-10-13 PROCEDURE — 25000003 PHARM REV CODE 250: Performed by: INTERNAL MEDICINE

## 2020-10-13 PROCEDURE — 96365 THER/PROPH/DIAG IV INF INIT: CPT | Mod: 59

## 2020-10-13 PROCEDURE — 63600175 PHARM REV CODE 636 W HCPCS: Performed by: INTERNAL MEDICINE

## 2020-10-13 PROCEDURE — 96366 THER/PROPH/DIAG IV INF ADDON: CPT

## 2020-10-13 PROCEDURE — 99213 OFFICE O/P EST LOW 20 MIN: CPT | Mod: ,,, | Performed by: INTERNAL MEDICINE

## 2020-10-13 PROCEDURE — G0378 HOSPITAL OBSERVATION PER HR: HCPCS

## 2020-10-13 PROCEDURE — 85007 BL SMEAR W/DIFF WBC COUNT: CPT

## 2020-10-13 PROCEDURE — 96367 TX/PROPH/DG ADDL SEQ IV INF: CPT

## 2020-10-13 PROCEDURE — 25000003 PHARM REV CODE 250: Performed by: STUDENT IN AN ORGANIZED HEALTH CARE EDUCATION/TRAINING PROGRAM

## 2020-10-13 PROCEDURE — 85027 COMPLETE CBC AUTOMATED: CPT

## 2020-10-13 RX ORDER — IRBESARTAN 150 MG/1
300 TABLET ORAL DAILY
Status: DISCONTINUED | OUTPATIENT
Start: 2020-10-13 | End: 2020-10-13 | Stop reason: HOSPADM

## 2020-10-13 RX ORDER — POTASSIUM CHLORIDE 750 MG/1
50 CAPSULE, EXTENDED RELEASE ORAL ONCE
Status: COMPLETED | OUTPATIENT
Start: 2020-10-13 | End: 2020-10-13

## 2020-10-13 RX ORDER — CIPROFLOXACIN 500 MG/1
500 TABLET ORAL 2 TIMES DAILY
Qty: 42 TABLET | Refills: 0 | Status: SHIPPED | OUTPATIENT
Start: 2020-10-13 | End: 2021-01-17

## 2020-10-13 RX ORDER — HYDROCHLOROTHIAZIDE 12.5 MG/1
12.5 TABLET ORAL DAILY
Status: DISCONTINUED | OUTPATIENT
Start: 2020-10-13 | End: 2020-10-13 | Stop reason: HOSPADM

## 2020-10-13 RX ADMIN — POTASSIUM CHLORIDE 50 MEQ: 750 CAPSULE, EXTENDED RELEASE ORAL at 10:10

## 2020-10-13 RX ADMIN — IRBESARTAN 300 MG: 150 TABLET ORAL at 10:10

## 2020-10-13 RX ADMIN — METOPROLOL SUCCINATE 50 MG: 25 TABLET, FILM COATED, EXTENDED RELEASE ORAL at 09:10

## 2020-10-13 RX ADMIN — SALINE NASAL SPRAY 1 SPRAY: 1.5 SOLUTION NASAL at 09:10

## 2020-10-13 RX ADMIN — ESCITALOPRAM OXALATE 20 MG: 10 TABLET ORAL at 09:10

## 2020-10-13 RX ADMIN — HYDROCHLOROTHIAZIDE 12.5 MG: 12.5 TABLET ORAL at 10:10

## 2020-10-13 RX ADMIN — MEROPENEM 1 G: 1 INJECTION, POWDER, FOR SOLUTION INTRAVENOUS at 03:10

## 2020-10-13 RX ADMIN — NYSTATIN 500000 UNITS: 100000 SUSPENSION ORAL at 09:10

## 2020-10-13 RX ADMIN — LABETALOL HYDROCHLORIDE 10 MG: 5 INJECTION, SOLUTION INTRAVENOUS at 12:10

## 2020-10-13 NOTE — PLAN OF CARE
10/13/20 1148   Final Note   Assessment Type Final Discharge Note   Anticipated Discharge Disposition Home   No needs at this time.

## 2020-10-13 NOTE — DISCHARGE SUMMARY
"[facility] Medicine Discharge Summary    Date of Admit: 10/10/2020  Date of Discharge: 10/13/2020    Discharge to: Home or Self Care  Condition: good    Discharge Diagnoses     Acute recurrent maxillary sinusitis     Patient Active Problem List   Diagnosis    Anxiety    HTN (hypertension)    Osteoarthritis    Depression    Sleep apnea    Postoperative pain    Rotator cuff tear    Cervical stenosis (uterine cervix)    Cervicogenic headache    Cervical myofascial pain syndrome    Subjective memory complaints    Insomnia    HGSIL (high grade squamous intraepithelial lesion) on Pap smear of cervix    Thoracic aorta atherosclerosis    HA (headache)    Fall    Imbalance    Anemia    Vaginal dysplasia    DDD (degenerative disc disease), lumbar    Chronic bilateral low back pain without sciatica    Weakness of left lower extremity    Chronic pain    Dyspnea    Elevated troponin    Syncope    SIRS (systemic inflammatory response syndrome)    Fever    Oral thrush    LEROY (acute kidney injury)    Invasive fungal sinusitis    Abnormal MRI    Chronic maxillary sinusitis    Acute recurrent maxillary sinusitis        Brief History of Present Illness      Marleny Guzman is a 70 y.o. female who  has a past medical history of Abnormal Pap smear, Abnormal Pap smear of cervix, Abnormal Pap smear of vagina (7/20/2016), LEROY (acute kidney injury) (10/10/2020), Allergy, Anemia, Anxiety, Anxiety, Cataract, Chronic bilateral low back pain without sciatica (3/13/2018), Depression, Depression with anxiety, Hypertension, Osteoarthritis (6/20/2013), Right rotator cuff tear, Sleep apnea, Trouble in sleeping, and Urinary incontinence.  The patient presented to [facility] on 10/10/2020 with a primary complaint of COVID-19 Concerns (fever and cough since yesterday. Denies SOB)  .   Per Dr. Yun's notes:   "HPI:  Mrs. Guzman is a 70-years-old female who presented to the ED with chief complaint of weakness.  Patient " reports 1 day history of generalized weakness associated with myalgia/achiness, mild diffuse headache, lightheadedness especially on moving from sitting to standing position.  Reports she had been having nonproductive cough, denies any chest pain or shortness of breath.  Does report some increased sensitivity to light however no neck stiffness or rigidity.  No nausea or vomiting, mild abdominal pain, no constipation or diarrhea.  No urinary symptoms or rashes.  No known positive COVID-19 contact.  Has had her flu vaccination previously.  States 2 weeks ago she had sinus surgery, states she had previous dental extraction with defect, fat filling procedure with sinus surgery was performed, states she completed course of amoxicillin last week.  No known sick contacts.  Denies any difficulty swallowing.  In the ED she was febrile to 101.8.  Labs with leukocytosis 15.6, CRP 7.84, ferritin 78, lactic acid 1.4, procalcitonin 11.68, influenza A/B negative, COVID-19 negative, BUN/creatinine 22/1.3, UA with 1 WBC.  Chest x-ray without focal infiltrates or consolidation.  She received acetaminophen 650 mg, 6 mg of IV dexamethasone, IV vancomycin and Zosyn.  Case discussed with ED provider.  Patient updated plan of care.     Overview/Hospital Course:  Patient presented with weakness, headache, lightheadedness, cough.  Found to have a fever in the ED.  Procal 11.  Per my chart review, patient with chronic sinusitis.  On 9/17, she underwent Right endoscopic anterior Ethmoidectomy, Right endoscopic maxillary antrectomy with removal of contents for the diagnosis of right oral antral fistula, chronic right maxillary sinusitis and chronic right ethmoid sinusitis.  Right maxillary sinus was sent to pathology and revealed severe chronic inflammation, no malignant cells noted.  Culture of that tissue from 9/17 grew Rare Enterobacter, Rare Klebsiella.  Patient admitted, started on broad abx and antifungal.  CT sinuses ordered which  "revealed changes concerning for invasive fungal sinusitis. I called Radiology who kindly reviewed the CT sinus with me. When compared to January 2020, there are definite changes.  These findings could represent invasive fungal sinusitis but given her recent surgical history, this could also represent severe chronic sinusitis with surgical changes.  ID consulted and evaluated patient.  Clinical picture does not support aggressive invasive fungal sinusitis but continuing Amphotericin at this time with iVS (monitoring renal function).  CT abd with some mild gallbladder wall thickening and she did have some TTP on exam.  US was negative for signs of acute cholecystitis.   ENT consulted per ID recommendations. Dr. Scales saw Mrs. Guzman and there is no evidence on exam of invasive fungal sinusitis.  Cultures taken from middle meatus. No pus encountered.  Ampothericin and Vanc stopped 10/12.  Continued another day of Meropenem per ID recommendation and then patient can be changed to po cipro for a 21 day course for Gram negative sinusitis and discharged from an ID stand point. MRI of brain done and no abscess which is what was being evaluated.  MRI did indicate "On diffusion weighted images, 2 small adjacent foci of very subtle signal increase are noted at the corona radiata on the left, probably reflecting small subacute white matter ischemic insults."  There is no clinical history or exam findings to suggest acute or subacute CVA.  Plan to review with radiology and order Echo with bubble to evaluate embolic source in the interim.  Radiology has gone from the evening 10/12 and thus will revisit tomorrow. Echo done with results no embolic source.  No focal deficits on exam and no history given to suggest acute CVA and thus I do no think this represents acute CVA."  Reviewed images with radiology and likely to be chronic findings. Has no neurologic deficits to suggest acute/subacute CVA.   Did well overnight, no major " "issues. Afebrile. Has some mild facial "stuffiness" but o/w feels better.   Will d/c home with course of po cipro and she will f/u with her PCP and her primary ENT physician and neurology. Advised her to return to the ED with any new, worsening, or recurrent symptoms.     Hospital Course     As above.     Physical exam     Physical Exam  Vitals signs and nursing note reviewed.   Constitutional:       General: She is not in acute distress.     Appearance: She is well-developed.      Comments: Non toxic appearing   HENT:      Head: Normocephalic and atraumatic.      Nose:      Comments: billateral maxillary sinus pressure  Eyes:      Pupils: Pupils are equal, round, and reactive to light.   Neck:      Musculoskeletal: Normal range of motion.   Cardiovascular:      Rate and Rhythm: Regular rhythm.      Heart sounds: No murmur.   Pulmonary:      Effort: Pulmonary effort is normal.      Breath sounds: Normal breath sounds. No wheezing.   Abdominal:      General: There is no distension.      Palpations: Abdomen is soft.      Tenderness: There is no abdominal tenderness. There is no guarding or rebound.   Musculoskeletal: Normal range of motion.   Skin:     Findings: No rash.   Neurological:      Mental Status: She is alert and oriented to person, place, and time.      Cranial Nerves: CNIII-XII intact      Sensory: No sensory deficit.     Discharge Medications        Medication List      START taking these medications    ciprofloxacin HCl 500 MG tablet  Commonly known as: CIPRO  Take 1 tablet (500 mg total) by mouth 2 (two) times daily.        CHANGE how you take these medications    ergocalciferol 50,000 unit Cap  Commonly known as: ERGOCALCIFEROL  TAKE 1 CAPSULE (50,000 UNITS TOTAL) BY MOUTH EVERY 30 DAYS.  What changed: See the new instructions.     escitalopram oxalate 20 MG tablet  Commonly known as: LEXAPRO  1.5 tablets daily  What changed:   · how much to take  · how to take this  · when to take this     estradioL " 0.01 % (0.1 mg/gram) vaginal cream  Commonly known as: ESTRACE  Use 1 GM nightly vaginally and dime size amount on vulvatwice weekly  What changed:   · how much to take  · how to take this     LORazepam 0.5 MG tablet  Commonly known as: ATIVAN  TAKE 1/2 TO 1 TABLET BY MOUTH DAILY AS NEEDED FOR ANXIETY  What changed:   · how much to take  · how to take this  · when to take this  · reasons to take this        CONTINUE taking these medications    albuterol 90 mcg/actuation inhaler  Commonly known as: PROVENTIL/VENTOLIN HFA  Inhale 1-2 puffs into the lungs every 6 (six) hours as needed for Wheezing. Rescue     FLUZONE HIGHDOSE QUAD 20-21 PF IM     irbesartan-hydrochlorothiazide 300-12.5 mg per tablet  Commonly known as: AVALIDE  Take 1 tablet by mouth once daily.     metoprolol succinate 50 MG 24 hr tablet  Commonly known as: TOPROL-XL  TAKE 1 TABLET BY MOUTH EVERY DAY        STOP taking these medications    meloxicam 15 MG tablet  Commonly known as: MOBIC           Where to Get Your Medications      These medications were sent to [facility]/pharmacy #[id number] - [place], [place] - [address]  [address], [place] [address]    Phone: [phone]   · ciprofloxacin HCl 500 MG tablet         Instructions:  1. Take all medications as prescribed  2. Keep all follow-up appointments  3. Return to the hospital or call your primary care physicians for any new, worsening, or recurrent symptoms.    Follow-Up:  Follow-up Information     Bridget Cantu MD In 3 weeks.    Specialty: Internal Medicine  Contact information:  [address]  [facility] [address]  [phone]                       Pratik Segovia MD  11:19 AM  10/13/2020

## 2020-10-13 NOTE — PROGRESS NOTES
Consult Note  Infectious Disease    Reason for Consult:      HPI: Marleny Guzman is a 70 y.o. 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 Claudio at [facility] was admitted for worsening of headache sinus pain and fevers.  She reports that after that surgery she then had a a FESS done byher orthodontist 1 week later.  She took 7 days of Augmentin after her surgery.  She completed the antibiotics.  Her symptoms never went away.  She continues have swelling of her face.  Denies any eye pain however has ongoing sinus pain and headaches.  She has some clear nasal discharge.  Denies any purulence drainage.  She has some ongoing coughing due to postnasal drainage.  Denies any shortness of breath, sore throat.  She denies any nausea vomiting diarrhea.  Denies any dysuria.  She lives with her daughter and son-in-law in a relatively new house no known mold exposure or issues with mold in the house.  She is not a diabetic.   She feels better today.  In the ED she had CT head and sinus that was was suspicious for invasive fungal infection due to asymmetric haziness within the posterior periAntral soft tissues.  Blood cultures were done.  She was started on  broad-spectrum antibiotics as well as AmBisome. Chart review shows that she grew Enterobacter and another Gram-negative organism from her sinus culture- per Dr. Yun.     10/12(Psychiatric) chart and imaging reviewed, cultures from [facility] everywhere with Enterobacter and klebsiella. D/w Dr. Keith at the bedside who found no evidence of invasive fungal sinusitis. A culture of the nasopharynx was obtained by her(the sinus was not entered), no pus was encountered either. She is supposed to be using nasal saline. She has a mild cough. Mild facial  stuffiness.  10/13: she feels mild generalized weakness. Nasal drainage is light yellow. Culture from nares is negative so far. Discussed plan for 3 weeks of cipro for chronic sinusitis for enterobacter and klebsiella.     EXAM & DIAGNOSTICS REVIEWED:   Vitals:     Temp:  [98.1 °F (36.7 °C)-99.1 °F (37.3 °C)]   Temp: 98.7 °F (37.1 °C) (10/13/20 0716)  Pulse: 80 (10/13/20 0716)  Resp: 17 (10/13/20 0716)  BP: (!) 162/75 (10/13/20 0716)  SpO2: (!) 94 % (10/13/20 0716)  No intake or output data in the 24 hours ending 10/13/20 1116    Exam 10/12  General:  In NAD. Alert and attentive, cooperative, comfortable,   Eyes:  Anicteric, PERRL, no periorbital swelling  ENT:  Status post extraction of all upper teeth and multiple lower jaw teeth- No ulcers, exudates, positive thrush,  dentition is poor-    Neck:  supple,    Lungs: Clear, no consolidation, rales, wheezes, rub  Heart:  RRR, no gallop/murmur noted  Abd:  Soft, NT, ND, normal BS, no masses or organomegaly appreciated.  :     Musc:  Joints without effusion, swelling,  erythema, synovitis, ambulatory  Skin:  No rashes. No palmar or plantar lesions. No subungual petechiae  Wound:   Neuro: Alert, attentive, speech fluent, face symmetric, moves all extremities, no focal weakness  Psych:  Calm, cooperative  Lymphatic:        Extrem: No edema, erythema, phlebitis, cellulitis, warm and well perfused  VAD:  PIV       Isolation:   None      General Labs reviewed:  Recent Labs   Lab 10/11/20  0516 10/12/20  0713 10/13/20  0531   WBC 23.17* 16.29* 12.39   HGB 11.1* 10.6* 10.9*   HCT 33.1* 32.0* 32.7*    266 292       Recent Labs   Lab 10/11/20  0516 10/12/20  0713 10/13/20  0531    140 142   K 3.8 3.4* 3.4*    102 103   CO2 26 28 28   BUN 15 15 13   CREATININE 1.1 1.4 1.2   CALCIUM 8.6* 8.7 8.8   PROT 6.5 5.9* 6.2   BILITOT 0.7 0.5 0.5   ALKPHOS 121 113 120   ALT 29 26 22   AST 20 17 16         Micro:  Microbiology Results (last 7 days)     Procedure  Component Value Units Date/Time    Aerobic culture [[id number]] Collected: 10/12/20 1123    Order Status: Completed Specimen: Body Fluid from Sinus Updated: 10/13/20 0716     Aerobic Bacterial Culture No growth    Narrative:      Right Maxillary Sinus    Blood culture x two cultures. Draw prior to antibiotics. [[id number]] Collected: 10/10/20 2005    Order Status: Completed Specimen: Blood from Peripheral, Antecubital, Right Updated: 10/12/20 2232     Blood Culture, Routine No Growth to date      No Growth to date      No Growth to date    Narrative:      Aerobic and anaerobic    Blood culture x two cultures. Draw prior to antibiotics. [[id number]] Collected: 10/10/20 1930    Order Status: Completed Specimen: Blood from Peripheral, Antecubital, Right Updated: 10/12/20 2032     Blood Culture, Routine No Growth to date      No Growth to date      No Growth to date    Narrative:      Aerobic and anaerobic    Culture, Anaerobe [[id number]] Collected: 10/12/20 1124    Order Status: Sent Specimen: Body Fluid from Nares, Right Updated: 10/12/20 1138    Fungus Culture, Blood or Bone Marrow [[id number]] Collected: 10/11/20 0911    Order Status: Sent Specimen: Blood Updated: 10/11/20 0914        Imaging Reviewed:  CT sinuses 10/10  Prominent mucosal thickening in the right maxillary sinus, with asymmetric mucosal thickening of the right nasal cavity and bilateral ethmoid sinus disease. There has been interval partial destruction of the walls of the right ethmoid bulla. The anterior and posterolateral walls of the right maxillary sinus appear slightly thickened and somewhat ill-defined. In addition, there is mild asymmetric haziness within the posterior periantral soft tissues on the right. The nasal septum appears intact and the pre-maxillary soft tissues are unremarkable. However, findings are worrisome for right-sided acute invasive fungal sinusitis    MRI brain 10/11   1. 2 small adjacent foci of subtle diffusion restriction at  the corona  radiata on the left, suggesting small subacute white matter ischemic  insults. Additional findings of moderate chronic microvascular white  matter disease are noted.  2. Changes of chronic sinusitis as described, most pronounced at the  right maxillary sinus. No acute bone destruction is identified.    CT abdomen and pelvis 10/10  1.  Ventral wall hernia containing a knuckle of colon. There is no evidence of obstruction.  2.  Suggestion of minimal gallbladder wall thickening/pericholecystic inflammation. No calcified gallstones or ductal dilatation. If there is clinical concern for acute gallbladder pathology, findings could be further evaluated with ultrasound or HIDA scan    IMPRESSION & PLAN     70 y.o. 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 Claudio at [facility] was admitted for worsening of headache sinus pain and fevers:    1- Acute on chronic right maxillary sinusitis- 9/17 Enterobacter and Klebsiella, both sensitive to cipro   - this is not consistent with invasive fungal infection and ENT exam at bedside did not reveal findings of fungal infection either         - no no mold exposure, NOT a diabetic  - blood cultures in progress  - on AmBisome, vancomycin and meropenem    2- LEROY- improving    Recommendations:   ok for home on 21 days of cipro for gram negative sinusitis. F/u with her primary ENT.  D/w her about tendonitis and to take a probiotic

## 2020-10-13 NOTE — NURSING
Patient given discharge instructions understanding verbalized.  IV discontinued patient tolerated well. Patient discharged via wheelchair to personal vehicle.

## 2020-10-15 LAB
BACTERIA BLD CULT: NORMAL
BACTERIA BLD CULT: NORMAL

## 2020-10-16 LAB
BACTERIA SPEC AEROBE CULT: NO GROWTH
BACTERIA SPEC ANAEROBE CULT: NORMAL

## 2020-10-21 ENCOUNTER — PATIENT OUTREACH (OUTPATIENT)
Dept: ADMINISTRATIVE | Facility: HOSPITAL | Age: 70
End: 2020-10-21

## 2020-10-22 ENCOUNTER — OFFICE VISIT (OUTPATIENT)
Dept: INTERNAL MEDICINE | Facility: CLINIC | Age: 70
End: 2020-10-22
Payer: MEDICARE

## 2020-10-22 VITALS
WEIGHT: 188.25 LBS | DIASTOLIC BLOOD PRESSURE: 64 MMHG | BODY MASS INDEX: 26.95 KG/M2 | HEIGHT: 70 IN | TEMPERATURE: 98 F | HEART RATE: 72 BPM | SYSTOLIC BLOOD PRESSURE: 126 MMHG | OXYGEN SATURATION: 98 %

## 2020-10-22 DIAGNOSIS — D64.9 ANEMIA, UNSPECIFIED TYPE: Primary | ICD-10-CM

## 2020-10-22 PROCEDURE — 99999 PR PBB SHADOW E&M-EST. PATIENT-LVL IV: CPT | Mod: PBBFAC,,, | Performed by: INTERNAL MEDICINE

## 2020-10-22 PROCEDURE — 99999 PR PBB SHADOW E&M-EST. PATIENT-LVL IV: ICD-10-PCS | Mod: PBBFAC,,, | Performed by: INTERNAL MEDICINE

## 2020-10-22 PROCEDURE — 99213 OFFICE O/P EST LOW 20 MIN: CPT | Mod: S$GLB,,, | Performed by: INTERNAL MEDICINE

## 2020-10-22 PROCEDURE — 3074F PR MOST RECENT SYSTOLIC BLOOD PRESSURE < 130 MM HG: ICD-10-PCS | Mod: CPTII,S$GLB,, | Performed by: INTERNAL MEDICINE

## 2020-10-22 PROCEDURE — 1159F MED LIST DOCD IN RCRD: CPT | Mod: S$GLB,,, | Performed by: INTERNAL MEDICINE

## 2020-10-22 PROCEDURE — 1101F PR PT FALLS ASSESS DOC 0-1 FALLS W/OUT INJ PAST YR: ICD-10-PCS | Mod: CPTII,S$GLB,, | Performed by: INTERNAL MEDICINE

## 2020-10-22 PROCEDURE — 3078F PR MOST RECENT DIASTOLIC BLOOD PRESSURE < 80 MM HG: ICD-10-PCS | Mod: CPTII,S$GLB,, | Performed by: INTERNAL MEDICINE

## 2020-10-22 PROCEDURE — 3078F DIAST BP <80 MM HG: CPT | Mod: CPTII,S$GLB,, | Performed by: INTERNAL MEDICINE

## 2020-10-22 PROCEDURE — 3074F SYST BP LT 130 MM HG: CPT | Mod: CPTII,S$GLB,, | Performed by: INTERNAL MEDICINE

## 2020-10-22 PROCEDURE — 3008F PR BODY MASS INDEX (BMI) DOCUMENTED: ICD-10-PCS | Mod: CPTII,S$GLB,, | Performed by: INTERNAL MEDICINE

## 2020-10-22 PROCEDURE — 1126F PR PAIN SEVERITY QUANTIFIED, NO PAIN PRESENT: ICD-10-PCS | Mod: S$GLB,,, | Performed by: INTERNAL MEDICINE

## 2020-10-22 PROCEDURE — 1126F AMNT PAIN NOTED NONE PRSNT: CPT | Mod: S$GLB,,, | Performed by: INTERNAL MEDICINE

## 2020-10-22 PROCEDURE — 1159F PR MEDICATION LIST DOCUMENTED IN MEDICAL RECORD: ICD-10-PCS | Mod: S$GLB,,, | Performed by: INTERNAL MEDICINE

## 2020-10-22 PROCEDURE — 1101F PT FALLS ASSESS-DOCD LE1/YR: CPT | Mod: CPTII,S$GLB,, | Performed by: INTERNAL MEDICINE

## 2020-10-22 PROCEDURE — 99213 PR OFFICE/OUTPT VISIT, EST, LEVL III, 20-29 MIN: ICD-10-PCS | Mod: S$GLB,,, | Performed by: INTERNAL MEDICINE

## 2020-10-22 PROCEDURE — 3008F BODY MASS INDEX DOCD: CPT | Mod: CPTII,S$GLB,, | Performed by: INTERNAL MEDICINE

## 2020-10-22 RX ORDER — ERGOCALCIFEROL 1.25 MG/1
CAPSULE ORAL
Qty: 3 CAPSULE | Refills: 3 | Status: SHIPPED | OUTPATIENT
Start: 2020-10-22 | End: 2021-07-15 | Stop reason: SDUPTHER

## 2020-10-22 RX ORDER — IRBESARTAN AND HYDROCHLOROTHIAZIDE 300; 12.5 MG/1; MG/1
1 TABLET, FILM COATED ORAL DAILY
Qty: 90 TABLET | Refills: 3 | Status: SHIPPED | OUTPATIENT
Start: 2020-10-22 | End: 2020-12-30 | Stop reason: SDUPTHER

## 2020-10-22 RX ORDER — BENZONATATE 100 MG/1
100 CAPSULE ORAL 3 TIMES DAILY PRN
Qty: 30 CAPSULE | Refills: 0 | Status: SHIPPED | OUTPATIENT
Start: 2020-10-22 | End: 2020-11-01

## 2020-10-22 NOTE — PROGRESS NOTES
Subjective:       Patient ID: Marleny Guzman is a 70 y.o. female.    Chief Complaint: Follow-up    HPIPt had a sinus infection that caused a fever.  She was worked up for other reasons.  She is doing better - was having dental work done and sinus was open and infection got in.  Review of Systems   Respiratory: Negative for shortness of breath (PND or orthopnea).    Cardiovascular: Negative for chest pain (arm pain or jaw pain).   Gastrointestinal: Negative for abdominal pain, diarrhea, nausea and vomiting.   Genitourinary: Negative for dysuria.   Neurological: Negative for seizures, syncope and headaches.       Objective:      Physical Exam  Constitutional:       General: She is not in acute distress.     Appearance: She is well-developed.   HENT:      Head: Normocephalic.   Neck:      Musculoskeletal: Neck supple.      Thyroid: No thyromegaly.      Vascular: No JVD.   Cardiovascular:      Rate and Rhythm: Normal rate and regular rhythm.      Heart sounds: Normal heart sounds. No murmur. No friction rub. No gallop.    Pulmonary:      Effort: Pulmonary effort is normal.      Breath sounds: Normal breath sounds. No wheezing or rales.   Abdominal:      General: Bowel sounds are normal. There is no distension.      Palpations: Abdomen is soft. There is no mass.      Tenderness: There is no abdominal tenderness. There is no guarding or rebound.   Genitourinary:     Rectum: Guaiac result negative.   Lymphadenopathy:      Cervical: No cervical adenopathy.   Skin:     General: Skin is warm and dry.   Neurological:      Mental Status: She is alert and oriented to person, place, and time.      Deep Tendon Reflexes: Reflexes are normal and symmetric.   Psychiatric:         Behavior: Behavior normal.         Thought Content: Thought content normal.         Judgment: Judgment normal.         Assessment:       1. Anemia, unspecified type        Plan:   Anemia, unspecified type  -     CBC auto differential; Future; Expected date:  10/22/2020  -     Iron and TIBC; Future; Expected date: 10/22/2020  -     Ferritin; Future; Expected date: 10/22/2020  -     Comprehensive Metabolic Panel; Future; Expected date: 10/22/2020    Other orders  -     benzonatate (TESSALON) 100 MG capsule; Take 1 capsule (100 mg total) by mouth 3 (three) times daily as needed.  Dispense: 30 capsule; Refill: 0  -     irbesartan-hydrochlorothiazide (AVALIDE) 300-12.5 mg per tablet; Take 1 tablet by mouth once daily.  Dispense: 90 tablet; Refill: 3  -     ergocalciferol (ERGOCALCIFEROL) 50,000 unit Cap; TAKE 1 CAPSULE (50,000 UNITS TOTAL) BY MOUTH EVERY 30 DAYS.  Dispense: 3 capsule; Refill: 3

## 2020-10-26 ENCOUNTER — LAB VISIT (OUTPATIENT)
Dept: LAB | Facility: HOSPITAL | Age: 70
End: 2020-10-26
Attending: INTERNAL MEDICINE
Payer: MEDICARE

## 2020-10-26 DIAGNOSIS — D64.9 ANEMIA, UNSPECIFIED TYPE: ICD-10-CM

## 2020-10-26 LAB
ANISOCYTOSIS BLD QL SMEAR: SLIGHT
BASOPHILS # BLD AUTO: 0.06 K/UL (ref 0–0.2)
BASOPHILS NFR BLD: 0.6 % (ref 0–1.9)
DIFFERENTIAL METHOD: ABNORMAL
EOSINOPHIL # BLD AUTO: 4.1 K/UL (ref 0–0.5)
EOSINOPHIL NFR BLD: 41.3 % (ref 0–8)
ERYTHROCYTE [DISTWIDTH] IN BLOOD BY AUTOMATED COUNT: 15.6 % (ref 11.5–14.5)
HCT VFR BLD AUTO: 33.1 % (ref 37–48.5)
HGB BLD-MCNC: 10.3 G/DL (ref 12–16)
IMM GRANULOCYTES # BLD AUTO: 0.02 K/UL (ref 0–0.04)
IMM GRANULOCYTES NFR BLD AUTO: 0.2 % (ref 0–0.5)
LYMPHOCYTES # BLD AUTO: 1.4 K/UL (ref 1–4.8)
LYMPHOCYTES NFR BLD: 14.3 % (ref 18–48)
MCH RBC QN AUTO: 28.1 PG (ref 27–31)
MCHC RBC AUTO-ENTMCNC: 31.1 G/DL (ref 32–36)
MCV RBC AUTO: 90 FL (ref 82–98)
MONOCYTES # BLD AUTO: 0.6 K/UL (ref 0.3–1)
MONOCYTES NFR BLD: 5.6 % (ref 4–15)
NEUTROPHILS # BLD AUTO: 3.8 K/UL (ref 1.8–7.7)
NEUTROPHILS NFR BLD: 38 % (ref 38–73)
NRBC BLD-RTO: 0 /100 WBC
PLATELET # BLD AUTO: 387 K/UL (ref 150–350)
PLATELET BLD QL SMEAR: ABNORMAL
PMV BLD AUTO: 10.4 FL (ref 9.2–12.9)
POIKILOCYTOSIS BLD QL SMEAR: SLIGHT
RBC # BLD AUTO: 3.67 M/UL (ref 4–5.4)
WBC # BLD AUTO: 10 K/UL (ref 3.9–12.7)

## 2020-10-26 PROCEDURE — 80053 COMPREHEN METABOLIC PANEL: CPT

## 2020-10-26 PROCEDURE — 36415 COLL VENOUS BLD VENIPUNCTURE: CPT

## 2020-10-26 PROCEDURE — 82728 ASSAY OF FERRITIN: CPT

## 2020-10-26 PROCEDURE — 85025 COMPLETE CBC W/AUTO DIFF WBC: CPT

## 2020-10-26 PROCEDURE — 83540 ASSAY OF IRON: CPT

## 2020-10-27 LAB
ALBUMIN SERPL BCP-MCNC: 3 G/DL (ref 3.5–5.2)
ALP SERPL-CCNC: 135 U/L (ref 55–135)
ALT SERPL W/O P-5'-P-CCNC: 20 U/L (ref 10–44)
ANION GAP SERPL CALC-SCNC: 12 MMOL/L (ref 8–16)
AST SERPL-CCNC: 19 U/L (ref 10–40)
BILIRUB SERPL-MCNC: 0.5 MG/DL (ref 0.1–1)
BUN SERPL-MCNC: 16 MG/DL (ref 8–23)
CALCIUM SERPL-MCNC: 8.8 MG/DL (ref 8.7–10.5)
CHLORIDE SERPL-SCNC: 105 MMOL/L (ref 95–110)
CO2 SERPL-SCNC: 25 MMOL/L (ref 23–29)
CREAT SERPL-MCNC: 1.1 MG/DL (ref 0.5–1.4)
EST. GFR  (AFRICAN AMERICAN): 58.4 ML/MIN/1.73 M^2
EST. GFR  (NON AFRICAN AMERICAN): 50.6 ML/MIN/1.73 M^2
FERRITIN SERPL-MCNC: 132 NG/ML (ref 20–300)
GLUCOSE SERPL-MCNC: 73 MG/DL (ref 70–110)
IRON SERPL-MCNC: 33 UG/DL (ref 30–160)
POTASSIUM SERPL-SCNC: 3.6 MMOL/L (ref 3.5–5.1)
PROT SERPL-MCNC: 6.8 G/DL (ref 6–8.4)
SATURATED IRON: 13 % (ref 20–50)
SODIUM SERPL-SCNC: 142 MMOL/L (ref 136–145)
TOTAL IRON BINDING CAPACITY: 252 UG/DL (ref 250–450)
TRANSFERRIN SERPL-MCNC: 170 MG/DL (ref 200–375)

## 2020-11-03 ENCOUNTER — TELEPHONE (OUTPATIENT)
Dept: INTERNAL MEDICINE | Facility: CLINIC | Age: 70
End: 2020-11-03

## 2020-11-03 RX ORDER — AMOXICILLIN AND CLAVULANATE POTASSIUM 500; 125 MG/1; MG/1
TABLET, FILM COATED ORAL
COMMUNITY
Start: 2020-10-22 | End: 2021-01-17

## 2020-11-03 NOTE — TELEPHONE ENCOUNTER
----- Message from Marcelle Goodman sent at 11/3/2020  2:16 PM CST -----  Contact: self/[phone]  Pt called in regards to talking to the nurse about a Rx that is not working.  The Rx benzonatate (TESSALON) 100 MG capsule. Pt would like a call back    Please advise

## 2020-11-03 NOTE — TELEPHONE ENCOUNTER
Spoke with pt, she was rx'd Tessalon for her cough. She states that she has been taking it for 2 weeks but she is still coughing. She states that the medication is not working. She would like something else called in to her pharmacy. Please Advise

## 2020-11-04 RX ORDER — CODEINE PHOSPHATE AND GUAIFENESIN 10; 100 MG/5ML; MG/5ML
SOLUTION ORAL
Qty: 118 ML | Refills: 0 | Status: SHIPPED | OUTPATIENT
Start: 2020-11-04 | End: 2021-01-17

## 2020-11-04 RX ORDER — LEVOFLOXACIN 500 MG/1
500 TABLET, FILM COATED ORAL DAILY
Qty: 10 TABLET | Refills: 0 | Status: SHIPPED | OUTPATIENT
Start: 2020-11-04 | End: 2021-01-17

## 2020-11-17 LAB
FUNGUS BLD CULT: NORMAL

## 2020-12-07 RX ORDER — LORAZEPAM 0.5 MG/1
TABLET ORAL
Qty: 30 TABLET | Refills: 0 | Status: SHIPPED | OUTPATIENT
Start: 2020-12-07 | End: 2021-03-03 | Stop reason: SDUPTHER

## 2020-12-22 ENCOUNTER — TELEPHONE (OUTPATIENT)
Dept: INTERNAL MEDICINE | Facility: CLINIC | Age: 70
End: 2020-12-22

## 2020-12-22 RX ORDER — DOXYCYCLINE HYCLATE 100 MG
TABLET ORAL
Qty: 20 TABLET | Refills: 0 | Status: SHIPPED | OUTPATIENT
Start: 2020-12-22 | End: 2021-01-17

## 2020-12-22 NOTE — TELEPHONE ENCOUNTER
----- Message from Fatoumata Walker sent at 12/22/2020 10:27 AM CST -----  Contact: Self   Pt is requesting a rx for her sinus. Pt states her allergies have been acting up. [id number] [address] [phone] (Phone) [phone] (Fax). Please advise

## 2020-12-30 RX ORDER — IRBESARTAN AND HYDROCHLOROTHIAZIDE 300; 12.5 MG/1; MG/1
1 TABLET, FILM COATED ORAL DAILY
Qty: 90 TABLET | Refills: 0 | Status: SHIPPED | OUTPATIENT
Start: 2020-12-30 | End: 2021-09-23 | Stop reason: SDUPTHER

## 2021-01-17 ENCOUNTER — HOSPITAL ENCOUNTER (OUTPATIENT)
Facility: HOSPITAL | Age: 71
Discharge: HOME | End: 2021-01-18
Attending: EMERGENCY MEDICINE | Admitting: INTERNAL MEDICINE
Payer: MEDICARE

## 2021-01-17 DIAGNOSIS — F41.9 ANXIETY: Primary | Chronic | ICD-10-CM

## 2021-01-17 DIAGNOSIS — R07.9 CHEST PAIN: ICD-10-CM

## 2021-01-17 DIAGNOSIS — I10 ESSENTIAL HYPERTENSION: ICD-10-CM

## 2021-01-17 DIAGNOSIS — R06.02 SHORTNESS OF BREATH: ICD-10-CM

## 2021-01-17 DIAGNOSIS — R55 SYNCOPE, UNSPECIFIED SYNCOPE TYPE: ICD-10-CM

## 2021-01-17 LAB
ALBUMIN SERPL BCP-MCNC: 3.5 G/DL (ref 3.5–5.2)
ALP SERPL-CCNC: 146 U/L (ref 55–135)
ALT SERPL W/O P-5'-P-CCNC: 20 U/L (ref 10–44)
ANION GAP SERPL CALC-SCNC: 10 MMOL/L (ref 8–16)
AST SERPL-CCNC: 18 U/L (ref 10–40)
BASOPHILS # BLD AUTO: 0.05 K/UL (ref 0–0.2)
BASOPHILS NFR BLD: 0.4 % (ref 0–1.9)
BILIRUB SERPL-MCNC: 0.7 MG/DL (ref 0.1–1)
BNP SERPL-MCNC: 76 PG/ML (ref 0–99)
BUN SERPL-MCNC: 20 MG/DL (ref 8–23)
CALCIUM SERPL-MCNC: 9.3 MG/DL (ref 8.7–10.5)
CHLORIDE SERPL-SCNC: 102 MMOL/L (ref 95–110)
CO2 SERPL-SCNC: 26 MMOL/L (ref 23–29)
CREAT SERPL-MCNC: 1 MG/DL (ref 0.5–1.4)
CREAT SERPL-MCNC: 1.1 MG/DL (ref 0.5–1.4)
DIFFERENTIAL METHOD: ABNORMAL
EOSINOPHIL # BLD AUTO: 0.6 K/UL (ref 0–0.5)
EOSINOPHIL NFR BLD: 4.9 % (ref 0–8)
ERYTHROCYTE [DISTWIDTH] IN BLOOD BY AUTOMATED COUNT: 14.6 % (ref 11.5–14.5)
ERYTHROCYTE [DISTWIDTH] IN BLOOD BY AUTOMATED COUNT: 14.6 % (ref 11.5–14.5)
EST. GFR  (AFRICAN AMERICAN): >60 ML/MIN/1.73 M^2
EST. GFR  (NON AFRICAN AMERICAN): 56.8 ML/MIN/1.73 M^2
GLUCOSE SERPL-MCNC: 102 MG/DL (ref 70–110)
HCT VFR BLD AUTO: 36.2 % (ref 37–48.5)
HCT VFR BLD AUTO: 36.6 % (ref 37–48.5)
HGB BLD-MCNC: 11.8 G/DL (ref 12–16)
HGB BLD-MCNC: 11.8 G/DL (ref 12–16)
IMM GRANULOCYTES # BLD AUTO: 0.03 K/UL (ref 0–0.04)
IMM GRANULOCYTES NFR BLD AUTO: 0.3 % (ref 0–0.5)
INR PPP: 1.3
LYMPHOCYTES # BLD AUTO: 1.5 K/UL (ref 1–4.8)
LYMPHOCYTES NFR BLD: 13.4 % (ref 18–48)
MAGNESIUM SERPL-MCNC: 1.9 MG/DL (ref 1.6–2.6)
MCH RBC QN AUTO: 28 PG (ref 27–31)
MCH RBC QN AUTO: 28.4 PG (ref 27–31)
MCHC RBC AUTO-ENTMCNC: 32.2 G/DL (ref 32–36)
MCHC RBC AUTO-ENTMCNC: 32.6 G/DL (ref 32–36)
MCV RBC AUTO: 87 FL (ref 82–98)
MCV RBC AUTO: 87 FL (ref 82–98)
MONOCYTES # BLD AUTO: 0.5 K/UL (ref 0.3–1)
MONOCYTES NFR BLD: 4.4 % (ref 4–15)
NEUTROPHILS # BLD AUTO: 8.8 K/UL (ref 1.8–7.7)
NEUTROPHILS NFR BLD: 76.6 % (ref 38–73)
NRBC BLD-RTO: 0 /100 WBC
PLATELET # BLD AUTO: 325 K/UL (ref 150–350)
PLATELET # BLD AUTO: 327 K/UL (ref 150–350)
PMV BLD AUTO: 9.7 FL (ref 9.2–12.9)
PMV BLD AUTO: 9.9 FL (ref 9.2–12.9)
POTASSIUM SERPL-SCNC: 3.8 MMOL/L (ref 3.5–5.1)
PROT SERPL-MCNC: 7.4 G/DL (ref 6–8.4)
PROTHROMBIN TIME: 15.8 SEC (ref 10.6–14.8)
RBC # BLD AUTO: 4.16 M/UL (ref 4–5.4)
RBC # BLD AUTO: 4.21 M/UL (ref 4–5.4)
SAMPLE: NORMAL
SARS-COV-2 RDRP RESP QL NAA+PROBE: NEGATIVE
SODIUM SERPL-SCNC: 138 MMOL/L (ref 136–145)
TROPONIN I SERPL DL<=0.01 NG/ML-MCNC: <0.03 NG/ML
WBC # BLD AUTO: 11.53 K/UL (ref 3.9–12.7)
WBC # BLD AUTO: 11.72 K/UL (ref 3.9–12.7)

## 2021-01-17 PROCEDURE — G0378 HOSPITAL OBSERVATION PER HR: HCPCS

## 2021-01-17 PROCEDURE — 85610 PROTHROMBIN TIME: CPT

## 2021-01-17 PROCEDURE — 85027 COMPLETE CBC AUTOMATED: CPT | Mod: 59

## 2021-01-17 PROCEDURE — 99285 EMERGENCY DEPT VISIT HI MDM: CPT | Mod: 25

## 2021-01-17 PROCEDURE — 83880 ASSAY OF NATRIURETIC PEPTIDE: CPT

## 2021-01-17 PROCEDURE — 96360 HYDRATION IV INFUSION INIT: CPT | Mod: 59

## 2021-01-17 PROCEDURE — 25000003 PHARM REV CODE 250: Performed by: INTERNAL MEDICINE

## 2021-01-17 PROCEDURE — 93005 ELECTROCARDIOGRAM TRACING: CPT | Performed by: INTERNAL MEDICINE

## 2021-01-17 PROCEDURE — 83735 ASSAY OF MAGNESIUM: CPT

## 2021-01-17 PROCEDURE — 93010 ELECTROCARDIOGRAM REPORT: CPT | Mod: ,,, | Performed by: INTERNAL MEDICINE

## 2021-01-17 PROCEDURE — 84484 ASSAY OF TROPONIN QUANT: CPT

## 2021-01-17 PROCEDURE — 84484 ASSAY OF TROPONIN QUANT: CPT | Mod: 91

## 2021-01-17 PROCEDURE — 96361 HYDRATE IV INFUSION ADD-ON: CPT

## 2021-01-17 PROCEDURE — 93010 EKG 12-LEAD: ICD-10-PCS | Mod: ,,, | Performed by: INTERNAL MEDICINE

## 2021-01-17 PROCEDURE — 80053 COMPREHEN METABOLIC PANEL: CPT

## 2021-01-17 PROCEDURE — U0002 COVID-19 LAB TEST NON-CDC: HCPCS

## 2021-01-17 PROCEDURE — 36415 COLL VENOUS BLD VENIPUNCTURE: CPT

## 2021-01-17 PROCEDURE — 25500020 PHARM REV CODE 255: Performed by: INTERNAL MEDICINE

## 2021-01-17 PROCEDURE — 85025 COMPLETE CBC W/AUTO DIFF WBC: CPT

## 2021-01-17 PROCEDURE — 94761 N-INVAS EAR/PLS OXIMETRY MLT: CPT

## 2021-01-17 RX ORDER — FLUTICASONE PROPIONATE 50 MCG
1 SPRAY, SUSPENSION (ML) NASAL DAILY
Status: DISCONTINUED | OUTPATIENT
Start: 2021-01-18 | End: 2021-01-18 | Stop reason: HOSPADM

## 2021-01-17 RX ORDER — GLUCAGON 1 MG
1 KIT INJECTION
Status: DISCONTINUED | OUTPATIENT
Start: 2021-01-17 | End: 2021-01-18 | Stop reason: HOSPADM

## 2021-01-17 RX ORDER — HYDRALAZINE HYDROCHLORIDE 20 MG/ML
5 INJECTION INTRAMUSCULAR; INTRAVENOUS EVERY 6 HOURS PRN
Status: DISCONTINUED | OUTPATIENT
Start: 2021-01-17 | End: 2021-01-18 | Stop reason: HOSPADM

## 2021-01-17 RX ORDER — GABAPENTIN 300 MG/1
300 CAPSULE ORAL NIGHTLY
COMMUNITY
End: 2021-06-07

## 2021-01-17 RX ORDER — HYDROCODONE BITARTRATE AND ACETAMINOPHEN 5; 325 MG/1; MG/1
1 TABLET ORAL EVERY 8 HOURS PRN
Status: DISCONTINUED | OUTPATIENT
Start: 2021-01-17 | End: 2021-01-18 | Stop reason: HOSPADM

## 2021-01-17 RX ORDER — CEFDINIR 300 MG/1
300 CAPSULE ORAL 2 TIMES DAILY
Status: ON HOLD | COMMUNITY
End: 2021-01-26 | Stop reason: HOSPADM

## 2021-01-17 RX ORDER — FLUTICASONE PROPIONATE 50 MCG
1 SPRAY, SUSPENSION (ML) NASAL DAILY
COMMUNITY
End: 2022-08-26 | Stop reason: CLARIF

## 2021-01-17 RX ORDER — IBUPROFEN 200 MG
24 TABLET ORAL
Status: DISCONTINUED | OUTPATIENT
Start: 2021-01-17 | End: 2021-01-18 | Stop reason: HOSPADM

## 2021-01-17 RX ORDER — SODIUM CHLORIDE 0.9 % (FLUSH) 0.9 %
10 SYRINGE (ML) INJECTION
Status: DISCONTINUED | OUTPATIENT
Start: 2021-01-17 | End: 2021-01-18 | Stop reason: HOSPADM

## 2021-01-17 RX ORDER — LOSARTAN POTASSIUM 50 MG/1
100 TABLET ORAL DAILY
Status: DISCONTINUED | OUTPATIENT
Start: 2021-01-18 | End: 2021-01-18 | Stop reason: HOSPADM

## 2021-01-17 RX ORDER — CEFDINIR 300 MG/1
300 CAPSULE ORAL 2 TIMES DAILY
Status: DISCONTINUED | OUTPATIENT
Start: 2021-01-17 | End: 2021-01-18 | Stop reason: HOSPADM

## 2021-01-17 RX ORDER — NAPROXEN SODIUM 220 MG/1
81 TABLET, FILM COATED ORAL DAILY
Status: DISCONTINUED | OUTPATIENT
Start: 2021-01-18 | End: 2021-01-18 | Stop reason: HOSPADM

## 2021-01-17 RX ORDER — METOPROLOL SUCCINATE 25 MG/1
25 TABLET, EXTENDED RELEASE ORAL DAILY
Status: DISCONTINUED | OUTPATIENT
Start: 2021-01-18 | End: 2021-01-18 | Stop reason: HOSPADM

## 2021-01-17 RX ORDER — HYDROCODONE BITARTRATE AND ACETAMINOPHEN 5; 325 MG/1; MG/1
1 TABLET ORAL EVERY 6 HOURS PRN
COMMUNITY
End: 2021-06-07

## 2021-01-17 RX ORDER — IBUPROFEN 200 MG
16 TABLET ORAL
Status: DISCONTINUED | OUTPATIENT
Start: 2021-01-17 | End: 2021-01-18 | Stop reason: HOSPADM

## 2021-01-17 RX ORDER — GABAPENTIN 100 MG/1
100 CAPSULE ORAL NIGHTLY
Status: DISCONTINUED | OUTPATIENT
Start: 2021-01-17 | End: 2021-01-18 | Stop reason: HOSPADM

## 2021-01-17 RX ORDER — ESCITALOPRAM OXALATE 10 MG/1
30 TABLET ORAL DAILY
Status: DISCONTINUED | OUTPATIENT
Start: 2021-01-17 | End: 2021-01-18 | Stop reason: HOSPADM

## 2021-01-17 RX ORDER — LORAZEPAM 1 MG/1
1 TABLET ORAL DAILY PRN
Status: DISCONTINUED | OUTPATIENT
Start: 2021-01-17 | End: 2021-01-18 | Stop reason: HOSPADM

## 2021-01-17 RX ORDER — SODIUM CHLORIDE 9 MG/ML
INJECTION, SOLUTION INTRAVENOUS CONTINUOUS
Status: DISCONTINUED | OUTPATIENT
Start: 2021-01-17 | End: 2021-01-18 | Stop reason: HOSPADM

## 2021-01-17 RX ADMIN — ESCITALOPRAM OXALATE 30 MG: 10 TABLET ORAL at 06:01

## 2021-01-17 RX ADMIN — GABAPENTIN 100 MG: 100 CAPSULE ORAL at 08:01

## 2021-01-17 RX ADMIN — IOHEXOL 100 ML: 350 INJECTION, SOLUTION INTRAVENOUS at 03:01

## 2021-01-17 RX ADMIN — CEFDINIR 300 MG: 300 CAPSULE ORAL at 08:01

## 2021-01-17 RX ADMIN — SODIUM CHLORIDE 50 ML/HR: 0.9 INJECTION, SOLUTION INTRAVENOUS at 05:01

## 2021-01-18 VITALS
HEART RATE: 75 BPM | OXYGEN SATURATION: 96 % | TEMPERATURE: 98 F | HEIGHT: 70 IN | SYSTOLIC BLOOD PRESSURE: 188 MMHG | DIASTOLIC BLOOD PRESSURE: 86 MMHG | WEIGHT: 182.31 LBS | RESPIRATION RATE: 15 BRPM | BODY MASS INDEX: 26.1 KG/M2

## 2021-01-18 LAB
ANION GAP SERPL CALC-SCNC: 3 MMOL/L (ref 8–16)
BUN SERPL-MCNC: 21 MG/DL (ref 8–23)
CALCIUM SERPL-MCNC: 9.2 MG/DL (ref 8.7–10.5)
CHLORIDE SERPL-SCNC: 104 MMOL/L (ref 95–110)
CO2 SERPL-SCNC: 27 MMOL/L (ref 23–29)
CREAT SERPL-MCNC: 0.9 MG/DL (ref 0.5–1.4)
EST. GFR  (AFRICAN AMERICAN): >60 ML/MIN/1.73 M^2
EST. GFR  (NON AFRICAN AMERICAN): >60 ML/MIN/1.73 M^2
GLUCOSE SERPL-MCNC: 112 MG/DL (ref 70–110)
POTASSIUM SERPL-SCNC: 3.6 MMOL/L (ref 3.5–5.1)
SODIUM SERPL-SCNC: 134 MMOL/L (ref 136–145)

## 2021-01-18 PROCEDURE — 25000242 PHARM REV CODE 250 ALT 637 W/ HCPCS: Performed by: INTERNAL MEDICINE

## 2021-01-18 PROCEDURE — G0378 HOSPITAL OBSERVATION PER HR: HCPCS

## 2021-01-18 PROCEDURE — 25000003 PHARM REV CODE 250: Performed by: INTERNAL MEDICINE

## 2021-01-18 PROCEDURE — 36415 COLL VENOUS BLD VENIPUNCTURE: CPT

## 2021-01-18 PROCEDURE — 96361 HYDRATE IV INFUSION ADD-ON: CPT

## 2021-01-18 PROCEDURE — 80048 BASIC METABOLIC PNL TOTAL CA: CPT

## 2021-01-18 RX ORDER — NAPROXEN SODIUM 220 MG/1
81 TABLET, FILM COATED ORAL DAILY
Qty: 30 TABLET | Refills: 3 | Status: SHIPPED | OUTPATIENT
Start: 2021-01-19 | End: 2021-04-15

## 2021-01-18 RX ORDER — ATORVASTATIN CALCIUM 20 MG/1
20 TABLET, FILM COATED ORAL DAILY
Qty: 90 TABLET | Refills: 1 | Status: SHIPPED | OUTPATIENT
Start: 2021-01-18 | End: 2021-09-23 | Stop reason: SDUPTHER

## 2021-01-18 RX ORDER — AMLODIPINE BESYLATE 5 MG/1
5 TABLET ORAL DAILY
Qty: 30 TABLET | Refills: 2 | OUTPATIENT
Start: 2021-01-18 | End: 2022-01-18

## 2021-01-18 RX ADMIN — ASPIRIN 81 MG: 81 TABLET, CHEWABLE ORAL at 08:01

## 2021-01-18 RX ADMIN — FLUTICASONE PROPIONATE 50 MCG: 50 SPRAY, METERED NASAL at 08:01

## 2021-01-18 RX ADMIN — LOSARTAN POTASSIUM 100 MG: 50 TABLET, FILM COATED ORAL at 08:01

## 2021-01-18 RX ADMIN — CEFDINIR 300 MG: 300 CAPSULE ORAL at 08:01

## 2021-01-18 RX ADMIN — ESCITALOPRAM OXALATE 30 MG: 10 TABLET ORAL at 08:01

## 2021-01-18 RX ADMIN — METOPROLOL SUCCINATE 25 MG: 25 TABLET, FILM COATED, EXTENDED RELEASE ORAL at 08:01

## 2021-01-24 ENCOUNTER — HOSPITAL ENCOUNTER (OUTPATIENT)
Facility: HOSPITAL | Age: 71
Discharge: HOME | End: 2021-01-26
Attending: EMERGENCY MEDICINE | Admitting: INTERNAL MEDICINE
Payer: MEDICARE

## 2021-01-24 DIAGNOSIS — K92.2 GIB (GASTROINTESTINAL BLEEDING): ICD-10-CM

## 2021-01-24 DIAGNOSIS — K92.2 GI BLEED: Primary | ICD-10-CM

## 2021-01-24 DIAGNOSIS — R07.9 CHEST PAIN: ICD-10-CM

## 2021-01-24 LAB
ABO + RH BLD: NORMAL
ALBUMIN SERPL BCP-MCNC: 3.5 G/DL (ref 3.5–5.2)
ALP SERPL-CCNC: 126 U/L (ref 55–135)
ALT SERPL W/O P-5'-P-CCNC: 13 U/L (ref 10–44)
ANION GAP SERPL CALC-SCNC: 10 MMOL/L (ref 8–16)
AST SERPL-CCNC: 17 U/L (ref 10–40)
BASOPHILS # BLD AUTO: 0.07 K/UL (ref 0–0.2)
BASOPHILS # BLD AUTO: 0.08 K/UL (ref 0–0.2)
BASOPHILS NFR BLD: 0.5 % (ref 0–1.9)
BASOPHILS NFR BLD: 0.7 % (ref 0–1.9)
BILIRUB SERPL-MCNC: 0.7 MG/DL (ref 0.1–1)
BILIRUB UR QL STRIP: NEGATIVE
BLD GP AB SCN CELLS X3 SERPL QL: NORMAL
BUN SERPL-MCNC: 25 MG/DL (ref 8–23)
CALCIUM SERPL-MCNC: 9.3 MG/DL (ref 8.7–10.5)
CHLORIDE SERPL-SCNC: 102 MMOL/L (ref 95–110)
CLARITY UR: CLEAR
CO2 SERPL-SCNC: 27 MMOL/L (ref 23–29)
COLOR UR: YELLOW
CREAT SERPL-MCNC: 0.9 MG/DL (ref 0.5–1.4)
DIFFERENTIAL METHOD: ABNORMAL
DIFFERENTIAL METHOD: ABNORMAL
EOSINOPHIL # BLD AUTO: 0.6 K/UL (ref 0–0.5)
EOSINOPHIL # BLD AUTO: 0.7 K/UL (ref 0–0.5)
EOSINOPHIL NFR BLD: 3.6 % (ref 0–8)
EOSINOPHIL NFR BLD: 6.7 % (ref 0–8)
ERYTHROCYTE [DISTWIDTH] IN BLOOD BY AUTOMATED COUNT: 14.4 % (ref 11.5–14.5)
ERYTHROCYTE [DISTWIDTH] IN BLOOD BY AUTOMATED COUNT: 14.5 % (ref 11.5–14.5)
EST. GFR  (AFRICAN AMERICAN): >60 ML/MIN/1.73 M^2
EST. GFR  (NON AFRICAN AMERICAN): >60 ML/MIN/1.73 M^2
GLUCOSE SERPL-MCNC: 144 MG/DL (ref 70–110)
GLUCOSE SERPL-MCNC: 73 MG/DL (ref 70–110)
GLUCOSE UR QL STRIP: NEGATIVE
HCT VFR BLD AUTO: 34.8 % (ref 37–48.5)
HCT VFR BLD AUTO: 35 % (ref 37–48.5)
HGB BLD-MCNC: 11.2 G/DL (ref 12–16)
HGB BLD-MCNC: 11.3 G/DL (ref 12–16)
HGB UR QL STRIP: NEGATIVE
IMM GRANULOCYTES # BLD AUTO: 0.04 K/UL (ref 0–0.04)
IMM GRANULOCYTES # BLD AUTO: 0.06 K/UL (ref 0–0.04)
IMM GRANULOCYTES NFR BLD AUTO: 0.4 % (ref 0–0.5)
IMM GRANULOCYTES NFR BLD AUTO: 0.4 % (ref 0–0.5)
INR PPP: 1.2
IRON SERPL-MCNC: 54 UG/DL (ref 30–160)
KETONES UR QL STRIP: NEGATIVE
LEUKOCYTE ESTERASE UR QL STRIP: NEGATIVE
LIPASE SERPL-CCNC: 39 U/L (ref 4–60)
LYMPHOCYTES # BLD AUTO: 2.4 K/UL (ref 1–4.8)
LYMPHOCYTES # BLD AUTO: 2.7 K/UL (ref 1–4.8)
LYMPHOCYTES NFR BLD: 15.1 % (ref 18–48)
LYMPHOCYTES NFR BLD: 26.3 % (ref 18–48)
MCH RBC QN AUTO: 28.2 PG (ref 27–31)
MCH RBC QN AUTO: 28.3 PG (ref 27–31)
MCHC RBC AUTO-ENTMCNC: 32.2 G/DL (ref 32–36)
MCHC RBC AUTO-ENTMCNC: 32.3 G/DL (ref 32–36)
MCV RBC AUTO: 88 FL (ref 82–98)
MCV RBC AUTO: 88 FL (ref 82–98)
MONOCYTES # BLD AUTO: 0.7 K/UL (ref 0.3–1)
MONOCYTES # BLD AUTO: 0.7 K/UL (ref 0.3–1)
MONOCYTES NFR BLD: 4.2 % (ref 4–15)
MONOCYTES NFR BLD: 6.4 % (ref 4–15)
NEUTROPHILS # BLD AUTO: 12.1 K/UL (ref 1.8–7.7)
NEUTROPHILS # BLD AUTO: 6.1 K/UL (ref 1.8–7.7)
NEUTROPHILS NFR BLD: 59.5 % (ref 38–73)
NEUTROPHILS NFR BLD: 76.2 % (ref 38–73)
NITRITE UR QL STRIP: NEGATIVE
NRBC BLD-RTO: 0 /100 WBC
NRBC BLD-RTO: 0 /100 WBC
OB PNL STL: POSITIVE
PH UR STRIP: 6 [PH] (ref 5–8)
PLATELET # BLD AUTO: 354 K/UL (ref 150–350)
PLATELET # BLD AUTO: 381 K/UL (ref 150–350)
PMV BLD AUTO: 10 FL (ref 9.2–12.9)
PMV BLD AUTO: 10.2 FL (ref 9.2–12.9)
POTASSIUM SERPL-SCNC: 3.5 MMOL/L (ref 3.5–5.1)
PROT SERPL-MCNC: 7.2 G/DL (ref 6–8.4)
PROT UR QL STRIP: NEGATIVE
PROTHROMBIN TIME: 14.6 SEC (ref 10.6–14.8)
RBC # BLD AUTO: 3.97 M/UL (ref 4–5.4)
RBC # BLD AUTO: 4 M/UL (ref 4–5.4)
SARS-COV-2 RDRP RESP QL NAA+PROBE: NEGATIVE
SATURATED IRON: 19 % (ref 20–50)
SODIUM SERPL-SCNC: 139 MMOL/L (ref 136–145)
SP GR UR STRIP: 1.03 (ref 1–1.03)
TOTAL IRON BINDING CAPACITY: 286 UG/DL (ref 250–450)
TRANSFERRIN SERPL-MCNC: 204 MG/DL (ref 200–375)
URN SPEC COLLECT METH UR: NORMAL
UROBILINOGEN UR STRIP-ACNC: NEGATIVE EU/DL
WBC # BLD AUTO: 10.27 K/UL (ref 3.9–12.7)
WBC # BLD AUTO: 15.86 K/UL (ref 3.9–12.7)

## 2021-01-24 PROCEDURE — 25000003 PHARM REV CODE 250

## 2021-01-24 PROCEDURE — 89055 LEUKOCYTE ASSESSMENT FECAL: CPT

## 2021-01-24 PROCEDURE — 87324 CLOSTRIDIUM AG IA: CPT | Mod: 59

## 2021-01-24 PROCEDURE — 63600175 PHARM REV CODE 636 W HCPCS: Performed by: INTERNAL MEDICINE

## 2021-01-24 PROCEDURE — 87046 STOOL CULTR AEROBIC BACT EA: CPT

## 2021-01-24 PROCEDURE — 81003 URINALYSIS AUTO W/O SCOPE: CPT

## 2021-01-24 PROCEDURE — G0378 HOSPITAL OBSERVATION PER HR: HCPCS

## 2021-01-24 PROCEDURE — 82272 OCCULT BLD FECES 1-3 TESTS: CPT

## 2021-01-24 PROCEDURE — 25000003 PHARM REV CODE 250: Performed by: INTERNAL MEDICINE

## 2021-01-24 PROCEDURE — 36415 COLL VENOUS BLD VENIPUNCTURE: CPT

## 2021-01-24 PROCEDURE — 99285 EMERGENCY DEPT VISIT HI MDM: CPT | Mod: 25

## 2021-01-24 PROCEDURE — 25500020 PHARM REV CODE 255: Performed by: EMERGENCY MEDICINE

## 2021-01-24 PROCEDURE — 63600175 PHARM REV CODE 636 W HCPCS: Performed by: EMERGENCY MEDICINE

## 2021-01-24 PROCEDURE — 87449 NOS EACH ORGANISM AG IA: CPT

## 2021-01-24 PROCEDURE — 87045 FECES CULTURE AEROBIC BACT: CPT

## 2021-01-24 PROCEDURE — 85610 PROTHROMBIN TIME: CPT

## 2021-01-24 PROCEDURE — 83540 ASSAY OF IRON: CPT

## 2021-01-24 PROCEDURE — U0002 COVID-19 LAB TEST NON-CDC: HCPCS

## 2021-01-24 PROCEDURE — C9113 INJ PANTOPRAZOLE SODIUM, VIA: HCPCS | Performed by: EMERGENCY MEDICINE

## 2021-01-24 PROCEDURE — 85025 COMPLETE CBC W/AUTO DIFF WBC: CPT

## 2021-01-24 PROCEDURE — 93010 EKG 12-LEAD: ICD-10-PCS | Mod: ,,, | Performed by: INTERNAL MEDICINE

## 2021-01-24 PROCEDURE — 87493 C DIFF AMPLIFIED PROBE: CPT

## 2021-01-24 PROCEDURE — 86900 BLOOD TYPING SEROLOGIC ABO: CPT

## 2021-01-24 PROCEDURE — 87015 SPECIMEN INFECT AGNT CONCNTJ: CPT | Mod: 59

## 2021-01-24 PROCEDURE — 93010 ELECTROCARDIOGRAM REPORT: CPT | Mod: ,,, | Performed by: INTERNAL MEDICINE

## 2021-01-24 PROCEDURE — 25000003 PHARM REV CODE 250: Performed by: EMERGENCY MEDICINE

## 2021-01-24 PROCEDURE — C9113 INJ PANTOPRAZOLE SODIUM, VIA: HCPCS | Performed by: INTERNAL MEDICINE

## 2021-01-24 PROCEDURE — 82962 GLUCOSE BLOOD TEST: CPT

## 2021-01-24 PROCEDURE — 83690 ASSAY OF LIPASE: CPT

## 2021-01-24 PROCEDURE — 93005 ELECTROCARDIOGRAM TRACING: CPT | Performed by: INTERNAL MEDICINE

## 2021-01-24 PROCEDURE — 96361 HYDRATE IV INFUSION ADD-ON: CPT

## 2021-01-24 PROCEDURE — 96374 THER/PROPH/DIAG INJ IV PUSH: CPT | Mod: 59

## 2021-01-24 PROCEDURE — 87209 SMEAR COMPLEX STAIN: CPT

## 2021-01-24 PROCEDURE — 80053 COMPREHEN METABOLIC PANEL: CPT

## 2021-01-24 RX ORDER — ACETAMINOPHEN 325 MG/1
650 TABLET ORAL EVERY 8 HOURS PRN
Status: DISCONTINUED | OUTPATIENT
Start: 2021-01-24 | End: 2021-01-26 | Stop reason: HOSPADM

## 2021-01-24 RX ORDER — HYDROCHLOROTHIAZIDE 12.5 MG/1
12.5 TABLET ORAL DAILY
Status: DISCONTINUED | OUTPATIENT
Start: 2021-01-24 | End: 2021-01-25

## 2021-01-24 RX ORDER — PANTOPRAZOLE SODIUM 40 MG/10ML
80 INJECTION, POWDER, LYOPHILIZED, FOR SOLUTION INTRAVENOUS
Status: COMPLETED | OUTPATIENT
Start: 2021-01-24 | End: 2021-01-24

## 2021-01-24 RX ORDER — ATORVASTATIN CALCIUM 20 MG/1
20 TABLET, FILM COATED ORAL DAILY
Status: DISCONTINUED | OUTPATIENT
Start: 2021-01-25 | End: 2021-01-26 | Stop reason: HOSPADM

## 2021-01-24 RX ORDER — POLYETHYLENE GLYCOL 3350 17 G/17G
238 POWDER, FOR SOLUTION ORAL ONCE
Status: COMPLETED | OUTPATIENT
Start: 2021-01-24 | End: 2021-01-24

## 2021-01-24 RX ORDER — SODIUM CHLORIDE 9 MG/ML
125 INJECTION, SOLUTION INTRAVENOUS CONTINUOUS
Status: DISCONTINUED | OUTPATIENT
Start: 2021-01-24 | End: 2021-01-25

## 2021-01-24 RX ORDER — ACETAMINOPHEN 325 MG/1
650 TABLET ORAL EVERY 4 HOURS PRN
Status: DISCONTINUED | OUTPATIENT
Start: 2021-01-24 | End: 2021-01-26 | Stop reason: HOSPADM

## 2021-01-24 RX ORDER — PANTOPRAZOLE SODIUM 40 MG/10ML
40 INJECTION, POWDER, LYOPHILIZED, FOR SOLUTION INTRAVENOUS EVERY 12 HOURS
Status: DISCONTINUED | OUTPATIENT
Start: 2021-01-24 | End: 2021-01-24

## 2021-01-24 RX ORDER — CEFDINIR 300 MG/1
300 CAPSULE ORAL EVERY 12 HOURS
Status: COMPLETED | OUTPATIENT
Start: 2021-01-24 | End: 2021-01-25

## 2021-01-24 RX ORDER — IBUPROFEN 200 MG
24 TABLET ORAL
Status: DISCONTINUED | OUTPATIENT
Start: 2021-01-24 | End: 2021-01-26 | Stop reason: HOSPADM

## 2021-01-24 RX ORDER — METOPROLOL SUCCINATE 50 MG/1
50 TABLET, EXTENDED RELEASE ORAL DAILY
Status: DISCONTINUED | OUTPATIENT
Start: 2021-01-25 | End: 2021-01-26 | Stop reason: HOSPADM

## 2021-01-24 RX ORDER — TALC
6 POWDER (GRAM) TOPICAL NIGHTLY PRN
Status: DISCONTINUED | OUTPATIENT
Start: 2021-01-24 | End: 2021-01-26 | Stop reason: HOSPADM

## 2021-01-24 RX ORDER — FLUTICASONE PROPIONATE 50 MCG
1 SPRAY, SUSPENSION (ML) NASAL DAILY
Status: DISCONTINUED | OUTPATIENT
Start: 2021-01-25 | End: 2021-01-26 | Stop reason: HOSPADM

## 2021-01-24 RX ORDER — GLUCAGON 1 MG
1 KIT INJECTION
Status: DISCONTINUED | OUTPATIENT
Start: 2021-01-24 | End: 2021-01-26 | Stop reason: HOSPADM

## 2021-01-24 RX ORDER — IBUPROFEN 200 MG
16 TABLET ORAL
Status: DISCONTINUED | OUTPATIENT
Start: 2021-01-24 | End: 2021-01-26 | Stop reason: HOSPADM

## 2021-01-24 RX ORDER — ONDANSETRON 2 MG/ML
4 INJECTION INTRAMUSCULAR; INTRAVENOUS EVERY 8 HOURS PRN
Status: DISCONTINUED | OUTPATIENT
Start: 2021-01-24 | End: 2021-01-26 | Stop reason: HOSPADM

## 2021-01-24 RX ORDER — ESTRADIOL 0.1 MG/G
1 CREAM VAGINAL DAILY
Status: DISCONTINUED | OUTPATIENT
Start: 2021-01-25 | End: 2021-01-26 | Stop reason: HOSPADM

## 2021-01-24 RX ADMIN — PANTOPRAZOLE SODIUM 80 MG: 40 INJECTION, POWDER, FOR SOLUTION INTRAVENOUS at 10:01

## 2021-01-24 RX ADMIN — IOHEXOL 100 ML: 350 INJECTION, SOLUTION INTRAVENOUS at 11:01

## 2021-01-24 RX ADMIN — PANTOPRAZOLE SODIUM: 40 INJECTION, POWDER, FOR SOLUTION INTRAVENOUS at 10:01

## 2021-01-24 RX ADMIN — SODIUM CHLORIDE 125 ML/HR: 0.9 INJECTION, SOLUTION INTRAVENOUS at 10:01

## 2021-01-24 RX ADMIN — POLYETHYLENE GLYCOL 3350 238 G: 17 POWDER, FOR SOLUTION ORAL at 06:01

## 2021-01-24 RX ADMIN — HYDROCHLOROTHIAZIDE 12.5 MG: 25 TABLET ORAL at 02:01

## 2021-01-24 RX ADMIN — SODIUM CHLORIDE 125 ML/HR: 0.9 INJECTION, SOLUTION INTRAVENOUS at 02:01

## 2021-01-24 RX ADMIN — CEFDINIR 300 MG: 300 CAPSULE ORAL at 09:01

## 2021-01-24 RX ADMIN — PANTOPRAZOLE SODIUM: 40 INJECTION, POWDER, FOR SOLUTION INTRAVENOUS at 05:01

## 2021-01-25 ENCOUNTER — ANESTHESIA (OUTPATIENT)
Dept: SURGERY | Facility: HOSPITAL | Age: 71
End: 2021-01-25
Payer: MEDICARE

## 2021-01-25 ENCOUNTER — ANESTHESIA EVENT (OUTPATIENT)
Dept: SURGERY | Facility: HOSPITAL | Age: 71
End: 2021-01-25
Payer: MEDICARE

## 2021-01-25 LAB
ANION GAP SERPL CALC-SCNC: 7 MMOL/L (ref 8–16)
BASOPHILS # BLD AUTO: 0.05 K/UL (ref 0–0.2)
BASOPHILS # BLD AUTO: 0.06 K/UL (ref 0–0.2)
BASOPHILS NFR BLD: 0.5 % (ref 0–1.9)
BASOPHILS NFR BLD: 0.6 % (ref 0–1.9)
BUN SERPL-MCNC: 14 MG/DL (ref 8–23)
C DIFF GDH STL QL: POSITIVE
C DIFF TOX A+B STL QL IA: NEGATIVE
C DIFF TOX GENS STL QL NAA+PROBE: NEGATIVE
CALCIUM SERPL-MCNC: 8.4 MG/DL (ref 8.7–10.5)
CHLORIDE SERPL-SCNC: 103 MMOL/L (ref 95–110)
CO2 SERPL-SCNC: 25 MMOL/L (ref 23–29)
CREAT SERPL-MCNC: 0.7 MG/DL (ref 0.5–1.4)
DIFFERENTIAL METHOD: ABNORMAL
DIFFERENTIAL METHOD: ABNORMAL
EOSINOPHIL # BLD AUTO: 0.5 K/UL (ref 0–0.5)
EOSINOPHIL # BLD AUTO: 0.7 K/UL (ref 0–0.5)
EOSINOPHIL NFR BLD: 5.8 % (ref 0–8)
EOSINOPHIL NFR BLD: 7.4 % (ref 0–8)
ERYTHROCYTE [DISTWIDTH] IN BLOOD BY AUTOMATED COUNT: 14.4 % (ref 11.5–14.5)
ERYTHROCYTE [DISTWIDTH] IN BLOOD BY AUTOMATED COUNT: 14.4 % (ref 11.5–14.5)
EST. GFR  (AFRICAN AMERICAN): >60 ML/MIN/1.73 M^2
EST. GFR  (NON AFRICAN AMERICAN): >60 ML/MIN/1.73 M^2
ESTIMATED AVG GLUCOSE: 114 MG/DL (ref 68–131)
GLUCOSE SERPL-MCNC: 71 MG/DL (ref 70–110)
GLUCOSE SERPL-MCNC: 91 MG/DL (ref 70–110)
GLUCOSE SERPL-MCNC: 93 MG/DL (ref 70–110)
GLUCOSE SERPL-MCNC: 95 MG/DL (ref 70–110)
HBA1C MFR BLD HPLC: 5.6 % (ref 4.5–6.2)
HCT VFR BLD AUTO: 27.8 % (ref 37–48.5)
HCT VFR BLD AUTO: 30.3 % (ref 37–48.5)
HGB BLD-MCNC: 9 G/DL (ref 12–16)
HGB BLD-MCNC: 9.8 G/DL (ref 12–16)
IMM GRANULOCYTES # BLD AUTO: 0.02 K/UL (ref 0–0.04)
IMM GRANULOCYTES # BLD AUTO: 0.02 K/UL (ref 0–0.04)
IMM GRANULOCYTES NFR BLD AUTO: 0.2 % (ref 0–0.5)
IMM GRANULOCYTES NFR BLD AUTO: 0.2 % (ref 0–0.5)
LYMPHOCYTES # BLD AUTO: 2.9 K/UL (ref 1–4.8)
LYMPHOCYTES # BLD AUTO: 3.1 K/UL (ref 1–4.8)
LYMPHOCYTES NFR BLD: 31.3 % (ref 18–48)
LYMPHOCYTES NFR BLD: 32.4 % (ref 18–48)
MAGNESIUM SERPL-MCNC: 1.7 MG/DL (ref 1.6–2.6)
MCH RBC QN AUTO: 28.2 PG (ref 27–31)
MCH RBC QN AUTO: 28.3 PG (ref 27–31)
MCHC RBC AUTO-ENTMCNC: 32.3 G/DL (ref 32–36)
MCHC RBC AUTO-ENTMCNC: 32.4 G/DL (ref 32–36)
MCV RBC AUTO: 87 FL (ref 82–98)
MCV RBC AUTO: 87 FL (ref 82–98)
MONOCYTES # BLD AUTO: 0.5 K/UL (ref 0.3–1)
MONOCYTES # BLD AUTO: 0.6 K/UL (ref 0.3–1)
MONOCYTES NFR BLD: 5.4 % (ref 4–15)
MONOCYTES NFR BLD: 6 % (ref 4–15)
NEUTROPHILS # BLD AUTO: 5.2 K/UL (ref 1.8–7.7)
NEUTROPHILS # BLD AUTO: 5.3 K/UL (ref 1.8–7.7)
NEUTROPHILS NFR BLD: 54.1 % (ref 38–73)
NEUTROPHILS NFR BLD: 56.1 % (ref 38–73)
NRBC BLD-RTO: 0 /100 WBC
NRBC BLD-RTO: 0 /100 WBC
PLATELET # BLD AUTO: 301 K/UL (ref 150–350)
PLATELET # BLD AUTO: 311 K/UL (ref 150–350)
PMV BLD AUTO: 9.5 FL (ref 9.2–12.9)
PMV BLD AUTO: 9.9 FL (ref 9.2–12.9)
POTASSIUM SERPL-SCNC: 3.2 MMOL/L (ref 3.5–5.1)
RBC # BLD AUTO: 3.18 M/UL (ref 4–5.4)
RBC # BLD AUTO: 3.48 M/UL (ref 4–5.4)
SODIUM SERPL-SCNC: 135 MMOL/L (ref 136–145)
WBC # BLD AUTO: 9.36 K/UL (ref 3.9–12.7)
WBC # BLD AUTO: 9.55 K/UL (ref 3.9–12.7)
WBC #/AREA STL HPF: NORMAL /[HPF]

## 2021-01-25 PROCEDURE — 43239 EGD BIOPSY SINGLE/MULTIPLE: CPT | Performed by: INTERNAL MEDICINE

## 2021-01-25 PROCEDURE — 27000675 HC TUBING MICRODRIP: Performed by: ANESTHESIOLOGY

## 2021-01-25 PROCEDURE — 63600175 PHARM REV CODE 636 W HCPCS: Performed by: INTERNAL MEDICINE

## 2021-01-25 PROCEDURE — 63600175 PHARM REV CODE 636 W HCPCS: Performed by: NURSE ANESTHETIST, CERTIFIED REGISTERED

## 2021-01-25 PROCEDURE — 36415 COLL VENOUS BLD VENIPUNCTURE: CPT

## 2021-01-25 PROCEDURE — 27000671 HC TUBING MICROBORE EXT: Performed by: ANESTHESIOLOGY

## 2021-01-25 PROCEDURE — G0378 HOSPITAL OBSERVATION PER HR: HCPCS

## 2021-01-25 PROCEDURE — 27100019 HC AMBU BAG ADULT/PED: Performed by: ANESTHESIOLOGY

## 2021-01-25 PROCEDURE — 37000009 HC ANESTHESIA EA ADD 15 MINS: Performed by: INTERNAL MEDICINE

## 2021-01-25 PROCEDURE — 27202103: Performed by: ANESTHESIOLOGY

## 2021-01-25 PROCEDURE — 83735 ASSAY OF MAGNESIUM: CPT

## 2021-01-25 PROCEDURE — 25000003 PHARM REV CODE 250: Performed by: INTERNAL MEDICINE

## 2021-01-25 PROCEDURE — 96376 TX/PRO/DX INJ SAME DRUG ADON: CPT

## 2021-01-25 PROCEDURE — 88305 TISSUE EXAM BY PATHOLOGIST: CPT | Mod: TC

## 2021-01-25 PROCEDURE — 25000003 PHARM REV CODE 250: Performed by: HOSPITALIST

## 2021-01-25 PROCEDURE — 25000003 PHARM REV CODE 250: Performed by: NURSE ANESTHETIST, CERTIFIED REGISTERED

## 2021-01-25 PROCEDURE — 45382 COLONOSCOPY W/CONTROL BLEED: CPT | Performed by: INTERNAL MEDICINE

## 2021-01-25 PROCEDURE — 25000242 PHARM REV CODE 250 ALT 637 W/ HCPCS: Performed by: INTERNAL MEDICINE

## 2021-01-25 PROCEDURE — 83036 HEMOGLOBIN GLYCOSYLATED A1C: CPT

## 2021-01-25 PROCEDURE — 85025 COMPLETE CBC W/AUTO DIFF WBC: CPT

## 2021-01-25 PROCEDURE — C9113 INJ PANTOPRAZOLE SODIUM, VIA: HCPCS | Performed by: INTERNAL MEDICINE

## 2021-01-25 PROCEDURE — 27200043 HC FORCEPS, BIOPSY: Performed by: INTERNAL MEDICINE

## 2021-01-25 PROCEDURE — 80048 BASIC METABOLIC PNL TOTAL CA: CPT

## 2021-01-25 PROCEDURE — 27202049 HC PROBE, APC ERBE: Performed by: INTERNAL MEDICINE

## 2021-01-25 PROCEDURE — 37000008 HC ANESTHESIA 1ST 15 MINUTES: Performed by: INTERNAL MEDICINE

## 2021-01-25 RX ORDER — VANCOMYCIN HCL 50 MG/ML
125 SOLUTION, RECONSTITUTED, ORAL ORAL EVERY 6 HOURS
Status: DISCONTINUED | OUTPATIENT
Start: 2021-01-25 | End: 2021-01-26 | Stop reason: HOSPADM

## 2021-01-25 RX ORDER — PANTOPRAZOLE SODIUM 40 MG/1
40 TABLET, DELAYED RELEASE ORAL 2 TIMES DAILY
Status: DISCONTINUED | OUTPATIENT
Start: 2021-01-25 | End: 2021-01-26 | Stop reason: HOSPADM

## 2021-01-25 RX ORDER — PROPOFOL 10 MG/ML
VIAL (ML) INTRAVENOUS
Status: DISCONTINUED | OUTPATIENT
Start: 2021-01-25 | End: 2021-01-25

## 2021-01-25 RX ADMIN — SODIUM CHLORIDE 125 ML/HR: 0.9 INJECTION, SOLUTION INTRAVENOUS at 12:01

## 2021-01-25 RX ADMIN — SODIUM CHLORIDE: 0.9 INJECTION, SOLUTION INTRAVENOUS at 08:01

## 2021-01-25 RX ADMIN — PROPOFOL 50 MG: 10 INJECTION, EMULSION INTRAVENOUS at 09:01

## 2021-01-25 RX ADMIN — PANTOPRAZOLE SODIUM: 40 INJECTION, POWDER, FOR SOLUTION INTRAVENOUS at 03:01

## 2021-01-25 RX ADMIN — METOPROLOL SUCCINATE 50 MG: 50 TABLET, EXTENDED RELEASE ORAL at 10:01

## 2021-01-25 RX ADMIN — PROPOFOL 50 MG: 10 INJECTION, EMULSION INTRAVENOUS at 08:01

## 2021-01-25 RX ADMIN — ATORVASTATIN CALCIUM 20 MG: 20 TABLET, FILM COATED ORAL at 10:01

## 2021-01-25 RX ADMIN — PANTOPRAZOLE SODIUM 40 MG: 40 TABLET, DELAYED RELEASE ORAL at 10:01

## 2021-01-25 RX ADMIN — VANCOMYCIN HYDROCHLORIDE 125 MG: KIT at 05:01

## 2021-01-25 RX ADMIN — CEFDINIR 300 MG: 300 CAPSULE ORAL at 10:01

## 2021-01-25 RX ADMIN — FLUTICASONE PROPIONATE 50 MCG: 50 SPRAY, METERED NASAL at 10:01

## 2021-01-25 RX ADMIN — ESTRADIOL 1 G: 0.1 CREAM VAGINAL at 10:01

## 2021-01-25 RX ADMIN — VANCOMYCIN HYDROCHLORIDE 125 MG: KIT at 02:01

## 2021-01-25 RX ADMIN — SIMETHICONE 40 MG: 20 SUSPENSION/ DROPS ORAL at 09:01

## 2021-01-25 RX ADMIN — HYDROCHLOROTHIAZIDE 12.5 MG: 25 TABLET ORAL at 10:01

## 2021-01-26 VITALS
HEIGHT: 70 IN | DIASTOLIC BLOOD PRESSURE: 70 MMHG | HEART RATE: 74 BPM | TEMPERATURE: 99 F | OXYGEN SATURATION: 98 % | RESPIRATION RATE: 16 BRPM | SYSTOLIC BLOOD PRESSURE: 143 MMHG | BODY MASS INDEX: 27.77 KG/M2 | WEIGHT: 194 LBS

## 2021-01-26 PROBLEM — K92.2 GIB (GASTROINTESTINAL BLEEDING): Status: RESOLVED | Noted: 2021-01-24 | Resolved: 2021-01-26

## 2021-01-26 PROBLEM — K92.2 GI BLEED: Status: RESOLVED | Noted: 2021-01-24 | Resolved: 2021-01-26

## 2021-01-26 LAB
ANION GAP SERPL CALC-SCNC: 9 MMOL/L (ref 8–16)
BASOPHILS # BLD AUTO: 0.07 K/UL (ref 0–0.2)
BASOPHILS NFR BLD: 0.8 % (ref 0–1.9)
BUN SERPL-MCNC: 8 MG/DL (ref 8–23)
CALCIUM SERPL-MCNC: 8.6 MG/DL (ref 8.7–10.5)
CHLORIDE SERPL-SCNC: 106 MMOL/L (ref 95–110)
CO2 SERPL-SCNC: 25 MMOL/L (ref 23–29)
CREAT SERPL-MCNC: 0.8 MG/DL (ref 0.5–1.4)
DIFFERENTIAL METHOD: ABNORMAL
EOSINOPHIL # BLD AUTO: 0.7 K/UL (ref 0–0.5)
EOSINOPHIL NFR BLD: 8 % (ref 0–8)
ERYTHROCYTE [DISTWIDTH] IN BLOOD BY AUTOMATED COUNT: 14.6 % (ref 11.5–14.5)
EST. GFR  (AFRICAN AMERICAN): >60 ML/MIN/1.73 M^2
EST. GFR  (NON AFRICAN AMERICAN): >60 ML/MIN/1.73 M^2
GLUCOSE SERPL-MCNC: 84 MG/DL (ref 70–110)
HCT VFR BLD AUTO: 28.3 % (ref 37–48.5)
HGB BLD-MCNC: 9.1 G/DL (ref 12–16)
IMM GRANULOCYTES # BLD AUTO: 0.01 K/UL (ref 0–0.04)
IMM GRANULOCYTES NFR BLD AUTO: 0.1 % (ref 0–0.5)
LYMPHOCYTES # BLD AUTO: 2.7 K/UL (ref 1–4.8)
LYMPHOCYTES NFR BLD: 31 % (ref 18–48)
MAGNESIUM SERPL-MCNC: 1.7 MG/DL (ref 1.6–2.6)
MCH RBC QN AUTO: 28.3 PG (ref 27–31)
MCHC RBC AUTO-ENTMCNC: 32.2 G/DL (ref 32–36)
MCV RBC AUTO: 88 FL (ref 82–98)
MONOCYTES # BLD AUTO: 0.6 K/UL (ref 0.3–1)
MONOCYTES NFR BLD: 6.3 % (ref 4–15)
NEUTROPHILS # BLD AUTO: 4.7 K/UL (ref 1.8–7.7)
NEUTROPHILS NFR BLD: 53.8 % (ref 38–73)
NRBC BLD-RTO: 0 /100 WBC
O+P STL TRI STN: NORMAL
PLATELET # BLD AUTO: 326 K/UL (ref 150–350)
PMV BLD AUTO: 10.3 FL (ref 9.2–12.9)
POTASSIUM SERPL-SCNC: 3.7 MMOL/L (ref 3.5–5.1)
RBC # BLD AUTO: 3.22 M/UL (ref 4–5.4)
SODIUM SERPL-SCNC: 140 MMOL/L (ref 136–145)
STOOL CULTURE: NORMAL
STOOL CULTURE: NORMAL
WBC # BLD AUTO: 8.8 K/UL (ref 3.9–12.7)

## 2021-01-26 PROCEDURE — 25000003 PHARM REV CODE 250: Performed by: HOSPITALIST

## 2021-01-26 PROCEDURE — 83735 ASSAY OF MAGNESIUM: CPT

## 2021-01-26 PROCEDURE — 36415 COLL VENOUS BLD VENIPUNCTURE: CPT

## 2021-01-26 PROCEDURE — G0378 HOSPITAL OBSERVATION PER HR: HCPCS

## 2021-01-26 PROCEDURE — 85025 COMPLETE CBC W/AUTO DIFF WBC: CPT

## 2021-01-26 PROCEDURE — 80048 BASIC METABOLIC PNL TOTAL CA: CPT

## 2021-01-26 PROCEDURE — 25000003 PHARM REV CODE 250: Performed by: INTERNAL MEDICINE

## 2021-01-26 RX ORDER — PANTOPRAZOLE SODIUM 40 MG/1
40 TABLET, DELAYED RELEASE ORAL DAILY
Qty: 30 TABLET | Refills: 0 | Status: ON HOLD | OUTPATIENT
Start: 2021-01-26 | End: 2021-06-10 | Stop reason: HOSPADM

## 2021-01-26 RX ORDER — LANOLIN ALCOHOL/MO/W.PET/CERES
800 CREAM (GRAM) TOPICAL ONCE
Status: COMPLETED | OUTPATIENT
Start: 2021-01-26 | End: 2021-01-26

## 2021-01-26 RX ADMIN — VANCOMYCIN HYDROCHLORIDE 125 MG: KIT at 12:01

## 2021-01-26 RX ADMIN — POTASSIUM BICARBONATE 50 MEQ: 977.5 TABLET, EFFERVESCENT ORAL at 09:01

## 2021-01-26 RX ADMIN — ATORVASTATIN CALCIUM 20 MG: 20 TABLET, FILM COATED ORAL at 09:01

## 2021-01-26 RX ADMIN — PANTOPRAZOLE SODIUM 40 MG: 40 TABLET, DELAYED RELEASE ORAL at 09:01

## 2021-01-26 RX ADMIN — MAGNESIUM OXIDE 800 MG: 400 TABLET ORAL at 09:01

## 2021-01-26 RX ADMIN — VANCOMYCIN HYDROCHLORIDE 125 MG: KIT at 05:01

## 2021-01-26 RX ADMIN — METOPROLOL SUCCINATE 50 MG: 50 TABLET, EXTENDED RELEASE ORAL at 09:01

## 2021-01-27 ENCOUNTER — TELEPHONE (OUTPATIENT)
Dept: INTERNAL MEDICINE | Facility: CLINIC | Age: 71
End: 2021-01-27

## 2021-02-08 ENCOUNTER — HOSPITAL ENCOUNTER (OUTPATIENT)
Dept: RADIOLOGY | Facility: HOSPITAL | Age: 71
Discharge: HOME | End: 2021-02-08
Attending: INTERNAL MEDICINE
Payer: MEDICARE

## 2021-02-08 ENCOUNTER — OFFICE VISIT (OUTPATIENT)
Dept: INTERNAL MEDICINE | Facility: CLINIC | Age: 71
End: 2021-02-08
Payer: MEDICARE

## 2021-02-08 VITALS
SYSTOLIC BLOOD PRESSURE: 134 MMHG | HEART RATE: 72 BPM | HEIGHT: 70 IN | BODY MASS INDEX: 26.48 KG/M2 | WEIGHT: 184.94 LBS | DIASTOLIC BLOOD PRESSURE: 70 MMHG | OXYGEN SATURATION: 98 %

## 2021-02-08 DIAGNOSIS — E55.9 MILD VITAMIN D DEFICIENCY: ICD-10-CM

## 2021-02-08 DIAGNOSIS — E78.5 HYPERLIPIDEMIA, UNSPECIFIED HYPERLIPIDEMIA TYPE: ICD-10-CM

## 2021-02-08 DIAGNOSIS — I10 ESSENTIAL HYPERTENSION: ICD-10-CM

## 2021-02-08 DIAGNOSIS — R73.9 HYPERGLYCEMIA: ICD-10-CM

## 2021-02-08 DIAGNOSIS — D64.9 ANEMIA, UNSPECIFIED TYPE: Primary | ICD-10-CM

## 2021-02-08 DIAGNOSIS — M25.519 SHOULDER PAIN, UNSPECIFIED CHRONICITY, UNSPECIFIED LATERALITY: ICD-10-CM

## 2021-02-08 DIAGNOSIS — J32.4 CHRONIC PANSINUSITIS: ICD-10-CM

## 2021-02-08 DIAGNOSIS — R90.89 ABNORMAL FINDING ON MRI OF BRAIN: ICD-10-CM

## 2021-02-08 DIAGNOSIS — R93.89 ABNORMAL CT OF THE CHEST: ICD-10-CM

## 2021-02-08 DIAGNOSIS — Z12.31 SCREENING MAMMOGRAM, ENCOUNTER FOR: ICD-10-CM

## 2021-02-08 PROCEDURE — 73030 XR SHOULDER COMPLETE 2 OR MORE VIEWS BILATERAL: ICD-10-PCS | Mod: 26,50,, | Performed by: RADIOLOGY

## 2021-02-08 PROCEDURE — 1101F PT FALLS ASSESS-DOCD LE1/YR: CPT | Mod: CPTII,S$GLB,, | Performed by: INTERNAL MEDICINE

## 2021-02-08 PROCEDURE — 1101F PR PT FALLS ASSESS DOC 0-1 FALLS W/OUT INJ PAST YR: ICD-10-PCS | Mod: CPTII,S$GLB,, | Performed by: INTERNAL MEDICINE

## 2021-02-08 PROCEDURE — 73030 X-RAY EXAM OF SHOULDER: CPT | Mod: 26,50,, | Performed by: RADIOLOGY

## 2021-02-08 PROCEDURE — 3008F BODY MASS INDEX DOCD: CPT | Mod: CPTII,S$GLB,, | Performed by: INTERNAL MEDICINE

## 2021-02-08 PROCEDURE — 99214 OFFICE O/P EST MOD 30 MIN: CPT | Mod: S$GLB,,, | Performed by: INTERNAL MEDICINE

## 2021-02-08 PROCEDURE — 99214 PR OFFICE/OUTPT VISIT, EST, LEVL IV, 30-39 MIN: ICD-10-PCS | Mod: S$GLB,,, | Performed by: INTERNAL MEDICINE

## 2021-02-08 PROCEDURE — 99999 PR PBB SHADOW E&M-EST. PATIENT-LVL V: ICD-10-PCS | Mod: PBBFAC,,, | Performed by: INTERNAL MEDICINE

## 2021-02-08 PROCEDURE — 3288F FALL RISK ASSESSMENT DOCD: CPT | Mod: CPTII,S$GLB,, | Performed by: INTERNAL MEDICINE

## 2021-02-08 PROCEDURE — 99999 PR PBB SHADOW E&M-EST. PATIENT-LVL V: CPT | Mod: PBBFAC,,, | Performed by: INTERNAL MEDICINE

## 2021-02-08 PROCEDURE — 3288F PR FALLS RISK ASSESSMENT DOCUMENTED: ICD-10-PCS | Mod: CPTII,S$GLB,, | Performed by: INTERNAL MEDICINE

## 2021-02-08 PROCEDURE — 3008F PR BODY MASS INDEX (BMI) DOCUMENTED: ICD-10-PCS | Mod: CPTII,S$GLB,, | Performed by: INTERNAL MEDICINE

## 2021-02-08 PROCEDURE — 73030 X-RAY EXAM OF SHOULDER: CPT | Mod: TC,50

## 2021-02-09 ENCOUNTER — LAB VISIT (OUTPATIENT)
Dept: LAB | Facility: HOSPITAL | Age: 71
End: 2021-02-09
Attending: INTERNAL MEDICINE
Payer: MEDICARE

## 2021-02-09 DIAGNOSIS — D64.9 ANEMIA, UNSPECIFIED TYPE: ICD-10-CM

## 2021-02-09 DIAGNOSIS — R73.9 HYPERGLYCEMIA: ICD-10-CM

## 2021-02-09 DIAGNOSIS — E55.9 MILD VITAMIN D DEFICIENCY: ICD-10-CM

## 2021-02-09 DIAGNOSIS — I10 ESSENTIAL HYPERTENSION: ICD-10-CM

## 2021-02-09 DIAGNOSIS — E78.5 HYPERLIPIDEMIA, UNSPECIFIED HYPERLIPIDEMIA TYPE: ICD-10-CM

## 2021-02-09 LAB
25(OH)D3+25(OH)D2 SERPL-MCNC: 40 NG/ML (ref 30–96)
ALBUMIN SERPL BCP-MCNC: 3.6 G/DL (ref 3.5–5.2)
ALP SERPL-CCNC: 164 U/L (ref 55–135)
ALT SERPL W/O P-5'-P-CCNC: 14 U/L (ref 10–44)
ANION GAP SERPL CALC-SCNC: 9 MMOL/L (ref 8–16)
AST SERPL-CCNC: 18 U/L (ref 10–40)
BASOPHILS # BLD AUTO: 0.04 K/UL (ref 0–0.2)
BASOPHILS NFR BLD: 0.5 % (ref 0–1.9)
BILIRUB SERPL-MCNC: 0.4 MG/DL (ref 0.1–1)
BUN SERPL-MCNC: 21 MG/DL (ref 8–23)
CALCIUM SERPL-MCNC: 9.5 MG/DL (ref 8.7–10.5)
CHLORIDE SERPL-SCNC: 106 MMOL/L (ref 95–110)
CHOLEST SERPL-MCNC: 109 MG/DL (ref 120–199)
CHOLEST/HDLC SERPL: 2.7 {RATIO} (ref 2–5)
CO2 SERPL-SCNC: 28 MMOL/L (ref 23–29)
CREAT SERPL-MCNC: 0.9 MG/DL (ref 0.5–1.4)
DIFFERENTIAL METHOD: ABNORMAL
EOSINOPHIL # BLD AUTO: 0.4 K/UL (ref 0–0.5)
EOSINOPHIL NFR BLD: 4.9 % (ref 0–8)
ERYTHROCYTE [DISTWIDTH] IN BLOOD BY AUTOMATED COUNT: 14.6 % (ref 11.5–14.5)
EST. GFR  (AFRICAN AMERICAN): >60 ML/MIN/1.73 M^2
EST. GFR  (NON AFRICAN AMERICAN): >60 ML/MIN/1.73 M^2
ESTIMATED AVG GLUCOSE: 105 MG/DL (ref 68–131)
FERRITIN SERPL-MCNC: 12 NG/ML (ref 20–300)
GLUCOSE SERPL-MCNC: 100 MG/DL (ref 70–110)
HBA1C MFR BLD: 5.3 % (ref 4–5.6)
HCT VFR BLD AUTO: 29.6 % (ref 37–48.5)
HDLC SERPL-MCNC: 41 MG/DL (ref 40–75)
HDLC SERPL: 37.6 % (ref 20–50)
HGB BLD-MCNC: 9.1 G/DL (ref 12–16)
IMM GRANULOCYTES # BLD AUTO: 0.02 K/UL (ref 0–0.04)
IMM GRANULOCYTES NFR BLD AUTO: 0.2 % (ref 0–0.5)
IRON SERPL-MCNC: 34 UG/DL (ref 30–160)
LDLC SERPL CALC-MCNC: 54.2 MG/DL (ref 63–159)
LYMPHOCYTES # BLD AUTO: 2.1 K/UL (ref 1–4.8)
LYMPHOCYTES NFR BLD: 24.2 % (ref 18–48)
MCH RBC QN AUTO: 27.4 PG (ref 27–31)
MCHC RBC AUTO-ENTMCNC: 30.7 G/DL (ref 32–36)
MCV RBC AUTO: 89 FL (ref 82–98)
MONOCYTES # BLD AUTO: 0.5 K/UL (ref 0.3–1)
MONOCYTES NFR BLD: 6 % (ref 4–15)
NEUTROPHILS # BLD AUTO: 5.7 K/UL (ref 1.8–7.7)
NEUTROPHILS NFR BLD: 64.2 % (ref 38–73)
NONHDLC SERPL-MCNC: 68 MG/DL
NRBC BLD-RTO: 0 /100 WBC
PLATELET # BLD AUTO: 495 K/UL (ref 150–350)
PMV BLD AUTO: 9.6 FL (ref 9.2–12.9)
POTASSIUM SERPL-SCNC: 3.8 MMOL/L (ref 3.5–5.1)
PROT SERPL-MCNC: 7.5 G/DL (ref 6–8.4)
RBC # BLD AUTO: 3.32 M/UL (ref 4–5.4)
SATURATED IRON: 10 % (ref 20–50)
SODIUM SERPL-SCNC: 143 MMOL/L (ref 136–145)
TOTAL IRON BINDING CAPACITY: 354 UG/DL (ref 250–450)
TRANSFERRIN SERPL-MCNC: 239 MG/DL (ref 200–375)
TRIGL SERPL-MCNC: 69 MG/DL (ref 30–150)
TSH SERPL DL<=0.005 MIU/L-ACNC: 3.06 UIU/ML (ref 0.4–4)
WBC # BLD AUTO: 8.85 K/UL (ref 3.9–12.7)

## 2021-02-09 PROCEDURE — 80053 COMPREHEN METABOLIC PANEL: CPT

## 2021-02-09 PROCEDURE — 84443 ASSAY THYROID STIM HORMONE: CPT

## 2021-02-09 PROCEDURE — 83036 HEMOGLOBIN GLYCOSYLATED A1C: CPT

## 2021-02-09 PROCEDURE — 82728 ASSAY OF FERRITIN: CPT

## 2021-02-09 PROCEDURE — 85025 COMPLETE CBC W/AUTO DIFF WBC: CPT

## 2021-02-09 PROCEDURE — 36415 COLL VENOUS BLD VENIPUNCTURE: CPT

## 2021-02-09 PROCEDURE — 80061 LIPID PANEL: CPT

## 2021-02-09 PROCEDURE — 83540 ASSAY OF IRON: CPT

## 2021-02-09 PROCEDURE — 82306 VITAMIN D 25 HYDROXY: CPT

## 2021-02-10 ENCOUNTER — TELEPHONE (OUTPATIENT)
Dept: ORTHOPEDICS | Facility: CLINIC | Age: 71
End: 2021-02-10

## 2021-02-10 ENCOUNTER — OFFICE VISIT (OUTPATIENT)
Dept: GASTROENTEROLOGY | Facility: CLINIC | Age: 71
End: 2021-02-10
Payer: MEDICARE

## 2021-02-10 ENCOUNTER — HOSPITAL ENCOUNTER (OUTPATIENT)
Dept: RADIOLOGY | Facility: HOSPITAL | Age: 71
Discharge: HOME | End: 2021-02-10
Attending: INTERNAL MEDICINE
Payer: MEDICARE

## 2021-02-10 VITALS
HEIGHT: 70 IN | BODY MASS INDEX: 26.2 KG/M2 | SYSTOLIC BLOOD PRESSURE: 116 MMHG | HEART RATE: 79 BPM | DIASTOLIC BLOOD PRESSURE: 69 MMHG | WEIGHT: 183 LBS

## 2021-02-10 DIAGNOSIS — K92.2 GASTROINTESTINAL HEMORRHAGE, UNSPECIFIED GASTROINTESTINAL HEMORRHAGE TYPE: ICD-10-CM

## 2021-02-10 DIAGNOSIS — J32.4 CHRONIC PANSINUSITIS: ICD-10-CM

## 2021-02-10 DIAGNOSIS — K55.21 ANGIODYSPLASIA OF COLON WITH HEMORRHAGE: ICD-10-CM

## 2021-02-10 DIAGNOSIS — D64.9 ANEMIA, UNSPECIFIED TYPE: Primary | ICD-10-CM

## 2021-02-10 PROCEDURE — 3008F BODY MASS INDEX DOCD: CPT | Mod: CPTII,S$GLB,, | Performed by: FAMILY MEDICINE

## 2021-02-10 PROCEDURE — 3078F PR MOST RECENT DIASTOLIC BLOOD PRESSURE < 80 MM HG: ICD-10-PCS | Mod: CPTII,S$GLB,, | Performed by: FAMILY MEDICINE

## 2021-02-10 PROCEDURE — 1126F AMNT PAIN NOTED NONE PRSNT: CPT | Mod: S$GLB,,, | Performed by: FAMILY MEDICINE

## 2021-02-10 PROCEDURE — 3288F PR FALLS RISK ASSESSMENT DOCUMENTED: ICD-10-PCS | Mod: CPTII,S$GLB,, | Performed by: FAMILY MEDICINE

## 2021-02-10 PROCEDURE — 1101F PT FALLS ASSESS-DOCD LE1/YR: CPT | Mod: CPTII,S$GLB,, | Performed by: FAMILY MEDICINE

## 2021-02-10 PROCEDURE — 3074F PR MOST RECENT SYSTOLIC BLOOD PRESSURE < 130 MM HG: ICD-10-PCS | Mod: CPTII,S$GLB,, | Performed by: FAMILY MEDICINE

## 2021-02-10 PROCEDURE — 1101F PR PT FALLS ASSESS DOC 0-1 FALLS W/OUT INJ PAST YR: ICD-10-PCS | Mod: CPTII,S$GLB,, | Performed by: FAMILY MEDICINE

## 2021-02-10 PROCEDURE — 3074F SYST BP LT 130 MM HG: CPT | Mod: CPTII,S$GLB,, | Performed by: FAMILY MEDICINE

## 2021-02-10 PROCEDURE — 1159F PR MEDICATION LIST DOCUMENTED IN MEDICAL RECORD: ICD-10-PCS | Mod: S$GLB,,, | Performed by: FAMILY MEDICINE

## 2021-02-10 PROCEDURE — 70486 CT SINUSES WITHOUT CONTRAST: ICD-10-PCS | Mod: 26,,, | Performed by: RADIOLOGY

## 2021-02-10 PROCEDURE — 3288F FALL RISK ASSESSMENT DOCD: CPT | Mod: CPTII,S$GLB,, | Performed by: FAMILY MEDICINE

## 2021-02-10 PROCEDURE — 3078F DIAST BP <80 MM HG: CPT | Mod: CPTII,S$GLB,, | Performed by: FAMILY MEDICINE

## 2021-02-10 PROCEDURE — 99214 PR OFFICE/OUTPT VISIT, EST, LEVL IV, 30-39 MIN: ICD-10-PCS | Mod: S$GLB,,, | Performed by: FAMILY MEDICINE

## 2021-02-10 PROCEDURE — 3008F PR BODY MASS INDEX (BMI) DOCUMENTED: ICD-10-PCS | Mod: CPTII,S$GLB,, | Performed by: FAMILY MEDICINE

## 2021-02-10 PROCEDURE — 1159F MED LIST DOCD IN RCRD: CPT | Mod: S$GLB,,, | Performed by: FAMILY MEDICINE

## 2021-02-10 PROCEDURE — 99999 PR PBB SHADOW E&M-EST. PATIENT-LVL IV: CPT | Mod: PBBFAC,,, | Performed by: FAMILY MEDICINE

## 2021-02-10 PROCEDURE — 99214 OFFICE O/P EST MOD 30 MIN: CPT | Mod: S$GLB,,, | Performed by: FAMILY MEDICINE

## 2021-02-10 PROCEDURE — 1126F PR PAIN SEVERITY QUANTIFIED, NO PAIN PRESENT: ICD-10-PCS | Mod: S$GLB,,, | Performed by: FAMILY MEDICINE

## 2021-02-10 PROCEDURE — 70486 CT MAXILLOFACIAL W/O DYE: CPT | Mod: 26,,, | Performed by: RADIOLOGY

## 2021-02-10 PROCEDURE — 70486 CT MAXILLOFACIAL W/O DYE: CPT | Mod: TC

## 2021-02-10 PROCEDURE — 99999 PR PBB SHADOW E&M-EST. PATIENT-LVL IV: ICD-10-PCS | Mod: PBBFAC,,, | Performed by: FAMILY MEDICINE

## 2021-02-11 ENCOUNTER — OFFICE VISIT (OUTPATIENT)
Dept: ORTHOPEDICS | Facility: CLINIC | Age: 71
End: 2021-02-11
Payer: MEDICARE

## 2021-02-11 VITALS
SYSTOLIC BLOOD PRESSURE: 109 MMHG | HEART RATE: 76 BPM | HEIGHT: 70 IN | BODY MASS INDEX: 26.2 KG/M2 | DIASTOLIC BLOOD PRESSURE: 62 MMHG | WEIGHT: 183 LBS

## 2021-02-11 DIAGNOSIS — M25.511 BILATERAL SHOULDER PAIN: Primary | ICD-10-CM

## 2021-02-11 DIAGNOSIS — M25.512 BILATERAL SHOULDER PAIN: Primary | ICD-10-CM

## 2021-02-11 DIAGNOSIS — M25.519 SHOULDER PAIN, UNSPECIFIED CHRONICITY, UNSPECIFIED LATERALITY: ICD-10-CM

## 2021-02-11 DIAGNOSIS — M19.011 ARTHRITIS OF BOTH GLENOHUMERAL JOINTS: ICD-10-CM

## 2021-02-11 DIAGNOSIS — M19.012 ARTHRITIS OF BOTH GLENOHUMERAL JOINTS: ICD-10-CM

## 2021-02-11 PROCEDURE — 1101F PR PT FALLS ASSESS DOC 0-1 FALLS W/OUT INJ PAST YR: ICD-10-PCS | Mod: CPTII,S$GLB,, | Performed by: ORTHOPAEDIC SURGERY

## 2021-02-11 PROCEDURE — 20610 DRAIN/INJ JOINT/BURSA W/O US: CPT | Mod: 50,S$GLB,, | Performed by: ORTHOPAEDIC SURGERY

## 2021-02-11 PROCEDURE — 1159F MED LIST DOCD IN RCRD: CPT | Mod: S$GLB,,, | Performed by: ORTHOPAEDIC SURGERY

## 2021-02-11 PROCEDURE — 1159F PR MEDICATION LIST DOCUMENTED IN MEDICAL RECORD: ICD-10-PCS | Mod: S$GLB,,, | Performed by: ORTHOPAEDIC SURGERY

## 2021-02-11 PROCEDURE — 3008F PR BODY MASS INDEX (BMI) DOCUMENTED: ICD-10-PCS | Mod: CPTII,S$GLB,, | Performed by: ORTHOPAEDIC SURGERY

## 2021-02-11 PROCEDURE — 3288F PR FALLS RISK ASSESSMENT DOCUMENTED: ICD-10-PCS | Mod: CPTII,S$GLB,, | Performed by: ORTHOPAEDIC SURGERY

## 2021-02-11 PROCEDURE — 1125F PR PAIN SEVERITY QUANTIFIED, PAIN PRESENT: ICD-10-PCS | Mod: S$GLB,,, | Performed by: ORTHOPAEDIC SURGERY

## 2021-02-11 PROCEDURE — 3078F PR MOST RECENT DIASTOLIC BLOOD PRESSURE < 80 MM HG: ICD-10-PCS | Mod: CPTII,S$GLB,, | Performed by: ORTHOPAEDIC SURGERY

## 2021-02-11 PROCEDURE — 3078F DIAST BP <80 MM HG: CPT | Mod: CPTII,S$GLB,, | Performed by: ORTHOPAEDIC SURGERY

## 2021-02-11 PROCEDURE — 20610 LARGE JOINT ASPIRATION/INJECTION: L GLENOHUMERAL: ICD-10-PCS | Mod: 50,S$GLB,, | Performed by: ORTHOPAEDIC SURGERY

## 2021-02-11 PROCEDURE — 99999 PR PBB SHADOW E&M-EST. PATIENT-LVL IV: ICD-10-PCS | Mod: PBBFAC,,, | Performed by: ORTHOPAEDIC SURGERY

## 2021-02-11 PROCEDURE — 1125F AMNT PAIN NOTED PAIN PRSNT: CPT | Mod: S$GLB,,, | Performed by: ORTHOPAEDIC SURGERY

## 2021-02-11 PROCEDURE — 99204 PR OFFICE/OUTPT VISIT, NEW, LEVL IV, 45-59 MIN: ICD-10-PCS | Mod: 25,S$GLB,, | Performed by: ORTHOPAEDIC SURGERY

## 2021-02-11 PROCEDURE — 99999 PR PBB SHADOW E&M-EST. PATIENT-LVL IV: CPT | Mod: PBBFAC,,, | Performed by: ORTHOPAEDIC SURGERY

## 2021-02-11 PROCEDURE — 3008F BODY MASS INDEX DOCD: CPT | Mod: CPTII,S$GLB,, | Performed by: ORTHOPAEDIC SURGERY

## 2021-02-11 PROCEDURE — 1101F PT FALLS ASSESS-DOCD LE1/YR: CPT | Mod: CPTII,S$GLB,, | Performed by: ORTHOPAEDIC SURGERY

## 2021-02-11 PROCEDURE — 99204 OFFICE O/P NEW MOD 45 MIN: CPT | Mod: 25,S$GLB,, | Performed by: ORTHOPAEDIC SURGERY

## 2021-02-11 PROCEDURE — 3074F SYST BP LT 130 MM HG: CPT | Mod: CPTII,S$GLB,, | Performed by: ORTHOPAEDIC SURGERY

## 2021-02-11 PROCEDURE — 3288F FALL RISK ASSESSMENT DOCD: CPT | Mod: CPTII,S$GLB,, | Performed by: ORTHOPAEDIC SURGERY

## 2021-02-11 PROCEDURE — 3074F PR MOST RECENT SYSTOLIC BLOOD PRESSURE < 130 MM HG: ICD-10-PCS | Mod: CPTII,S$GLB,, | Performed by: ORTHOPAEDIC SURGERY

## 2021-02-11 RX ORDER — TRIAMCINOLONE ACETONIDE 40 MG/ML
80 INJECTION, SUSPENSION INTRA-ARTICULAR; INTRAMUSCULAR
Status: DISCONTINUED | OUTPATIENT
Start: 2021-02-11 | End: 2021-02-11 | Stop reason: HOSPADM

## 2021-02-11 RX ADMIN — TRIAMCINOLONE ACETONIDE 80 MG: 40 INJECTION, SUSPENSION INTRA-ARTICULAR; INTRAMUSCULAR at 09:02

## 2021-02-12 ENCOUNTER — TELEPHONE (OUTPATIENT)
Dept: INTERNAL MEDICINE | Facility: CLINIC | Age: 71
End: 2021-02-12

## 2021-02-17 ENCOUNTER — LAB VISIT (OUTPATIENT)
Dept: LAB | Facility: HOSPITAL | Age: 71
End: 2021-02-17
Attending: INTERNAL MEDICINE
Payer: MEDICARE

## 2021-02-17 DIAGNOSIS — D64.9 ANEMIA, UNSPECIFIED: Primary | ICD-10-CM

## 2021-02-17 LAB
BASOPHILS # BLD AUTO: 0.03 K/UL (ref 0–0.2)
BASOPHILS NFR BLD: 0.1 % (ref 0–1.9)
DIFFERENTIAL METHOD: ABNORMAL
EOSINOPHIL # BLD AUTO: 0.1 K/UL (ref 0–0.5)
EOSINOPHIL NFR BLD: 0.3 % (ref 0–8)
ERYTHROCYTE [DISTWIDTH] IN BLOOD BY AUTOMATED COUNT: 14.9 % (ref 11.5–14.5)
HCT VFR BLD AUTO: 33.5 % (ref 37–48.5)
HGB BLD-MCNC: 10.7 G/DL (ref 12–16)
IMM GRANULOCYTES # BLD AUTO: 0.12 K/UL (ref 0–0.04)
IMM GRANULOCYTES NFR BLD AUTO: 0.6 % (ref 0–0.5)
LYMPHOCYTES # BLD AUTO: 2.5 K/UL (ref 1–4.8)
LYMPHOCYTES NFR BLD: 12.3 % (ref 18–48)
MCH RBC QN AUTO: 27.8 PG (ref 27–31)
MCHC RBC AUTO-ENTMCNC: 31.9 G/DL (ref 32–36)
MCV RBC AUTO: 87 FL (ref 82–98)
MONOCYTES # BLD AUTO: 1.3 K/UL (ref 0.3–1)
MONOCYTES NFR BLD: 6.4 % (ref 4–15)
NEUTROPHILS # BLD AUTO: 16.3 K/UL (ref 1.8–7.7)
NEUTROPHILS NFR BLD: 80.3 % (ref 38–73)
NRBC BLD-RTO: 0 /100 WBC
PLATELET # BLD AUTO: 525 K/UL (ref 150–350)
PMV BLD AUTO: 9.5 FL (ref 9.2–12.9)
RBC # BLD AUTO: 3.85 M/UL (ref 4–5.4)
WBC # BLD AUTO: 20.26 K/UL (ref 3.9–12.7)

## 2021-02-17 PROCEDURE — 36415 COLL VENOUS BLD VENIPUNCTURE: CPT

## 2021-02-17 PROCEDURE — 85025 COMPLETE CBC W/AUTO DIFF WBC: CPT

## 2021-03-04 ENCOUNTER — TELEPHONE (OUTPATIENT)
Dept: INTERNAL MEDICINE | Facility: CLINIC | Age: 71
End: 2021-03-04

## 2021-03-08 ENCOUNTER — TELEPHONE (OUTPATIENT)
Dept: GASTROENTEROLOGY | Facility: CLINIC | Age: 71
End: 2021-03-08

## 2021-03-09 ENCOUNTER — PATIENT OUTREACH (OUTPATIENT)
Dept: ADMINISTRATIVE | Facility: OTHER | Age: 71
End: 2021-03-09

## 2021-03-12 ENCOUNTER — OFFICE VISIT (OUTPATIENT)
Dept: PULMONOLOGY | Facility: CLINIC | Age: 71
End: 2021-03-12
Payer: MEDICARE

## 2021-03-12 ENCOUNTER — LAB VISIT (OUTPATIENT)
Dept: LAB | Facility: HOSPITAL | Age: 71
End: 2021-03-12
Attending: INTERNAL MEDICINE
Payer: MEDICARE

## 2021-03-12 VITALS
BODY MASS INDEX: 25.75 KG/M2 | HEART RATE: 82 BPM | HEIGHT: 70 IN | OXYGEN SATURATION: 97 % | DIASTOLIC BLOOD PRESSURE: 62 MMHG | SYSTOLIC BLOOD PRESSURE: 124 MMHG | WEIGHT: 179.88 LBS

## 2021-03-12 DIAGNOSIS — R91.1 LUNG NODULE: ICD-10-CM

## 2021-03-12 DIAGNOSIS — D64.9 ANEMIA, UNSPECIFIED TYPE: ICD-10-CM

## 2021-03-12 DIAGNOSIS — R06.02 SHORTNESS OF BREATH: ICD-10-CM

## 2021-03-12 DIAGNOSIS — R59.0 MEDIASTINAL LYMPHADENOPATHY: ICD-10-CM

## 2021-03-12 DIAGNOSIS — R06.09 DOE (DYSPNEA ON EXERTION): Primary | ICD-10-CM

## 2021-03-12 DIAGNOSIS — R93.89 ABNORMAL CT OF THE CHEST: ICD-10-CM

## 2021-03-12 LAB
BASOPHILS # BLD AUTO: 0.04 K/UL (ref 0–0.2)
BASOPHILS NFR BLD: 0.3 % (ref 0–1.9)
DIFFERENTIAL METHOD: ABNORMAL
EOSINOPHIL # BLD AUTO: 0.5 K/UL (ref 0–0.5)
EOSINOPHIL NFR BLD: 3.8 % (ref 0–8)
ERYTHROCYTE [DISTWIDTH] IN BLOOD BY AUTOMATED COUNT: 15.4 % (ref 11.5–14.5)
HCT VFR BLD AUTO: 33.2 % (ref 37–48.5)
HGB BLD-MCNC: 10.7 G/DL (ref 12–16)
IMM GRANULOCYTES # BLD AUTO: 0.04 K/UL (ref 0–0.04)
IMM GRANULOCYTES NFR BLD AUTO: 0.3 % (ref 0–0.5)
LYMPHOCYTES # BLD AUTO: 2.5 K/UL (ref 1–4.8)
LYMPHOCYTES NFR BLD: 21 % (ref 18–48)
MCH RBC QN AUTO: 27.8 PG (ref 27–31)
MCHC RBC AUTO-ENTMCNC: 32.2 G/DL (ref 32–36)
MCV RBC AUTO: 86 FL (ref 82–98)
MONOCYTES # BLD AUTO: 0.8 K/UL (ref 0.3–1)
MONOCYTES NFR BLD: 6.4 % (ref 4–15)
NEUTROPHILS # BLD AUTO: 8.1 K/UL (ref 1.8–7.7)
NEUTROPHILS NFR BLD: 68.2 % (ref 38–73)
NRBC BLD-RTO: 0 /100 WBC
PLATELET # BLD AUTO: 281 K/UL (ref 150–350)
PMV BLD AUTO: 9.2 FL (ref 9.2–12.9)
RBC # BLD AUTO: 3.85 M/UL (ref 4–5.4)
WBC # BLD AUTO: 11.93 K/UL (ref 3.9–12.7)

## 2021-03-12 PROCEDURE — 1126F AMNT PAIN NOTED NONE PRSNT: CPT | Mod: S$GLB,,, | Performed by: INTERNAL MEDICINE

## 2021-03-12 PROCEDURE — 3074F PR MOST RECENT SYSTOLIC BLOOD PRESSURE < 130 MM HG: ICD-10-PCS | Mod: CPTII,S$GLB,, | Performed by: INTERNAL MEDICINE

## 2021-03-12 PROCEDURE — 36415 COLL VENOUS BLD VENIPUNCTURE: CPT | Performed by: INTERNAL MEDICINE

## 2021-03-12 PROCEDURE — 3008F BODY MASS INDEX DOCD: CPT | Mod: CPTII,S$GLB,, | Performed by: INTERNAL MEDICINE

## 2021-03-12 PROCEDURE — 3078F DIAST BP <80 MM HG: CPT | Mod: CPTII,S$GLB,, | Performed by: INTERNAL MEDICINE

## 2021-03-12 PROCEDURE — 3288F FALL RISK ASSESSMENT DOCD: CPT | Mod: CPTII,S$GLB,, | Performed by: INTERNAL MEDICINE

## 2021-03-12 PROCEDURE — 99999 PR PBB SHADOW E&M-EST. PATIENT-LVL III: ICD-10-PCS | Mod: PBBFAC,,, | Performed by: INTERNAL MEDICINE

## 2021-03-12 PROCEDURE — 1126F PR PAIN SEVERITY QUANTIFIED, NO PAIN PRESENT: ICD-10-PCS | Mod: S$GLB,,, | Performed by: INTERNAL MEDICINE

## 2021-03-12 PROCEDURE — 1101F PR PT FALLS ASSESS DOC 0-1 FALLS W/OUT INJ PAST YR: ICD-10-PCS | Mod: CPTII,S$GLB,, | Performed by: INTERNAL MEDICINE

## 2021-03-12 PROCEDURE — 85025 COMPLETE CBC W/AUTO DIFF WBC: CPT | Performed by: INTERNAL MEDICINE

## 2021-03-12 PROCEDURE — 3078F PR MOST RECENT DIASTOLIC BLOOD PRESSURE < 80 MM HG: ICD-10-PCS | Mod: CPTII,S$GLB,, | Performed by: INTERNAL MEDICINE

## 2021-03-12 PROCEDURE — 3074F SYST BP LT 130 MM HG: CPT | Mod: CPTII,S$GLB,, | Performed by: INTERNAL MEDICINE

## 2021-03-12 PROCEDURE — 3288F PR FALLS RISK ASSESSMENT DOCUMENTED: ICD-10-PCS | Mod: CPTII,S$GLB,, | Performed by: INTERNAL MEDICINE

## 2021-03-12 PROCEDURE — 99204 PR OFFICE/OUTPT VISIT, NEW, LEVL IV, 45-59 MIN: ICD-10-PCS | Mod: S$GLB,,, | Performed by: INTERNAL MEDICINE

## 2021-03-12 PROCEDURE — 99999 PR PBB SHADOW E&M-EST. PATIENT-LVL III: CPT | Mod: PBBFAC,,, | Performed by: INTERNAL MEDICINE

## 2021-03-12 PROCEDURE — 99204 OFFICE O/P NEW MOD 45 MIN: CPT | Mod: S$GLB,,, | Performed by: INTERNAL MEDICINE

## 2021-03-12 PROCEDURE — 3008F PR BODY MASS INDEX (BMI) DOCUMENTED: ICD-10-PCS | Mod: CPTII,S$GLB,, | Performed by: INTERNAL MEDICINE

## 2021-03-12 PROCEDURE — 1101F PT FALLS ASSESS-DOCD LE1/YR: CPT | Mod: CPTII,S$GLB,, | Performed by: INTERNAL MEDICINE

## 2021-03-12 PROCEDURE — 1159F PR MEDICATION LIST DOCUMENTED IN MEDICAL RECORD: ICD-10-PCS | Mod: S$GLB,,, | Performed by: INTERNAL MEDICINE

## 2021-03-12 PROCEDURE — 1159F MED LIST DOCD IN RCRD: CPT | Mod: S$GLB,,, | Performed by: INTERNAL MEDICINE

## 2021-03-15 ENCOUNTER — LAB VISIT (OUTPATIENT)
Dept: INTERNAL MEDICINE | Facility: CLINIC | Age: 71
End: 2021-03-15
Payer: MEDICARE

## 2021-03-15 DIAGNOSIS — R06.02 SHORTNESS OF BREATH: ICD-10-CM

## 2021-03-15 PROCEDURE — U0003 INFECTIOUS AGENT DETECTION BY NUCLEIC ACID (DNA OR RNA); SEVERE ACUTE RESPIRATORY SYNDROME CORONAVIRUS 2 (SARS-COV-2) (CORONAVIRUS DISEASE [COVID-19]), AMPLIFIED PROBE TECHNIQUE, MAKING USE OF HIGH THROUGHPUT TECHNOLOGIES AS DESCRIBED BY CMS-2020-01-R: HCPCS | Performed by: INTERNAL MEDICINE

## 2021-03-15 PROCEDURE — U0005 INFEC AGEN DETEC AMPLI PROBE: HCPCS | Performed by: INTERNAL MEDICINE

## 2021-03-16 LAB — SARS-COV-2 RNA RESP QL NAA+PROBE: NOT DETECTED

## 2021-03-17 ENCOUNTER — HOSPITAL ENCOUNTER (OUTPATIENT)
Dept: RADIOLOGY | Facility: HOSPITAL | Age: 71
Discharge: HOME | End: 2021-03-17
Attending: INTERNAL MEDICINE
Payer: MEDICARE

## 2021-03-17 ENCOUNTER — HOSPITAL ENCOUNTER (OUTPATIENT)
Dept: PULMONOLOGY | Facility: CLINIC | Age: 71
Discharge: HOME | End: 2021-03-17
Payer: MEDICARE

## 2021-03-17 VITALS — HEIGHT: 70 IN | WEIGHT: 181 LBS | BODY MASS INDEX: 25.91 KG/M2

## 2021-03-17 DIAGNOSIS — R06.09 DOE (DYSPNEA ON EXERTION): ICD-10-CM

## 2021-03-17 DIAGNOSIS — Z12.31 SCREENING MAMMOGRAM, ENCOUNTER FOR: ICD-10-CM

## 2021-03-17 PROCEDURE — 77067 MAMMO DIGITAL SCREENING BILAT WITH TOMO: ICD-10-PCS | Mod: 26,,, | Performed by: RADIOLOGY

## 2021-03-17 PROCEDURE — 94727 PR PULM FUNCTION TEST BY GAS: ICD-10-PCS | Mod: S$GLB,,, | Performed by: INTERNAL MEDICINE

## 2021-03-17 PROCEDURE — 77067 SCR MAMMO BI INCL CAD: CPT | Mod: TC

## 2021-03-17 PROCEDURE — 77063 MAMMO DIGITAL SCREENING BILAT WITH TOMO: ICD-10-PCS | Mod: 26,,, | Performed by: RADIOLOGY

## 2021-03-17 PROCEDURE — 94010 BREATHING CAPACITY TEST: CPT | Mod: S$GLB,,, | Performed by: INTERNAL MEDICINE

## 2021-03-17 PROCEDURE — 94727 GAS DIL/WSHOT DETER LNG VOL: CPT | Mod: S$GLB,,, | Performed by: INTERNAL MEDICINE

## 2021-03-17 PROCEDURE — 77067 SCR MAMMO BI INCL CAD: CPT | Mod: 26,,, | Performed by: RADIOLOGY

## 2021-03-17 PROCEDURE — 94729 DIFFUSING CAPACITY: CPT | Mod: S$GLB,,, | Performed by: INTERNAL MEDICINE

## 2021-03-17 PROCEDURE — 94010 BREATHING CAPACITY TEST: ICD-10-PCS | Mod: S$GLB,,, | Performed by: INTERNAL MEDICINE

## 2021-03-17 PROCEDURE — 77063 BREAST TOMOSYNTHESIS BI: CPT | Mod: 26,,, | Performed by: RADIOLOGY

## 2021-03-17 PROCEDURE — 94729 PR C02/MEMBANE DIFFUSE CAPACITY: ICD-10-PCS | Mod: S$GLB,,, | Performed by: INTERNAL MEDICINE

## 2021-03-18 ENCOUNTER — TELEPHONE (OUTPATIENT)
Dept: PULMONOLOGY | Facility: HOSPITAL | Age: 71
End: 2021-03-18

## 2021-03-22 ENCOUNTER — HOSPITAL ENCOUNTER (OUTPATIENT)
Dept: RADIOLOGY | Facility: HOSPITAL | Age: 71
Discharge: HOME | End: 2021-03-22
Attending: INTERNAL MEDICINE
Payer: MEDICARE

## 2021-03-22 DIAGNOSIS — R92.8 ABNORMAL MAMMOGRAM: ICD-10-CM

## 2021-03-22 PROCEDURE — 77061 BREAST TOMOSYNTHESIS UNI: CPT | Mod: 26,RT,, | Performed by: RADIOLOGY

## 2021-03-22 PROCEDURE — 77061 MAMMO DIGITAL DIAGNOSTIC RIGHT WITH TOMO: ICD-10-PCS | Mod: 26,RT,, | Performed by: RADIOLOGY

## 2021-03-22 PROCEDURE — 77061 BREAST TOMOSYNTHESIS UNI: CPT | Mod: TC,RT

## 2021-03-22 PROCEDURE — 77065 MAMMO DIGITAL DIAGNOSTIC RIGHT WITH TOMO: ICD-10-PCS | Mod: 26,RT,, | Performed by: RADIOLOGY

## 2021-03-22 PROCEDURE — 77065 DX MAMMO INCL CAD UNI: CPT | Mod: 26,RT,, | Performed by: RADIOLOGY

## 2021-03-23 ENCOUNTER — TELEPHONE (OUTPATIENT)
Dept: RADIOLOGY | Facility: HOSPITAL | Age: 71
End: 2021-03-23

## 2021-03-25 ENCOUNTER — OFFICE VISIT (OUTPATIENT)
Dept: GYNECOLOGIC ONCOLOGY | Facility: CLINIC | Age: 71
End: 2021-03-25
Payer: MEDICARE

## 2021-03-25 VITALS
HEART RATE: 73 BPM | BODY MASS INDEX: 25.65 KG/M2 | WEIGHT: 178.81 LBS | DIASTOLIC BLOOD PRESSURE: 60 MMHG | SYSTOLIC BLOOD PRESSURE: 135 MMHG

## 2021-03-25 DIAGNOSIS — N89.3 VAGINAL DYSPLASIA: Primary | ICD-10-CM

## 2021-03-25 PROCEDURE — 1101F PR PT FALLS ASSESS DOC 0-1 FALLS W/OUT INJ PAST YR: ICD-10-PCS | Mod: CPTII,S$GLB,, | Performed by: OBSTETRICS & GYNECOLOGY

## 2021-03-25 PROCEDURE — 1126F AMNT PAIN NOTED NONE PRSNT: CPT | Mod: S$GLB,,, | Performed by: OBSTETRICS & GYNECOLOGY

## 2021-03-25 PROCEDURE — 87624 HPV HI-RISK TYP POOLED RSLT: CPT | Performed by: NURSE PRACTITIONER

## 2021-03-25 PROCEDURE — 99999 PR PBB SHADOW E&M-EST. PATIENT-LVL III: ICD-10-PCS | Mod: PBBFAC,,, | Performed by: OBSTETRICS & GYNECOLOGY

## 2021-03-25 PROCEDURE — 3075F SYST BP GE 130 - 139MM HG: CPT | Mod: CPTII,S$GLB,, | Performed by: OBSTETRICS & GYNECOLOGY

## 2021-03-25 PROCEDURE — 1159F MED LIST DOCD IN RCRD: CPT | Mod: S$GLB,,, | Performed by: OBSTETRICS & GYNECOLOGY

## 2021-03-25 PROCEDURE — 3288F FALL RISK ASSESSMENT DOCD: CPT | Mod: CPTII,S$GLB,, | Performed by: OBSTETRICS & GYNECOLOGY

## 2021-03-25 PROCEDURE — 3078F DIAST BP <80 MM HG: CPT | Mod: CPTII,S$GLB,, | Performed by: OBSTETRICS & GYNECOLOGY

## 2021-03-25 PROCEDURE — 3008F BODY MASS INDEX DOCD: CPT | Mod: CPTII,S$GLB,, | Performed by: OBSTETRICS & GYNECOLOGY

## 2021-03-25 PROCEDURE — 99213 PR OFFICE/OUTPT VISIT, EST, LEVL III, 20-29 MIN: ICD-10-PCS | Mod: S$GLB,,, | Performed by: OBSTETRICS & GYNECOLOGY

## 2021-03-25 PROCEDURE — 3075F PR MOST RECENT SYSTOLIC BLOOD PRESS GE 130-139MM HG: ICD-10-PCS | Mod: CPTII,S$GLB,, | Performed by: OBSTETRICS & GYNECOLOGY

## 2021-03-25 PROCEDURE — 3008F PR BODY MASS INDEX (BMI) DOCUMENTED: ICD-10-PCS | Mod: CPTII,S$GLB,, | Performed by: OBSTETRICS & GYNECOLOGY

## 2021-03-25 PROCEDURE — 1159F PR MEDICATION LIST DOCUMENTED IN MEDICAL RECORD: ICD-10-PCS | Mod: S$GLB,,, | Performed by: OBSTETRICS & GYNECOLOGY

## 2021-03-25 PROCEDURE — 3288F PR FALLS RISK ASSESSMENT DOCUMENTED: ICD-10-PCS | Mod: CPTII,S$GLB,, | Performed by: OBSTETRICS & GYNECOLOGY

## 2021-03-25 PROCEDURE — 99999 PR PBB SHADOW E&M-EST. PATIENT-LVL III: CPT | Mod: PBBFAC,,, | Performed by: OBSTETRICS & GYNECOLOGY

## 2021-03-25 PROCEDURE — 1101F PT FALLS ASSESS-DOCD LE1/YR: CPT | Mod: CPTII,S$GLB,, | Performed by: OBSTETRICS & GYNECOLOGY

## 2021-03-25 PROCEDURE — 88175 CYTOPATH C/V AUTO FLUID REDO: CPT | Performed by: NURSE PRACTITIONER

## 2021-03-25 PROCEDURE — 3078F PR MOST RECENT DIASTOLIC BLOOD PRESSURE < 80 MM HG: ICD-10-PCS | Mod: CPTII,S$GLB,, | Performed by: OBSTETRICS & GYNECOLOGY

## 2021-03-25 PROCEDURE — 1126F PR PAIN SEVERITY QUANTIFIED, NO PAIN PRESENT: ICD-10-PCS | Mod: S$GLB,,, | Performed by: OBSTETRICS & GYNECOLOGY

## 2021-03-25 PROCEDURE — 99213 OFFICE O/P EST LOW 20 MIN: CPT | Mod: S$GLB,,, | Performed by: OBSTETRICS & GYNECOLOGY

## 2021-03-30 ENCOUNTER — HOSPITAL ENCOUNTER (OUTPATIENT)
Dept: RADIOLOGY | Facility: HOSPITAL | Age: 71
Discharge: HOME | End: 2021-03-30
Attending: INTERNAL MEDICINE
Payer: MEDICARE

## 2021-03-30 DIAGNOSIS — R92.8 ABNORMAL MAMMOGRAM: ICD-10-CM

## 2021-03-30 DIAGNOSIS — R92.8 ABNORMAL MAMMOGRAM: Primary | ICD-10-CM

## 2021-03-30 DIAGNOSIS — R92.1 BREAST CALCIFICATION, RIGHT: ICD-10-CM

## 2021-03-30 PROCEDURE — 88342 IMHCHEM/IMCYTCHM 1ST ANTB: CPT | Mod: 26,,, | Performed by: PATHOLOGY

## 2021-03-30 PROCEDURE — 19082 BX BREAST ADD LESION STRTCTC: CPT | Mod: RT,,, | Performed by: RADIOLOGY

## 2021-03-30 PROCEDURE — A4648 IMPLANTABLE TISSUE MARKER: HCPCS

## 2021-03-30 PROCEDURE — 88305 TISSUE EXAM BY PATHOLOGIST: CPT | Mod: 26,,, | Performed by: PATHOLOGY

## 2021-03-30 PROCEDURE — 88342 IMHCHEM/IMCYTCHM 1ST ANTB: CPT | Performed by: PATHOLOGY

## 2021-03-30 PROCEDURE — 88305 TISSUE EXAM BY PATHOLOGIST: CPT | Performed by: PATHOLOGY

## 2021-03-30 PROCEDURE — 88342 CHG IMMUNOCYTOCHEMISTRY: ICD-10-PCS | Mod: 26,,, | Performed by: PATHOLOGY

## 2021-03-30 PROCEDURE — 19081 BX BREAST 1ST LESION STRTCTC: CPT | Mod: RT

## 2021-03-30 PROCEDURE — 19081 MAMMO BREAST STEREOTACTIC BREAST BIOPSY RIGHT: ICD-10-PCS | Mod: RT,,, | Performed by: RADIOLOGY

## 2021-03-30 PROCEDURE — 88305 TISSUE EXAM BY PATHOLOGIST: ICD-10-PCS | Mod: 26,,, | Performed by: PATHOLOGY

## 2021-03-30 PROCEDURE — 25000003 PHARM REV CODE 250: Performed by: INTERNAL MEDICINE

## 2021-03-30 PROCEDURE — 19082 PR BX BRST, EA ADD'L LESION, STEREOTACTIC GUIDANCE: ICD-10-PCS | Mod: RT,,, | Performed by: RADIOLOGY

## 2021-03-30 PROCEDURE — 19081 BX BREAST 1ST LESION STRTCTC: CPT | Mod: RT,,, | Performed by: RADIOLOGY

## 2021-03-30 PROCEDURE — 19082 BX BREAST ADD LESION STRTCTC: CPT

## 2021-03-30 RX ORDER — LIDOCAINE HYDROCHLORIDE AND EPINEPHRINE 20; 10 MG/ML; UG/ML
20 INJECTION, SOLUTION INFILTRATION; PERINEURAL ONCE
Status: COMPLETED | OUTPATIENT
Start: 2021-03-30 | End: 2021-03-30

## 2021-03-30 RX ORDER — LIDOCAINE HYDROCHLORIDE 10 MG/ML
5 INJECTION, SOLUTION EPIDURAL; INFILTRATION; INTRACAUDAL; PERINEURAL ONCE
Status: COMPLETED | OUTPATIENT
Start: 2021-03-30 | End: 2021-03-30

## 2021-03-30 RX ADMIN — LIDOCAINE HYDROCHLORIDE 5 ML: 10 INJECTION, SOLUTION EPIDURAL; INFILTRATION; INTRACAUDAL; PERINEURAL at 11:03

## 2021-03-30 RX ADMIN — LIDOCAINE HYDROCHLORIDE AND EPINEPHRINE 20 ML: 20; 10 INJECTION, SOLUTION INFILTRATION; PERINEURAL at 11:03

## 2021-03-31 LAB
CLINICAL INFO: NORMAL
CYTO CVX: NORMAL
CYTOLOGIST CVX/VAG CYTO: NORMAL
CYTOLOGY CMNT CVX/VAG CYTO-IMP: NORMAL
CYTOLOGY PAP THIN PREP EXPLANATION: NORMAL
DATE OF PREVIOUS PAP: NORMAL
DATE PREVIOUS BX: NO
HPV E6+E7 MRNA CVX QL NAA+PROBE: NOT DETECTED
LMP START DATE: NORMAL
SPECIMEN SOURCE CVX/VAG CYTO: NORMAL
STAT OF ADQ CVX/VAG CYTO-IMP: NORMAL

## 2021-04-01 ENCOUNTER — TELEPHONE (OUTPATIENT)
Dept: SURGERY | Facility: CLINIC | Age: 71
End: 2021-04-01

## 2021-04-01 ENCOUNTER — TELEPHONE (OUTPATIENT)
Dept: GYNECOLOGIC ONCOLOGY | Facility: CLINIC | Age: 71
End: 2021-04-01

## 2021-04-01 DIAGNOSIS — N63.0 BREAST MASS: Primary | ICD-10-CM

## 2021-04-01 DIAGNOSIS — D24.1 BENIGN NEOPLASM OF RIGHT BREAST: ICD-10-CM

## 2021-04-01 LAB
FINAL PATHOLOGIC DIAGNOSIS: NORMAL
GROSS: NORMAL

## 2021-04-13 ENCOUNTER — LAB VISIT (OUTPATIENT)
Dept: LAB | Facility: HOSPITAL | Age: 71
End: 2021-04-13
Attending: INTERNAL MEDICINE
Payer: MEDICARE

## 2021-04-13 DIAGNOSIS — D50.9 IRON DEFICIENCY ANEMIA, UNSPECIFIED: Primary | ICD-10-CM

## 2021-04-13 LAB
BASOPHILS # BLD AUTO: 0.06 K/UL (ref 0–0.2)
BASOPHILS NFR BLD: 0.6 % (ref 0–1.9)
DIFFERENTIAL METHOD: ABNORMAL
EOSINOPHIL # BLD AUTO: 0.3 K/UL (ref 0–0.5)
EOSINOPHIL NFR BLD: 3 % (ref 0–8)
ERYTHROCYTE [DISTWIDTH] IN BLOOD BY AUTOMATED COUNT: 15.9 % (ref 11.5–14.5)
FERRITIN SERPL-MCNC: 10 NG/ML (ref 20–300)
HCT VFR BLD AUTO: 33.9 % (ref 37–48.5)
HGB BLD-MCNC: 10.8 G/DL (ref 12–16)
IMM GRANULOCYTES # BLD AUTO: 0.03 K/UL (ref 0–0.04)
IMM GRANULOCYTES NFR BLD AUTO: 0.3 % (ref 0–0.5)
IRON SERPL-MCNC: 99 UG/DL (ref 30–160)
LYMPHOCYTES # BLD AUTO: 2.5 K/UL (ref 1–4.8)
LYMPHOCYTES NFR BLD: 23 % (ref 18–48)
MCH RBC QN AUTO: 27.5 PG (ref 27–31)
MCHC RBC AUTO-ENTMCNC: 31.9 G/DL (ref 32–36)
MCV RBC AUTO: 86 FL (ref 82–98)
MONOCYTES # BLD AUTO: 0.6 K/UL (ref 0.3–1)
MONOCYTES NFR BLD: 5.5 % (ref 4–15)
NEUTROPHILS # BLD AUTO: 7.2 K/UL (ref 1.8–7.7)
NEUTROPHILS NFR BLD: 67.6 % (ref 38–73)
NRBC BLD-RTO: 0 /100 WBC
PLATELET # BLD AUTO: 391 K/UL (ref 150–450)
PMV BLD AUTO: 9.7 FL (ref 9.2–12.9)
RBC # BLD AUTO: 3.93 M/UL (ref 4–5.4)
SATURATED IRON: 33 % (ref 20–50)
TOTAL IRON BINDING CAPACITY: 298 UG/DL (ref 250–450)
TRANSFERRIN SERPL-MCNC: 213 MG/DL (ref 200–375)
WBC # BLD AUTO: 10.67 K/UL (ref 3.9–12.7)

## 2021-04-13 PROCEDURE — 36415 COLL VENOUS BLD VENIPUNCTURE: CPT | Performed by: INTERNAL MEDICINE

## 2021-04-13 PROCEDURE — 82728 ASSAY OF FERRITIN: CPT | Performed by: INTERNAL MEDICINE

## 2021-04-13 PROCEDURE — 83540 ASSAY OF IRON: CPT | Performed by: INTERNAL MEDICINE

## 2021-04-13 PROCEDURE — 85025 COMPLETE CBC W/AUTO DIFF WBC: CPT | Performed by: INTERNAL MEDICINE

## 2021-05-19 ENCOUNTER — LAB VISIT (OUTPATIENT)
Dept: LAB | Facility: HOSPITAL | Age: 71
End: 2021-05-19
Attending: INTERNAL MEDICINE
Payer: MEDICARE

## 2021-05-19 DIAGNOSIS — D50.9 IRON DEFICIENCY ANEMIA, UNSPECIFIED: Primary | ICD-10-CM

## 2021-05-19 LAB
BASOPHILS # BLD AUTO: 0.06 K/UL (ref 0–0.2)
BASOPHILS NFR BLD: 0.6 % (ref 0–1.9)
DIFFERENTIAL METHOD: ABNORMAL
EOSINOPHIL # BLD AUTO: 1.3 K/UL (ref 0–0.5)
EOSINOPHIL NFR BLD: 13 % (ref 0–8)
ERYTHROCYTE [DISTWIDTH] IN BLOOD BY AUTOMATED COUNT: 17 % (ref 11.5–14.5)
FERRITIN SERPL-MCNC: 15 NG/ML (ref 20–300)
HCT VFR BLD AUTO: 36.2 % (ref 37–48.5)
HGB BLD-MCNC: 11.5 G/DL (ref 12–16)
IMM GRANULOCYTES # BLD AUTO: 0.02 K/UL (ref 0–0.04)
IMM GRANULOCYTES NFR BLD AUTO: 0.2 % (ref 0–0.5)
IRON SERPL-MCNC: 27 UG/DL (ref 30–160)
LYMPHOCYTES # BLD AUTO: 2.3 K/UL (ref 1–4.8)
LYMPHOCYTES NFR BLD: 23.5 % (ref 18–48)
MCH RBC QN AUTO: 27.7 PG (ref 27–31)
MCHC RBC AUTO-ENTMCNC: 31.8 G/DL (ref 32–36)
MCV RBC AUTO: 87 FL (ref 82–98)
MONOCYTES # BLD AUTO: 0.6 K/UL (ref 0.3–1)
MONOCYTES NFR BLD: 5.8 % (ref 4–15)
NEUTROPHILS # BLD AUTO: 5.6 K/UL (ref 1.8–7.7)
NEUTROPHILS NFR BLD: 56.9 % (ref 38–73)
NRBC BLD-RTO: 0 /100 WBC
PLATELET # BLD AUTO: 347 K/UL (ref 150–450)
PMV BLD AUTO: 10.1 FL (ref 9.2–12.9)
RBC # BLD AUTO: 4.15 M/UL (ref 4–5.4)
SATURATED IRON: 8 % (ref 20–50)
TOTAL IRON BINDING CAPACITY: 339 UG/DL (ref 250–450)
TRANSFERRIN SERPL-MCNC: 229 MG/DL (ref 200–375)
WBC # BLD AUTO: 9.89 K/UL (ref 3.9–12.7)

## 2021-05-19 PROCEDURE — 83540 ASSAY OF IRON: CPT | Performed by: INTERNAL MEDICINE

## 2021-05-19 PROCEDURE — 85025 COMPLETE CBC W/AUTO DIFF WBC: CPT | Performed by: INTERNAL MEDICINE

## 2021-05-19 PROCEDURE — 82728 ASSAY OF FERRITIN: CPT | Performed by: INTERNAL MEDICINE

## 2021-06-04 ENCOUNTER — NURSE TRIAGE (OUTPATIENT)
Dept: ADMINISTRATIVE | Facility: CLINIC | Age: 71
End: 2021-06-04

## 2021-06-07 ENCOUNTER — HOSPITAL ENCOUNTER (INPATIENT)
Facility: OTHER | Age: 71
LOS: 3 days | Discharge: HOME | DRG: 195 | End: 2021-06-10
Attending: EMERGENCY MEDICINE | Admitting: INTERNAL MEDICINE
Payer: MEDICARE

## 2021-06-07 ENCOUNTER — OFFICE VISIT (OUTPATIENT)
Dept: URGENT CARE | Facility: CLINIC | Age: 71
End: 2021-06-07
Payer: MEDICARE

## 2021-06-07 VITALS
OXYGEN SATURATION: 93 % | TEMPERATURE: 98 F | HEIGHT: 70 IN | BODY MASS INDEX: 25.48 KG/M2 | WEIGHT: 178 LBS | DIASTOLIC BLOOD PRESSURE: 70 MMHG | HEART RATE: 74 BPM | SYSTOLIC BLOOD PRESSURE: 126 MMHG | RESPIRATION RATE: 28 BRPM

## 2021-06-07 DIAGNOSIS — R06.02 SOB (SHORTNESS OF BREATH): Primary | ICD-10-CM

## 2021-06-07 DIAGNOSIS — I10 ESSENTIAL HYPERTENSION: ICD-10-CM

## 2021-06-07 DIAGNOSIS — F32.A DEPRESSION, UNSPECIFIED DEPRESSION TYPE: Chronic | ICD-10-CM

## 2021-06-07 DIAGNOSIS — R09.02 HYPOXIA: ICD-10-CM

## 2021-06-07 DIAGNOSIS — F41.9 ANXIETY: Chronic | ICD-10-CM

## 2021-06-07 DIAGNOSIS — R06.02 SHORTNESS OF BREATH: ICD-10-CM

## 2021-06-07 DIAGNOSIS — J18.9 PNEUMONIA OF LEFT LOWER LOBE DUE TO INFECTIOUS ORGANISM: ICD-10-CM

## 2021-06-07 DIAGNOSIS — J18.9 PNEUMONIA OF BOTH LUNGS DUE TO INFECTIOUS ORGANISM, UNSPECIFIED PART OF LUNG: Primary | ICD-10-CM

## 2021-06-07 LAB
ALBUMIN SERPL BCP-MCNC: 3.3 G/DL (ref 3.5–5.2)
ALP SERPL-CCNC: 189 U/L (ref 55–135)
ALT SERPL W/O P-5'-P-CCNC: 17 U/L (ref 10–44)
ANION GAP SERPL CALC-SCNC: 10 MMOL/L (ref 8–16)
ANISOCYTOSIS BLD QL SMEAR: SLIGHT
AST SERPL-CCNC: 25 U/L (ref 10–40)
BASOPHILS # BLD AUTO: ABNORMAL K/UL (ref 0–0.2)
BASOPHILS NFR BLD: 3 % (ref 0–1.9)
BILIRUB SERPL-MCNC: 0.3 MG/DL (ref 0.1–1)
BNP SERPL-MCNC: 35 PG/ML (ref 0–99)
BUN SERPL-MCNC: 18 MG/DL (ref 8–23)
BURR CELLS BLD QL SMEAR: ABNORMAL
CALCIUM SERPL-MCNC: 9.3 MG/DL (ref 8.7–10.5)
CHLORIDE SERPL-SCNC: 104 MMOL/L (ref 95–110)
CO2 SERPL-SCNC: 25 MMOL/L (ref 23–29)
CREAT SERPL-MCNC: 0.9 MG/DL (ref 0.5–1.4)
CTP QC/QA: YES
DIFFERENTIAL METHOD: ABNORMAL
EOSINOPHIL # BLD AUTO: ABNORMAL K/UL (ref 0–0.5)
EOSINOPHIL NFR BLD: 29 % (ref 0–8)
ERYTHROCYTE [DISTWIDTH] IN BLOOD BY AUTOMATED COUNT: 16.2 % (ref 11.5–14.5)
EST. GFR  (AFRICAN AMERICAN): >60 ML/MIN/1.73 M^2
EST. GFR  (NON AFRICAN AMERICAN): >60 ML/MIN/1.73 M^2
GLUCOSE SERPL-MCNC: 90 MG/DL (ref 70–110)
GLUCOSE SERPL-MCNC: 99 MG/DL (ref 70–110)
HCT VFR BLD AUTO: 39.3 % (ref 37–48.5)
HGB BLD-MCNC: 12.7 G/DL (ref 12–16)
IMM GRANULOCYTES # BLD AUTO: ABNORMAL K/UL (ref 0–0.04)
IMM GRANULOCYTES NFR BLD AUTO: ABNORMAL % (ref 0–0.5)
LACTATE SERPL-SCNC: 0.6 MMOL/L (ref 0.5–2.2)
LYMPHOCYTES # BLD AUTO: ABNORMAL K/UL (ref 1–4.8)
LYMPHOCYTES NFR BLD: 18 % (ref 18–48)
MCH RBC QN AUTO: 27.7 PG (ref 27–31)
MCHC RBC AUTO-ENTMCNC: 32.3 G/DL (ref 32–36)
MCV RBC AUTO: 86 FL (ref 82–98)
MONOCYTES # BLD AUTO: ABNORMAL K/UL (ref 0.3–1)
MONOCYTES NFR BLD: 4 % (ref 4–15)
NEUTROPHILS NFR BLD: 46 % (ref 38–73)
NRBC BLD-RTO: 0 /100 WBC
PLATELET # BLD AUTO: 292 K/UL (ref 150–450)
PLATELET BLD QL SMEAR: ABNORMAL
PMV BLD AUTO: 9.7 FL (ref 9.2–12.9)
POC ANION GAP: 16 MMOL/L (ref 10–20)
POC BUN: 18 MMOL/L (ref 8–26)
POC CHLORIDE: 100 MMOL/L (ref 98–109)
POC CREATININE: 1 MG/DL (ref 0.6–1.3)
POC HEMATOCRIT: 39 %PCV (ref 37–47)
POC HEMOGLOBIN: 13.3 G/DL (ref 12.5–16)
POC ICA: 1.15 MMOL/L (ref 1.12–1.32)
POC POTASSIUM: 3.8 MMOL/L (ref 3.5–4.9)
POC SODIUM: 140 MMOL/L (ref 138–146)
POC TCO2: 29 MMOL/L (ref 24–29)
POIKILOCYTOSIS BLD QL SMEAR: SLIGHT
POTASSIUM SERPL-SCNC: 4.3 MMOL/L (ref 3.5–5.1)
PROT SERPL-MCNC: 7.5 G/DL (ref 6–8.4)
RBC # BLD AUTO: 4.59 M/UL (ref 4–5.4)
SARS-COV-2 RDRP RESP QL NAA+PROBE: NEGATIVE
SODIUM SERPL-SCNC: 139 MMOL/L (ref 136–145)
TROPONIN I SERPL DL<=0.01 NG/ML-MCNC: <0.006 NG/ML (ref 0–0.03)
WBC # BLD AUTO: 11.17 K/UL (ref 3.9–12.7)

## 2021-06-07 PROCEDURE — 84484 ASSAY OF TROPONIN QUANT: CPT | Performed by: PHYSICIAN ASSISTANT

## 2021-06-07 PROCEDURE — 93010 ELECTROCARDIOGRAM REPORT: CPT | Mod: S$GLB,,, | Performed by: INTERNAL MEDICINE

## 2021-06-07 PROCEDURE — 93005 EKG 12-LEAD: ICD-10-PCS | Mod: S$GLB,,, | Performed by: INTERNAL MEDICINE

## 2021-06-07 PROCEDURE — 94640 AIRWAY INHALATION TREATMENT: CPT

## 2021-06-07 PROCEDURE — 87040 BLOOD CULTURE FOR BACTERIA: CPT | Performed by: EMERGENCY MEDICINE

## 2021-06-07 PROCEDURE — 96365 THER/PROPH/DIAG IV INF INIT: CPT

## 2021-06-07 PROCEDURE — 27000221 HC OXYGEN, UP TO 24 HOURS

## 2021-06-07 PROCEDURE — 3008F PR BODY MASS INDEX (BMI) DOCUMENTED: ICD-10-PCS | Mod: CPTII,S$GLB,, | Performed by: INTERNAL MEDICINE

## 2021-06-07 PROCEDURE — 99223 1ST HOSP IP/OBS HIGH 75: CPT | Mod: ,,, | Performed by: NURSE PRACTITIONER

## 2021-06-07 PROCEDURE — 63700000 PHARM REV CODE 250 ALT 637 W/O HCPCS: Performed by: EMERGENCY MEDICINE

## 2021-06-07 PROCEDURE — 71046 X-RAY EXAM CHEST 2 VIEWS: CPT | Mod: S$GLB,,, | Performed by: RADIOLOGY

## 2021-06-07 PROCEDURE — 80053 COMPREHEN METABOLIC PANEL: CPT | Performed by: PHYSICIAN ASSISTANT

## 2021-06-07 PROCEDURE — 93005 ELECTROCARDIOGRAM TRACING: CPT | Mod: S$GLB,,, | Performed by: INTERNAL MEDICINE

## 2021-06-07 PROCEDURE — 99499 NO LOS: ICD-10-PCS | Mod: S$GLB,,, | Performed by: INTERNAL MEDICINE

## 2021-06-07 PROCEDURE — 83605 ASSAY OF LACTIC ACID: CPT | Performed by: EMERGENCY MEDICINE

## 2021-06-07 PROCEDURE — 85027 COMPLETE CBC AUTOMATED: CPT | Performed by: PHYSICIAN ASSISTANT

## 2021-06-07 PROCEDURE — 93010 EKG 12-LEAD: ICD-10-PCS | Mod: ,,, | Performed by: INTERNAL MEDICINE

## 2021-06-07 PROCEDURE — 99223 PR INITIAL HOSPITAL CARE,LEVL III: ICD-10-PCS | Mod: ,,, | Performed by: NURSE PRACTITIONER

## 2021-06-07 PROCEDURE — U0002: ICD-10-PCS | Mod: QW,S$GLB,, | Performed by: INTERNAL MEDICINE

## 2021-06-07 PROCEDURE — 93010 ELECTROCARDIOGRAM REPORT: CPT | Mod: ,,, | Performed by: INTERNAL MEDICINE

## 2021-06-07 PROCEDURE — 63600175 PHARM REV CODE 636 W HCPCS: Performed by: EMERGENCY MEDICINE

## 2021-06-07 PROCEDURE — 3008F BODY MASS INDEX DOCD: CPT | Mod: CPTII,S$GLB,, | Performed by: INTERNAL MEDICINE

## 2021-06-07 PROCEDURE — 83880 ASSAY OF NATRIURETIC PEPTIDE: CPT | Performed by: PHYSICIAN ASSISTANT

## 2021-06-07 PROCEDURE — 71046 XR CHEST PA AND LATERAL: ICD-10-PCS | Mod: S$GLB,,, | Performed by: RADIOLOGY

## 2021-06-07 PROCEDURE — 25000242 PHARM REV CODE 250 ALT 637 W/ HCPCS: Performed by: NURSE PRACTITIONER

## 2021-06-07 PROCEDURE — 25500020 PHARM REV CODE 255: Performed by: EMERGENCY MEDICINE

## 2021-06-07 PROCEDURE — 93005 ELECTROCARDIOGRAM TRACING: CPT

## 2021-06-07 PROCEDURE — U0002 COVID-19 LAB TEST NON-CDC: HCPCS | Mod: QW,S$GLB,, | Performed by: INTERNAL MEDICINE

## 2021-06-07 PROCEDURE — 80047 POCT CHEMISTRY PANEL: ICD-10-PCS | Mod: QW,S$GLB,, | Performed by: INTERNAL MEDICINE

## 2021-06-07 PROCEDURE — 21400001 HC TELEMETRY ROOM

## 2021-06-07 PROCEDURE — 85007 BL SMEAR W/DIFF WBC COUNT: CPT | Performed by: PHYSICIAN ASSISTANT

## 2021-06-07 PROCEDURE — 93010 EKG 12-LEAD: ICD-10-PCS | Mod: S$GLB,,, | Performed by: INTERNAL MEDICINE

## 2021-06-07 PROCEDURE — 99499 UNLISTED E&M SERVICE: CPT | Mod: S$GLB,,, | Performed by: INTERNAL MEDICINE

## 2021-06-07 PROCEDURE — 80047 BASIC METABLC PNL IONIZED CA: CPT | Mod: QW,S$GLB,, | Performed by: INTERNAL MEDICINE

## 2021-06-07 PROCEDURE — 99285 EMERGENCY DEPT VISIT HI MDM: CPT | Mod: 25

## 2021-06-07 PROCEDURE — 94761 N-INVAS EAR/PLS OXIMETRY MLT: CPT

## 2021-06-07 PROCEDURE — 63600175 PHARM REV CODE 636 W HCPCS: Performed by: NURSE PRACTITIONER

## 2021-06-07 RX ORDER — IPRATROPIUM BROMIDE AND ALBUTEROL SULFATE 2.5; .5 MG/3ML; MG/3ML
3 SOLUTION RESPIRATORY (INHALATION) EVERY 4 HOURS PRN
Status: DISCONTINUED | OUTPATIENT
Start: 2021-06-07 | End: 2021-06-10 | Stop reason: HOSPADM

## 2021-06-07 RX ORDER — METOPROLOL SUCCINATE 50 MG/1
50 TABLET, EXTENDED RELEASE ORAL DAILY
Status: DISCONTINUED | OUTPATIENT
Start: 2021-06-08 | End: 2021-06-10 | Stop reason: HOSPADM

## 2021-06-07 RX ORDER — ONDANSETRON 2 MG/ML
8 INJECTION INTRAMUSCULAR; INTRAVENOUS EVERY 8 HOURS PRN
Status: DISCONTINUED | OUTPATIENT
Start: 2021-06-07 | End: 2021-06-10 | Stop reason: HOSPADM

## 2021-06-07 RX ORDER — IRBESARTAN AND HYDROCHLOROTHIAZIDE 300; 12.5 MG/1; MG/1
1 TABLET, FILM COATED ORAL DAILY
Status: DISCONTINUED | OUTPATIENT
Start: 2021-06-08 | End: 2021-06-07 | Stop reason: SDUPTHER

## 2021-06-07 RX ORDER — ACETAMINOPHEN 325 MG/1
650 TABLET ORAL EVERY 4 HOURS PRN
Status: DISCONTINUED | OUTPATIENT
Start: 2021-06-07 | End: 2021-06-10 | Stop reason: HOSPADM

## 2021-06-07 RX ORDER — HEPARIN SODIUM 5000 [USP'U]/ML
5000 INJECTION, SOLUTION INTRAVENOUS; SUBCUTANEOUS EVERY 8 HOURS
Status: DISCONTINUED | OUTPATIENT
Start: 2021-06-07 | End: 2021-06-10 | Stop reason: HOSPADM

## 2021-06-07 RX ORDER — ATORVASTATIN CALCIUM 20 MG/1
20 TABLET, FILM COATED ORAL DAILY
Status: DISCONTINUED | OUTPATIENT
Start: 2021-06-08 | End: 2021-06-10 | Stop reason: HOSPADM

## 2021-06-07 RX ORDER — SODIUM CHLORIDE 0.9 % (FLUSH) 0.9 %
10 SYRINGE (ML) INJECTION
Status: DISCONTINUED | OUTPATIENT
Start: 2021-06-07 | End: 2021-06-10 | Stop reason: HOSPADM

## 2021-06-07 RX ORDER — HYDRALAZINE HYDROCHLORIDE 20 MG/ML
10 INJECTION INTRAMUSCULAR; INTRAVENOUS ONCE
Status: DISCONTINUED | OUTPATIENT
Start: 2021-06-07 | End: 2021-06-10 | Stop reason: HOSPADM

## 2021-06-07 RX ORDER — NAPROXEN SODIUM 220 MG/1
81 TABLET, FILM COATED ORAL DAILY
Status: DISCONTINUED | OUTPATIENT
Start: 2021-06-08 | End: 2021-06-10 | Stop reason: HOSPADM

## 2021-06-07 RX ORDER — AZITHROMYCIN 250 MG/1
500 TABLET, FILM COATED ORAL
Status: COMPLETED | OUTPATIENT
Start: 2021-06-07 | End: 2021-06-07

## 2021-06-07 RX ORDER — IRBESARTAN 150 MG/1
300 TABLET ORAL DAILY
Status: DISCONTINUED | OUTPATIENT
Start: 2021-06-08 | End: 2021-06-07

## 2021-06-07 RX ORDER — ESCITALOPRAM OXALATE 10 MG/1
30 TABLET ORAL DAILY
Status: DISCONTINUED | OUTPATIENT
Start: 2021-06-08 | End: 2021-06-10 | Stop reason: HOSPADM

## 2021-06-07 RX ORDER — TALC
6 POWDER (GRAM) TOPICAL NIGHTLY PRN
Status: CANCELLED | OUTPATIENT
Start: 2021-06-07

## 2021-06-07 RX ORDER — HYDROCHLOROTHIAZIDE 12.5 MG/1
12.5 TABLET ORAL DAILY
Status: DISCONTINUED | OUTPATIENT
Start: 2021-06-08 | End: 2021-06-10 | Stop reason: HOSPADM

## 2021-06-07 RX ADMIN — IPRATROPIUM BROMIDE AND ALBUTEROL SULFATE 3 ML: .5; 3 SOLUTION RESPIRATORY (INHALATION) at 08:06

## 2021-06-07 RX ADMIN — AZITHROMYCIN MONOHYDRATE 500 MG: 250 TABLET ORAL at 06:06

## 2021-06-07 RX ADMIN — HEPARIN SODIUM 5000 UNITS: 5000 INJECTION INTRAVENOUS; SUBCUTANEOUS at 09:06

## 2021-06-07 RX ADMIN — CEFTRIAXONE 1 G: 1 INJECTION, SOLUTION INTRAVENOUS at 06:06

## 2021-06-07 RX ADMIN — IOHEXOL 100 ML: 350 INJECTION, SOLUTION INTRAVENOUS at 05:06

## 2021-06-08 ENCOUNTER — TELEPHONE (OUTPATIENT)
Dept: URGENT CARE | Facility: CLINIC | Age: 71
End: 2021-06-08

## 2021-06-08 LAB
ALBUMIN SERPL BCP-MCNC: 3 G/DL (ref 3.5–5.2)
ALP SERPL-CCNC: 175 U/L (ref 55–135)
ALT SERPL W/O P-5'-P-CCNC: 13 U/L (ref 10–44)
ANION GAP SERPL CALC-SCNC: 10 MMOL/L (ref 8–16)
AST SERPL-CCNC: 15 U/L (ref 10–40)
AV INDEX (PROSTH): 1.05
AV MEAN GRADIENT: 6 MMHG
AV PEAK GRADIENT: 8 MMHG
AV VALVE AREA: 3.44 CM2
AV VELOCITY RATIO: 0.85
BASOPHILS # BLD AUTO: 0.05 K/UL (ref 0–0.2)
BASOPHILS NFR BLD: 0.4 % (ref 0–1.9)
BILIRUB SERPL-MCNC: 0.4 MG/DL (ref 0.1–1)
BSA FOR ECHO PROCEDURE: 1.99 M2
BUN SERPL-MCNC: 14 MG/DL (ref 8–23)
CALCIUM SERPL-MCNC: 8.8 MG/DL (ref 8.7–10.5)
CHLORIDE SERPL-SCNC: 103 MMOL/L (ref 95–110)
CO2 SERPL-SCNC: 26 MMOL/L (ref 23–29)
CREAT SERPL-MCNC: 0.8 MG/DL (ref 0.5–1.4)
CV ECHO LV RWT: 0.33 CM
DIFFERENTIAL METHOD: ABNORMAL
DOP CALC AO PEAK VEL: 1.41 M/S
DOP CALC AO VTI: 32.65 CM
DOP CALC LVOT AREA: 3.3 CM2
DOP CALC LVOT DIAMETER: 2.04 CM
DOP CALC LVOT PEAK VEL: 1.2 M/S
DOP CALC LVOT STROKE VOLUME: 112.28 CM3
DOP CALCLVOT PEAK VEL VTI: 34.37 CM
E WAVE DECELERATION TIME: 172 MSEC
E/A RATIO: 1.12
E/E' RATIO: 10.71 M/S
ECHO LV POSTERIOR WALL: 0.68 CM (ref 0.6–1.1)
EJECTION FRACTION: 69 %
EOSINOPHIL # BLD AUTO: 2.9 K/UL (ref 0–0.5)
EOSINOPHIL NFR BLD: 25.1 % (ref 0–8)
ERYTHROCYTE [DISTWIDTH] IN BLOOD BY AUTOMATED COUNT: 16 % (ref 11.5–14.5)
EST. GFR  (AFRICAN AMERICAN): >60 ML/MIN/1.73 M^2
EST. GFR  (NON AFRICAN AMERICAN): >60 ML/MIN/1.73 M^2
FRACTIONAL SHORTENING: 38 % (ref 28–44)
GLUCOSE SERPL-MCNC: 93 MG/DL (ref 70–110)
HCT VFR BLD AUTO: 36.4 % (ref 37–48.5)
HGB BLD-MCNC: 11.8 G/DL (ref 12–16)
IMM GRANULOCYTES # BLD AUTO: 0.02 K/UL (ref 0–0.04)
IMM GRANULOCYTES NFR BLD AUTO: 0.2 % (ref 0–0.5)
INTERVENTRICULAR SEPTUM: 0.71 CM (ref 0.6–1.1)
IVRT: 83.73 MSEC
LA MAJOR: 4.89 CM
LA MINOR: 5.13 CM
LA WIDTH: 4.11 CM
LEFT ATRIUM SIZE: 3.06 CM
LEFT ATRIUM VOLUME INDEX MOD: 21.2 ML/M2
LEFT ATRIUM VOLUME INDEX: 27 ML/M2
LEFT ATRIUM VOLUME MOD: 42 CM3
LEFT ATRIUM VOLUME: 53.53 CM3
LEFT INTERNAL DIMENSION IN SYSTOLE: 2.52 CM (ref 2.1–4)
LEFT VENTRICLE DIASTOLIC VOLUME INDEX: 37.19 ML/M2
LEFT VENTRICLE DIASTOLIC VOLUME: 73.64 ML
LEFT VENTRICLE MASS INDEX: 41 G/M2
LEFT VENTRICLE SYSTOLIC VOLUME INDEX: 11.5 ML/M2
LEFT VENTRICLE SYSTOLIC VOLUME: 22.77 ML
LEFT VENTRICULAR INTERNAL DIMENSION IN DIASTOLE: 4.09 CM (ref 3.5–6)
LEFT VENTRICULAR MASS: 80.6 G
LV LATERAL E/E' RATIO: 9.1 M/S
LV SEPTAL E/E' RATIO: 13 M/S
LYMPHOCYTES # BLD AUTO: 1.8 K/UL (ref 1–4.8)
LYMPHOCYTES NFR BLD: 16.1 % (ref 18–48)
MAGNESIUM SERPL-MCNC: 1.7 MG/DL (ref 1.6–2.6)
MCH RBC QN AUTO: 27.5 PG (ref 27–31)
MCHC RBC AUTO-ENTMCNC: 32.4 G/DL (ref 32–36)
MCV RBC AUTO: 85 FL (ref 82–98)
MONOCYTES # BLD AUTO: 0.6 K/UL (ref 0.3–1)
MONOCYTES NFR BLD: 4.9 % (ref 4–15)
MV PEAK A VEL: 0.81 M/S
MV PEAK E VEL: 0.91 M/S
NEUTROPHILS # BLD AUTO: 6.1 K/UL (ref 1.8–7.7)
NEUTROPHILS NFR BLD: 53.3 % (ref 38–73)
NRBC BLD-RTO: 0 /100 WBC
PHOSPHATE SERPL-MCNC: 4.3 MG/DL (ref 2.7–4.5)
PISA MRMAX VEL: 0.05 M/S
PISA TR MAX VEL: 3.03 M/S
PLATELET # BLD AUTO: 325 K/UL (ref 150–450)
PMV BLD AUTO: 10.1 FL (ref 9.2–12.9)
POTASSIUM SERPL-SCNC: 3.7 MMOL/L (ref 3.5–5.1)
PROCALCITONIN SERPL IA-MCNC: 0.03 NG/ML
PROT SERPL-MCNC: 6.6 G/DL (ref 6–8.4)
PULM VEIN S/D RATIO: 1.06
PV PEAK D VEL: 0.53 M/S
PV PEAK S VEL: 0.56 M/S
PV PEAK VELOCITY: 0.77 CM/S
RA MAJOR: 4.85 CM
RA WIDTH: 3.74 CM
RBC # BLD AUTO: 4.29 M/UL (ref 4–5.4)
SINUS: 2.83 CM
SODIUM SERPL-SCNC: 139 MMOL/L (ref 136–145)
TDI LATERAL: 0.1 M/S
TDI SEPTAL: 0.07 M/S
TDI: 0.09 M/S
TR MAX PG: 37 MMHG
TRICUSPID ANNULAR PLANE SYSTOLIC EXCURSION: 2.57 CM
WBC # BLD AUTO: 11.4 K/UL (ref 3.9–12.7)

## 2021-06-08 PROCEDURE — 21400001 HC TELEMETRY ROOM

## 2021-06-08 PROCEDURE — 25000242 PHARM REV CODE 250 ALT 637 W/ HCPCS: Performed by: NURSE PRACTITIONER

## 2021-06-08 PROCEDURE — 25000003 PHARM REV CODE 250: Performed by: NURSE PRACTITIONER

## 2021-06-08 PROCEDURE — 94640 AIRWAY INHALATION TREATMENT: CPT

## 2021-06-08 PROCEDURE — 94761 N-INVAS EAR/PLS OXIMETRY MLT: CPT

## 2021-06-08 PROCEDURE — 84145 PROCALCITONIN (PCT): CPT | Performed by: INTERNAL MEDICINE

## 2021-06-08 PROCEDURE — 63600175 PHARM REV CODE 636 W HCPCS: Performed by: NURSE PRACTITIONER

## 2021-06-08 PROCEDURE — 85025 COMPLETE CBC W/AUTO DIFF WBC: CPT | Performed by: NURSE PRACTITIONER

## 2021-06-08 PROCEDURE — 36415 COLL VENOUS BLD VENIPUNCTURE: CPT | Performed by: NURSE PRACTITIONER

## 2021-06-08 PROCEDURE — 80053 COMPREHEN METABOLIC PANEL: CPT | Performed by: NURSE PRACTITIONER

## 2021-06-08 PROCEDURE — 83735 ASSAY OF MAGNESIUM: CPT | Performed by: NURSE PRACTITIONER

## 2021-06-08 PROCEDURE — 25000003 PHARM REV CODE 250: Performed by: INTERNAL MEDICINE

## 2021-06-08 PROCEDURE — 36415 COLL VENOUS BLD VENIPUNCTURE: CPT | Performed by: INTERNAL MEDICINE

## 2021-06-08 PROCEDURE — 27000221 HC OXYGEN, UP TO 24 HOURS

## 2021-06-08 PROCEDURE — 84100 ASSAY OF PHOSPHORUS: CPT | Performed by: NURSE PRACTITIONER

## 2021-06-08 RX ORDER — BENZONATATE 100 MG/1
100 CAPSULE ORAL 3 TIMES DAILY PRN
Status: DISCONTINUED | OUTPATIENT
Start: 2021-06-08 | End: 2021-06-10 | Stop reason: HOSPADM

## 2021-06-08 RX ORDER — SODIUM CHLORIDE 9 MG/ML
INJECTION, SOLUTION INTRAVENOUS CONTINUOUS
Status: DISCONTINUED | OUTPATIENT
Start: 2021-06-08 | End: 2021-06-10 | Stop reason: HOSPADM

## 2021-06-08 RX ORDER — SODIUM CHLORIDE 0.9 % (FLUSH) 0.9 %
3 SYRINGE (ML) INJECTION
Status: DISCONTINUED | OUTPATIENT
Start: 2021-06-08 | End: 2021-06-10 | Stop reason: HOSPADM

## 2021-06-08 RX ADMIN — METOPROLOL SUCCINATE 50 MG: 50 TABLET, EXTENDED RELEASE ORAL at 09:06

## 2021-06-08 RX ADMIN — HEPARIN SODIUM 5000 UNITS: 5000 INJECTION INTRAVENOUS; SUBCUTANEOUS at 10:06

## 2021-06-08 RX ADMIN — HEPARIN SODIUM 5000 UNITS: 5000 INJECTION INTRAVENOUS; SUBCUTANEOUS at 05:06

## 2021-06-08 RX ADMIN — ESCITALOPRAM OXALATE 30 MG: 10 TABLET, FILM COATED ORAL at 09:06

## 2021-06-08 RX ADMIN — AZITHROMYCIN MONOHYDRATE 500 MG: 500 INJECTION, POWDER, LYOPHILIZED, FOR SOLUTION INTRAVENOUS at 07:06

## 2021-06-08 RX ADMIN — ASPIRIN 81 MG CHEWABLE TABLET 81 MG: 81 TABLET CHEWABLE at 09:06

## 2021-06-08 RX ADMIN — CEFTRIAXONE 1 G: 1 INJECTION, SOLUTION INTRAVENOUS at 06:06

## 2021-06-08 RX ADMIN — SODIUM CHLORIDE: 0.9 INJECTION, SOLUTION INTRAVENOUS at 06:06

## 2021-06-08 RX ADMIN — ATORVASTATIN CALCIUM 20 MG: 20 TABLET, FILM COATED ORAL at 09:06

## 2021-06-08 RX ADMIN — HEPARIN SODIUM 5000 UNITS: 5000 INJECTION INTRAVENOUS; SUBCUTANEOUS at 01:06

## 2021-06-08 RX ADMIN — HYDROCHLOROTHIAZIDE 12.5 MG: 12.5 TABLET ORAL at 09:06

## 2021-06-08 RX ADMIN — IPRATROPIUM BROMIDE AND ALBUTEROL SULFATE 3 ML: .5; 3 SOLUTION RESPIRATORY (INHALATION) at 06:06

## 2021-06-09 LAB
ALBUMIN SERPL BCP-MCNC: 2.9 G/DL (ref 3.5–5.2)
ALP SERPL-CCNC: 172 U/L (ref 55–135)
ALT SERPL W/O P-5'-P-CCNC: 14 U/L (ref 10–44)
ANION GAP SERPL CALC-SCNC: 8 MMOL/L (ref 8–16)
ANISOCYTOSIS BLD QL SMEAR: SLIGHT
AST SERPL-CCNC: 18 U/L (ref 10–40)
BASOPHILS # BLD AUTO: ABNORMAL K/UL (ref 0–0.2)
BASOPHILS NFR BLD: 0 % (ref 0–1.9)
BILIRUB SERPL-MCNC: 0.4 MG/DL (ref 0.1–1)
BUN SERPL-MCNC: 14 MG/DL (ref 8–23)
CALCIUM SERPL-MCNC: 9.3 MG/DL (ref 8.7–10.5)
CHLORIDE SERPL-SCNC: 102 MMOL/L (ref 95–110)
CO2 SERPL-SCNC: 28 MMOL/L (ref 23–29)
CREAT SERPL-MCNC: 0.8 MG/DL (ref 0.5–1.4)
DIFFERENTIAL METHOD: ABNORMAL
EOSINOPHIL # BLD AUTO: ABNORMAL K/UL (ref 0–0.5)
EOSINOPHIL NFR BLD: 28 % (ref 0–8)
ERYTHROCYTE [DISTWIDTH] IN BLOOD BY AUTOMATED COUNT: 16 % (ref 11.5–14.5)
EST. GFR  (AFRICAN AMERICAN): >60 ML/MIN/1.73 M^2
EST. GFR  (NON AFRICAN AMERICAN): >60 ML/MIN/1.73 M^2
GLUCOSE SERPL-MCNC: 85 MG/DL (ref 70–110)
HCT VFR BLD AUTO: 36.8 % (ref 37–48.5)
HGB BLD-MCNC: 11.9 G/DL (ref 12–16)
IMM GRANULOCYTES # BLD AUTO: ABNORMAL K/UL (ref 0–0.04)
IMM GRANULOCYTES NFR BLD AUTO: ABNORMAL % (ref 0–0.5)
LYMPHOCYTES # BLD AUTO: ABNORMAL K/UL (ref 1–4.8)
LYMPHOCYTES NFR BLD: 25 % (ref 18–48)
MAGNESIUM SERPL-MCNC: 1.9 MG/DL (ref 1.6–2.6)
MCH RBC QN AUTO: 27.4 PG (ref 27–31)
MCHC RBC AUTO-ENTMCNC: 32.3 G/DL (ref 32–36)
MCV RBC AUTO: 85 FL (ref 82–98)
MONOCYTES # BLD AUTO: ABNORMAL K/UL (ref 0.3–1)
MONOCYTES NFR BLD: 5 % (ref 4–15)
NEUTROPHILS NFR BLD: 41 % (ref 38–73)
NEUTS BAND NFR BLD MANUAL: 1 %
NRBC BLD-RTO: 0 /100 WBC
PHOSPHATE SERPL-MCNC: 3.8 MG/DL (ref 2.7–4.5)
PLATELET # BLD AUTO: 312 K/UL (ref 150–450)
PLATELET BLD QL SMEAR: ABNORMAL
PMV BLD AUTO: 9.9 FL (ref 9.2–12.9)
POCT GLUCOSE: 156 MG/DL (ref 70–110)
POIKILOCYTOSIS BLD QL SMEAR: SLIGHT
POTASSIUM SERPL-SCNC: 3.9 MMOL/L (ref 3.5–5.1)
PROT SERPL-MCNC: 6.5 G/DL (ref 6–8.4)
RBC # BLD AUTO: 4.35 M/UL (ref 4–5.4)
SCHISTOCYTES BLD QL SMEAR: ABNORMAL
SODIUM SERPL-SCNC: 138 MMOL/L (ref 136–145)
WBC # BLD AUTO: 10.71 K/UL (ref 3.9–12.7)

## 2021-06-09 PROCEDURE — 25000242 PHARM REV CODE 250 ALT 637 W/ HCPCS: Performed by: NURSE PRACTITIONER

## 2021-06-09 PROCEDURE — 36415 COLL VENOUS BLD VENIPUNCTURE: CPT | Performed by: NURSE PRACTITIONER

## 2021-06-09 PROCEDURE — 99900035 HC TECH TIME PER 15 MIN (STAT)

## 2021-06-09 PROCEDURE — 83735 ASSAY OF MAGNESIUM: CPT | Performed by: NURSE PRACTITIONER

## 2021-06-09 PROCEDURE — 27000221 HC OXYGEN, UP TO 24 HOURS

## 2021-06-09 PROCEDURE — 85027 COMPLETE CBC AUTOMATED: CPT | Performed by: NURSE PRACTITIONER

## 2021-06-09 PROCEDURE — 94664 DEMO&/EVAL PT USE INHALER: CPT

## 2021-06-09 PROCEDURE — 85007 BL SMEAR W/DIFF WBC COUNT: CPT | Performed by: NURSE PRACTITIONER

## 2021-06-09 PROCEDURE — 99233 PR SUBSEQUENT HOSPITAL CARE,LEVL III: ICD-10-PCS | Mod: ,,, | Performed by: INTERNAL MEDICINE

## 2021-06-09 PROCEDURE — 80053 COMPREHEN METABOLIC PANEL: CPT | Performed by: NURSE PRACTITIONER

## 2021-06-09 PROCEDURE — 97530 THERAPEUTIC ACTIVITIES: CPT

## 2021-06-09 PROCEDURE — 25000003 PHARM REV CODE 250: Performed by: INTERNAL MEDICINE

## 2021-06-09 PROCEDURE — 21400001 HC TELEMETRY ROOM

## 2021-06-09 PROCEDURE — 63600175 PHARM REV CODE 636 W HCPCS: Performed by: NURSE PRACTITIONER

## 2021-06-09 PROCEDURE — 94640 AIRWAY INHALATION TREATMENT: CPT

## 2021-06-09 PROCEDURE — 25000003 PHARM REV CODE 250: Performed by: NURSE PRACTITIONER

## 2021-06-09 PROCEDURE — 84100 ASSAY OF PHOSPHORUS: CPT | Performed by: NURSE PRACTITIONER

## 2021-06-09 PROCEDURE — 94799 UNLISTED PULMONARY SVC/PX: CPT

## 2021-06-09 PROCEDURE — 27000646 HC AEROBIKA DEVICE

## 2021-06-09 PROCEDURE — 94761 N-INVAS EAR/PLS OXIMETRY MLT: CPT

## 2021-06-09 PROCEDURE — 99233 SBSQ HOSP IP/OBS HIGH 50: CPT | Mod: ,,, | Performed by: INTERNAL MEDICINE

## 2021-06-09 PROCEDURE — 97162 PT EVAL MOD COMPLEX 30 MIN: CPT

## 2021-06-09 RX ORDER — LOSARTAN POTASSIUM 50 MG/1
100 TABLET ORAL DAILY
Status: DISCONTINUED | OUTPATIENT
Start: 2021-06-09 | End: 2021-06-10 | Stop reason: HOSPADM

## 2021-06-09 RX ADMIN — LOSARTAN POTASSIUM 100 MG: 50 TABLET, FILM COATED ORAL at 11:06

## 2021-06-09 RX ADMIN — ASPIRIN 81 MG CHEWABLE TABLET 81 MG: 81 TABLET CHEWABLE at 09:06

## 2021-06-09 RX ADMIN — IPRATROPIUM BROMIDE AND ALBUTEROL SULFATE 3 ML: .5; 3 SOLUTION RESPIRATORY (INHALATION) at 02:06

## 2021-06-09 RX ADMIN — ATORVASTATIN CALCIUM 20 MG: 20 TABLET, FILM COATED ORAL at 09:06

## 2021-06-09 RX ADMIN — HEPARIN SODIUM 5000 UNITS: 5000 INJECTION INTRAVENOUS; SUBCUTANEOUS at 10:06

## 2021-06-09 RX ADMIN — ESCITALOPRAM OXALATE 30 MG: 10 TABLET, FILM COATED ORAL at 09:06

## 2021-06-09 RX ADMIN — HYDROCHLOROTHIAZIDE 12.5 MG: 12.5 TABLET ORAL at 09:06

## 2021-06-09 RX ADMIN — BENZONATATE 100 MG: 100 CAPSULE ORAL at 09:06

## 2021-06-09 RX ADMIN — IPRATROPIUM BROMIDE AND ALBUTEROL SULFATE 3 ML: .5; 3 SOLUTION RESPIRATORY (INHALATION) at 07:06

## 2021-06-09 RX ADMIN — HEPARIN SODIUM 5000 UNITS: 5000 INJECTION INTRAVENOUS; SUBCUTANEOUS at 06:06

## 2021-06-09 RX ADMIN — HEPARIN SODIUM 5000 UNITS: 5000 INJECTION INTRAVENOUS; SUBCUTANEOUS at 04:06

## 2021-06-09 RX ADMIN — METOPROLOL SUCCINATE 50 MG: 50 TABLET, EXTENDED RELEASE ORAL at 09:06

## 2021-06-10 VITALS
DIASTOLIC BLOOD PRESSURE: 79 MMHG | HEART RATE: 58 BPM | RESPIRATION RATE: 18 BRPM | TEMPERATURE: 98 F | SYSTOLIC BLOOD PRESSURE: 174 MMHG | BODY MASS INDEX: 25.34 KG/M2 | HEIGHT: 70 IN | OXYGEN SATURATION: 95 % | WEIGHT: 177 LBS

## 2021-06-10 LAB
ALBUMIN SERPL BCP-MCNC: 3 G/DL (ref 3.5–5.2)
ALP SERPL-CCNC: 188 U/L (ref 55–135)
ALT SERPL W/O P-5'-P-CCNC: 16 U/L (ref 10–44)
ANION GAP SERPL CALC-SCNC: 8 MMOL/L (ref 8–16)
ANISOCYTOSIS BLD QL SMEAR: SLIGHT
AST SERPL-CCNC: 21 U/L (ref 10–40)
BASOPHILS # BLD AUTO: ABNORMAL K/UL (ref 0–0.2)
BASOPHILS NFR BLD: 0 % (ref 0–1.9)
BILIRUB SERPL-MCNC: 0.4 MG/DL (ref 0.1–1)
BUN SERPL-MCNC: 13 MG/DL (ref 8–23)
CALCIUM SERPL-MCNC: 9.1 MG/DL (ref 8.7–10.5)
CHLORIDE SERPL-SCNC: 104 MMOL/L (ref 95–110)
CO2 SERPL-SCNC: 29 MMOL/L (ref 23–29)
CREAT SERPL-MCNC: 0.8 MG/DL (ref 0.5–1.4)
DIFFERENTIAL METHOD: ABNORMAL
EOSINOPHIL # BLD AUTO: ABNORMAL K/UL (ref 0–0.5)
EOSINOPHIL NFR BLD: 19 % (ref 0–8)
ERYTHROCYTE [DISTWIDTH] IN BLOOD BY AUTOMATED COUNT: 15.9 % (ref 11.5–14.5)
EST. GFR  (AFRICAN AMERICAN): >60 ML/MIN/1.73 M^2
EST. GFR  (NON AFRICAN AMERICAN): >60 ML/MIN/1.73 M^2
GIANT PLATELETS BLD QL SMEAR: PRESENT
GLUCOSE SERPL-MCNC: 87 MG/DL (ref 70–110)
HCT VFR BLD AUTO: 37.3 % (ref 37–48.5)
HGB BLD-MCNC: 12 G/DL (ref 12–16)
IMM GRANULOCYTES # BLD AUTO: ABNORMAL K/UL (ref 0–0.04)
IMM GRANULOCYTES NFR BLD AUTO: ABNORMAL % (ref 0–0.5)
LYMPHOCYTES # BLD AUTO: ABNORMAL K/UL (ref 1–4.8)
LYMPHOCYTES NFR BLD: 29 % (ref 18–48)
MAGNESIUM SERPL-MCNC: 1.8 MG/DL (ref 1.6–2.6)
MCH RBC QN AUTO: 27.5 PG (ref 27–31)
MCHC RBC AUTO-ENTMCNC: 32.2 G/DL (ref 32–36)
MCV RBC AUTO: 85 FL (ref 82–98)
MONOCYTES # BLD AUTO: ABNORMAL K/UL (ref 0.3–1)
MONOCYTES NFR BLD: 9 % (ref 4–15)
NEUTROPHILS # BLD AUTO: ABNORMAL K/UL (ref 1.8–7.7)
NEUTROPHILS NFR BLD: 43 % (ref 38–73)
NRBC BLD-RTO: 0 /100 WBC
OVALOCYTES BLD QL SMEAR: ABNORMAL
PHOSPHATE SERPL-MCNC: 4 MG/DL (ref 2.7–4.5)
PLATELET # BLD AUTO: 324 K/UL (ref 150–450)
PLATELET BLD QL SMEAR: ABNORMAL
PMV BLD AUTO: 9.9 FL (ref 9.2–12.9)
POIKILOCYTOSIS BLD QL SMEAR: SLIGHT
POTASSIUM SERPL-SCNC: 4.4 MMOL/L (ref 3.5–5.1)
PROT SERPL-MCNC: 6.7 G/DL (ref 6–8.4)
RBC # BLD AUTO: 4.37 M/UL (ref 4–5.4)
SODIUM SERPL-SCNC: 141 MMOL/L (ref 136–145)
WBC # BLD AUTO: 11.37 K/UL (ref 3.9–12.7)
WBC TOXIC VACUOLES BLD QL SMEAR: PRESENT

## 2021-06-10 PROCEDURE — 94761 N-INVAS EAR/PLS OXIMETRY MLT: CPT

## 2021-06-10 PROCEDURE — 36415 COLL VENOUS BLD VENIPUNCTURE: CPT | Performed by: NURSE PRACTITIONER

## 2021-06-10 PROCEDURE — 85027 COMPLETE CBC AUTOMATED: CPT | Performed by: NURSE PRACTITIONER

## 2021-06-10 PROCEDURE — 99239 HOSP IP/OBS DSCHRG MGMT >30: CPT | Mod: ,,, | Performed by: INTERNAL MEDICINE

## 2021-06-10 PROCEDURE — 25000242 PHARM REV CODE 250 ALT 637 W/ HCPCS: Performed by: NURSE PRACTITIONER

## 2021-06-10 PROCEDURE — 94640 AIRWAY INHALATION TREATMENT: CPT

## 2021-06-10 PROCEDURE — 25000003 PHARM REV CODE 250: Performed by: NURSE PRACTITIONER

## 2021-06-10 PROCEDURE — 85007 BL SMEAR W/DIFF WBC COUNT: CPT | Performed by: NURSE PRACTITIONER

## 2021-06-10 PROCEDURE — 84100 ASSAY OF PHOSPHORUS: CPT | Performed by: NURSE PRACTITIONER

## 2021-06-10 PROCEDURE — 80053 COMPREHEN METABOLIC PANEL: CPT | Performed by: NURSE PRACTITIONER

## 2021-06-10 PROCEDURE — 25000003 PHARM REV CODE 250: Performed by: INTERNAL MEDICINE

## 2021-06-10 PROCEDURE — 83735 ASSAY OF MAGNESIUM: CPT | Performed by: NURSE PRACTITIONER

## 2021-06-10 PROCEDURE — 99239 PR HOSPITAL DISCHARGE DAY,>30 MIN: ICD-10-PCS | Mod: ,,, | Performed by: INTERNAL MEDICINE

## 2021-06-10 PROCEDURE — 63600175 PHARM REV CODE 636 W HCPCS: Performed by: NURSE PRACTITIONER

## 2021-06-10 RX ORDER — LEVOFLOXACIN 750 MG/1
750 TABLET ORAL DAILY
Qty: 4 TABLET | Refills: 0 | Status: SHIPPED | OUTPATIENT
Start: 2021-06-10 | End: 2021-06-14

## 2021-06-10 RX ORDER — ESCITALOPRAM OXALATE 20 MG/1
30 TABLET ORAL DAILY
Start: 2021-06-10 | End: 2021-07-15 | Stop reason: SDUPTHER

## 2021-06-10 RX ADMIN — METOPROLOL SUCCINATE 50 MG: 50 TABLET, EXTENDED RELEASE ORAL at 08:06

## 2021-06-10 RX ADMIN — IPRATROPIUM BROMIDE AND ALBUTEROL SULFATE 3 ML: .5; 3 SOLUTION RESPIRATORY (INHALATION) at 07:06

## 2021-06-10 RX ADMIN — ASPIRIN 81 MG CHEWABLE TABLET 81 MG: 81 TABLET CHEWABLE at 08:06

## 2021-06-10 RX ADMIN — ESCITALOPRAM OXALATE 30 MG: 10 TABLET, FILM COATED ORAL at 08:06

## 2021-06-10 RX ADMIN — HEPARIN SODIUM 5000 UNITS: 5000 INJECTION INTRAVENOUS; SUBCUTANEOUS at 04:06

## 2021-06-10 RX ADMIN — LOSARTAN POTASSIUM 100 MG: 50 TABLET, FILM COATED ORAL at 08:06

## 2021-06-10 RX ADMIN — ATORVASTATIN CALCIUM 20 MG: 20 TABLET, FILM COATED ORAL at 08:06

## 2021-06-10 RX ADMIN — HYDROCHLOROTHIAZIDE 12.5 MG: 12.5 TABLET ORAL at 08:06

## 2021-06-12 LAB — BACTERIA BLD CULT: NORMAL

## 2021-06-13 LAB — BACTERIA BLD CULT: NORMAL

## 2021-06-16 ENCOUNTER — OFFICE VISIT (OUTPATIENT)
Dept: INTERNAL MEDICINE | Facility: CLINIC | Age: 71
End: 2021-06-16
Payer: MEDICARE

## 2021-06-16 ENCOUNTER — HOSPITAL ENCOUNTER (OUTPATIENT)
Dept: RADIOLOGY | Facility: HOSPITAL | Age: 71
Discharge: HOME | End: 2021-06-16
Attending: NURSE PRACTITIONER
Payer: MEDICARE

## 2021-06-16 VITALS
HEART RATE: 70 BPM | WEIGHT: 174 LBS | SYSTOLIC BLOOD PRESSURE: 136 MMHG | BODY MASS INDEX: 24.91 KG/M2 | DIASTOLIC BLOOD PRESSURE: 78 MMHG | HEIGHT: 70 IN | OXYGEN SATURATION: 97 %

## 2021-06-16 DIAGNOSIS — S99.922A INJURY OF TOE ON LEFT FOOT, INITIAL ENCOUNTER: ICD-10-CM

## 2021-06-16 DIAGNOSIS — R91.1 PULMONARY NODULE: ICD-10-CM

## 2021-06-16 DIAGNOSIS — Z87.01 HISTORY OF PNEUMONIA: Primary | ICD-10-CM

## 2021-06-16 PROCEDURE — 3288F PR FALLS RISK ASSESSMENT DOCUMENTED: ICD-10-PCS | Mod: CPTII,S$GLB,, | Performed by: NURSE PRACTITIONER

## 2021-06-16 PROCEDURE — 3288F FALL RISK ASSESSMENT DOCD: CPT | Mod: CPTII,S$GLB,, | Performed by: NURSE PRACTITIONER

## 2021-06-16 PROCEDURE — 1111F DSCHRG MED/CURRENT MED MERGE: CPT | Mod: CPTII,S$GLB,, | Performed by: NURSE PRACTITIONER

## 2021-06-16 PROCEDURE — 73630 XR FOOT COMPLETE 3 VIEW LEFT: ICD-10-PCS | Mod: 26,LT,, | Performed by: RADIOLOGY

## 2021-06-16 PROCEDURE — 3008F PR BODY MASS INDEX (BMI) DOCUMENTED: ICD-10-PCS | Mod: CPTII,S$GLB,, | Performed by: NURSE PRACTITIONER

## 2021-06-16 PROCEDURE — 1159F MED LIST DOCD IN RCRD: CPT | Mod: S$GLB,,, | Performed by: NURSE PRACTITIONER

## 2021-06-16 PROCEDURE — 99214 OFFICE O/P EST MOD 30 MIN: CPT | Mod: S$GLB,,, | Performed by: NURSE PRACTITIONER

## 2021-06-16 PROCEDURE — 99999 PR PBB SHADOW E&M-EST. PATIENT-LVL V: ICD-10-PCS | Mod: PBBFAC,,, | Performed by: NURSE PRACTITIONER

## 2021-06-16 PROCEDURE — 1101F PT FALLS ASSESS-DOCD LE1/YR: CPT | Mod: CPTII,S$GLB,, | Performed by: NURSE PRACTITIONER

## 2021-06-16 PROCEDURE — 99999 PR PBB SHADOW E&M-EST. PATIENT-LVL V: CPT | Mod: PBBFAC,,, | Performed by: NURSE PRACTITIONER

## 2021-06-16 PROCEDURE — 99214 PR OFFICE/OUTPT VISIT, EST, LEVL IV, 30-39 MIN: ICD-10-PCS | Mod: S$GLB,,, | Performed by: NURSE PRACTITIONER

## 2021-06-16 PROCEDURE — 1111F PR DISCHARGE MEDS RECONCILED W/ CURRENT OUTPATIENT MED LIST: ICD-10-PCS | Mod: CPTII,S$GLB,, | Performed by: NURSE PRACTITIONER

## 2021-06-16 PROCEDURE — 3008F BODY MASS INDEX DOCD: CPT | Mod: CPTII,S$GLB,, | Performed by: NURSE PRACTITIONER

## 2021-06-16 PROCEDURE — 1126F PR PAIN SEVERITY QUANTIFIED, NO PAIN PRESENT: ICD-10-PCS | Mod: S$GLB,,, | Performed by: NURSE PRACTITIONER

## 2021-06-16 PROCEDURE — 1159F PR MEDICATION LIST DOCUMENTED IN MEDICAL RECORD: ICD-10-PCS | Mod: S$GLB,,, | Performed by: NURSE PRACTITIONER

## 2021-06-16 PROCEDURE — 1126F AMNT PAIN NOTED NONE PRSNT: CPT | Mod: S$GLB,,, | Performed by: NURSE PRACTITIONER

## 2021-06-16 PROCEDURE — 73630 X-RAY EXAM OF FOOT: CPT | Mod: 26,LT,, | Performed by: RADIOLOGY

## 2021-06-16 PROCEDURE — 1101F PR PT FALLS ASSESS DOC 0-1 FALLS W/OUT INJ PAST YR: ICD-10-PCS | Mod: CPTII,S$GLB,, | Performed by: NURSE PRACTITIONER

## 2021-06-16 PROCEDURE — 73630 X-RAY EXAM OF FOOT: CPT | Mod: TC,LT

## 2021-06-17 ENCOUNTER — OFFICE VISIT (OUTPATIENT)
Dept: ORTHOPEDICS | Facility: CLINIC | Age: 71
End: 2021-06-17
Payer: MEDICARE

## 2021-06-17 ENCOUNTER — OFFICE VISIT (OUTPATIENT)
Dept: PULMONOLOGY | Facility: CLINIC | Age: 71
End: 2021-06-17
Payer: MEDICARE

## 2021-06-17 VITALS
BODY MASS INDEX: 25.06 KG/M2 | DIASTOLIC BLOOD PRESSURE: 62 MMHG | HEIGHT: 70 IN | OXYGEN SATURATION: 93 % | SYSTOLIC BLOOD PRESSURE: 124 MMHG | WEIGHT: 175.06 LBS | HEART RATE: 73 BPM

## 2021-06-17 VITALS — WEIGHT: 173.94 LBS | HEIGHT: 70 IN | BODY MASS INDEX: 24.9 KG/M2

## 2021-06-17 DIAGNOSIS — R06.02 SHORTNESS OF BREATH: ICD-10-CM

## 2021-06-17 DIAGNOSIS — R91.1 PULMONARY NODULE: ICD-10-CM

## 2021-06-17 DIAGNOSIS — R59.0 MEDIASTINAL ADENOPATHY: ICD-10-CM

## 2021-06-17 DIAGNOSIS — R06.09 DYSPNEA ON EXERTION: Primary | ICD-10-CM

## 2021-06-17 DIAGNOSIS — S99.922A INJURY OF TOE ON LEFT FOOT, INITIAL ENCOUNTER: ICD-10-CM

## 2021-06-17 DIAGNOSIS — J18.9 PNEUMONIA OF BOTH LOWER LOBES DUE TO INFECTIOUS ORGANISM: ICD-10-CM

## 2021-06-17 DIAGNOSIS — R91.1 LUNG NODULE: ICD-10-CM

## 2021-06-17 DIAGNOSIS — Z87.01 HISTORY OF PNEUMONIA: ICD-10-CM

## 2021-06-17 PROCEDURE — 3008F PR BODY MASS INDEX (BMI) DOCUMENTED: ICD-10-PCS | Mod: CPTII,S$GLB,, | Performed by: PHYSICIAN ASSISTANT

## 2021-06-17 PROCEDURE — 3008F BODY MASS INDEX DOCD: CPT | Mod: CPTII,S$GLB,, | Performed by: NURSE PRACTITIONER

## 2021-06-17 PROCEDURE — 1111F DSCHRG MED/CURRENT MED MERGE: CPT | Mod: CPTII,S$GLB,, | Performed by: PHYSICIAN ASSISTANT

## 2021-06-17 PROCEDURE — 3288F FALL RISK ASSESSMENT DOCD: CPT | Mod: CPTII,S$GLB,, | Performed by: PHYSICIAN ASSISTANT

## 2021-06-17 PROCEDURE — 1159F PR MEDICATION LIST DOCUMENTED IN MEDICAL RECORD: ICD-10-PCS | Mod: S$GLB,,, | Performed by: PHYSICIAN ASSISTANT

## 2021-06-17 PROCEDURE — 99214 PR OFFICE/OUTPT VISIT, EST, LEVL IV, 30-39 MIN: ICD-10-PCS | Mod: S$GLB,,, | Performed by: NURSE PRACTITIONER

## 2021-06-17 PROCEDURE — 3008F PR BODY MASS INDEX (BMI) DOCUMENTED: ICD-10-PCS | Mod: CPTII,S$GLB,, | Performed by: NURSE PRACTITIONER

## 2021-06-17 PROCEDURE — 3008F BODY MASS INDEX DOCD: CPT | Mod: CPTII,S$GLB,, | Performed by: PHYSICIAN ASSISTANT

## 2021-06-17 PROCEDURE — 3288F PR FALLS RISK ASSESSMENT DOCUMENTED: ICD-10-PCS | Mod: CPTII,S$GLB,, | Performed by: PHYSICIAN ASSISTANT

## 2021-06-17 PROCEDURE — 99203 OFFICE O/P NEW LOW 30 MIN: CPT | Mod: S$GLB,,, | Performed by: PHYSICIAN ASSISTANT

## 2021-06-17 PROCEDURE — 1101F PR PT FALLS ASSESS DOC 0-1 FALLS W/OUT INJ PAST YR: ICD-10-PCS | Mod: CPTII,S$GLB,, | Performed by: NURSE PRACTITIONER

## 2021-06-17 PROCEDURE — 99999 PR PBB SHADOW E&M-EST. PATIENT-LVL III: CPT | Mod: PBBFAC,,, | Performed by: PHYSICIAN ASSISTANT

## 2021-06-17 PROCEDURE — 3288F PR FALLS RISK ASSESSMENT DOCUMENTED: ICD-10-PCS | Mod: CPTII,S$GLB,, | Performed by: NURSE PRACTITIONER

## 2021-06-17 PROCEDURE — 1159F PR MEDICATION LIST DOCUMENTED IN MEDICAL RECORD: ICD-10-PCS | Mod: S$GLB,,, | Performed by: NURSE PRACTITIONER

## 2021-06-17 PROCEDURE — 1101F PT FALLS ASSESS-DOCD LE1/YR: CPT | Mod: CPTII,S$GLB,, | Performed by: NURSE PRACTITIONER

## 2021-06-17 PROCEDURE — 1111F PR DISCHARGE MEDS RECONCILED W/ CURRENT OUTPATIENT MED LIST: ICD-10-PCS | Mod: CPTII,S$GLB,, | Performed by: PHYSICIAN ASSISTANT

## 2021-06-17 PROCEDURE — 99999 PR PBB SHADOW E&M-EST. PATIENT-LVL IV: CPT | Mod: PBBFAC,,, | Performed by: NURSE PRACTITIONER

## 2021-06-17 PROCEDURE — 1159F MED LIST DOCD IN RCRD: CPT | Mod: S$GLB,,, | Performed by: NURSE PRACTITIONER

## 2021-06-17 PROCEDURE — 99203 PR OFFICE/OUTPT VISIT, NEW, LEVL III, 30-44 MIN: ICD-10-PCS | Mod: S$GLB,,, | Performed by: PHYSICIAN ASSISTANT

## 2021-06-17 PROCEDURE — 99999 PR PBB SHADOW E&M-EST. PATIENT-LVL IV: ICD-10-PCS | Mod: PBBFAC,,, | Performed by: NURSE PRACTITIONER

## 2021-06-17 PROCEDURE — 1111F PR DISCHARGE MEDS RECONCILED W/ CURRENT OUTPATIENT MED LIST: ICD-10-PCS | Mod: CPTII,S$GLB,, | Performed by: NURSE PRACTITIONER

## 2021-06-17 PROCEDURE — 99214 OFFICE O/P EST MOD 30 MIN: CPT | Mod: S$GLB,,, | Performed by: NURSE PRACTITIONER

## 2021-06-17 PROCEDURE — 3288F FALL RISK ASSESSMENT DOCD: CPT | Mod: CPTII,S$GLB,, | Performed by: NURSE PRACTITIONER

## 2021-06-17 PROCEDURE — 1101F PR PT FALLS ASSESS DOC 0-1 FALLS W/OUT INJ PAST YR: ICD-10-PCS | Mod: CPTII,S$GLB,, | Performed by: PHYSICIAN ASSISTANT

## 2021-06-17 PROCEDURE — 1125F PR PAIN SEVERITY QUANTIFIED, PAIN PRESENT: ICD-10-PCS | Mod: S$GLB,,, | Performed by: PHYSICIAN ASSISTANT

## 2021-06-17 PROCEDURE — 1126F AMNT PAIN NOTED NONE PRSNT: CPT | Mod: S$GLB,,, | Performed by: NURSE PRACTITIONER

## 2021-06-17 PROCEDURE — 1125F AMNT PAIN NOTED PAIN PRSNT: CPT | Mod: S$GLB,,, | Performed by: PHYSICIAN ASSISTANT

## 2021-06-17 PROCEDURE — 1126F PR PAIN SEVERITY QUANTIFIED, NO PAIN PRESENT: ICD-10-PCS | Mod: S$GLB,,, | Performed by: NURSE PRACTITIONER

## 2021-06-17 PROCEDURE — 1159F MED LIST DOCD IN RCRD: CPT | Mod: S$GLB,,, | Performed by: PHYSICIAN ASSISTANT

## 2021-06-17 PROCEDURE — 1111F DSCHRG MED/CURRENT MED MERGE: CPT | Mod: CPTII,S$GLB,, | Performed by: NURSE PRACTITIONER

## 2021-06-17 PROCEDURE — 1101F PT FALLS ASSESS-DOCD LE1/YR: CPT | Mod: CPTII,S$GLB,, | Performed by: PHYSICIAN ASSISTANT

## 2021-06-17 PROCEDURE — 99999 PR PBB SHADOW E&M-EST. PATIENT-LVL III: ICD-10-PCS | Mod: PBBFAC,,, | Performed by: PHYSICIAN ASSISTANT

## 2021-06-17 RX ORDER — ALBUTEROL SULFATE 90 UG/1
2 AEROSOL, METERED RESPIRATORY (INHALATION) EVERY 4 HOURS PRN
Qty: 18 G | Refills: 6 | Status: SHIPPED | OUTPATIENT
Start: 2021-06-17 | End: 2022-04-05 | Stop reason: SDUPTHER

## 2021-06-17 RX ORDER — INFLUENZA A VIRUS A/MICHIGAN/45/2015 X-275 (H1N1) ANTIGEN (FORMALDEHYDE INACTIVATED), INFLUENZA A VIRUS A/SINGAPORE/INFIMH-16-0019/2016 IVR-186 (H3N2) ANTIGEN (FORMALDEHYDE INACTIVATED), INFLUENZA B VIRUS B/PHUKET/3073/2013 ANTIGEN (FORMALDEHYDE INACTIVATED), AND INFLUENZA B VIRUS B/MARYLAND/15/2016 BX-69A ANTIGEN (FORMALDEHYDE INACTIVATED) 60; 60; 60; 60 UG/.7ML; UG/.7ML; UG/.7ML; UG/.7ML
INJECTION, SUSPENSION INTRAMUSCULAR
COMMUNITY
Start: 2020-12-22 | End: 2022-09-02 | Stop reason: CLARIF

## 2021-06-17 RX ORDER — AMLODIPINE BESYLATE 5 MG/1
5 TABLET ORAL DAILY
COMMUNITY
Start: 2021-01-18 | End: 2021-07-15

## 2021-06-17 RX ORDER — NAPROXEN 500 MG/1
500 TABLET ORAL 2 TIMES DAILY WITH MEALS
Qty: 28 TABLET | Refills: 0 | Status: SHIPPED | OUTPATIENT
Start: 2021-06-17 | End: 2021-07-17

## 2021-06-23 ENCOUNTER — HOSPITAL ENCOUNTER (OUTPATIENT)
Dept: PREADMISSION TESTING | Facility: OTHER | Age: 71
Discharge: HOME | End: 2021-06-23
Attending: ANESTHESIOLOGY
Payer: MEDICARE

## 2021-06-23 DIAGNOSIS — R06.02 SHORTNESS OF BREATH: ICD-10-CM

## 2021-06-23 LAB — SARS-COV-2 RDRP RESP QL NAA+PROBE: NEGATIVE

## 2021-06-23 PROCEDURE — U0002 COVID-19 LAB TEST NON-CDC: HCPCS | Performed by: NURSE PRACTITIONER

## 2021-06-25 ENCOUNTER — HOSPITAL ENCOUNTER (OUTPATIENT)
Dept: PULMONOLOGY | Facility: CLINIC | Age: 71
Discharge: HOME | End: 2021-06-25
Payer: MEDICARE

## 2021-06-25 DIAGNOSIS — R06.09 DYSPNEA ON EXERTION: ICD-10-CM

## 2021-06-25 PROCEDURE — 94729 PR C02/MEMBANE DIFFUSE CAPACITY: ICD-10-PCS | Mod: S$GLB,,, | Performed by: INTERNAL MEDICINE

## 2021-06-25 PROCEDURE — 94727 PR PULM FUNCTION TEST BY GAS: ICD-10-PCS | Mod: S$GLB,,, | Performed by: INTERNAL MEDICINE

## 2021-06-25 PROCEDURE — 94729 DIFFUSING CAPACITY: CPT | Mod: S$GLB,,, | Performed by: INTERNAL MEDICINE

## 2021-06-25 PROCEDURE — 94060 EVALUATION OF WHEEZING: CPT | Mod: S$GLB,,, | Performed by: INTERNAL MEDICINE

## 2021-06-25 PROCEDURE — 94727 GAS DIL/WSHOT DETER LNG VOL: CPT | Mod: S$GLB,,, | Performed by: INTERNAL MEDICINE

## 2021-06-25 PROCEDURE — 94060 PR EVAL OF BRONCHOSPASM: ICD-10-PCS | Mod: S$GLB,,, | Performed by: INTERNAL MEDICINE

## 2021-07-01 ENCOUNTER — PATIENT MESSAGE (OUTPATIENT)
Dept: INTERNAL MEDICINE | Facility: CLINIC | Age: 71
End: 2021-07-01

## 2021-07-05 RX ORDER — LORAZEPAM 0.5 MG/1
TABLET ORAL
Qty: 30 TABLET | Refills: 0 | OUTPATIENT
Start: 2021-07-05

## 2021-07-05 RX ORDER — LORAZEPAM 0.5 MG/1
TABLET ORAL
Qty: 30 TABLET | Refills: 0 | Status: SHIPPED | OUTPATIENT
Start: 2021-07-05 | End: 2021-09-17 | Stop reason: SDUPTHER

## 2021-07-13 ENCOUNTER — OFFICE VISIT (OUTPATIENT)
Dept: URGENT CARE | Facility: CLINIC | Age: 71
End: 2021-07-13
Payer: MEDICARE

## 2021-07-13 VITALS
WEIGHT: 175 LBS | HEART RATE: 68 BPM | TEMPERATURE: 98 F | RESPIRATION RATE: 18 BRPM | OXYGEN SATURATION: 91 % | DIASTOLIC BLOOD PRESSURE: 84 MMHG | SYSTOLIC BLOOD PRESSURE: 160 MMHG | BODY MASS INDEX: 25.05 KG/M2 | HEIGHT: 70 IN

## 2021-07-13 DIAGNOSIS — R05.9 COUGH: Primary | ICD-10-CM

## 2021-07-13 DIAGNOSIS — R06.02 SOB (SHORTNESS OF BREATH): ICD-10-CM

## 2021-07-13 LAB
CTP QC/QA: YES
SARS-COV-2 RDRP RESP QL NAA+PROBE: NEGATIVE

## 2021-07-13 PROCEDURE — 93010 ELECTROCARDIOGRAM REPORT: CPT | Mod: S$GLB,,, | Performed by: INTERNAL MEDICINE

## 2021-07-13 PROCEDURE — U0002: ICD-10-PCS | Mod: QW,S$GLB,, | Performed by: NURSE PRACTITIONER

## 2021-07-13 PROCEDURE — 3008F PR BODY MASS INDEX (BMI) DOCUMENTED: ICD-10-PCS | Mod: CPTII,S$GLB,, | Performed by: NURSE PRACTITIONER

## 2021-07-13 PROCEDURE — 3008F BODY MASS INDEX DOCD: CPT | Mod: CPTII,S$GLB,, | Performed by: NURSE PRACTITIONER

## 2021-07-13 PROCEDURE — 99214 PR OFFICE/OUTPT VISIT, EST, LEVL IV, 30-39 MIN: ICD-10-PCS | Mod: S$GLB,,, | Performed by: NURSE PRACTITIONER

## 2021-07-13 PROCEDURE — 71046 X-RAY EXAM CHEST 2 VIEWS: CPT | Mod: S$GLB,,, | Performed by: RADIOLOGY

## 2021-07-13 PROCEDURE — U0002 COVID-19 LAB TEST NON-CDC: HCPCS | Mod: QW,S$GLB,, | Performed by: NURSE PRACTITIONER

## 2021-07-13 PROCEDURE — 99214 OFFICE O/P EST MOD 30 MIN: CPT | Mod: S$GLB,,, | Performed by: NURSE PRACTITIONER

## 2021-07-13 PROCEDURE — 93005 ELECTROCARDIOGRAM TRACING: CPT | Mod: S$GLB,,, | Performed by: NURSE PRACTITIONER

## 2021-07-13 PROCEDURE — 93010 EKG 12-LEAD: ICD-10-PCS | Mod: S$GLB,,, | Performed by: INTERNAL MEDICINE

## 2021-07-13 PROCEDURE — 93005 EKG 12-LEAD: ICD-10-PCS | Mod: S$GLB,,, | Performed by: NURSE PRACTITIONER

## 2021-07-13 PROCEDURE — 71046 XR CHEST PA AND LATERAL: ICD-10-PCS | Mod: S$GLB,,, | Performed by: RADIOLOGY

## 2021-07-13 RX ORDER — FLUTICASONE PROPIONATE AND SALMETEROL 100; 50 UG/1; UG/1
1 POWDER RESPIRATORY (INHALATION) 2 TIMES DAILY
Qty: 60 EACH | Refills: 0 | Status: SHIPPED | OUTPATIENT
Start: 2021-07-13 | End: 2021-09-23

## 2021-07-15 ENCOUNTER — LAB VISIT (OUTPATIENT)
Dept: LAB | Facility: HOSPITAL | Age: 71
End: 2021-07-15
Attending: INTERNAL MEDICINE
Payer: MEDICARE

## 2021-07-15 ENCOUNTER — OFFICE VISIT (OUTPATIENT)
Dept: INTERNAL MEDICINE | Facility: CLINIC | Age: 71
End: 2021-07-15
Payer: MEDICARE

## 2021-07-15 VITALS
HEART RATE: 67 BPM | DIASTOLIC BLOOD PRESSURE: 92 MMHG | SYSTOLIC BLOOD PRESSURE: 142 MMHG | WEIGHT: 181.44 LBS | OXYGEN SATURATION: 94 % | HEIGHT: 70 IN | BODY MASS INDEX: 25.98 KG/M2

## 2021-07-15 DIAGNOSIS — F32.A DEPRESSION, UNSPECIFIED DEPRESSION TYPE: Chronic | ICD-10-CM

## 2021-07-15 DIAGNOSIS — F41.9 ANXIETY: Chronic | ICD-10-CM

## 2021-07-15 DIAGNOSIS — D64.9 ANEMIA, UNSPECIFIED TYPE: ICD-10-CM

## 2021-07-15 DIAGNOSIS — R05.9 COUGH: ICD-10-CM

## 2021-07-15 DIAGNOSIS — D64.9 ANEMIA, UNSPECIFIED TYPE: Primary | ICD-10-CM

## 2021-07-15 PROCEDURE — 99999 PR PBB SHADOW E&M-EST. PATIENT-LVL IV: CPT | Mod: PBBFAC,,, | Performed by: INTERNAL MEDICINE

## 2021-07-15 PROCEDURE — 1159F MED LIST DOCD IN RCRD: CPT | Mod: CPTII,S$GLB,, | Performed by: INTERNAL MEDICINE

## 2021-07-15 PROCEDURE — 1159F PR MEDICATION LIST DOCUMENTED IN MEDICAL RECORD: ICD-10-PCS | Mod: CPTII,S$GLB,, | Performed by: INTERNAL MEDICINE

## 2021-07-15 PROCEDURE — 82728 ASSAY OF FERRITIN: CPT | Performed by: INTERNAL MEDICINE

## 2021-07-15 PROCEDURE — 85025 COMPLETE CBC W/AUTO DIFF WBC: CPT | Performed by: INTERNAL MEDICINE

## 2021-07-15 PROCEDURE — 1126F AMNT PAIN NOTED NONE PRSNT: CPT | Mod: CPTII,S$GLB,, | Performed by: INTERNAL MEDICINE

## 2021-07-15 PROCEDURE — 3288F PR FALLS RISK ASSESSMENT DOCUMENTED: ICD-10-PCS | Mod: CPTII,S$GLB,, | Performed by: INTERNAL MEDICINE

## 2021-07-15 PROCEDURE — 3008F PR BODY MASS INDEX (BMI) DOCUMENTED: ICD-10-PCS | Mod: CPTII,S$GLB,, | Performed by: INTERNAL MEDICINE

## 2021-07-15 PROCEDURE — 99214 PR OFFICE/OUTPT VISIT, EST, LEVL IV, 30-39 MIN: ICD-10-PCS | Mod: S$GLB,,, | Performed by: INTERNAL MEDICINE

## 2021-07-15 PROCEDURE — 1101F PT FALLS ASSESS-DOCD LE1/YR: CPT | Mod: CPTII,S$GLB,, | Performed by: INTERNAL MEDICINE

## 2021-07-15 PROCEDURE — 1101F PR PT FALLS ASSESS DOC 0-1 FALLS W/OUT INJ PAST YR: ICD-10-PCS | Mod: CPTII,S$GLB,, | Performed by: INTERNAL MEDICINE

## 2021-07-15 PROCEDURE — 99999 PR PBB SHADOW E&M-EST. PATIENT-LVL IV: ICD-10-PCS | Mod: PBBFAC,,, | Performed by: INTERNAL MEDICINE

## 2021-07-15 PROCEDURE — 3288F FALL RISK ASSESSMENT DOCD: CPT | Mod: CPTII,S$GLB,, | Performed by: INTERNAL MEDICINE

## 2021-07-15 PROCEDURE — 1126F PR PAIN SEVERITY QUANTIFIED, NO PAIN PRESENT: ICD-10-PCS | Mod: CPTII,S$GLB,, | Performed by: INTERNAL MEDICINE

## 2021-07-15 PROCEDURE — 36415 COLL VENOUS BLD VENIPUNCTURE: CPT | Performed by: INTERNAL MEDICINE

## 2021-07-15 PROCEDURE — 99214 OFFICE O/P EST MOD 30 MIN: CPT | Mod: S$GLB,,, | Performed by: INTERNAL MEDICINE

## 2021-07-15 PROCEDURE — 83540 ASSAY OF IRON: CPT | Performed by: INTERNAL MEDICINE

## 2021-07-15 PROCEDURE — 3008F BODY MASS INDEX DOCD: CPT | Mod: CPTII,S$GLB,, | Performed by: INTERNAL MEDICINE

## 2021-07-15 RX ORDER — AMLODIPINE BESYLATE 2.5 MG/1
2.5 TABLET ORAL DAILY
Qty: 90 TABLET | Refills: 3 | Status: SHIPPED | OUTPATIENT
Start: 2021-07-15 | End: 2022-03-24 | Stop reason: SDUPTHER

## 2021-07-15 RX ORDER — ERGOCALCIFEROL 1.25 MG/1
CAPSULE ORAL
Qty: 3 CAPSULE | Refills: 3 | Status: SHIPPED | OUTPATIENT
Start: 2021-07-15 | End: 2021-09-23 | Stop reason: SDUPTHER

## 2021-07-15 RX ORDER — ESCITALOPRAM OXALATE 20 MG/1
20 TABLET ORAL DAILY
Qty: 90 TABLET | Refills: 3 | Status: SHIPPED | OUTPATIENT
Start: 2021-07-15 | End: 2022-03-24 | Stop reason: SDUPTHER

## 2021-07-16 LAB
ANISOCYTOSIS BLD QL SMEAR: SLIGHT
BASOPHILS # BLD AUTO: 0.07 K/UL (ref 0–0.2)
BASOPHILS NFR BLD: 0.7 % (ref 0–1.9)
BURR CELLS BLD QL SMEAR: ABNORMAL
DIFFERENTIAL METHOD: ABNORMAL
EOSINOPHIL # BLD AUTO: 3 K/UL (ref 0–0.5)
EOSINOPHIL NFR BLD: 30.3 % (ref 0–8)
ERYTHROCYTE [DISTWIDTH] IN BLOOD BY AUTOMATED COUNT: 16.9 % (ref 11.5–14.5)
FERRITIN SERPL-MCNC: 23 NG/ML (ref 20–300)
HCT VFR BLD AUTO: 38.5 % (ref 37–48.5)
HGB BLD-MCNC: 12.5 G/DL (ref 12–16)
IMM GRANULOCYTES # BLD AUTO: 0.02 K/UL (ref 0–0.04)
IMM GRANULOCYTES NFR BLD AUTO: 0.2 % (ref 0–0.5)
IRON SERPL-MCNC: 110 UG/DL (ref 30–160)
LYMPHOCYTES # BLD AUTO: 2.2 K/UL (ref 1–4.8)
LYMPHOCYTES NFR BLD: 22.4 % (ref 18–48)
MCH RBC QN AUTO: 28.2 PG (ref 27–31)
MCHC RBC AUTO-ENTMCNC: 32.5 G/DL (ref 32–36)
MCV RBC AUTO: 87 FL (ref 82–98)
MONOCYTES # BLD AUTO: 0.5 K/UL (ref 0.3–1)
MONOCYTES NFR BLD: 5.3 % (ref 4–15)
NEUTROPHILS # BLD AUTO: 4 K/UL (ref 1.8–7.7)
NEUTROPHILS NFR BLD: 41.1 % (ref 38–73)
NRBC BLD-RTO: 0 /100 WBC
PLATELET # BLD AUTO: 245 K/UL (ref 150–450)
PLATELET BLD QL SMEAR: ABNORMAL
PMV BLD AUTO: 11.6 FL (ref 9.2–12.9)
POIKILOCYTOSIS BLD QL SMEAR: ABNORMAL
RBC # BLD AUTO: 4.43 M/UL (ref 4–5.4)
SATURATED IRON: 30 % (ref 20–50)
TOTAL IRON BINDING CAPACITY: 369 UG/DL (ref 250–450)
TRANSFERRIN SERPL-MCNC: 249 MG/DL (ref 200–375)
WBC # BLD AUTO: 9.76 K/UL (ref 3.9–12.7)

## 2021-07-19 ENCOUNTER — HOSPITAL ENCOUNTER (OUTPATIENT)
Dept: RADIOLOGY | Facility: HOSPITAL | Age: 71
Discharge: HOME | End: 2021-07-19
Attending: INTERNAL MEDICINE
Payer: MEDICARE

## 2021-07-19 DIAGNOSIS — R05.9 COUGH: ICD-10-CM

## 2021-07-19 PROCEDURE — 71250 CT THORAX DX C-: CPT | Mod: TC

## 2021-07-19 PROCEDURE — 71250 CT THORAX DX C-: CPT | Mod: 26,,, | Performed by: RADIOLOGY

## 2021-07-19 PROCEDURE — 71250 CT CHEST WITHOUT CONTRAST: ICD-10-PCS | Mod: 26,,, | Performed by: RADIOLOGY

## 2021-09-22 ENCOUNTER — TELEPHONE (OUTPATIENT)
Dept: INTERNAL MEDICINE | Facility: CLINIC | Age: 71
End: 2021-09-22

## 2021-09-23 ENCOUNTER — LAB VISIT (OUTPATIENT)
Dept: LAB | Facility: HOSPITAL | Age: 71
End: 2021-09-23
Attending: INTERNAL MEDICINE
Payer: MEDICARE

## 2021-09-23 ENCOUNTER — OFFICE VISIT (OUTPATIENT)
Dept: INTERNAL MEDICINE | Facility: CLINIC | Age: 71
End: 2021-09-23
Payer: MEDICARE

## 2021-09-23 VITALS
WEIGHT: 181 LBS | OXYGEN SATURATION: 97 % | SYSTOLIC BLOOD PRESSURE: 134 MMHG | HEART RATE: 67 BPM | DIASTOLIC BLOOD PRESSURE: 64 MMHG | BODY MASS INDEX: 25.91 KG/M2 | TEMPERATURE: 98 F | HEIGHT: 70 IN

## 2021-09-23 DIAGNOSIS — R41.3 MEMORY CHANGES: Primary | ICD-10-CM

## 2021-09-23 DIAGNOSIS — I10 ESSENTIAL HYPERTENSION: Chronic | ICD-10-CM

## 2021-09-23 DIAGNOSIS — N95.2 VAGINAL ATROPHY: ICD-10-CM

## 2021-09-23 DIAGNOSIS — R87.629 ABNORMAL PAP SMEAR OF VAGINA: ICD-10-CM

## 2021-09-23 DIAGNOSIS — K42.9 UMBILICAL HERNIA WITHOUT OBSTRUCTION AND WITHOUT GANGRENE: ICD-10-CM

## 2021-09-23 DIAGNOSIS — N90.89 VULVAR IRRITATION: ICD-10-CM

## 2021-09-23 DIAGNOSIS — F33.9 EPISODE OF RECURRENT MAJOR DEPRESSIVE DISORDER, UNSPECIFIED DEPRESSION EPISODE SEVERITY: ICD-10-CM

## 2021-09-23 DIAGNOSIS — R93.89 ABNORMAL CHEST CT: ICD-10-CM

## 2021-09-23 DIAGNOSIS — R41.3 MEMORY CHANGES: ICD-10-CM

## 2021-09-23 LAB
ALBUMIN SERPL BCP-MCNC: 3.4 G/DL (ref 3.5–5.2)
ALP SERPL-CCNC: 156 U/L (ref 55–135)
ALT SERPL W/O P-5'-P-CCNC: 21 U/L (ref 10–44)
ANION GAP SERPL CALC-SCNC: 12 MMOL/L (ref 8–16)
AST SERPL-CCNC: 20 U/L (ref 10–40)
BILIRUB SERPL-MCNC: 0.4 MG/DL (ref 0.1–1)
BUN SERPL-MCNC: 18 MG/DL (ref 8–23)
CALCIUM SERPL-MCNC: 10.1 MG/DL (ref 8.7–10.5)
CHLORIDE SERPL-SCNC: 104 MMOL/L (ref 95–110)
CO2 SERPL-SCNC: 25 MMOL/L (ref 23–29)
CREAT SERPL-MCNC: 0.8 MG/DL (ref 0.5–1.4)
EST. GFR  (AFRICAN AMERICAN): >60 ML/MIN/1.73 M^2
EST. GFR  (NON AFRICAN AMERICAN): >60 ML/MIN/1.73 M^2
GLUCOSE SERPL-MCNC: 53 MG/DL (ref 70–110)
POTASSIUM SERPL-SCNC: 3.8 MMOL/L (ref 3.5–5.1)
PROT SERPL-MCNC: 7.1 G/DL (ref 6–8.4)
SODIUM SERPL-SCNC: 141 MMOL/L (ref 136–145)
TSH SERPL DL<=0.005 MIU/L-ACNC: 2.41 UIU/ML (ref 0.4–4)
VIT B12 SERPL-MCNC: 1875 PG/ML (ref 210–950)

## 2021-09-23 PROCEDURE — 3075F PR MOST RECENT SYSTOLIC BLOOD PRESS GE 130-139MM HG: ICD-10-PCS | Mod: CPTII,S$GLB,, | Performed by: INTERNAL MEDICINE

## 2021-09-23 PROCEDURE — 1101F PT FALLS ASSESS-DOCD LE1/YR: CPT | Mod: CPTII,S$GLB,, | Performed by: INTERNAL MEDICINE

## 2021-09-23 PROCEDURE — 1159F MED LIST DOCD IN RCRD: CPT | Mod: CPTII,S$GLB,, | Performed by: INTERNAL MEDICINE

## 2021-09-23 PROCEDURE — 3044F PR MOST RECENT HEMOGLOBIN A1C LEVEL <7.0%: ICD-10-PCS | Mod: CPTII,S$GLB,, | Performed by: INTERNAL MEDICINE

## 2021-09-23 PROCEDURE — 1126F PR PAIN SEVERITY QUANTIFIED, NO PAIN PRESENT: ICD-10-PCS | Mod: CPTII,S$GLB,, | Performed by: INTERNAL MEDICINE

## 2021-09-23 PROCEDURE — 83921 ORGANIC ACID SINGLE QUANT: CPT | Performed by: INTERNAL MEDICINE

## 2021-09-23 PROCEDURE — 3008F BODY MASS INDEX DOCD: CPT | Mod: CPTII,S$GLB,, | Performed by: INTERNAL MEDICINE

## 2021-09-23 PROCEDURE — 99499 RISK ADDL DX/OHS AUDIT: ICD-10-PCS | Mod: S$GLB,,, | Performed by: INTERNAL MEDICINE

## 2021-09-23 PROCEDURE — 3044F HG A1C LEVEL LT 7.0%: CPT | Mod: CPTII,S$GLB,, | Performed by: INTERNAL MEDICINE

## 2021-09-23 PROCEDURE — 80053 COMPREHEN METABOLIC PANEL: CPT | Performed by: INTERNAL MEDICINE

## 2021-09-23 PROCEDURE — 1159F PR MEDICATION LIST DOCUMENTED IN MEDICAL RECORD: ICD-10-PCS | Mod: CPTII,S$GLB,, | Performed by: INTERNAL MEDICINE

## 2021-09-23 PROCEDURE — 99214 PR OFFICE/OUTPT VISIT, EST, LEVL IV, 30-39 MIN: ICD-10-PCS | Mod: S$GLB,,, | Performed by: INTERNAL MEDICINE

## 2021-09-23 PROCEDURE — 3078F DIAST BP <80 MM HG: CPT | Mod: CPTII,S$GLB,, | Performed by: INTERNAL MEDICINE

## 2021-09-23 PROCEDURE — 3288F PR FALLS RISK ASSESSMENT DOCUMENTED: ICD-10-PCS | Mod: CPTII,S$GLB,, | Performed by: INTERNAL MEDICINE

## 2021-09-23 PROCEDURE — 99499 UNLISTED E&M SERVICE: CPT | Mod: S$GLB,,, | Performed by: INTERNAL MEDICINE

## 2021-09-23 PROCEDURE — 86592 SYPHILIS TEST NON-TREP QUAL: CPT | Performed by: INTERNAL MEDICINE

## 2021-09-23 PROCEDURE — 84443 ASSAY THYROID STIM HORMONE: CPT | Performed by: INTERNAL MEDICINE

## 2021-09-23 PROCEDURE — 3078F PR MOST RECENT DIASTOLIC BLOOD PRESSURE < 80 MM HG: ICD-10-PCS | Mod: CPTII,S$GLB,, | Performed by: INTERNAL MEDICINE

## 2021-09-23 PROCEDURE — 3288F FALL RISK ASSESSMENT DOCD: CPT | Mod: CPTII,S$GLB,, | Performed by: INTERNAL MEDICINE

## 2021-09-23 PROCEDURE — 3008F PR BODY MASS INDEX (BMI) DOCUMENTED: ICD-10-PCS | Mod: CPTII,S$GLB,, | Performed by: INTERNAL MEDICINE

## 2021-09-23 PROCEDURE — 99999 PR PBB SHADOW E&M-EST. PATIENT-LVL V: ICD-10-PCS | Mod: PBBFAC,,, | Performed by: INTERNAL MEDICINE

## 2021-09-23 PROCEDURE — 99999 PR PBB SHADOW E&M-EST. PATIENT-LVL V: CPT | Mod: PBBFAC,,, | Performed by: INTERNAL MEDICINE

## 2021-09-23 PROCEDURE — 1101F PR PT FALLS ASSESS DOC 0-1 FALLS W/OUT INJ PAST YR: ICD-10-PCS | Mod: CPTII,S$GLB,, | Performed by: INTERNAL MEDICINE

## 2021-09-23 PROCEDURE — 3075F SYST BP GE 130 - 139MM HG: CPT | Mod: CPTII,S$GLB,, | Performed by: INTERNAL MEDICINE

## 2021-09-23 PROCEDURE — 1126F AMNT PAIN NOTED NONE PRSNT: CPT | Mod: CPTII,S$GLB,, | Performed by: INTERNAL MEDICINE

## 2021-09-23 PROCEDURE — 82607 VITAMIN B-12: CPT | Performed by: INTERNAL MEDICINE

## 2021-09-23 PROCEDURE — 99214 OFFICE O/P EST MOD 30 MIN: CPT | Mod: S$GLB,,, | Performed by: INTERNAL MEDICINE

## 2021-09-23 RX ORDER — METOPROLOL SUCCINATE 50 MG/1
50 TABLET, EXTENDED RELEASE ORAL DAILY
Qty: 90 TABLET | Refills: 3 | Status: SHIPPED | OUTPATIENT
Start: 2021-09-23 | End: 2022-03-24 | Stop reason: SDUPTHER

## 2021-09-23 RX ORDER — ESTRADIOL 0.1 MG/G
CREAM VAGINAL
Qty: 42.5 G | Refills: 4 | Status: SHIPPED | OUTPATIENT
Start: 2021-09-23 | End: 2022-05-04 | Stop reason: SDUPTHER

## 2021-09-23 RX ORDER — IRBESARTAN AND HYDROCHLOROTHIAZIDE 300; 12.5 MG/1; MG/1
1 TABLET, FILM COATED ORAL DAILY
Qty: 90 TABLET | Refills: 0 | Status: SHIPPED | OUTPATIENT
Start: 2021-09-23 | End: 2021-12-27

## 2021-09-23 RX ORDER — ERGOCALCIFEROL 1.25 MG/1
CAPSULE ORAL
Qty: 3 CAPSULE | Refills: 3 | Status: SHIPPED | OUTPATIENT
Start: 2021-09-23 | End: 2022-04-25 | Stop reason: SDUPTHER

## 2021-09-23 RX ORDER — ATORVASTATIN CALCIUM 20 MG/1
20 TABLET, FILM COATED ORAL DAILY
Qty: 90 TABLET | Refills: 1 | Status: SHIPPED | OUTPATIENT
Start: 2021-09-23 | End: 2022-03-24 | Stop reason: SDUPTHER

## 2021-09-24 LAB — RPR SER QL: NORMAL

## 2021-09-27 ENCOUNTER — PATIENT OUTREACH (OUTPATIENT)
Dept: ADMINISTRATIVE | Facility: OTHER | Age: 71
End: 2021-09-27

## 2021-09-28 LAB — METHYLMALONATE SERPL-SCNC: 0.18 UMOL/L

## 2021-09-29 ENCOUNTER — TELEPHONE (OUTPATIENT)
Dept: SURGERY | Facility: CLINIC | Age: 71
End: 2021-09-29

## 2021-10-01 ENCOUNTER — HOSPITAL ENCOUNTER (OUTPATIENT)
Dept: RADIOLOGY | Facility: HOSPITAL | Age: 71
Discharge: HOME | End: 2021-10-01
Attending: INTERNAL MEDICINE
Payer: MEDICARE

## 2021-10-01 DIAGNOSIS — R93.89 ABNORMAL CHEST CT: ICD-10-CM

## 2021-10-01 PROCEDURE — 71250 CT THORAX DX C-: CPT | Mod: TC

## 2021-10-01 PROCEDURE — 71250 CT CHEST WITHOUT CONTRAST: ICD-10-PCS | Mod: 26,,, | Performed by: INTERNAL MEDICINE

## 2021-10-01 PROCEDURE — 71250 CT THORAX DX C-: CPT | Mod: 26,,, | Performed by: INTERNAL MEDICINE

## 2021-10-06 ENCOUNTER — HOSPITAL ENCOUNTER (OUTPATIENT)
Dept: RADIOLOGY | Facility: HOSPITAL | Age: 71
Discharge: HOME | End: 2021-10-06
Attending: INTERNAL MEDICINE
Payer: MEDICARE

## 2021-10-06 DIAGNOSIS — R41.3 MEMORY CHANGES: ICD-10-CM

## 2021-10-06 PROCEDURE — 70551 MRI BRAIN STEM W/O DYE: CPT | Mod: 26,,, | Performed by: RADIOLOGY

## 2021-10-06 PROCEDURE — 70551 MRI BRAIN STEM W/O DYE: CPT | Mod: TC

## 2021-10-06 PROCEDURE — 70551 MRI BRAIN WITHOUT CONTRAST: ICD-10-PCS | Mod: 26,,, | Performed by: RADIOLOGY

## 2021-10-18 ENCOUNTER — TELEPHONE (OUTPATIENT)
Dept: SURGERY | Facility: CLINIC | Age: 71
End: 2021-10-18

## 2021-10-19 ENCOUNTER — OFFICE VISIT (OUTPATIENT)
Dept: SURGERY | Facility: CLINIC | Age: 71
End: 2021-10-19
Payer: MEDICARE

## 2021-10-19 ENCOUNTER — LAB VISIT (OUTPATIENT)
Dept: LAB | Facility: HOSPITAL | Age: 71
End: 2021-10-19
Attending: SURGERY
Payer: MEDICARE

## 2021-10-19 VITALS
WEIGHT: 178.69 LBS | HEIGHT: 70 IN | BODY MASS INDEX: 25.58 KG/M2 | SYSTOLIC BLOOD PRESSURE: 146 MMHG | DIASTOLIC BLOOD PRESSURE: 70 MMHG | HEART RATE: 62 BPM

## 2021-10-19 DIAGNOSIS — K43.9 VENTRAL HERNIA WITHOUT OBSTRUCTION OR GANGRENE: ICD-10-CM

## 2021-10-19 DIAGNOSIS — K43.9 VENTRAL HERNIA WITHOUT OBSTRUCTION OR GANGRENE: Primary | ICD-10-CM

## 2021-10-19 LAB
ANION GAP SERPL CALC-SCNC: 6 MMOL/L (ref 8–16)
BASOPHILS # BLD AUTO: 0.07 K/UL (ref 0–0.2)
BASOPHILS NFR BLD: 0.7 % (ref 0–1.9)
BUN SERPL-MCNC: 17 MG/DL (ref 8–23)
CALCIUM SERPL-MCNC: 10.3 MG/DL (ref 8.7–10.5)
CHLORIDE SERPL-SCNC: 102 MMOL/L (ref 95–110)
CO2 SERPL-SCNC: 30 MMOL/L (ref 23–29)
CREAT SERPL-MCNC: 0.8 MG/DL (ref 0.5–1.4)
DIFFERENTIAL METHOD: ABNORMAL
EOSINOPHIL # BLD AUTO: 1.1 K/UL (ref 0–0.5)
EOSINOPHIL NFR BLD: 11.8 % (ref 0–8)
ERYTHROCYTE [DISTWIDTH] IN BLOOD BY AUTOMATED COUNT: 15.7 % (ref 11.5–14.5)
EST. GFR  (AFRICAN AMERICAN): >60 ML/MIN/1.73 M^2
EST. GFR  (NON AFRICAN AMERICAN): >60 ML/MIN/1.73 M^2
GLUCOSE SERPL-MCNC: 75 MG/DL (ref 70–110)
HCT VFR BLD AUTO: 39.2 % (ref 37–48.5)
HGB BLD-MCNC: 12.6 G/DL (ref 12–16)
IMM GRANULOCYTES # BLD AUTO: 0.02 K/UL (ref 0–0.04)
IMM GRANULOCYTES NFR BLD AUTO: 0.2 % (ref 0–0.5)
LYMPHOCYTES # BLD AUTO: 2.6 K/UL (ref 1–4.8)
LYMPHOCYTES NFR BLD: 27.1 % (ref 18–48)
MCH RBC QN AUTO: 28.3 PG (ref 27–31)
MCHC RBC AUTO-ENTMCNC: 32.1 G/DL (ref 32–36)
MCV RBC AUTO: 88 FL (ref 82–98)
MONOCYTES # BLD AUTO: 0.6 K/UL (ref 0.3–1)
MONOCYTES NFR BLD: 6.5 % (ref 4–15)
NEUTROPHILS # BLD AUTO: 5.2 K/UL (ref 1.8–7.7)
NEUTROPHILS NFR BLD: 53.7 % (ref 38–73)
NRBC BLD-RTO: 0 /100 WBC
PLATELET # BLD AUTO: 319 K/UL (ref 150–450)
PMV BLD AUTO: 10.2 FL (ref 9.2–12.9)
POTASSIUM SERPL-SCNC: 4 MMOL/L (ref 3.5–5.1)
RBC # BLD AUTO: 4.45 M/UL (ref 4–5.4)
SODIUM SERPL-SCNC: 138 MMOL/L (ref 136–145)
WBC # BLD AUTO: 9.68 K/UL (ref 3.9–12.7)

## 2021-10-19 PROCEDURE — 3077F PR MOST RECENT SYSTOLIC BLOOD PRESSURE >= 140 MM HG: ICD-10-PCS | Mod: CPTII,S$GLB,, | Performed by: SURGERY

## 2021-10-19 PROCEDURE — 3288F PR FALLS RISK ASSESSMENT DOCUMENTED: ICD-10-PCS | Mod: CPTII,S$GLB,, | Performed by: SURGERY

## 2021-10-19 PROCEDURE — 3288F FALL RISK ASSESSMENT DOCD: CPT | Mod: CPTII,S$GLB,, | Performed by: SURGERY

## 2021-10-19 PROCEDURE — 1125F PR PAIN SEVERITY QUANTIFIED, PAIN PRESENT: ICD-10-PCS | Mod: CPTII,S$GLB,, | Performed by: SURGERY

## 2021-10-19 PROCEDURE — 3044F PR MOST RECENT HEMOGLOBIN A1C LEVEL <7.0%: ICD-10-PCS | Mod: CPTII,S$GLB,, | Performed by: SURGERY

## 2021-10-19 PROCEDURE — 99203 PR OFFICE/OUTPT VISIT, NEW, LEVL III, 30-44 MIN: ICD-10-PCS | Mod: S$GLB,,, | Performed by: SURGERY

## 2021-10-19 PROCEDURE — 1101F PT FALLS ASSESS-DOCD LE1/YR: CPT | Mod: CPTII,S$GLB,, | Performed by: SURGERY

## 2021-10-19 PROCEDURE — 36415 COLL VENOUS BLD VENIPUNCTURE: CPT | Performed by: SURGERY

## 2021-10-19 PROCEDURE — 3077F SYST BP >= 140 MM HG: CPT | Mod: CPTII,S$GLB,, | Performed by: SURGERY

## 2021-10-19 PROCEDURE — 3044F HG A1C LEVEL LT 7.0%: CPT | Mod: CPTII,S$GLB,, | Performed by: SURGERY

## 2021-10-19 PROCEDURE — 1159F PR MEDICATION LIST DOCUMENTED IN MEDICAL RECORD: ICD-10-PCS | Mod: CPTII,S$GLB,, | Performed by: SURGERY

## 2021-10-19 PROCEDURE — 99999 PR PBB SHADOW E&M-EST. PATIENT-LVL III: ICD-10-PCS | Mod: PBBFAC,,, | Performed by: SURGERY

## 2021-10-19 PROCEDURE — 1101F PR PT FALLS ASSESS DOC 0-1 FALLS W/OUT INJ PAST YR: ICD-10-PCS | Mod: CPTII,S$GLB,, | Performed by: SURGERY

## 2021-10-19 PROCEDURE — 99203 OFFICE O/P NEW LOW 30 MIN: CPT | Mod: S$GLB,,, | Performed by: SURGERY

## 2021-10-19 PROCEDURE — 1160F PR REVIEW ALL MEDS BY PRESCRIBER/CLIN PHARMACIST DOCUMENTED: ICD-10-PCS | Mod: CPTII,S$GLB,, | Performed by: SURGERY

## 2021-10-19 PROCEDURE — 85025 COMPLETE CBC W/AUTO DIFF WBC: CPT | Performed by: SURGERY

## 2021-10-19 PROCEDURE — 3078F DIAST BP <80 MM HG: CPT | Mod: CPTII,S$GLB,, | Performed by: SURGERY

## 2021-10-19 PROCEDURE — 99999 PR PBB SHADOW E&M-EST. PATIENT-LVL III: CPT | Mod: PBBFAC,,, | Performed by: SURGERY

## 2021-10-19 PROCEDURE — 1160F RVW MEDS BY RX/DR IN RCRD: CPT | Mod: CPTII,S$GLB,, | Performed by: SURGERY

## 2021-10-19 PROCEDURE — 3008F PR BODY MASS INDEX (BMI) DOCUMENTED: ICD-10-PCS | Mod: CPTII,S$GLB,, | Performed by: SURGERY

## 2021-10-19 PROCEDURE — 3008F BODY MASS INDEX DOCD: CPT | Mod: CPTII,S$GLB,, | Performed by: SURGERY

## 2021-10-19 PROCEDURE — 3078F PR MOST RECENT DIASTOLIC BLOOD PRESSURE < 80 MM HG: ICD-10-PCS | Mod: CPTII,S$GLB,, | Performed by: SURGERY

## 2021-10-19 PROCEDURE — 80048 BASIC METABOLIC PNL TOTAL CA: CPT | Performed by: SURGERY

## 2021-10-19 PROCEDURE — 1159F MED LIST DOCD IN RCRD: CPT | Mod: CPTII,S$GLB,, | Performed by: SURGERY

## 2021-10-19 PROCEDURE — 1125F AMNT PAIN NOTED PAIN PRSNT: CPT | Mod: CPTII,S$GLB,, | Performed by: SURGERY

## 2021-11-11 ENCOUNTER — HOSPITAL ENCOUNTER (OUTPATIENT)
Dept: RADIOLOGY | Facility: HOSPITAL | Age: 71
Discharge: HOME | End: 2021-11-11
Attending: NURSE PRACTITIONER
Payer: MEDICARE

## 2021-11-11 DIAGNOSIS — R91.1 PULMONARY NODULE: ICD-10-CM

## 2021-11-11 DIAGNOSIS — Z87.01 HISTORY OF PNEUMONIA: ICD-10-CM

## 2021-11-11 PROCEDURE — 71046 X-RAY EXAM CHEST 2 VIEWS: CPT | Mod: TC

## 2021-11-11 PROCEDURE — 71046 X-RAY EXAM CHEST 2 VIEWS: CPT | Mod: 26,,, | Performed by: RADIOLOGY

## 2021-11-11 PROCEDURE — 71046 XR CHEST PA AND LATERAL: ICD-10-PCS | Mod: 26,,, | Performed by: RADIOLOGY

## 2021-11-22 ENCOUNTER — TELEPHONE (OUTPATIENT)
Dept: SURGERY | Facility: CLINIC | Age: 71
End: 2021-11-22
Payer: MEDICARE

## 2021-11-24 ENCOUNTER — PES CALL (OUTPATIENT)
Dept: ADMINISTRATIVE | Facility: CLINIC | Age: 71
End: 2021-11-24
Payer: MEDICARE

## 2021-11-26 ENCOUNTER — ANESTHESIA EVENT (OUTPATIENT)
Dept: SURGERY | Facility: HOSPITAL | Age: 71
End: 2021-11-26
Payer: MEDICARE

## 2021-11-26 ENCOUNTER — TELEPHONE (OUTPATIENT)
Dept: SURGERY | Facility: CLINIC | Age: 71
End: 2021-11-26
Payer: MEDICARE

## 2021-11-29 ENCOUNTER — ANESTHESIA (OUTPATIENT)
Dept: SURGERY | Facility: HOSPITAL | Age: 71
End: 2021-11-29
Payer: MEDICARE

## 2021-11-29 ENCOUNTER — TELEPHONE (OUTPATIENT)
Dept: INTERNAL MEDICINE | Facility: CLINIC | Age: 71
End: 2021-11-29
Payer: MEDICARE

## 2021-11-29 ENCOUNTER — HOSPITAL ENCOUNTER (OUTPATIENT)
Facility: HOSPITAL | Age: 71
Discharge: HOME | End: 2021-11-29
Attending: SURGERY | Admitting: SURGERY
Payer: MEDICARE

## 2021-11-29 VITALS
BODY MASS INDEX: 25.6 KG/M2 | OXYGEN SATURATION: 99 % | HEIGHT: 70 IN | HEART RATE: 57 BPM | TEMPERATURE: 98 F | WEIGHT: 178.81 LBS | DIASTOLIC BLOOD PRESSURE: 74 MMHG | RESPIRATION RATE: 12 BRPM | SYSTOLIC BLOOD PRESSURE: 155 MMHG

## 2021-11-29 DIAGNOSIS — K43.9 VENTRAL HERNIA WITHOUT OBSTRUCTION OR GANGRENE: Primary | ICD-10-CM

## 2021-11-29 DIAGNOSIS — K43.9 VENTRAL HERNIA: ICD-10-CM

## 2021-11-29 PROCEDURE — 36000711: Performed by: SURGERY

## 2021-11-29 PROCEDURE — 71000016 HC POSTOP RECOV ADDL HR: Performed by: SURGERY

## 2021-11-29 PROCEDURE — 64488 TAP BLOCK BI INJECTION: CPT | Mod: 59,,, | Performed by: ANESTHESIOLOGY

## 2021-11-29 PROCEDURE — 36000710: Performed by: SURGERY

## 2021-11-29 PROCEDURE — D9220A PRA ANESTHESIA: Mod: CRNA,,, | Performed by: NURSE ANESTHETIST, CERTIFIED REGISTERED

## 2021-11-29 PROCEDURE — 63600175 PHARM REV CODE 636 W HCPCS: Performed by: SURGERY

## 2021-11-29 PROCEDURE — 94761 N-INVAS EAR/PLS OXIMETRY MLT: CPT

## 2021-11-29 PROCEDURE — D9220A PRA ANESTHESIA: Mod: ANES,,, | Performed by: ANESTHESIOLOGY

## 2021-11-29 PROCEDURE — 64488 TAP BLOCK BI INJECTION: CPT | Mod: 50,59 | Performed by: STUDENT IN AN ORGANIZED HEALTH CARE EDUCATION/TRAINING PROGRAM

## 2021-11-29 PROCEDURE — 63600175 PHARM REV CODE 636 W HCPCS: Performed by: NURSE ANESTHETIST, CERTIFIED REGISTERED

## 2021-11-29 PROCEDURE — 49652 PR LAP VENTRAL/UMBILICAL HERNIA; REDUCIBLE: CPT | Mod: ,,, | Performed by: SURGERY

## 2021-11-29 PROCEDURE — D9220A PRA ANESTHESIA: ICD-10-PCS | Mod: ANES,,, | Performed by: ANESTHESIOLOGY

## 2021-11-29 PROCEDURE — 64488 RECTUS SHEATH SINGLE INJECTION BLOCKS: ICD-10-PCS | Mod: 59,,, | Performed by: ANESTHESIOLOGY

## 2021-11-29 PROCEDURE — 25000003 PHARM REV CODE 250: Performed by: NURSE ANESTHETIST, CERTIFIED REGISTERED

## 2021-11-29 PROCEDURE — 63600175 PHARM REV CODE 636 W HCPCS: Performed by: STUDENT IN AN ORGANIZED HEALTH CARE EDUCATION/TRAINING PROGRAM

## 2021-11-29 PROCEDURE — D9220A PRA ANESTHESIA: ICD-10-PCS | Mod: CRNA,,, | Performed by: NURSE ANESTHETIST, CERTIFIED REGISTERED

## 2021-11-29 PROCEDURE — 37000009 HC ANESTHESIA EA ADD 15 MINS: Performed by: SURGERY

## 2021-11-29 PROCEDURE — 25000003 PHARM REV CODE 250: Performed by: ANESTHESIOLOGY

## 2021-11-29 PROCEDURE — 25000003 PHARM REV CODE 250: Performed by: SURGERY

## 2021-11-29 PROCEDURE — C1781 MESH (IMPLANTABLE): HCPCS | Performed by: SURGERY

## 2021-11-29 PROCEDURE — 25000003 PHARM REV CODE 250: Performed by: STUDENT IN AN ORGANIZED HEALTH CARE EDUCATION/TRAINING PROGRAM

## 2021-11-29 PROCEDURE — 37000008 HC ANESTHESIA 1ST 15 MINUTES: Performed by: SURGERY

## 2021-11-29 PROCEDURE — 71000015 HC POSTOP RECOV 1ST HR: Performed by: SURGERY

## 2021-11-29 PROCEDURE — 49652 PR LAP VENTRAL/UMBILICAL HERNIA; REDUCIBLE: ICD-10-PCS | Mod: ,,, | Performed by: SURGERY

## 2021-11-29 PROCEDURE — 71000033 HC RECOVERY, INTIAL HOUR: Performed by: SURGERY

## 2021-11-29 PROCEDURE — S0020 INJECTION, BUPIVICAINE HYDRO: HCPCS | Performed by: ANESTHESIOLOGY

## 2021-11-29 DEVICE — MESH HERNIA ST 4.5IN CIRCULAR: Type: IMPLANTABLE DEVICE | Site: ABDOMEN | Status: FUNCTIONAL

## 2021-11-29 RX ORDER — FENTANYL CITRATE 50 UG/ML
INJECTION, SOLUTION INTRAMUSCULAR; INTRAVENOUS
Status: DISCONTINUED | OUTPATIENT
Start: 2021-11-29 | End: 2021-11-29

## 2021-11-29 RX ORDER — BUPIVACAINE HYDROCHLORIDE 7.5 MG/ML
INJECTION, SOLUTION EPIDURAL; RETROBULBAR
Status: COMPLETED | OUTPATIENT
Start: 2021-11-29 | End: 2021-11-29

## 2021-11-29 RX ORDER — FENTANYL CITRATE 50 UG/ML
25 INJECTION, SOLUTION INTRAMUSCULAR; INTRAVENOUS EVERY 5 MIN PRN
Status: COMPLETED | OUTPATIENT
Start: 2021-11-29 | End: 2021-11-29

## 2021-11-29 RX ORDER — MIDAZOLAM HYDROCHLORIDE 1 MG/ML
.5-4 INJECTION INTRAMUSCULAR; INTRAVENOUS
Status: DISPENSED | OUTPATIENT
Start: 2021-11-29

## 2021-11-29 RX ORDER — PROPOFOL 10 MG/ML
VIAL (ML) INTRAVENOUS
Status: DISCONTINUED | OUTPATIENT
Start: 2021-11-29 | End: 2021-11-29

## 2021-11-29 RX ORDER — ACETAMINOPHEN 10 MG/ML
INJECTION, SOLUTION INTRAVENOUS
Status: DISCONTINUED | OUTPATIENT
Start: 2021-11-29 | End: 2021-11-29

## 2021-11-29 RX ORDER — OXYCODONE AND ACETAMINOPHEN 5; 325 MG/1; MG/1
1 TABLET ORAL ONCE AS NEEDED
Status: COMPLETED | OUTPATIENT
Start: 2021-11-29 | End: 2021-11-29

## 2021-11-29 RX ORDER — EPHEDRINE SULFATE 50 MG/ML
INJECTION, SOLUTION INTRAVENOUS
Status: DISCONTINUED | OUTPATIENT
Start: 2021-11-29 | End: 2021-11-29

## 2021-11-29 RX ORDER — SODIUM CHLORIDE 9 MG/ML
INJECTION, SOLUTION INTRAVENOUS CONTINUOUS
Status: ACTIVE | OUTPATIENT
Start: 2021-11-29

## 2021-11-29 RX ORDER — FAMOTIDINE 10 MG/ML
INJECTION INTRAVENOUS
Status: DISCONTINUED | OUTPATIENT
Start: 2021-11-29 | End: 2021-11-29

## 2021-11-29 RX ORDER — FENTANYL CITRATE 50 UG/ML
25-200 INJECTION, SOLUTION INTRAMUSCULAR; INTRAVENOUS
Status: DISPENSED | OUTPATIENT
Start: 2021-11-29

## 2021-11-29 RX ORDER — CEFAZOLIN SODIUM 1 G/3ML
2 INJECTION, POWDER, FOR SOLUTION INTRAMUSCULAR; INTRAVENOUS
Status: COMPLETED | OUTPATIENT
Start: 2021-11-29 | End: 2021-11-29

## 2021-11-29 RX ORDER — SODIUM CHLORIDE 0.9 % (FLUSH) 0.9 %
10 SYRINGE (ML) INJECTION
Status: ACTIVE | OUTPATIENT
Start: 2021-11-29

## 2021-11-29 RX ORDER — DEXAMETHASONE SODIUM PHOSPHATE 4 MG/ML
INJECTION, SOLUTION INTRA-ARTICULAR; INTRALESIONAL; INTRAMUSCULAR; INTRAVENOUS; SOFT TISSUE
Status: DISCONTINUED | OUTPATIENT
Start: 2021-11-29 | End: 2021-11-29

## 2021-11-29 RX ORDER — LIDOCAINE HYDROCHLORIDE 10 MG/ML
1 INJECTION, SOLUTION EPIDURAL; INFILTRATION; INTRACAUDAL; PERINEURAL ONCE
Status: ACTIVE | OUTPATIENT
Start: 2021-11-29

## 2021-11-29 RX ORDER — LIDOCAINE HYDROCHLORIDE 20 MG/ML
INJECTION, SOLUTION EPIDURAL; INFILTRATION; INTRACAUDAL; PERINEURAL
Status: DISCONTINUED | OUTPATIENT
Start: 2021-11-29 | End: 2021-11-29

## 2021-11-29 RX ORDER — OXYCODONE AND ACETAMINOPHEN 5; 325 MG/1; MG/1
1 TABLET ORAL EVERY 4 HOURS PRN
Qty: 30 TABLET | Refills: 0 | Status: SHIPPED | OUTPATIENT
Start: 2021-11-29 | End: 2022-03-24

## 2021-11-29 RX ORDER — PROCHLORPERAZINE EDISYLATE 5 MG/ML
5 INJECTION INTRAMUSCULAR; INTRAVENOUS EVERY 30 MIN PRN
Status: ACTIVE | OUTPATIENT
Start: 2021-11-29

## 2021-11-29 RX ORDER — NEOSTIGMINE METHYLSULFATE 0.5 MG/ML
INJECTION, SOLUTION INTRAVENOUS
Status: DISCONTINUED | OUTPATIENT
Start: 2021-11-29 | End: 2021-11-29

## 2021-11-29 RX ORDER — ROCURONIUM BROMIDE 10 MG/ML
INJECTION, SOLUTION INTRAVENOUS
Status: DISCONTINUED | OUTPATIENT
Start: 2021-11-29 | End: 2021-11-29

## 2021-11-29 RX ORDER — ONDANSETRON 2 MG/ML
INJECTION INTRAMUSCULAR; INTRAVENOUS
Status: DISCONTINUED | OUTPATIENT
Start: 2021-11-29 | End: 2021-11-29

## 2021-11-29 RX ADMIN — FENTANYL CITRATE 50 MCG: 50 INJECTION INTRAMUSCULAR; INTRAVENOUS at 11:11

## 2021-11-29 RX ADMIN — FENTANYL CITRATE 25 MCG: 50 INJECTION INTRAMUSCULAR; INTRAVENOUS at 06:11

## 2021-11-29 RX ADMIN — ONDANSETRON 4 MG: 2 INJECTION INTRAMUSCULAR; INTRAVENOUS at 04:11

## 2021-11-29 RX ADMIN — CEFAZOLIN 2 G: 330 INJECTION, POWDER, FOR SOLUTION INTRAMUSCULAR; INTRAVENOUS at 04:11

## 2021-11-29 RX ADMIN — FENTANYL CITRATE 100 MCG: 50 INJECTION INTRAMUSCULAR; INTRAVENOUS at 04:11

## 2021-11-29 RX ADMIN — DEXAMETHASONE SODIUM PHOSPHATE 4 MG: 4 INJECTION INTRA-ARTICULAR; INTRALESIONAL; INTRAMUSCULAR; INTRAVENOUS; SOFT TISSUE at 04:11

## 2021-11-29 RX ADMIN — BUPIVACAINE HYDROCHLORIDE 10 ML: 7.5 INJECTION, SOLUTION EPIDURAL; RETROBULBAR at 11:11

## 2021-11-29 RX ADMIN — MIDAZOLAM 1 MG: 1 INJECTION INTRAMUSCULAR; INTRAVENOUS at 11:11

## 2021-11-29 RX ADMIN — LIDOCAINE HYDROCHLORIDE 100 MG: 20 INJECTION, SOLUTION EPIDURAL; INFILTRATION; INTRACAUDAL at 04:11

## 2021-11-29 RX ADMIN — EPHEDRINE SULFATE 5 MG: 50 INJECTION INTRAVENOUS at 04:11

## 2021-11-29 RX ADMIN — GLYCOPYRROLATE 0.6 MG: 0.2 INJECTION, SOLUTION INTRAMUSCULAR; INTRAVITREAL at 05:11

## 2021-11-29 RX ADMIN — NEOSTIGMINE METHYLSULFATE 5 MG: 0.5 INJECTION INTRAVENOUS at 05:11

## 2021-11-29 RX ADMIN — SODIUM CHLORIDE: 0.9 INJECTION, SOLUTION INTRAVENOUS at 04:11

## 2021-11-29 RX ADMIN — ROCURONIUM BROMIDE 50 MG: 10 INJECTION, SOLUTION INTRAVENOUS at 04:11

## 2021-11-29 RX ADMIN — EPHEDRINE SULFATE 5 MG: 50 INJECTION INTRAVENOUS at 05:11

## 2021-11-29 RX ADMIN — ACETAMINOPHEN 1000 MG: 10 INJECTION INTRAVENOUS at 04:11

## 2021-11-29 RX ADMIN — SUGAMMADEX 200 MG: 100 INJECTION, SOLUTION INTRAVENOUS at 05:11

## 2021-11-29 RX ADMIN — GLYCOPYRROLATE 0.2 MG: 0.2 INJECTION, SOLUTION INTRAMUSCULAR; INTRAVITREAL at 04:11

## 2021-11-29 RX ADMIN — PROPOFOL 200 MG: 10 INJECTION, EMULSION INTRAVENOUS at 04:11

## 2021-11-29 RX ADMIN — FAMOTIDINE 20 MG: 10 INJECTION, SOLUTION INTRAVENOUS at 04:11

## 2021-11-29 RX ADMIN — OXYCODONE HYDROCHLORIDE AND ACETAMINOPHEN 1 TABLET: 5; 325 TABLET ORAL at 06:11

## 2021-11-30 ENCOUNTER — DOCUMENTATION ONLY (OUTPATIENT)
Dept: SURGERY | Facility: CLINIC | Age: 71
End: 2021-11-30
Payer: MEDICARE

## 2021-12-01 ENCOUNTER — TELEPHONE (OUTPATIENT)
Dept: SURGERY | Facility: CLINIC | Age: 71
End: 2021-12-01
Payer: MEDICARE

## 2021-12-13 ENCOUNTER — HOSPITAL ENCOUNTER (OUTPATIENT)
Dept: RADIOLOGY | Facility: HOSPITAL | Age: 71
Discharge: HOME | End: 2021-12-13
Attending: INTERNAL MEDICINE
Payer: MEDICARE

## 2021-12-13 ENCOUNTER — OFFICE VISIT (OUTPATIENT)
Dept: INTERNAL MEDICINE | Facility: CLINIC | Age: 71
End: 2021-12-13
Payer: MEDICARE

## 2021-12-13 VITALS
HEIGHT: 70 IN | TEMPERATURE: 100 F | SYSTOLIC BLOOD PRESSURE: 144 MMHG | HEART RATE: 73 BPM | WEIGHT: 178.56 LBS | BODY MASS INDEX: 25.56 KG/M2 | DIASTOLIC BLOOD PRESSURE: 64 MMHG | OXYGEN SATURATION: 96 %

## 2021-12-13 DIAGNOSIS — R05.9 COUGH: Primary | ICD-10-CM

## 2021-12-13 DIAGNOSIS — R05.9 COUGH: ICD-10-CM

## 2021-12-13 PROCEDURE — 99213 PR OFFICE/OUTPT VISIT, EST, LEVL III, 20-29 MIN: ICD-10-PCS | Mod: 25,S$GLB,, | Performed by: INTERNAL MEDICINE

## 2021-12-13 PROCEDURE — 3008F PR BODY MASS INDEX (BMI) DOCUMENTED: ICD-10-PCS | Mod: CPTII,S$GLB,, | Performed by: INTERNAL MEDICINE

## 2021-12-13 PROCEDURE — 1160F PR REVIEW ALL MEDS BY PRESCRIBER/CLIN PHARMACIST DOCUMENTED: ICD-10-PCS | Mod: CPTII,S$GLB,, | Performed by: INTERNAL MEDICINE

## 2021-12-13 PROCEDURE — 3077F PR MOST RECENT SYSTOLIC BLOOD PRESSURE >= 140 MM HG: ICD-10-PCS | Mod: CPTII,S$GLB,, | Performed by: INTERNAL MEDICINE

## 2021-12-13 PROCEDURE — 99999 PR PBB SHADOW E&M-EST. PATIENT-LVL V: CPT | Mod: PBBFAC,,, | Performed by: INTERNAL MEDICINE

## 2021-12-13 PROCEDURE — 96372 PR INJECTION,THERAP/PROPH/DIAG2ST, IM OR SUBCUT: ICD-10-PCS | Mod: S$GLB,,, | Performed by: INTERNAL MEDICINE

## 2021-12-13 PROCEDURE — 1159F PR MEDICATION LIST DOCUMENTED IN MEDICAL RECORD: ICD-10-PCS | Mod: CPTII,S$GLB,, | Performed by: INTERNAL MEDICINE

## 2021-12-13 PROCEDURE — 1159F MED LIST DOCD IN RCRD: CPT | Mod: CPTII,S$GLB,, | Performed by: INTERNAL MEDICINE

## 2021-12-13 PROCEDURE — 1101F PR PT FALLS ASSESS DOC 0-1 FALLS W/OUT INJ PAST YR: ICD-10-PCS | Mod: CPTII,S$GLB,, | Performed by: INTERNAL MEDICINE

## 2021-12-13 PROCEDURE — 3077F SYST BP >= 140 MM HG: CPT | Mod: CPTII,S$GLB,, | Performed by: INTERNAL MEDICINE

## 2021-12-13 PROCEDURE — 1101F PT FALLS ASSESS-DOCD LE1/YR: CPT | Mod: CPTII,S$GLB,, | Performed by: INTERNAL MEDICINE

## 2021-12-13 PROCEDURE — 3288F PR FALLS RISK ASSESSMENT DOCUMENTED: ICD-10-PCS | Mod: CPTII,S$GLB,, | Performed by: INTERNAL MEDICINE

## 2021-12-13 PROCEDURE — 71046 XR CHEST PA AND LATERAL: ICD-10-PCS | Mod: 26,,, | Performed by: RADIOLOGY

## 2021-12-13 PROCEDURE — 71046 X-RAY EXAM CHEST 2 VIEWS: CPT | Mod: TC

## 2021-12-13 PROCEDURE — 3078F DIAST BP <80 MM HG: CPT | Mod: CPTII,S$GLB,, | Performed by: INTERNAL MEDICINE

## 2021-12-13 PROCEDURE — 3044F HG A1C LEVEL LT 7.0%: CPT | Mod: CPTII,S$GLB,, | Performed by: INTERNAL MEDICINE

## 2021-12-13 PROCEDURE — 1160F RVW MEDS BY RX/DR IN RCRD: CPT | Mod: CPTII,S$GLB,, | Performed by: INTERNAL MEDICINE

## 2021-12-13 PROCEDURE — 71046 X-RAY EXAM CHEST 2 VIEWS: CPT | Mod: 26,,, | Performed by: RADIOLOGY

## 2021-12-13 PROCEDURE — 3078F PR MOST RECENT DIASTOLIC BLOOD PRESSURE < 80 MM HG: ICD-10-PCS | Mod: CPTII,S$GLB,, | Performed by: INTERNAL MEDICINE

## 2021-12-13 PROCEDURE — 3008F BODY MASS INDEX DOCD: CPT | Mod: CPTII,S$GLB,, | Performed by: INTERNAL MEDICINE

## 2021-12-13 PROCEDURE — 99213 OFFICE O/P EST LOW 20 MIN: CPT | Mod: 25,S$GLB,, | Performed by: INTERNAL MEDICINE

## 2021-12-13 PROCEDURE — 99999 PR PBB SHADOW E&M-EST. PATIENT-LVL V: ICD-10-PCS | Mod: PBBFAC,,, | Performed by: INTERNAL MEDICINE

## 2021-12-13 PROCEDURE — U0005 INFEC AGEN DETEC AMPLI PROBE: HCPCS | Performed by: INTERNAL MEDICINE

## 2021-12-13 PROCEDURE — 96372 THER/PROPH/DIAG INJ SC/IM: CPT | Mod: S$GLB,,, | Performed by: INTERNAL MEDICINE

## 2021-12-13 PROCEDURE — U0003 INFECTIOUS AGENT DETECTION BY NUCLEIC ACID (DNA OR RNA); SEVERE ACUTE RESPIRATORY SYNDROME CORONAVIRUS 2 (SARS-COV-2) (CORONAVIRUS DISEASE [COVID-19]), AMPLIFIED PROBE TECHNIQUE, MAKING USE OF HIGH THROUGHPUT TECHNOLOGIES AS DESCRIBED BY CMS-2020-01-R: HCPCS | Performed by: INTERNAL MEDICINE

## 2021-12-13 PROCEDURE — 3044F PR MOST RECENT HEMOGLOBIN A1C LEVEL <7.0%: ICD-10-PCS | Mod: CPTII,S$GLB,, | Performed by: INTERNAL MEDICINE

## 2021-12-13 PROCEDURE — 3288F FALL RISK ASSESSMENT DOCD: CPT | Mod: CPTII,S$GLB,, | Performed by: INTERNAL MEDICINE

## 2021-12-13 RX ORDER — LEVOFLOXACIN 500 MG/1
500 TABLET, FILM COATED ORAL DAILY
Qty: 10 TABLET | Refills: 0 | Status: SHIPPED | OUTPATIENT
Start: 2021-12-13 | End: 2022-03-17 | Stop reason: ALTCHOICE

## 2021-12-13 RX ORDER — PROMETHAZINE HYDROCHLORIDE AND DEXTROMETHORPHAN HYDROBROMIDE 6.25; 15 MG/5ML; MG/5ML
5 SYRUP ORAL NIGHTLY PRN
Qty: 118 ML | Refills: 0 | Status: SHIPPED | OUTPATIENT
Start: 2021-12-13 | End: 2021-12-23

## 2021-12-14 ENCOUNTER — TELEPHONE (OUTPATIENT)
Dept: INTERNAL MEDICINE | Facility: CLINIC | Age: 71
End: 2021-12-14
Payer: MEDICARE

## 2021-12-14 ENCOUNTER — NURSE TRIAGE (OUTPATIENT)
Dept: ADMINISTRATIVE | Facility: CLINIC | Age: 71
End: 2021-12-14
Payer: MEDICARE

## 2021-12-14 ENCOUNTER — INFUSION (OUTPATIENT)
Dept: INFECTIOUS DISEASES | Facility: HOSPITAL | Age: 71
End: 2021-12-14
Attending: INTERNAL MEDICINE
Payer: MEDICARE

## 2021-12-14 ENCOUNTER — PATIENT MESSAGE (OUTPATIENT)
Dept: ADMINISTRATIVE | Facility: CLINIC | Age: 71
End: 2021-12-14
Payer: MEDICARE

## 2021-12-14 VITALS
SYSTOLIC BLOOD PRESSURE: 135 MMHG | TEMPERATURE: 99 F | WEIGHT: 179 LBS | OXYGEN SATURATION: 96 % | BODY MASS INDEX: 25.62 KG/M2 | RESPIRATION RATE: 16 BRPM | HEART RATE: 73 BPM | HEIGHT: 70 IN | DIASTOLIC BLOOD PRESSURE: 62 MMHG

## 2021-12-14 DIAGNOSIS — U07.1 COVID: Primary | ICD-10-CM

## 2021-12-14 DIAGNOSIS — U07.1 COVID-19: Primary | ICD-10-CM

## 2021-12-14 DIAGNOSIS — U07.1 COVID: ICD-10-CM

## 2021-12-14 PROBLEM — K43.9 VENTRAL HERNIA WITHOUT OBSTRUCTION OR GANGRENE: Status: RESOLVED | Noted: 2021-11-29 | Resolved: 2021-12-14

## 2021-12-14 LAB
SARS-COV-2 RNA RESP QL NAA+PROBE: DETECTED
SARS-COV-2- CYCLE NUMBER: 16

## 2021-12-14 PROCEDURE — 25000003 PHARM REV CODE 250: Performed by: INTERNAL MEDICINE

## 2021-12-14 PROCEDURE — M0243 CASIRIVI AND IMDEVI INFUSION: HCPCS | Performed by: INTERNAL MEDICINE

## 2021-12-14 PROCEDURE — 63600175 PHARM REV CODE 636 W HCPCS: Performed by: INTERNAL MEDICINE

## 2021-12-14 RX ORDER — ONDANSETRON 4 MG/1
4 TABLET, ORALLY DISINTEGRATING ORAL ONCE AS NEEDED
Status: DISCONTINUED | OUTPATIENT
Start: 2021-12-14 | End: 2022-03-17 | Stop reason: ALTCHOICE

## 2021-12-14 RX ORDER — EPINEPHRINE 0.3 MG/.3ML
0.3 INJECTION SUBCUTANEOUS
Status: DISCONTINUED | OUTPATIENT
Start: 2021-12-14 | End: 2022-03-17 | Stop reason: ALTCHOICE

## 2021-12-14 RX ORDER — DIPHENHYDRAMINE HYDROCHLORIDE 50 MG/ML
25 INJECTION INTRAMUSCULAR; INTRAVENOUS ONCE AS NEEDED
Status: DISCONTINUED | OUTPATIENT
Start: 2021-12-14 | End: 2022-03-17 | Stop reason: ALTCHOICE

## 2021-12-14 RX ORDER — ALBUTEROL SULFATE 90 UG/1
2 AEROSOL, METERED RESPIRATORY (INHALATION)
Status: DISCONTINUED | OUTPATIENT
Start: 2021-12-14 | End: 2022-03-17 | Stop reason: ALTCHOICE

## 2021-12-14 RX ORDER — ACETAMINOPHEN 325 MG/1
650 TABLET ORAL ONCE AS NEEDED
Status: DISCONTINUED | OUTPATIENT
Start: 2021-12-14 | End: 2022-03-17 | Stop reason: ALTCHOICE

## 2021-12-14 RX ORDER — SODIUM CHLORIDE 0.9 % (FLUSH) 0.9 %
10 SYRINGE (ML) INJECTION
Status: DISCONTINUED | OUTPATIENT
Start: 2021-12-14 | End: 2022-03-17 | Stop reason: ALTCHOICE

## 2021-12-14 RX ADMIN — CASIRIVIMAB AND IMDEVIMAB 600 MG: 600; 600 INJECTION, SOLUTION, CONCENTRATE INTRAVENOUS at 02:12

## 2021-12-15 ENCOUNTER — NURSE TRIAGE (OUTPATIENT)
Dept: ADMINISTRATIVE | Facility: CLINIC | Age: 71
End: 2021-12-15
Payer: MEDICARE

## 2021-12-27 RX ORDER — IRBESARTAN AND HYDROCHLOROTHIAZIDE 300; 12.5 MG/1; MG/1
TABLET, FILM COATED ORAL
Qty: 90 TABLET | Refills: 3 | Status: SHIPPED | OUTPATIENT
Start: 2021-12-27 | End: 2022-12-18

## 2022-01-13 ENCOUNTER — OFFICE VISIT (OUTPATIENT)
Dept: SURGERY | Facility: CLINIC | Age: 72
End: 2022-01-13
Payer: MEDICARE

## 2022-01-13 VITALS
SYSTOLIC BLOOD PRESSURE: 146 MMHG | DIASTOLIC BLOOD PRESSURE: 66 MMHG | WEIGHT: 181.69 LBS | HEIGHT: 70 IN | BODY MASS INDEX: 26.01 KG/M2 | HEART RATE: 64 BPM

## 2022-01-13 DIAGNOSIS — Z09 POSTOP CHECK: Primary | ICD-10-CM

## 2022-01-13 PROCEDURE — 3078F DIAST BP <80 MM HG: CPT | Mod: CPTII,S$GLB,, | Performed by: SURGERY

## 2022-01-13 PROCEDURE — 1101F PT FALLS ASSESS-DOCD LE1/YR: CPT | Mod: CPTII,S$GLB,, | Performed by: SURGERY

## 2022-01-13 PROCEDURE — 99024 PR POST-OP FOLLOW-UP VISIT: ICD-10-PCS | Mod: S$GLB,,, | Performed by: SURGERY

## 2022-01-13 PROCEDURE — 1126F PR PAIN SEVERITY QUANTIFIED, NO PAIN PRESENT: ICD-10-PCS | Mod: CPTII,S$GLB,, | Performed by: SURGERY

## 2022-01-13 PROCEDURE — 3078F PR MOST RECENT DIASTOLIC BLOOD PRESSURE < 80 MM HG: ICD-10-PCS | Mod: CPTII,S$GLB,, | Performed by: SURGERY

## 2022-01-13 PROCEDURE — 3077F SYST BP >= 140 MM HG: CPT | Mod: CPTII,S$GLB,, | Performed by: SURGERY

## 2022-01-13 PROCEDURE — 1101F PR PT FALLS ASSESS DOC 0-1 FALLS W/OUT INJ PAST YR: ICD-10-PCS | Mod: CPTII,S$GLB,, | Performed by: SURGERY

## 2022-01-13 PROCEDURE — 3288F PR FALLS RISK ASSESSMENT DOCUMENTED: ICD-10-PCS | Mod: CPTII,S$GLB,, | Performed by: SURGERY

## 2022-01-13 PROCEDURE — 1160F PR REVIEW ALL MEDS BY PRESCRIBER/CLIN PHARMACIST DOCUMENTED: ICD-10-PCS | Mod: CPTII,S$GLB,, | Performed by: SURGERY

## 2022-01-13 PROCEDURE — 99999 PR PBB SHADOW E&M-EST. PATIENT-LVL IV: ICD-10-PCS | Mod: PBBFAC,,, | Performed by: SURGERY

## 2022-01-13 PROCEDURE — 99024 POSTOP FOLLOW-UP VISIT: CPT | Mod: S$GLB,,, | Performed by: SURGERY

## 2022-01-13 PROCEDURE — 3008F PR BODY MASS INDEX (BMI) DOCUMENTED: ICD-10-PCS | Mod: CPTII,S$GLB,, | Performed by: SURGERY

## 2022-01-13 PROCEDURE — 1126F AMNT PAIN NOTED NONE PRSNT: CPT | Mod: CPTII,S$GLB,, | Performed by: SURGERY

## 2022-01-13 PROCEDURE — 3008F BODY MASS INDEX DOCD: CPT | Mod: CPTII,S$GLB,, | Performed by: SURGERY

## 2022-01-13 PROCEDURE — 3288F FALL RISK ASSESSMENT DOCD: CPT | Mod: CPTII,S$GLB,, | Performed by: SURGERY

## 2022-01-13 PROCEDURE — 3077F PR MOST RECENT SYSTOLIC BLOOD PRESSURE >= 140 MM HG: ICD-10-PCS | Mod: CPTII,S$GLB,, | Performed by: SURGERY

## 2022-01-13 PROCEDURE — 1159F MED LIST DOCD IN RCRD: CPT | Mod: CPTII,S$GLB,, | Performed by: SURGERY

## 2022-01-13 PROCEDURE — 99999 PR PBB SHADOW E&M-EST. PATIENT-LVL IV: CPT | Mod: PBBFAC,,, | Performed by: SURGERY

## 2022-01-13 PROCEDURE — 1160F RVW MEDS BY RX/DR IN RCRD: CPT | Mod: CPTII,S$GLB,, | Performed by: SURGERY

## 2022-01-13 PROCEDURE — 1159F PR MEDICATION LIST DOCUMENTED IN MEDICAL RECORD: ICD-10-PCS | Mod: CPTII,S$GLB,, | Performed by: SURGERY

## 2022-01-13 NOTE — PROGRESS NOTES
I have seen the patient, reviewed the Resident's history and physical, assessment and plan. I have personally interviewed and examined the patient at bedside and: agree with the findings.     S/p robotic epigastric/navel hernia repair with mesh 11/29/21.  She has no complaints.  Wound clear, no hernias.  She has no restrictions.  Follow up prn.

## 2022-01-13 NOTE — LETTER
[facility]  [address]  [facility] [address]  Phone: [phone] January 13, 2022      Bridget Cantu MD  [address]  [facility] [address]    Patient: Marleny Guzman   MR Number: [id number]   YOB: 1950   Date of Visit: 1/13/2022     Dear Dr. Cantu:    Thank you for referring Marleny Guzman to me for evaluation. Attached you will find relevant portions of my assessment and plan of care.    Ms. Guzmna is status post robotic epigastric/navel hernia repair with mesh 11/29/21.  She has no complaints.  Wound clear, no hernias.  She has no restrictions.  She is to follow up as needed.    If you have questions, please do not hesitate to call me. I look forward to following Marleny Guzman along with you.    Sincerely,      Roney Jackson M.D.  Professor, [employer]  Section Head, General Surgery  [facility]    WSR/ang

## 2022-01-13 NOTE — PROGRESS NOTES
Surgery Clinic Note    Subjective:     Marleny Guzman   1. Postop check       Review of patient's allergies indicates:  No Known Allergies    Marleny Guzman is a 71 y.o. female who presents to clinic for follow up s/p robotic ventral hernia repair with mesh on 11/28/21.  The patient reports that she is tolerating a regular diet and having regular bowel movements. She denies fever or chills. Denies pain. They deny drainage or redness to their incision.       Objective:     PHYSICAL EXAM:  Vital Signs (Most Recent)  Pulse: 64 (01/13/22 1350)  BP: (!) 146/66 (01/13/22 1350)    Physical Exam  Constitutional:       General: She is not in acute distress.     Appearance: Normal appearance.   Cardiovascular:      Rate and Rhythm: Normal rate.   Pulmonary:      Effort: Pulmonary effort is normal. No respiratory distress.   Abdominal:      General: There is no distension.      Palpations: There is no mass.      Comments: Incisions are well healed with no erythema, no induration, bleeding or drainage. No TTP.     Skin:     General: Skin is warm and dry.   Neurological:      Mental Status: She is alert.   Psychiatric:         Mood and Affect: Mood normal.         Behavior: Behavior normal.           Assessment:     71 y.o. female s/p robotic ventral hernia repair with mesh on 11/29/21. Pt has recovered well and can continue with normal daily activities.    Plan:     - Return to clinic as needed  - All questions answered; patient is comfortable with follow-up plan.    Akin Sauer MD   [facility]

## 2022-02-10 ENCOUNTER — PATIENT OUTREACH (OUTPATIENT)
Dept: ADMINISTRATIVE | Facility: HOSPITAL | Age: 72
End: 2022-02-10
Payer: MEDICARE

## 2022-02-10 NOTE — PROGRESS NOTES
Health Maintenance Due   Topic Date Due    COVID-19 Vaccine (3 - Booster for Pfizer series) 02/20/2022    Mammogram  03/22/2022     Triggered LINKS & reconciled immunizations.  Updated Care Everywhere.  Chart was reviewed as part of the PHN Attestation for 2021.

## 2022-02-18 ENCOUNTER — PES CALL (OUTPATIENT)
Dept: ADMINISTRATIVE | Facility: CLINIC | Age: 72
End: 2022-02-18
Payer: MEDICARE

## 2022-02-25 NOTE — ED NOTES
Nancy Hackett is a 25 year old female presenting with office visit for physical with PAP    Patient reports no additional concerns    Last menstrual period 1/01/22    Latex allergy?  No    Refills needed? No    Advance Directives:  not discussed    PHQ-9:  0      Medications verified, no changes.  Social History     Tobacco Use   Smoking Status Never Smoker   Smokeless Tobacco Never Used         Health Maintenance Due   Topic Date Due   • Influenza Vaccine (1) 09/01/2021   • Depression Screening  03/25/2022   • Cervical Cancer Risk  03/25/2022       Patient is due for the topics as listed above and wishes to proceed with them. Orders placed for Immunization(s) Influenza.      Patient would like communication of their results via:        Cell Phone:   Telephone Information:   Mobile [phone]     Okay to leave a message containing results? Yes     Pt was able to ambulate to the bathroom with the assistance of her daughter who is an RN.

## 2022-03-08 ENCOUNTER — TELEPHONE (OUTPATIENT)
Dept: ADMINISTRATIVE | Facility: CLINIC | Age: 72
End: 2022-03-08
Payer: MEDICARE

## 2022-03-10 ENCOUNTER — TELEPHONE (OUTPATIENT)
Dept: ADMINISTRATIVE | Facility: CLINIC | Age: 72
End: 2022-03-10
Payer: MEDICARE

## 2022-03-17 ENCOUNTER — OFFICE VISIT (OUTPATIENT)
Dept: INTERNAL MEDICINE | Facility: CLINIC | Age: 72
End: 2022-03-17
Payer: MEDICARE

## 2022-03-17 VITALS
SYSTOLIC BLOOD PRESSURE: 134 MMHG | RESPIRATION RATE: 16 BRPM | DIASTOLIC BLOOD PRESSURE: 62 MMHG | HEART RATE: 80 BPM | WEIGHT: 190.25 LBS | BODY MASS INDEX: 27.24 KG/M2 | HEIGHT: 70 IN

## 2022-03-17 DIAGNOSIS — Z00.00 ENCOUNTER FOR PREVENTIVE HEALTH EXAMINATION: Primary | ICD-10-CM

## 2022-03-17 DIAGNOSIS — R26.9 ABNORMALITY OF GAIT AND MOBILITY: ICD-10-CM

## 2022-03-17 DIAGNOSIS — D64.9 ANEMIA, UNSPECIFIED TYPE: ICD-10-CM

## 2022-03-17 DIAGNOSIS — M19.90 OSTEOARTHRITIS, UNSPECIFIED OSTEOARTHRITIS TYPE, UNSPECIFIED SITE: Chronic | ICD-10-CM

## 2022-03-17 DIAGNOSIS — R41.89 SUBJECTIVE MEMORY COMPLAINTS: ICD-10-CM

## 2022-03-17 DIAGNOSIS — G89.29 OTHER CHRONIC PAIN: ICD-10-CM

## 2022-03-17 DIAGNOSIS — I70.0 THORACIC AORTA ATHEROSCLEROSIS: ICD-10-CM

## 2022-03-17 DIAGNOSIS — M51.36 DDD (DEGENERATIVE DISC DISEASE), LUMBAR: ICD-10-CM

## 2022-03-17 DIAGNOSIS — F33.41 RECURRENT MAJOR DEPRESSIVE DISORDER, IN PARTIAL REMISSION: ICD-10-CM

## 2022-03-17 DIAGNOSIS — I10 PRIMARY HYPERTENSION: ICD-10-CM

## 2022-03-17 DIAGNOSIS — R91.1 LUNG NODULE: ICD-10-CM

## 2022-03-17 PROCEDURE — 3008F PR BODY MASS INDEX (BMI) DOCUMENTED: ICD-10-PCS | Mod: CPTII,S$GLB,, | Performed by: NURSE PRACTITIONER

## 2022-03-17 PROCEDURE — 1159F PR MEDICATION LIST DOCUMENTED IN MEDICAL RECORD: ICD-10-PCS | Mod: CPTII,S$GLB,, | Performed by: NURSE PRACTITIONER

## 2022-03-17 PROCEDURE — G0439 PPPS, SUBSEQ VISIT: HCPCS | Mod: S$GLB,,, | Performed by: NURSE PRACTITIONER

## 2022-03-17 PROCEDURE — 3075F SYST BP GE 130 - 139MM HG: CPT | Mod: CPTII,S$GLB,, | Performed by: NURSE PRACTITIONER

## 2022-03-17 PROCEDURE — 3288F FALL RISK ASSESSMENT DOCD: CPT | Mod: CPTII,S$GLB,, | Performed by: NURSE PRACTITIONER

## 2022-03-17 PROCEDURE — 3288F PR FALLS RISK ASSESSMENT DOCUMENTED: ICD-10-PCS | Mod: CPTII,S$GLB,, | Performed by: NURSE PRACTITIONER

## 2022-03-17 PROCEDURE — 1125F AMNT PAIN NOTED PAIN PRSNT: CPT | Mod: CPTII,S$GLB,, | Performed by: NURSE PRACTITIONER

## 2022-03-17 PROCEDURE — 1170F PR FUNCTIONAL STATUS ASSESSED: ICD-10-PCS | Mod: CPTII,S$GLB,, | Performed by: NURSE PRACTITIONER

## 2022-03-17 PROCEDURE — 1101F PT FALLS ASSESS-DOCD LE1/YR: CPT | Mod: CPTII,S$GLB,, | Performed by: NURSE PRACTITIONER

## 2022-03-17 PROCEDURE — 1125F PR PAIN SEVERITY QUANTIFIED, PAIN PRESENT: ICD-10-PCS | Mod: CPTII,S$GLB,, | Performed by: NURSE PRACTITIONER

## 2022-03-17 PROCEDURE — 1159F MED LIST DOCD IN RCRD: CPT | Mod: CPTII,S$GLB,, | Performed by: NURSE PRACTITIONER

## 2022-03-17 PROCEDURE — 1160F PR REVIEW ALL MEDS BY PRESCRIBER/CLIN PHARMACIST DOCUMENTED: ICD-10-PCS | Mod: CPTII,S$GLB,, | Performed by: NURSE PRACTITIONER

## 2022-03-17 PROCEDURE — 99999 PR PBB SHADOW E&M-EST. PATIENT-LVL V: CPT | Mod: PBBFAC,,, | Performed by: NURSE PRACTITIONER

## 2022-03-17 PROCEDURE — 99499 UNLISTED E&M SERVICE: CPT | Mod: S$GLB,,, | Performed by: NURSE PRACTITIONER

## 2022-03-17 PROCEDURE — 1101F PR PT FALLS ASSESS DOC 0-1 FALLS W/OUT INJ PAST YR: ICD-10-PCS | Mod: CPTII,S$GLB,, | Performed by: NURSE PRACTITIONER

## 2022-03-17 PROCEDURE — 3008F BODY MASS INDEX DOCD: CPT | Mod: CPTII,S$GLB,, | Performed by: NURSE PRACTITIONER

## 2022-03-17 PROCEDURE — 99499 RISK ADDL DX/OHS AUDIT: ICD-10-PCS | Mod: S$GLB,,, | Performed by: NURSE PRACTITIONER

## 2022-03-17 PROCEDURE — 3078F PR MOST RECENT DIASTOLIC BLOOD PRESSURE < 80 MM HG: ICD-10-PCS | Mod: CPTII,S$GLB,, | Performed by: NURSE PRACTITIONER

## 2022-03-17 PROCEDURE — 1170F FXNL STATUS ASSESSED: CPT | Mod: CPTII,S$GLB,, | Performed by: NURSE PRACTITIONER

## 2022-03-17 PROCEDURE — 3078F DIAST BP <80 MM HG: CPT | Mod: CPTII,S$GLB,, | Performed by: NURSE PRACTITIONER

## 2022-03-17 PROCEDURE — G0439 PR MEDICARE ANNUAL WELLNESS SUBSEQUENT VISIT: ICD-10-PCS | Mod: S$GLB,,, | Performed by: NURSE PRACTITIONER

## 2022-03-17 PROCEDURE — 3075F PR MOST RECENT SYSTOLIC BLOOD PRESS GE 130-139MM HG: ICD-10-PCS | Mod: CPTII,S$GLB,, | Performed by: NURSE PRACTITIONER

## 2022-03-17 PROCEDURE — 99999 PR PBB SHADOW E&M-EST. PATIENT-LVL V: ICD-10-PCS | Mod: PBBFAC,,, | Performed by: NURSE PRACTITIONER

## 2022-03-17 PROCEDURE — 1160F RVW MEDS BY RX/DR IN RCRD: CPT | Mod: CPTII,S$GLB,, | Performed by: NURSE PRACTITIONER

## 2022-03-17 NOTE — Clinical Note
Medicare AWV completed. Eligible for covid booster. Due for mammogram soon. Seeing you next week for annual. C/o memory issues, mini cog normal, reassured.   --Trisha

## 2022-03-17 NOTE — PATIENT INSTRUCTIONS
1. Follow up with Dr. Bridget Cantu MD as scheduled.    2. Discuss mammogram at annual.    3. You are eligible for 3rd dose of covid vaccine because it has been >90 days since your infusion.    Counseling and Referral of Other Preventative  (Italic type indicates deductible and co-insurance are waived)    Patient Name: Marleny Guzman  Today's Date: 3/17/2022    Health Maintenance       Date Due Completion Date    COVID-19 Vaccine (3 - Booster for Pfizer series) 01/20/2022 8/20/2021    Mammogram 03/22/2022 3/22/2021    Lipid Panel 02/09/2026 2/9/2021    Colorectal Cancer Screening 08/02/2026 8/2/2021    TETANUS VACCINE 02/01/2030 2/1/2020        No orders of the defined types were placed in this encounter.    The following information is provided to all patients.  This information is to help you find resources for any of the problems found today that may be affecting your health:                Living healthy guide: www.[facility].[place].[facility]      Understanding Diabetes: www.diabetes.org      Eating healthy: www.cdc.gov/healthyweight      CDC home safety checklist: www.cdc.gov/steadi/patient.html      Agency on Aging: www.goea.[place].gov      Alcoholics anonymous (AA): www.aa.org      Physical Activity: www.saman.nih.gov/ui4mcsl      Tobacco use: www.quitwithusla.org

## 2022-03-17 NOTE — PROGRESS NOTES
"Marleny Guzman presented for a  Medicare AWV and comprehensive Health Risk Assessment today. The following components were reviewed and updated:    · Medical history  · Family History  · Social history  · Allergies and Current Medications  · Health Risk Assessment  · Health Maintenance  · Care Team         ** See Completed Assessments for Annual Wellness Visit within the encounter summary.**         The following assessments were completed:  · Living Situation  · CAGE  · Depression Screening  · Timed Get Up and Go  · Whisper Test  · Cognitive Function Screening    · Nutrition Screening  · ADL Screening  · PAQ Screening        Vitals:    03/17/22 0822   BP: 134/62   BP Location: Right arm   Patient Position: Sitting   BP Method: Large (Manual)   Pulse: 80   Resp: 16   Weight: 86.3 kg (190 lb 4.1 oz)   Height: 5' 10" (1.778 m)     Body mass index is 27.3 kg/m².     Physical Exam  Vitals reviewed.   Constitutional:       Appearance: Normal appearance.   HENT:      Head: Normocephalic.   Cardiovascular:      Rate and Rhythm: Normal rate and regular rhythm.   Pulmonary:      Effort: Pulmonary effort is normal.      Breath sounds: Normal breath sounds.   Abdominal:      General: Bowel sounds are normal.   Musculoskeletal:         General: Normal range of motion.      Cervical back: Normal range of motion.      Right lower leg: Edema present.      Left lower leg: Edema present.   Skin:     General: Skin is warm and dry.      Capillary Refill: Capillary refill takes less than 2 seconds.   Neurological:      Mental Status: She is alert and oriented to person, place, and time.   Psychiatric:         Behavior: Behavior normal.         Thought Content: Thought content normal.         Judgment: Judgment normal.               Diagnoses and health risks identified today and associated recommendations/orders:    1. Encounter for preventive health examination  Assessments completed.   recommendations reviewed. Eligible for covid " booster >90 days since infusion.   F/u with PCP as scheduled.    2. Thoracic aorta atherosclerosis  Chronic, stable on current regimen. Followed by PCP. On statin.    3. Recurrent major depressive disorder, in partial remission  Chronic, stable on current regimen. Followed by PCP. PHQ2 score of 2 today.    4. Primary hypertension  Chronic, stable on current regimen. Followed by PCP.    5. DDD (degenerative disc disease), lumbar  Chronic, stable on current regimen. Followed by PCP.    6. Other chronic pain  Chronic, stable on current regimen. Followed by PCP.    7. Lung nodule  Chronic, stable. Noted on imaging. Last imaging 10/01/2021, unchanged. Followed by PCP.    8. Anemia, unspecified type  Chronic, stable on current regimen. Followed by PCP.    9. Osteoarthritis, unspecified osteoarthritis type, unspecified site  Chronic, stable on current regimen. Followed by PCP.    10. Abnormality of gait and mobility  Chronic, stable. No recent falls. Does not use assistive devices. Followed by PCP.    11. Subjective memory complaints  Chronic, stable. Mini cog reassuring. Followed by PCP.      Provided Marleny with a 5-10 year written screening schedule and personal prevention plan. Recommendations were developed using the USPSTF age appropriate recommendations. Education, counseling, and referrals were provided as needed. After Visit Summary printed and given to patient which includes a list of additional screenings\tests needed.    Follow up in about 1 year (around 3/17/2023) for Medicare AWV and with PCP as scheduled.       Trisha Pena NP     I offered to discuss advanced care planning, including how to pick a person who would make decisions for you if you were unable to make them for yourself, called a health care power of , and what kind of decisions you might make such as use of life sustaining treatments such as ventilators and tube feeding when faced with a life limiting illness recorded on a living will  that they will need to know. (How you want to be cared for as you near the end of your natural life)     X Patient is interested in learning more about how to make advanced directives.  I provided them paperwork and offered to discuss this with them.

## 2022-03-24 ENCOUNTER — OFFICE VISIT (OUTPATIENT)
Dept: INTERNAL MEDICINE | Facility: CLINIC | Age: 72
End: 2022-03-24
Payer: MEDICARE

## 2022-03-24 ENCOUNTER — LAB VISIT (OUTPATIENT)
Dept: LAB | Facility: HOSPITAL | Age: 72
End: 2022-03-24
Attending: INTERNAL MEDICINE
Payer: MEDICARE

## 2022-03-24 VITALS
WEIGHT: 191 LBS | HEART RATE: 66 BPM | SYSTOLIC BLOOD PRESSURE: 138 MMHG | DIASTOLIC BLOOD PRESSURE: 80 MMHG | BODY MASS INDEX: 27.35 KG/M2 | HEIGHT: 70 IN | OXYGEN SATURATION: 98 %

## 2022-03-24 DIAGNOSIS — I10 ESSENTIAL HYPERTENSION: Primary | ICD-10-CM

## 2022-03-24 DIAGNOSIS — M25.511 ACUTE PAIN OF RIGHT SHOULDER: ICD-10-CM

## 2022-03-24 DIAGNOSIS — E78.5 HYPERLIPIDEMIA, UNSPECIFIED HYPERLIPIDEMIA TYPE: ICD-10-CM

## 2022-03-24 DIAGNOSIS — D64.9 ANEMIA, UNSPECIFIED TYPE: ICD-10-CM

## 2022-03-24 DIAGNOSIS — R73.9 HYPERGLYCEMIA: ICD-10-CM

## 2022-03-24 DIAGNOSIS — E55.9 MILD VITAMIN D DEFICIENCY: ICD-10-CM

## 2022-03-24 DIAGNOSIS — N63.0 BREAST NODULE: ICD-10-CM

## 2022-03-24 DIAGNOSIS — Z01.00 ROUTINE EYE EXAM: ICD-10-CM

## 2022-03-24 DIAGNOSIS — R92.8 ABNORMAL MAMMOGRAM: ICD-10-CM

## 2022-03-24 DIAGNOSIS — F41.9 ANXIETY: Chronic | ICD-10-CM

## 2022-03-24 DIAGNOSIS — Z12.11 SCREENING FOR COLON CANCER: ICD-10-CM

## 2022-03-24 DIAGNOSIS — R91.1 SOLITARY PULMONARY NODULE: ICD-10-CM

## 2022-03-24 DIAGNOSIS — I10 ESSENTIAL HYPERTENSION: ICD-10-CM

## 2022-03-24 DIAGNOSIS — F32.A DEPRESSION, UNSPECIFIED DEPRESSION TYPE: Chronic | ICD-10-CM

## 2022-03-24 LAB
25(OH)D3+25(OH)D2 SERPL-MCNC: 43 NG/ML (ref 30–96)
ALBUMIN SERPL BCP-MCNC: 3.7 G/DL (ref 3.5–5.2)
ALBUMIN/CREAT UR: 9.8 UG/MG (ref 0–30)
ALP SERPL-CCNC: 165 U/L (ref 55–135)
ALT SERPL W/O P-5'-P-CCNC: 20 U/L (ref 10–44)
ANION GAP SERPL CALC-SCNC: 8 MMOL/L (ref 8–16)
AST SERPL-CCNC: 23 U/L (ref 10–40)
BACTERIA #/AREA URNS AUTO: ABNORMAL /HPF
BASOPHILS # BLD AUTO: 0.07 K/UL (ref 0–0.2)
BASOPHILS NFR BLD: 0.8 % (ref 0–1.9)
BILIRUB SERPL-MCNC: 0.6 MG/DL (ref 0.1–1)
BILIRUB UR QL STRIP: NEGATIVE
BUN SERPL-MCNC: 17 MG/DL (ref 8–23)
CALCIUM SERPL-MCNC: 10.1 MG/DL (ref 8.7–10.5)
CHLORIDE SERPL-SCNC: 101 MMOL/L (ref 95–110)
CHOLEST SERPL-MCNC: 117 MG/DL (ref 120–199)
CHOLEST/HDLC SERPL: 2.1 {RATIO} (ref 2–5)
CLARITY UR REFRACT.AUTO: ABNORMAL
CO2 SERPL-SCNC: 31 MMOL/L (ref 23–29)
COLOR UR AUTO: YELLOW
CREAT SERPL-MCNC: 0.8 MG/DL (ref 0.5–1.4)
CREAT UR-MCNC: 102 MG/DL (ref 15–325)
DIFFERENTIAL METHOD: ABNORMAL
EOSINOPHIL # BLD AUTO: 0.5 K/UL (ref 0–0.5)
EOSINOPHIL NFR BLD: 6.4 % (ref 0–8)
ERYTHROCYTE [DISTWIDTH] IN BLOOD BY AUTOMATED COUNT: 14.7 % (ref 11.5–14.5)
EST. GFR  (AFRICAN AMERICAN): >60 ML/MIN/1.73 M^2
EST. GFR  (NON AFRICAN AMERICAN): >60 ML/MIN/1.73 M^2
ESTIMATED AVG GLUCOSE: 111 MG/DL (ref 68–131)
FERRITIN SERPL-MCNC: 43 NG/ML (ref 20–300)
GLUCOSE SERPL-MCNC: 72 MG/DL (ref 70–110)
GLUCOSE UR QL STRIP: NEGATIVE
HBA1C MFR BLD: 5.5 % (ref 4–5.6)
HCT VFR BLD AUTO: 40.7 % (ref 37–48.5)
HDLC SERPL-MCNC: 55 MG/DL (ref 40–75)
HDLC SERPL: 47 % (ref 20–50)
HGB BLD-MCNC: 12.7 G/DL (ref 12–16)
HGB UR QL STRIP: NEGATIVE
IMM GRANULOCYTES # BLD AUTO: 0.02 K/UL (ref 0–0.04)
IMM GRANULOCYTES NFR BLD AUTO: 0.2 % (ref 0–0.5)
KETONES UR QL STRIP: NEGATIVE
LDLC SERPL CALC-MCNC: 54.2 MG/DL (ref 63–159)
LEUKOCYTE ESTERASE UR QL STRIP: NEGATIVE
LYMPHOCYTES # BLD AUTO: 2.2 K/UL (ref 1–4.8)
LYMPHOCYTES NFR BLD: 25.7 % (ref 18–48)
MCH RBC QN AUTO: 28.9 PG (ref 27–31)
MCHC RBC AUTO-ENTMCNC: 31.2 G/DL (ref 32–36)
MCV RBC AUTO: 93 FL (ref 82–98)
MICROALBUMIN UR DL<=1MG/L-MCNC: 10 UG/ML
MICROSCOPIC COMMENT: ABNORMAL
MONOCYTES # BLD AUTO: 0.5 K/UL (ref 0.3–1)
MONOCYTES NFR BLD: 6.4 % (ref 4–15)
NEUTROPHILS # BLD AUTO: 5.1 K/UL (ref 1.8–7.7)
NEUTROPHILS NFR BLD: 60.5 % (ref 38–73)
NITRITE UR QL STRIP: NEGATIVE
NONHDLC SERPL-MCNC: 62 MG/DL
NRBC BLD-RTO: 0 /100 WBC
PH UR STRIP: 6 [PH] (ref 5–8)
PLATELET # BLD AUTO: 324 K/UL (ref 150–450)
PMV BLD AUTO: 10.5 FL (ref 9.2–12.9)
POTASSIUM SERPL-SCNC: 3.7 MMOL/L (ref 3.5–5.1)
PROT SERPL-MCNC: 7.4 G/DL (ref 6–8.4)
PROT UR QL STRIP: NEGATIVE
RBC # BLD AUTO: 4.39 M/UL (ref 4–5.4)
RBC #/AREA URNS AUTO: 4 /HPF (ref 0–4)
SODIUM SERPL-SCNC: 140 MMOL/L (ref 136–145)
SP GR UR STRIP: 1.02 (ref 1–1.03)
SQUAMOUS #/AREA URNS AUTO: 12 /HPF
T4 FREE SERPL-MCNC: 1.1 NG/DL (ref 0.71–1.51)
TRIGL SERPL-MCNC: 39 MG/DL (ref 30–150)
TSH SERPL DL<=0.005 MIU/L-ACNC: 4.54 UIU/ML (ref 0.4–4)
URN SPEC COLLECT METH UR: ABNORMAL
WBC # BLD AUTO: 8.44 K/UL (ref 3.9–12.7)
WBC #/AREA URNS AUTO: 1 /HPF (ref 0–5)

## 2022-03-24 PROCEDURE — 1101F PT FALLS ASSESS-DOCD LE1/YR: CPT | Mod: CPTII,S$GLB,, | Performed by: INTERNAL MEDICINE

## 2022-03-24 PROCEDURE — 1159F PR MEDICATION LIST DOCUMENTED IN MEDICAL RECORD: ICD-10-PCS | Mod: CPTII,S$GLB,, | Performed by: INTERNAL MEDICINE

## 2022-03-24 PROCEDURE — 3008F PR BODY MASS INDEX (BMI) DOCUMENTED: ICD-10-PCS | Mod: CPTII,S$GLB,, | Performed by: INTERNAL MEDICINE

## 2022-03-24 PROCEDURE — 1126F AMNT PAIN NOTED NONE PRSNT: CPT | Mod: CPTII,S$GLB,, | Performed by: INTERNAL MEDICINE

## 2022-03-24 PROCEDURE — 3008F BODY MASS INDEX DOCD: CPT | Mod: CPTII,S$GLB,, | Performed by: INTERNAL MEDICINE

## 2022-03-24 PROCEDURE — 3288F PR FALLS RISK ASSESSMENT DOCUMENTED: ICD-10-PCS | Mod: CPTII,S$GLB,, | Performed by: INTERNAL MEDICINE

## 2022-03-24 PROCEDURE — 80053 COMPREHEN METABOLIC PANEL: CPT | Performed by: INTERNAL MEDICINE

## 2022-03-24 PROCEDURE — 3079F PR MOST RECENT DIASTOLIC BLOOD PRESSURE 80-89 MM HG: ICD-10-PCS | Mod: CPTII,S$GLB,, | Performed by: INTERNAL MEDICINE

## 2022-03-24 PROCEDURE — 82728 ASSAY OF FERRITIN: CPT | Performed by: INTERNAL MEDICINE

## 2022-03-24 PROCEDURE — 3288F FALL RISK ASSESSMENT DOCD: CPT | Mod: CPTII,S$GLB,, | Performed by: INTERNAL MEDICINE

## 2022-03-24 PROCEDURE — 81001 URINALYSIS AUTO W/SCOPE: CPT | Performed by: INTERNAL MEDICINE

## 2022-03-24 PROCEDURE — 84439 ASSAY OF FREE THYROXINE: CPT | Performed by: INTERNAL MEDICINE

## 2022-03-24 PROCEDURE — 83036 HEMOGLOBIN GLYCOSYLATED A1C: CPT | Performed by: INTERNAL MEDICINE

## 2022-03-24 PROCEDURE — 82570 ASSAY OF URINE CREATININE: CPT | Performed by: INTERNAL MEDICINE

## 2022-03-24 PROCEDURE — 3075F PR MOST RECENT SYSTOLIC BLOOD PRESS GE 130-139MM HG: ICD-10-PCS | Mod: CPTII,S$GLB,, | Performed by: INTERNAL MEDICINE

## 2022-03-24 PROCEDURE — 1159F MED LIST DOCD IN RCRD: CPT | Mod: CPTII,S$GLB,, | Performed by: INTERNAL MEDICINE

## 2022-03-24 PROCEDURE — 1126F PR PAIN SEVERITY QUANTIFIED, NO PAIN PRESENT: ICD-10-PCS | Mod: CPTII,S$GLB,, | Performed by: INTERNAL MEDICINE

## 2022-03-24 PROCEDURE — 99999 PR PBB SHADOW E&M-EST. PATIENT-LVL V: CPT | Mod: PBBFAC,,, | Performed by: INTERNAL MEDICINE

## 2022-03-24 PROCEDURE — 1101F PR PT FALLS ASSESS DOC 0-1 FALLS W/OUT INJ PAST YR: ICD-10-PCS | Mod: CPTII,S$GLB,, | Performed by: INTERNAL MEDICINE

## 2022-03-24 PROCEDURE — 82306 VITAMIN D 25 HYDROXY: CPT | Performed by: INTERNAL MEDICINE

## 2022-03-24 PROCEDURE — 99214 PR OFFICE/OUTPT VISIT, EST, LEVL IV, 30-39 MIN: ICD-10-PCS | Mod: S$GLB,,, | Performed by: INTERNAL MEDICINE

## 2022-03-24 PROCEDURE — 84443 ASSAY THYROID STIM HORMONE: CPT | Performed by: INTERNAL MEDICINE

## 2022-03-24 PROCEDURE — 3079F DIAST BP 80-89 MM HG: CPT | Mod: CPTII,S$GLB,, | Performed by: INTERNAL MEDICINE

## 2022-03-24 PROCEDURE — 99214 OFFICE O/P EST MOD 30 MIN: CPT | Mod: S$GLB,,, | Performed by: INTERNAL MEDICINE

## 2022-03-24 PROCEDURE — 85025 COMPLETE CBC W/AUTO DIFF WBC: CPT | Performed by: INTERNAL MEDICINE

## 2022-03-24 PROCEDURE — 99999 PR PBB SHADOW E&M-EST. PATIENT-LVL V: ICD-10-PCS | Mod: PBBFAC,,, | Performed by: INTERNAL MEDICINE

## 2022-03-24 PROCEDURE — 80061 LIPID PANEL: CPT | Performed by: INTERNAL MEDICINE

## 2022-03-24 PROCEDURE — 3075F SYST BP GE 130 - 139MM HG: CPT | Mod: CPTII,S$GLB,, | Performed by: INTERNAL MEDICINE

## 2022-03-24 PROCEDURE — 36415 COLL VENOUS BLD VENIPUNCTURE: CPT | Performed by: INTERNAL MEDICINE

## 2022-03-24 RX ORDER — AMLODIPINE BESYLATE 2.5 MG/1
2.5 TABLET ORAL DAILY
Qty: 90 TABLET | Refills: 3 | Status: SHIPPED | OUTPATIENT
Start: 2022-03-24 | End: 2023-02-17

## 2022-03-24 RX ORDER — ATORVASTATIN CALCIUM 20 MG/1
20 TABLET, FILM COATED ORAL DAILY
Qty: 90 TABLET | Refills: 1 | Status: SHIPPED | OUTPATIENT
Start: 2022-03-24 | End: 2022-09-29

## 2022-03-24 RX ORDER — METOPROLOL SUCCINATE 50 MG/1
50 TABLET, EXTENDED RELEASE ORAL DAILY
Qty: 90 TABLET | Refills: 3 | Status: SHIPPED | OUTPATIENT
Start: 2022-03-24 | End: 2023-04-19

## 2022-03-24 RX ORDER — ESCITALOPRAM OXALATE 20 MG/1
20 TABLET ORAL DAILY
Qty: 90 TABLET | Refills: 3 | Status: SHIPPED | OUTPATIENT
Start: 2022-03-24 | End: 2023-05-05

## 2022-03-24 NOTE — PROGRESS NOTES
Subjective:       Patient ID: Marleny Guzman is a 71 y.o. female.    Chief Complaint: Follow-up    HPIPt is feeling well overall.  No CP.  Some mild dyspnea but no cough.  Has R shoulder pain.  Works as a sitter for patients.   Review of Systems   Respiratory: Negative for shortness of breath (PND or orthopnea).    Cardiovascular: Negative for chest pain (arm pain or jaw pain).   Gastrointestinal: Negative for abdominal pain, diarrhea, nausea and vomiting.   Genitourinary: Negative for dysuria.   Neurological: Negative for seizures, syncope and headaches.       Objective:      Physical Exam  Constitutional:       General: She is not in acute distress.     Appearance: She is well-developed.   HENT:      Head: Normocephalic.   Neck:      Thyroid: No thyromegaly.      Vascular: No JVD.   Cardiovascular:      Rate and Rhythm: Normal rate and regular rhythm.      Heart sounds: Normal heart sounds. No murmur heard.    No friction rub. No gallop.   Pulmonary:      Effort: Pulmonary effort is normal.      Breath sounds: Normal breath sounds. No wheezing or rales.      Comments: R Breast: No masses, discharge or adenopathy bilaterally    L breast - tender ropey area at 4 o'clock    Abdominal:      General: Bowel sounds are normal. There is no distension.      Palpations: Abdomen is soft. There is no mass.      Tenderness: There is no abdominal tenderness. There is no guarding or rebound.   Musculoskeletal:      Cervical back: Neck supple.   Lymphadenopathy:      Cervical: No cervical adenopathy.   Skin:     General: Skin is warm and dry.   Neurological:      Mental Status: She is alert and oriented to person, place, and time.      Deep Tendon Reflexes: Reflexes are normal and symmetric.   Psychiatric:         Behavior: Behavior normal.         Thought Content: Thought content normal.         Judgment: Judgment normal.         Assessment:       1. Essential hypertension    2. Hyperglycemia    3. Hyperlipidemia,  unspecified hyperlipidemia type    4. Mild vitamin D deficiency    5. Abnormal mammogram    6. Anemia, unspecified type    7. Solitary pulmonary nodule    8. Anxiety    9. Depression, unspecified depression type    10. Screening for colon cancer    11. Acute pain of right shoulder    12. Routine eye exam    13. Breast nodule        Plan:   Essential hypertension  -     CBC Auto Differential; Future; Expected date: 03/24/2022  -     Comprehensive Metabolic Panel; Future; Expected date: 03/24/2022  -     TSH; Future; Expected date: 03/24/2022  -     Urinalysis  -     Microalbumin/Creatinine Ratio, Urine  -     metoprolol succinate (TOPROL-XL) 50 MG 24 hr tablet; Take 1 tablet (50 mg total) by mouth once daily.  Dispense: 90 tablet; Refill: 3  Fair control   Work on diet and exercise  Hyperglycemia  -     Hemoglobin A1C; Future; Expected date: 03/24/2022    Hyperlipidemia, unspecified hyperlipidemia type  -     Lipid Panel; Future; Expected date: 03/24/2022    Mild vitamin D deficiency  -     Vitamin D; Future; Expected date: 03/24/2022    Abnormal mammogram  -     Mammo Digital Diagnostic Bilat; Future; Expected date: 03/24/2022    Anemia, unspecified type  -     Iron and TIBC; Future; Expected date: 03/24/2022  -     Ferritin; Future; Expected date: 03/24/2022    Solitary pulmonary nodule  -     CT Chest Without Contrast; Future; Expected date: 03/24/2022    Anxiety  -     EScitalopram oxalate (LEXAPRO) 20 MG tablet; Take 1 tablet (20 mg total) by mouth once daily. 1.5 tablets daily  Dispense: 90 tablet; Refill: 3    Depression, unspecified depression type  -     EScitalopram oxalate (LEXAPRO) 20 MG tablet; Take 1 tablet (20 mg total) by mouth once daily. 1.5 tablets daily  Dispense: 90 tablet; Refill: 3    Screening for colon cancer  -     Case Request Endoscopy: COLONOSCOPY    Acute pain of right shoulder  -     X-Ray Shoulder Trauma 3 view Right; Future; Expected date: 03/24/2022  -     Ambulatory referral/consult  to Orthopedics; Future; Expected date: 03/31/2022    Routine eye exam  -     Ambulatory referral/consult to Optometry; Future; Expected date: 03/31/2022    Breast nodule  -     US Breast Left Limited; Future; Expected date: 03/24/2022    Other orders  -     amLODIPine (NORVASC) 2.5 MG tablet; Take 1 tablet (2.5 mg total) by mouth once daily.  Dispense: 90 tablet; Refill: 3  -     atorvastatin (LIPITOR) 20 MG tablet; Take 1 tablet (20 mg total) by mouth once daily.  Dispense: 90 tablet; Refill: 1

## 2022-03-25 ENCOUNTER — HOSPITAL ENCOUNTER (OUTPATIENT)
Dept: RADIOLOGY | Facility: HOSPITAL | Age: 72
Discharge: HOME | End: 2022-03-25
Attending: INTERNAL MEDICINE
Payer: MEDICARE

## 2022-03-25 DIAGNOSIS — M25.511 ACUTE PAIN OF RIGHT SHOULDER: ICD-10-CM

## 2022-03-25 DIAGNOSIS — R91.1 SOLITARY PULMONARY NODULE: ICD-10-CM

## 2022-03-25 PROCEDURE — 71250 CT THORAX DX C-: CPT | Mod: 26,,, | Performed by: RADIOLOGY

## 2022-03-25 PROCEDURE — 71250 CT CHEST WITHOUT CONTRAST: ICD-10-PCS | Mod: 26,,, | Performed by: RADIOLOGY

## 2022-03-25 PROCEDURE — 73030 X-RAY EXAM OF SHOULDER: CPT | Mod: TC,RT

## 2022-03-25 PROCEDURE — 73030 XR SHOULDER TRAUMA 3 VIEW RIGHT: ICD-10-PCS | Mod: 26,RT,, | Performed by: RADIOLOGY

## 2022-03-25 PROCEDURE — 73030 X-RAY EXAM OF SHOULDER: CPT | Mod: 26,RT,, | Performed by: RADIOLOGY

## 2022-03-25 PROCEDURE — 71250 CT THORAX DX C-: CPT | Mod: TC

## 2022-03-27 ENCOUNTER — PATIENT OUTREACH (OUTPATIENT)
Dept: ADMINISTRATIVE | Facility: OTHER | Age: 72
End: 2022-03-27
Payer: MEDICARE

## 2022-03-28 NOTE — PROGRESS NOTES
Care Everywhere: updated  Immunization: updated  Health Maintenance: updated  Media Review:   Legacy Review:   DIS:  Order placed:   Upcoming appts:mammogram 5.12  EFAX:  Task Tickets:  Referrals:

## 2022-03-29 ENCOUNTER — PATIENT MESSAGE (OUTPATIENT)
Dept: RESEARCH | Facility: HOSPITAL | Age: 72
End: 2022-03-29
Payer: MEDICARE

## 2022-04-05 ENCOUNTER — HOSPITAL ENCOUNTER (EMERGENCY)
Facility: OTHER | Age: 72
Discharge: HOME | End: 2022-04-05
Attending: EMERGENCY MEDICINE
Payer: MEDICARE

## 2022-04-05 ENCOUNTER — OFFICE VISIT (OUTPATIENT)
Dept: GYNECOLOGIC ONCOLOGY | Facility: CLINIC | Age: 72
End: 2022-04-05
Payer: MEDICARE

## 2022-04-05 VITALS
HEART RATE: 86 BPM | BODY MASS INDEX: 27.68 KG/M2 | SYSTOLIC BLOOD PRESSURE: 164 MMHG | DIASTOLIC BLOOD PRESSURE: 72 MMHG | WEIGHT: 192.88 LBS

## 2022-04-05 VITALS
OXYGEN SATURATION: 95 % | BODY MASS INDEX: 27.2 KG/M2 | SYSTOLIC BLOOD PRESSURE: 149 MMHG | RESPIRATION RATE: 18 BRPM | HEIGHT: 70 IN | WEIGHT: 190 LBS | DIASTOLIC BLOOD PRESSURE: 65 MMHG | TEMPERATURE: 100 F | HEART RATE: 87 BPM

## 2022-04-05 DIAGNOSIS — R05.9 COUGH: ICD-10-CM

## 2022-04-05 DIAGNOSIS — J18.9 PNEUMONIA OF RIGHT LOWER LOBE DUE TO INFECTIOUS ORGANISM: Primary | ICD-10-CM

## 2022-04-05 DIAGNOSIS — R53.1 WEAKNESS: ICD-10-CM

## 2022-04-05 DIAGNOSIS — N89.3 VAGINAL DYSPLASIA: Primary | ICD-10-CM

## 2022-04-05 DIAGNOSIS — R50.9 FEVER, UNSPECIFIED FEVER CAUSE: ICD-10-CM

## 2022-04-05 DIAGNOSIS — L29.2 VULVAR PRURITUS: ICD-10-CM

## 2022-04-05 LAB
ALBUMIN SERPL BCP-MCNC: 3.5 G/DL (ref 3.5–5.2)
ALP SERPL-CCNC: 156 U/L (ref 55–135)
ALT SERPL W/O P-5'-P-CCNC: 22 U/L (ref 10–44)
ANION GAP SERPL CALC-SCNC: 11 MMOL/L (ref 8–16)
AST SERPL-CCNC: 19 U/L (ref 10–40)
BACTERIA #/AREA URNS HPF: ABNORMAL /HPF
BASOPHILS # BLD AUTO: 0.02 K/UL (ref 0–0.2)
BASOPHILS NFR BLD: 0.1 % (ref 0–1.9)
BILIRUB SERPL-MCNC: 0.5 MG/DL (ref 0.1–1)
BILIRUB UR QL STRIP: NEGATIVE
BUN SERPL-MCNC: 17 MG/DL (ref 8–23)
CALCIUM SERPL-MCNC: 9.8 MG/DL (ref 8.7–10.5)
CHLORIDE SERPL-SCNC: 103 MMOL/L (ref 95–110)
CLARITY UR: CLEAR
CO2 SERPL-SCNC: 27 MMOL/L (ref 23–29)
COLOR UR: YELLOW
CREAT SERPL-MCNC: 0.9 MG/DL (ref 0.5–1.4)
CTP QC/QA: YES
CTP QC/QA: YES
DIFFERENTIAL METHOD: ABNORMAL
EOSINOPHIL # BLD AUTO: 0 K/UL (ref 0–0.5)
EOSINOPHIL NFR BLD: 0.2 % (ref 0–8)
ERYTHROCYTE [DISTWIDTH] IN BLOOD BY AUTOMATED COUNT: 14.6 % (ref 11.5–14.5)
EST. GFR  (AFRICAN AMERICAN): >60 ML/MIN/1.73 M^2
EST. GFR  (NON AFRICAN AMERICAN): >60 ML/MIN/1.73 M^2
GLUCOSE SERPL-MCNC: 155 MG/DL (ref 70–110)
GLUCOSE UR QL STRIP: NEGATIVE
HCT VFR BLD AUTO: 39.9 % (ref 37–48.5)
HCV AB SERPL QL IA: NEGATIVE
HGB BLD-MCNC: 13.2 G/DL (ref 12–16)
HGB UR QL STRIP: ABNORMAL
IMM GRANULOCYTES # BLD AUTO: 0.04 K/UL (ref 0–0.04)
IMM GRANULOCYTES NFR BLD AUTO: 0.3 % (ref 0–0.5)
KETONES UR QL STRIP: NEGATIVE
LEUKOCYTE ESTERASE UR QL STRIP: NEGATIVE
LYMPHOCYTES # BLD AUTO: 0.8 K/UL (ref 1–4.8)
LYMPHOCYTES NFR BLD: 4.9 % (ref 18–48)
MAGNESIUM SERPL-MCNC: 1.6 MG/DL (ref 1.6–2.6)
MCH RBC QN AUTO: 30.2 PG (ref 27–31)
MCHC RBC AUTO-ENTMCNC: 33.1 G/DL (ref 32–36)
MCV RBC AUTO: 91 FL (ref 82–98)
MICROSCOPIC COMMENT: ABNORMAL
MONOCYTES # BLD AUTO: 0.3 K/UL (ref 0.3–1)
MONOCYTES NFR BLD: 2.1 % (ref 4–15)
NEUTROPHILS # BLD AUTO: 14.8 K/UL (ref 1.8–7.7)
NEUTROPHILS NFR BLD: 92.4 % (ref 38–73)
NITRITE UR QL STRIP: NEGATIVE
NRBC BLD-RTO: 0 /100 WBC
PH UR STRIP: 6 [PH] (ref 5–8)
PLATELET # BLD AUTO: 260 K/UL (ref 150–450)
PMV BLD AUTO: 9.5 FL (ref 9.2–12.9)
POC MOLECULAR INFLUENZA A AGN: NEGATIVE
POC MOLECULAR INFLUENZA B AGN: NEGATIVE
POTASSIUM SERPL-SCNC: 3.8 MMOL/L (ref 3.5–5.1)
PROT SERPL-MCNC: 7.4 G/DL (ref 6–8.4)
PROT UR QL STRIP: NEGATIVE
RBC # BLD AUTO: 4.37 M/UL (ref 4–5.4)
RBC #/AREA URNS HPF: 10 /HPF (ref 0–4)
SARS-COV-2 RDRP RESP QL NAA+PROBE: NEGATIVE
SODIUM SERPL-SCNC: 141 MMOL/L (ref 136–145)
SP GR UR STRIP: 1.02 (ref 1–1.03)
URN SPEC COLLECT METH UR: ABNORMAL
UROBILINOGEN UR STRIP-ACNC: NEGATIVE EU/DL
WBC # BLD AUTO: 15.96 K/UL (ref 3.9–12.7)
YEAST URNS QL MICRO: ABNORMAL

## 2022-04-05 PROCEDURE — 3061F NEG MICROALBUMINURIA REV: CPT | Mod: CPTII,S$GLB,, | Performed by: OBSTETRICS & GYNECOLOGY

## 2022-04-05 PROCEDURE — 63600175 PHARM REV CODE 636 W HCPCS: Performed by: EMERGENCY MEDICINE

## 2022-04-05 PROCEDURE — 99999 PR PBB SHADOW E&M-EST. PATIENT-LVL III: ICD-10-PCS | Mod: PBBFAC,,, | Performed by: OBSTETRICS & GYNECOLOGY

## 2022-04-05 PROCEDURE — 1101F PR PT FALLS ASSESS DOC 0-1 FALLS W/OUT INJ PAST YR: ICD-10-PCS | Mod: CPTII,S$GLB,, | Performed by: OBSTETRICS & GYNECOLOGY

## 2022-04-05 PROCEDURE — 99999 PR PBB SHADOW E&M-EST. PATIENT-LVL III: CPT | Mod: PBBFAC,,, | Performed by: OBSTETRICS & GYNECOLOGY

## 2022-04-05 PROCEDURE — 93010 EKG 12-LEAD: ICD-10-PCS | Mod: ,,, | Performed by: INTERNAL MEDICINE

## 2022-04-05 PROCEDURE — 3078F DIAST BP <80 MM HG: CPT | Mod: CPTII,S$GLB,, | Performed by: OBSTETRICS & GYNECOLOGY

## 2022-04-05 PROCEDURE — 88175 CYTOPATH C/V AUTO FLUID REDO: CPT | Performed by: OBSTETRICS & GYNECOLOGY

## 2022-04-05 PROCEDURE — 83735 ASSAY OF MAGNESIUM: CPT | Performed by: EMERGENCY MEDICINE

## 2022-04-05 PROCEDURE — 3077F PR MOST RECENT SYSTOLIC BLOOD PRESSURE >= 140 MM HG: ICD-10-PCS | Mod: CPTII,S$GLB,, | Performed by: OBSTETRICS & GYNECOLOGY

## 2022-04-05 PROCEDURE — 99285 EMERGENCY DEPT VISIT HI MDM: CPT | Mod: 25

## 2022-04-05 PROCEDURE — 81000 URINALYSIS NONAUTO W/SCOPE: CPT | Performed by: EMERGENCY MEDICINE

## 2022-04-05 PROCEDURE — 3078F PR MOST RECENT DIASTOLIC BLOOD PRESSURE < 80 MM HG: ICD-10-PCS | Mod: CPTII,S$GLB,, | Performed by: OBSTETRICS & GYNECOLOGY

## 2022-04-05 PROCEDURE — 25000003 PHARM REV CODE 250: Performed by: EMERGENCY MEDICINE

## 2022-04-05 PROCEDURE — 63700000 PHARM REV CODE 250 ALT 637 W/O HCPCS: Performed by: EMERGENCY MEDICINE

## 2022-04-05 PROCEDURE — 1126F PR PAIN SEVERITY QUANTIFIED, NO PAIN PRESENT: ICD-10-PCS | Mod: CPTII,S$GLB,, | Performed by: OBSTETRICS & GYNECOLOGY

## 2022-04-05 PROCEDURE — 85025 COMPLETE CBC W/AUTO DIFF WBC: CPT | Performed by: EMERGENCY MEDICINE

## 2022-04-05 PROCEDURE — 96361 HYDRATE IV INFUSION ADD-ON: CPT

## 2022-04-05 PROCEDURE — 3044F PR MOST RECENT HEMOGLOBIN A1C LEVEL <7.0%: ICD-10-PCS | Mod: CPTII,S$GLB,, | Performed by: OBSTETRICS & GYNECOLOGY

## 2022-04-05 PROCEDURE — 96365 THER/PROPH/DIAG IV INF INIT: CPT

## 2022-04-05 PROCEDURE — 3008F PR BODY MASS INDEX (BMI) DOCUMENTED: ICD-10-PCS | Mod: CPTII,S$GLB,, | Performed by: OBSTETRICS & GYNECOLOGY

## 2022-04-05 PROCEDURE — 80053 COMPREHEN METABOLIC PANEL: CPT | Performed by: EMERGENCY MEDICINE

## 2022-04-05 PROCEDURE — 3077F SYST BP >= 140 MM HG: CPT | Mod: CPTII,S$GLB,, | Performed by: OBSTETRICS & GYNECOLOGY

## 2022-04-05 PROCEDURE — 3066F PR DOCUMENTATION OF TREATMENT FOR NEPHROPATHY: ICD-10-PCS | Mod: CPTII,S$GLB,, | Performed by: OBSTETRICS & GYNECOLOGY

## 2022-04-05 PROCEDURE — 93010 ELECTROCARDIOGRAM REPORT: CPT | Mod: ,,, | Performed by: INTERNAL MEDICINE

## 2022-04-05 PROCEDURE — 3061F PR NEG MICROALBUMINURIA RESULT DOCUMENTED/REVIEW: ICD-10-PCS | Mod: CPTII,S$GLB,, | Performed by: OBSTETRICS & GYNECOLOGY

## 2022-04-05 PROCEDURE — 93005 ELECTROCARDIOGRAM TRACING: CPT

## 2022-04-05 PROCEDURE — 3288F PR FALLS RISK ASSESSMENT DOCUMENTED: ICD-10-PCS | Mod: CPTII,S$GLB,, | Performed by: OBSTETRICS & GYNECOLOGY

## 2022-04-05 PROCEDURE — 1126F AMNT PAIN NOTED NONE PRSNT: CPT | Mod: CPTII,S$GLB,, | Performed by: OBSTETRICS & GYNECOLOGY

## 2022-04-05 PROCEDURE — 3066F NEPHROPATHY DOC TX: CPT | Mod: CPTII,S$GLB,, | Performed by: OBSTETRICS & GYNECOLOGY

## 2022-04-05 PROCEDURE — 3008F BODY MASS INDEX DOCD: CPT | Mod: CPTII,S$GLB,, | Performed by: OBSTETRICS & GYNECOLOGY

## 2022-04-05 PROCEDURE — 99214 PR OFFICE/OUTPT VISIT, EST, LEVL IV, 30-39 MIN: ICD-10-PCS | Mod: S$GLB,,, | Performed by: OBSTETRICS & GYNECOLOGY

## 2022-04-05 PROCEDURE — 99214 OFFICE O/P EST MOD 30 MIN: CPT | Mod: S$GLB,,, | Performed by: OBSTETRICS & GYNECOLOGY

## 2022-04-05 PROCEDURE — 86803 HEPATITIS C AB TEST: CPT | Performed by: EMERGENCY MEDICINE

## 2022-04-05 PROCEDURE — 3044F HG A1C LEVEL LT 7.0%: CPT | Mod: CPTII,S$GLB,, | Performed by: OBSTETRICS & GYNECOLOGY

## 2022-04-05 PROCEDURE — 3288F FALL RISK ASSESSMENT DOCD: CPT | Mod: CPTII,S$GLB,, | Performed by: OBSTETRICS & GYNECOLOGY

## 2022-04-05 PROCEDURE — 1101F PT FALLS ASSESS-DOCD LE1/YR: CPT | Mod: CPTII,S$GLB,, | Performed by: OBSTETRICS & GYNECOLOGY

## 2022-04-05 PROCEDURE — U0002 COVID-19 LAB TEST NON-CDC: HCPCS | Performed by: EMERGENCY MEDICINE

## 2022-04-05 RX ORDER — ALBUTEROL SULFATE 90 UG/1
2 AEROSOL, METERED RESPIRATORY (INHALATION) EVERY 4 HOURS PRN
Qty: 18 G | Refills: 6 | Status: SHIPPED | OUTPATIENT
Start: 2022-04-05 | End: 2022-09-02 | Stop reason: CLARIF

## 2022-04-05 RX ORDER — AZITHROMYCIN 250 MG/1
1000 TABLET, FILM COATED ORAL
Status: COMPLETED | OUTPATIENT
Start: 2022-04-05 | End: 2022-04-05

## 2022-04-05 RX ORDER — ACETAMINOPHEN 500 MG
1000 TABLET ORAL
Status: COMPLETED | OUTPATIENT
Start: 2022-04-05 | End: 2022-04-05

## 2022-04-05 RX ORDER — AZITHROMYCIN 250 MG/1
250 TABLET, FILM COATED ORAL DAILY
Qty: 5 TABLET | Refills: 0 | Status: SHIPPED | OUTPATIENT
Start: 2022-04-05 | End: 2022-07-25

## 2022-04-05 RX ORDER — CLOBETASOL PROPIONATE 0.5 MG/G
CREAM TOPICAL 2 TIMES DAILY
Qty: 45 G | Refills: 0 | Status: SHIPPED | OUTPATIENT
Start: 2022-04-05 | End: 2022-04-05

## 2022-04-05 RX ADMIN — CEFTRIAXONE 1 G: 1 INJECTION, SOLUTION INTRAVENOUS at 04:04

## 2022-04-05 RX ADMIN — ACETAMINOPHEN 1000 MG: 500 TABLET, FILM COATED ORAL at 04:04

## 2022-04-05 RX ADMIN — AZITHROMYCIN MONOHYDRATE 1000 MG: 250 TABLET ORAL at 04:04

## 2022-04-05 RX ADMIN — SODIUM CHLORIDE 500 ML: 0.9 INJECTION, SOLUTION INTRAVENOUS at 04:04

## 2022-04-05 NOTE — ED TRIAGE NOTES
Pt to the ER with complaints of coughing with chills, bodyaches, headache and weakness that started last night. Pt denies sick contacts.

## 2022-04-05 NOTE — ED PROVIDER NOTES
Encounter Date: 4/5/2022    SCRIBE #1 NOTE: I, Dhara Hernandez, mejia scribing for, and in the presence of, Natalie Hernandez MD.       History     Chief Complaint   Patient presents with    Cough    Chills    Headache    COVID-19 Concerns     Pt c.o coughing, chills, bodyaches, headache and weakness onset last night.  AAO x 3 nadn skin w.d  matias c.e      Time seen by provider: 4:06 PM    This is a 71 y.o. female with a PMHx of Pneumonia, who presents with complaint of coughing onset late last night. Patient explains her whole body starting late last night. She admits to rhinorrhea, fatigue, sore throat, coughing, chills, and a lack of appetite. She denies any nausea, vomiting, diarrhea, or abdominal pain. Patient denies taking any medication. She admits having COVID vaccination. This is the extent of the patient's complaints at this time.    The history is provided by the patient.     Review of patient's allergies indicates:  No Known Allergies  Past Medical History:   Diagnosis Date    Abnormal Pap smear     Abnormal Pap smear of cervix     Abnormal Pap smear of vagina 7/20/2016    LEROY (acute kidney injury) 10/10/2020    Allergy     Anemia     Anxiety     Anxiety     Cataract     Chronic bilateral low back pain without sciatica 3/13/2018    Depression     Depression with anxiety     Hypertension     Osteoarthritis 6/20/2013    Pneumonia of both lungs due to infectious organism 6/7/2021    Right rotator cuff tear     Sleep apnea     Cipap machine at home    Trouble in sleeping     Urinary incontinence     Leaks urine with laughing/ coughing/ sneezing     Past Surgical History:   Procedure Laterality Date    CATARACT EXTRACTION  6/18/12    OS    CATARACT EXTRACTION  7/16/12    OD    CERVIX SURGERY      Conization    COLONOSCOPY      COLONOSCOPY N/A 3/4/2016    Procedure: COLONOSCOPY;  Surgeon: Tenzin Thakkar MD;  Location: [address]);  Service: Endoscopy;  Laterality: N/A;     "COLONOSCOPY Left 1/25/2021    Procedure: COLONOSCOPY;  Surgeon: Wayne Naranjo MD;  Location: [facility] ENDO;  Service: Endoscopy;  Laterality: Left;    ESOPHAGOGASTRODUODENOSCOPY Left 1/25/2021    Procedure: EGD (ESOPHAGOGASTRODUODENOSCOPY);  Surgeon: Wayne Naranjo MD;  Location: [facility] ENDO;  Service: Endoscopy;  Laterality: Left;    EYE SURGERY Bilateral     cataract    HYSTERECTOMY  5-12-15    LEEP      LUMBAR EPIDURAL INJECTION      OOPHORECTOMY      ME REMOVAL OF OVARY/TUBE(S)  5-12-15    ROBOT-ASSISTED REPAIR OF VENTRAL HERNIA USING DA ASPEN XI N/A 11/29/2021    Procedure: XI ROBOTIC REPAIR, HERNIA, VENTRAL w/ possible mesh;  Surgeon: Roney Jackson MD;  Location: [address];  Service: General;  Laterality: N/A;  Anesthesia Block    SALPINGECTOMY Left     SHOULDER SURGERY Right      Family History   Problem Relation Age of Onset    Hypertension Father     Cataracts Father     Cancer Mother         THYROID CANCER    Stroke Mother     Breast cancer Cousin     Heart disease Sister     Pacemaker/defibrilator Sister     Hypertension Sister     Deep vein thrombosis Sister     Heart disease Sister         CABG    Osteoarthritis Sister     Edema Sister     Eczema Sister     Hypertension Sister     Breast cancer Sister 72    Anuerysm Sister     Drug abuse Sister     Hypertension Sister     No Known Problems Brother     No Known Problems Sister     Cancer Sister         Cancer cells or "growth in her stomach"    No Known Problems Sister     Hypertension Daughter     Other Daughter         Heart palpitations    Depression Maternal Grandmother     Ulcers Maternal Grandfather         Legs    No Known Problems Paternal Grandmother     No Known Problems Paternal Grandfather     Cancer Maternal Aunt     ADD / ADHD Neg Hx     Alcohol abuse Neg Hx     Anxiety disorder Neg Hx     Bipolar disorder Neg Hx     Dementia Neg Hx     OCD Neg Hx     Paranoid behavior Neg Hx     " Physical abuse Neg Hx     Schizophrenia Neg Hx     Seizures Neg Hx     Sexual abuse Neg Hx     Amblyopia Neg Hx     Blindness Neg Hx     Glaucoma Neg Hx     Macular degeneration Neg Hx     Retinal detachment Neg Hx     Strabismus Neg Hx      Social History     Tobacco Use    Smoking status: Former Smoker     Packs/day: 0.50     Years: 22.00     Pack years: 11.00     Types: Cigarettes     Quit date: 1998     Years since quittin.1    Smokeless tobacco: Never Used   Substance Use Topics    Alcohol use: Yes     Alcohol/week: 0.0 standard drinks     Comment: Occassionally for social events will have a glass of wine    Drug use: No     Review of Systems   Constitutional: Positive for appetite change, chills and fatigue. Negative for fever.   HENT: Positive for rhinorrhea and sore throat.    Respiratory: Positive for cough. Negative for shortness of breath.    Cardiovascular: Negative for chest pain.   Gastrointestinal: Negative for abdominal pain, diarrhea, nausea and vomiting.   Genitourinary: Negative for dysuria.   Musculoskeletal: Negative for back pain.   Skin: Negative for rash.   Neurological: Negative for weakness.   Hematological: Does not bruise/bleed easily.       Physical Exam     Initial Vitals [22 1450]   BP Pulse Resp Temp SpO2   (!) 166/70 92 18 (!) 100.5 °F (38.1 °C) 97 %      MAP       --         Physical Exam    Constitutional: She appears well-developed. She is cooperative.   Nontoxic appearing.   HENT:   Head: Atraumatic.   No tonsillar exudates   Eyes: Conjunctivae and lids are normal.   Neck: Neck supple.   No meningismus   Normal range of motion.  Cardiovascular: Normal rate.   Pulmonary/Chest: Breath sounds normal. No respiratory distress. She has no wheezes.   No increased work of breathing. No respiratory distress.    Musculoskeletal:         General: Normal range of motion.      Cervical back: Normal range of motion and neck supple.     Neurological: She is alert.    Skin: No rash noted.   Psychiatric: She has a normal mood and affect. Her speech is normal and behavior is normal.         ED Course   Procedures  Labs Reviewed   CBC W/ AUTO DIFFERENTIAL - Abnormal; Notable for the following components:       Result Value    WBC 15.96 (*)     RDW 14.6 (*)     Gran # (ANC) 14.8 (*)     Lymph # 0.8 (*)     Gran % 92.4 (*)     Lymph % 4.9 (*)     Mono % 2.1 (*)     All other components within normal limits   COMPREHENSIVE METABOLIC PANEL - Abnormal; Notable for the following components:    Glucose 155 (*)     Alkaline Phosphatase 156 (*)     All other components within normal limits   MAGNESIUM   HEPATITIS C ANTIBODY    Narrative:     Release to patient->Immediate   URINALYSIS, REFLEX TO URINE CULTURE   SARS-COV-2 RDRP GENE   POCT INFLUENZA A/B MOLECULAR     EKG Readings: (Independently Interpreted)   Initial Reading: No STEMI. Rhythm: Normal Sinus Rhythm. Heart Rate: 89.   Artifact present. Narrow QRS       Imaging Results          X-Ray Chest PA And Lateral (Final result)  Result time 04/05/22 16:25:04    Final result by William Sevilla MD (04/05/22 16:25:04)                 Impression:      Airspace opacity in the medial aspect of the right lower lobe concerning for pneumonia.      Electronically signed by: William Sevilla MD  Date:    04/05/2022  Time:    16:25             Narrative:    EXAMINATION:  XR CHEST PA AND LATERAL    CLINICAL HISTORY:  Cough, unspecified    TECHNIQUE:  PA and lateral views of the chest were performed.    COMPARISON:  CT 03/25/2021    FINDINGS:  There is airspace opacity in the medial aspect of the right lower lobe.  Left lung appears relatively clear.    The cardiac silhouette is normal in size. The hilar and mediastinal contours are unremarkable.    Bones are intact.                              X-Rays:   Independently Interpreted Readings:   Chest X-Ray: Right patchy opacity to right lower lobe.     Medications   acetaminophen tablet 1,000 mg  (1,000 mg Oral Given 4/5/22 1608)   sodium chloride 0.9% bolus 500 mL (0 mLs Intravenous Stopped 4/5/22 1715)   cefTRIAXone (ROCEPHIN) 1 g/50 mL D5W IVPB (0 g Intravenous Stopped 4/5/22 1715)   azithromycin tablet 1,000 mg (1,000 mg Oral Given 4/5/22 1643)     Medical Decision Making:   History:   Old Medical Records: I decided to obtain old medical records.  Initial Assessment:   70 yo F here with subjective fever, malaise and cough. Pt non toxic appearing, no resp distress, low grade temp but otherwise no findings on exam to suspect serious bacterial illness at this time. Will admin antipyretics, ivf and eval for source of infection.     Independently Interpreted Test(s):   I have ordered and independently interpreted X-rays - see prior notes.  I have ordered and independently interpreted EKG Reading(s) - see prior notes  Clinical Tests:   Lab Tests: Ordered and Reviewed  Radiological Study: Ordered and Reviewed  Medical Tests: Ordered and Reviewed  ED Management:  No resting hypoxia, nor dyspnea.  Labs with leukocytosis, imaging with opacity to right lower lobe, in setting of cough will treat as community-acquired pneumonia, administer antibiotics, IV fluids, DC home given clinical picture, patient amenable to this plan, strict return precautions discussed.          Scribe Attestation:   Scribe #1: I performed the above scribed service and the documentation accurately describes the services I performed. I attest to the accuracy of the note.        ED Course as of 04/05/22 1751   Tue Apr 05, 2022   1641 D/w pt cxr results- plan for iv abx and likely dc home [DM]      ED Course User Index  [DM] Natalie Hernandez MD           Physician Attestation for Scribe: I, David, reviewed documentation as scribed in my presence, which is both accurate and complete.  Clinical Impression:   Final diagnoses:  [R53.1] Weakness  [R05.9] Cough  [J18.9] Pneumonia of right lower lobe due to infectious organism (Primary)  [R50.9]  Fever, unspecified fever cause          ED Disposition Condition    Discharge Stable        ED Prescriptions     Medication Sig Dispense Start Date End Date Auth. Provider    azithromycin (ZITHROMAX Z-DONTAE) 250 MG tablet Take 1 tablet (250 mg total) by mouth once daily. 5 tablet 4/5/2022  Natalie Hernandez MD    albuterol (VENTOLIN HFA) 90 mcg/actuation inhaler Inhale 2 puffs into the lungs every 4 (four) hours as needed for Wheezing or Shortness of Breath (cough). Rescue 18 g 4/5/2022  Natalie Hernandez MD        Follow-up Information     Follow up With Specialties Details Why Contact Info    Bridget Cantu MD Internal Medicine Schedule an appointment as soon as possible for a visit   [address]  [facility] [address]  [phone]             Natalie Hernandez MD  04/05/22 2742

## 2022-04-05 NOTE — ED NOTES
LOC: The patient is awake, alert, and oriented to self, place, time, and situation. Pt is calm and cooperative. Affect is appropriate.  Speech is appropriate and clear.     APPEARANCE: Patient resting on stretcher; appears uncomfortable but in no acute distress.  Patient is clean and well groomed.    SKIN: The skin is dry but hot to tough; color consistent with ethnicity.  Patient has normal skin turgor and moist mucus membranes.  Skin intact; no breakdown or bruising noted.     MUSCULOSKELETAL: Patient moving upper and lower extremities without difficulty; denies pain in the extremities or back.  Denies weakness of the extremities but complains of generalized weakness.     RESPIRATORY: Airway is open and patent. Respirations spontaneous, even, easy, and non-labored.  Patient has a normal effort and rate.  No accessory muscle use noted. Pt reports a nonproductive cough.     CARDIAC:  Normal rate noted; pt is hypertensive.  No peripheral edema noted. No complaints of chest pain.      ABDOMEN: Soft and non tender to palpation.  No distention noted. Pt denies abdominal pain; denies nausea, vomiting, diarrhea, or constipation.    NEUROLOGIC: Eyes open spontaneously.  Behavior appropriate to situation.  Follows commands; facial expression symmetrical.  Purposeful motor response noted; normal sensation in all extremities. Pt complains of a headache; denies lightheadedness or dizziness; denies visual disturbances; denies loss of balance; denies unilateral weakness.

## 2022-04-07 RX ORDER — PROMETHAZINE HYDROCHLORIDE 6.25 MG/5ML
SYRUP ORAL EVERY 6 HOURS PRN
COMMUNITY
End: 2022-09-02 | Stop reason: CLARIF

## 2022-04-07 NOTE — TELEPHONE ENCOUNTER
----- Message from Clare Anderson sent at 4/7/2022  8:37 AM CDT -----  Contact: [phone]  Patient would like to get medical advice.  Symptoms :  cough  How long have you had these symptoms:   Would you like a call back, or a response through your [facility] portal?:    Pharmacy name and phone # :  [id number] - [place] - [address]   Phone:  [phone]  Fax:  [phone]  Comments:   Patient would like a refill on Promethazine DM 6.25 -15 mg and please call patient to confirm.

## 2022-04-07 NOTE — TELEPHONE ENCOUNTER
No new care gaps identified.  Powered by [employer]. Reference number: [id number].   4/07/2022 10:25:27 AM CDT

## 2022-04-08 ENCOUNTER — OFFICE VISIT (OUTPATIENT)
Dept: INTERNAL MEDICINE | Facility: CLINIC | Age: 72
End: 2022-04-08
Payer: MEDICARE

## 2022-04-08 ENCOUNTER — PATIENT MESSAGE (OUTPATIENT)
Dept: INTERNAL MEDICINE | Facility: CLINIC | Age: 72
End: 2022-04-08

## 2022-04-08 VITALS
SYSTOLIC BLOOD PRESSURE: 142 MMHG | DIASTOLIC BLOOD PRESSURE: 74 MMHG | BODY MASS INDEX: 27.87 KG/M2 | HEIGHT: 70 IN | OXYGEN SATURATION: 95 % | TEMPERATURE: 99 F | HEART RATE: 68 BPM | RESPIRATION RATE: 16 BRPM | WEIGHT: 194.69 LBS

## 2022-04-08 DIAGNOSIS — R06.02 SHORTNESS OF BREATH: ICD-10-CM

## 2022-04-08 DIAGNOSIS — R53.1 WEAKNESS: ICD-10-CM

## 2022-04-08 DIAGNOSIS — J18.9 PNEUMONIA OF RIGHT LOWER LOBE DUE TO INFECTIOUS ORGANISM: Primary | ICD-10-CM

## 2022-04-08 DIAGNOSIS — R05.9 COUGH: ICD-10-CM

## 2022-04-08 DIAGNOSIS — I10 PRIMARY HYPERTENSION: ICD-10-CM

## 2022-04-08 PROCEDURE — 3077F PR MOST RECENT SYSTOLIC BLOOD PRESSURE >= 140 MM HG: ICD-10-PCS | Mod: CPTII,S$GLB,, | Performed by: NURSE PRACTITIONER

## 2022-04-08 PROCEDURE — 3078F DIAST BP <80 MM HG: CPT | Mod: CPTII,S$GLB,, | Performed by: NURSE PRACTITIONER

## 2022-04-08 PROCEDURE — 3061F PR NEG MICROALBUMINURIA RESULT DOCUMENTED/REVIEW: ICD-10-PCS | Mod: CPTII,S$GLB,, | Performed by: NURSE PRACTITIONER

## 2022-04-08 PROCEDURE — 3044F PR MOST RECENT HEMOGLOBIN A1C LEVEL <7.0%: ICD-10-PCS | Mod: CPTII,S$GLB,, | Performed by: NURSE PRACTITIONER

## 2022-04-08 PROCEDURE — 99999 PR PBB SHADOW E&M-EST. PATIENT-LVL IV: CPT | Mod: PBBFAC,,, | Performed by: NURSE PRACTITIONER

## 2022-04-08 PROCEDURE — 3077F SYST BP >= 140 MM HG: CPT | Mod: CPTII,S$GLB,, | Performed by: NURSE PRACTITIONER

## 2022-04-08 PROCEDURE — 99214 PR OFFICE/OUTPT VISIT, EST, LEVL IV, 30-39 MIN: ICD-10-PCS | Mod: S$GLB,,, | Performed by: NURSE PRACTITIONER

## 2022-04-08 PROCEDURE — 3044F HG A1C LEVEL LT 7.0%: CPT | Mod: CPTII,S$GLB,, | Performed by: NURSE PRACTITIONER

## 2022-04-08 PROCEDURE — 3066F NEPHROPATHY DOC TX: CPT | Mod: CPTII,S$GLB,, | Performed by: NURSE PRACTITIONER

## 2022-04-08 PROCEDURE — 1159F MED LIST DOCD IN RCRD: CPT | Mod: CPTII,S$GLB,, | Performed by: NURSE PRACTITIONER

## 2022-04-08 PROCEDURE — 99999 PR PBB SHADOW E&M-EST. PATIENT-LVL IV: ICD-10-PCS | Mod: PBBFAC,,, | Performed by: NURSE PRACTITIONER

## 2022-04-08 PROCEDURE — 3008F PR BODY MASS INDEX (BMI) DOCUMENTED: ICD-10-PCS | Mod: CPTII,S$GLB,, | Performed by: NURSE PRACTITIONER

## 2022-04-08 PROCEDURE — 3061F NEG MICROALBUMINURIA REV: CPT | Mod: CPTII,S$GLB,, | Performed by: NURSE PRACTITIONER

## 2022-04-08 PROCEDURE — 3008F BODY MASS INDEX DOCD: CPT | Mod: CPTII,S$GLB,, | Performed by: NURSE PRACTITIONER

## 2022-04-08 PROCEDURE — 99214 OFFICE O/P EST MOD 30 MIN: CPT | Mod: S$GLB,,, | Performed by: NURSE PRACTITIONER

## 2022-04-08 PROCEDURE — 3078F PR MOST RECENT DIASTOLIC BLOOD PRESSURE < 80 MM HG: ICD-10-PCS | Mod: CPTII,S$GLB,, | Performed by: NURSE PRACTITIONER

## 2022-04-08 PROCEDURE — 1159F PR MEDICATION LIST DOCUMENTED IN MEDICAL RECORD: ICD-10-PCS | Mod: CPTII,S$GLB,, | Performed by: NURSE PRACTITIONER

## 2022-04-08 PROCEDURE — 3066F PR DOCUMENTATION OF TREATMENT FOR NEPHROPATHY: ICD-10-PCS | Mod: CPTII,S$GLB,, | Performed by: NURSE PRACTITIONER

## 2022-04-08 RX ORDER — DOXYCYCLINE HYCLATE 100 MG
100 TABLET ORAL 2 TIMES DAILY
Qty: 14 TABLET | Refills: 0 | Status: SHIPPED | OUTPATIENT
Start: 2022-04-08 | End: 2022-04-15

## 2022-04-08 RX ORDER — PROMETHAZINE HYDROCHLORIDE AND DEXTROMETHORPHAN HYDROBROMIDE 6.25; 15 MG/5ML; MG/5ML
5 SYRUP ORAL EVERY 4 HOURS PRN
Qty: 118 ML | Refills: 0 | Status: SHIPPED | OUTPATIENT
Start: 2022-04-08 | End: 2023-03-30

## 2022-04-08 NOTE — PROGRESS NOTES
[facility] Note    Chief Complaint      Chief Complaint   Patient presents with    Shortness of Breath    Cough     History of Present Illness      Marleny Guzman is a 71 y.o. female patient of Dr. Covarrubias who is new to me and presents today for ER f/u from 4/5/22 for diagnosis of pneumonia. Still with fever, elevated BP, a lot of congestion, cough, will complete anbx tomorrow.   Plan to change to doxy, mucinex, cough syrup, continue inhaler as needed. Repeat cxr in 6 weeks from diagnosis.    ER note:  PMHx of Pneumonia, former smoker, who presents with complaint of coughing onset late last night. Patient explains her whole body starting late last night. She admits to rhinorrhea, fatigue, sore throat, coughing, chills, and a lack of appetite. She denies any nausea, vomiting, diarrhea, or abdominal pain. Patient denies taking any medication. She admits having COVID vaccination. This is the extent of the patient's complaints at this time. Pt was prescribed zpack and inhaler    WBC 15.96  covid and flu negative    Initial Assessment:   72 yo F here with subjective fever, malaise and cough. Pt non toxic appearing, no resp distress, low grade temp but otherwise no findings on exam to suspect serious bacterial illness at this time. Will admin antipyretics, ivf and eval for source of infection.        ED Management:  No resting hypoxia, nor dyspnea.  Labs with leukocytosis, imaging with opacity to right lower lobe, in setting of cough will treat as community-acquired pneumonia, administer antibiotics, IV fluids, DC home given clinical picture, patient amenable to this plan, strict return precautions discussed.     Health Maintenance   Topic Date Due    Mammogram  05/12/2022 (Originally 3/22/2022)    Lipid Panel  03/24/2027    TETANUS VACCINE  02/01/2030    Hepatitis C Screening  Completed    DEXA Scan  Discontinued       Past Medical History:   Diagnosis Date    Abnormal Pap smear      Abnormal Pap smear of cervix     Abnormal Pap smear of vagina 7/20/2016    LEROY (acute kidney injury) 10/10/2020    Allergy     Anemia     Anxiety     Anxiety     Cataract     Chronic bilateral low back pain without sciatica 3/13/2018    Depression     Depression with anxiety     Hypertension     Osteoarthritis 6/20/2013    Pneumonia of both lungs due to infectious organism 6/7/2021    Right rotator cuff tear     Sleep apnea     Cipap machine at home    Trouble in sleeping     Urinary incontinence     Leaks urine with laughing/ coughing/ sneezing       Past Surgical History:   Procedure Laterality Date    CATARACT EXTRACTION  6/18/12    OS    CATARACT EXTRACTION  7/16/12    OD    CERVIX SURGERY      Conization    COLONOSCOPY      COLONOSCOPY N/A 3/4/2016    Procedure: COLONOSCOPY;  Surgeon: Tenzin Thakkar MD;  Location: [facility] (4TH FLR);  Service: Endoscopy;  Laterality: N/A;    COLONOSCOPY Left 1/25/2021    Procedure: COLONOSCOPY;  Surgeon: Wayne Naranjo MD;  Location: [facility] ENDO;  Service: Endoscopy;  Laterality: Left;    ESOPHAGOGASTRODUODENOSCOPY Left 1/25/2021    Procedure: EGD (ESOPHAGOGASTRODUODENOSCOPY);  Surgeon: Wayne Naranjo MD;  Location: [facility] ENDO;  Service: Endoscopy;  Laterality: Left;    EYE SURGERY Bilateral     cataract    HYSTERECTOMY  5-12-15    LEEP      LUMBAR EPIDURAL INJECTION      OOPHORECTOMY      RI REMOVAL OF OVARY/TUBE(S)  5-12-15    ROBOT-ASSISTED REPAIR OF VENTRAL HERNIA USING DA ASPEN XI N/A 11/29/2021    Procedure: XI ROBOTIC REPAIR, HERNIA, VENTRAL w/ possible mesh;  Surgeon: Roney Jackson MD;  Location: [address];  Service: General;  Laterality: N/A;  Anesthesia Block    SALPINGECTOMY Left     SHOULDER SURGERY Right        family history includes Anuerysm in her sister; Breast cancer in her cousin; Breast cancer (age of onset: 72) in her sister; Cancer in her maternal aunt, mother, and sister; Cataracts in her father; Deep vein  thrombosis in her sister; Depression in her maternal grandmother; Drug abuse in her sister; Eczema in her sister; Edema in her sister; Heart disease in her sister and sister; Hypertension in her daughter, father, sister, sister, and sister; No Known Problems in her brother, paternal grandfather, paternal grandmother, sister, and sister; Osteoarthritis in her sister; Other in her daughter; Pacemaker/defibrilator in her sister; Stroke in her mother; Ulcers in her maternal grandfather.    Social History     Tobacco Use    Smoking status: Former Smoker     Packs/day: 0.50     Years: 22.00     Pack years: 11.00     Types: Cigarettes     Quit date: 1998     Years since quittin.2    Smokeless tobacco: Never Used   Substance Use Topics    Alcohol use: Yes     Alcohol/week: 0.0 standard drinks     Comment: Occassionally for social events will have a glass of wine    Drug use: No       Review of Systems   Constitutional: Positive for chills, fever and malaise/fatigue.   HENT: Positive for sore throat.    Eyes: Negative for blurred vision.   Respiratory: Positive for cough, sputum production and shortness of breath. Negative for wheezing.    Cardiovascular: Negative for chest pain and palpitations.   Gastrointestinal: Negative for nausea.   Genitourinary: Negative for dysuria.   Musculoskeletal: Negative for myalgias.   Neurological: Positive for weakness. Negative for dizziness and headaches.        Outpatient Encounter Medications as of 2022   Medication Sig Dispense Refill    albuterol (VENTOLIN HFA) 90 mcg/actuation inhaler Inhale 2 puffs into the lungs every 4 (four) hours as needed for Wheezing or Shortness of Breath (cough). Rescue 18 g 6    amLODIPine (NORVASC) 2.5 MG tablet Take 1 tablet (2.5 mg total) by mouth once daily. 90 tablet 3    aspirin 81 MG Chew TAKE 1 TABLET BY MOUTH EVERY DAY 90 tablet 1    atorvastatin (LIPITOR) 20 MG tablet Take 1 tablet (20 mg total) by mouth once daily. 90 tablet  1    azithromycin (ZITHROMAX Z-DONTAE) 250 MG tablet Take 1 tablet (250 mg total) by mouth once daily. 5 tablet 0    ergocalciferol (ERGOCALCIFEROL) 50,000 unit Cap TAKE 1 CAPSULE (50,000 UNITS TOTAL) BY MOUTH EVERY 30 DAYS. 3 capsule 3    EScitalopram oxalate (LEXAPRO) 20 MG tablet Take 1 tablet (20 mg total) by mouth once daily. 1.5 tablets daily 90 tablet 3    estradioL (ESTRACE) 0.01 % (0.1 mg/gram) vaginal cream Use 1 GM nightly vaginally and dime size amount on vulvatwice weekly 42.5 g 4    fluticasone propionate (FLONASE) 50 mcg/actuation nasal spray 1 spray by Each Nostril route once daily. HCS      irbesartan-hydrochlorothiazide (AVALIDE) 300-12.5 mg per tablet TAKE 1 TABLET BY MOUTH EVERY DAY 90 tablet 3    LORazepam (ATIVAN) 0.5 MG tablet TAKE 1/2 TO 1 TABLET BY MOUTH DAILY AS NEEDED FOR ANXIETY 30 tablet 0    metoprolol succinate (TOPROL-XL) 50 MG 24 hr tablet Take 1 tablet (50 mg total) by mouth once daily. 90 tablet 3    doxycycline (VIBRA-TABS) 100 MG tablet Take 1 tablet (100 mg total) by mouth 2 (two) times daily. for 7 days 14 tablet 0    FLUZONE HIGHDOSE QUAD 20-21  mcg/0.7 mL Syrg       promethazine (PHENERGAN) 6.25 mg/5 mL syrup Take by mouth every 6 (six) hours as needed.      promethazine-dextromethorphan (PROMETHAZINE-DM) 6.25-15 mg/5 mL Syrp Take 5 mLs by mouth every 4 (four) hours as needed (cough). 118 mL 0    pulse oximeter (PULSE OXIMETER) device by Apply Externally route 2 (two) times a day. Use twice daily at 8 AM and 3 PM and [facility] as [facility]. (Patient not taking: Reported on 4/8/2022) 1 each 0     Facility-Administered Encounter Medications as of 4/8/2022   Medication Dose Route Frequency Provider Last Rate Last Admin    0.9%  NaCl infusion   Intravenous Continuous Roney Jackson MD   Stopped at 11/29/21 7349    fentaNYL 50 mcg/mL injection  mcg   mcg Intravenous PRN Anthony Hernandez MD   50 mcg at 11/29/21 1100     "LIDOcaine (PF) 10 mg/ml (1%) injection 10 mg  1 mL Intradermal Once Roney Jackson MD        midazolam (VERSED) 1 mg/mL injection 0.5-4 mg  0.5-4 mg Intravenous PRN Anthony Hernandez MD   1 mg at 11/29/21 1109    prochlorperazine injection Soln 5 mg  5 mg Intravenous Q30 Min PRN Beth Ramsey MD        sodium chloride 0.9% flush 10 mL  10 mL Intravenous PRN Beth Ramsey MD            Review of patient's allergies indicates:  No Known Allergies    Physical Exam      Vital Signs  Temp: 99.3 °F (37.4 °C)  Temp src: Oral  Pulse: 68  Resp: 16  SpO2: 95 %  BP: (!) 142/74  BP Location: Left arm  Patient Position: Sitting  Height and Weight  Height: 5' 10" (177.8 cm)  Weight: 88.3 kg (194 lb 10.7 oz)  BSA (Calculated - sq m): 2.09 sq meters  BMI (Calculated): 27.9  Weight in (lb) to have BMI = 25: 173.9    Physical Exam  Vitals and nursing note reviewed.   Constitutional:       General: She is not in acute distress.     Appearance: Normal appearance. She is ill-appearing.   HENT:      Head: Normocephalic and atraumatic.      Ears:      Comments: Redness to bilateral ear canals     Mouth/Throat:      Mouth: Mucous membranes are moist.      Pharynx: Posterior oropharyngeal erythema present.   Eyes:      Pupils: Pupils are equal, round, and reactive to light.   Cardiovascular:      Rate and Rhythm: Normal rate and regular rhythm.      Heart sounds: Normal heart sounds.   Pulmonary:      Effort: Pulmonary effort is normal. No respiratory distress.   Skin:     General: Skin is warm and dry.   Neurological:      Mental Status: She is alert and oriented to person, place, and time.   Psychiatric:         Mood and Affect: Mood normal.         Behavior: Behavior normal.         Thought Content: Thought content normal.         Judgment: Judgment normal.          Laboratory:  CBC:  Lab Results   Component Value Date    WBC 15.96 (H) 04/05/2022    RBC 4.37 04/05/2022    HGB 13.2 04/05/2022    HCT 39.9 04/05/2022 "     04/05/2022    MCV 91 04/05/2022    MCH 30.2 04/05/2022    MCHC 33.1 04/05/2022    MCHC 31.2 (L) 03/24/2022    MCHC 32.1 10/19/2021     CMP:  Lab Results   Component Value Date     (H) 04/05/2022    CALCIUM 9.8 04/05/2022    ALBUMIN 3.5 04/05/2022    PROT 7.4 04/05/2022     04/05/2022    K 3.8 04/05/2022    CO2 27 04/05/2022     04/05/2022    BUN 17 04/05/2022    ALKPHOS 156 (H) 04/05/2022    ALT 22 04/05/2022    AST 19 04/05/2022    BILITOT 0.5 04/05/2022    BILITOT 0.6 03/24/2022    BILITOT 0.4 09/23/2021     URINALYSIS:  Lab Results   Component Value Date    COLORU Yellow 04/05/2022    SPECGRAV 1.020 04/05/2022    PHUR 6.0 04/05/2022    PROTEINUA Negative 04/05/2022    BACTERIA Rare 04/05/2022    NITRITE Negative 04/05/2022    LEUKOCYTESUR Negative 04/05/2022    UROBILINOGEN Negative 04/05/2022    HYALINECASTS 16 (A) 10/10/2020    HYALINECASTS 0 02/08/2020    HYALINECASTS 28 (A) 01/31/2020      LIPIDS:  Lab Results   Component Value Date    TSH 4.536 (H) 03/24/2022    TSH 2.411 09/23/2021    TSH 3.063 02/09/2021    HDL 55 03/24/2022    HDL 41 02/09/2021    HDL 34 (L) 02/19/2020    CHOL 117 (L) 03/24/2022    CHOL 109 (L) 02/09/2021    CHOL 154 02/19/2020    TRIG 39 03/24/2022    TRIG 69 02/09/2021    TRIG 98 02/19/2020    LDLCALC 54.2 (L) 03/24/2022    LDLCALC 54.2 (L) 02/09/2021    LDLCALC 100.4 02/19/2020    CHOLHDL 47.0 03/24/2022    CHOLHDL 37.6 02/09/2021    CHOLHDL 22.1 02/19/2020    NONHDLCHOL 62 03/24/2022    NONHDLCHOL 68 02/09/2021    NONHDLCHOL 120 02/19/2020    TOTALCHOLEST 2.1 03/24/2022    TOTALCHOLEST 2.7 02/09/2021    TOTALCHOLEST 4.5 02/19/2020     TSH:  Lab Results   Component Value Date    TSH 4.536 (H) 03/24/2022    TSH 2.411 09/23/2021    TSH 3.063 02/09/2021     A1C:  Lab Results   Component Value Date    HGBA1C 5.5 03/24/2022    HGBA1C 5.3 02/09/2021    HGBA1C 5.6 01/25/2021    HGBA1C 5.9 (H) 02/19/2020    HGBA1C 6.0 (H) 11/26/2019    HGBA1C 5.8 (H) 05/02/2019     HGBA1C 5.8 (H) 07/23/2018    HGBA1C 5.5 12/22/2017    HGBA1C 6.1 09/27/2016    HGBA1C 6.1 09/25/2015    HGBA1C 6.1 12/29/2014    HGBA1C 6.1 10/22/2013         Assessment/Plan     Marleny Guzman is a 71 y.o.female with:    Pneumonia of right lower lobe due to infectious organism  -     promethazine-dextromethorphan (PROMETHAZINE-DM) 6.25-15 mg/5 mL Syrp; Take 5 mLs by mouth every 4 (four) hours as needed (cough).  Dispense: 118 mL; Refill: 0  -     doxycycline (VIBRA-TABS) 100 MG tablet; Take 1 tablet (100 mg total) by mouth 2 (two) times daily. for 7 days  Dispense: 14 tablet; Refill: 0    Cough  -     promethazine-dextromethorphan (PROMETHAZINE-DM) 6.25-15 mg/5 mL Syrp; Take 5 mLs by mouth every 4 (four) hours as needed (cough).  Dispense: 118 mL; Refill: 0  -     doxycycline (VIBRA-TABS) 100 MG tablet; Take 1 tablet (100 mg total) by mouth 2 (two) times daily. for 7 days  Dispense: 14 tablet; Refill: 0    Shortness of breath    Primary hypertension    Weakness      Stay hydrated  Monitor and treat symptoms  Want to f/u with cxr in 5 weeks  Complete anbx    I spent 30 minutes on the day of this encounter for preparing for, evaluating, treating, and managing this patient.      -Continue current medications and maintain follow up with specialists.  Return to clinic as needed for any concerns   No follow-ups on file.       Erin Jiminez, NP-C [facility]

## 2022-04-10 NOTE — PROGRESS NOTES
Subjective:      Patient ID: Marleny Guzman is a 71 y.o. female.    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 Gonzalez. Vaginal pap smear 6/15/16 HSIL. She was referred for consult by Dr. Valles. 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      Follow up pap 1/30/17 HSIL. 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      Pap 7/24/17 ASCUS HPV 18+   Pap 1/22/18 LSIL, normal exam  Pap 8/2018 normal, normal exam.   Pap 9/2019 ASCUS, HPV 18+, normal exam   Pap 3/2020 negative   Pap/HPV 3/2021 negative     Presents today for follow up. Denies bleeding. Using vaginal estrogen.   Review of Systems   Constitutional: Negative for appetite change, chills, fatigue and fever.   HENT: Negative for mouth sores.    Respiratory: Negative for cough and shortness of breath.    Cardiovascular: Negative for leg swelling.   Gastrointestinal: Negative for abdominal pain, blood in stool, constipation and diarrhea.   Endocrine: Negative for cold intolerance.   Genitourinary: Negative for dysuria and vaginal bleeding.   Musculoskeletal: Negative for myalgias.   Skin: Negative for rash.   Allergic/Immunologic: Negative.    Neurological: Negative for weakness and numbness.   Hematological: Negative for adenopathy. Does not bruise/bleed easily.   Psychiatric/Behavioral: Negative for confusion.       Objective:   Physical Exam:   Constitutional: She is oriented to person, place, and time. She appears well-developed and well-nourished.    HENT:   Head: Normocephalic and atraumatic.    Eyes: Pupils are equal, round, and reactive to light. EOM are normal.    Neck: No thyromegaly present.    Cardiovascular: Normal rate, regular rhythm and intact distal pulses.     Pulmonary/Chest: Effort normal and breath sounds normal. No respiratory distress. She has no wheezes.        Abdominal: Soft. Bowel sounds are normal. She exhibits no distension  and no mass. There is no abdominal tenderness.     Genitourinary:    Vagina and rectum normal.      Pelvic exam was performed with patient supine.   There is no lesion on the right labia. There is no lesion on the left labia. Right adnexum displays no mass. Left adnexum displays no mass. Vaginal cuff normal.  Cervix is absent.Uterus is absent.           Musculoskeletal: Normal range of motion and moves all extremeties.      Lymphadenopathy:     She has no cervical adenopathy.        Right: No supraclavicular adenopathy present.        Left: No supraclavicular adenopathy present.    Neurological: She is alert and oriented to person, place, and time.    Skin: Skin is warm and dry. No rash noted.    Psychiatric: She has a normal mood and affect.       Assessment:     1. Vaginal dysplasia    2. Vulvar pruritus        Plan:   No orders of the defined types were placed in this encounter.    Exam without gross abnormal findings today.   Surveillance pap/HPV collected.   Will plan for continued surveillance pending the above results.

## 2022-04-11 LAB
CLINICAL INFO: NORMAL
CYTO CVX: NORMAL
CYTOLOGIST CVX/VAG CYTO: NORMAL
CYTOLOGIST CVX/VAG CYTO: NORMAL
CYTOLOGY CMNT CVX/VAG CYTO-IMP: NORMAL
CYTOLOGY PAP THIN PREP EXPLANATION: NORMAL
DATE OF PREVIOUS PAP: NORMAL
DATE PREVIOUS BX: YES
GEN CATEG CVX/VAG CYTO-IMP: NORMAL
LMP START DATE: NORMAL
MICROORGANISM CVX/VAG CYTO: NORMAL
PATHOLOGIST CVX/VAG CYTO: NORMAL
SERVICE CMNT-IMP: NORMAL
SPECIMEN SOURCE CVX/VAG CYTO: NORMAL
STAT OF ADQ CVX/VAG CYTO-IMP: NORMAL

## 2022-04-15 ENCOUNTER — TELEPHONE (OUTPATIENT)
Dept: INTERNAL MEDICINE | Facility: CLINIC | Age: 72
End: 2022-04-15
Payer: MEDICARE

## 2022-04-15 DIAGNOSIS — R05.9 COUGH: ICD-10-CM

## 2022-04-15 DIAGNOSIS — J18.9 PNEUMONIA OF RIGHT LOWER LOBE DUE TO INFECTIOUS ORGANISM: Primary | ICD-10-CM

## 2022-04-18 RX ORDER — PROMETHAZINE HYDROCHLORIDE 6.25 MG/5ML
SYRUP ORAL EVERY 6 HOURS PRN
OUTPATIENT
Start: 2022-04-18

## 2022-04-19 ENCOUNTER — RESEARCH ENCOUNTER (OUTPATIENT)
Dept: RESEARCH | Facility: HOSPITAL | Age: 72
End: 2022-04-19
Payer: MEDICARE

## 2022-04-19 ENCOUNTER — PATIENT MESSAGE (OUTPATIENT)
Dept: RESEARCH | Facility: HOSPITAL | Age: 72
End: 2022-04-19
Payer: MEDICARE

## 2022-04-19 NOTE — PROGRESS NOTES
Study title: [facility]  IRB #: 2022.001  IRB approval date: 1/19/2022       Patient called to discuss participation into the [facility] study. Explained overview of study. Patient expressed interest and is willing to see us on April 20 @ 9am. Patient voiced understanding.  Reminder MyChart message will be sent about appointment.

## 2022-04-20 ENCOUNTER — RESEARCH ENCOUNTER (OUTPATIENT)
Dept: HEMATOLOGY/ONCOLOGY | Facility: CLINIC | Age: 72
End: 2022-04-20
Payer: MEDICARE

## 2022-04-20 ENCOUNTER — PATIENT MESSAGE (OUTPATIENT)
Dept: RESEARCH | Facility: HOSPITAL | Age: 72
End: 2022-04-20
Payer: MEDICARE

## 2022-04-20 NOTE — RESEARCH
Met with patient at the Primary Care and [facility] center regarding participation in IRB protocol #2022.001, [facility] study. Pt was agreeable.    The Informed Consent Form (ICF) was reviewed with pt. The discussion included:  - participation is voluntary  - pt can change their mind about participating  - pt was informed that participation in this study would not preclude them from participating in any other research if offered  - specimens will be used by [facility] researchers in this study  - specimens collected (blood, saliva, nasal swab, urine, stool) include only those discussed with the patient at the time of consent and are indicated on the ICF   - specimens will be used in a multi-omics study regarding Covid-19 severity and biomarkers that are involved  - all specimens released to researchers will be stripped of identifiers  - no personal information will be released to any parties outside of this research study  - there will be no other physical risks outside of those involved in standard of care procedure.     Pt had a chance to ask any questions, and all questions were answered. Pt willingly and independently signed ICF.    A copy of ICF was given to pt with instructions to call with any questions that may arise or if they should change their mind regarding participation in [facility] study.    Four questionnaires and two take home sample collection kits were provided to the pt at the end of this encounter. The patient was instructed on how to collect and return these study items. Pt was then informed of a $50 stipend that would be offered to them upon return of these questionnaires and samples.    The following specimens were collected from the pt at the time of this encounter: blood, saliva, nasal swab

## 2022-05-04 ENCOUNTER — PATIENT MESSAGE (OUTPATIENT)
Dept: GYNECOLOGIC ONCOLOGY | Facility: CLINIC | Age: 72
End: 2022-05-04
Payer: MEDICARE

## 2022-05-04 DIAGNOSIS — N90.89 VULVAR IRRITATION: ICD-10-CM

## 2022-05-04 DIAGNOSIS — R87.629 ABNORMAL PAP SMEAR OF VAGINA: ICD-10-CM

## 2022-05-04 DIAGNOSIS — N95.2 VAGINAL ATROPHY: ICD-10-CM

## 2022-05-04 RX ORDER — ESTRADIOL 0.1 MG/G
CREAM VAGINAL
Qty: 42.5 G | Refills: 4 | Status: SHIPPED | OUTPATIENT
Start: 2022-05-04 | End: 2022-05-10 | Stop reason: SDUPTHER

## 2022-05-10 DIAGNOSIS — R87.629 ABNORMAL PAP SMEAR OF VAGINA: ICD-10-CM

## 2022-05-10 DIAGNOSIS — N90.89 VULVAR IRRITATION: ICD-10-CM

## 2022-05-10 DIAGNOSIS — N95.2 VAGINAL ATROPHY: ICD-10-CM

## 2022-05-10 RX ORDER — ESTRADIOL 0.1 MG/G
CREAM VAGINAL
Qty: 42.5 G | Refills: 4 | Status: SHIPPED | OUTPATIENT
Start: 2022-05-10 | End: 2023-02-22 | Stop reason: SDUPTHER

## 2022-05-12 ENCOUNTER — HOSPITAL ENCOUNTER (OUTPATIENT)
Dept: RADIOLOGY | Facility: OTHER | Age: 72
Discharge: HOME | End: 2022-05-12
Attending: INTERNAL MEDICINE
Payer: MEDICARE

## 2022-05-12 DIAGNOSIS — R92.8 ABNORMAL MAMMOGRAM: ICD-10-CM

## 2022-05-12 PROCEDURE — 77062 BREAST TOMOSYNTHESIS BI: CPT | Mod: 26,,, | Performed by: RADIOLOGY

## 2022-05-12 PROCEDURE — 77062 MAMMO DIGITAL DIAGNOSTIC BILAT WITH TOMO: ICD-10-PCS | Mod: 26,,, | Performed by: RADIOLOGY

## 2022-05-12 PROCEDURE — 77066 MAMMO DIGITAL DIAGNOSTIC BILAT WITH TOMO: ICD-10-PCS | Mod: 26,,, | Performed by: RADIOLOGY

## 2022-05-12 PROCEDURE — 77066 DX MAMMO INCL CAD BI: CPT | Mod: TC

## 2022-05-12 PROCEDURE — 77066 DX MAMMO INCL CAD BI: CPT | Mod: 26,,, | Performed by: RADIOLOGY

## 2022-05-24 ENCOUNTER — TELEPHONE (OUTPATIENT)
Dept: OBSTETRICS AND GYNECOLOGY | Facility: CLINIC | Age: 72
End: 2022-05-24
Payer: MEDICARE

## 2022-05-24 NOTE — TELEPHONE ENCOUNTER
----- Message from Sunshine Marcelino sent at 5/24/2022 12:57 PM CDT -----  Regarding: Appointment Access              Name of Who is Calling: Marleny Guzman    Who Left The Message: Marleny Guzman      What is the request in detail:     Patient called requesting to schedule her Well Women's Exam with this Office; I did attempt to schedule per request but was unsuccessful. Patient was last seen by the Office 06/27/2014.  Please further advise.   Thank you!      Reply by [facility]:  Yes      Preferred Call Back :  [phone] (W)

## 2022-05-30 ENCOUNTER — TELEPHONE (OUTPATIENT)
Dept: ORTHOPEDICS | Facility: CLINIC | Age: 72
End: 2022-05-30
Payer: MEDICARE

## 2022-05-31 ENCOUNTER — OFFICE VISIT (OUTPATIENT)
Dept: ORTHOPEDICS | Facility: CLINIC | Age: 72
End: 2022-05-31
Payer: MEDICARE

## 2022-05-31 VITALS
HEIGHT: 70 IN | SYSTOLIC BLOOD PRESSURE: 130 MMHG | DIASTOLIC BLOOD PRESSURE: 72 MMHG | HEART RATE: 67 BPM | BODY MASS INDEX: 27.87 KG/M2 | WEIGHT: 194.69 LBS

## 2022-05-31 DIAGNOSIS — M75.01 ADHESIVE CAPSULITIS OF RIGHT SHOULDER: Primary | ICD-10-CM

## 2022-05-31 PROCEDURE — 20610 DRAIN/INJ JOINT/BURSA W/O US: CPT | Mod: RT,S$GLB,, | Performed by: ORTHOPAEDIC SURGERY

## 2022-05-31 PROCEDURE — 99214 PR OFFICE/OUTPT VISIT, EST, LEVL IV, 30-39 MIN: ICD-10-PCS | Mod: 25,S$GLB,, | Performed by: ORTHOPAEDIC SURGERY

## 2022-05-31 PROCEDURE — 1159F MED LIST DOCD IN RCRD: CPT | Mod: CPTII,S$GLB,, | Performed by: ORTHOPAEDIC SURGERY

## 2022-05-31 PROCEDURE — 1101F PR PT FALLS ASSESS DOC 0-1 FALLS W/OUT INJ PAST YR: ICD-10-PCS | Mod: CPTII,S$GLB,, | Performed by: ORTHOPAEDIC SURGERY

## 2022-05-31 PROCEDURE — 99999 PR PBB SHADOW E&M-EST. PATIENT-LVL IV: ICD-10-PCS | Mod: PBBFAC,,, | Performed by: ORTHOPAEDIC SURGERY

## 2022-05-31 PROCEDURE — 1159F PR MEDICATION LIST DOCUMENTED IN MEDICAL RECORD: ICD-10-PCS | Mod: CPTII,S$GLB,, | Performed by: ORTHOPAEDIC SURGERY

## 2022-05-31 PROCEDURE — 3008F BODY MASS INDEX DOCD: CPT | Mod: CPTII,S$GLB,, | Performed by: ORTHOPAEDIC SURGERY

## 2022-05-31 PROCEDURE — 3044F HG A1C LEVEL LT 7.0%: CPT | Mod: CPTII,S$GLB,, | Performed by: ORTHOPAEDIC SURGERY

## 2022-05-31 PROCEDURE — 1125F PR PAIN SEVERITY QUANTIFIED, PAIN PRESENT: ICD-10-PCS | Mod: CPTII,S$GLB,, | Performed by: ORTHOPAEDIC SURGERY

## 2022-05-31 PROCEDURE — 1101F PT FALLS ASSESS-DOCD LE1/YR: CPT | Mod: CPTII,S$GLB,, | Performed by: ORTHOPAEDIC SURGERY

## 2022-05-31 PROCEDURE — 3061F NEG MICROALBUMINURIA REV: CPT | Mod: CPTII,S$GLB,, | Performed by: ORTHOPAEDIC SURGERY

## 2022-05-31 PROCEDURE — 3288F FALL RISK ASSESSMENT DOCD: CPT | Mod: CPTII,S$GLB,, | Performed by: ORTHOPAEDIC SURGERY

## 2022-05-31 PROCEDURE — 3075F PR MOST RECENT SYSTOLIC BLOOD PRESS GE 130-139MM HG: ICD-10-PCS | Mod: CPTII,S$GLB,, | Performed by: ORTHOPAEDIC SURGERY

## 2022-05-31 PROCEDURE — 99999 PR PBB SHADOW E&M-EST. PATIENT-LVL IV: CPT | Mod: PBBFAC,,, | Performed by: ORTHOPAEDIC SURGERY

## 2022-05-31 PROCEDURE — 99214 OFFICE O/P EST MOD 30 MIN: CPT | Mod: 25,S$GLB,, | Performed by: ORTHOPAEDIC SURGERY

## 2022-05-31 PROCEDURE — 1125F AMNT PAIN NOTED PAIN PRSNT: CPT | Mod: CPTII,S$GLB,, | Performed by: ORTHOPAEDIC SURGERY

## 2022-05-31 PROCEDURE — 3075F SYST BP GE 130 - 139MM HG: CPT | Mod: CPTII,S$GLB,, | Performed by: ORTHOPAEDIC SURGERY

## 2022-05-31 PROCEDURE — 3066F NEPHROPATHY DOC TX: CPT | Mod: CPTII,S$GLB,, | Performed by: ORTHOPAEDIC SURGERY

## 2022-05-31 PROCEDURE — 20610 LARGE JOINT ASPIRATION/INJECTION: R SUBACROMIAL BURSA: ICD-10-PCS | Mod: RT,S$GLB,, | Performed by: ORTHOPAEDIC SURGERY

## 2022-05-31 PROCEDURE — 3044F PR MOST RECENT HEMOGLOBIN A1C LEVEL <7.0%: ICD-10-PCS | Mod: CPTII,S$GLB,, | Performed by: ORTHOPAEDIC SURGERY

## 2022-05-31 PROCEDURE — 3061F PR NEG MICROALBUMINURIA RESULT DOCUMENTED/REVIEW: ICD-10-PCS | Mod: CPTII,S$GLB,, | Performed by: ORTHOPAEDIC SURGERY

## 2022-05-31 PROCEDURE — 3066F PR DOCUMENTATION OF TREATMENT FOR NEPHROPATHY: ICD-10-PCS | Mod: CPTII,S$GLB,, | Performed by: ORTHOPAEDIC SURGERY

## 2022-05-31 PROCEDURE — 3008F PR BODY MASS INDEX (BMI) DOCUMENTED: ICD-10-PCS | Mod: CPTII,S$GLB,, | Performed by: ORTHOPAEDIC SURGERY

## 2022-05-31 PROCEDURE — 3288F PR FALLS RISK ASSESSMENT DOCUMENTED: ICD-10-PCS | Mod: CPTII,S$GLB,, | Performed by: ORTHOPAEDIC SURGERY

## 2022-05-31 PROCEDURE — 3078F DIAST BP <80 MM HG: CPT | Mod: CPTII,S$GLB,, | Performed by: ORTHOPAEDIC SURGERY

## 2022-05-31 PROCEDURE — 3078F PR MOST RECENT DIASTOLIC BLOOD PRESSURE < 80 MM HG: ICD-10-PCS | Mod: CPTII,S$GLB,, | Performed by: ORTHOPAEDIC SURGERY

## 2022-05-31 RX ADMIN — TRIAMCINOLONE ACETONIDE 80 MG: 40 INJECTION, SUSPENSION INTRA-ARTICULAR; INTRAMUSCULAR at 02:05

## 2022-05-31 NOTE — PROCEDURES
Large Joint Aspiration/Injection: R subacromial bursa    Date/Time: 5/31/2022 2:45 PM  Performed by: Whit Mazariegos MD  Authorized by: Whit Mazariegos MD     Consent Done?:  Yes (Verbal)  Indications:  Pain  Prep: patient was prepped and draped in usual sterile fashion      Local anesthesia used?: Yes    Anesthesia:  Local infiltration  Local anesthetic:  Lidocaine 1% without epinephrine    Details:  Needle Size:  22 G  Approach:  Posterior  Location:  Shoulder  Site:  R subacromial bursa  Medications:  80 mg triamcinolone acetonide 40 mg/mL

## 2022-05-31 NOTE — PROGRESS NOTES
I have personally taken the history and examined this patient. I agree with the resident's note as stated above.  On examination patient has adhesive capsulitis she has a history rotator cuff repair 2014 she did very well she states now she has a lot of pain she can not lifted on examination she has adhesive capsulitis we talked about injection and therapy we discussed that if it continues to have pain we can do an MRI however she needs to regain better passive range of motion before any kind of surgical option we can only forward flex passively to about 100°

## 2022-06-01 NOTE — PROGRESS NOTES
[facility]  History & Physical  Orthopedics    SUBJECTIVE:        Chief Complaint: right shoulder pain    Referring Provider: No ref. provider found     History of Present Illness:  Patient is a 71 y.o. right hand dominant female who presents today with complaints of right shoulder pain. She had a right rotator cuff repair by Dr. Owusu in 2014. She did very well after the surgery. However over the past 2 years she has re developed shoulder pain. This has been worsening and is now severe. She has not had recent treatment for this. Pain is diffuse and worse with overhead activities and better with rest. She denies trauma or other inciting factor. Reports the shoulder has become stiff and she has difficulty with ADLs.      Past Medical History:   Diagnosis Date    Abnormal Pap smear     Abnormal Pap smear of cervix     Abnormal Pap smear of vagina 7/20/2016    LEROY (acute kidney injury) 10/10/2020    Allergy     Anemia     Anxiety     Anxiety     Cataract     Chronic bilateral low back pain without sciatica 3/13/2018    Depression     Depression with anxiety     Hypertension     Osteoarthritis 6/20/2013    Pneumonia of both lungs due to infectious organism 6/7/2021    Right rotator cuff tear     Sleep apnea     Cipap machine at home    Trouble in sleeping     Urinary incontinence     Leaks urine with laughing/ coughing/ sneezing     Past Surgical History:   Procedure Laterality Date    CATARACT EXTRACTION  6/18/12    OS    CATARACT EXTRACTION  7/16/12    OD    CERVIX SURGERY      Conization    COLONOSCOPY      COLONOSCOPY N/A 3/4/2016    Procedure: COLONOSCOPY;  Surgeon: Tenzin Thakkar MD;  Location: [address]);  Service: Endoscopy;  Laterality: N/A;    COLONOSCOPY Left 1/25/2021    Procedure: COLONOSCOPY;  Surgeon: Wayne Naranjo MD;  Location: [facility];  Service: Endoscopy;  Laterality: Left;    ESOPHAGOGASTRODUODENOSCOPY Left 1/25/2021    Procedure: EGD  "(ESOPHAGOGASTRODUODENOSCOPY);  Surgeon: Wayne Naranjo MD;  Location: [facility];  Service: Endoscopy;  Laterality: Left;    EYE SURGERY Bilateral     cataract    HYSTERECTOMY  5-12-15    LEEP      LUMBAR EPIDURAL INJECTION      OOPHORECTOMY      AR REMOVAL OF OVARY/TUBE(S)  5-12-15    ROBOT-ASSISTED REPAIR OF VENTRAL HERNIA USING DA ASPEN XI N/A 11/29/2021    Procedure: XI ROBOTIC REPAIR, HERNIA, VENTRAL w/ possible mesh;  Surgeon: Roney Jackson MD;  Location: [address];  Service: General;  Laterality: N/A;  Anesthesia Block    SALPINGECTOMY Left     SHOULDER SURGERY Right      Review of patient's allergies indicates:  No Known Allergies  Social History     Social History Narrative    Marleny currently does operate an automobile.     Family History   Problem Relation Age of Onset    Hypertension Father     Cataracts Father     Cancer Mother         THYROID CANCER    Stroke Mother     Breast cancer Cousin     Heart disease Sister     Pacemaker/defibrilator Sister     Hypertension Sister     Deep vein thrombosis Sister     Heart disease Sister         CABG    Osteoarthritis Sister     Edema Sister     Eczema Sister     Hypertension Sister     Breast cancer Sister 72    Anuerysm Sister     Drug abuse Sister     Hypertension Sister     No Known Problems Brother     No Known Problems Sister     Cancer Sister         Cancer cells or "growth in her stomach"    No Known Problems Sister     Hypertension Daughter     Other Daughter         Heart palpitations    Depression Maternal Grandmother     Ulcers Maternal Grandfather         Legs    No Known Problems Paternal Grandmother     No Known Problems Paternal Grandfather     Cancer Maternal Aunt     ADD / ADHD Neg Hx     Alcohol abuse Neg Hx     Anxiety disorder Neg Hx     Bipolar disorder Neg Hx     Dementia Neg Hx     OCD Neg Hx     Paranoid behavior Neg Hx     Physical abuse Neg Hx     Schizophrenia Neg Hx     " Seizures Neg Hx     Sexual abuse Neg Hx     Amblyopia Neg Hx     Blindness Neg Hx     Glaucoma Neg Hx     Macular degeneration Neg Hx     Retinal detachment Neg Hx     Strabismus Neg Hx          Current Outpatient Medications:     albuterol (VENTOLIN HFA) 90 mcg/actuation inhaler, Inhale 2 puffs into the lungs every 4 (four) hours as needed for Wheezing or Shortness of Breath (cough). Rescue, Disp: 18 g, Rfl: 6    amLODIPine (NORVASC) 2.5 MG tablet, Take 1 tablet (2.5 mg total) by mouth once daily., Disp: 90 tablet, Rfl: 3    aspirin 81 MG Chew, TAKE 1 TABLET BY MOUTH EVERY DAY, Disp: 90 tablet, Rfl: 1    atorvastatin (LIPITOR) 20 MG tablet, Take 1 tablet (20 mg total) by mouth once daily., Disp: 90 tablet, Rfl: 1    azithromycin (ZITHROMAX Z-DONTAE) 250 MG tablet, Take 1 tablet (250 mg total) by mouth once daily., Disp: 5 tablet, Rfl: 0    ergocalciferol (ERGOCALCIFEROL) 50,000 unit Cap, TAKE 1 CAPSULE (50,000 UNITS TOTAL) BY MOUTH EVERY 30 DAYS., Disp: 3 capsule, Rfl: 3    EScitalopram oxalate (LEXAPRO) 20 MG tablet, Take 1 tablet (20 mg total) by mouth once daily. 1.5 tablets daily, Disp: 90 tablet, Rfl: 3    estradioL (ESTRACE) 0.01 % (0.1 mg/gram) vaginal cream, Use 1 GM nightly vaginally and dime size amount on vulvatwice weekly, Disp: 42.5 g, Rfl: 4    fluticasone propionate (FLONASE) 50 mcg/actuation nasal spray, 1 spray by Each Nostril route once daily. HCS, Disp: , Rfl:     FLUZONE HIGHDOSE QUAD 20-21  mcg/0.7 mL Syrg, , Disp: , Rfl:     irbesartan-hydrochlorothiazide (AVALIDE) 300-12.5 mg per tablet, TAKE 1 TABLET BY MOUTH EVERY DAY, Disp: 90 tablet, Rfl: 3    LORazepam (ATIVAN) 0.5 MG tablet, TAKE 1/2 TO 1 TABLET BY MOUTH DAILY AS NEEDED FOR ANXIETY, Disp: 30 tablet, Rfl: 0    metoprolol succinate (TOPROL-XL) 50 MG 24 hr tablet, Take 1 tablet (50 mg total) by mouth once daily., Disp: 90 tablet, Rfl: 3    promethazine (PHENERGAN) 6.25 mg/5 mL syrup, Take by mouth every 6 (six)  "hours as needed., Disp: , Rfl:     promethazine-dextromethorphan (PROMETHAZINE-DM) 6.25-15 mg/5 mL Syrp, Take 5 mLs by mouth every 4 (four) hours as needed (cough)., Disp: 118 mL, Rfl: 0    pulse oximeter (PULSE OXIMETER) device, by Apply Externally route 2 (two) times a day. Use twice daily at 8 AM and 3 PM and record the value in MyChart as directed. (Patient not taking: Reported on 4/8/2022), Disp: 1 each, Rfl: 0  No current facility-administered medications for this visit.    Facility-Administered Medications Ordered in Other Visits:     0.9%  NaCl infusion, , Intravenous, Continuous, Roney Jackson MD, Stopped at 11/29/21 1755    fentaNYL 50 mcg/mL injection  mcg,  mcg, Intravenous, PRN, Anthony Hernandez MD, 50 mcg at 11/29/21 1109    LIDOcaine (PF) 10 mg/ml (1%) injection 10 mg, 1 mL, Intradermal, Once, Roney Jackson MD    midazolam (VERSED) 1 mg/mL injection 0.5-4 mg, 0.5-4 mg, Intravenous, PRN, Anthony Hernandez MD, 1 mg at 11/29/21 1109    prochlorperazine injection Soln 5 mg, 5 mg, Intravenous, Q30 Min PRN, Beth aRmsey MD    sodium chloride 0.9% flush 10 mL, 10 mL, Intravenous, PRN, Beth Ramsey MD      Review of Systems:  As per HPI otherwise noncontributory    OBJECTIVE:      Vital Signs (Most Recent):  Vitals:    05/31/22 1410   BP: 130/72   Pulse: 67   Weight: 88.3 kg (194 lb 10.7 oz)   Height: 5' 10" (1.778 m)     Body mass index is 27.93 kg/m².      Physical Exam:  Constitutional: The patient appears well-developed and well-nourished. No distress.   Skin: No lesions appreciated  Head: Normocephalic and atraumatic.   Nose: Nose normal.   Ears: No deformities seen  Eyes: Conjunctivae and EOM are normal.   Neck: No tracheal deviation present.   Cardiovascular: Normal rate and intact distal pulses.    Pulmonary/Chest: Effort normal. No respiratory distress.   Abdominal: There is no guarding.   Neurological: The patient is alert.   Psychiatric: The " patient has a normal mood and affect.     RUE Exam: Skin intact, no deformity noted. No open wounds/abrasions/laceration. TTP right AC joint and RC insertion. No TTP LHB. FF active 90, passive 100. ER 10 active, 15 passive. IR to back pocket. Pos job, torres. Negative Speed. FROM elbow and wrist. SILT Ax/Musc/Med/U/R. Motor intact Axillary/AIN/PIN/M/U/R . Cap refill < 2s. 2+ RP      Diagnostic Results:     Imaging - I independently viewed the patient's imaging as well as the radiology report.  Xrays of the patient's right shoulder  demonstrate no evidence of any acute fractures or dislocations or significant degenerative changes.    EMG - none    ASSESSMENT/PLAN:      71 y.o. yo female with h/o R RCR in 2014 with 2 year h/o worseening R shoulder pain and now appears to have adhesive capsulitis.    Plan: Right GH CSI performed in clinic. PT for right shoulder ordered. Patient to f/u for evaluation in 6-8 weeks to evaluate. She will need to improve in ROM prior to any interventions being performed. May order new MRI at next visit.

## 2022-06-02 RX ORDER — TRIAMCINOLONE ACETONIDE 40 MG/ML
80 INJECTION, SUSPENSION INTRA-ARTICULAR; INTRAMUSCULAR
Status: DISCONTINUED | OUTPATIENT
Start: 2022-05-31 | End: 2022-06-02 | Stop reason: HOSPADM

## 2022-06-29 ENCOUNTER — HOSPITAL ENCOUNTER (EMERGENCY)
Facility: OTHER | Age: 72
Discharge: HOME | End: 2022-06-29
Attending: EMERGENCY MEDICINE
Payer: MEDICARE

## 2022-06-29 VITALS
HEIGHT: 70 IN | BODY MASS INDEX: 28.81 KG/M2 | WEIGHT: 201.25 LBS | RESPIRATION RATE: 18 BRPM | SYSTOLIC BLOOD PRESSURE: 160 MMHG | HEART RATE: 80 BPM | DIASTOLIC BLOOD PRESSURE: 73 MMHG | OXYGEN SATURATION: 100 % | TEMPERATURE: 99 F

## 2022-06-29 DIAGNOSIS — U07.1 COVID: Primary | ICD-10-CM

## 2022-06-29 LAB
CTP QC/QA: YES
SARS-COV-2 RDRP RESP QL NAA+PROBE: POSITIVE

## 2022-06-29 PROCEDURE — 99283 EMERGENCY DEPT VISIT LOW MDM: CPT | Mod: 25

## 2022-06-29 PROCEDURE — U0002 COVID-19 LAB TEST NON-CDC: HCPCS | Performed by: EMERGENCY MEDICINE

## 2022-06-29 RX ORDER — BENZONATATE 100 MG/1
100 CAPSULE ORAL 3 TIMES DAILY PRN
Qty: 20 CAPSULE | Refills: 0 | Status: SHIPPED | OUTPATIENT
Start: 2022-06-29 | End: 2022-07-09

## 2022-06-30 ENCOUNTER — TELEPHONE (OUTPATIENT)
Dept: INTERNAL MEDICINE | Facility: CLINIC | Age: 72
End: 2022-06-30
Payer: MEDICARE

## 2022-06-30 RX ORDER — CODEINE PHOSPHATE AND GUAIFENESIN 10; 100 MG/5ML; MG/5ML
5 SOLUTION ORAL 3 TIMES DAILY PRN
Qty: 118 ML | Refills: 0 | Status: SHIPPED | OUTPATIENT
Start: 2022-06-30 | End: 2022-07-10

## 2022-06-30 NOTE — FIRST PROVIDER EVALUATION
Emergency Department TeleTriage Encounter Note      CHIEF COMPLAINT    Chief Complaint   Patient presents with    COVID-19 Concerns    Shortness of Breath    Cough     Since last night       VITAL SIGNS   Initial Vitals [06/29/22 2005]   BP Pulse Resp Temp SpO2   (!) 155/93 79 18 98.6 °F (37 °C) 96 %      MAP       --            ALLERGIES    Review of patient's allergies indicates:  No Known Allergies    PROVIDER TRIAGE NOTE  71-year-old female exposed to COVID wants to be tested.  Has a cough.  Otherwise no complaints and appears well, vital signs normal      ORDERS  Labs Reviewed   SARS-COV-2 RDRP GENE       ED Orders (720h ago, onward)    Start Ordered     Status Ordering Provider    06/29/22 2019 06/29/22 2018  POCT COVID-19 Rapid Screening  Once         Ordered GILMER DA SILVA            Virtual Visit Note: The provider triage portion of this emergency department evaluation and documentation was performed via [employer], a HIPAA-compliant telemedicine application, in concert with a tele-presenter in the room. A face to face patient evaluation with one of my colleagues will occur once the patient is placed in an emergency department room.      DISCLAIMER: This note was prepared with [employer] voice recognition transcription software. Garbled syntax, mangled pronouns, and other bizarre constructions may be attributed to that software system.

## 2022-06-30 NOTE — ED PROVIDER NOTES
Encounter Date: 6/29/2022    SCRIBE #1 NOTE: I, Agustina John, am scribing for, and in the presence of, Andria Andrade MD.       History     Chief Complaint   Patient presents with    COVID-19 Concerns    Shortness of Breath    Cough     Since last night     Time seen by provider: 9:20 PM    This is a 71 y.o. female who presents with complaint of cough and shortness of breath that began last night. Patient denies fever or any other complaints. She has been vaccinated against COVID. This is the extent of the patient's complaints at this time.    The history is provided by the patient.     Review of patient's allergies indicates:  No Known Allergies  Past Medical History:   Diagnosis Date    Abnormal Pap smear     Abnormal Pap smear of cervix     Abnormal Pap smear of vagina 7/20/2016    LEROY (acute kidney injury) 10/10/2020    Allergy     Anemia     Anxiety     Anxiety     Cataract     Chronic bilateral low back pain without sciatica 3/13/2018    Depression     Depression with anxiety     Hypertension     Osteoarthritis 6/20/2013    Pneumonia of both lungs due to infectious organism 6/7/2021    Right rotator cuff tear     Sleep apnea     Cipap machine at home    Trouble in sleeping     Urinary incontinence     Leaks urine with laughing/ coughing/ sneezing     Past Surgical History:   Procedure Laterality Date    CATARACT EXTRACTION  6/18/12    OS    CATARACT EXTRACTION  7/16/12    OD    CERVIX SURGERY      Conization    COLONOSCOPY      COLONOSCOPY N/A 3/4/2016    Procedure: COLONOSCOPY;  Surgeon: Tenzin Thakkar MD;  Location: [address]);  Service: Endoscopy;  Laterality: N/A;    COLONOSCOPY Left 1/25/2021    Procedure: COLONOSCOPY;  Surgeon: Wayne Naranjo MD;  Location: [facility];  Service: Endoscopy;  Laterality: Left;    ESOPHAGOGASTRODUODENOSCOPY Left 1/25/2021    Procedure: EGD (ESOPHAGOGASTRODUODENOSCOPY);  Surgeon: Wayne Naranjo MD;  Location: [facility];  Service:  "Endoscopy;  Laterality: Left;    EYE SURGERY Bilateral     cataract    HYSTERECTOMY  5-12-15    LEEP      LUMBAR EPIDURAL INJECTION      OOPHORECTOMY      IL REMOVAL OF OVARY/TUBE(S)  5-12-15    ROBOT-ASSISTED REPAIR OF VENTRAL HERNIA USING DA ASPEN XI N/A 11/29/2021    Procedure: XI ROBOTIC REPAIR, HERNIA, VENTRAL w/ possible mesh;  Surgeon: Roney Jackson MD;  Location: [facility] OR [address] [place] [address];  Service: General;  Laterality: N/A;  Anesthesia Block    SALPINGECTOMY Left     SHOULDER SURGERY Right      Family History   Problem Relation Age of Onset    Hypertension Father     Cataracts Father     Cancer Mother         THYROID CANCER    Stroke Mother     Breast cancer Cousin     Heart disease Sister     Pacemaker/defibrilator Sister     Hypertension Sister     Deep vein thrombosis Sister     Heart disease Sister         CABG    Osteoarthritis Sister     Edema Sister     Eczema Sister     Hypertension Sister     Breast cancer Sister 72    Anuerysm Sister     Drug abuse Sister     Hypertension Sister     No Known Problems Brother     No Known Problems Sister     Cancer Sister         Cancer cells or "growth in her stomach"    No Known Problems Sister     Hypertension Daughter     Other Daughter         Heart palpitations    Depression Maternal Grandmother     Ulcers Maternal Grandfather         Legs    No Known Problems Paternal Grandmother     No Known Problems Paternal Grandfather     Cancer Maternal Aunt     ADD / ADHD Neg Hx     Alcohol abuse Neg Hx     Anxiety disorder Neg Hx     Bipolar disorder Neg Hx     Dementia Neg Hx     OCD Neg Hx     Paranoid behavior Neg Hx     Physical abuse Neg Hx     Schizophrenia Neg Hx     Seizures Neg Hx     Sexual abuse Neg Hx     Amblyopia Neg Hx     Blindness Neg Hx     Glaucoma Neg Hx     Macular degeneration Neg Hx     Retinal detachment Neg Hx     Strabismus Neg Hx      Social History     Tobacco Use    Smoking status: " Former Smoker     Packs/day: 0.50     Years: 22.00     Pack years: 11.00     Types: Cigarettes     Quit date: 1998     Years since quittin.4    Smokeless tobacco: Never Used   Substance Use Topics    Alcohol use: Yes     Alcohol/week: 0.0 standard drinks     Comment: Occassionally for social events will have a glass of wine    Drug use: No     Review of Systems   Constitutional: Negative for fever.   HENT: Negative for sore throat.    Respiratory: Positive for cough and shortness of breath.    Cardiovascular: Negative for chest pain.   Gastrointestinal: Negative for nausea.   Genitourinary: Negative for dysuria.   Musculoskeletal: Negative for back pain.   Skin: Negative for rash.   Neurological: Negative for weakness.   Hematological: Does not bruise/bleed easily.       Physical Exam     Initial Vitals [22]   BP Pulse Resp Temp SpO2   (!) 155/93 79 18 98.6 °F (37 °C) 96 %      MAP       --         Physical Exam    Nursing note and vitals reviewed.  Constitutional: She appears well-developed and well-nourished. She is not diaphoretic. No distress.   HENT:   Head: Normocephalic and atraumatic.   Eyes: EOM are normal.   Neck:   Normal range of motion.  Pulmonary/Chest: No respiratory distress.   Musculoskeletal:         General: Normal range of motion.      Cervical back: Normal range of motion.     Neurological: She is alert and oriented to person, place, and time.   Ambulatory with a steady gait.   Skin: Skin is dry.         ED Course   Procedures  Labs Reviewed   SARS-COV-2 RDRP GENE - Abnormal; Notable for the following components:       Result Value    POC Rapid COVID Positive (*)     All other components within normal limits    Narrative:     This test utilizes isothermal nucleic acid amplification   technology to detect the SARS-CoV-2 RdRp nucleic acid segment.   The analytical sensitivity (limit of detection) is 125 genome   equivalents/mL.   A POSITIVE result implies infection with the  SARS-CoV-2 virus;   the patient is presumed to be contagious.     A NEGATIVE result means that SARS-CoV-2 nucleic acids are not   present above the limit of detection. A NEGATIVE result should be   treated as presumptive. It does not rule out the possibility of   COVID-19 and should not be the sole basis for treatment decisions.   If COVID-19 is strongly suspected based on clinical and exposure   history, re-testing using an alternate molecular assay should be   considered.   This test is only for use under the Food and Drug   Administration s Emergency Use Authorization (EUA).   Commercial kits are provided by [employer].   Performance characteristics of the EUA have been independently   verified by [facility] Department of   Pathology and Laboratory Medicine.   _________________________________________________________________   The authorized Fact Sheet for Healthcare Providers and the authorized Fact   Sheet for Patients of the ID NOW COVID-19 are available on the FDA   website:     https://www.fda.gov/media/159010/download  https://www.fda.gov/media/577299/download                  Imaging Results          X-Ray Chest AP Portable (Final result)  Result time 06/29/22 21:03:53    Final result by Nika Sapp MD (06/29/22 21:03:53)                 Impression:      No acute cardiopulmonary process identified.      Electronically signed by: Nika Sapp MD  Date:    06/29/2022  Time:    21:03             Narrative:    EXAMINATION:  XR CHEST AP PORTABLE    CLINICAL HISTORY:  COVID-19    TECHNIQUE:  Single frontal view of the chest was performed.    COMPARISON:  04/05/2022.    FINDINGS:  Cardiac silhouette is normal in size.  Lungs are symmetrically expanded.  Mild chronic coarse interstitial lung changes are seen.  There has been interval resolution of previously visualized opacity in the right lower lung zone.  No evidence of new focal consolidative process, pneumothorax, or significant  pleural effusion.  No acute osseous abnormality identified.                              X-Rays:   Independently Interpreted Readings:   Chest X-Ray: Trachea midline. No cardiomegaly. No effusion, edema or pneumothorax.      Medications - No data to display  Medical Decision Making:   History:   Old Medical Records: I decided to obtain old medical records.  Old Records Summarized: other records and records from clinic visits.  Initial Assessment:   9:20PM:  Patient is a 71-year-old female who presents to the emergency department with cough, shortness of breath.  Patient is COVID positive.  This is likely the etiology of her symptoms.  Patient's chest x-ray is negative for any acute findings.  She was ambulatory with no issues and able to maintain her O2 sat.  I do feel that the patient is stable for outpatient management.  I do not feel that further work up in the ED is indicated at this time.  I updated pt regarding results and I counseled pt regarding supportive care measures.  I have discussed with the pt ED return warnings and need for close PCP f/u.  Pt agreeable to plan and all questions answered.  I feel that pt is stable for discharge and management as an outpatient and no further intervention is needed at this time.  Pt is comfortable returning to the ED if needed.  Will DC home in stable condition.    Independently Interpreted Test(s):   I have ordered and independently interpreted X-rays - see prior notes.  Clinical Tests:   Lab Tests: Ordered and Reviewed  Radiological Study: Reviewed and Ordered              Scribe Attestation:   Scribe #1: I performed the above scribed service and the documentation accurately describes the services I performed. I attest to the accuracy of the note.              Physician Attestation for Scribe: I, Andria Andrade, reviewed documentation as scribed in my presence, which is both accurate and complete.    Clinical Impression:   Final diagnoses:  [U07.1] COVID (Primary)           ED Disposition Condition    Discharge Stable        ED Prescriptions     Medication Sig Dispense Start Date End Date Auth. Provider    benzonatate (TESSALON) 100 MG capsule Take 1 capsule (100 mg total) by mouth 3 (three) times daily as needed for Cough. 20 capsule 6/29/2022 7/9/2022 Andria Andrade MD        Follow-up Information     Follow up With Specialties Details Why Contact Info    Bridget Cantu MD Internal Medicine   [address] [place] [id number]  [phone]             Andria Andrade MD  06/29/22 2149

## 2022-06-30 NOTE — TELEPHONE ENCOUNTER
----- Message from Clare Anderson sent at 6/30/2022  1:35 PM CDT -----  Contact: [phone]  Patient is calling for Medical Advice regarding: Patient has tested positive for covid  6/ 29 and would like something for the cough and pain    How long has patient had these symptoms:6/29    Pharmacy name and phone#:[facility]/pharmacy #[id number] - [place] - [address]   Phone:  [phone]  Fax:  [phone]      Comments:Please call patient to confirm.

## 2022-07-01 ENCOUNTER — PATIENT MESSAGE (OUTPATIENT)
Dept: ADMINISTRATIVE | Facility: OTHER | Age: 72
End: 2022-07-01
Payer: MEDICARE

## 2022-07-01 ENCOUNTER — NURSE TRIAGE (OUTPATIENT)
Dept: ADMINISTRATIVE | Facility: CLINIC | Age: 72
End: 2022-07-01
Payer: MEDICARE

## 2022-07-01 ENCOUNTER — PATIENT MESSAGE (OUTPATIENT)
Dept: ADMINISTRATIVE | Facility: CLINIC | Age: 72
End: 2022-07-01
Payer: MEDICARE

## 2022-07-01 NOTE — TELEPHONE ENCOUNTER
Pt called for no response to cc push.    Attempt made to contact Pt. Pt answered phone but wanted to be called back in 15 minutes. She was in the bathroom. Explained to Pt that if she wasn't having any issues or concerns at this time that she could put in her VS when she got out. Pt hung up phone and ended call.    Pt called back and reminded to put in her vs asap. Pt denies having any issues or questions at this time or need to be triaged. Invited Pt to call ooc at [phone] if anything worsens or she has questions or concerns at anytime. Alert and oriented, Pt agrees with above. Encounter routed to pcp staff.    Reason for Disposition   Health Information question, no triage required and triager able to answer question    Protocols used: INFORMATION ONLY CALL - NO TRIAGE-A-

## 2022-07-01 NOTE — TELEPHONE ENCOUNTER
Called patient to review COVID-19 Surveillance Program enrollment process.    Smartphone: yes    [facility] drew: yes    Program consent: yes    Pulse oximeter status: yes    Verified emergency contacts: yes    Program Overview: Reviewed , no response process, and importance of correct emergency contacts in event that well-being check is warranted. Patient will call [phone] 24/7 to speak with an OnCall nurse, if needed. Pt aware that if Sp02 less than or equal to 93% or if they have any worsening symptoms, they need to go to the emergency department. If they are having a medical emergency, they will call 911. Otherwise, patient will continue to submit data as scheduled. Reviewed importance of wearing mask if self or family members leave the house.     Patient had no further questions.    Reason for Disposition   Information only question and nurse able to answer    Protocols used: INFORMATION ONLY CALL - NO TRIAGE-A-[place]

## 2022-07-01 NOTE — TELEPHONE ENCOUNTER
Pt called for escalation of vs. Yes to fever symptoms. Pt did not put in a temp.    Pt doesn't have a thermometer to measure temp at this time. She said she is feeling better overall just has a mucous productive cough. Denies sob or chest pain. Covid protocol used and Pt educated on covid, cough, tx and when to call ooc. Pt invited to call ooc at [phone] if having any new or worsening symptoms or questions. Encounter routed to pcp staff.      Reason for Disposition   [1] COVID-19 diagnosed by positive lab test (e.g., PCR, rapid self-test kit) AND [2] mild symptoms (e.g., cough, fever, others) AND [3] no complications or SOB    Additional Information   Negative: SEVERE difficulty breathing (e.g., struggling for each breath, speaks in single words)   Negative: Difficult to awaken or acting confused (e.g., disoriented, slurred speech)   Negative: Bluish (or gray) lips or face now   Negative: Shock suspected (e.g., cold/pale/clammy skin, too weak to stand, low BP, rapid pulse)   Negative: Sounds like a life-threatening emergency to the triager   Negative: SEVERE or constant chest pain or pressure  (Exception: Mild central chest pain, present only when coughing.)   Negative: MODERATE difficulty breathing (e.g., speaks in phrases, SOB even at rest, pulse 100-120)   Negative: [1] Headache AND [2] stiff neck (can't touch chin to chest)   Negative: Oxygen level (e.g., pulse oximetry) 90 percent or lower   Negative: Chest pain or pressure   Negative: Patient sounds very sick or weak to the triager   Negative: MILD difficulty breathing (e.g., minimal/no SOB at rest, SOB with walking, pulse <100)   Negative: Fever > 103 F (39.4 C)   Negative: [1] Fever > 101 F (38.3 C) AND [2] age > 60 years   Negative: [1] Fever > 100.0 F (37.8 C) AND [2] bedridden (e.g., nursing home patient, CVA, chronic illness, recovering from surgery)   Negative: HIGH RISK for severe COVID complications (e.g., weak immune system,  age > 64 years, obesity with BMI > 25, pregnant, chronic lung disease or other chronic medical condition)  (Exception: Already seen by PCP and no new or worsening symptoms.)   Negative: [1] HIGH RISK patient AND [2] influenza is widespread in the community AND [3] ONE OR MORE respiratory symptoms: cough, sore throat, runny or stuffy nose   Negative: [1] HIGH RISK patient AND [2] influenza exposure within the last 7 days AND [3] ONE OR MORE respiratory symptoms: cough, sore throat, runny or stuffy nose   Negative: Oxygen level (e.g., pulse oximetry) 91 to 94 percent   Negative: Fever present > 3 days (72 hours)   Negative: [1] Fever returns after gone for over 24 hours AND [2] symptoms worse or not improved   Negative: [1] Continuous (nonstop) coughing interferes with work or school AND [2] no improvement using cough treatment per Care Advice   Negative: [1] COVID-19 infection suspected by caller or triager AND [2] mild symptoms (cough, fever, or others) AND [3] negative COVID-19 rapid test   Negative: Cough present > 3 weeks   Negative: [1] COVID-19 diagnosed by positive lab test (e.g., PCR, rapid self-test kit) AND [2] NO symptoms (e.g., cough, fever, others)    Protocols used: CORONAVIRUS (COVID-19) DIAGNOSED OR [id number]

## 2022-07-02 ENCOUNTER — PATIENT MESSAGE (OUTPATIENT)
Dept: ADMINISTRATIVE | Facility: CLINIC | Age: 72
End: 2022-07-02
Payer: MEDICARE

## 2022-07-02 ENCOUNTER — PATIENT MESSAGE (OUTPATIENT)
Dept: ADMINISTRATIVE | Facility: OTHER | Age: 72
End: 2022-07-02
Payer: MEDICARE

## 2022-07-02 ENCOUNTER — NURSE TRIAGE (OUTPATIENT)
Dept: ADMINISTRATIVE | Facility: CLINIC | Age: 72
End: 2022-07-02
Payer: MEDICARE

## 2022-07-02 NOTE — TELEPHONE ENCOUNTER
Pt escalated for SpO2 of 93. Pt states she is feeling a lot better. Pt states her SpO2 was actually 96, she just put it in wrong. Pt denies SOB or any new or worsening symptoms. No triage needed at this time. Will continue to monitor.    Reason for Disposition   Health Information question, no triage required and triager able to answer question    Protocols used: INFORMATION ONLY CALL - NO TRIAGE-A-

## 2022-07-02 NOTE — TELEPHONE ENCOUNTER
Pt escalated for no response. Spoke to pt who was confused about the times to submit. Pt states she is feeling better. She will get vitals submitted now. Pt has number to OOC for further concerns. Will continue to monitor.    Reason for Disposition   [1] Follow-up call to recent contact AND [2] information only call, no triage required    Protocols used: INFORMATION ONLY CALL - NO TRIAGE-A-

## 2022-07-03 ENCOUNTER — PATIENT MESSAGE (OUTPATIENT)
Dept: ADMINISTRATIVE | Facility: OTHER | Age: 72
End: 2022-07-03
Payer: MEDICARE

## 2022-07-04 ENCOUNTER — PATIENT MESSAGE (OUTPATIENT)
Dept: ADMINISTRATIVE | Facility: OTHER | Age: 72
End: 2022-07-04
Payer: MEDICARE

## 2022-07-04 ENCOUNTER — NURSE TRIAGE (OUTPATIENT)
Dept: ADMINISTRATIVE | Facility: CLINIC | Age: 72
End: 2022-07-04
Payer: MEDICARE

## 2022-07-04 ENCOUNTER — PATIENT MESSAGE (OUTPATIENT)
Dept: ADMINISTRATIVE | Facility: CLINIC | Age: 72
End: 2022-07-04
Payer: MEDICARE

## 2022-07-04 NOTE — TELEPHONE ENCOUNTER
Pt escalated due to no response. Pt called with no contact made. VM left, and mychart message sent. Ec called with contact made. Sates will reach out to pt.    Reason for Disposition   Message left on unidentified voice mail.  Phone number verified.    Protocols used: NO CONTACT OR DUPLICATE CONTACT CALL-A-

## 2022-07-05 ENCOUNTER — NURSE TRIAGE (OUTPATIENT)
Dept: ADMINISTRATIVE | Facility: CLINIC | Age: 72
End: 2022-07-05
Payer: MEDICARE

## 2022-07-05 ENCOUNTER — PATIENT MESSAGE (OUTPATIENT)
Dept: ADMINISTRATIVE | Facility: OTHER | Age: 72
End: 2022-07-05
Payer: MEDICARE

## 2022-07-05 ENCOUNTER — PATIENT MESSAGE (OUTPATIENT)
Dept: ADMINISTRATIVE | Facility: CLINIC | Age: 72
End: 2022-07-05
Payer: MEDICARE

## 2022-07-05 NOTE — TELEPHONE ENCOUNTER
Surveillance pt escalated for no response.  No answer.  Left VM including OOC number.      Reason for Disposition   Message left on unidentified voice mail. Phone number verified.    Additional Information   Negative: Caller has already spoken with the PCP (or office), and has no further questions   Negative: Caller has already spoken with another triager and has no further questions   Negative: Caller has already spoken with another triager or PCP (or office), and has further questions and triager able to answer questions.   Negative: Busy signal.  First attempt to contact caller.  Follow-up call scheduled within 15 minutes.   Negative: No answer.  First attempt to contact caller.  Follow-up call scheduled within 15 minutes.   Negative: Message left on identified voicemail    Protocols used: NO CONTACT OR DUPLICATE CONTACT CALL-A-[place]

## 2022-07-05 NOTE — TELEPHONE ENCOUNTER
Pt escalated for temp of 973. Pt just forgot the decimal. Spoke to pt. Updated vitals. All WNL so no triage required. Pt is doing well. Denies SOB or any new or worsening symptoms. Pt has number to OOC for further concerns. Will continue to monitor.    Reason for Disposition   Health Information question, no triage required and triager able to answer question    Protocols used: INFORMATION ONLY CALL - NO TRIAGE-A-

## 2022-07-05 NOTE — TELEPHONE ENCOUNTER
Spoke w/pt and asked how she is feeling now. Pt stated that she feels much better and her sob is improved, no fever, but she still has a cough. Overall, she is doing okay.

## 2022-07-06 ENCOUNTER — PATIENT MESSAGE (OUTPATIENT)
Dept: ADMINISTRATIVE | Facility: OTHER | Age: 72
End: 2022-07-06
Payer: MEDICARE

## 2022-07-07 ENCOUNTER — PATIENT MESSAGE (OUTPATIENT)
Dept: ADMINISTRATIVE | Facility: OTHER | Age: 72
End: 2022-07-07
Payer: MEDICARE

## 2022-07-08 ENCOUNTER — PATIENT MESSAGE (OUTPATIENT)
Dept: ADMINISTRATIVE | Facility: OTHER | Age: 72
End: 2022-07-08
Payer: MEDICARE

## 2022-07-08 ENCOUNTER — NURSE TRIAGE (OUTPATIENT)
Dept: ADMINISTRATIVE | Facility: CLINIC | Age: 72
End: 2022-07-08
Payer: MEDICARE

## 2022-07-08 ENCOUNTER — PATIENT MESSAGE (OUTPATIENT)
Dept: ADMINISTRATIVE | Facility: CLINIC | Age: 72
End: 2022-07-08
Payer: MEDICARE

## 2022-07-08 NOTE — TELEPHONE ENCOUNTER
Pt escalated for no response, NT unable to make phone contact. As per program protocol all contact numbers called once and my chart message sent.    NT did speak with EC, Ludwig Guzman, who reports he is not with pt at this time but we call pt shortly.     Reason for Disposition   Message left on unidentified voice mail.  Phone number verified.    Protocols used: NO CONTACT OR DUPLICATE CONTACT CALL-A-

## 2022-07-09 ENCOUNTER — PATIENT MESSAGE (OUTPATIENT)
Dept: ADMINISTRATIVE | Facility: CLINIC | Age: 72
End: 2022-07-09
Payer: MEDICARE

## 2022-07-09 ENCOUNTER — PATIENT MESSAGE (OUTPATIENT)
Dept: ADMINISTRATIVE | Facility: OTHER | Age: 72
End: 2022-07-09
Payer: MEDICARE

## 2022-07-10 ENCOUNTER — PATIENT MESSAGE (OUTPATIENT)
Dept: ADMINISTRATIVE | Facility: OTHER | Age: 72
End: 2022-07-10
Payer: MEDICARE

## 2022-07-10 ENCOUNTER — PATIENT MESSAGE (OUTPATIENT)
Dept: ADMINISTRATIVE | Facility: CLINIC | Age: 72
End: 2022-07-10
Payer: MEDICARE

## 2022-07-11 ENCOUNTER — PATIENT MESSAGE (OUTPATIENT)
Dept: ADMINISTRATIVE | Facility: OTHER | Age: 72
End: 2022-07-11
Payer: MEDICARE

## 2022-07-11 ENCOUNTER — PATIENT MESSAGE (OUTPATIENT)
Dept: ADMINISTRATIVE | Facility: CLINIC | Age: 72
End: 2022-07-11
Payer: MEDICARE

## 2022-07-12 ENCOUNTER — PATIENT MESSAGE (OUTPATIENT)
Dept: ADMINISTRATIVE | Facility: OTHER | Age: 72
End: 2022-07-12
Payer: MEDICARE

## 2022-07-12 ENCOUNTER — PATIENT MESSAGE (OUTPATIENT)
Dept: ADMINISTRATIVE | Facility: CLINIC | Age: 72
End: 2022-07-12
Payer: MEDICARE

## 2022-07-12 ENCOUNTER — NURSE TRIAGE (OUTPATIENT)
Dept: ADMINISTRATIVE | Facility: CLINIC | Age: 72
End: 2022-07-12
Payer: MEDICARE

## 2022-07-12 NOTE — TELEPHONE ENCOUNTER
[place]    PCP:  Dr. Bridget Cantu    Per Covid Surveillance Program, pt escalated high priority for abnormal vitals; SpO2=62% SC=97.  Pt states that she accidentally entered the readings incorrectly.  SpO2=97% SC=62.  All readings are WNL.  Per protocol, care advised is home care.  Instructed to call for worsening/questions/concerns.  VU.    Reason for Disposition   [1] COVID-19 diagnosed by positive lab test (e.g., PCR, rapid self-test kit) AND [2] mild symptoms (e.g., cough, fever, others) AND [3] no complications or SOB    Additional Information   Negative: SEVERE difficulty breathing (e.g., struggling for each breath, speaks in single words)   Negative: Difficult to awaken or acting confused (e.g., disoriented, slurred speech)   Negative: Bluish (or gray) lips or face now   Negative: Shock suspected (e.g., cold/pale/clammy skin, too weak to stand, low BP, rapid pulse)   Negative: Sounds like a life-threatening emergency to the triager   Negative: SEVERE or constant chest pain or pressure  (Exception: Mild central chest pain, present only when coughing.)   Negative: MODERATE difficulty breathing (e.g., speaks in phrases, SOB even at rest, pulse 100-120)   Negative: [1] Headache AND [2] stiff neck (can't touch chin to chest)   Negative: Oxygen level (e.g., pulse oximetry) 90 percent or lower   Negative: Chest pain or pressure   Negative: Patient sounds very sick or weak to the triager   Negative: MILD difficulty breathing (e.g., minimal/no SOB at rest, SOB with walking, pulse <100)   Negative: Fever > 103 F (39.4 C)   Negative: [1] Fever > 101 F (38.3 C) AND [2] age > 60 years   Negative: [1] Fever > 100.0 F (37.8 C) AND [2] bedridden (e.g., nursing home patient, CVA, chronic illness, recovering from surgery)   Negative: HIGH RISK for severe COVID complications (e.g., weak immune system, age > 64 years, obesity with BMI > 25, pregnant, chronic lung disease or other chronic medical condition)   (Exception: Already seen by PCP and no new or worsening symptoms.)   Negative: [1] HIGH RISK patient AND [2] influenza is widespread in the community AND [3] ONE OR MORE respiratory symptoms: cough, sore throat, runny or stuffy nose   Negative: [1] HIGH RISK patient AND [2] influenza exposure within the last 7 days AND [3] ONE OR MORE respiratory symptoms: cough, sore throat, runny or stuffy nose   Negative: Oxygen level (e.g., pulse oximetry) 91 to 94 percent   Negative: Fever present > 3 days (72 hours)   Negative: [1] Fever returns after gone for over 24 hours AND [2] symptoms worse or not improved   Negative: [1] Continuous (nonstop) coughing interferes with work or school AND [2] no improvement using cough treatment per Care Advice   Negative: [1] COVID-19 infection suspected by caller or triager AND [2] mild symptoms (cough, fever, or others) AND [3] negative COVID-19 rapid test   Negative: Cough present > 3 weeks   Negative: [1] COVID-19 diagnosed by positive lab test (e.g., PCR, rapid self-test kit) AND [2] NO symptoms (e.g., cough, fever, others)    Protocols used: CORONAVIRUS (COVID-19) DIAGNOSED OR [id number]

## 2022-07-13 ENCOUNTER — PATIENT MESSAGE (OUTPATIENT)
Dept: ADMINISTRATIVE | Facility: OTHER | Age: 72
End: 2022-07-13
Payer: MEDICARE

## 2022-07-14 ENCOUNTER — PATIENT MESSAGE (OUTPATIENT)
Dept: ADMINISTRATIVE | Facility: OTHER | Age: 72
End: 2022-07-14
Payer: MEDICARE

## 2022-07-20 ENCOUNTER — LAB VISIT (OUTPATIENT)
Dept: LAB | Facility: HOSPITAL | Age: 72
End: 2022-07-20
Attending: INTERNAL MEDICINE
Payer: MEDICARE

## 2022-07-20 DIAGNOSIS — D64.9 ANEMIA, UNSPECIFIED TYPE: ICD-10-CM

## 2022-07-20 LAB
IRON SERPL-MCNC: 40 UG/DL (ref 30–160)
SATURATED IRON: 14 % (ref 20–50)
TOTAL IRON BINDING CAPACITY: 295 UG/DL (ref 250–450)
TRANSFERRIN SERPL-MCNC: 199 MG/DL (ref 200–375)

## 2022-07-20 PROCEDURE — 36415 COLL VENOUS BLD VENIPUNCTURE: CPT | Performed by: INTERNAL MEDICINE

## 2022-07-20 PROCEDURE — 84466 ASSAY OF TRANSFERRIN: CPT | Performed by: INTERNAL MEDICINE

## 2022-07-25 ENCOUNTER — TELEPHONE (OUTPATIENT)
Dept: ORTHOPEDICS | Facility: CLINIC | Age: 72
End: 2022-07-25
Payer: MEDICARE

## 2022-07-25 ENCOUNTER — OFFICE VISIT (OUTPATIENT)
Dept: INTERNAL MEDICINE | Facility: CLINIC | Age: 72
End: 2022-07-25
Payer: MEDICARE

## 2022-07-25 ENCOUNTER — HOSPITAL ENCOUNTER (OUTPATIENT)
Dept: RADIOLOGY | Facility: HOSPITAL | Age: 72
Discharge: HOME | End: 2022-07-25
Attending: INTERNAL MEDICINE
Payer: MEDICARE

## 2022-07-25 VITALS
OXYGEN SATURATION: 98 % | SYSTOLIC BLOOD PRESSURE: 128 MMHG | HEART RATE: 62 BPM | DIASTOLIC BLOOD PRESSURE: 72 MMHG | BODY MASS INDEX: 28.6 KG/M2 | HEIGHT: 70 IN | WEIGHT: 199.75 LBS

## 2022-07-25 DIAGNOSIS — R31.9 HEMATURIA, UNSPECIFIED TYPE: Primary | ICD-10-CM

## 2022-07-25 DIAGNOSIS — R41.3 MEMORY LOSS: ICD-10-CM

## 2022-07-25 DIAGNOSIS — R06.02 SHORTNESS OF BREATH: ICD-10-CM

## 2022-07-25 DIAGNOSIS — R05.9 COUGH: ICD-10-CM

## 2022-07-25 DIAGNOSIS — R06.09 DOE (DYSPNEA ON EXERTION): ICD-10-CM

## 2022-07-25 DIAGNOSIS — R73.9 HYPERGLYCEMIA: ICD-10-CM

## 2022-07-25 LAB
BILIRUB UR QL STRIP: NEGATIVE
CLARITY UR REFRACT.AUTO: ABNORMAL
COLOR UR AUTO: YELLOW
GLUCOSE UR QL STRIP: NEGATIVE
HGB UR QL STRIP: NEGATIVE
KETONES UR QL STRIP: NEGATIVE
LEUKOCYTE ESTERASE UR QL STRIP: NEGATIVE
MICROSCOPIC COMMENT: ABNORMAL
NITRITE UR QL STRIP: NEGATIVE
PH UR STRIP: 6 [PH] (ref 5–8)
PROT UR QL STRIP: NEGATIVE
RBC #/AREA URNS AUTO: 5 /HPF (ref 0–4)
SP GR UR STRIP: 1.02 (ref 1–1.03)
SQUAMOUS #/AREA URNS AUTO: 1 /HPF
URN SPEC COLLECT METH UR: ABNORMAL
WBC #/AREA URNS AUTO: 0 /HPF (ref 0–5)

## 2022-07-25 PROCEDURE — 3078F PR MOST RECENT DIASTOLIC BLOOD PRESSURE < 80 MM HG: ICD-10-PCS | Mod: CPTII,S$GLB,, | Performed by: INTERNAL MEDICINE

## 2022-07-25 PROCEDURE — 3288F PR FALLS RISK ASSESSMENT DOCUMENTED: ICD-10-PCS | Mod: CPTII,S$GLB,, | Performed by: INTERNAL MEDICINE

## 2022-07-25 PROCEDURE — 71046 X-RAY EXAM CHEST 2 VIEWS: CPT | Mod: TC,FY

## 2022-07-25 PROCEDURE — 3008F PR BODY MASS INDEX (BMI) DOCUMENTED: ICD-10-PCS | Mod: CPTII,S$GLB,, | Performed by: INTERNAL MEDICINE

## 2022-07-25 PROCEDURE — 3044F HG A1C LEVEL LT 7.0%: CPT | Mod: CPTII,S$GLB,, | Performed by: INTERNAL MEDICINE

## 2022-07-25 PROCEDURE — 1126F PR PAIN SEVERITY QUANTIFIED, NO PAIN PRESENT: ICD-10-PCS | Mod: CPTII,S$GLB,, | Performed by: INTERNAL MEDICINE

## 2022-07-25 PROCEDURE — 3061F PR NEG MICROALBUMINURIA RESULT DOCUMENTED/REVIEW: ICD-10-PCS | Mod: CPTII,S$GLB,, | Performed by: INTERNAL MEDICINE

## 2022-07-25 PROCEDURE — 3061F NEG MICROALBUMINURIA REV: CPT | Mod: CPTII,S$GLB,, | Performed by: INTERNAL MEDICINE

## 2022-07-25 PROCEDURE — 71046 XR CHEST PA AND LATERAL: ICD-10-PCS | Mod: 26,,, | Performed by: RADIOLOGY

## 2022-07-25 PROCEDURE — 3074F SYST BP LT 130 MM HG: CPT | Mod: CPTII,S$GLB,, | Performed by: INTERNAL MEDICINE

## 2022-07-25 PROCEDURE — 3074F PR MOST RECENT SYSTOLIC BLOOD PRESSURE < 130 MM HG: ICD-10-PCS | Mod: CPTII,S$GLB,, | Performed by: INTERNAL MEDICINE

## 2022-07-25 PROCEDURE — 3078F DIAST BP <80 MM HG: CPT | Mod: CPTII,S$GLB,, | Performed by: INTERNAL MEDICINE

## 2022-07-25 PROCEDURE — 3066F NEPHROPATHY DOC TX: CPT | Mod: CPTII,S$GLB,, | Performed by: INTERNAL MEDICINE

## 2022-07-25 PROCEDURE — 1101F PR PT FALLS ASSESS DOC 0-1 FALLS W/OUT INJ PAST YR: ICD-10-PCS | Mod: CPTII,S$GLB,, | Performed by: INTERNAL MEDICINE

## 2022-07-25 PROCEDURE — 3044F PR MOST RECENT HEMOGLOBIN A1C LEVEL <7.0%: ICD-10-PCS | Mod: CPTII,S$GLB,, | Performed by: INTERNAL MEDICINE

## 2022-07-25 PROCEDURE — 1101F PT FALLS ASSESS-DOCD LE1/YR: CPT | Mod: CPTII,S$GLB,, | Performed by: INTERNAL MEDICINE

## 2022-07-25 PROCEDURE — 99999 PR PBB SHADOW E&M-EST. PATIENT-LVL IV: CPT | Mod: PBBFAC,,, | Performed by: INTERNAL MEDICINE

## 2022-07-25 PROCEDURE — 1159F PR MEDICATION LIST DOCUMENTED IN MEDICAL RECORD: ICD-10-PCS | Mod: CPTII,S$GLB,, | Performed by: INTERNAL MEDICINE

## 2022-07-25 PROCEDURE — 71046 X-RAY EXAM CHEST 2 VIEWS: CPT | Mod: 26,,, | Performed by: RADIOLOGY

## 2022-07-25 PROCEDURE — 87086 URINE CULTURE/COLONY COUNT: CPT | Performed by: INTERNAL MEDICINE

## 2022-07-25 PROCEDURE — 99999 PR PBB SHADOW E&M-EST. PATIENT-LVL IV: ICD-10-PCS | Mod: PBBFAC,,, | Performed by: INTERNAL MEDICINE

## 2022-07-25 PROCEDURE — 1126F AMNT PAIN NOTED NONE PRSNT: CPT | Mod: CPTII,S$GLB,, | Performed by: INTERNAL MEDICINE

## 2022-07-25 PROCEDURE — 81001 URINALYSIS AUTO W/SCOPE: CPT | Performed by: INTERNAL MEDICINE

## 2022-07-25 PROCEDURE — 3008F BODY MASS INDEX DOCD: CPT | Mod: CPTII,S$GLB,, | Performed by: INTERNAL MEDICINE

## 2022-07-25 PROCEDURE — 1159F MED LIST DOCD IN RCRD: CPT | Mod: CPTII,S$GLB,, | Performed by: INTERNAL MEDICINE

## 2022-07-25 PROCEDURE — 3288F FALL RISK ASSESSMENT DOCD: CPT | Mod: CPTII,S$GLB,, | Performed by: INTERNAL MEDICINE

## 2022-07-25 PROCEDURE — 99214 OFFICE O/P EST MOD 30 MIN: CPT | Mod: S$GLB,,, | Performed by: INTERNAL MEDICINE

## 2022-07-25 PROCEDURE — 3066F PR DOCUMENTATION OF TREATMENT FOR NEPHROPATHY: ICD-10-PCS | Mod: CPTII,S$GLB,, | Performed by: INTERNAL MEDICINE

## 2022-07-25 PROCEDURE — 99214 PR OFFICE/OUTPT VISIT, EST, LEVL IV, 30-39 MIN: ICD-10-PCS | Mod: S$GLB,,, | Performed by: INTERNAL MEDICINE

## 2022-07-25 RX ORDER — LORAZEPAM 0.5 MG/1
TABLET ORAL
Qty: 30 TABLET | Refills: 0 | Status: SHIPPED | OUTPATIENT
Start: 2022-07-25 | End: 2022-10-29 | Stop reason: SDUPTHER

## 2022-07-25 NOTE — PROGRESS NOTES
Subjective:       Patient ID: Marleny Guzman is a 72 y.o. female.    Chief Complaint: Follow-up    HPIPt had pneumonia in April and then COVID in June - she is slowly recovering.  SOem slight cough and slight SOB.  She works nights sitting for an elderly client.  She is still grieving the loss of her sister.  She reports memory loss.  Sometimes has to pull over because she is getting lost.  Review of Systems   Respiratory: Positive for cough. Negative for shortness of breath (PND or orthopnea).    Cardiovascular: Negative for chest pain (arm pain or jaw pain).   Gastrointestinal: Negative for abdominal pain, diarrhea, nausea and vomiting.   Genitourinary: Negative for dysuria.   Neurological: Negative for seizures, syncope and headaches.       Objective:      Physical Exam  Constitutional:       General: She is not in acute distress.     Appearance: She is well-developed.   HENT:      Head: Normocephalic.   Eyes:      Pupils: Pupils are equal, round, and reactive to light.   Neck:      Thyroid: No thyromegaly.      Vascular: No JVD.   Cardiovascular:      Rate and Rhythm: Normal rate and regular rhythm.      Heart sounds: Normal heart sounds. No murmur heard.    No friction rub. No gallop.   Pulmonary:      Effort: Pulmonary effort is normal.      Breath sounds: Normal breath sounds. No wheezing or rales.   Abdominal:      General: Bowel sounds are normal. There is no distension.      Palpations: Abdomen is soft. There is no mass.      Tenderness: There is no abdominal tenderness. There is no guarding or rebound.   Musculoskeletal:      Cervical back: Neck supple.   Lymphadenopathy:      Cervical: No cervical adenopathy.   Skin:     General: Skin is warm and dry.   Neurological:      Mental Status: She is alert and oriented to person, place, and time.      Deep Tendon Reflexes: Reflexes are normal and symmetric.   Psychiatric:         Behavior: Behavior normal.         Thought Content: Thought content  normal.         Judgment: Judgment normal.         Assessment:       1. Hematuria, unspecified type    2. Cough    3. Memory loss    4. Hyperglycemia    5. WOOTEN (dyspnea on exertion)    6. Shortness of breath         Plan:   Hematuria, unspecified type  -     Urinalysis  -     Urine culture    Cough  -     X-Ray Chest PA And Lateral; Future; Expected date: 07/25/2022    Memory loss  -     Ambulatory referral/consult to Neurology; Future; Expected date: 08/01/2022  -     Vitamin B12; Future; Expected date: 08/25/2022  -     Methylmalonic Acid, Serum; Future; Expected date: 07/25/2022  -     TSH; Future; Expected date: 07/25/2022    Hyperglycemia  -     Hemoglobin A1C; Future; Expected date: 07/25/2022  -     Comprehensive Metabolic Panel; Future; Expected date: 07/25/2022    WOOTEN (dyspnea on exertion)  -     Stress Echo Which stress agent will be used? Treadmill Exercise; Future    Shortness of breath   -     Stress Echo Which stress agent will be used? Treadmill Exercise; Future    Other orders  -     LORazepam (ATIVAN) 0.5 MG tablet; TAKE 1/2 TO 1 TABLET BY MOUTH DAILY AS NEEDED FOR ANXIETY  Dispense: 30 tablet; Refill: 0      She is still grieving - denies being suicidal or homicidal.    Declines appt with psychiatry.

## 2022-07-25 NOTE — TELEPHONE ENCOUNTER
----- Message from Nereida Elias sent at 7/25/2022  2:04 PM CDT -----  DOMINICK SENA calling regarding Appointment Reschedule for another appt.  pt want to reschedule her appt #f/u R shoulder injection  for an appt for this week.  call back # [phone]

## 2022-07-26 LAB — BACTERIA UR CULT: NO GROWTH

## 2022-07-27 ENCOUNTER — TELEPHONE (OUTPATIENT)
Dept: INTERNAL MEDICINE | Facility: CLINIC | Age: 72
End: 2022-07-27

## 2022-07-27 DIAGNOSIS — R31.9 HEMATURIA, UNSPECIFIED TYPE: Primary | ICD-10-CM

## 2022-07-27 NOTE — PROGRESS NOTES
You have a bit of blood in your urine - we need to check your kidney with a CT scan and your bladder - will ask ou to see urology to evaluate this. We will call you to schedule the appointments.

## 2022-08-01 ENCOUNTER — HOSPITAL ENCOUNTER (OUTPATIENT)
Dept: CARDIOLOGY | Facility: HOSPITAL | Age: 72
Discharge: HOME | End: 2022-08-01
Attending: INTERNAL MEDICINE
Payer: MEDICARE

## 2022-08-01 VITALS — WEIGHT: 198 LBS | BODY MASS INDEX: 28.35 KG/M2 | HEIGHT: 70 IN

## 2022-08-01 DIAGNOSIS — R06.02 SHORTNESS OF BREATH: ICD-10-CM

## 2022-08-01 DIAGNOSIS — R06.09 DOE (DYSPNEA ON EXERTION): ICD-10-CM

## 2022-08-01 LAB
ASCENDING AORTA: 2.9 CM
AV INDEX (PROSTH): 0.86
AV MEAN GRADIENT: 3 MMHG
AV PEAK GRADIENT: 6 MMHG
AV VALVE AREA: 2.73 CM2
AV VELOCITY RATIO: 0.85
BSA FOR ECHO PROCEDURE: 2.11 M2
CV ECHO LV RWT: 0.37 CM
CV STRESS BASE HR: 60 BPM
DIASTOLIC BLOOD PRESSURE: 51 MMHG
DOP CALC AO PEAK VEL: 1.23 M/S
DOP CALC AO VTI: 30.38 CM
DOP CALC LVOT AREA: 3.2 CM2
DOP CALC LVOT DIAMETER: 2.01 CM
DOP CALC LVOT PEAK VEL: 1.04 M/S
DOP CALC LVOT STROKE VOLUME: 82.81 CM3
DOP CALCLVOT PEAK VEL VTI: 26.11 CM
E WAVE DECELERATION TIME: 201.76 MSEC
E/A RATIO: 1
E/E' RATIO: 9.56 M/S
ECHO LV POSTERIOR WALL: 0.9 CM (ref 0.6–1.1)
EJECTION FRACTION: 65 %
FRACTIONAL SHORTENING: 46 % (ref 28–44)
INTERVENTRICULAR SEPTUM: 0.8 CM (ref 0.6–1.1)
IVRT: 82.78 MSEC
LA MAJOR: 5.25 CM
LA MINOR: 5.29 CM
LA WIDTH: 4.38 CM
LEFT ATRIUM SIZE: 3.35 CM
LEFT ATRIUM VOLUME INDEX MOD: 33.1 ML/M2
LEFT ATRIUM VOLUME INDEX: 31.6 ML/M2
LEFT ATRIUM VOLUME MOD: 68.77 CM3
LEFT ATRIUM VOLUME: 65.73 CM3
LEFT INTERNAL DIMENSION IN SYSTOLE: 2.66 CM (ref 2.1–4)
LEFT VENTRICLE DIASTOLIC VOLUME INDEX: 32.37 ML/M2
LEFT VENTRICLE DIASTOLIC VOLUME: 67.32 ML
LEFT VENTRICLE MASS INDEX: 68 G/M2
LEFT VENTRICLE SYSTOLIC VOLUME INDEX: 12.5 ML/M2
LEFT VENTRICLE SYSTOLIC VOLUME: 26 ML
LEFT VENTRICULAR INTERNAL DIMENSION IN DIASTOLE: 4.9 CM (ref 3.5–6)
LEFT VENTRICULAR MASS: 141.91 G
LV LATERAL E/E' RATIO: 8.6 M/S
LV SEPTAL E/E' RATIO: 10.75 M/S
MV A" WAVE DURATION": 12.56 MSEC
MV PEAK A VEL: 0.86 M/S
MV PEAK E VEL: 0.86 M/S
MV STENOSIS PRESSURE HALF TIME: 58.51 MS
MV VALVE AREA P 1/2 METHOD: 3.76 CM2
OHS CV CPX 1 MINUTE RECOVERY HEART RATE: 77 BPM
OHS CV CPX 85 PERCENT MAX PREDICTED HEART RATE MALE: 120
OHS CV CPX ESTIMATED METS: 7
OHS CV CPX MAX PREDICTED HEART RATE: 142
OHS CV CPX PATIENT IS FEMALE: 1
OHS CV CPX PATIENT IS MALE: 0
OHS CV CPX PEAK DIASTOLIC BLOOD PRESSURE: 50 MMHG
OHS CV CPX PEAK HEAR RATE: 99 BPM
OHS CV CPX PEAK RATE PRESSURE PRODUCT: ABNORMAL
OHS CV CPX PEAK SYSTOLIC BLOOD PRESSURE: 161 MMHG
OHS CV CPX PERCENT MAX PREDICTED HEART RATE ACHIEVED: 70
OHS CV CPX RATE PRESSURE PRODUCT PRESENTING: 7320
PISA TR MAX VEL: 2.7 M/S
PULM VEIN S/D RATIO: 1.12
PV PEAK D VEL: 0.43 M/S
PV PEAK S VEL: 0.48 M/S
RA MAJOR: 5.08 CM
RA PRESSURE: 3 MMHG
RA WIDTH: 3.68 CM
RIGHT VENTRICULAR END-DIASTOLIC DIMENSION: 3.23 CM
RV TISSUE DOPPLER FREE WALL SYSTOLIC VELOCITY 1 (APICAL 4 CHAMBER VIEW): 15.07 CM/S
SINUS: 2.78 CM
STJ: 2.55 CM
STRESS ECHO POST EXERCISE DUR MIN: 5 MINUTES
STRESS ECHO POST EXERCISE DUR SEC: 36 SECONDS
SYSTOLIC BLOOD PRESSURE: 122 MMHG
TDI LATERAL: 0.1 M/S
TDI SEPTAL: 0.08 M/S
TDI: 0.09 M/S
TR MAX PG: 29 MMHG
TRICUSPID ANNULAR PLANE SYSTOLIC EXCURSION: 2.85 CM
TV REST PULMONARY ARTERY PRESSURE: 32 MMHG

## 2022-08-01 PROCEDURE — 93351 STRESS TTE COMPLETE: CPT | Mod: 26,,, | Performed by: INTERNAL MEDICINE

## 2022-08-01 PROCEDURE — 93320 STRESS ECHO (CUPID ONLY): ICD-10-PCS | Mod: 26,,, | Performed by: INTERNAL MEDICINE

## 2022-08-01 PROCEDURE — 93320 DOPPLER ECHO COMPLETE: CPT

## 2022-08-01 PROCEDURE — 93320 DOPPLER ECHO COMPLETE: CPT | Mod: 26,,, | Performed by: INTERNAL MEDICINE

## 2022-08-01 PROCEDURE — 93351 STRESS ECHO (CUPID ONLY): ICD-10-PCS | Mod: 26,,, | Performed by: INTERNAL MEDICINE

## 2022-08-01 PROCEDURE — 93325 DOPPLER ECHO COLOR FLOW MAPG: CPT | Mod: 26,,, | Performed by: INTERNAL MEDICINE

## 2022-08-01 PROCEDURE — 93325 STRESS ECHO (CUPID ONLY): ICD-10-PCS | Mod: 26,,, | Performed by: INTERNAL MEDICINE

## 2022-08-11 ENCOUNTER — HOSPITAL ENCOUNTER (OUTPATIENT)
Dept: RADIOLOGY | Facility: OTHER | Age: 72
Discharge: HOME | End: 2022-08-11
Attending: INTERNAL MEDICINE
Payer: MEDICARE

## 2022-08-11 DIAGNOSIS — R31.9 HEMATURIA, UNSPECIFIED TYPE: ICD-10-CM

## 2022-08-11 PROCEDURE — 74178 CT ABD&PLV WO CNTR FLWD CNTR: CPT | Mod: 26,,, | Performed by: RADIOLOGY

## 2022-08-11 PROCEDURE — 74178 CT UROGRAM ABD PELVIS W WO: ICD-10-PCS | Mod: 26,,, | Performed by: RADIOLOGY

## 2022-08-11 PROCEDURE — 74178 CT ABD&PLV WO CNTR FLWD CNTR: CPT | Mod: TC

## 2022-08-11 PROCEDURE — 25500020 PHARM REV CODE 255: Performed by: INTERNAL MEDICINE

## 2022-08-11 RX ADMIN — IOHEXOL 125 ML: 350 INJECTION, SOLUTION INTRAVENOUS at 04:08

## 2022-08-16 ENCOUNTER — OFFICE VISIT (OUTPATIENT)
Dept: UROLOGY | Facility: CLINIC | Age: 72
End: 2022-08-16
Payer: MEDICARE

## 2022-08-16 DIAGNOSIS — R31.9 HEMATURIA, UNSPECIFIED TYPE: ICD-10-CM

## 2022-08-16 DIAGNOSIS — R31.29 MICROSCOPIC HEMATURIA: Primary | ICD-10-CM

## 2022-08-16 LAB
BILIRUB UR QL STRIP: NEGATIVE
CLARITY UR REFRACT.AUTO: CLEAR
COLOR UR AUTO: YELLOW
GLUCOSE UR QL STRIP: NEGATIVE
HGB UR QL STRIP: NEGATIVE
KETONES UR QL STRIP: NEGATIVE
LEUKOCYTE ESTERASE UR QL STRIP: NEGATIVE
NITRITE UR QL STRIP: NEGATIVE
PH UR STRIP: 6 [PH] (ref 5–8)
PROT UR QL STRIP: ABNORMAL
SP GR UR STRIP: >1.03 (ref 1–1.03)
URN SPEC COLLECT METH UR: ABNORMAL

## 2022-08-16 PROCEDURE — 3288F FALL RISK ASSESSMENT DOCD: CPT | Mod: CPTII,S$GLB,, | Performed by: NURSE PRACTITIONER

## 2022-08-16 PROCEDURE — 3061F NEG MICROALBUMINURIA REV: CPT | Mod: CPTII,S$GLB,, | Performed by: NURSE PRACTITIONER

## 2022-08-16 PROCEDURE — 99204 PR OFFICE/OUTPT VISIT, NEW, LEVL IV, 45-59 MIN: ICD-10-PCS | Mod: S$GLB,,, | Performed by: NURSE PRACTITIONER

## 2022-08-16 PROCEDURE — 88112 PR  CYTOPATH, CELL ENHANCE TECH: ICD-10-PCS | Mod: 26,,, | Performed by: PATHOLOGY

## 2022-08-16 PROCEDURE — 3066F NEPHROPATHY DOC TX: CPT | Mod: CPTII,S$GLB,, | Performed by: NURSE PRACTITIONER

## 2022-08-16 PROCEDURE — 1160F PR REVIEW ALL MEDS BY PRESCRIBER/CLIN PHARMACIST DOCUMENTED: ICD-10-PCS | Mod: CPTII,S$GLB,, | Performed by: NURSE PRACTITIONER

## 2022-08-16 PROCEDURE — 88112 CYTOPATH CELL ENHANCE TECH: CPT | Mod: 26,,, | Performed by: PATHOLOGY

## 2022-08-16 PROCEDURE — 1159F MED LIST DOCD IN RCRD: CPT | Mod: CPTII,S$GLB,, | Performed by: NURSE PRACTITIONER

## 2022-08-16 PROCEDURE — 3061F PR NEG MICROALBUMINURIA RESULT DOCUMENTED/REVIEW: ICD-10-PCS | Mod: CPTII,S$GLB,, | Performed by: NURSE PRACTITIONER

## 2022-08-16 PROCEDURE — 99999 PR PBB SHADOW E&M-EST. PATIENT-LVL IV: CPT | Mod: PBBFAC,,, | Performed by: NURSE PRACTITIONER

## 2022-08-16 PROCEDURE — 3044F HG A1C LEVEL LT 7.0%: CPT | Mod: CPTII,S$GLB,, | Performed by: NURSE PRACTITIONER

## 2022-08-16 PROCEDURE — 81003 URINALYSIS AUTO W/O SCOPE: CPT | Performed by: NURSE PRACTITIONER

## 2022-08-16 PROCEDURE — 1159F PR MEDICATION LIST DOCUMENTED IN MEDICAL RECORD: ICD-10-PCS | Mod: CPTII,S$GLB,, | Performed by: NURSE PRACTITIONER

## 2022-08-16 PROCEDURE — 99999 PR PBB SHADOW E&M-EST. PATIENT-LVL IV: ICD-10-PCS | Mod: PBBFAC,,, | Performed by: NURSE PRACTITIONER

## 2022-08-16 PROCEDURE — 3044F PR MOST RECENT HEMOGLOBIN A1C LEVEL <7.0%: ICD-10-PCS | Mod: CPTII,S$GLB,, | Performed by: NURSE PRACTITIONER

## 2022-08-16 PROCEDURE — 1160F RVW MEDS BY RX/DR IN RCRD: CPT | Mod: CPTII,S$GLB,, | Performed by: NURSE PRACTITIONER

## 2022-08-16 PROCEDURE — 1101F PR PT FALLS ASSESS DOC 0-1 FALLS W/OUT INJ PAST YR: ICD-10-PCS | Mod: CPTII,S$GLB,, | Performed by: NURSE PRACTITIONER

## 2022-08-16 PROCEDURE — 3288F PR FALLS RISK ASSESSMENT DOCUMENTED: ICD-10-PCS | Mod: CPTII,S$GLB,, | Performed by: NURSE PRACTITIONER

## 2022-08-16 PROCEDURE — 99204 OFFICE O/P NEW MOD 45 MIN: CPT | Mod: S$GLB,,, | Performed by: NURSE PRACTITIONER

## 2022-08-16 PROCEDURE — 88112 CYTOPATH CELL ENHANCE TECH: CPT | Performed by: PATHOLOGY

## 2022-08-16 PROCEDURE — 3066F PR DOCUMENTATION OF TREATMENT FOR NEPHROPATHY: ICD-10-PCS | Mod: CPTII,S$GLB,, | Performed by: NURSE PRACTITIONER

## 2022-08-16 PROCEDURE — 1101F PT FALLS ASSESS-DOCD LE1/YR: CPT | Mod: CPTII,S$GLB,, | Performed by: NURSE PRACTITIONER

## 2022-08-16 RX ORDER — CIPROFLOXACIN 500 MG/1
500 TABLET ORAL ONCE
Status: CANCELLED | OUTPATIENT
Start: 2022-08-16 | End: 2022-08-16

## 2022-08-16 RX ORDER — LIDOCAINE HYDROCHLORIDE 20 MG/ML
JELLY TOPICAL ONCE
Status: CANCELLED | OUTPATIENT
Start: 2022-08-16 | End: 2022-08-16

## 2022-08-16 NOTE — H&P (VIEW-ONLY)
CHIEF COMPLAINT:    Marleny Guzman is a 72 y.o. female presents today for Hematuria      HISTORY OF PRESENTING ILLINESS:    Marleny Guzman is a 72 y.o. female new to [facility]. This is a new patient to for me. I personally reviewed their recent medical records as well as their outside medical, surgical, family, & social history.     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 Gonzalez      She is here today due to Hematuria.   Consult from Dr. Cantu due to microscopic hematuria.    07/25/2022 RBC's 5  04/05/2022 RBC's 10  03/24/2022 RBC's 4  10/10/2020 RBC's 11    Denies ever seeing blood in her urine.    08/11/2022 CTUrogram  -no masses or obstructive uropathy.  -Small calcifications along the medullary pyramid suspicious for medullary nephrocalcinosis.              REVIEW OF SYSTEMS:  Review of Systems   Constitutional: Negative.  Negative for chills and fever.   Eyes: Negative for double vision.   Respiratory: Negative for cough and shortness of breath.    Cardiovascular: Negative for chest pain and palpitations.   Gastrointestinal: Negative for abdominal pain, constipation, diarrhea, nausea and vomiting.        Has seen blood in her stool in the past     Genitourinary: Positive for frequency. Negative for dysuria, flank pain and hematuria.        FOS is good.  (+) frequency but taking diuretics     Neurological: Negative for dizziness.         PATIENT HISTORY:    Past Medical History:   Diagnosis Date    Abnormal Pap smear     Abnormal Pap smear of cervix     Abnormal Pap smear of vagina 7/20/2016    LEROY (acute kidney injury) 10/10/2020    Allergy     Anemia     Anxiety     Anxiety     Cataract     Chronic bilateral low back pain without sciatica 3/13/2018    Depression     Depression with anxiety     Hypertension     Osteoarthritis 6/20/2013    Pneumonia of both lungs due to infectious organism 6/7/2021    Right  rotator cuff tear     Sleep apnea     Cipap machine at home    Trouble in sleeping     Urinary incontinence     Leaks urine with laughing/ coughing/ sneezing       Past Surgical History:   Procedure Laterality Date    CATARACT EXTRACTION  6/18/12    OS    CATARACT EXTRACTION  7/16/12    OD    CERVIX SURGERY      Conization    COLONOSCOPY      COLONOSCOPY N/A 3/4/2016    Procedure: COLONOSCOPY;  Surgeon: Tenzin Thakkar MD;  Location: [facility] (4TH FLR);  Service: Endoscopy;  Laterality: N/A;    COLONOSCOPY Left 1/25/2021    Procedure: COLONOSCOPY;  Surgeon: Wayne Naranjo MD;  Location: [facility] ENDO;  Service: Endoscopy;  Laterality: Left;    ESOPHAGOGASTRODUODENOSCOPY Left 1/25/2021    Procedure: EGD (ESOPHAGOGASTRODUODENOSCOPY);  Surgeon: Wayne Naranjo MD;  Location: [facility];  Service: Endoscopy;  Laterality: Left;    EYE SURGERY Bilateral     cataract    HYSTERECTOMY  5-12-15    LEEP      LUMBAR EPIDURAL INJECTION      OOPHORECTOMY      IL REMOVAL OF OVARY/TUBE(S)  5-12-15    ROBOT-ASSISTED REPAIR OF VENTRAL HERNIA USING DA ASPEN XI N/A 11/29/2021    Procedure: XI ROBOTIC REPAIR, HERNIA, VENTRAL w/ possible mesh;  Surgeon: Roney Jackson MD;  Location: [facility] OR [address] [place] [address];  Service: General;  Laterality: N/A;  Anesthesia Block    SALPINGECTOMY Left     SHOULDER SURGERY Right        Family History   Problem Relation Age of Onset    Hypertension Father     Cataracts Father     Cancer Mother         THYROID CANCER    Stroke Mother     Breast cancer Cousin     Heart disease Sister     Pacemaker/defibrilator Sister     Hypertension Sister     Deep vein thrombosis Sister     Heart disease Sister         CABG    Osteoarthritis Sister     Edema Sister     Eczema Sister     Hypertension Sister     Breast cancer Sister 72    Anuerysm Sister     Drug abuse Sister     Hypertension Sister     No Known Problems Brother     No Known Problems Sister     Cancer Sister         Cancer  "cells or "growth in her stomach"    No Known Problems Sister     Hypertension Daughter     Other Daughter         Heart palpitations    Depression Maternal Grandmother     Ulcers Maternal Grandfather         Legs    No Known Problems Paternal Grandmother     No Known Problems Paternal Grandfather     Cancer Maternal Aunt     ADD / ADHD Neg Hx     Alcohol abuse Neg Hx     Anxiety disorder Neg Hx     Bipolar disorder Neg Hx     Dementia Neg Hx     OCD Neg Hx     Paranoid behavior Neg Hx     Physical abuse Neg Hx     Schizophrenia Neg Hx     Seizures Neg Hx     Sexual abuse Neg Hx     Amblyopia Neg Hx     Blindness Neg Hx     Glaucoma Neg Hx     Macular degeneration Neg Hx     Retinal detachment Neg Hx     Strabismus Neg Hx        Social History     Socioeconomic History    Marital status:    Occupational History    Occupation: Disability     Comment: Depression     Employer: DISABLED   Tobacco Use    Smoking status: Former Smoker     Packs/day: 0.50     Years: 22.00     Pack years: 11.00     Types: Cigarettes     Quit date: 1998     Years since quittin.5    Smokeless tobacco: Never Used   Substance and Sexual Activity    Alcohol use: Yes     Alcohol/week: 0.0 standard drinks     Comment: Occassionally for social events will have a glass of wine    Drug use: No   Other Topics Concern    Caffeine Use Yes    Financial Status: Disabled Yes    Firearms: Does patient have access to a firearm? No    Home situation: lives alone Yes    Spirituality: Active Participation Yes    Education: Unfinished college Yes    Spirituality: Organized Rastafarian Yes    Legal: Arrest history No   Social History Narrative    Marleny currently does operate an automobile.     Social Determinants of Health     Financial Resource Strain: Low Risk     Difficulty of Paying Living Expenses: Not hard at all   Food Insecurity: No Food Insecurity    Worried About Running Out of Food in the Last " Year: Never true    Ran Out of Food in the Last Year: Never true   Transportation Needs: No Transportation Needs    Lack of Transportation (Medical): No    Lack of Transportation (Non-Medical): No   Physical Activity: Inactive    Days of Exercise per Week: 0 days    Minutes of Exercise per Session: 0 min   Stress: Stress Concern Present    Feeling of Stress : To some extent   Social Connections: Socially Isolated    Frequency of Communication with Friends and Family: More than three times a week    Frequency of Social Gatherings with Friends and Family: Twice a week    Attends Adventism Services: Never    Active Member of Clubs or Organizations: No    Attends Club or Organization Meetings: Never    Marital Status:    Housing Stability: Low Risk     Unable to Pay for Housing in the Last Year: No    Number of Places Lived in the Last Year: 1    Unstable Housing in the Last Year: No       Allergies:  Patient has no known allergies.    Medications:    Current Outpatient Medications:     albuterol (VENTOLIN HFA) 90 mcg/actuation inhaler, Inhale 2 puffs into the lungs every 4 (four) hours as needed for Wheezing or Shortness of Breath (cough). Rescue, Disp: 18 g, Rfl: 6    amLODIPine (NORVASC) 2.5 MG tablet, Take 1 tablet (2.5 mg total) by mouth once daily., Disp: 90 tablet, Rfl: 3    aspirin 81 MG Chew, TAKE 1 TABLET BY MOUTH EVERY DAY, Disp: 90 tablet, Rfl: 1    atorvastatin (LIPITOR) 20 MG tablet, Take 1 tablet (20 mg total) by mouth once daily., Disp: 90 tablet, Rfl: 1    ergocalciferol (ERGOCALCIFEROL) 50,000 unit Cap, TAKE 1 CAPSULE (50,000 UNITS TOTAL) BY MOUTH EVERY 30 DAYS., Disp: 3 capsule, Rfl: 3    EScitalopram oxalate (LEXAPRO) 20 MG tablet, Take 1 tablet (20 mg total) by mouth once daily. 1.5 tablets daily, Disp: 90 tablet, Rfl: 3    estradioL (ESTRACE) 0.01 % (0.1 mg/gram) vaginal cream, Use 1 GM nightly vaginally and dime size amount on vulvatwice weekly, Disp: 42.5 g, Rfl:  4    fluticasone propionate (FLONASE) 50 mcg/actuation nasal spray, 1 spray by Each Nostril route once daily. HCS, Disp: , Rfl:     FLUZONE HIGHDOSE QUAD 20-21  mcg/0.7 mL Syrg, , Disp: , Rfl:     irbesartan-hydrochlorothiazide (AVALIDE) 300-12.5 mg per tablet, TAKE 1 TABLET BY MOUTH EVERY DAY, Disp: 90 tablet, Rfl: 3    LORazepam (ATIVAN) 0.5 MG tablet, TAKE 1/2 TO 1 TABLET BY MOUTH DAILY AS NEEDED FOR ANXIETY, Disp: 30 tablet, Rfl: 0    metoprolol succinate (TOPROL-XL) 50 MG 24 hr tablet, Take 1 tablet (50 mg total) by mouth once daily., Disp: 90 tablet, Rfl: 3    promethazine (PHENERGAN) 6.25 mg/5 mL syrup, Take by mouth every 6 (six) hours as needed., Disp: , Rfl:     promethazine-dextromethorphan (PROMETHAZINE-DM) 6.25-15 mg/5 mL Syrp, Take 5 mLs by mouth every 4 (four) hours as needed (cough)., Disp: 118 mL, Rfl: 0    pulse oximeter (PULSE OXIMETER) device, by Apply Externally route 2 (two) times a day. Use twice daily at 8 AM and 3 PM and record the value in MyChart as directed., Disp: 1 each, Rfl: 0  No current facility-administered medications for this visit.    Facility-Administered Medications Ordered in Other Visits:     0.9%  NaCl infusion, , Intravenous, Continuous, Roney Jackson MD, Stopped at 11/29/21 1755    fentaNYL 50 mcg/mL injection  mcg,  mcg, Intravenous, PRN, Anthony Hernandez MD, 50 mcg at 11/29/21 1109    LIDOcaine (PF) 10 mg/ml (1%) injection 10 mg, 1 mL, Intradermal, Once, Roney Jackson MD    midazolam (VERSED) 1 mg/mL injection 0.5-4 mg, 0.5-4 mg, Intravenous, PRN, Anthony Hernandez MD, 1 mg at 11/29/21 1109    prochlorperazine injection Soln 5 mg, 5 mg, Intravenous, Q30 Min PRN, Beth Ramsey MD    sodium chloride 0.9% flush 10 mL, 10 mL, Intravenous, PRN, Beth Ramsey MD    PHYSICAL EXAMINATION:  Physical Exam  Vitals reviewed.   Constitutional:       General: She is awake. She is not in acute distress.     Appearance:  Normal appearance. She is well-developed. She is not toxic-appearing.   HENT:      Head: Normocephalic and atraumatic.      Right Ear: External ear normal.      Left Ear: External ear normal.      Nose: Nose normal.   Eyes:      General: Lids are normal.         Right eye: No discharge.         Left eye: No discharge.      Conjunctiva/sclera: Conjunctivae normal.   Neck:      Trachea: Trachea normal.   Cardiovascular:      Rate and Rhythm: Normal rate.      Heart sounds: S1 normal and S2 normal.   Pulmonary:      Effort: Pulmonary effort is normal. No respiratory distress.   Abdominal:      General: There is no distension.      Palpations: Abdomen is soft.      Tenderness: There is no abdominal tenderness. There is no right CVA tenderness, left CVA tenderness, guarding or rebound.   Musculoskeletal:         General: Normal range of motion.      Cervical back: Normal range of motion and neck supple.   Skin:     General: Skin is warm and dry.   Neurological:      General: No focal deficit present.      Mental Status: She is alert and oriented to person, place, and time.   Psychiatric:         Speech: Speech normal.         Behavior: Behavior normal. Behavior is cooperative.         Thought Content: Thought content normal.         Judgment: Judgment normal.           LABS:      In office UA today was clear of active infection/microscopic blood      IMPRESSION:    Encounter Diagnoses   Name Primary?    Microscopic hematuria Yes    Hematuria, unspecified type          Assessment:       1. Microscopic hematuria    2. Hematuria, unspecified type        Plan:         I spent 45 minutes with the patient of which more than half was spent in direct consultation with the patient in regards to our treatment and plan.  We addressed the office findings and recent labs.   Education and recommendations of today's plan of care including home remedies and needed follow up with PCP.   We discussed the chief complaint/LUTS and the  possible contributory factors.   Recommended lifestyle modifications with proper, healthy diet, good hydration if no fluid restrictions; reducing bladder irritants.   Benefits of regular exercise approved by PCP.  Discussed microscopic vs gross hematuria.  Reviewed the workup:  Ct urogram is done  Urine sent for cytology  Cysto with Dr. Aragon.  Reviewed the process, expectations, and purpose.  Reassurance.

## 2022-08-16 NOTE — PROGRESS NOTES
CHIEF COMPLAINT:    Marleny Guzman is a 72 y.o. female presents today for Hematuria      HISTORY OF PRESENTING ILLINESS:    Marleny Guzman is a 72 y.o. female new to Urology Clinic. This is a new patient to for me. I personally reviewed their recent medical records as well as their outside medical, surgical, family, & social history.     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 Gonzalez      She is here today due to Hematuria.   Consult from Dr. Cantu due to microscopic hematuria.    07/25/2022 RBC's 5  04/05/2022 RBC's 10  03/24/2022 RBC's 4  10/10/2020 RBC's 11    Denies ever seeing blood in her urine.    08/11/2022 CTUrogram  -no masses or obstructive uropathy.  -Small calcifications along the medullary pyramid suspicious for medullary nephrocalcinosis.              REVIEW OF SYSTEMS:  Review of Systems   Constitutional: Negative.  Negative for chills and fever.   Eyes: Negative for double vision.   Respiratory: Negative for cough and shortness of breath.    Cardiovascular: Negative for chest pain and palpitations.   Gastrointestinal: Negative for abdominal pain, constipation, diarrhea, nausea and vomiting.        Has seen blood in her stool in the past     Genitourinary: Positive for frequency. Negative for dysuria, flank pain and hematuria.        FOS is good.  (+) frequency but taking diuretics     Neurological: Negative for dizziness.         PATIENT HISTORY:    Past Medical History:   Diagnosis Date    Abnormal Pap smear     Abnormal Pap smear of cervix     Abnormal Pap smear of vagina 7/20/2016    LEROY (acute kidney injury) 10/10/2020    Allergy     Anemia     Anxiety     Anxiety     Cataract     Chronic bilateral low back pain without sciatica 3/13/2018    Depression     Depression with anxiety     Hypertension     Osteoarthritis 6/20/2013    Pneumonia of both lungs due to infectious organism 6/7/2021    Right  rotator cuff tear     Sleep apnea     Cipap machine at home    Trouble in sleeping     Urinary incontinence     Leaks urine with laughing/ coughing/ sneezing       Past Surgical History:   Procedure Laterality Date    CATARACT EXTRACTION  6/18/12    OS    CATARACT EXTRACTION  7/16/12    OD    CERVIX SURGERY      Conization    COLONOSCOPY      COLONOSCOPY N/A 3/4/2016    Procedure: COLONOSCOPY;  Surgeon: Tenzin Thakkar MD;  Location: [facility] (4TH FLR);  Service: Endoscopy;  Laterality: N/A;    COLONOSCOPY Left 1/25/2021    Procedure: COLONOSCOPY;  Surgeon: Wayne Naranjo MD;  Location: [facility] ENDO;  Service: Endoscopy;  Laterality: Left;    ESOPHAGOGASTRODUODENOSCOPY Left 1/25/2021    Procedure: EGD (ESOPHAGOGASTRODUODENOSCOPY);  Surgeon: Wayne Naranjo MD;  Location: [facility];  Service: Endoscopy;  Laterality: Left;    EYE SURGERY Bilateral     cataract    HYSTERECTOMY  5-12-15    LEEP      LUMBAR EPIDURAL INJECTION      OOPHORECTOMY      TN REMOVAL OF OVARY/TUBE(S)  5-12-15    ROBOT-ASSISTED REPAIR OF VENTRAL HERNIA USING DA ASPEN XI N/A 11/29/2021    Procedure: XI ROBOTIC REPAIR, HERNIA, VENTRAL w/ possible mesh;  Surgeon: Roney Jackson MD;  Location: [facility] OR [address] [place] [address];  Service: General;  Laterality: N/A;  Anesthesia Block    SALPINGECTOMY Left     SHOULDER SURGERY Right        Family History   Problem Relation Age of Onset    Hypertension Father     Cataracts Father     Cancer Mother         THYROID CANCER    Stroke Mother     Breast cancer Cousin     Heart disease Sister     Pacemaker/defibrilator Sister     Hypertension Sister     Deep vein thrombosis Sister     Heart disease Sister         CABG    Osteoarthritis Sister     Edema Sister     Eczema Sister     Hypertension Sister     Breast cancer Sister 72    Anuerysm Sister     Drug abuse Sister     Hypertension Sister     No Known Problems Brother     No Known Problems Sister     Cancer Sister         Cancer  "cells or "growth in her stomach"    No Known Problems Sister     Hypertension Daughter     Other Daughter         Heart palpitations    Depression Maternal Grandmother     Ulcers Maternal Grandfather         Legs    No Known Problems Paternal Grandmother     No Known Problems Paternal Grandfather     Cancer Maternal Aunt     ADD / ADHD Neg Hx     Alcohol abuse Neg Hx     Anxiety disorder Neg Hx     Bipolar disorder Neg Hx     Dementia Neg Hx     OCD Neg Hx     Paranoid behavior Neg Hx     Physical abuse Neg Hx     Schizophrenia Neg Hx     Seizures Neg Hx     Sexual abuse Neg Hx     Amblyopia Neg Hx     Blindness Neg Hx     Glaucoma Neg Hx     Macular degeneration Neg Hx     Retinal detachment Neg Hx     Strabismus Neg Hx        Social History     Socioeconomic History    Marital status:    Occupational History    Occupation: Disability     Comment: Depression     Employer: DISABLED   Tobacco Use    Smoking status: Former Smoker     Packs/day: 0.50     Years: 22.00     Pack years: 11.00     Types: Cigarettes     Quit date: 1998     Years since quittin.5    Smokeless tobacco: Never Used   Substance and Sexual Activity    Alcohol use: Yes     Alcohol/week: 0.0 standard drinks     Comment: Occassionally for social events will have a glass of wine    Drug use: No   Other Topics Concern    Caffeine Use Yes    Financial Status: Disabled Yes    Firearms: Does patient have access to a firearm? No    Home situation: lives alone Yes    Spirituality: Active Participation Yes    Education: Unfinished college Yes    Spirituality: Organized Gnosticist Yes    Legal: Arrest history No   Social History Narrative    Marleny currently does operate an automobile.     Social Determinants of Health     Financial Resource Strain: Low Risk     Difficulty of Paying Living Expenses: Not hard at all   Food Insecurity: No Food Insecurity    Worried About Running Out of Food in the Last " Year: Never true    Ran Out of Food in the Last Year: Never true   Transportation Needs: No Transportation Needs    Lack of Transportation (Medical): No    Lack of Transportation (Non-Medical): No   Physical Activity: Inactive    Days of Exercise per Week: 0 days    Minutes of Exercise per Session: 0 min   Stress: Stress Concern Present    Feeling of Stress : To some extent   Social Connections: Socially Isolated    Frequency of Communication with Friends and Family: More than three times a week    Frequency of Social Gatherings with Friends and Family: Twice a week    Attends Sikhism Services: Never    Active Member of Clubs or Organizations: No    Attends Club or Organization Meetings: Never    Marital Status:    Housing Stability: Low Risk     Unable to Pay for Housing in the Last Year: No    Number of Places Lived in the Last Year: 1    Unstable Housing in the Last Year: No       Allergies:  Patient has no known allergies.    Medications:    Current Outpatient Medications:     albuterol (VENTOLIN HFA) 90 mcg/actuation inhaler, Inhale 2 puffs into the lungs every 4 (four) hours as needed for Wheezing or Shortness of Breath (cough). Rescue, Disp: 18 g, Rfl: 6    amLODIPine (NORVASC) 2.5 MG tablet, Take 1 tablet (2.5 mg total) by mouth once daily., Disp: 90 tablet, Rfl: 3    aspirin 81 MG Chew, TAKE 1 TABLET BY MOUTH EVERY DAY, Disp: 90 tablet, Rfl: 1    atorvastatin (LIPITOR) 20 MG tablet, Take 1 tablet (20 mg total) by mouth once daily., Disp: 90 tablet, Rfl: 1    ergocalciferol (ERGOCALCIFEROL) 50,000 unit Cap, TAKE 1 CAPSULE (50,000 UNITS TOTAL) BY MOUTH EVERY 30 DAYS., Disp: 3 capsule, Rfl: 3    EScitalopram oxalate (LEXAPRO) 20 MG tablet, Take 1 tablet (20 mg total) by mouth once daily. 1.5 tablets daily, Disp: 90 tablet, Rfl: 3    estradioL (ESTRACE) 0.01 % (0.1 mg/gram) vaginal cream, Use 1 GM nightly vaginally and dime size amount on vulvatwice weekly, Disp: 42.5 g, Rfl:  4    fluticasone propionate (FLONASE) 50 mcg/actuation nasal spray, 1 spray by Each Nostril route once daily. HCS, Disp: , Rfl:     FLUZONE HIGHDOSE QUAD 20-21  mcg/0.7 mL Syrg, , Disp: , Rfl:     irbesartan-hydrochlorothiazide (AVALIDE) 300-12.5 mg per tablet, TAKE 1 TABLET BY MOUTH EVERY DAY, Disp: 90 tablet, Rfl: 3    LORazepam (ATIVAN) 0.5 MG tablet, TAKE 1/2 TO 1 TABLET BY MOUTH DAILY AS NEEDED FOR ANXIETY, Disp: 30 tablet, Rfl: 0    metoprolol succinate (TOPROL-XL) 50 MG 24 hr tablet, Take 1 tablet (50 mg total) by mouth once daily., Disp: 90 tablet, Rfl: 3    promethazine (PHENERGAN) 6.25 mg/5 mL syrup, Take by mouth every 6 (six) hours as needed., Disp: , Rfl:     promethazine-dextromethorphan (PROMETHAZINE-DM) 6.25-15 mg/5 mL Syrp, Take 5 mLs by mouth every 4 (four) hours as needed (cough)., Disp: 118 mL, Rfl: 0    pulse oximeter (PULSE OXIMETER) device, by Apply Externally route 2 (two) times a day. Use twice daily at 8 AM and 3 PM and record the value in MyChart as directed., Disp: 1 each, Rfl: 0  No current facility-administered medications for this visit.    Facility-Administered Medications Ordered in Other Visits:     0.9%  NaCl infusion, , Intravenous, Continuous, Roney Jackson MD, Stopped at 11/29/21 1755    fentaNYL 50 mcg/mL injection  mcg,  mcg, Intravenous, PRN, Anthony Hernandez MD, 50 mcg at 11/29/21 1109    LIDOcaine (PF) 10 mg/ml (1%) injection 10 mg, 1 mL, Intradermal, Once, Roney Jackson MD    midazolam (VERSED) 1 mg/mL injection 0.5-4 mg, 0.5-4 mg, Intravenous, PRN, Anthony Hernandez MD, 1 mg at 11/29/21 1109    prochlorperazine injection Soln 5 mg, 5 mg, Intravenous, Q30 Min PRN, Beth Ramsey MD    sodium chloride 0.9% flush 10 mL, 10 mL, Intravenous, PRN, Beth Ramsey MD    PHYSICAL EXAMINATION:  Physical Exam  Vitals reviewed.   Constitutional:       General: She is awake. She is not in acute distress.     Appearance:  Normal appearance. She is well-developed. She is not toxic-appearing.   HENT:      Head: Normocephalic and atraumatic.      Right Ear: External ear normal.      Left Ear: External ear normal.      Nose: Nose normal.   Eyes:      General: Lids are normal.         Right eye: No discharge.         Left eye: No discharge.      Conjunctiva/sclera: Conjunctivae normal.   Neck:      Trachea: Trachea normal.   Cardiovascular:      Rate and Rhythm: Normal rate.      Heart sounds: S1 normal and S2 normal.   Pulmonary:      Effort: Pulmonary effort is normal. No respiratory distress.   Abdominal:      General: There is no distension.      Palpations: Abdomen is soft.      Tenderness: There is no abdominal tenderness. There is no right CVA tenderness, left CVA tenderness, guarding or rebound.   Musculoskeletal:         General: Normal range of motion.      Cervical back: Normal range of motion and neck supple.   Skin:     General: Skin is warm and dry.   Neurological:      General: No focal deficit present.      Mental Status: She is alert and oriented to person, place, and time.   Psychiatric:         Speech: Speech normal.         Behavior: Behavior normal. Behavior is cooperative.         Thought Content: Thought content normal.         Judgment: Judgment normal.           LABS:      In office UA today was clear of active infection/microscopic blood      IMPRESSION:    Encounter Diagnoses   Name Primary?    Microscopic hematuria Yes    Hematuria, unspecified type          Assessment:       1. Microscopic hematuria    2. Hematuria, unspecified type        Plan:         I spent 45 minutes with the patient of which more than half was spent in direct consultation with the patient in regards to our treatment and plan.  We addressed the office findings and recent labs.   Education and recommendations of today's plan of care including home remedies and needed follow up with PCP.   We discussed the chief complaint/LUTS and the  possible contributory factors.   Recommended lifestyle modifications with proper, healthy diet, good hydration if no fluid restrictions; reducing bladder irritants.   Benefits of regular exercise approved by PCP.  Discussed microscopic vs gross hematuria.  Reviewed the workup:  Ct urogram is done  Urine sent for cytology  Cysto with Dr. Aragon.  Reviewed the process, expectations, and purpose.  Reassurance.

## 2022-08-18 LAB
FINAL PATHOLOGIC DIAGNOSIS: NORMAL
Lab: NORMAL

## 2022-08-24 ENCOUNTER — PROCEDURE VISIT (OUTPATIENT)
Dept: UROLOGY | Facility: CLINIC | Age: 72
End: 2022-08-24
Payer: MEDICARE

## 2022-08-24 VITALS
RESPIRATION RATE: 16 BRPM | WEIGHT: 204.38 LBS | BODY MASS INDEX: 29.26 KG/M2 | TEMPERATURE: 98 F | HEART RATE: 54 BPM | SYSTOLIC BLOOD PRESSURE: 166 MMHG | DIASTOLIC BLOOD PRESSURE: 72 MMHG | HEIGHT: 70 IN

## 2022-08-24 DIAGNOSIS — R31.29 MICROSCOPIC HEMATURIA: ICD-10-CM

## 2022-08-24 LAB
MICROSCOPIC COMMENT: NORMAL
RBC #/AREA URNS AUTO: 4 /HPF (ref 0–4)
SQUAMOUS #/AREA URNS AUTO: 3 /HPF

## 2022-08-24 PROCEDURE — 81001 URINALYSIS AUTO W/SCOPE: CPT | Performed by: UROLOGY

## 2022-08-24 PROCEDURE — 52000 CYSTOURETHROSCOPY: CPT | Mod: S$GLB,,, | Performed by: UROLOGY

## 2022-08-24 PROCEDURE — 52000 CYSTOSCOPY: ICD-10-PCS | Mod: S$GLB,,, | Performed by: UROLOGY

## 2022-08-24 RX ORDER — LIDOCAINE HYDROCHLORIDE 20 MG/ML
JELLY TOPICAL
Status: COMPLETED | OUTPATIENT
Start: 2022-08-24 | End: 2022-08-24

## 2022-08-24 RX ADMIN — LIDOCAINE HYDROCHLORIDE: 20 JELLY TOPICAL at 10:08

## 2022-08-24 NOTE — PROCEDURES
Cystoscopy    Date/Time: 8/24/2022 10:45 AM  Performed by: Bianka Aragon MD  Authorized by: Malu Barragan NP     Consent Done?:  Yes (Written)  Timeout: prior to procedure the correct patient, procedure, and site was verified    Prep: patient was prepped and draped in usual sterile fashion    Local anesthesia used?: No    Anesthesia:  Intraurethral instillation  Indications: hematuria    Position:  Dorsal lithotomy  Anesthesia:  Intraurethral instillation  Preparation: Patient was prepped and draped in usual sterile fashion    Scope type:  Flexible cystoscope  External exam normal: Yes    Digital exam performed: Yes    Urethra normal: Yes    Bladder neck normal: Yes    Bladder normal: Yes     patient tolerated the procedure well with no immediate complications  Comments:      Cysto normal. Did have some left hyperdense material in left renal pelvis on CT, however no filling defects on contrasted portion of the procedure.   Does still have microhematuria - 4 RBC.   Discussed low risk of abnormality in left renal pelvis - but patient wants us to look in the left ureter. Discussed we may have to place stent first if we can't get up.;

## 2022-08-24 NOTE — PATIENT INSTRUCTIONS
What to Expect After a Cystoscopy  For the next 24-48 hours, you may feel a mild burning when you urinate. This burning is normal and expected. Drink plenty of water to dilute the urine to help relieve the burning sensation. You may also see a small amount of blood in your urine after the procedure.    Unless you are already taking antibiotics, you may be given an antibiotic after the test to prevent infection.    Signs and Symptoms to Report  Call the [facility] at [phone] if you develop any of the following:  Fever of 101 degrees or higher  Chills or persistent bleeding  Inability to urinate

## 2022-08-25 ENCOUNTER — TELEPHONE (OUTPATIENT)
Dept: UROLOGY | Facility: CLINIC | Age: 72
End: 2022-08-25
Payer: MEDICARE

## 2022-08-25 DIAGNOSIS — R31.29 MICROSCOPIC HEMATURIA: Primary | ICD-10-CM

## 2022-08-26 ENCOUNTER — TELEPHONE (OUTPATIENT)
Dept: UROLOGY | Facility: CLINIC | Age: 72
End: 2022-08-26
Payer: MEDICARE

## 2022-08-26 NOTE — TELEPHONE ENCOUNTER
Called pt to confirm arrival time of 10am for procedure on 8/29/22. Gave pt NPO instructions and gave pt opportunity to ask questions. Pt verbalized understanding.    Pt was informed that only one person would be allowed to accomany them the morning of surgery. Pt verbalized understanding.

## 2022-08-26 NOTE — TELEPHONE ENCOUNTER
Spoke with pt to inform surgery was still not approved by insurance and surgery will have to be changed to a different day. Pt verbalized understanding.

## 2022-08-26 NOTE — PRE-PROCEDURE INSTRUCTIONS
PREOP INSTRUCTIONS:  No food,milk or milk products for 8 hours before surgery.  Clear liquids like water,gatorade,apple juice are allowed up until 2 hours before surgery.  Instructed to follow the surgeon's instructions if they differ from these.  Shower instructions as well as directions to the [facility] were given.  Encouraged to wear loose fitting,comfortable clothing.  Medication instructions for pm prior to and am of procedure reviewed.  Instructed to avoid taking vitamins,supplements,aspirin and ibuprofen the morning of surgery.    Patient reported that she does not have an active prescription for aspirin but does elect to take it every morning.Patient reported that she will hold off on taking the aspirin until after the procedure Monday 8/29/20022    Patient denies any side effects or issues with anesthesia or sedation.

## 2022-09-01 ENCOUNTER — OFFICE VISIT (OUTPATIENT)
Dept: OPTOMETRY | Facility: CLINIC | Age: 72
End: 2022-09-01
Payer: MEDICARE

## 2022-09-01 DIAGNOSIS — Z97.3 WEARS CONTACT LENSES: Primary | ICD-10-CM

## 2022-09-01 DIAGNOSIS — H26.491 PCO (POSTERIOR CAPSULAR OPACIFICATION), RIGHT: Primary | ICD-10-CM

## 2022-09-01 DIAGNOSIS — Z01.00 ROUTINE EYE EXAM: ICD-10-CM

## 2022-09-01 DIAGNOSIS — H52.4 MYOPIA WITH ASTIGMATISM AND PRESBYOPIA, BILATERAL: ICD-10-CM

## 2022-09-01 DIAGNOSIS — Z96.1 PSEUDOPHAKIA OF BOTH EYES: ICD-10-CM

## 2022-09-01 DIAGNOSIS — H52.203 MYOPIA WITH ASTIGMATISM AND PRESBYOPIA, BILATERAL: ICD-10-CM

## 2022-09-01 DIAGNOSIS — Z46.0 FITTING AND ADJUSTMENT OF SPECTACLES AND CONTACT LENSES: ICD-10-CM

## 2022-09-01 DIAGNOSIS — H52.13 MYOPIA WITH ASTIGMATISM AND PRESBYOPIA, BILATERAL: ICD-10-CM

## 2022-09-01 PROCEDURE — 3044F PR MOST RECENT HEMOGLOBIN A1C LEVEL <7.0%: ICD-10-PCS | Mod: CPTII,S$GLB,, | Performed by: OPTOMETRIST

## 2022-09-01 PROCEDURE — 1160F RVW MEDS BY RX/DR IN RCRD: CPT | Mod: CPTII,S$GLB,, | Performed by: OPTOMETRIST

## 2022-09-01 PROCEDURE — 92014 COMPRE OPH EXAM EST PT 1/>: CPT | Mod: S$GLB,,, | Performed by: OPTOMETRIST

## 2022-09-01 PROCEDURE — 3066F PR DOCUMENTATION OF TREATMENT FOR NEPHROPATHY: ICD-10-PCS | Mod: CPTII,S$GLB,, | Performed by: OPTOMETRIST

## 2022-09-01 PROCEDURE — 3288F FALL RISK ASSESSMENT DOCD: CPT | Mod: CPTII,S$GLB,, | Performed by: OPTOMETRIST

## 2022-09-01 PROCEDURE — 92015 PR REFRACTION: ICD-10-PCS | Mod: S$GLB,,, | Performed by: OPTOMETRIST

## 2022-09-01 PROCEDURE — 1159F MED LIST DOCD IN RCRD: CPT | Mod: CPTII,S$GLB,, | Performed by: OPTOMETRIST

## 2022-09-01 PROCEDURE — 3066F NEPHROPATHY DOC TX: CPT | Mod: CPTII,S$GLB,, | Performed by: OPTOMETRIST

## 2022-09-01 PROCEDURE — 1101F PR PT FALLS ASSESS DOC 0-1 FALLS W/OUT INJ PAST YR: ICD-10-PCS | Mod: CPTII,S$GLB,, | Performed by: OPTOMETRIST

## 2022-09-01 PROCEDURE — 99999 PR PBB SHADOW E&M-EST. PATIENT-LVL II: ICD-10-PCS | Mod: PBBFAC,,, | Performed by: OPTOMETRIST

## 2022-09-01 PROCEDURE — 3288F PR FALLS RISK ASSESSMENT DOCUMENTED: ICD-10-PCS | Mod: CPTII,S$GLB,, | Performed by: OPTOMETRIST

## 2022-09-01 PROCEDURE — 92310 CONTACT LENS FITTING OU: CPT | Mod: CSM,S$GLB,, | Performed by: OPTOMETRIST

## 2022-09-01 PROCEDURE — 99999 PR PBB SHADOW E&M-EST. PATIENT-LVL I: ICD-10-PCS | Mod: PBBFAC,,, | Performed by: OPTOMETRIST

## 2022-09-01 PROCEDURE — 99999 PR PBB SHADOW E&M-EST. PATIENT-LVL I: CPT | Mod: PBBFAC,,, | Performed by: OPTOMETRIST

## 2022-09-01 PROCEDURE — 1101F PT FALLS ASSESS-DOCD LE1/YR: CPT | Mod: CPTII,S$GLB,, | Performed by: OPTOMETRIST

## 2022-09-01 PROCEDURE — 3061F NEG MICROALBUMINURIA REV: CPT | Mod: CPTII,S$GLB,, | Performed by: OPTOMETRIST

## 2022-09-01 PROCEDURE — 92014 PR EYE EXAM, EST PATIENT,COMPREHESV: ICD-10-PCS | Mod: S$GLB,,, | Performed by: OPTOMETRIST

## 2022-09-01 PROCEDURE — 1159F PR MEDICATION LIST DOCUMENTED IN MEDICAL RECORD: ICD-10-PCS | Mod: CPTII,S$GLB,, | Performed by: OPTOMETRIST

## 2022-09-01 PROCEDURE — 1160F PR REVIEW ALL MEDS BY PRESCRIBER/CLIN PHARMACIST DOCUMENTED: ICD-10-PCS | Mod: CPTII,S$GLB,, | Performed by: OPTOMETRIST

## 2022-09-01 PROCEDURE — 1126F AMNT PAIN NOTED NONE PRSNT: CPT | Mod: CPTII,S$GLB,, | Performed by: OPTOMETRIST

## 2022-09-01 PROCEDURE — 3061F PR NEG MICROALBUMINURIA RESULT DOCUMENTED/REVIEW: ICD-10-PCS | Mod: CPTII,S$GLB,, | Performed by: OPTOMETRIST

## 2022-09-01 PROCEDURE — 3044F HG A1C LEVEL LT 7.0%: CPT | Mod: CPTII,S$GLB,, | Performed by: OPTOMETRIST

## 2022-09-01 PROCEDURE — 1126F PR PAIN SEVERITY QUANTIFIED, NO PAIN PRESENT: ICD-10-PCS | Mod: CPTII,S$GLB,, | Performed by: OPTOMETRIST

## 2022-09-01 PROCEDURE — 92310 PR CONTACT LENS FITTING (NO CHANGE): ICD-10-PCS | Mod: CSM,S$GLB,, | Performed by: OPTOMETRIST

## 2022-09-01 PROCEDURE — 92015 DETERMINE REFRACTIVE STATE: CPT | Mod: S$GLB,,, | Performed by: OPTOMETRIST

## 2022-09-01 PROCEDURE — 99999 PR PBB SHADOW E&M-EST. PATIENT-LVL II: CPT | Mod: PBBFAC,,, | Performed by: OPTOMETRIST

## 2022-09-01 RX ORDER — HYDROCODONE BITARTRATE AND ACETAMINOPHEN 5; 325 MG/1; MG/1
TABLET ORAL
COMMUNITY
End: 2022-09-02 | Stop reason: CLARIF

## 2022-09-01 RX ORDER — MELOXICAM 15 MG/1
TABLET ORAL
COMMUNITY
End: 2022-09-02 | Stop reason: CLARIF

## 2022-09-01 RX ORDER — CHLORHEXIDINE GLUCONATE ORAL RINSE 1.2 MG/ML
SOLUTION DENTAL
COMMUNITY
End: 2022-09-02 | Stop reason: CLARIF

## 2022-09-01 NOTE — PROGRESS NOTES
HPI    AYANA: 07/22 elsewhere- [facility] [address]  Chief complaint (CC): Patient had an exam elsewhere about 2 months ago but   would like to repeat everything today. Patient had been prescribed   monovision colored contacts but the near correction was too strong and she   didn't like the color.  Glasses? -  Contacts? +  H/o eye surgery, injections or laser: PC IOL OU  H/o eye injury: -  Known eye conditions? See above  Family h/o eye conditions? -  Eye gtts? -      (-) Flashes (-)  Floaters (-) Mucous   (-)  Tearing (-) Itching (-) Burning   (-) Headaches (-) Eye Pain/discomfort (-) Irritation   (-)  Redness (-) Double vision (-) Blurry vision    Diabetic? -  A1c? -      Last edited by Jose J Gaspar, OD on 9/1/2022  3:19 PM.            Assessment /Plan     For exam results, see Encounter Report.    PCO (posterior capsular opacification), right  Stable monitor.     Routine eye exam  -     Ambulatory referral/consult to Optometry    Pseudophakia of both eyes  Stable. Monitor.     Myopia with astigmatism and presbyopia, bilateral  SRx released to patient. Patient educated on lens options. Normal ocular health. RTC 1 year for routine exam.   CLRx trials ordered. Pt needs CLFU at dispense.

## 2022-09-02 NOTE — PRE-PROCEDURE INSTRUCTIONS
Preop screen completed.  Preop instructions(bathing/fasting/directions/location of surgery/ and preop medication instructions reviewed with patient).  Patient instructed to hold/stop all blood thinning medications, prior to surgery, following the pre-surgery recommended guidelines.  Patient verbalized understanding.

## 2022-09-02 NOTE — ANESTHESIA PAT ROS NOTE
09/02/2022  Marleny Guzman is a 72 y.o., female.      Pre-op Assessment          Review of Systems  Anesthesia Hx:  No problems with previous Anesthesia             Denies Family Hx of Anesthesia complications.    Denies Personal Hx of Anesthesia complications.                    Social:  Former Smoker, Social Alcohol Use       Hematology/Oncology:    Oncology Normal    -- Anemia:                                  EENT/Dental:   Wears contacts /Glasses/Dentures-Top only          Cardiovascular:  Exercise tolerance: good   Hypertension           hyperlipidemia WOOTEN   Walking daily in mall 3x/wk./Housework/                          Pulmonary:  Pneumonia    Shortness of breath  Sleep Apnea Pneumonia & SOB related to Covid-19 in Nov./Dec.-2019               Renal/:  Chronic Renal Disease   LEROY history/ Microscopic hematuria             Hepatic/GI:  Hepatic/GI Normal                 Musculoskeletal:  Arthritis   DDD-Lumbar area            OB/GYN/PEDS:  S/P-Hysterectomy/Uses Estrogen cream           Neurological:    Neuromuscular Disease,  Headaches                                 Endocrine:  Endocrine Normal            Dermatological:  Skin Normal    Psych:  Psychiatric History anxiety depression On medication             Shiela Garner RN 9/2/22    Anesthesia Assessment: Preoperative EQUATION    Planned Procedure: Procedure(s) (LRB):  LITHOTRIPSY, USING LASER (N/A)  BIOPSY, BLADDER (N/A)  INSERTION, STENT, URETER (N/A)  BIOPSY, URETER (Left)  PYELOGRAM, RETROGRADE (Left)  Requested Anesthesia Type:General/MAC  Surgeon: Bianka Aragon MD  Service: Urology  Known or anticipated Date of Surgery:9/12/2022    Surgeon notes: reviewed and Microscopic hematuria    Previous anesthesia records: 11/29/21-XI Robotic Repair, Hernia, Ventral w/ possible mesh-General -no apparent anesthetic complications and  tolerated procedure well-no nausea/vomiting     Anesthesia Hx:  No problems with previous Anesthesia  Denies Family Hx of Anesthesia complications.   Denies Personal Hx of Anesthesia complications.     Airway/Jaw/Neck:  Airway Findings: Mouth Opening: Normal Tongue: Normal  General Airway Assessment: Adult  TM Distance: Normal, at least 6 cm          Last PCP note: within 3 months , within [facility] , 7/25/22-Bridget Cantu MD-IM-Hematuria, unspecified type+5 more Dx-F/U  Subspecialty notes: Gastroenterology, Gyn/ONC, Ortho, Pulmonary, Optometry , UROGYN    Other important co-morbidities: HTN, ALVAREZ, and Microscopic hematuria     Medical History    Diagnosis Date Comment Source   Abnormal Pap smear      Abnormal Pap smear of cervix      Abnormal Pap smear of vagina 7/20/2016     LEROY (acute kidney injury) 10/10/2020     Allergy      Anemia      Anxiety      Anxiety      Cataract      Chronic bilateral low back pain without sciatica 3/13/2018     Depression      Depression with anxiety      Hypertension      Osteoarthritis 6/20/2013     Pneumonia of both lungs due to infectious organism 6/7/2021     Right rotator cuff tear      Sleep apnea  Cipap machine at home    Trouble in sleeping      Urinary incontinence  Leaks urine with laughing/ coughing/ sneezing      Tests already available:  Available tests,  within 3 months , within [facility] , 7/25/22-Exc. Stress echo/ EKG-4/15/22/ CXR-7/25/22/ Labs--8/24/22-UA microscopic/8/16/22-UA/7/25/22-CMP/A1c/TSH/T4,Free/UA/,etc. 6/29/22-POCT Covid-19 screen. Results have been reviewed.       Plan:  Phone pending  Testing:  Hematology Profile     Patient has previously  scheduled Medical Appointment:Patient  has appointment on 9/8/22, prior to the surgery date.    Navigation: Phone Completed                        Tests Scheduled. Lab-Hem Prof done 9/6/22, and reviewed by  Dr. Carpio.              Consults scheduled. Request sent to [facility] PCP Dr. Cantu -recent visit on  "7/25/22.-----Plan reviewed with anesthesia-Dr. Carpio-NO "Clearance" is needed at this time.                 Shiela Garner RN  9/2/22 & 9/7/22                "

## 2022-09-06 ENCOUNTER — LAB VISIT (OUTPATIENT)
Dept: LAB | Facility: HOSPITAL | Age: 72
End: 2022-09-06
Attending: ANESTHESIOLOGY
Payer: MEDICARE

## 2022-09-06 DIAGNOSIS — Z01.818 PREOP TESTING: ICD-10-CM

## 2022-09-06 LAB
ERYTHROCYTE [DISTWIDTH] IN BLOOD BY AUTOMATED COUNT: 14.6 % (ref 11.5–14.5)
HCT VFR BLD AUTO: 36.3 % (ref 37–48.5)
HGB BLD-MCNC: 11.9 G/DL (ref 12–16)
MCH RBC QN AUTO: 29.8 PG (ref 27–31)
MCHC RBC AUTO-ENTMCNC: 32.8 G/DL (ref 32–36)
MCV RBC AUTO: 91 FL (ref 82–98)
PLATELET # BLD AUTO: 307 K/UL (ref 150–450)
PMV BLD AUTO: 10.4 FL (ref 9.2–12.9)
RBC # BLD AUTO: 4 M/UL (ref 4–5.4)
WBC # BLD AUTO: 10.06 K/UL (ref 3.9–12.7)

## 2022-09-06 PROCEDURE — 85027 COMPLETE CBC AUTOMATED: CPT | Performed by: ANESTHESIOLOGY

## 2022-09-06 PROCEDURE — 36415 COLL VENOUS BLD VENIPUNCTURE: CPT | Performed by: ANESTHESIOLOGY

## 2022-09-07 ENCOUNTER — TELEPHONE (OUTPATIENT)
Dept: INTERNAL MEDICINE | Facility: CLINIC | Age: 72
End: 2022-09-07
Payer: MEDICARE

## 2022-09-07 NOTE — PRE-PROCEDURE INSTRUCTIONS
"----- Message -----   From: Shiela Garner RN   Sent: 9/7/2022   4:59 PM CDT   To: Bridget Cantu MD, Shiela Garner, RN, *   Subject: RE: "Clearance" statement request                 Reviewed the pre-op plan with anesthesia-Dr. Carpio, and a "Clearance"  statement, that was requested  prior to the patients' pending LITHOTRIPSY, USING LASER  INSERTION, STENT, URETER  BIOPSY, URETER (Left: Ureter) PYELOGRAM, RETROGRADE (Left: Perineum), with Dr. Bianka Aragon, on 9/12/22 , is no longer needed, at this time.FYI.Thank you. Sincerely, Shiela Garner RN [facility] ext. [phone]   ----- Message -----   From: Shiela Garner RN   Sent: 9/7/2022   9:09 AM CDT   To: Bridget Cantu MD, Shiela Garner, RN, *   Subject: "Clearance" statement request                     Patient is having a  LITHOTRIPSY, USING LASER  INSERTION, STENT, URETER  BIOPSY, URETER (Left: Ureter) PYELOGRAM, RETROGRADE (Left: Perineum), with Dr. Bianka Aragon, on 9/12/22. Requesting a "Clearance" statement from Dr. Bridget Cantu, prior to the patients' surgery date. Patient was recently seen by Dr. Cantu on 7/25/22. Await your reply. Thank you. Sincerely, Shiela Garner RN [facility] ext. [phone]    "

## 2022-09-07 NOTE — TELEPHONE ENCOUNTER
"----- Message from Shiela Garner RN sent at 9/7/2022  8:58 AM CDT -----  Regarding: "Clearance" statement request  Patient is having a  LITHOTRIPSY, USING LASER  INSERTION, STENT, URETER  BIOPSY, URETER (Left: Ureter) PYELOGRAM, RETROGRADE (Left: Perineum), with Dr. Bianka Aragon, on 9/12/22. Requesting a "Clearance" statement from Dr. Bridget Cantu, prior to the patients' surgery date. Patient was recently seen by Dr. Cantu on 7/25/22. Await your reply. Thank you. Sincerely, Shiela Garner RN [facility] ext. [phone]    " yes

## 2022-09-08 ENCOUNTER — TELEPHONE (OUTPATIENT)
Dept: INTERNAL MEDICINE | Facility: CLINIC | Age: 72
End: 2022-09-08
Payer: MEDICARE

## 2022-09-08 NOTE — TELEPHONE ENCOUNTER
"----- Message from Shiela Garner RN sent at 9/7/2022  4:51 PM CDT -----  Regarding: RE: "Clearance" statement request  Reviewed the pre-op plan with anesthesia-Dr. Carpio, and a "Clearance"  statement, that was requested  prior to the patients' pending LITHOTRIPSY, USING LASER  INSERTION, STENT, URETER  BIOPSY, URETER (Left: Ureter) PYELOGRAM, RETROGRADE (Left: Perineum), with Dr. Bianka Aragon, on 9/12/22 , is no longer needed, at this time.FYI.Thank you. Sincerely, Shiela Garner RN [facility] ext. [phone]  ----- Message -----  From: Shiela Garner RN  Sent: 9/7/2022   9:09 AM CDT  To: Bridget Cantu MD, Shiela Garner RN, #  Subject: "Clearance" statement request                    Patient is having a  LITHOTRIPSY, USING LASER  INSERTION, STENT, URETER  BIOPSY, URETER (Left: Ureter) PYELOGRAM, RETROGRADE (Left: Perineum), with Dr. Bianka Aragon, on 9/12/22. Requesting a "Clearance" statement from Dr. Bridget Cantu, prior to the patients' surgery date. Patient was recently seen by Dr. Cantu on 7/25/22. Await your reply. Thank you. Sincerely, Shiela Garner RN [facility] ext. [phone]      "

## 2022-09-09 ENCOUNTER — PATIENT MESSAGE (OUTPATIENT)
Dept: OPTOMETRY | Facility: CLINIC | Age: 72
End: 2022-09-09
Payer: MEDICARE

## 2022-09-09 ENCOUNTER — TELEPHONE (OUTPATIENT)
Dept: UROLOGY | Facility: CLINIC | Age: 72
End: 2022-09-09
Payer: MEDICARE

## 2022-09-09 NOTE — TELEPHONE ENCOUNTER
Called pt to confirm arrival time of 11:30am for procedure on 9/12/22. Gave pt NPO instructions and gave pt opportunity to ask questions. Pt verbalized understanding.     Pt was informed that only 1 person would be allowed to accompany them the morning of surgery.  Pt verbalized understanding.

## 2022-09-12 ENCOUNTER — ANESTHESIA EVENT (OUTPATIENT)
Dept: SURGERY | Facility: HOSPITAL | Age: 72
End: 2022-09-12
Payer: MEDICARE

## 2022-09-12 ENCOUNTER — ANESTHESIA (OUTPATIENT)
Dept: SURGERY | Facility: HOSPITAL | Age: 72
End: 2022-09-12
Payer: MEDICARE

## 2022-09-12 ENCOUNTER — HOSPITAL ENCOUNTER (OUTPATIENT)
Facility: HOSPITAL | Age: 72
Discharge: HOME | End: 2022-09-12
Attending: UROLOGY | Admitting: UROLOGY
Payer: MEDICARE

## 2022-09-12 VITALS
SYSTOLIC BLOOD PRESSURE: 190 MMHG | BODY MASS INDEX: 29.2 KG/M2 | RESPIRATION RATE: 16 BRPM | HEART RATE: 58 BPM | HEIGHT: 70 IN | OXYGEN SATURATION: 98 % | DIASTOLIC BLOOD PRESSURE: 88 MMHG | TEMPERATURE: 98 F | WEIGHT: 204 LBS

## 2022-09-12 DIAGNOSIS — N20.0 NEPHROLITHIASIS: ICD-10-CM

## 2022-09-12 DIAGNOSIS — R31.9 HEMATURIA: Primary | ICD-10-CM

## 2022-09-12 PROCEDURE — 36000707: Performed by: UROLOGY

## 2022-09-12 PROCEDURE — 25500020 PHARM REV CODE 255: Performed by: UROLOGY

## 2022-09-12 PROCEDURE — 36000706: Performed by: UROLOGY

## 2022-09-12 PROCEDURE — 52005 PR CYSTOURETHROSCOPY,URETER CATHETER: ICD-10-PCS | Mod: ,,, | Performed by: UROLOGY

## 2022-09-12 PROCEDURE — 25000003 PHARM REV CODE 250: Performed by: NURSE ANESTHETIST, CERTIFIED REGISTERED

## 2022-09-12 PROCEDURE — 37000008 HC ANESTHESIA 1ST 15 MINUTES: Performed by: UROLOGY

## 2022-09-12 PROCEDURE — 74420 UROGRAPHY RTRGR +-KUB: CPT | Mod: 26,,, | Performed by: UROLOGY

## 2022-09-12 PROCEDURE — D9220A PRA ANESTHESIA: Mod: ANES,,, | Performed by: STUDENT IN AN ORGANIZED HEALTH CARE EDUCATION/TRAINING PROGRAM

## 2022-09-12 PROCEDURE — 63600175 PHARM REV CODE 636 W HCPCS: Performed by: NURSE ANESTHETIST, CERTIFIED REGISTERED

## 2022-09-12 PROCEDURE — 25000003 PHARM REV CODE 250: Performed by: STUDENT IN AN ORGANIZED HEALTH CARE EDUCATION/TRAINING PROGRAM

## 2022-09-12 PROCEDURE — D9220A PRA ANESTHESIA: ICD-10-PCS | Mod: ANES,,, | Performed by: STUDENT IN AN ORGANIZED HEALTH CARE EDUCATION/TRAINING PROGRAM

## 2022-09-12 PROCEDURE — 71000044 HC DOSC ROUTINE RECOVERY FIRST HOUR: Performed by: UROLOGY

## 2022-09-12 PROCEDURE — 74420 PR  X-RAY RETROGRADE PYELOGRAM: ICD-10-PCS | Mod: 26,,, | Performed by: UROLOGY

## 2022-09-12 PROCEDURE — C1758 CATHETER, URETERAL: HCPCS | Performed by: UROLOGY

## 2022-09-12 PROCEDURE — D9220A PRA ANESTHESIA: Mod: CRNA,,, | Performed by: NURSE ANESTHETIST, CERTIFIED REGISTERED

## 2022-09-12 PROCEDURE — D9220A PRA ANESTHESIA: ICD-10-PCS | Mod: CRNA,,, | Performed by: NURSE ANESTHETIST, CERTIFIED REGISTERED

## 2022-09-12 PROCEDURE — 52005 CYSTO W/URTRL CATHJ: CPT | Mod: ,,, | Performed by: UROLOGY

## 2022-09-12 PROCEDURE — 37000009 HC ANESTHESIA EA ADD 15 MINS: Performed by: UROLOGY

## 2022-09-12 PROCEDURE — 71000015 HC POSTOP RECOV 1ST HR: Performed by: UROLOGY

## 2022-09-12 PROCEDURE — C1769 GUIDE WIRE: HCPCS | Performed by: UROLOGY

## 2022-09-12 RX ORDER — FENTANYL CITRATE 50 UG/ML
INJECTION, SOLUTION INTRAMUSCULAR; INTRAVENOUS
Status: DISCONTINUED | OUTPATIENT
Start: 2022-09-12 | End: 2022-09-12

## 2022-09-12 RX ORDER — PROCHLORPERAZINE EDISYLATE 5 MG/ML
5 INJECTION INTRAMUSCULAR; INTRAVENOUS EVERY 30 MIN PRN
Status: DISCONTINUED | OUTPATIENT
Start: 2022-09-12 | End: 2022-09-12 | Stop reason: HOSPADM

## 2022-09-12 RX ORDER — MIDAZOLAM HYDROCHLORIDE 1 MG/ML
INJECTION, SOLUTION INTRAMUSCULAR; INTRAVENOUS
Status: DISCONTINUED | OUTPATIENT
Start: 2022-09-12 | End: 2022-09-12

## 2022-09-12 RX ORDER — FENTANYL CITRATE 50 UG/ML
25 INJECTION, SOLUTION INTRAMUSCULAR; INTRAVENOUS EVERY 5 MIN PRN
Status: DISCONTINUED | OUTPATIENT
Start: 2022-09-12 | End: 2022-09-12 | Stop reason: HOSPADM

## 2022-09-12 RX ORDER — PROPOFOL 10 MG/ML
VIAL (ML) INTRAVENOUS
Status: DISCONTINUED | OUTPATIENT
Start: 2022-09-12 | End: 2022-09-12

## 2022-09-12 RX ORDER — LIDOCAINE HYDROCHLORIDE 20 MG/ML
JELLY TOPICAL ONCE
Status: SHIPPED | OUTPATIENT
Start: 2022-09-12

## 2022-09-12 RX ORDER — CEFAZOLIN SODIUM/WATER 2 G/20 ML
2 SYRINGE (ML) INTRAVENOUS
Status: COMPLETED | OUTPATIENT
Start: 2022-09-12 | End: 2022-09-12

## 2022-09-12 RX ORDER — ONDANSETRON 2 MG/ML
INJECTION INTRAMUSCULAR; INTRAVENOUS
Status: DISCONTINUED | OUTPATIENT
Start: 2022-09-12 | End: 2022-09-12

## 2022-09-12 RX ORDER — SODIUM CHLORIDE 0.9 % (FLUSH) 0.9 %
10 SYRINGE (ML) INJECTION
Status: DISCONTINUED | OUTPATIENT
Start: 2022-09-12 | End: 2022-09-12 | Stop reason: HOSPADM

## 2022-09-12 RX ORDER — CIPROFLOXACIN 500 MG/1
500 TABLET ORAL ONCE
Status: SHIPPED | OUTPATIENT
Start: 2022-09-12

## 2022-09-12 RX ORDER — ONDANSETRON 2 MG/ML
4 INJECTION INTRAMUSCULAR; INTRAVENOUS DAILY PRN
Status: DISCONTINUED | OUTPATIENT
Start: 2022-09-12 | End: 2022-09-12 | Stop reason: HOSPADM

## 2022-09-12 RX ORDER — ROCURONIUM BROMIDE 10 MG/ML
INJECTION, SOLUTION INTRAVENOUS
Status: DISCONTINUED | OUTPATIENT
Start: 2022-09-12 | End: 2022-09-12

## 2022-09-12 RX ORDER — DEXAMETHASONE SODIUM PHOSPHATE 4 MG/ML
INJECTION, SOLUTION INTRA-ARTICULAR; INTRALESIONAL; INTRAMUSCULAR; INTRAVENOUS; SOFT TISSUE
Status: DISCONTINUED | OUTPATIENT
Start: 2022-09-12 | End: 2022-09-12

## 2022-09-12 RX ORDER — LIDOCAINE HYDROCHLORIDE 10 MG/ML
INJECTION, SOLUTION INTRAVENOUS
Status: DISCONTINUED | OUTPATIENT
Start: 2022-09-12 | End: 2022-09-12

## 2022-09-12 RX ADMIN — PROPOFOL 200 MG: 10 INJECTION, EMULSION INTRAVENOUS at 01:09

## 2022-09-12 RX ADMIN — FENTANYL CITRATE 100 MCG: 50 INJECTION, SOLUTION INTRAMUSCULAR; INTRAVENOUS at 01:09

## 2022-09-12 RX ADMIN — SODIUM CHLORIDE: 9 INJECTION, SOLUTION INTRAVENOUS at 01:09

## 2022-09-12 RX ADMIN — Medication 2 G: at 01:09

## 2022-09-12 RX ADMIN — ONDANSETRON 4 MG: 2 INJECTION INTRAMUSCULAR; INTRAVENOUS at 01:09

## 2022-09-12 RX ADMIN — DEXAMETHASONE SODIUM PHOSPHATE 4 MG: 4 INJECTION, SOLUTION INTRAMUSCULAR; INTRAVENOUS at 01:09

## 2022-09-12 RX ADMIN — MIDAZOLAM HYDROCHLORIDE 2 MG: 1 INJECTION, SOLUTION INTRAMUSCULAR; INTRAVENOUS at 01:09

## 2022-09-12 RX ADMIN — LIDOCAINE HYDROCHLORIDE 50 MG: 10 INJECTION, SOLUTION INTRAVENOUS at 01:09

## 2022-09-12 RX ADMIN — ROCURONIUM BROMIDE 25 MG: 10 INJECTION, SOLUTION INTRAVENOUS at 01:09

## 2022-09-12 NOTE — PLAN OF CARE
Pt in post op recovery. Pt denies any pain and nausea. Pt received discharge instructions and verbalizes understanding.

## 2022-09-12 NOTE — PATIENT INSTRUCTIONS
Post Cystoscopy Instructions  Do not strain to have a bowel movement  No strenuous exercise x 7 days  No driving while you are on narcotic pain medications or if your fisher  catheter is in place    You can expect:  To pass stone fragments if you had a stone procedure  Have pain when you void from your stent if you have a stent in place  See blood in your urine if you have a stent in place    If you have a catheter, please return to the ER if your catheter stops draining or you are having abdominal pain.    Call the doctor if:  Temperature is greater than 101F  Persistent vomiting and inability to keep food down  Inability to void if you do not have a catheter    Follow up in 1 year with ADRIANNE with urinalysis.

## 2022-09-12 NOTE — TRANSFER OF CARE
"Anesthesia Transfer of Care Note    Patient: Marleny Guzman    Procedure(s) Performed: Procedure(s) (LRB):  PYELOGRAM, RETROGRADE (Bilateral)  CYSTOSCOPY (N/A)    Patient location: PACU    Anesthesia Type: general    Transport from OR: Transported from OR on 6-10 L/min O2 by face mask with adequate spontaneous ventilation    Post pain: adequate analgesia    Post assessment: no apparent anesthetic complications and tolerated procedure well    Post vital signs: stable    Level of consciousness: awake and alert    Nausea/Vomiting: no nausea/vomiting    Complications: none    Transfer of care protocol was followed      Last vitals:   Visit Vitals  BP (!) 183/81 (BP Location: Left arm, Patient Position: Lying)   Pulse 60   Temp 37.2 °C (98.9 °F) (Temporal)   Resp 18   Ht 5' 10" (1.778 m)   Wt 92.5 kg (204 lb)   SpO2 100%   Breastfeeding No   BMI 29.27 kg/m²     "

## 2022-09-12 NOTE — INTERVAL H&P NOTE
The patient has been examined and the H&P has been reviewed:    I concur with the findings and no changes have occurred since H&P was written.    Surgery risks, benefits and alternative options discussed and understood by patient/family.    Normal office cystoscopy. CTU with filling abnormality of left kidney near UPJ and portion of right ureter that did not fill with contrast.    Interval h&p reviewed and unchanged from previous encounter. Urine dipped: Negative for all components. Nonconcerning for infection.    The patient has stopped all blood thinners, including aspirin for 7+ days.    Patient consented and would like to proceed with the procedure. Bilateral RGP, possible pyeloscopy with possible biopsy, etc.        Active Hospital Problems    Diagnosis  POA    *Hematuria [R31.9]  Yes      Resolved Hospital Problems   No resolved problems to display.

## 2022-09-12 NOTE — ANESTHESIA PREPROCEDURE EVALUATION
09/12/2022  Marleny Guzman is a 72 y.o., female.    Pre-operative evaluation for Procedure(s) (LRB):  LITHOTRIPSY, USING LASER (N/A)  BIOPSY, BLADDER (N/A)  INSERTION, STENT, URETER (N/A)  BIOPSY, URETER (Left)  PYELOGRAM, RETROGRADE (Left)    Patient Active Problem List   Diagnosis    Anxiety    HTN (hypertension)    Osteoarthritis    Depression    Sleep apnea    Postoperative pain    Rotator cuff tear    Cervical stenosis (uterine cervix)    Cervicogenic headache    Cervical myofascial pain syndrome    Subjective memory complaints    Insomnia    HGSIL (high grade squamous intraepithelial lesion) on Pap smear of cervix    Thoracic aorta atherosclerosis    HA (headache)    Fall    Imbalance    Anemia    Vaginal dysplasia    DDD (degenerative disc disease), lumbar    Chronic bilateral low back pain without sciatica    Weakness of left lower extremity    Chronic pain    Dyspnea    Elevated troponin    Syncope    SIRS (systemic inflammatory response syndrome)    Fever    Oral thrush    LEROY (acute kidney injury)    Invasive fungal sinusitis    Abnormal MRI    Chronic maxillary sinusitis    Acute recurrent maxillary sinusitis    Shortness of breath    WOOTEN (dyspnea on exertion)    Abnormal CT of the chest    Mediastinal adenopathy    Lung nodule    Pneumonia of both lungs due to infectious organism    Wears contact lenses            Medications Prior to Admission   Medication Sig Dispense Refill Last Dose    amLODIPine (NORVASC) 2.5 MG tablet Take 1 tablet (2.5 mg total) by mouth once daily. (Patient taking differently: Take 2.5 mg by mouth every morning.) 90 tablet 3 8/26/2022 at Unknown time    aspirin 81 MG Chew TAKE 1 TABLET BY MOUTH EVERY DAY (Patient taking differently: No sig reported) 90 tablet 1 8/26/2022 at Unknown time    atorvastatin (LIPITOR) 20 MG  tablet Take 1 tablet (20 mg total) by mouth once daily. (Patient taking differently: Take 20 mg by mouth every morning.) 90 tablet 1 8/26/2022 at Unknown time    ergocalciferol (ERGOCALCIFEROL) 50,000 unit Cap TAKE 1 CAPSULE (50,000 UNITS TOTAL) BY MOUTH EVERY 30 DAYS. 3 capsule 3 Past Month at Unknown time    EScitalopram oxalate (LEXAPRO) 20 MG tablet Take 1 tablet (20 mg total) by mouth once daily. 1.5 tablets daily (Patient taking differently: Take 20 mg by mouth every morning. 1.5 tablets daily) 90 tablet 3 8/26/2022 at Unknown time    irbesartan-hydrochlorothiazide (AVALIDE) 300-12.5 mg per tablet TAKE 1 TABLET BY MOUTH EVERY DAY (Patient taking differently: Take by mouth every morning.) 90 tablet 3 8/26/2022 at Unknown time    metoprolol succinate (TOPROL-XL) 50 MG 24 hr tablet Take 1 tablet (50 mg total) by mouth once daily. (Patient taking differently: Take 50 mg by mouth every morning.) 90 tablet 3 8/26/2022 at Unknown time    estradioL (ESTRACE) 0.01 % (0.1 mg/gram) vaginal cream Use 1 GM nightly vaginally and dime size amount on vulvatwice weekly 42.5 g 4     LORazepam (ATIVAN) 0.5 MG tablet TAKE 1/2 TO 1 TABLET BY MOUTH DAILY AS NEEDED FOR ANXIETY 30 tablet 0     promethazine-dextromethorphan (PROMETHAZINE-DM) 6.25-15 mg/5 mL Syrp Take 5 mLs by mouth every 4 (four) hours as needed (cough). 118 mL 0     pulse oximeter (PULSE OXIMETER) device by Apply Externally route 2 (two) times a day. Use twice daily at 8 AM and 3 PM and [facility] the [facility] as directed. 1 each 0        Review of patient's allergies indicates:  No Known Allergies    Past Medical History:   Diagnosis Date    Abnormal Pap smear     Abnormal Pap smear of cervix     Abnormal Pap smear of vagina 7/20/2016    LEROY (acute kidney injury) 10/10/2020    Allergy     Anemia     Anxiety     Anxiety     Cataract     Chronic bilateral low back pain without sciatica 3/13/2018    Depression     Depression with anxiety      Hypertension     Osteoarthritis 2013    Pneumonia of both lungs due to infectious organism 2021    Right rotator cuff tear     Sleep apnea     Cipap machine at home    Trouble in sleeping     Urinary incontinence     Leaks urine with laughing/ coughing/ sneezing     Past Surgical History:   Procedure Laterality Date    CATARACT EXTRACTION  12    OS    CATARACT EXTRACTION  12    OD    CERVIX SURGERY      Conization    COLONOSCOPY      COLONOSCOPY N/A 3/4/2016    Procedure: COLONOSCOPY;  Surgeon: Tenzin Thakkar MD;  [facility]  Service: Endoscopy;  Laterality: N/A;    COLONOSCOPY Left 2021    Procedure: COLONOSCOPY;  Surgeon: Wayne Naranjo MD;  Location: [facility] ENDO;  Service: Endoscopy;  Laterality: Left;    ESOPHAGOGASTRODUODENOSCOPY Left 2021    Procedure: EGD (ESOPHAGOGASTRODUODENOSCOPY);  Surgeon: Wayne Naranjo MD;  Location: [facility];  Service: Endoscopy;  Laterality: Left;    EYE SURGERY Bilateral     cataract    HYSTERECTOMY  5-12-15    LEEP      LUMBAR EPIDURAL INJECTION      OOPHORECTOMY      GA REMOVAL OF OVARY/TUBE(S)  5-12-15    ROBOT-ASSISTED REPAIR OF VENTRAL HERNIA USING DA ASPEN XI N/A 2021    Procedure: XI ROBOTIC REPAIR, HERNIA, VENTRAL w/ possible mesh;  Surgeon: Roney Jackson MD;  Location: [facility] OR [address] [place] [address];  Service: General;  Laterality: N/A;  Anesthesia Block    SALPINGECTOMY Left     SHOULDER SURGERY Right      Tobacco Use    Smoking status: Former     Packs/day: 0.50     Years: 22.00     Pack years: 11.00     Types: Cigarettes     Quit date: 1997     Years since quittin.7    Smokeless tobacco: Never    Tobacco comments:     Some wheezing since COVID-19 infection-2020   Substance and Sexual Activity    Alcohol use: Yes     Alcohol/week: 0.0 standard drinks     Comment: Occassionally for social events will have a glass of wine    Drug use: No    Sexual activity: Not on file        Objective:     Vital Signs (Most Recent):    Vital Signs (24h Range):        Weight: 92.5 kg (204 lb)  Body mass index is 29.27 kg/m².        Significant Labs:  All pertinent labs from the last 24 hours have been reviewed.    CBC: No results for input(s): WBC, RBC, HGB, HCT, PLT, MCV, MCH, MCHC in the last 72 hours.    CMP: No results for input(s): NA, K, CL, CO2, BUN, CREATININE, GLU, MG, PHOS, CALCIUM, ALBUMIN, PROT, ALKPHOS, ALT, AST, BILITOT in the last 72 hours.    INR  No results for input(s): PT, INR, PROTIME, APTT in the last 72 hours.      Pre-op Assessment    I have reviewed the Patient Summary Reports.     I have reviewed the Nursing Notes. I have reviewed the NPO Status.   I have reviewed the Medications.     Review of Systems  Anesthesia Hx:  Denies Family Hx of Anesthesia complications.   Denies Personal Hx of Anesthesia complications.       Physical Exam  General: Well nourished    Airway:  Mallampati: II   Mouth Opening: Normal  TM Distance: Normal  Tongue: Normal  Neck ROM: Normal ROM    Dental:  Dentures  Any loose and/or missing teeth verified with patient   Chest/Lungs:  Clear to auscultation    Heart:  Rate: Normal  Rhythm: Regular Rhythm  Sounds: Normal    Abdomen:  Normal        Anesthesia Plan  Type of Anesthesia, risks & benefits discussed:    Anesthesia Type: Gen ETT, Gen Supraglottic Airway, Gen Natural Airway  Intra-op Monitoring Plan: Standard ASA Monitors  Post Op Pain Control Plan: multimodal analgesia and IV/PO Opioids PRN  Induction:  IV  Informed Consent: Informed consent signed with the Patient and all parties understand the risks and agree with anesthesia plan.  All questions answered.   ASA Score: 2    Ready For Surgery From Anesthesia Perspective.     .

## 2022-09-12 NOTE — DISCHARGE SUMMARY
[facility] (1st Fl)  Discharge Note  Short Stay    Procedure(s) (LRB):  PYELOGRAM, RETROGRADE (Bilateral)  CYSTOSCOPY (N/A)    OUTCOME: Patient tolerated treatment/procedure well without complication and is now ready for discharge.    DISPOSITION: Home or Self Care    FINAL DIAGNOSIS:  Hematuria    FOLLOWUP: In clinic in 12 months with urinalysis prior.    DISCHARGE INSTRUCTIONS:    Discharge Procedure Orders   Urinalysis Microscopic   Standing Status: Future Standing Exp. Date: 03/12/24   Order Comments:       Order Specific Question Answer Comments   Specimen Source Urine      Notify your health care provider if you experience any of the following:  temperature >100.4     Notify your health care provider if you experience any of the following:  persistent nausea and vomiting or diarrhea     Notify your health care provider if you experience any of the following:  severe uncontrolled pain     Notify your health care provider if you experience any of the following:  redness, tenderness, or signs of infection (pain, swelling, redness, odor or green/yellow discharge around incision site)     Notify your health care provider if you experience any of the following:  worsening rash     Notify your health care provider if you experience any of the following:  persistent dizziness, light-headedness, or visual disturbances        TIME SPENT ON DISCHARGE: 10 minutes

## 2022-09-12 NOTE — OP NOTE
[facility]  Operative Note    Date: 09/12/2022    Pre-Op Diagnosis: Hematuria    Post-Op Diagnosis: Same    Procedure(s) Performed:  1. Cystoscopy with bilateral RGP  2. Fluoro < 1 h    Specimen(s): none    Staff Surgeon: Bianka Aragon MD    Assistant Surgeon: MD Andrew Benites MD -  (TA)    Anesthesia: Monitored Local Anesthesia with Sedation    Indications: Marleny Guzman is a 72 y.o. female with hematuria. Negative urine cytology and normal office cystoscopy. CT Urogram showed absent contrast in right ureter and filling abnormality in the left UPJ.    Findings: Normal bilateral retrograde pyelogram    Estimated Blood Loss: min    Drains: None    Procedure in Detail:  After risks, benefits and possible complications of the procedure were explained, the patient elected to undergo the procedure and informed consent was obtained. All questions were answered in the mari-operative area. The patient was transferred to the cystoscopy suite and placed in the supine position. SCDs were applied and working. Anesthesia was administered. Once the patient was adequately sedated, she was placed in the dorsal lithotomy position and prepped and draped in the usual sterile fashion. Time out was performed, mari-procedural antibiotics were confirmed.     A rigid cystoscope in a 22 Fr sheath was introduced into the bladder per urethra. This passed easily. The entire urethra was visualized which showed no masses or strictures. The right and left ureteral orifices were identified in the normal anatomic position and were seen effluxing clear urine. Formal cystoscopy using the 30 degree lens was performed which revealed no masses or lesions suspicious for malignancy, no trabeculations, no bladder stones and no bladder diverticuli.     A 5-Fr open-tipped catheter was used to perform a bilateral RGP. This revealed delicate calyces bilaterally without filling defects.  The entire course of the ureter was normal bilaterally. The bladder was drained, and the patient was removed from lithotomy.     The patient tolerated the procedure well and was transferred to recovery in stable condition.    Disposition: The patient will follow up with a Urology ADRIANNE in 12 months.     Marcellus Anderson MD

## 2022-09-13 NOTE — ANESTHESIA POSTPROCEDURE EVALUATION
Anesthesia Post Evaluation    Patient: Marleny Guzman    Procedure(s) Performed: Procedure(s) (LRB):  PYELOGRAM, RETROGRADE (Bilateral)  CYSTOSCOPY (N/A)    Final Anesthesia Type: general      Patient location during evaluation: PACU  Patient participation: Yes- Able to Participate  Level of consciousness: awake and alert  Post-procedure vital signs: reviewed and stable  Pain management: adequate  Airway patency: patent  ALVAREZ mitigation strategies: Extubation while patient is awake  PONV status at discharge: No PONV  Anesthetic complications: no      Cardiovascular status: stable  Respiratory status: spontaneous ventilation and face mask  Hydration status: euvolemic  Follow-up not needed.          Vitals Value Taken Time   /88 09/12/22 1530   Temp 36.7 °C (98 °F) 09/12/22 1530   Pulse 59 09/12/22 1538   Resp 16 09/12/22 1530   SpO2 95 % 09/12/22 1538   Vitals shown include unvalidated device data.      No case tracking events are documented in the log.      Pain/Margaret Score: Margaret Score: 10 (9/12/2022  2:45 PM)

## 2022-09-23 ENCOUNTER — OFFICE VISIT (OUTPATIENT)
Dept: OPTOMETRY | Facility: CLINIC | Age: 72
End: 2022-09-23
Payer: MEDICARE

## 2022-09-23 DIAGNOSIS — H52.4 MYOPIA WITH ASTIGMATISM AND PRESBYOPIA, BILATERAL: Primary | ICD-10-CM

## 2022-09-23 DIAGNOSIS — H52.203 MYOPIA WITH ASTIGMATISM AND PRESBYOPIA, BILATERAL: Primary | ICD-10-CM

## 2022-09-23 DIAGNOSIS — H52.13 MYOPIA WITH ASTIGMATISM AND PRESBYOPIA, BILATERAL: Primary | ICD-10-CM

## 2022-09-23 PROCEDURE — 3066F PR DOCUMENTATION OF TREATMENT FOR NEPHROPATHY: ICD-10-PCS | Mod: CPTII,S$GLB,, | Performed by: OPTOMETRIST

## 2022-09-23 PROCEDURE — 3288F FALL RISK ASSESSMENT DOCD: CPT | Mod: CPTII,S$GLB,, | Performed by: OPTOMETRIST

## 2022-09-23 PROCEDURE — 3061F PR NEG MICROALBUMINURIA RESULT DOCUMENTED/REVIEW: ICD-10-PCS | Mod: CPTII,S$GLB,, | Performed by: OPTOMETRIST

## 2022-09-23 PROCEDURE — 1101F PT FALLS ASSESS-DOCD LE1/YR: CPT | Mod: CPTII,S$GLB,, | Performed by: OPTOMETRIST

## 2022-09-23 PROCEDURE — 1159F PR MEDICATION LIST DOCUMENTED IN MEDICAL RECORD: ICD-10-PCS | Mod: CPTII,S$GLB,, | Performed by: OPTOMETRIST

## 2022-09-23 PROCEDURE — 3288F PR FALLS RISK ASSESSMENT DOCUMENTED: ICD-10-PCS | Mod: CPTII,S$GLB,, | Performed by: OPTOMETRIST

## 2022-09-23 PROCEDURE — 3066F NEPHROPATHY DOC TX: CPT | Mod: CPTII,S$GLB,, | Performed by: OPTOMETRIST

## 2022-09-23 PROCEDURE — 92499 PR CONTACT LENS F/U LEV 1: ICD-10-PCS | Mod: ,,, | Performed by: OPTOMETRIST

## 2022-09-23 PROCEDURE — 1160F PR REVIEW ALL MEDS BY PRESCRIBER/CLIN PHARMACIST DOCUMENTED: ICD-10-PCS | Mod: CPTII,S$GLB,, | Performed by: OPTOMETRIST

## 2022-09-23 PROCEDURE — 1101F PR PT FALLS ASSESS DOC 0-1 FALLS W/OUT INJ PAST YR: ICD-10-PCS | Mod: CPTII,S$GLB,, | Performed by: OPTOMETRIST

## 2022-09-23 PROCEDURE — 1159F MED LIST DOCD IN RCRD: CPT | Mod: CPTII,S$GLB,, | Performed by: OPTOMETRIST

## 2022-09-23 PROCEDURE — 1160F RVW MEDS BY RX/DR IN RCRD: CPT | Mod: CPTII,S$GLB,, | Performed by: OPTOMETRIST

## 2022-09-23 PROCEDURE — 3044F HG A1C LEVEL LT 7.0%: CPT | Mod: CPTII,S$GLB,, | Performed by: OPTOMETRIST

## 2022-09-23 PROCEDURE — 3044F PR MOST RECENT HEMOGLOBIN A1C LEVEL <7.0%: ICD-10-PCS | Mod: CPTII,S$GLB,, | Performed by: OPTOMETRIST

## 2022-09-23 PROCEDURE — 92499 UNLISTED OPH SVC/PROCEDURE: CPT | Mod: ,,, | Performed by: OPTOMETRIST

## 2022-09-23 PROCEDURE — 1126F AMNT PAIN NOTED NONE PRSNT: CPT | Mod: CPTII,S$GLB,, | Performed by: OPTOMETRIST

## 2022-09-23 PROCEDURE — 3061F NEG MICROALBUMINURIA REV: CPT | Mod: CPTII,S$GLB,, | Performed by: OPTOMETRIST

## 2022-09-23 PROCEDURE — 1126F PR PAIN SEVERITY QUANTIFIED, NO PAIN PRESENT: ICD-10-PCS | Mod: CPTII,S$GLB,, | Performed by: OPTOMETRIST

## 2022-09-23 NOTE — PROGRESS NOTES
HPI    Last eye exam was 9/1/22 with Dr. Gaspar.  Patient here to  SCL trials.   Last edited by Clau Monae MA on 9/23/2022  1:39 PM.            Assessment /Plan     For exam results, see Encounter Report.    Myopia with astigmatism and presbyopia, bilateral    CLRx trials dispensed. CLRx updated. Good VA and fit. RTC 1 year.

## 2022-09-26 ENCOUNTER — PATIENT MESSAGE (OUTPATIENT)
Dept: OPTOMETRY | Facility: CLINIC | Age: 72
End: 2022-09-26
Payer: MEDICARE

## 2022-10-28 ENCOUNTER — IMMUNIZATION (OUTPATIENT)
Dept: URGENT CARE | Facility: CLINIC | Age: 72
End: 2022-10-28
Payer: MEDICARE

## 2022-10-28 DIAGNOSIS — Z23 IMMUNIZATION DUE: Primary | ICD-10-CM

## 2022-10-28 PROCEDURE — 90694 FLU VACCINE - QUADRIVALENT - ADJUVANTED: ICD-10-PCS | Mod: S$GLB,,, | Performed by: NURSE PRACTITIONER

## 2022-10-28 PROCEDURE — 90694 VACC AIIV4 NO PRSRV 0.5ML IM: CPT | Mod: S$GLB,,, | Performed by: NURSE PRACTITIONER

## 2022-10-28 PROCEDURE — G0008 ADMIN INFLUENZA VIRUS VAC: HCPCS | Mod: S$GLB,,, | Performed by: NURSE PRACTITIONER

## 2022-10-28 PROCEDURE — G0008 FLU VACCINE - QUADRIVALENT - ADJUVANTED: ICD-10-PCS | Mod: S$GLB,,, | Performed by: NURSE PRACTITIONER

## 2022-11-05 ENCOUNTER — OFFICE VISIT (OUTPATIENT)
Dept: URGENT CARE | Facility: CLINIC | Age: 72
End: 2022-11-05
Payer: MEDICARE

## 2022-11-05 VITALS
OXYGEN SATURATION: 99 % | BODY MASS INDEX: 29.2 KG/M2 | WEIGHT: 203.94 LBS | HEIGHT: 70 IN | DIASTOLIC BLOOD PRESSURE: 56 MMHG | HEART RATE: 56 BPM | SYSTOLIC BLOOD PRESSURE: 165 MMHG | RESPIRATION RATE: 18 BRPM | TEMPERATURE: 97 F

## 2022-11-05 DIAGNOSIS — J20.8 ACUTE BACTERIAL BRONCHITIS: Primary | ICD-10-CM

## 2022-11-05 DIAGNOSIS — B96.89 ACUTE BACTERIAL BRONCHITIS: Primary | ICD-10-CM

## 2022-11-05 DIAGNOSIS — R05.9 COUGH, UNSPECIFIED TYPE: ICD-10-CM

## 2022-11-05 DIAGNOSIS — R09.82 POST-NASAL DRIP: ICD-10-CM

## 2022-11-05 LAB
CTP QC/QA: YES
SARS-COV-2 RDRP RESP QL NAA+PROBE: NEGATIVE

## 2022-11-05 PROCEDURE — 3061F NEG MICROALBUMINURIA REV: CPT | Mod: CPTII,S$GLB,,

## 2022-11-05 PROCEDURE — 3078F PR MOST RECENT DIASTOLIC BLOOD PRESSURE < 80 MM HG: ICD-10-PCS | Mod: CPTII,S$GLB,,

## 2022-11-05 PROCEDURE — 1160F RVW MEDS BY RX/DR IN RCRD: CPT | Mod: CPTII,S$GLB,,

## 2022-11-05 PROCEDURE — 99213 PR OFFICE/OUTPT VISIT, EST, LEVL III, 20-29 MIN: ICD-10-PCS | Mod: S$GLB,,,

## 2022-11-05 PROCEDURE — 3077F SYST BP >= 140 MM HG: CPT | Mod: CPTII,S$GLB,,

## 2022-11-05 PROCEDURE — 1159F PR MEDICATION LIST DOCUMENTED IN MEDICAL RECORD: ICD-10-PCS | Mod: CPTII,S$GLB,,

## 2022-11-05 PROCEDURE — 3066F NEPHROPATHY DOC TX: CPT | Mod: CPTII,S$GLB,,

## 2022-11-05 PROCEDURE — 1125F PR PAIN SEVERITY QUANTIFIED, PAIN PRESENT: ICD-10-PCS | Mod: CPTII,S$GLB,,

## 2022-11-05 PROCEDURE — 1125F AMNT PAIN NOTED PAIN PRSNT: CPT | Mod: CPTII,S$GLB,,

## 2022-11-05 PROCEDURE — 1160F PR REVIEW ALL MEDS BY PRESCRIBER/CLIN PHARMACIST DOCUMENTED: ICD-10-PCS | Mod: CPTII,S$GLB,,

## 2022-11-05 PROCEDURE — 3044F HG A1C LEVEL LT 7.0%: CPT | Mod: CPTII,S$GLB,,

## 2022-11-05 PROCEDURE — 3044F PR MOST RECENT HEMOGLOBIN A1C LEVEL <7.0%: ICD-10-PCS | Mod: CPTII,S$GLB,,

## 2022-11-05 PROCEDURE — 99213 OFFICE O/P EST LOW 20 MIN: CPT | Mod: S$GLB,,,

## 2022-11-05 PROCEDURE — 3066F PR DOCUMENTATION OF TREATMENT FOR NEPHROPATHY: ICD-10-PCS | Mod: CPTII,S$GLB,,

## 2022-11-05 PROCEDURE — 87635: ICD-10-PCS | Mod: QW,S$GLB,,

## 2022-11-05 PROCEDURE — 1159F MED LIST DOCD IN RCRD: CPT | Mod: CPTII,S$GLB,,

## 2022-11-05 PROCEDURE — 87635 SARS-COV-2 COVID-19 AMP PRB: CPT | Mod: QW,S$GLB,,

## 2022-11-05 PROCEDURE — 3008F BODY MASS INDEX DOCD: CPT | Mod: CPTII,S$GLB,,

## 2022-11-05 PROCEDURE — 3078F DIAST BP <80 MM HG: CPT | Mod: CPTII,S$GLB,,

## 2022-11-05 PROCEDURE — 3061F PR NEG MICROALBUMINURIA RESULT DOCUMENTED/REVIEW: ICD-10-PCS | Mod: CPTII,S$GLB,,

## 2022-11-05 PROCEDURE — 3077F PR MOST RECENT SYSTOLIC BLOOD PRESSURE >= 140 MM HG: ICD-10-PCS | Mod: CPTII,S$GLB,,

## 2022-11-05 PROCEDURE — 3008F PR BODY MASS INDEX (BMI) DOCUMENTED: ICD-10-PCS | Mod: CPTII,S$GLB,,

## 2022-11-05 RX ORDER — AZELASTINE 1 MG/ML
1 SPRAY, METERED NASAL 2 TIMES DAILY
Qty: 30 ML | Refills: 0 | Status: SHIPPED | OUTPATIENT
Start: 2022-11-05 | End: 2023-07-26

## 2022-11-05 RX ORDER — AMOXICILLIN AND CLAVULANATE POTASSIUM 875; 125 MG/1; MG/1
1 TABLET, FILM COATED ORAL EVERY 12 HOURS
Qty: 14 TABLET | Refills: 0 | Status: SHIPPED | OUTPATIENT
Start: 2022-11-05 | End: 2022-11-12

## 2022-11-05 NOTE — PATIENT INSTRUCTIONS
- Delsym or Coricidin for cough- these will not raise blood pressure.     - You have been given an antibiotic to treat your condition today.    - Please complete the antibiotic as directed on the bottle.   - you can take over-the-counter probiotics during and after antibiotic use to help preserve gut crista and reduce gastrointestinal symptoms     - You must understand that you have received an Urgent Care treatment only and that you may be released before all of your medical problems are known or treated.   - You, the patient, will arrange for follow up care as instructed.   - If your condition worsens or fails to improve we recommend that you receive another evaluation at the ER immediately or contact your PCP to discuss your concerns or return here.   - Follow up with your PCP or specialty clinic as directed in the next 1-2 weeks if not improved or as needed.  You can call [phone] to schedule an appointment with the appropriate provider.    If your symptoms do not improve or worsen, go to the emergency room immediately.

## 2022-11-05 NOTE — PROGRESS NOTES
"Subjective:       Patient ID: Marleny Guzman is a 72 y.o. female.    Vitals:  height is 5' 10" (1.778 m) and weight is 92.5 kg (203 lb 14.8 oz). Her temporal temperature is 97.4 °F (36.3 °C). Her blood pressure is 165/56 (abnormal) and her pulse is 56 (abnormal). Her respiration is 18 and oxygen saturation is 99%.     Chief Complaint: Cough    Patient reports to clinic with the compliant of a cough and chest congestion that has been present for a bout 3 weeks now. Patient states that cough is productive and she does cough up phlegm and mucus. Patient reports with taking Mucinex, however, did not help with her symptoms. Patient reports some soreness to the body.    Cough  This is a new problem. The cough is Productive of sputum. Associated symptoms include ear pain, myalgias, shortness of breath and wheezing. Pertinent negatives include no chills, fever or sore throat. Nothing aggravates the symptoms. She has tried OTC cough suppressant for the symptoms. The treatment provided no relief. There is no history of asthma, bronchiectasis, bronchitis, COPD, emphysema, environmental allergies or pneumonia.     Constitution: Positive for fatigue. Negative for chills, sweating and fever.   HENT:  Positive for ear pain. Negative for congestion, sinus pain, sinus pressure, sore throat and trouble swallowing.    Respiratory:  Positive for cough, shortness of breath and wheezing.    Musculoskeletal:  Positive for muscle ache.   Allergic/Immunologic: Negative for environmental allergies.   Neurological:  Negative for disorientation and altered mental status.   Psychiatric/Behavioral:  Negative for altered mental status and disorientation.      Objective:      Physical Exam   Constitutional: She is oriented to person, place, and time. She appears well-developed. She is cooperative.  Non-toxic appearance. She does not appear ill. No distress.      Comments:Patient sits comfortably in exam chair. Answers questions in complete " sentences. Does not show any signs of distress or discoloration.        HENT:   Head: Normocephalic and atraumatic.   Ears:   Right Ear: Hearing, tympanic membrane, external ear and ear canal normal.   Left Ear: Hearing, tympanic membrane, external ear and ear canal normal.   Nose: Nose normal. No mucosal edema, rhinorrhea, nasal deformity or congestion. No epistaxis. Right sinus exhibits no maxillary sinus tenderness and no frontal sinus tenderness. Left sinus exhibits no maxillary sinus tenderness and no frontal sinus tenderness.   Mouth/Throat: Uvula is midline, oropharynx is clear and moist and mucous membranes are normal. No trismus in the jaw. Normal dentition. No uvula swelling. No oropharyngeal exudate, posterior oropharyngeal edema or posterior oropharyngeal erythema.   Eyes: Conjunctivae and lids are normal. No scleral icterus.   Neck: Trachea normal and phonation normal. Neck supple. No edema present. No erythema present. No neck rigidity present.   Cardiovascular: Normal rate, regular rhythm, normal heart sounds and normal pulses.   Pulmonary/Chest: Effort normal and breath sounds normal. No respiratory distress. She has no decreased breath sounds. She has no rhonchi.   Abdominal: Normal appearance.   Musculoskeletal: Normal range of motion.         General: No deformity. Normal range of motion.   Neurological: She is alert and oriented to person, place, and time. She exhibits normal muscle tone. Coordination normal.   Skin: Skin is warm, dry, intact, not diaphoretic and not pale.   Psychiatric: Her speech is normal and behavior is normal. Judgment and thought content normal.   Nursing note and vitals reviewed.      Results for orders placed or performed in visit on 11/05/22   POCT COVID-19 Rapid Screening   Result Value Ref Range    POC Rapid COVID Negative Negative     Acceptable Yes      *Note: Due to a large number of results and/or encounters for the requested time period, some  results have not been displayed. A complete set of results can be found in Results Review.       Assessment:       1. Acute bacterial bronchitis    2. Cough, unspecified type    3. Post-nasal drip          Plan:         Acute bacterial bronchitis  -     amoxicillin-clavulanate 875-125mg (AUGMENTIN) 875-125 mg per tablet; Take 1 tablet by mouth every 12 (twelve) hours. for 7 days  Dispense: 14 tablet; Refill: 0    Cough, unspecified type  -     POCT COVID-19 Rapid Screening    Post-nasal drip  -     azelastine (ASTELIN) 137 mcg (0.1 %) nasal spray; 1 spray (137 mcg total) by Nasal route 2 (two) times daily.  Dispense: 30 mL; Refill: 0               Patient Instructions   - Delsym or Coricidin for cough- these will not raise blood pressure.     - You have been given an antibiotic to treat your condition today.    - Please complete the antibiotic as directed on the bottle.   - you can take over-the-counter probiotics during and after antibiotic use to help preserve gut crista and reduce gastrointestinal symptoms     - You must understand that you have received an Urgent Care treatment only and that you may be released before all of your medical problems are known or treated.   - You, the patient, will arrange for follow up care as instructed.   - If your condition worsens or fails to improve we recommend that you receive another evaluation at the ER immediately or contact your PCP to discuss your concerns or return here.   - Follow up with your PCP or specialty clinic as directed in the next 1-2 weeks if not improved or as needed.  You can call [phone] to schedule an appointment with the appropriate provider.    If your symptoms do not improve or worsen, go to the emergency room immediately.

## 2022-11-17 DIAGNOSIS — Z12.11 SCREENING FOR COLON CANCER: Primary | ICD-10-CM

## 2022-11-18 ENCOUNTER — TELEPHONE (OUTPATIENT)
Dept: OPTOMETRY | Facility: CLINIC | Age: 72
End: 2022-11-18
Payer: MEDICARE

## 2022-11-18 NOTE — TELEPHONE ENCOUNTER
----- Message from Luz Corcoran sent at 11/18/2022 11:00 AM CST -----  Regarding: Questions  Pt is requesting to speak with a tech. They want to know if their contacts are in yet. Pt ordered them over the phone and [facility] [place].      Marleny @ [phone]

## 2022-11-28 ENCOUNTER — HOSPITAL ENCOUNTER (EMERGENCY)
Facility: OTHER | Age: 72
Discharge: HOME | End: 2022-11-28
Attending: EMERGENCY MEDICINE
Payer: MEDICARE

## 2022-11-28 VITALS
TEMPERATURE: 98 F | SYSTOLIC BLOOD PRESSURE: 161 MMHG | RESPIRATION RATE: 18 BRPM | BODY MASS INDEX: 28.63 KG/M2 | WEIGHT: 200 LBS | HEART RATE: 97 BPM | DIASTOLIC BLOOD PRESSURE: 67 MMHG | OXYGEN SATURATION: 97 % | HEIGHT: 70 IN

## 2022-11-28 DIAGNOSIS — R06.02 SOB (SHORTNESS OF BREATH): ICD-10-CM

## 2022-11-28 DIAGNOSIS — R06.09 DYSPNEA ON EXERTION: ICD-10-CM

## 2022-11-28 DIAGNOSIS — R60.0 LOWER EXTREMITY EDEMA: ICD-10-CM

## 2022-11-28 DIAGNOSIS — J20.9 ACUTE BRONCHITIS, UNSPECIFIED ORGANISM: Primary | ICD-10-CM

## 2022-11-28 LAB
ALBUMIN SERPL BCP-MCNC: 3.5 G/DL (ref 3.5–5.2)
ALP SERPL-CCNC: 141 U/L (ref 55–135)
ALT SERPL W/O P-5'-P-CCNC: 20 U/L (ref 10–44)
ANION GAP SERPL CALC-SCNC: 7 MMOL/L (ref 8–16)
AST SERPL-CCNC: 19 U/L (ref 10–40)
BASOPHILS # BLD AUTO: 0.06 K/UL (ref 0–0.2)
BASOPHILS NFR BLD: 0.6 % (ref 0–1.9)
BILIRUB SERPL-MCNC: 0.4 MG/DL (ref 0.1–1)
BUN SERPL-MCNC: 15 MG/DL (ref 8–23)
CALCIUM SERPL-MCNC: 9.6 MG/DL (ref 8.7–10.5)
CHLORIDE SERPL-SCNC: 108 MMOL/L (ref 95–110)
CO2 SERPL-SCNC: 28 MMOL/L (ref 23–29)
CREAT SERPL-MCNC: 0.8 MG/DL (ref 0.5–1.4)
DIFFERENTIAL METHOD: NORMAL
EOSINOPHIL # BLD AUTO: 0.5 K/UL (ref 0–0.5)
EOSINOPHIL NFR BLD: 4.9 % (ref 0–8)
ERYTHROCYTE [DISTWIDTH] IN BLOOD BY AUTOMATED COUNT: 14.3 % (ref 11.5–14.5)
EST. GFR  (NO RACE VARIABLE): >60 ML/MIN/1.73 M^2
GLUCOSE SERPL-MCNC: 66 MG/DL (ref 70–110)
HCT VFR BLD AUTO: 40 % (ref 37–48.5)
HCV AB SERPL QL IA: NEGATIVE
HGB BLD-MCNC: 13 G/DL (ref 12–16)
HIV 1+2 AB+HIV1 P24 AG SERPL QL IA: NEGATIVE
IMM GRANULOCYTES # BLD AUTO: 0.02 K/UL (ref 0–0.04)
IMM GRANULOCYTES NFR BLD AUTO: 0.2 % (ref 0–0.5)
LYMPHOCYTES # BLD AUTO: 2.7 K/UL (ref 1–4.8)
LYMPHOCYTES NFR BLD: 26.2 % (ref 18–48)
MCH RBC QN AUTO: 29 PG (ref 27–31)
MCHC RBC AUTO-ENTMCNC: 32.5 G/DL (ref 32–36)
MCV RBC AUTO: 89 FL (ref 82–98)
MONOCYTES # BLD AUTO: 0.6 K/UL (ref 0.3–1)
MONOCYTES NFR BLD: 5.3 % (ref 4–15)
NEUTROPHILS # BLD AUTO: 6.6 K/UL (ref 1.8–7.7)
NEUTROPHILS NFR BLD: 62.8 % (ref 38–73)
NRBC BLD-RTO: 0 /100 WBC
PLATELET # BLD AUTO: 325 K/UL (ref 150–450)
PMV BLD AUTO: 10.1 FL (ref 9.2–12.9)
POCT GLUCOSE: 90 MG/DL (ref 70–110)
POTASSIUM SERPL-SCNC: 4 MMOL/L (ref 3.5–5.1)
PROT SERPL-MCNC: 7.2 G/DL (ref 6–8.4)
RBC # BLD AUTO: 4.48 M/UL (ref 4–5.4)
SODIUM SERPL-SCNC: 143 MMOL/L (ref 136–145)
TROPONIN I SERPL DL<=0.01 NG/ML-MCNC: <0.006 NG/ML (ref 0–0.03)
TSH SERPL DL<=0.005 MIU/L-ACNC: 2.1 UIU/ML (ref 0.4–4)
WBC # BLD AUTO: 10.42 K/UL (ref 3.9–12.7)

## 2022-11-28 PROCEDURE — 84484 ASSAY OF TROPONIN QUANT: CPT | Performed by: EMERGENCY MEDICINE

## 2022-11-28 PROCEDURE — 86803 HEPATITIS C AB TEST: CPT | Performed by: NURSE PRACTITIONER

## 2022-11-28 PROCEDURE — 85025 COMPLETE CBC W/AUTO DIFF WBC: CPT | Performed by: EMERGENCY MEDICINE

## 2022-11-28 PROCEDURE — 99285 EMERGENCY DEPT VISIT HI MDM: CPT | Mod: 25

## 2022-11-28 PROCEDURE — 25000242 PHARM REV CODE 250 ALT 637 W/ HCPCS: Performed by: EMERGENCY MEDICINE

## 2022-11-28 PROCEDURE — 63600175 PHARM REV CODE 636 W HCPCS: Performed by: EMERGENCY MEDICINE

## 2022-11-28 PROCEDURE — 84443 ASSAY THYROID STIM HORMONE: CPT | Performed by: EMERGENCY MEDICINE

## 2022-11-28 PROCEDURE — 82962 GLUCOSE BLOOD TEST: CPT

## 2022-11-28 PROCEDURE — 80053 COMPREHEN METABOLIC PANEL: CPT | Performed by: EMERGENCY MEDICINE

## 2022-11-28 PROCEDURE — 87389 HIV-1 AG W/HIV-1&-2 AB AG IA: CPT | Performed by: NURSE PRACTITIONER

## 2022-11-28 PROCEDURE — 94640 AIRWAY INHALATION TREATMENT: CPT | Mod: XB

## 2022-11-28 RX ORDER — IPRATROPIUM BROMIDE AND ALBUTEROL SULFATE 2.5; .5 MG/3ML; MG/3ML
3 SOLUTION RESPIRATORY (INHALATION) EVERY 5 MIN PRN
Status: COMPLETED | OUTPATIENT
Start: 2022-11-28 | End: 2022-11-28

## 2022-11-28 RX ORDER — DEXAMETHASONE 4 MG/1
8 TABLET ORAL
Status: COMPLETED | OUTPATIENT
Start: 2022-11-28 | End: 2022-11-28

## 2022-11-28 RX ORDER — ALBUTEROL SULFATE 90 UG/1
1-2 AEROSOL, METERED RESPIRATORY (INHALATION) EVERY 6 HOURS PRN
Qty: 6.7 G | Refills: 0 | Status: SHIPPED | OUTPATIENT
Start: 2022-11-28 | End: 2022-12-28

## 2022-11-28 RX ADMIN — IPRATROPIUM BROMIDE AND ALBUTEROL SULFATE 3 ML: .5; 3 SOLUTION RESPIRATORY (INHALATION) at 01:11

## 2022-11-28 RX ADMIN — DEXAMETHASONE 8 MG: 4 TABLET ORAL at 03:11

## 2022-11-28 NOTE — ED PROVIDER NOTES
"SCRIBE #1 NOTE: I, Raissali Deleon, am scribing for, and in the presence of,  Fitz Kline MD.       Source of History:  Medical record, patient.    Chief complaint:  Per triage note: "Cough (C/o cough, congestion, and SOB x1.5 months. Went to urgent care about 1 month ago and was treated with antibiotics and mucinex, but denies improvement.)  "    HPI:    Patient presents with a productive cough for the past six weeks. The patient reports she is producing yellow mucous and also notes congestion, dyspnea, and wheezing. She reports her symptoms have not worsened over the course of the past six weeks and have remained constant. She states her dyspnea occurs both while at rest and with exertion, but notes sitting upright provides some relief. The patient states she can walk a maximum of about half a block before experiencing dyspnea, but states this has been typical for her since she had COVID at the beginning of the pandemic. In addition to her respiratory symptoms, the patient reports BLE edema worse than at baseline. The patient denies associated fever.    This is the extent of the patient's complaints at this time.     ROS:   As per HPI and below:   General: No fever.   HENT: No sore throat. No facial pain. No facial swelling. Notes congestion.  Eyes: No visual changes. No eye pain.   Cardiovascular: No chest pain.   Respiratory: Notes dyspnea. Notes cough. Notes wheezing.    GI: No abdominal pain.    Skin: No rashes.  Neuro: No syncope.  Musculoskeletal: No extremity pain. Notes BLE edema.  Psych: No acute changes.  All other systems reviewed and are negative.      Review of patient's allergies indicates:  No Known Allergies    PMH:  As per HPI and below:  Past Medical History:   Diagnosis Date    Abnormal Pap smear     Abnormal Pap smear of cervix     Abnormal Pap smear of vagina 7/20/2016    LEROY (acute kidney injury) 10/10/2020    Allergy     Anemia     Anxiety     Anxiety     Cataract     Chronic bilateral " low back pain without sciatica 3/13/2018    Depression     Depression with anxiety     Hypertension     Osteoarthritis 6/20/2013    Pneumonia of both lungs due to infectious organism 6/7/2021    Right rotator cuff tear     Sleep apnea     Cipap machine at home    Trouble in sleeping     Urinary incontinence     Leaks urine with laughing/ coughing/ sneezing       Past Surgical History:   Procedure Laterality Date    CATARACT EXTRACTION  6/18/12    OS    CATARACT EXTRACTION  7/16/12    OD    CERVIX SURGERY      [place]    COLONOSCOPY      COLONOSCOPY N/A 3/4/2016    Procedure: COLONOSCOPY;  Surgeon: Tenzin Thakkar MD;  Location: [facility] (4TH FLR);  Service: Endoscopy;  Laterality: N/A;    COLONOSCOPY Left 1/25/2021    Procedure: COLONOSCOPY;  Surgeon: Wayne Naranjo MD;  Location: [facility];  Service: Endoscopy;  Laterality: Left;    CYSTOSCOPY N/A 9/12/2022    Procedure: CYSTOSCOPY;  Surgeon: Bianka Aragon MD;  Location: [facility] OR 1ST FLR;  Service: Urology;  Laterality: N/A;    ESOPHAGOGASTRODUODENOSCOPY Left 1/25/2021    Procedure: EGD (ESOPHAGOGASTRODUODENOSCOPY);  Surgeon: Wayne Naranjo MD;  Location: [facility];  Service: Endoscopy;  Laterality: Left;    EYE SURGERY Bilateral     cataract    HYSTERECTOMY  5-12-15    LEEP      LUMBAR EPIDURAL INJECTION      OOPHORECTOMY      FL REMOVAL OF OVARY/TUBE(S)  5-12-15    RETROGRADE PYELOGRAPHY Bilateral 9/12/2022    Procedure: PYELOGRAM, RETROGRADE;  Surgeon: Bianka Aragon MD;  Location: [facility] OR 1ST FLR;  Service: Urology;  Laterality: Bilateral;    ROBOT-ASSISTED REPAIR OF VENTRAL HERNIA USING DA ASPEN XI N/A 11/29/2021    Procedure: XI ROBOTIC REPAIR, HERNIA, VENTRAL w/ possible mesh;  Surgeon: Roney Jackson MD;  Location: [facility] OR 2ND FLR;  Service: General;  Laterality: N/A;  Anesthesia Block    SALPINGECTOMY Left     SHOULDER SURGERY Right        Social History     Tobacco Use    Smoking status: Former     Packs/day: 0.50     Years:  "22.00     Pack years: 11.00     Types: Cigarettes     Quit date: 1997     Years since quittin.9    Smokeless tobacco: Never    Tobacco comments:     Some wheezing since COVID-19 infection-2020   Substance Use Topics    Alcohol use: Yes     Alcohol/week: 0.0 standard drinks     Comment: Occassionally for social events will have a glass of wine    Drug use: No       Physical Exam:      Nursing note and vitals reviewed.  BP (!) 164/79 (BP Location: Left arm, Patient Position: Sitting)   Pulse 65   Temp 98.9 °F (37.2 °C) (Oral)   Resp 20   Ht 5' 10" (1.778 m)   Wt 90.7 kg (200 lb)   SpO2 98%   BMI 28.70 kg/m²   Nursing note and vitals reviewed.  Constitutional: AAOx3. Well-developed and well-nourished. No distress.  HENT:   Eyes: EOMI. No discharge. Anicteric.  Mouth/Throat: Oropharynx is clear and moist.  Neck: Normal range of motion. Neck supple.  Cardiovascular: Normal rate and normal heart sounds.  Exam reveals no gallop and no friction rub. No murmur heard.  Pulmonary/Chest: No respiratory distress. Dry cough. Rales. Limited air entry. No expiratory wheezes. No tenderness.  Abdominal: Soft. No distension.   Musculoskeletal: Normal range of motion.   Neurological: Alert and oriented to person, place, and time.   Skin: Skin is warm and dry.   Psychiatric: Behavior is normal. Judgment normal.      MDM:    I decided to obtain the patient's medical records.  Pt is a 72 y.o. F with HTN, chronic back pain, sleep apnea who presents with productive cough, dyspnea, wheezing, orthopnea and worse than baseline chronic lower extremity edema  for the past 6 weeks. She denies any sick contacts.   On exam, pt has rales, decreased entry, no wheezes.   Ddx included asthma, COPD, acute bronchitis, PNA, acute CHF.     ED Course as of 22 1757   Mon 2022   1401 I independently reviewed and interpreted CXR which shows no pneumothorax, no focal consolidation, no cardiomegaly, no acute process.   [RC]   1458 " Delay in disposition: CMP pending. Lab estimates should result in 10 min.  [RC]   1521 Delay in disposition: CMP pending. Lab reports there was a technical issue with the instrument. They estimate results will be available in 15-20 min.  [RC]   1542 I independently reviewed and interpreted labs which are notable for mild hypoglycemia, likely 2/2 lab error. Repeat as a POC was 90.   Pt reports her breathing is back at baseline after breathing treatment.   I note the patient has elevated blood pressures during this encounter. Patient does not have signs or symptoms suggestive of hypertensive emergency (denies chest pain, vision change, or urinary changes consistent with acute hypertensive kidney disease). Risk of acutely lowering blood pressure exceeds benefit. We will have the patient follow up with PCP for continued hypertension management.      --  I discussed with patient and/or guardian/caretaker that this evaluation in the ED does not suggest any emergent or life threatening medical condition requiring admission or further immediate intervention or diagnostics. Regardless, an unremarkable evaluation in the ED does not preclude the development or presence of a serious or life threatening condition. As such, patient was instructed to return for any worsening, new, changed, or concerning symptoms.     I had a detailed discussion with patient and/or guardian/caretaker regarding findings, plan, return precautions, importance of medication adherence, need to follow-up with a PCP and specialist. All questions answered.     Management decisions for this encounter made during COVID-19 public health emergency. Available resources, standards for appropriate emergency department evaluation, and admission vs. discharge standards have necessarily shifted and remain dynamic.     Note was created using voice recognition software. It may have occasional typographical errors not identified and edited despite initial review prior  to signing. [RC]      ED Course User Index  [RC] Fitz Kline MD       Medications   albuterol-ipratropium 2.5 mg-0.5 mg/3 mL nebulizer solution 3 mL (has no administration in time range)              I, Raissa Deleon, scribed for, and in the presence of, Dr. Kline. I performed the scribed service and the documentation accurately describes the services I performed. I attest to the accuracy of the note.     Physician Attestation for Scribe:   I, Fitz Kline MD, reviewed documentation as scribed in my presence, which is both accurate and complete.    Diagnostic Impression:    1. Acute bronchitis, unspecified organism    2. SOB (shortness of breath)    3. Dyspnea on exertion    4. Lower extremity edema                  Fitz Kline MD  11/28/22 1822

## 2022-11-28 NOTE — ED TRIAGE NOTES
Pt presents to the ER with complaints of cough, congestion, and SOB x1.5 months. Went to urgent care about 1 month ago and was treated with antibiotics and mucinex, but denies improvement.  Pt states SOB is worse with exertion and laying flat; reports swelling to the BLE but states this is normal for her.

## 2022-11-28 NOTE — FIRST PROVIDER EVALUATION
"Medical screening examination initiated.  I have conducted a focused provider triage encounter, findings are as follows:    Brief history of present illness:  cough, congestion x 1 month.  Seen by [facility] earlier this month and dx with bronchitis and given augmentin.  Reporst no improvement.  Reports SOB but states started before cough/congestion    Vitals:    11/28/22 1107   BP: (!) 164/79   BP Location: Left arm   Patient Position: Sitting   Pulse: 65   Resp: 20   Temp: 98.9 °F (37.2 °C)   TempSrc: Oral   SpO2: 98%   Weight: 90.7 kg (200 lb)   Height: 5' 10" (1.778 m)       Pertinent physical exam:  well appearing    Brief workup plan:  cxr.  ekg    Preliminary workup initiated; this workup will be continued and followed by the physician or advanced practice provider that is assigned to the patient when roomed.  "

## 2022-11-28 NOTE — DISCHARGE INSTRUCTIONS
Thank you for letting us take care of you today! It was nice meeting you, and I hope you feel better soon.     Call your primary care doctor to make the first available appointment.     Keep all your medical appointments.     Take your regular medication as prescribed. Contact your primary care provider before running out of medication, or for any problems obtaining them.    Do not drive or operate heavy machinery while taking opioid or muscle relaxing medications. Examples include norco, percocet, xanax, valium, flexeril.     Overuse or long term use of pain and sedating medication may lead to addiction, dependence, organ failure, family and work problems, legal problems, accidental overdose and death.    If you do not have health insurance, you probably can afford it:  Call [phone] ([facility]ly [facility] hotline) or go to www.[employer].[place].gov    Your evaluation in the ED does not suggest any emergent or life threatening medical condition requiring admission or immediate intervention beyond that provided in the ED.   However, the signs of a serious problem sometimes take more time to appear.     Do not hesitate to return to the ER if any of the following occur:    Weakness, dizziness, fainting, or loss of consciousness   Fever of 100.4ºF (38ºC) or higher  Any worse symptoms  Any new or concerning symptoms        To protect yourself and others from COVID19:  Get vaccinated.   Anyone over 5 years old is eligible for vaccination.   Everyone 18 and older should get 3 total vaccine doses. Anyone over 50 years old or with certain chronic conditions should get a 4th dose.   Vaccination is shown to prevent getting sick, ending up in the hospital, or dying because of COVID19.   If you are vaccinated, help friends and family get the vaccine.    If not vaccinated:  Your shot is waiting for you. To get it:   Text your ZIP code to [employer]VAX ([id number]) or [employer]ACUNA ([id number]) in [place]  call 311, or [phone], or [phone], or [phone],    go to www.vaccines.gov, or  Call your health provider  If exposed to someone with cold, flu, or COVID19 symptoms, you must quarantine for at least 5 days.   Even if you have no symptoms   Otherwise you could give the virus to someone who dies from it  Some symptoms of COVID19 include fever, cough, sore throat, breathing troubles, loss of taste/smell, headaches, stomach upset, diarrhea.     Acute Bronchitis  Your healthcare provider has told you that you have acute bronchitis. Bronchitis is infection or inflammation of the bronchial tubes (airways in the lungs). Normally, air moves easily in and out of the airways. Bronchitis narrows the airways, making it harder for air to flow in and out of the lungs. This causes symptoms such as shortness of breath, coughing, and wheezing. Bronchitis can be acute or chronic. Acute means the condition comes on quickly and goes away in a short time. Chronic means a condition lasts a long time and often comes back. Read on to learn more about acute bronchitis.    What causes acute bronchitis?  Acute bronchitis almost always starts as a viral respiratory infection, such as a cold or the flu. Certain factors make it more likely for a cold or flu to turn into bronchitis. These include being very young or very old or having a heart or lung problem. Cigarette smoking also makes bronchitis more likely.  When bronchitis develops, the airways become swollen. The airways may also become infected with bacteria. This is known as a secondary infection.  Diagnosing acute bronchitis  Your healthcare provider will examine you and ask about your symptoms and health history.   Treating acute bronchitis  Bronchitis usually clears up as the cold or flu goes away. This can take 3-4 weeks. You can help feel better faster by doing the following:  Take medicine as directed. You may be told to take ibuprofen or other over-the-counter medicines. These help relieve inflammation in your bronchial tubes.  Your doctor may prescribe an inhaler to help open up the bronchial tubes. Most of the time, acute bronchitis is caused by a viral infection. Antibiotics are usually not prescribed for viral infections.  Drink plenty of fluids, such as water, juice, or warm soup. Fluids loosen mucus so that you can cough it up. This helps you breathe more easily. Fluids also prevent dehydration.  Make sure you get plenty of rest.  Do not smoke. Do not allow anyone else to smoke in your home.  Recovery and follow-up  Follow up with your doctor as you are told. You will likely feel better in a week or two. It can be normal to have a cough for 4 weeks. Take steps to protect yourself from future infections. These steps include stopping smoking and avoiding tobacco smoke, washing your hands often, and getting a yearly flu shot.  When to call the doctor  Call the doctor if you have any of the following:  Fever of 100.4°F (38.0°C) higher  Symptoms that get worse, or new symptoms  Trouble breathing

## 2022-11-28 NOTE — ED NOTES
LOC: The patient is awake, alert, and oriented to self, place, time, and situation. Pt is calm and cooperative. Affect is appropriate.  Speech is appropriate and clear.     APPEARANCE: Patient resting comfortably in no acute distress.  Patient is clean and well groomed.    SKIN: The skin is warm and dry; color consistent with ethnicity.  Patient has normal skin turgor and moist mucus membranes.  Skin intact; no breakdown or bruising noted.     MUSCULOSKELETAL: Patient moving upper and lower extremities without difficulty; denies pain in the extremities or back.  Denies weakness.     RESPIRATORY: Airway is open and patent. Respirations spontaneous, even, easy, and non-labored.  Patient has a normal effort and rate.  No accessory muscle use noted. Pt reports cough and congestion with SOB.     CARDIAC:  Normal rate noted.  No peripheral edema noted. No complaints of chest pain.      ABDOMEN: Soft and non tender to palpation.  No distention noted. Pt denies abdominal pain; denies nausea, vomiting, diarrhea, or constipation.    NEUROLOGIC: Eyes open spontaneously.  Behavior appropriate to situation.  Follows commands; facial expression symmetrical.  Purposeful motor response noted; normal sensation in all extremities. Pt denies headache; denies lightheadedness or dizziness; denies visual disturbances; denies loss of balance; denies unilateral weakness.

## 2022-11-30 ENCOUNTER — PATIENT MESSAGE (OUTPATIENT)
Dept: NEUROLOGY | Facility: CLINIC | Age: 72
End: 2022-11-30
Payer: MEDICARE

## 2023-01-03 ENCOUNTER — PATIENT MESSAGE (OUTPATIENT)
Dept: RESEARCH | Facility: HOSPITAL | Age: 73
End: 2023-01-03
Payer: MEDICARE

## 2023-02-03 ENCOUNTER — PES CALL (OUTPATIENT)
Dept: ADMINISTRATIVE | Facility: CLINIC | Age: 73
End: 2023-02-03
Payer: MEDICARE

## 2023-02-08 ENCOUNTER — OFFICE VISIT (OUTPATIENT)
Dept: URGENT CARE | Facility: CLINIC | Age: 73
End: 2023-02-08
Payer: MEDICARE

## 2023-02-08 VITALS
SYSTOLIC BLOOD PRESSURE: 147 MMHG | HEIGHT: 70 IN | BODY MASS INDEX: 28.63 KG/M2 | RESPIRATION RATE: 18 BRPM | DIASTOLIC BLOOD PRESSURE: 57 MMHG | WEIGHT: 200 LBS | HEART RATE: 63 BPM | TEMPERATURE: 98 F | OXYGEN SATURATION: 95 %

## 2023-02-08 DIAGNOSIS — B96.89 ACUTE BACTERIAL SINUSITIS: Primary | ICD-10-CM

## 2023-02-08 DIAGNOSIS — R05.1 ACUTE COUGH: ICD-10-CM

## 2023-02-08 DIAGNOSIS — J01.90 ACUTE BACTERIAL SINUSITIS: Primary | ICD-10-CM

## 2023-02-08 PROCEDURE — 3008F BODY MASS INDEX DOCD: CPT | Mod: CPTII,S$GLB,,

## 2023-02-08 PROCEDURE — 3078F DIAST BP <80 MM HG: CPT | Mod: CPTII,S$GLB,,

## 2023-02-08 PROCEDURE — 99213 OFFICE O/P EST LOW 20 MIN: CPT | Mod: S$GLB,,,

## 2023-02-08 PROCEDURE — 1125F PR PAIN SEVERITY QUANTIFIED, PAIN PRESENT: ICD-10-PCS | Mod: CPTII,S$GLB,,

## 2023-02-08 PROCEDURE — 3077F SYST BP >= 140 MM HG: CPT | Mod: CPTII,S$GLB,,

## 2023-02-08 PROCEDURE — 1160F PR REVIEW ALL MEDS BY PRESCRIBER/CLIN PHARMACIST DOCUMENTED: ICD-10-PCS | Mod: CPTII,S$GLB,,

## 2023-02-08 PROCEDURE — 1159F MED LIST DOCD IN RCRD: CPT | Mod: CPTII,S$GLB,,

## 2023-02-08 PROCEDURE — 1160F RVW MEDS BY RX/DR IN RCRD: CPT | Mod: CPTII,S$GLB,,

## 2023-02-08 PROCEDURE — 1125F AMNT PAIN NOTED PAIN PRSNT: CPT | Mod: CPTII,S$GLB,,

## 2023-02-08 PROCEDURE — 3008F PR BODY MASS INDEX (BMI) DOCUMENTED: ICD-10-PCS | Mod: CPTII,S$GLB,,

## 2023-02-08 PROCEDURE — 3078F PR MOST RECENT DIASTOLIC BLOOD PRESSURE < 80 MM HG: ICD-10-PCS | Mod: CPTII,S$GLB,,

## 2023-02-08 PROCEDURE — 3077F PR MOST RECENT SYSTOLIC BLOOD PRESSURE >= 140 MM HG: ICD-10-PCS | Mod: CPTII,S$GLB,,

## 2023-02-08 PROCEDURE — 1159F PR MEDICATION LIST DOCUMENTED IN MEDICAL RECORD: ICD-10-PCS | Mod: CPTII,S$GLB,,

## 2023-02-08 PROCEDURE — 99213 PR OFFICE/OUTPT VISIT, EST, LEVL III, 20-29 MIN: ICD-10-PCS | Mod: S$GLB,,,

## 2023-02-08 RX ORDER — AMOXICILLIN AND CLAVULANATE POTASSIUM 875; 125 MG/1; MG/1
1 TABLET, FILM COATED ORAL EVERY 12 HOURS
Qty: 14 TABLET | Refills: 0 | Status: SHIPPED | OUTPATIENT
Start: 2023-02-08 | End: 2023-02-15

## 2023-02-08 RX ORDER — BENZONATATE 200 MG/1
200 CAPSULE ORAL 3 TIMES DAILY PRN
Qty: 30 CAPSULE | Refills: 0 | Status: SHIPPED | OUTPATIENT
Start: 2023-02-08 | End: 2023-02-18

## 2023-02-08 NOTE — PATIENT INSTRUCTIONS
- You have been given an antibiotic to treat your condition today.    - Please complete the antibiotic as directed on the bottle.   - you can take over-the-counter probiotics during and after antibiotic use to help preserve gut crista and reduce gastrointestinal symptoms    - Rest.    - Drink plenty of fluids.     - You can take over-the-counter claritin, zyrtec, allegra, or xyzal as directed. These are antihistamines that can help with runny nose, nasal congestion, sneezing, and helps to dry up post-nasal drip, which usually causes sore throat and cough.    - You can take plain Mucinex (guaifenesin) 1200 mg twice a day to help loosen mucous.      - You can use Flonase (fluticasone) nasal spray as directed for sinus congestion and postnasal drip. This is a steroid nasal spray that works locally over time to decrease the inflammation in your nose/sinuses and help with allergic symptoms. This is not an quick- relief spray like afrin, but it works well if used daily.  Discontinue if you develop nose bleed  - Use nasal saline prior to Flonase.  - Use Ocean Spray Nasal Saline 1-3 puffs each nostril every 2-3 hours then blow out onto tissue. This is to irrigate the nasal passage way to clear the sinus openings. Use until sinus problem resolved.    - A Neti Pot with sterile saline can help break up nasal congestion and give relief.      - Warm salt water gargles can help with sore throat     - Warm tea with honey can help with sore throat and cough. Honey is a natural cough suppressant.     - Rest.    - Drink plenty of fluids.    - Acetaminophen (tylenol) or Ibuprofen (advil,motrin) as directed as needed for fever/pain. Avoid tylenol if you have a history of liver disease. Do not take ibuprofen if you have a history of GI bleeding, kidney disease, or if you take blood thinners.   - Ibuprofen dosing for adults: 400 mg by mouth every 4-6 hours as needed. Max: 2400 mg/day; Info: use lowest effective dose, shortest effective  treatment duration; give w/ food if GI upset occurs.  - Tylenol dosing for adults: [By mouth route, immediate-release form] Dose: 325-1000 mg by mouth every 4-6h as needed; Max: 1 g/4h and 4 g/day from all sources. [By mouth route, extended-release form] Dose: 650-1300 mg Extended Release by mouth every 8h as needed; Max: 4 g/day from all sources.     - You must understand that you have received an Urgent Care treatment only and that you may be released before all of your medical problems are known or treated.   - You, the patient, will arrange for follow up care as instructed.   - If your condition worsens or fails to improve we recommend that you receive another evaluation at the ER immediately or contact your PCP to discuss your concerns or return here.   - Follow up with your PCP or specialty clinic as directed in the next 1-2 weeks if not improved or as needed.  You can call [phone] to schedule an appointment with the appropriate provider.    If your symptoms do not improve or worsen, go to the emergency room immediately.

## 2023-02-08 NOTE — PROGRESS NOTES
"Subjective:       Patient ID: Marleny Guzman is a 72 y.o. female.    Vitals:  height is 5' 10" (1.778 m) and weight is 90.7 kg (200 lb). Her temperature is 98.1 °F (36.7 °C). Her blood pressure is 147/57 (abnormal) and her pulse is 63. Her respiration is 18 and oxygen saturation is 95%.     Chief Complaint: Sinus Problem    Pt states symptoms started 3 weeks ago. Sinus pressure , constant coughing and scratchy throat . Pt states she's coughing up yellowish green phlegm. Pt states she's been taking Mucinex and OTC medications with no relief .    Sinus Problem  This is a new problem. The current episode started 1 to 4 weeks ago. The problem is unchanged. There has been no fever. Her pain is at a severity of 8/10. The pain is mild. Associated symptoms include congestion, coughing, ear pain, headaches, a hoarse voice, sinus pressure, sneezing and a sore throat. Pertinent negatives include no chills, diaphoresis, neck pain, shortness of breath or swollen glands. Treatments tried: Mucinex. The treatment provided no relief.     Constitution: Negative for chills, sweating, fatigue and fever.   HENT:  Positive for ear pain, congestion, postnasal drip, sinus pain, sinus pressure and sore throat.    Neck: Negative for neck pain.   Eyes:  Negative for eye itching, eye pain and eye redness.   Respiratory:  Positive for cough. Negative for shortness of breath.    Gastrointestinal:  Negative for nausea, vomiting, constipation and diarrhea.   Musculoskeletal:  Positive for muscle ache.   Allergic/Immunologic: Positive for sneezing.   Neurological:  Positive for headaches. Negative for disorientation and altered mental status.   Psychiatric/Behavioral:  Negative for altered mental status and disorientation.      Objective:      Physical Exam   Constitutional: She is oriented to person, place, and time. She appears well-developed. She is cooperative.  Non-toxic appearance. She does not appear ill. No distress.      " Comments:Patient sits comfortably in exam chair. Answers questions in complete sentences. Does not show any signs of distress or discoloration.        HENT:   Head: Normocephalic and atraumatic.   Ears:   Right Ear: Hearing, tympanic membrane, external ear and ear canal normal.   Left Ear: Hearing, tympanic membrane, external ear and ear canal normal.   Nose: Rhinorrhea and congestion present. No mucosal edema or nasal deformity. No epistaxis. Right sinus exhibits maxillary sinus tenderness. Right sinus exhibits no frontal sinus tenderness. Left sinus exhibits maxillary sinus tenderness. Left sinus exhibits no frontal sinus tenderness.   Mouth/Throat: Uvula is midline and mucous membranes are normal. No trismus in the jaw. Normal dentition. No uvula swelling. Posterior oropharyngeal erythema present. No oropharyngeal exudate or posterior oropharyngeal edema.   Eyes: Conjunctivae and lids are normal. No scleral icterus.   Neck: Trachea normal and phonation normal. Neck supple. No edema present. No erythema present. No neck rigidity present.   Cardiovascular: Normal rate, regular rhythm, normal heart sounds and normal pulses.   Pulmonary/Chest: Effort normal and breath sounds normal. No stridor. No respiratory distress. She has no decreased breath sounds. She has no wheezes. She has no rhonchi. She has no rales.   Abdominal: Normal appearance.   Musculoskeletal: Normal range of motion.         General: No deformity. Normal range of motion.   Lymphadenopathy:     She has no cervical adenopathy.        Right cervical: No superficial cervical, no deep cervical and no posterior cervical adenopathy present.       Left cervical: No superficial cervical, no deep cervical and no posterior cervical adenopathy present.   Neurological: She is alert and oriented to person, place, and time. She exhibits normal muscle tone. Coordination normal.   Skin: Skin is warm, dry, intact, not diaphoretic and not pale.   Psychiatric: Her  speech is normal and behavior is normal. Judgment and thought content normal.   Nursing note and vitals reviewed.      Assessment:       1. Acute bacterial sinusitis    2. Acute cough          Plan:         Acute bacterial sinusitis  -     amoxicillin-clavulanate 875-125mg (AUGMENTIN) 875-125 mg per tablet; Take 1 tablet by mouth every 12 (twelve) hours. for 7 days  Dispense: 14 tablet; Refill: 0    Acute cough  -     benzonatate (TESSALON) 200 MG capsule; Take 1 capsule (200 mg total) by mouth 3 (three) times daily as needed for Cough.  Dispense: 30 capsule; Refill: 0                 Patient Instructions   - You have been given an antibiotic to treat your condition today.    - Please complete the antibiotic as directed on the bottle.   - you can take over-the-counter probiotics during and after antibiotic use to help preserve gut crista and reduce gastrointestinal symptoms    - Rest.    - Drink plenty of fluids.     - You can take over-the-counter claritin, zyrtec, allegra, or xyzal as directed. These are antihistamines that can help with runny nose, nasal congestion, sneezing, and helps to dry up post-nasal drip, which usually causes sore throat and cough.    - You can take plain Mucinex (guaifenesin) 1200 mg twice a day to help loosen mucous.      - You can use Flonase (fluticasone) nasal spray as directed for sinus congestion and postnasal drip. This is a steroid nasal spray that works locally over time to decrease the inflammation in your nose/sinuses and help with allergic symptoms. This is not an quick- relief spray like afrin, but it works well if used daily.  Discontinue if you develop nose bleed  - Use nasal saline prior to Flonase.  - Use Ocean Spray Nasal Saline 1-3 puffs each nostril every 2-3 hours then blow out onto tissue. This is to irrigate the nasal passage way to clear the sinus openings. Use until sinus problem resolved.    - A Neti Pot with sterile saline can help break up nasal congestion and  give relief.      - Warm salt water gargles can help with sore throat     - Warm tea with honey can help with sore throat and cough. Honey is a natural cough suppressant.     - Rest.    - Drink plenty of fluids.    - Acetaminophen (tylenol) or Ibuprofen (advil,motrin) as directed as needed for fever/pain. Avoid tylenol if you have a history of liver disease. Do not take ibuprofen if you have a history of GI bleeding, kidney disease, or if you take blood thinners.   - Ibuprofen dosing for adults: 400 mg by mouth every 4-6 hours as needed. Max: 2400 mg/day; Info: use lowest effective dose, shortest effective treatment duration; give w/ food if GI upset occurs.  - Tylenol dosing for adults: [By mouth route, immediate-release form] Dose: 325-1000 mg by mouth every 4-6h as needed; Max: 1 g/4h and 4 g/day from all sources. [By mouth route, extended-release form] Dose: 650-1300 mg Extended Release by mouth every 8h as needed; Max: 4 g/day from all sources.     - You must understand that you have received an Urgent Care treatment only and that you may be released before all of your medical problems are known or treated.   - You, the patient, will arrange for follow up care as instructed.   - If your condition worsens or fails to improve we recommend that you receive another evaluation at the ER immediately or contact your PCP to discuss your concerns or return here.   - Follow up with your PCP or specialty clinic as directed in the next 1-2 weeks if not improved or as needed.  You can call [phone] to schedule an appointment with the appropriate provider.    If your symptoms do not improve or worsen, go to the emergency room immediately.

## 2023-02-14 ENCOUNTER — TELEPHONE (OUTPATIENT)
Dept: OPTOMETRY | Facility: CLINIC | Age: 73
End: 2023-02-14
Payer: MEDICARE

## 2023-02-14 NOTE — TELEPHONE ENCOUNTER
----- Message from Savi Valles sent at 2/14/2023  3:23 PM CST -----  Contact: Marleny @[phone]  Pt needs a call back to schedule her appt for  itchy watery eyes. Please give her a call back to further assist

## 2023-02-15 ENCOUNTER — TELEPHONE (OUTPATIENT)
Dept: OPTOMETRY | Facility: CLINIC | Age: 73
End: 2023-02-15
Payer: MEDICARE

## 2023-02-15 NOTE — TELEPHONE ENCOUNTER
----- Message from Charissa Mcnally sent at 2/15/2023 12:29 PM CST -----  Patient is calling to reschedule urgent appt.    Please contact pt at [phone]

## 2023-02-15 NOTE — TELEPHONE ENCOUNTER
----- Message from hCarissa Mcnally sent at 2/15/2023 12:29 PM CST -----  Patient is calling to reschedule urgent appt.    Please contact pt at [phone]

## 2023-02-17 ENCOUNTER — LAB VISIT (OUTPATIENT)
Dept: LAB | Facility: HOSPITAL | Age: 73
End: 2023-02-17
Attending: INTERNAL MEDICINE
Payer: MEDICARE

## 2023-02-17 ENCOUNTER — OFFICE VISIT (OUTPATIENT)
Dept: INTERNAL MEDICINE | Facility: CLINIC | Age: 73
End: 2023-02-17
Payer: MEDICARE

## 2023-02-17 VITALS
SYSTOLIC BLOOD PRESSURE: 158 MMHG | WEIGHT: 212.5 LBS | BODY MASS INDEX: 30.49 KG/M2 | HEART RATE: 62 BPM | DIASTOLIC BLOOD PRESSURE: 74 MMHG | OXYGEN SATURATION: 98 %

## 2023-02-17 DIAGNOSIS — R35.0 URINARY FREQUENCY: ICD-10-CM

## 2023-02-17 DIAGNOSIS — E55.9 MILD VITAMIN D DEFICIENCY: ICD-10-CM

## 2023-02-17 DIAGNOSIS — M79.10 MYALGIA: ICD-10-CM

## 2023-02-17 DIAGNOSIS — R60.9 EDEMA, UNSPECIFIED TYPE: ICD-10-CM

## 2023-02-17 DIAGNOSIS — M25.561 CHRONIC PAIN OF BOTH KNEES: ICD-10-CM

## 2023-02-17 DIAGNOSIS — G89.29 CHRONIC PAIN OF BOTH KNEES: ICD-10-CM

## 2023-02-17 DIAGNOSIS — R73.9 HYPERGLYCEMIA: ICD-10-CM

## 2023-02-17 DIAGNOSIS — R35.0 URINARY FREQUENCY: Primary | ICD-10-CM

## 2023-02-17 DIAGNOSIS — E78.5 HYPERLIPIDEMIA, UNSPECIFIED HYPERLIPIDEMIA TYPE: ICD-10-CM

## 2023-02-17 DIAGNOSIS — R53.83 FATIGUE, UNSPECIFIED TYPE: ICD-10-CM

## 2023-02-17 DIAGNOSIS — M25.562 CHRONIC PAIN OF BOTH KNEES: ICD-10-CM

## 2023-02-17 DIAGNOSIS — F33.41 RECURRENT MAJOR DEPRESSIVE DISORDER, IN PARTIAL REMISSION: ICD-10-CM

## 2023-02-17 PROCEDURE — 1101F PR PT FALLS ASSESS DOC 0-1 FALLS W/OUT INJ PAST YR: ICD-10-PCS | Mod: CPTII,S$GLB,, | Performed by: INTERNAL MEDICINE

## 2023-02-17 PROCEDURE — 3077F PR MOST RECENT SYSTOLIC BLOOD PRESSURE >= 140 MM HG: ICD-10-PCS | Mod: CPTII,S$GLB,, | Performed by: INTERNAL MEDICINE

## 2023-02-17 PROCEDURE — 99214 OFFICE O/P EST MOD 30 MIN: CPT | Mod: S$GLB,,, | Performed by: INTERNAL MEDICINE

## 2023-02-17 PROCEDURE — 87077 CULTURE AEROBIC IDENTIFY: CPT | Performed by: INTERNAL MEDICINE

## 2023-02-17 PROCEDURE — 1101F PT FALLS ASSESS-DOCD LE1/YR: CPT | Mod: CPTII,S$GLB,, | Performed by: INTERNAL MEDICINE

## 2023-02-17 PROCEDURE — 3008F BODY MASS INDEX DOCD: CPT | Mod: CPTII,S$GLB,, | Performed by: INTERNAL MEDICINE

## 2023-02-17 PROCEDURE — 99999 PR PBB SHADOW E&M-EST. PATIENT-LVL V: ICD-10-PCS | Mod: PBBFAC,,, | Performed by: INTERNAL MEDICINE

## 2023-02-17 PROCEDURE — 1159F MED LIST DOCD IN RCRD: CPT | Mod: CPTII,S$GLB,, | Performed by: INTERNAL MEDICINE

## 2023-02-17 PROCEDURE — 3078F DIAST BP <80 MM HG: CPT | Mod: CPTII,S$GLB,, | Performed by: INTERNAL MEDICINE

## 2023-02-17 PROCEDURE — 3288F FALL RISK ASSESSMENT DOCD: CPT | Mod: CPTII,S$GLB,, | Performed by: INTERNAL MEDICINE

## 2023-02-17 PROCEDURE — 87086 URINE CULTURE/COLONY COUNT: CPT | Performed by: INTERNAL MEDICINE

## 2023-02-17 PROCEDURE — 3078F PR MOST RECENT DIASTOLIC BLOOD PRESSURE < 80 MM HG: ICD-10-PCS | Mod: CPTII,S$GLB,, | Performed by: INTERNAL MEDICINE

## 2023-02-17 PROCEDURE — 87186 SC STD MICRODIL/AGAR DIL: CPT | Performed by: INTERNAL MEDICINE

## 2023-02-17 PROCEDURE — 3077F SYST BP >= 140 MM HG: CPT | Mod: CPTII,S$GLB,, | Performed by: INTERNAL MEDICINE

## 2023-02-17 PROCEDURE — 3008F PR BODY MASS INDEX (BMI) DOCUMENTED: ICD-10-PCS | Mod: CPTII,S$GLB,, | Performed by: INTERNAL MEDICINE

## 2023-02-17 PROCEDURE — 1125F AMNT PAIN NOTED PAIN PRSNT: CPT | Mod: CPTII,S$GLB,, | Performed by: INTERNAL MEDICINE

## 2023-02-17 PROCEDURE — 1159F PR MEDICATION LIST DOCUMENTED IN MEDICAL RECORD: ICD-10-PCS | Mod: CPTII,S$GLB,, | Performed by: INTERNAL MEDICINE

## 2023-02-17 PROCEDURE — 81003 URINALYSIS AUTO W/O SCOPE: CPT | Performed by: INTERNAL MEDICINE

## 2023-02-17 PROCEDURE — 99999 PR PBB SHADOW E&M-EST. PATIENT-LVL V: CPT | Mod: PBBFAC,,, | Performed by: INTERNAL MEDICINE

## 2023-02-17 PROCEDURE — 87088 URINE BACTERIA CULTURE: CPT | Performed by: INTERNAL MEDICINE

## 2023-02-17 PROCEDURE — 3288F PR FALLS RISK ASSESSMENT DOCUMENTED: ICD-10-PCS | Mod: CPTII,S$GLB,, | Performed by: INTERNAL MEDICINE

## 2023-02-17 PROCEDURE — 99214 PR OFFICE/OUTPT VISIT, EST, LEVL IV, 30-39 MIN: ICD-10-PCS | Mod: S$GLB,,, | Performed by: INTERNAL MEDICINE

## 2023-02-17 PROCEDURE — 1125F PR PAIN SEVERITY QUANTIFIED, PAIN PRESENT: ICD-10-PCS | Mod: CPTII,S$GLB,, | Performed by: INTERNAL MEDICINE

## 2023-02-17 RX ORDER — AZITHROMYCIN 250 MG/1
TABLET, FILM COATED ORAL
Qty: 6 TABLET | Refills: 0 | Status: SHIPPED | OUTPATIENT
Start: 2023-02-17 | End: 2023-05-29

## 2023-02-17 RX ORDER — SPIRONOLACTONE 25 MG/1
25 TABLET ORAL DAILY
Qty: 30 TABLET | Refills: 3 | Status: SHIPPED | OUTPATIENT
Start: 2023-02-17 | End: 2023-11-01

## 2023-02-17 RX ORDER — TRIAMCINOLONE ACETONIDE 1 MG/G
CREAM TOPICAL 2 TIMES DAILY
Qty: 45 G | Refills: 0 | Status: SHIPPED | OUTPATIENT
Start: 2023-02-17 | End: 2023-04-19

## 2023-02-17 RX ORDER — LORAZEPAM 0.5 MG/1
TABLET ORAL
Qty: 30 TABLET | Refills: 0 | Status: SHIPPED | OUTPATIENT
Start: 2023-02-17 | End: 2023-03-30 | Stop reason: SDUPTHER

## 2023-02-17 RX ORDER — NYSTATIN 100000 U/G
CREAM TOPICAL 2 TIMES DAILY
Qty: 30 G | Refills: 1 | Status: SHIPPED | OUTPATIENT
Start: 2023-02-17 | End: 2023-12-01 | Stop reason: SDUPTHER

## 2023-02-17 NOTE — PROGRESS NOTES
Subjective:       Patient ID: Marleny Guzman is a 72 y.o. female.    Chief Complaint: Annual Exam    HPIPt is fatigued and feels achy especially in her shoulders and her knees.  No CP or SOB. Has some new edema- BP is high.  She has vaginal itching. Her urinary incontinence is worse and that area tends to stay damp.  Review of Systems   Respiratory:  Negative for shortness of breath (PND or orthopnea).    Cardiovascular:  Negative for chest pain (arm pain or jaw pain).   Gastrointestinal:  Negative for abdominal pain, diarrhea, nausea and vomiting.   Genitourinary:  Negative for dysuria.   Neurological:  Negative for seizures, syncope and headaches.     Objective:      Physical Exam  Constitutional:       General: She is not in acute distress.     Appearance: She is well-developed.   HENT:      Head: Normocephalic.   Eyes:      Pupils: Pupils are equal, round, and reactive to light.   Neck:      Thyroid: No thyromegaly.      Vascular: No JVD.   Cardiovascular:      Rate and Rhythm: Normal rate and regular rhythm.      Heart sounds: Normal heart sounds. No murmur heard.    No friction rub. No gallop.   Pulmonary:      Effort: Pulmonary effort is normal.      Breath sounds: Normal breath sounds. No wheezing or rales.   Abdominal:      General: Bowel sounds are normal. There is no distension.      Palpations: Abdomen is soft. There is no mass.      Tenderness: There is no abdominal tenderness. There is no guarding or rebound.   Musculoskeletal:      Cervical back: Neck supple.   Lymphadenopathy:      Cervical: No cervical adenopathy.   Skin:     General: Skin is warm and dry.   Neurological:      Mental Status: She is alert and oriented to person, place, and time.      Deep Tendon Reflexes: Reflexes are normal and symmetric.   Psychiatric:         Behavior: Behavior normal.         Thought Content: Thought content normal.         Judgment: Judgment normal.       Assessment:       1. Urinary frequency    2.  Hyperlipidemia, unspecified hyperlipidemia type    3. Fatigue, unspecified type    4. Myalgia    5. Hyperglycemia    6. Mild vitamin D deficiency    7. Edema, unspecified type    8. Chronic pain of both knees          Plan:   Urinary frequency  -     Urinalysis; Future; Expected date: 02/17/2023  -     Urine culture; Future; Expected date: 02/17/2023    Hyperlipidemia, unspecified hyperlipidemia type  -     Lipid Panel; Future; Expected date: 02/17/2023    Fatigue, unspecified type  -     TSH; Future; Expected date: 02/17/2023    Myalgia  -     Sedimentation rate; Future; Expected date: 02/17/2023  -     C-Reactive Protein; Future; Expected date: 02/17/2023    Hyperglycemia  -     Hemoglobin A1C; Future; Expected date: 02/17/2023    Mild vitamin D deficiency  -     Vitamin D; Future; Expected date: 02/17/2023    Edema, unspecified type  -     Microalbumin/Creatinine Ratio, Urine; Future; Expected date: 02/17/2023  -     B-TYPE NATRIURETIC PEPTIDE; Future; Expected date: 02/17/2023  -      Lower Extremity Veins Bilateral; Future; Expected date: 02/17/2023    Chronic pain of both knees  -     X-ray AP Standing Knees with Both Latera; Future; Expected date: 02/17/2023  -     Ambulatory referral/consult to Orthopedics; Future; Expected date: 02/24/2023    Other orders  -     spironolactone (ALDACTONE) 25 MG tablet; Take 1 tablet (25 mg total) by mouth once daily.  Dispense: 30 tablet; Refill: 3  -     azithromycin (Z-DONTAE) 250 MG tablet; Two today then one daily thereafter  Dispense: 6 tablet; Refill: 0  -     nystatin (MYCOSTATIN) cream; Apply topically 2 (two) times daily.  Dispense: 30 g; Refill: 1  -     triamcinolone acetonide 0.1% (KENALOG) 0.1 % cream; Apply topically 2 (two) times daily.  Dispense: 45 g; Refill: 0  -     LORazepam (ATIVAN) 0.5 MG tablet; TAKE 1/2 TO 1 TABLET BY MOUTH DAILY AS NEEDED FOR ANXIETY  Dispense: 30 tablet; Refill: 0    Recurrent depressive disorder   Stable on lexapro    Stop  amlodipine due to edema - start spironolactone for HTN    Sinusitis - treat with z ailyn

## 2023-02-18 PROBLEM — F33.41 RECURRENT MAJOR DEPRESSIVE DISORDER, IN PARTIAL REMISSION: Status: ACTIVE | Noted: 2023-02-18

## 2023-02-18 LAB
BILIRUB UR QL STRIP: NEGATIVE
CLARITY UR REFRACT.AUTO: CLEAR
COLOR UR AUTO: YELLOW
GLUCOSE UR QL STRIP: NEGATIVE
HGB UR QL STRIP: ABNORMAL
KETONES UR QL STRIP: NEGATIVE
LEUKOCYTE ESTERASE UR QL STRIP: NEGATIVE
NITRITE UR QL STRIP: NEGATIVE
PH UR STRIP: 6 [PH] (ref 5–8)
PROT UR QL STRIP: NEGATIVE
SP GR UR STRIP: 1.02 (ref 1–1.03)
URN SPEC COLLECT METH UR: ABNORMAL

## 2023-02-20 ENCOUNTER — TELEPHONE (OUTPATIENT)
Dept: INTERNAL MEDICINE | Facility: CLINIC | Age: 73
End: 2023-02-20

## 2023-02-20 ENCOUNTER — TELEPHONE (OUTPATIENT)
Dept: INTERNAL MEDICINE | Facility: CLINIC | Age: 73
End: 2023-02-20
Payer: MEDICARE

## 2023-02-20 LAB — BACTERIA UR CULT: ABNORMAL

## 2023-02-20 RX ORDER — CIPROFLOXACIN 500 MG/1
500 TABLET ORAL 2 TIMES DAILY
Qty: 14 TABLET | Refills: 0 | Status: SHIPPED | OUTPATIENT
Start: 2023-02-20 | End: 2023-03-30

## 2023-02-20 NOTE — TELEPHONE ENCOUNTER
----- Message from Bridgte Cantu MD sent at 2/20/2023  1:05 PM CST -----  Please inform pt of UTI and that antibiotics were sent in.

## 2023-02-22 ENCOUNTER — OFFICE VISIT (OUTPATIENT)
Dept: GYNECOLOGIC ONCOLOGY | Facility: CLINIC | Age: 73
End: 2023-02-22
Payer: MEDICARE

## 2023-02-22 ENCOUNTER — LAB VISIT (OUTPATIENT)
Dept: LAB | Facility: OTHER | Age: 73
End: 2023-02-22
Attending: INTERNAL MEDICINE
Payer: MEDICARE

## 2023-02-22 VITALS
SYSTOLIC BLOOD PRESSURE: 139 MMHG | HEIGHT: 70 IN | DIASTOLIC BLOOD PRESSURE: 63 MMHG | WEIGHT: 208.63 LBS | HEART RATE: 65 BPM | BODY MASS INDEX: 29.87 KG/M2

## 2023-02-22 DIAGNOSIS — N95.2 VAGINAL ATROPHY: ICD-10-CM

## 2023-02-22 DIAGNOSIS — M79.10 MYALGIA: ICD-10-CM

## 2023-02-22 DIAGNOSIS — R53.83 FATIGUE, UNSPECIFIED TYPE: ICD-10-CM

## 2023-02-22 DIAGNOSIS — N90.89 VULVAR IRRITATION: ICD-10-CM

## 2023-02-22 DIAGNOSIS — R87.629 ABNORMAL PAP SMEAR OF VAGINA: ICD-10-CM

## 2023-02-22 DIAGNOSIS — E55.9 MILD VITAMIN D DEFICIENCY: ICD-10-CM

## 2023-02-22 DIAGNOSIS — R60.9 EDEMA, UNSPECIFIED TYPE: ICD-10-CM

## 2023-02-22 DIAGNOSIS — Z01.419 ENCOUNTER FOR GYNECOLOGICAL EXAMINATION: Primary | ICD-10-CM

## 2023-02-22 DIAGNOSIS — E78.5 HYPERLIPIDEMIA, UNSPECIFIED HYPERLIPIDEMIA TYPE: ICD-10-CM

## 2023-02-22 DIAGNOSIS — R73.9 HYPERGLYCEMIA: ICD-10-CM

## 2023-02-22 LAB
25(OH)D3+25(OH)D2 SERPL-MCNC: 35 NG/ML (ref 30–96)
BNP SERPL-MCNC: 52 PG/ML (ref 0–99)
CHOLEST SERPL-MCNC: 101 MG/DL (ref 120–199)
CHOLEST/HDLC SERPL: 2.5 {RATIO} (ref 2–5)
CRP SERPL-MCNC: 2.5 MG/L (ref 0–8.2)
ERYTHROCYTE [SEDIMENTATION RATE] IN BLOOD: 25 MM/HR (ref 0–20)
ESTIMATED AVG GLUCOSE: 117 MG/DL (ref 68–131)
HBA1C MFR BLD: 5.7 % (ref 4–5.6)
HDLC SERPL-MCNC: 41 MG/DL (ref 40–75)
HDLC SERPL: 40.6 % (ref 20–50)
LDLC SERPL CALC-MCNC: 49.8 MG/DL (ref 63–159)
NONHDLC SERPL-MCNC: 60 MG/DL
TRIGL SERPL-MCNC: 51 MG/DL (ref 30–150)
TSH SERPL DL<=0.005 MIU/L-ACNC: 1.93 UIU/ML (ref 0.4–4)

## 2023-02-22 PROCEDURE — 3008F BODY MASS INDEX DOCD: CPT | Mod: CPTII,S$GLB,, | Performed by: OBSTETRICS & GYNECOLOGY

## 2023-02-22 PROCEDURE — 85651 RBC SED RATE NONAUTOMATED: CPT | Performed by: INTERNAL MEDICINE

## 2023-02-22 PROCEDURE — 3078F PR MOST RECENT DIASTOLIC BLOOD PRESSURE < 80 MM HG: ICD-10-PCS | Mod: CPTII,S$GLB,, | Performed by: OBSTETRICS & GYNECOLOGY

## 2023-02-22 PROCEDURE — 86140 C-REACTIVE PROTEIN: CPT | Performed by: INTERNAL MEDICINE

## 2023-02-22 PROCEDURE — 99999 PR PBB SHADOW E&M-EST. PATIENT-LVL III: CPT | Mod: PBBFAC,,, | Performed by: OBSTETRICS & GYNECOLOGY

## 2023-02-22 PROCEDURE — 1101F PT FALLS ASSESS-DOCD LE1/YR: CPT | Mod: CPTII,S$GLB,, | Performed by: OBSTETRICS & GYNECOLOGY

## 2023-02-22 PROCEDURE — 3078F DIAST BP <80 MM HG: CPT | Mod: CPTII,S$GLB,, | Performed by: OBSTETRICS & GYNECOLOGY

## 2023-02-22 PROCEDURE — 3288F PR FALLS RISK ASSESSMENT DOCUMENTED: ICD-10-PCS | Mod: CPTII,S$GLB,, | Performed by: OBSTETRICS & GYNECOLOGY

## 2023-02-22 PROCEDURE — 82306 VITAMIN D 25 HYDROXY: CPT | Performed by: INTERNAL MEDICINE

## 2023-02-22 PROCEDURE — 83036 HEMOGLOBIN GLYCOSYLATED A1C: CPT | Performed by: INTERNAL MEDICINE

## 2023-02-22 PROCEDURE — 36415 COLL VENOUS BLD VENIPUNCTURE: CPT | Performed by: INTERNAL MEDICINE

## 2023-02-22 PROCEDURE — 99999 PR PBB SHADOW E&M-EST. PATIENT-LVL III: ICD-10-PCS | Mod: PBBFAC,,, | Performed by: OBSTETRICS & GYNECOLOGY

## 2023-02-22 PROCEDURE — 1159F MED LIST DOCD IN RCRD: CPT | Mod: CPTII,S$GLB,, | Performed by: OBSTETRICS & GYNECOLOGY

## 2023-02-22 PROCEDURE — 1101F PR PT FALLS ASSESS DOC 0-1 FALLS W/OUT INJ PAST YR: ICD-10-PCS | Mod: CPTII,S$GLB,, | Performed by: OBSTETRICS & GYNECOLOGY

## 2023-02-22 PROCEDURE — 1125F AMNT PAIN NOTED PAIN PRSNT: CPT | Mod: CPTII,S$GLB,, | Performed by: OBSTETRICS & GYNECOLOGY

## 2023-02-22 PROCEDURE — 83880 ASSAY OF NATRIURETIC PEPTIDE: CPT | Performed by: INTERNAL MEDICINE

## 2023-02-22 PROCEDURE — 3008F PR BODY MASS INDEX (BMI) DOCUMENTED: ICD-10-PCS | Mod: CPTII,S$GLB,, | Performed by: OBSTETRICS & GYNECOLOGY

## 2023-02-22 PROCEDURE — 1159F PR MEDICATION LIST DOCUMENTED IN MEDICAL RECORD: ICD-10-PCS | Mod: CPTII,S$GLB,, | Performed by: OBSTETRICS & GYNECOLOGY

## 2023-02-22 PROCEDURE — 3075F PR MOST RECENT SYSTOLIC BLOOD PRESS GE 130-139MM HG: ICD-10-PCS | Mod: CPTII,S$GLB,, | Performed by: OBSTETRICS & GYNECOLOGY

## 2023-02-22 PROCEDURE — 3075F SYST BP GE 130 - 139MM HG: CPT | Mod: CPTII,S$GLB,, | Performed by: OBSTETRICS & GYNECOLOGY

## 2023-02-22 PROCEDURE — 1125F PR PAIN SEVERITY QUANTIFIED, PAIN PRESENT: ICD-10-PCS | Mod: CPTII,S$GLB,, | Performed by: OBSTETRICS & GYNECOLOGY

## 2023-02-22 PROCEDURE — 84443 ASSAY THYROID STIM HORMONE: CPT | Performed by: INTERNAL MEDICINE

## 2023-02-22 PROCEDURE — 99214 PR OFFICE/OUTPT VISIT, EST, LEVL IV, 30-39 MIN: ICD-10-PCS | Mod: S$GLB,,, | Performed by: OBSTETRICS & GYNECOLOGY

## 2023-02-22 PROCEDURE — 88175 CYTOPATH C/V AUTO FLUID REDO: CPT | Performed by: OBSTETRICS & GYNECOLOGY

## 2023-02-22 PROCEDURE — 80061 LIPID PANEL: CPT | Performed by: INTERNAL MEDICINE

## 2023-02-22 PROCEDURE — 3288F FALL RISK ASSESSMENT DOCD: CPT | Mod: CPTII,S$GLB,, | Performed by: OBSTETRICS & GYNECOLOGY

## 2023-02-22 PROCEDURE — 99214 OFFICE O/P EST MOD 30 MIN: CPT | Mod: S$GLB,,, | Performed by: OBSTETRICS & GYNECOLOGY

## 2023-02-22 RX ORDER — ESTRADIOL 0.1 MG/G
CREAM VAGINAL
Qty: 42.5 G | Refills: 4 | Status: SHIPPED | OUTPATIENT
Start: 2023-02-22 | End: 2023-10-03 | Stop reason: SDUPTHER

## 2023-02-22 NOTE — PROGRESS NOTES
Subjective:      Patient ID: Marleny Guzman is a 72 y.o. female.    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 Gonzalez. Vaginal pap smear 6/15/16 HSIL. She was referred for consult by Dr. Valles. 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      Follow up pap 1/30/17 HSIL. 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      Pap 7/24/17 ASCUS HPV 18+   Pap 1/22/18 LSIL, normal exam  Pap 8/2018 normal, normal exam.   Pap 9/2019 ASCUS, HPV 18+, normal exam   Pap 3/2020 negative   Pap/HPV 3/2021 negative  Pap 4/2022 negative.      Presents today for follow up. Denies bleeding. Using vaginal estrogen.   Review of Systems   Constitutional:  Negative for appetite change, chills, fatigue and fever.   HENT:  Negative for mouth sores.    Respiratory:  Negative for cough and shortness of breath.    Cardiovascular:  Negative for leg swelling.   Gastrointestinal:  Negative for abdominal pain, blood in stool, constipation and diarrhea.   Endocrine: Negative for cold intolerance.   Genitourinary:  Negative for dysuria and vaginal bleeding.   Musculoskeletal:  Negative for myalgias.   Skin:  Negative for rash.   Allergic/Immunologic: Negative.    Neurological:  Negative for weakness and numbness.   Hematological:  Negative for adenopathy. Does not bruise/bleed easily.   Psychiatric/Behavioral:  Negative for confusion.      Objective:   Physical Exam:   Constitutional: She is oriented to person, place, and time. She appears well-developed and well-nourished.    HENT:   Head: Normocephalic and atraumatic.    Eyes: Pupils are equal, round, and reactive to light. EOM are normal.    Neck: No thyromegaly present.    Cardiovascular:  Normal rate, regular rhythm and intact distal pulses.             Pulmonary/Chest: Effort normal and breath sounds normal. No respiratory distress. She has no wheezes.        Abdominal:  Soft. Bowel sounds are normal. She exhibits no distension and no mass. There is no abdominal tenderness.     Genitourinary:    Vagina and rectum normal.      Pelvic exam was performed with patient supine.   There is no lesion on the right labia. There is no lesion on the left labia. Right adnexum displays no mass. Left adnexum displays no mass. Vaginal cuff normal.  Cervix is absent.   pap smear completedUterus is absent.           Musculoskeletal: Normal range of motion and moves all extremeties.      Lymphadenopathy:     She has no cervical adenopathy. No inguinal adenopathy noted on the right or left side.        Right: No supraclavicular adenopathy present.        Left: No supraclavicular adenopathy present.    Neurological: She is alert and oriented to person, place, and time.    Skin: Skin is warm and dry. No rash noted.    Psychiatric: She has a normal mood and affect.     Assessment:     1. Encounter for gynecological examination    2. Abnormal Pap smear of vagina    3. Vulvar irritation    4. Vaginal atrophy        Plan:   No orders of the defined types were placed in this encounter.    Exam without gross abnormal findings today.   Surveillance pap collected.   Will plan for continued surveillance pending the above results. Can likely transition back to primary ob/gyn care if pap remains negative. Negative since 2020 now.     I spent approximately 30 minutes reviewing the available records and evaluating the patient, out of which over 50% of the time was spent face to face with the patient in counseling and coordinating this patient's care.

## 2023-02-24 ENCOUNTER — TELEPHONE (OUTPATIENT)
Dept: INTERNAL MEDICINE | Facility: CLINIC | Age: 73
End: 2023-02-24
Payer: MEDICARE

## 2023-02-27 LAB
FINAL PATHOLOGIC DIAGNOSIS: NORMAL
Lab: NORMAL

## 2023-03-04 PROCEDURE — 99285 EMERGENCY DEPT VISIT HI MDM: CPT | Mod: 25

## 2023-03-05 ENCOUNTER — HOSPITAL ENCOUNTER (EMERGENCY)
Facility: OTHER | Age: 73
Discharge: HOME | End: 2023-03-05
Attending: EMERGENCY MEDICINE
Payer: MEDICARE

## 2023-03-05 VITALS
RESPIRATION RATE: 18 BRPM | HEART RATE: 61 BPM | WEIGHT: 200 LBS | DIASTOLIC BLOOD PRESSURE: 75 MMHG | HEIGHT: 70 IN | OXYGEN SATURATION: 99 % | TEMPERATURE: 98 F | BODY MASS INDEX: 28.63 KG/M2 | SYSTOLIC BLOOD PRESSURE: 152 MMHG

## 2023-03-05 DIAGNOSIS — S80.02XA CONTUSION OF LEFT KNEE, INITIAL ENCOUNTER: ICD-10-CM

## 2023-03-05 DIAGNOSIS — T14.90XA TRAUMA: ICD-10-CM

## 2023-03-05 DIAGNOSIS — S00.33XA CONTUSION OF NOSE, INITIAL ENCOUNTER: ICD-10-CM

## 2023-03-05 DIAGNOSIS — S16.1XXA CERVICAL STRAIN, ACUTE, INITIAL ENCOUNTER: ICD-10-CM

## 2023-03-05 DIAGNOSIS — M47.812 OSTEOARTHRITIS OF CERVICAL SPINE, UNSPECIFIED SPINAL OSTEOARTHRITIS COMPLICATION STATUS: ICD-10-CM

## 2023-03-05 DIAGNOSIS — S80.01XA CONTUSION OF RIGHT KNEE, INITIAL ENCOUNTER: ICD-10-CM

## 2023-03-05 DIAGNOSIS — S09.90XA INJURY OF HEAD, INITIAL ENCOUNTER: Primary | ICD-10-CM

## 2023-03-05 PROCEDURE — 63600175 PHARM REV CODE 636 W HCPCS: Performed by: EMERGENCY MEDICINE

## 2023-03-05 PROCEDURE — 96372 THER/PROPH/DIAG INJ SC/IM: CPT | Performed by: EMERGENCY MEDICINE

## 2023-03-05 PROCEDURE — 25000003 PHARM REV CODE 250: Performed by: EMERGENCY MEDICINE

## 2023-03-05 RX ORDER — OXYCODONE AND ACETAMINOPHEN 5; 325 MG/1; MG/1
1 TABLET ORAL
Status: COMPLETED | OUTPATIENT
Start: 2023-03-05 | End: 2023-03-05

## 2023-03-05 RX ORDER — OXYCODONE AND ACETAMINOPHEN 5; 325 MG/1; MG/1
1 TABLET ORAL EVERY 6 HOURS PRN
Qty: 5 TABLET | Refills: 0 | Status: SHIPPED | OUTPATIENT
Start: 2023-03-05 | End: 2023-03-08

## 2023-03-05 RX ORDER — ORPHENADRINE CITRATE 100 MG/1
100 TABLET, EXTENDED RELEASE ORAL DAILY PRN
Qty: 5 TABLET | Refills: 0 | Status: SHIPPED | OUTPATIENT
Start: 2023-03-05 | End: 2023-03-10

## 2023-03-05 RX ORDER — IBUPROFEN 600 MG/1
600 TABLET ORAL EVERY 6 HOURS PRN
Qty: 9 TABLET | Refills: 0 | Status: SHIPPED | OUTPATIENT
Start: 2023-03-05 | End: 2023-03-08

## 2023-03-05 RX ORDER — ORPHENADRINE CITRATE 30 MG/ML
30 INJECTION INTRAMUSCULAR; INTRAVENOUS
Status: COMPLETED | OUTPATIENT
Start: 2023-03-05 | End: 2023-03-05

## 2023-03-05 RX ADMIN — OXYCODONE HYDROCHLORIDE AND ACETAMINOPHEN 1 TABLET: 5; 325 TABLET ORAL at 02:03

## 2023-03-05 RX ADMIN — ORPHENADRINE CITRATE 30 MG: 30 INJECTION INTRAMUSCULAR; INTRAVENOUS at 05:03

## 2023-03-05 NOTE — ED PROVIDER NOTES
Encounter Date: 3/4/2023    SCRIBE #1 NOTE: ILaxmi, am scribing for, and in the presence of,  Tenzin Casas MD.     History     Chief Complaint   Patient presents with    Back Pain     Pt had a ground level trip and fall, pt struck her chest and head on the ground - pt is having facial, head, neck, back and left knee pain - pt is unsure if she had a loss of consciousness      Time seen by provider: 2:45 AM    This is a 72 y.o. female who presents with complaint of neck pain after mechanical trip and fall on her face in which she believes she lost consciousness. She notes facial pain and swelling, neck pain that radiates down her spine, and bilateral shoulder pain. She denies lower back pain.    The history is provided by the patient.   Review of patient's allergies indicates:  No Known Allergies  Past Medical History:   Diagnosis Date    Abnormal Pap smear of vagina 07/20/2016    Anemia     Cataract     Chronic bilateral low back pain without sciatica 03/13/2018    Depression with anxiety     Hypertension     Osteoarthritis 06/20/2013    Right rotator cuff tear     Sleep apnea     Cipap machine at home    Urinary incontinence     Leaks urine with laughing/ coughing/ sneezing     Past Surgical History:   Procedure Laterality Date    CATARACT EXTRACTION  6/18/12    OS    CATARACT EXTRACTION  7/16/12    OD    CERVIX SURGERY      Conization    COLONOSCOPY      COLONOSCOPY N/A 3/4/2016    Procedure: COLONOSCOPY;  Surgeon: Tenzin Thakkar MD;  Location: [address]);  Service: Endoscopy;  Laterality: N/A;    COLONOSCOPY Left 1/25/2021    Procedure: COLONOSCOPY;  Surgeon: Wayne Naranjo MD;  Location: [facility];  Service: Endoscopy;  Laterality: Left;    CYSTOSCOPY N/A 9/12/2022    Procedure: CYSTOSCOPY;  Surgeon: Bianka Aragon MD;  Location: [address];  Service: Urology;  Laterality: N/A;    ESOPHAGOGASTRODUODENOSCOPY Left 1/25/2021    Procedure: EGD (ESOPHAGOGASTRODUODENOSCOPY);   "Surgeon: Wayne Naranjo MD;  Location: [facility] ENDO;  Service: Endoscopy;  Laterality: Left;    EYE SURGERY Bilateral     cataract    HYSTERECTOMY  5-12-15    LEEP      LUMBAR EPIDURAL INJECTION      OOPHORECTOMY      OH REMOVAL OF OVARY/TUBE(S)  5-12-15    RETROGRADE PYELOGRAPHY Bilateral 9/12/2022    Procedure: PYELOGRAM, RETROGRADE;  Surgeon: Bianka Aragon MD;  Location: [facility] OR 1ST FLR;  Service: Urology;  Laterality: Bilateral;    ROBOT-ASSISTED REPAIR OF VENTRAL HERNIA USING DA ASPEN XI N/A 11/29/2021    Procedure: XI ROBOTIC REPAIR, HERNIA, VENTRAL w/ possible mesh;  Surgeon: Roney Jackson MD;  Location: [facility] OR 2ND FLR;  Service: General;  Laterality: N/A;  Anesthesia Block    SALPINGECTOMY Left     SHOULDER SURGERY Right      Family History   Problem Relation Age of Onset    Cancer Mother         THYROID CANCER    Stroke Mother     Hypertension Father     Cataracts Father     Heart disease Sister     Pacemaker/defibrilator Sister     Hypertension Sister     Deep vein thrombosis Sister     Heart disease Sister         CABG    Osteoarthritis Sister     Edema Sister     Eczema Sister     Hypertension Sister     Breast cancer Sister 72    Anuerysm Sister     Drug abuse Sister     Hypertension Sister     No Known Problems Sister     Cancer Sister         Cancer cells or "growth in her stomach"    No Known Problems Sister     No Known Problems Brother     Depression Maternal Grandmother     Ulcers Maternal Grandfather         Legs    No Known Problems Paternal Grandmother     No Known Problems Paternal Grandfather     Hypertension Daughter     Other Daughter         Heart palpitations    Cancer Maternal Aunt     Breast cancer Cousin     ADD / ADHD Neg Hx     Alcohol abuse Neg Hx     Anxiety disorder Neg Hx     Bipolar disorder Neg Hx     Dementia Neg Hx     OCD Neg Hx     Paranoid behavior Neg Hx     Physical abuse Neg Hx     Schizophrenia Neg Hx     Seizures Neg Hx     Sexual abuse Neg Hx     " Amblyopia Neg Hx     Blindness Neg Hx     Glaucoma Neg Hx     Macular degeneration Neg Hx     Retinal detachment Neg Hx     Strabismus Neg Hx     Anesthesia problems Neg Hx      Social History     Tobacco Use    Smoking status: Former     Packs/day: 0.50     Years: 22.00     Pack years: 11.00     Types: Cigarettes     Quit date: 1997     Years since quittin.2    Smokeless tobacco: Never    Tobacco comments:     Some wheezing since COVID-19 infection-2020   Substance Use Topics    Alcohol use: Yes     Alcohol/week: 0.0 standard drinks     Comment: Occassionally for social events will have a glass of wine    Drug use: No     Review of Systems   Constitutional:  Negative for chills and fever.   HENT:  Negative for congestion and sore throat.    Eyes:  Negative for photophobia and redness.   Respiratory:  Negative for cough and shortness of breath.    Cardiovascular:  Negative for chest pain.   Gastrointestinal:  Negative for abdominal pain, nausea and vomiting.   Genitourinary:  Negative for dysuria.   Musculoskeletal:  Positive for myalgias and neck pain. Negative for back pain.   Skin:  Negative for rash.   Neurological:  Negative for weakness, light-headedness and headaches.   Psychiatric/Behavioral:  Negative for confusion.      Physical Exam     Initial Vitals [23 2201]   BP Pulse Resp Temp SpO2   (!) 201/83 63 16 98.1 °F (36.7 °C) 99 %      MAP       --         Physical Exam    Nursing note and vitals reviewed.  Constitutional: She appears well-developed and well-nourished. She is not diaphoretic. No distress.   HENT:   Head: Normocephalic and atraumatic.   Mouth/Throat: Oropharynx is clear and moist and mucous membranes are normal.   Eyes: Conjunctivae and EOM are normal. Pupils are equal, round, and reactive to light. No scleral icterus.   Neck: Neck supple.   Normal range of motion.  Cardiovascular:  Normal rate, regular rhythm, S1 normal and S2 normal.           No murmur heard.  DP and PT  pulse 2+. Bilateral radial pulse 2+.   Pulmonary/Chest: Breath sounds normal. No respiratory distress.   Abdominal: Abdomen is soft. Bowel sounds are normal. There is no abdominal tenderness.   Musculoskeletal:         General: Tenderness present. No edema.      Cervical back: Normal range of motion and neck supple.      Comments: Bilateral tenderness over patella, worse in right knee. No deformities, crepitus, or step offs to bilateral patella. No tenderness over left shoulder or collarbone. No deformities, crepitus, or step offs. Bilateral paraspinal and midline tenderness to C4 and C5 extending to bilateral trapezius. Edema and swelling of nose with tenderness. No other facial bone tenderness to palpation, crepitus, or jolly poffs. No septal hematoma.     Lymphadenopathy:     She has no cervical adenopathy.   Neurological: She is alert and oriented to person, place, and time. She has normal strength.   Sensation intact to light touch. Strength 5/5.   Skin: Skin is warm and dry. Capillary refill takes less than 2 seconds. No rash noted. No pallor.   Psychiatric: She has a normal mood and affect. Thought content normal.       ED Course   Procedures  Labs Reviewed - No data to display       Imaging Results              X-Ray Knee 3 View Bilateral (Final result)  Result time 03/05/23 04:11:40      Final result by Ivan Peterson MD (03/05/23 04:11:40)                   Impression:      Chronic changes are noted, there is no evidence for acute fracture or dislocation, close clinical and historical correlation is needed to determine need for additional follow-up.      Electronically signed by: Ivan Peterson  Date:    03/05/2023  Time:    04:11               Narrative:    EXAMINATION:  XR KNEE 3 VIEW BILATERAL    CLINICAL HISTORY:  Injury, unspecified, initial encounter    TECHNIQUE:  AP, lateral, and Merchant views of both knees were performed.    COMPARISON:  None    FINDINGS:  The visualized osseous structures  demonstrate chronic change.  Bilaterally there is patellofemoral joint space narrowing laterally.  Bilaterally there is appearance likely relating to chondrocalcinosis at the femoral tibial joint.  Mild femoral tibial joint space narrowing is noted.    There is no evidence for osseous destructive process, acute fracture or dislocation bilaterally, there is no radiographically detectable radiopaque soft tissue foreign body.                                       CT Cervical Spine Without Contrast (Final result)  Result time 03/05/23 04:03:43      Final result by Ivan Peterson MD (03/05/23 04:03:43)                   Impression:      Chronic changes of the cervical spine are noted, correlation for any specific level of symptomatology is needed.    On close evaluation of available imaging, there is no evidence for acute cervical spine fracture deformity.      Electronically signed by: Ivan Peterson  Date:    03/05/2023  Time:    04:03               Narrative:    EXAMINATION:  CT CERVICAL SPINE WITHOUT CONTRAST    CLINICAL HISTORY:  Neck trauma (Age >= 65y);    TECHNIQUE:  Low dose axial images, sagittal and coronal reformations were performed though the cervical spine.  Contrast was not administered.    COMPARISON:  None    FINDINGS:  There is straightening of the cervical spine.  There is minimal grade 1 anterolisthesis of C5 with respect to C6.  There is multilevel prominent chronic endplate change and loss of disc space height.  There is no evidence for high-grade spondylolisthesis, there is no evidence for high-grade or acute compression fracture deformity.  Facet arthropathy is noted, there is no evidence for facet dislocation or facet fracture deformity.  The occipital condyles articulate appropriately with the superior articular facets of C1 at the craniocervical junction.    There is chronic change at the craniocervical junction and at C1-2.    At the C2-3 level there is disc osteophyte disease without  high-grade spinal canal or foraminal stenosis.    The C3-4 level demonstrates disc osteophyte disease mild anterior impression upon the dural sac.  There is uncovertebral spurring and facet arthropathy, there is no high-grade spinal canal or foraminal stenosis.    The C4-5 level demonstrates disc osteophyte disease with mild anterior impression upon the dural sac.  There is uncovertebral spurring and facet arthropathy, there is mild right foraminal narrowing, no high-grade spinal canal stenosis.    The C5-6 level demonstrates disc osteophyte disease, there is no high-grade spinal canal stenosis.  Uncovertebral spurring and facet arthropathy are noted with mild right foraminal narrowing.    The C6-7 level demonstrates disc osteophyte disease, there is no evidence for high-grade spinal canal or foraminal stenosis.    The C7-T1 level demonstrates mild disc osteophyte disease mild asymmetry to the left.  There is no evidence for high-grade spinal canal or foraminal stenosis.    There is no evidence for acute cervical spine fracture deformity.    Parenchymal and pleural changes at the lung apices appears stable when compared to a chest CT of March 25, 2022.                                       CT Maxillofacial Without Contrast (Final result)  Result time 03/05/23 04:03:34      Final result by Ivan Peterson MD (03/05/23 04:03:34)                   Impression:      There is no evidence for acute facial or orbital fracture deformity.    Paranasal sinus findings as above.      Electronically signed by: Ivan Peterson  Date:    03/05/2023  Time:    04:03               Narrative:    EXAMINATION:  CT MAXILLOFACIAL WITHOUT CONTRAST    CLINICAL HISTORY:  Facial trauma, blunt;    TECHNIQUE:  Low dose axial images, sagittal and coronal reformations were obtained through the face.  Contrast was not administered.    COMPARISON:  None    FINDINGS:  The globes, extraocular muscles and optic nerves of the orbits appear intact,  there is no evidence for retrobulbar hematoma.  There is mild paranasal sinus mucosal thickening throughout the paranasal sinuses.  There is osseous wall thickening of the right maxillary antrum, this may relate to chronic sinusitis, there is a small defect at the inferior aspect of the right maxillary antrum, this appears chronic.  Mild opacity of the left sphenoid sinus may relate to a viscous air-fluid level or mucous retention cyst.  The mastoid air cells and middle ear cavity bilaterally appear appropriate.    The temporomandibular joints appear intact with chronic change noted.  There are chronic appearing dental changes noted.  This includes lack of maxillary dentition.    The osseous structures overall demonstrate chronic change, there is no evidence for acute facial or orbital fracture deformity.                                       CT Head Without Contrast (Final result)  Result time 03/05/23 03:35:49      Final result by Ivan Peterson MD (03/05/23 03:35:49)                   Impression:      Chronic change noted, there is no evidence for acute intracranial process.    Paranasal sinus findings as above.      Electronically signed by: Ivan Peterson  Date:    03/05/2023  Time:    03:35               Narrative:    EXAMINATION:  CT HEAD WITHOUT CONTRAST    CLINICAL HISTORY:  Head trauma, moderate-severe;    TECHNIQUE:  Low dose axial images were obtained through the head.  Coronal and sagittal reformations were also performed. Contrast was not administered.    COMPARISON:  CT examination of the brain without contrast January 17, 2021    FINDINGS:  The ventricular system, sulcal pattern and parenchymal attenuation characteristics are consistent with chronic change.  Involutional change noted, chronic appearing white matter change noted.    There is no evidence for acute intracranial process.  There is no evidence for intracranial mass, mass effect or midline shift and there is no evidence for acute  intracranial hemorrhage.  Appropriate CSF spaces are seen at the skull base.    The visualized orbits appear intact.  The mastoid air cells appear well aerated.  There is mild paranasal sinus mucosal thickening, there is a finding of the left sphenoid sinus that may relate to a viscous air-fluid level or a mucous retention cyst.  There is osseous wall thickening of the visualized right maxillary antrum this may relate to chronic sinus disease.  The osseous structures appear intact.                                    X-Rays:   Independently Interpreted Readings:   Other Readings:  Bilateral knee xray:  No acute fractures or dislocations visualized.  Medications   oxyCODONE-acetaminophen 5-325 mg per tablet 1 tablet (1 tablet Oral Given 3/5/23 4446)   orphenadrine injection 30 mg (30 mg Intramuscular Given 3/5/23 3089)     Medical Decision Making:   History:   Old Medical Records: I decided to obtain old medical records.  Independently Interpreted Test(s):   I have ordered and independently interpreted X-rays - see prior notes.  Clinical Tests:   Radiological Study: Ordered and Reviewed        Scribe Attestation:   Scribe #1: I performed the above scribed service and the documentation accurately describes the services I performed. I attest to the accuracy of the note.    Attending Attestation:             Attending ED Notes:   Emergent evaluation of a 36-year-old female with face, head, knee, neck and back pain status post trip and fall.  Patient is afebrile, nontoxic appearing with stable vital signs.  Patient is neurovascularly intact without focal neurologic deficits. Cranial nerves II-XII intact. Strength 5/5 throughout. Sensation intact to light touch throughout. Good finger to nose task ability. Negative pronator drift. Two point discrimination is intact throughout. Reflexes 2+ throughout. No papilledema.  No meningeal signs. PERRLA. EOMI.  No midline C-spine, T-spine or L-spine tenderness to palpation, crepitus  or step-offs.  No acute findings on CT of the head.  No acute findings on maxillofacial CT.  No acute findings on CT of the cervical spine.  X-ray bilateral knees reveal no acute fractures or dislocations.  The patient is extensively counseled on their diagnosis and treatment including all diagnostic and physical exam findings.  The patient is discharged good condition and directed follow-up with their PCP in the next 24-48 hours.            Physician Attestation for Scribe: I, Tenzin Casas, reviewed documentation as scribed in my presence, which is both accurate and complete.       Clinical Impression:   Final diagnoses:  [T14.90XA] Trauma  [S09.90XA] Injury of head, initial encounter (Primary)  [S16.1XXA] Cervical strain, acute, initial encounter  [S00.33XA] Contusion of nose, initial encounter  [S80.01XA] Contusion of right knee, initial encounter  [S80.02XA] Contusion of left knee, initial encounter  [M47.812] Osteoarthritis of cervical spine, unspecified spinal osteoarthritis complication status        ED Disposition Condition    Discharge Good          ED Prescriptions       Medication Sig Dispense Start Date End Date Auth. Provider    ibuprofen (ADVIL,MOTRIN) 600 MG tablet () Take 1 tablet (600 mg total) by mouth every 6 (six) hours as needed for Pain. 9 tablet 3/5/2023 3/8/2023 Tenzin Casas MD    oxyCODONE-acetaminophen (PERCOCET) 5-325 mg per tablet () Take 1 tablet by mouth every 6 (six) hours as needed for Pain. 5 tablet 3/5/2023 3/8/2023 Tenzin Casas MD    orphenadrine (NORFLEX) 100 mg tablet () Take 1 tablet (100 mg total) by mouth daily as needed for Muscle spasms. 5 tablet 3/5/2023 3/10/2023 Tenzin Casas MD          Follow-up Information       Follow up With Specialties Details Why Contact Info Additional Information    Bridget Cantu MD Internal Medicine In 2 days  [address]  [place] [address]  [phone]       [facility]  Spine Services In 2 days  [address], [address]  [facility] [address]  [phone] [facility], 4th Floor Please park in [address] and use [place] elevators    [facility] Orthopedics In 2 days  [address] [facility] [address]  [phone]              Tenzin Casas MD  03/28/23 3345

## 2023-03-06 ENCOUNTER — OFFICE VISIT (OUTPATIENT)
Dept: SPORTS MEDICINE | Facility: CLINIC | Age: 73
End: 2023-03-06
Payer: MEDICARE

## 2023-03-06 ENCOUNTER — HOSPITAL ENCOUNTER (OUTPATIENT)
Dept: RADIOLOGY | Facility: HOSPITAL | Age: 73
Discharge: HOME | End: 2023-03-06
Attending: ORTHOPAEDIC SURGERY
Payer: MEDICARE

## 2023-03-06 VITALS
BODY MASS INDEX: 29.2 KG/M2 | HEART RATE: 64 BPM | SYSTOLIC BLOOD PRESSURE: 125 MMHG | WEIGHT: 204 LBS | DIASTOLIC BLOOD PRESSURE: 61 MMHG | HEIGHT: 70 IN

## 2023-03-06 DIAGNOSIS — M25.561 CHRONIC PAIN OF BOTH KNEES: ICD-10-CM

## 2023-03-06 DIAGNOSIS — M25.562 CHRONIC PAIN OF BOTH KNEES: ICD-10-CM

## 2023-03-06 DIAGNOSIS — G89.29 CHRONIC PAIN OF BOTH KNEES: ICD-10-CM

## 2023-03-06 DIAGNOSIS — M17.12 PRIMARY OSTEOARTHRITIS OF LEFT KNEE: ICD-10-CM

## 2023-03-06 DIAGNOSIS — M17.11 ARTHRITIS OF RIGHT KNEE: Primary | ICD-10-CM

## 2023-03-06 PROCEDURE — 3288F FALL RISK ASSESSMENT DOCD: CPT | Mod: CPTII,S$GLB,, | Performed by: ORTHOPAEDIC SURGERY

## 2023-03-06 PROCEDURE — 20610 LARGE JOINT ASPIRATION/INJECTION: R KNEE: ICD-10-PCS | Mod: RT,S$GLB,, | Performed by: ORTHOPAEDIC SURGERY

## 2023-03-06 PROCEDURE — 99204 OFFICE O/P NEW MOD 45 MIN: CPT | Mod: 25,S$GLB,, | Performed by: ORTHOPAEDIC SURGERY

## 2023-03-06 PROCEDURE — 1125F AMNT PAIN NOTED PAIN PRSNT: CPT | Mod: CPTII,S$GLB,, | Performed by: ORTHOPAEDIC SURGERY

## 2023-03-06 PROCEDURE — 20610 DRAIN/INJ JOINT/BURSA W/O US: CPT | Mod: RT,S$GLB,, | Performed by: ORTHOPAEDIC SURGERY

## 2023-03-06 PROCEDURE — 3078F PR MOST RECENT DIASTOLIC BLOOD PRESSURE < 80 MM HG: ICD-10-PCS | Mod: CPTII,S$GLB,, | Performed by: ORTHOPAEDIC SURGERY

## 2023-03-06 PROCEDURE — 1125F PR PAIN SEVERITY QUANTIFIED, PAIN PRESENT: ICD-10-PCS | Mod: CPTII,S$GLB,, | Performed by: ORTHOPAEDIC SURGERY

## 2023-03-06 PROCEDURE — 1159F PR MEDICATION LIST DOCUMENTED IN MEDICAL RECORD: ICD-10-PCS | Mod: CPTII,S$GLB,, | Performed by: ORTHOPAEDIC SURGERY

## 2023-03-06 PROCEDURE — 3288F PR FALLS RISK ASSESSMENT DOCUMENTED: ICD-10-PCS | Mod: CPTII,S$GLB,, | Performed by: ORTHOPAEDIC SURGERY

## 2023-03-06 PROCEDURE — 3078F DIAST BP <80 MM HG: CPT | Mod: CPTII,S$GLB,, | Performed by: ORTHOPAEDIC SURGERY

## 2023-03-06 PROCEDURE — 1101F PR PT FALLS ASSESS DOC 0-1 FALLS W/OUT INJ PAST YR: ICD-10-PCS | Mod: CPTII,S$GLB,, | Performed by: ORTHOPAEDIC SURGERY

## 2023-03-06 PROCEDURE — 99999 PR PBB SHADOW E&M-EST. PATIENT-LVL IV: CPT | Mod: PBBFAC,,, | Performed by: ORTHOPAEDIC SURGERY

## 2023-03-06 PROCEDURE — 3008F PR BODY MASS INDEX (BMI) DOCUMENTED: ICD-10-PCS | Mod: CPTII,S$GLB,, | Performed by: ORTHOPAEDIC SURGERY

## 2023-03-06 PROCEDURE — 3044F PR MOST RECENT HEMOGLOBIN A1C LEVEL <7.0%: ICD-10-PCS | Mod: CPTII,S$GLB,, | Performed by: ORTHOPAEDIC SURGERY

## 2023-03-06 PROCEDURE — 3008F BODY MASS INDEX DOCD: CPT | Mod: CPTII,S$GLB,, | Performed by: ORTHOPAEDIC SURGERY

## 2023-03-06 PROCEDURE — 99999 PR PBB SHADOW E&M-EST. PATIENT-LVL IV: ICD-10-PCS | Mod: PBBFAC,,, | Performed by: ORTHOPAEDIC SURGERY

## 2023-03-06 PROCEDURE — 73564 X-RAY EXAM KNEE 4 OR MORE: CPT | Mod: 26,50,, | Performed by: RADIOLOGY

## 2023-03-06 PROCEDURE — 3044F HG A1C LEVEL LT 7.0%: CPT | Mod: CPTII,S$GLB,, | Performed by: ORTHOPAEDIC SURGERY

## 2023-03-06 PROCEDURE — 99204 PR OFFICE/OUTPT VISIT, NEW, LEVL IV, 45-59 MIN: ICD-10-PCS | Mod: 25,S$GLB,, | Performed by: ORTHOPAEDIC SURGERY

## 2023-03-06 PROCEDURE — 73564 X-RAY EXAM KNEE 4 OR MORE: CPT | Mod: TC,50

## 2023-03-06 PROCEDURE — 3074F SYST BP LT 130 MM HG: CPT | Mod: CPTII,S$GLB,, | Performed by: ORTHOPAEDIC SURGERY

## 2023-03-06 PROCEDURE — 73564 XR KNEE ORTHO BILAT WITH FLEXION: ICD-10-PCS | Mod: 26,50,, | Performed by: RADIOLOGY

## 2023-03-06 PROCEDURE — 3074F PR MOST RECENT SYSTOLIC BLOOD PRESSURE < 130 MM HG: ICD-10-PCS | Mod: CPTII,S$GLB,, | Performed by: ORTHOPAEDIC SURGERY

## 2023-03-06 PROCEDURE — 1159F MED LIST DOCD IN RCRD: CPT | Mod: CPTII,S$GLB,, | Performed by: ORTHOPAEDIC SURGERY

## 2023-03-06 PROCEDURE — 1101F PT FALLS ASSESS-DOCD LE1/YR: CPT | Mod: CPTII,S$GLB,, | Performed by: ORTHOPAEDIC SURGERY

## 2023-03-06 RX ADMIN — TRIAMCINOLONE ACETONIDE 80 MG: 40 INJECTION, SUSPENSION INTRA-ARTICULAR; INTRAMUSCULAR at 03:03

## 2023-03-06 NOTE — PROGRESS NOTES
CC: Bilateral knee pain  OCCUPATION: Care giver    72 y.o. Female with a history of Bilateral pain who She states that the pain is severe and not responding to any conservative care.  She reports no injuries or traumatic events. Denies injections. Has done HEP previously without much relief.  She uses Ibuprofen and tylenol sometimes which helps some.     + mechanical symptoms, no instability    Is affecting ADLs.      SANE: 74    Review of Systems   Constitution: Negative. Negative for chills, fever and night sweats.   HENT: Negative for congestion and headaches.    Eyes: Negative for blurred vision, left vision loss and right vision loss.   Cardiovascular: Negative for chest pain and syncope.   Respiratory: Negative for cough and shortness of breath.    Endocrine: Negative for polydipsia, polyphagia and polyuria.   Hematologic/Lymphatic: Negative for bleeding problem. Does not bruise/bleed easily.   Skin: Negative for dry skin, itching and rash.   Musculoskeletal: Negative for falls. Positive for knee pain and muscle weakness.   Gastrointestinal: Negative for abdominal pain and bowel incontinence.   Genitourinary: Negative for bladder incontinence and nocturia.   Neurological: Negative for disturbances in coordination, loss of balance and seizures.   Psychiatric/Behavioral: Negative for depression. The patient does not have insomnia.    Allergic/Immunologic: Negative for hives and persistent infections.     PAST MEDICAL HISTORY:   Past Medical History:   Diagnosis Date    Abnormal Pap smear of vagina 07/20/2016    Anemia     Cataract     Chronic bilateral low back pain without sciatica 03/13/2018    Depression with anxiety     Hypertension     Osteoarthritis 06/20/2013    Right rotator cuff tear     Sleep apnea     Cipap machine at home    Urinary incontinence     Leaks urine with laughing/ coughing/ sneezing     PAST SURGICAL HISTORY:   Past Surgical History:   Procedure Laterality Date    CATARACT EXTRACTION   6/18/12    OS    CATARACT EXTRACTION  7/16/12    OD    CERVIX SURGERY      Conization    COLONOSCOPY      COLONOSCOPY N/A 3/4/2016    Procedure: COLONOSCOPY;  Surgeon: Tenzin Thakkar MD;  Location: [facility] (4TH FLR);  Service: Endoscopy;  Laterality: N/A;    COLONOSCOPY Left 1/25/2021    Procedure: COLONOSCOPY;  Surgeon: Wayne Naranjo MD;  Location: [facility] ENDO;  Service: Endoscopy;  Laterality: Left;    CYSTOSCOPY N/A 9/12/2022    Procedure: CYSTOSCOPY;  Surgeon: Bianka Aragon MD;  Location: [facility] OR 1ST FLR;  Service: Urology;  Laterality: N/A;    ESOPHAGOGASTRODUODENOSCOPY Left 1/25/2021    Procedure: EGD (ESOPHAGOGASTRODUODENOSCOPY);  Surgeon: Wayne Naranjo MD;  Location: [facility];  Service: Endoscopy;  Laterality: Left;    EYE SURGERY Bilateral     cataract    HYSTERECTOMY  5-12-15    LEEP      LUMBAR EPIDURAL INJECTION      OOPHORECTOMY      PA REMOVAL OF OVARY/TUBE(S)  5-12-15    RETROGRADE PYELOGRAPHY Bilateral 9/12/2022    Procedure: PYELOGRAM, RETROGRADE;  Surgeon: Bianka Aragon MD;  Location: [facility] OR 1ST FLR;  Service: Urology;  Laterality: Bilateral;    ROBOT-ASSISTED REPAIR OF VENTRAL HERNIA USING DA ASPEN XI N/A 11/29/2021    Procedure: XI ROBOTIC REPAIR, HERNIA, VENTRAL w/ possible mesh;  Surgeon: Roney Jackson MD;  Location: [facility] OR 2ND FLR;  Service: General;  Laterality: N/A;  Anesthesia Block    SALPINGECTOMY Left     SHOULDER SURGERY Right      FAMILY HISTORY:   Family History   Problem Relation Age of Onset    Cancer Mother         THYROID CANCER    Stroke Mother     Hypertension Father     Cataracts Father     Heart disease Sister     Pacemaker/defibrilator Sister     Hypertension Sister     Deep vein thrombosis Sister     Heart disease Sister         CABG    Osteoarthritis Sister     Edema Sister     Eczema Sister     Hypertension Sister     Breast cancer Sister 72    Anuerysm Sister     Drug abuse Sister     Hypertension Sister     No Known Problems Sister      "Cancer Sister         Cancer cells or "growth in her stomach"    No Known Problems Sister     No Known Problems Brother     Depression Maternal Grandmother     Ulcers Maternal Grandfather         Legs    No Known Problems Paternal Grandmother     No Known Problems Paternal Grandfather     Hypertension Daughter     Other Daughter         Heart palpitations    Cancer Maternal Aunt     Breast cancer Cousin     ADD / ADHD Neg Hx     Alcohol abuse Neg Hx     Anxiety disorder Neg Hx     Bipolar disorder Neg Hx     Dementia Neg Hx     OCD Neg Hx     Paranoid behavior Neg Hx     Physical abuse Neg Hx     Schizophrenia Neg Hx     Seizures Neg Hx     Sexual abuse Neg Hx     Amblyopia Neg Hx     Blindness Neg Hx     Glaucoma Neg Hx     Macular degeneration Neg Hx     Retinal detachment Neg Hx     Strabismus Neg Hx     Anesthesia problems Neg Hx      SOCIAL HISTORY:   Social History     Socioeconomic History    Marital status:    Occupational History    Occupation: Disability     Comment: Depression     Employer: DISABLED   Tobacco Use    Smoking status: Former     Packs/day: 0.50     Years: 22.00     Pack years: 11.00     Types: Cigarettes     Quit date: 1997     Years since quittin.1    Smokeless tobacco: Never    Tobacco comments:     Some wheezing since COVID-19 infection-2020   Substance and Sexual Activity    Alcohol use: Yes     Alcohol/week: 0.0 standard drinks     Comment: Occassionally for social events will have a glass of wine    Drug use: No   Other Topics Concern    Caffeine Use Yes    Financial Status: Disabled Yes    Firearms: Does patient have access to a firearm? No    Home situation: lives alone Yes    Spirituality: Active Participation Yes    Education: Unfinished college Yes    Spirituality: Organized Yarsanism Yes    Legal: Arrest history No   Social History Narrative    Marleny currently does operate an automobile.     Social Determinants of Health     Financial Resource Strain: Low Risk  "    Difficulty of Paying Living Expenses: Not hard at all   Food Insecurity: No Food Insecurity    Worried About Running Out of Food in the Last Year: Never true    Ran Out of Food in the Last Year: Never true   Transportation Needs: No Transportation Needs    Lack of Transportation (Medical): No    Lack of Transportation (Non-Medical): No   Physical Activity: Inactive    Days of Exercise per Week: 0 days    Minutes of Exercise per Session: 0 min   Stress: Stress Concern Present    Feeling of Stress : To some extent   Social Connections: Socially Isolated    Frequency of Communication with Friends and Family: More than three times a week    Frequency of Social Gatherings with Friends and Family: Twice a week    Attends Denominational Services: Never    Active Member of Clubs or Organizations: No    Attends Club or Organization Meetings: Never    Marital Status:    Housing Stability: Low Risk     Unable to Pay for Housing in the Last Year: No    Number of Places Lived in the Last Year: 1    Unstable Housing in the Last Year: No       MEDICATIONS:   Current Outpatient Medications:     aspirin 81 MG Chew, TAKE 1 TABLET BY MOUTH EVERY DAY (Patient taking differently: No sig reported), Disp: 90 tablet, Rfl: 1    atorvastatin (LIPITOR) 20 MG tablet, TAKE 1 TABLET BY MOUTH EVERY DAY, Disp: 90 tablet, Rfl: 3    azithromycin (Z-DONTAE) 250 MG tablet, Two today then one daily thereafter, Disp: 6 tablet, Rfl: 0    ciprofloxacin HCl (CIPRO) 500 MG tablet, Take 1 tablet (500 mg total) by mouth 2 (two) times daily., Disp: 14 tablet, Rfl: 0    ergocalciferol (ERGOCALCIFEROL) 50,000 unit Cap, TAKE 1 CAPSULE (50,000 UNITS TOTAL) BY MOUTH EVERY 30 DAYS., Disp: 3 capsule, Rfl: 3    EScitalopram oxalate (LEXAPRO) 20 MG tablet, Take 1 tablet (20 mg total) by mouth once daily. 1.5 tablets daily (Patient taking differently: Take 20 mg by mouth every morning. 1.5 tablets daily), Disp: 90 tablet, Rfl: 3    estradioL (ESTRACE) 0.01 % (0.1  mg/gram) vaginal cream, Use 1 GM nightly vaginally and dime size amount on vulva twice weekly, Disp: 42.5 g, Rfl: 4    ibuprofen (ADVIL,MOTRIN) 600 MG tablet, Take 1 tablet (600 mg total) by mouth every 6 (six) hours as needed for Pain., Disp: 9 tablet, Rfl: 0    irbesartan-hydrochlorothiazide (AVALIDE) 300-12.5 mg per tablet, TAKE 1 TABLET BY MOUTH EVERY DAY, Disp: 90 tablet, Rfl: 1    LORazepam (ATIVAN) 0.5 MG tablet, TAKE 1/2 TO 1 TABLET BY MOUTH DAILY AS NEEDED FOR ANXIETY, Disp: 30 tablet, Rfl: 0    metoprolol succinate (TOPROL-XL) 50 MG 24 hr tablet, Take 1 tablet (50 mg total) by mouth once daily. (Patient taking differently: Take 50 mg by mouth every morning.), Disp: 90 tablet, Rfl: 3    nystatin (MYCOSTATIN) cream, Apply topically 2 (two) times daily., Disp: 30 g, Rfl: 1    orphenadrine (NORFLEX) 100 mg tablet, Take 1 tablet (100 mg total) by mouth daily as needed for Muscle spasms., Disp: 5 tablet, Rfl: 0    oxyCODONE-acetaminophen (PERCOCET) 5-325 mg per tablet, Take 1 tablet by mouth every 6 (six) hours as needed for Pain., Disp: 5 tablet, Rfl: 0    promethazine-dextromethorphan (PROMETHAZINE-DM) 6.25-15 mg/5 mL Syrp, Take 5 mLs by mouth every 4 (four) hours as needed (cough)., Disp: 118 mL, Rfl: 0    pulse oximeter (PULSE OXIMETER) device, by Apply Externally route 2 (two) times a day. Use twice daily at 8 AM and 3 PM and [facility] the [facility] as directed., Disp: 1 each, Rfl: 0    spironolactone (ALDACTONE) 25 MG tablet, Take 1 tablet (25 mg total) by mouth once daily., Disp: 30 tablet, Rfl: 3    triamcinolone acetonide 0.1% (KENALOG) 0.1 % cream, Apply topically 2 (two) times daily., Disp: 45 g, Rfl: 0    albuterol (PROVENTIL/VENTOLIN HFA) 90 mcg/actuation inhaler, Inhale 1-2 puffs into the lungs every 6 (six) hours as needed for Wheezing or Shortness of Breath (Please dispense with spacer)., Disp: 6.7 g, Rfl: 0    azelastine (ASTELIN) 137 mcg (0.1 %) nasal spray, 1 spray (137 mcg total) by Nasal  "route 2 (two) times daily., Disp: 30 mL, Rfl: 0    Current Facility-Administered Medications:     ciprofloxacin HCl tablet 500 mg, 500 mg, Oral, Once, Malu Barragan NP    LIDOcaine HCl 2% urojet, , Urethral, Once, Malu Barragan NP    Facility-Administered Medications Ordered in Other Visits:     0.9%  NaCl infusion, , Intravenous, Continuous, Roney Jackson MD, Stopped at 11/29/21 1755    fentaNYL 50 mcg/mL injection  mcg,  mcg, Intravenous, PRN, Anthony Hernandez MD, 50 mcg at 11/29/21 1109    LIDOcaine (PF) 10 mg/ml (1%) injection 10 mg, 1 mL, Intradermal, Once, Roney Jackson MD    midazolam (VERSED) 1 mg/mL injection 0.5-4 mg, 0.5-4 mg, Intravenous, PRN, Anthony Hernandez MD, 1 mg at 11/29/21 1109    prochlorperazine injection Soln 5 mg, 5 mg, Intravenous, Q30 Min PRN, Beth Ramsey MD    sodium chloride 0.9% flush 10 mL, 10 mL, Intravenous, PRN, Beth Ramsey MD  ALLERGIES: Review of patient's allergies indicates:  No Known Allergies    VITAL SIGNS: /61   Pulse 64   Ht 5' 10" (1.778 m)   Wt 92.5 kg (204 lb)   BMI 29.27 kg/m²      PHYSICAL EXAMINATION  VITAL SIGNS: /61   Pulse 64   Ht 5' 10" (1.778 m)   Wt 92.5 kg (204 lb)   BMI 29.27 kg/m²    General:  The patient is alert and oriented x 3.  Mood is pleasant.  Observation of ears, eyes and nose reveal no gross abnormalities.  HEENT: NCAT, sclera nonicteric  Lungs: Respirations are equal and unlabored.    Bilateral KNEE EXAMINATION     OBSERVATION / INSPECTION   Gait:   Nonantalgic   Alignment:  Neutral   Scars:   None   Muscle atrophy: Mild  Effusion:  None   Warmth:  None   Discoloration:   none     TENDERNESS / CREPITUS (T / C):          T / C      T / C   Patella   - / -   Lateral joint line   + / -    Peripatellar medial  -  Medial joint line    + / -    Peripatellar lateral -  Medial plica   - / -    Patellar tendon -   Popliteal fossa  - / -    Quad tendon   -   Gastrocnemius "   -   Prepatellar Bursa - / -   Quadricep   -   Tibial tubercle  -  Thigh/hamstring  -   Pes anserine/HS -  Fibula    -   ITB   - / -  Tibia     -   Tib/fib joint  - / -  LCL    -     MFC   - / -   MCL: Proximal  -    LFC   - / -    Distal   -          ROM: (* = pain)  PASSIVE   ACTIVE    Left :   5 / 0 / 125   5 / 0 / 125     Right :    5 / 0 / 125   5 / 0 / 125    PATELLOFEMORAL EXAMINATION:  See above noted areas of tenderness.   Patella position    Subluxation / dislocation: Centered           Sup. / Inf;   Normal   Crepitus (PF):    Absent   Patellar Mobility:       Medial-lateral:   Normal    Superior-inferior:  Normal    Inferior tilt   Normal    Patellar tendon:  Normal   Lateral tilt:    Normal   J-sign:     None   Patellofemoral grind:   No pain       MENISCAL SIGNS:     Pain on terminal extension:  +  Pain on terminal flexion:  +  Abdelrahmans maneuver:  - for pain  Squat     - posterior joint pain    LIGAMENT EXAMINATION:  ACL / Lachman:  normal (-1 to 2mm)    PCL-Post.  drawer: normal 0 to 2mm  MCL- Valgus:  normal 0 to 2mm  LCL- Varus:  normal 0 to 2mm  Pivot shift: normal (Equal)   Dial Test: difference c/w other side   At 30° flexion: normal (< 5°)    At 90° flexion: normal (< 5°)   Reverse Pivot Shift:   normal (Equal)     STRENGTH: (* = with pain) PAINFUL SIDE   Quadricep   5/5   Hamstrin/5    EXTREMITY NEURO-VASCULAR EXAMINATION:   Sensation:  Grossly intact to light touch all dermatomal regions.   Motor Function:  Fully intact motor function at hip, knee, foot and ankle    DTRs;  quadriceps and  achilles 2+.  No clonus and downgoing Babinski.    Vascular status:  DP and PT pulses 2+, brisk capillary refill, symmetric.     Other Findings:       X-rays reviewed and interpreted personally by me:  including standing, weight bearing AP and flexion bilateral knees, lateral and merchant views ordered and images reviewed by me show:  No fracture, dislocation. There are KL Grade 2 changes seen  tricompartmentally in bilateral knees     ASSESSMENT:    Bilateral Knee primary osteoarthritis    PLAN:   CSI Right knee performed today  Prior auth for Euflexxa for Left Knee  F/u in 2 weeks for LEFT knee Euflexxa injection    All questions were answered, pt will contact us for questions or concerns in the interim.    PROCEDURE NOTE: right KNEE INJECTION  After time out was performed, including verification of patient ID, procedure, site and side, availability of information and equipment, review of safety issues, and agreement with consent, the procedure site was marked and the patient was prepped aseptically. A diagnostic and therapeutic injection of 2cc 40 mg kenalog and 1% lidocaine/0.5% sensorcaine was given under sterile technique using a 22g x 1.5 needle into the knee inferolaterally in seated position.   The patient had no adverse reactions to the medication. Pain decreased. The patient was instructed to apply ice to the joint for 20 minutes and avoid strenuous activities for 24-36 hours following the injection. Patient was warned of possible blood sugar and/or blood pressure changes during that time. Following that time, patient can resume regular activities.

## 2023-03-07 ENCOUNTER — PES CALL (OUTPATIENT)
Dept: ADMINISTRATIVE | Facility: CLINIC | Age: 73
End: 2023-03-07
Payer: MEDICARE

## 2023-03-16 ENCOUNTER — TELEPHONE (OUTPATIENT)
Dept: SPORTS MEDICINE | Facility: CLINIC | Age: 73
End: 2023-03-16
Payer: MEDICARE

## 2023-03-16 DIAGNOSIS — M17.12 PRIMARY OSTEOARTHRITIS OF LEFT KNEE: Primary | ICD-10-CM

## 2023-03-20 ENCOUNTER — TELEPHONE (OUTPATIENT)
Dept: INTERNAL MEDICINE | Facility: CLINIC | Age: 73
End: 2023-03-20
Payer: MEDICARE

## 2023-03-20 RX ORDER — TRIAMCINOLONE ACETONIDE 40 MG/ML
80 INJECTION, SUSPENSION INTRA-ARTICULAR; INTRAMUSCULAR
Status: DISCONTINUED | OUTPATIENT
Start: 2023-03-06 | End: 2023-03-20 | Stop reason: HOSPADM

## 2023-03-20 NOTE — PROCEDURES
Large Joint Aspiration/Injection: R knee    Date/Time: 3/6/2023 3:00 PM  Performed by: Evonne Michel MD  Authorized by: Evonne Michel MD     Consent Done?:  Yes (Verbal)  Indications:  Pain  Site marked: the procedure site was marked    Timeout: prior to procedure the correct patient, procedure, and site was verified    Prep: patient was prepped and draped in usual sterile fashion      Details:  Needle Size:  22 G  Approach:  Lateral  Location:  Knee  Site:  R knee  Medications:  80 mg triamcinolone acetonide 40 mg/mL  Patient tolerance:  Patient tolerated the procedure well with no immediate complications

## 2023-03-20 NOTE — TELEPHONE ENCOUNTER
----- Message from Trena Palmer sent at 3/20/2023  2:38 PM CDT -----  Contact: [phone]  Patient called, requested a courtesy call from Dr Cantu's nurse in regards some problems that patient had - did not want to specify. Thank you

## 2023-03-27 ENCOUNTER — OFFICE VISIT (OUTPATIENT)
Dept: SPORTS MEDICINE | Facility: CLINIC | Age: 73
End: 2023-03-27
Payer: MEDICARE

## 2023-03-27 VITALS
HEART RATE: 66 BPM | HEIGHT: 70 IN | DIASTOLIC BLOOD PRESSURE: 62 MMHG | WEIGHT: 204 LBS | SYSTOLIC BLOOD PRESSURE: 130 MMHG | BODY MASS INDEX: 29.2 KG/M2

## 2023-03-27 DIAGNOSIS — M17.12 PRIMARY OSTEOARTHRITIS OF LEFT KNEE: Primary | ICD-10-CM

## 2023-03-27 PROCEDURE — 1125F PR PAIN SEVERITY QUANTIFIED, PAIN PRESENT: ICD-10-PCS | Mod: CPTII,S$GLB,, | Performed by: ORTHOPAEDIC SURGERY

## 2023-03-27 PROCEDURE — 1125F AMNT PAIN NOTED PAIN PRSNT: CPT | Mod: CPTII,S$GLB,, | Performed by: ORTHOPAEDIC SURGERY

## 2023-03-27 PROCEDURE — 3066F NEPHROPATHY DOC TX: CPT | Mod: CPTII,S$GLB,, | Performed by: ORTHOPAEDIC SURGERY

## 2023-03-27 PROCEDURE — 3075F SYST BP GE 130 - 139MM HG: CPT | Mod: CPTII,S$GLB,, | Performed by: ORTHOPAEDIC SURGERY

## 2023-03-27 PROCEDURE — 3066F PR DOCUMENTATION OF TREATMENT FOR NEPHROPATHY: ICD-10-PCS | Mod: CPTII,S$GLB,, | Performed by: ORTHOPAEDIC SURGERY

## 2023-03-27 PROCEDURE — 3061F NEG MICROALBUMINURIA REV: CPT | Mod: CPTII,S$GLB,, | Performed by: ORTHOPAEDIC SURGERY

## 2023-03-27 PROCEDURE — 3078F PR MOST RECENT DIASTOLIC BLOOD PRESSURE < 80 MM HG: ICD-10-PCS | Mod: CPTII,S$GLB,, | Performed by: ORTHOPAEDIC SURGERY

## 2023-03-27 PROCEDURE — 1100F PTFALLS ASSESS-DOCD GE2>/YR: CPT | Mod: CPTII,S$GLB,, | Performed by: ORTHOPAEDIC SURGERY

## 2023-03-27 PROCEDURE — 3288F FALL RISK ASSESSMENT DOCD: CPT | Mod: CPTII,S$GLB,, | Performed by: ORTHOPAEDIC SURGERY

## 2023-03-27 PROCEDURE — 3078F DIAST BP <80 MM HG: CPT | Mod: CPTII,S$GLB,, | Performed by: ORTHOPAEDIC SURGERY

## 2023-03-27 PROCEDURE — 3061F PR NEG MICROALBUMINURIA RESULT DOCUMENTED/REVIEW: ICD-10-PCS | Mod: CPTII,S$GLB,, | Performed by: ORTHOPAEDIC SURGERY

## 2023-03-27 PROCEDURE — 1100F PR PT FALLS ASSESS DOC 2+ FALLS/FALL W/INJURY/YR: ICD-10-PCS | Mod: CPTII,S$GLB,, | Performed by: ORTHOPAEDIC SURGERY

## 2023-03-27 PROCEDURE — 99999 PR PBB SHADOW E&M-EST. PATIENT-LVL III: ICD-10-PCS | Mod: PBBFAC,,, | Performed by: ORTHOPAEDIC SURGERY

## 2023-03-27 PROCEDURE — 99499 UNLISTED E&M SERVICE: CPT | Mod: S$GLB,,, | Performed by: ORTHOPAEDIC SURGERY

## 2023-03-27 PROCEDURE — 3008F BODY MASS INDEX DOCD: CPT | Mod: CPTII,S$GLB,, | Performed by: ORTHOPAEDIC SURGERY

## 2023-03-27 PROCEDURE — 3044F HG A1C LEVEL LT 7.0%: CPT | Mod: CPTII,S$GLB,, | Performed by: ORTHOPAEDIC SURGERY

## 2023-03-27 PROCEDURE — 3044F PR MOST RECENT HEMOGLOBIN A1C LEVEL <7.0%: ICD-10-PCS | Mod: CPTII,S$GLB,, | Performed by: ORTHOPAEDIC SURGERY

## 2023-03-27 PROCEDURE — 3288F PR FALLS RISK ASSESSMENT DOCUMENTED: ICD-10-PCS | Mod: CPTII,S$GLB,, | Performed by: ORTHOPAEDIC SURGERY

## 2023-03-27 PROCEDURE — 99999 PR PBB SHADOW E&M-EST. PATIENT-LVL III: CPT | Mod: PBBFAC,,, | Performed by: ORTHOPAEDIC SURGERY

## 2023-03-27 PROCEDURE — 3075F PR MOST RECENT SYSTOLIC BLOOD PRESS GE 130-139MM HG: ICD-10-PCS | Mod: CPTII,S$GLB,, | Performed by: ORTHOPAEDIC SURGERY

## 2023-03-27 PROCEDURE — 3008F PR BODY MASS INDEX (BMI) DOCUMENTED: ICD-10-PCS | Mod: CPTII,S$GLB,, | Performed by: ORTHOPAEDIC SURGERY

## 2023-03-27 PROCEDURE — 20610 LARGE JOINT ASPIRATION/INJECTION: L KNEE: ICD-10-PCS | Mod: LT,S$GLB,, | Performed by: ORTHOPAEDIC SURGERY

## 2023-03-27 PROCEDURE — 99499 NO LOS: ICD-10-PCS | Mod: S$GLB,,, | Performed by: ORTHOPAEDIC SURGERY

## 2023-03-27 PROCEDURE — 20610 DRAIN/INJ JOINT/BURSA W/O US: CPT | Mod: LT,S$GLB,, | Performed by: ORTHOPAEDIC SURGERY

## 2023-03-29 ENCOUNTER — TELEPHONE (OUTPATIENT)
Dept: INTERNAL MEDICINE | Facility: CLINIC | Age: 73
End: 2023-03-29
Payer: MEDICARE

## 2023-03-29 NOTE — TELEPHONE ENCOUNTER
----- Message from Natalia Abbasi sent at 3/28/2023  2:29 PM CDT -----  Contact: [phone] Patient  Caller is requesting an earlier appointment then we can schedule.  Caller is requesting a message be sent to the provider.  If this is for urgent care symptoms, did you offer other providers at this location, providers at other locations, or [facility]? (yes, no, n/a):    If this is for the patients physical, did you offer to schedule next available and put on wait list, or to see NP or PA for their physical?  (yes, no, n/a):    When is the next available appointment with their provider:  05/2023  Reason for the appointment:  Anxiety, Fall and hit head x 2 weeks, muscle spasm ,breathing issues due to covid  Patient preference of timeframe to be scheduled:  ASAP  Would the patient like a call back, or a response through their [facility] portal?:   Call Back Please   Comments:

## 2023-03-30 ENCOUNTER — OFFICE VISIT (OUTPATIENT)
Dept: INTERNAL MEDICINE | Facility: CLINIC | Age: 73
End: 2023-03-30
Payer: MEDICARE

## 2023-03-30 ENCOUNTER — LAB VISIT (OUTPATIENT)
Dept: LAB | Facility: HOSPITAL | Age: 73
End: 2023-03-30
Attending: INTERNAL MEDICINE
Payer: MEDICARE

## 2023-03-30 VITALS
SYSTOLIC BLOOD PRESSURE: 134 MMHG | DIASTOLIC BLOOD PRESSURE: 64 MMHG | OXYGEN SATURATION: 98 % | WEIGHT: 200.38 LBS | HEIGHT: 70 IN | BODY MASS INDEX: 28.69 KG/M2 | HEART RATE: 60 BPM

## 2023-03-30 DIAGNOSIS — M62.838 MUSCLE SPASM: ICD-10-CM

## 2023-03-30 DIAGNOSIS — R06.00 DYSPNEA, UNSPECIFIED TYPE: ICD-10-CM

## 2023-03-30 DIAGNOSIS — I70.0 THORACIC AORTA ATHEROSCLEROSIS: ICD-10-CM

## 2023-03-30 DIAGNOSIS — R41.3 MEMORY LOSS: ICD-10-CM

## 2023-03-30 DIAGNOSIS — I10 PRIMARY HYPERTENSION: ICD-10-CM

## 2023-03-30 DIAGNOSIS — Z12.11 SCREENING FOR COLON CANCER: ICD-10-CM

## 2023-03-30 DIAGNOSIS — J84.9 INTERSTITIAL PULMONARY DISEASE, UNSPECIFIED: Primary | ICD-10-CM

## 2023-03-30 DIAGNOSIS — Z12.31 SCREENING MAMMOGRAM, ENCOUNTER FOR: ICD-10-CM

## 2023-03-30 DIAGNOSIS — N39.0 URINARY TRACT INFECTION WITHOUT HEMATURIA, SITE UNSPECIFIED: ICD-10-CM

## 2023-03-30 DIAGNOSIS — A04.8 H. PYLORI INFECTION: ICD-10-CM

## 2023-03-30 LAB
ALBUMIN SERPL BCP-MCNC: 3.7 G/DL (ref 3.5–5.2)
ALP SERPL-CCNC: 161 U/L (ref 55–135)
ALT SERPL W/O P-5'-P-CCNC: 20 U/L (ref 10–44)
ANION GAP SERPL CALC-SCNC: 6 MMOL/L (ref 8–16)
AST SERPL-CCNC: 18 U/L (ref 10–40)
BASOPHILS # BLD AUTO: 0.06 K/UL (ref 0–0.2)
BASOPHILS NFR BLD: 0.5 % (ref 0–1.9)
BILIRUB SERPL-MCNC: 0.5 MG/DL (ref 0.1–1)
BUN SERPL-MCNC: 24 MG/DL (ref 8–23)
CALCIUM SERPL-MCNC: 9.9 MG/DL (ref 8.7–10.5)
CHLORIDE SERPL-SCNC: 103 MMOL/L (ref 95–110)
CO2 SERPL-SCNC: 30 MMOL/L (ref 23–29)
CREAT SERPL-MCNC: 1.1 MG/DL (ref 0.5–1.4)
DIFFERENTIAL METHOD: ABNORMAL
EOSINOPHIL # BLD AUTO: 0.3 K/UL (ref 0–0.5)
EOSINOPHIL NFR BLD: 2.4 % (ref 0–8)
ERYTHROCYTE [DISTWIDTH] IN BLOOD BY AUTOMATED COUNT: 15 % (ref 11.5–14.5)
EST. GFR  (NO RACE VARIABLE): 53.1 ML/MIN/1.73 M^2
GLUCOSE SERPL-MCNC: 80 MG/DL (ref 70–110)
HCT VFR BLD AUTO: 42 % (ref 37–48.5)
HGB BLD-MCNC: 13.1 G/DL (ref 12–16)
IMM GRANULOCYTES # BLD AUTO: 0.04 K/UL (ref 0–0.04)
IMM GRANULOCYTES NFR BLD AUTO: 0.3 % (ref 0–0.5)
LYMPHOCYTES # BLD AUTO: 2.5 K/UL (ref 1–4.8)
LYMPHOCYTES NFR BLD: 20.8 % (ref 18–48)
MAGNESIUM SERPL-MCNC: 2.1 MG/DL (ref 1.6–2.6)
MCH RBC QN AUTO: 27.8 PG (ref 27–31)
MCHC RBC AUTO-ENTMCNC: 31.2 G/DL (ref 32–36)
MCV RBC AUTO: 89 FL (ref 82–98)
MONOCYTES # BLD AUTO: 0.6 K/UL (ref 0.3–1)
MONOCYTES NFR BLD: 5.3 % (ref 4–15)
NEUTROPHILS # BLD AUTO: 8.5 K/UL (ref 1.8–7.7)
NEUTROPHILS NFR BLD: 70.7 % (ref 38–73)
NRBC BLD-RTO: 0 /100 WBC
PLATELET # BLD AUTO: 333 K/UL (ref 150–450)
PMV BLD AUTO: 10.7 FL (ref 9.2–12.9)
POTASSIUM SERPL-SCNC: 4.7 MMOL/L (ref 3.5–5.1)
PROT SERPL-MCNC: 7.7 G/DL (ref 6–8.4)
RBC # BLD AUTO: 4.71 M/UL (ref 4–5.4)
SODIUM SERPL-SCNC: 139 MMOL/L (ref 136–145)
VIT B12 SERPL-MCNC: >2000 PG/ML (ref 210–950)
WBC # BLD AUTO: 11.98 K/UL (ref 3.9–12.7)

## 2023-03-30 PROCEDURE — 1125F AMNT PAIN NOTED PAIN PRSNT: CPT | Mod: CPTII,S$GLB,, | Performed by: INTERNAL MEDICINE

## 2023-03-30 PROCEDURE — 99999 PR PBB SHADOW E&M-EST. PATIENT-LVL V: ICD-10-PCS | Mod: PBBFAC,,, | Performed by: INTERNAL MEDICINE

## 2023-03-30 PROCEDURE — 3066F PR DOCUMENTATION OF TREATMENT FOR NEPHROPATHY: ICD-10-PCS | Mod: CPTII,S$GLB,, | Performed by: INTERNAL MEDICINE

## 2023-03-30 PROCEDURE — 3288F FALL RISK ASSESSMENT DOCD: CPT | Mod: CPTII,S$GLB,, | Performed by: INTERNAL MEDICINE

## 2023-03-30 PROCEDURE — 99214 OFFICE O/P EST MOD 30 MIN: CPT | Mod: S$GLB,,, | Performed by: INTERNAL MEDICINE

## 2023-03-30 PROCEDURE — 85025 COMPLETE CBC W/AUTO DIFF WBC: CPT | Performed by: INTERNAL MEDICINE

## 2023-03-30 PROCEDURE — 1159F PR MEDICATION LIST DOCUMENTED IN MEDICAL RECORD: ICD-10-PCS | Mod: CPTII,S$GLB,, | Performed by: INTERNAL MEDICINE

## 2023-03-30 PROCEDURE — 36415 COLL VENOUS BLD VENIPUNCTURE: CPT | Performed by: INTERNAL MEDICINE

## 2023-03-30 PROCEDURE — 3288F PR FALLS RISK ASSESSMENT DOCUMENTED: ICD-10-PCS | Mod: CPTII,S$GLB,, | Performed by: INTERNAL MEDICINE

## 2023-03-30 PROCEDURE — 82607 VITAMIN B-12: CPT | Performed by: INTERNAL MEDICINE

## 2023-03-30 PROCEDURE — 3061F NEG MICROALBUMINURIA REV: CPT | Mod: CPTII,S$GLB,, | Performed by: INTERNAL MEDICINE

## 2023-03-30 PROCEDURE — 3044F PR MOST RECENT HEMOGLOBIN A1C LEVEL <7.0%: ICD-10-PCS | Mod: CPTII,S$GLB,, | Performed by: INTERNAL MEDICINE

## 2023-03-30 PROCEDURE — 3008F BODY MASS INDEX DOCD: CPT | Mod: CPTII,S$GLB,, | Performed by: INTERNAL MEDICINE

## 2023-03-30 PROCEDURE — 99214 PR OFFICE/OUTPT VISIT, EST, LEVL IV, 30-39 MIN: ICD-10-PCS | Mod: S$GLB,,, | Performed by: INTERNAL MEDICINE

## 2023-03-30 PROCEDURE — 3066F NEPHROPATHY DOC TX: CPT | Mod: CPTII,S$GLB,, | Performed by: INTERNAL MEDICINE

## 2023-03-30 PROCEDURE — 83921 ORGANIC ACID SINGLE QUANT: CPT | Performed by: INTERNAL MEDICINE

## 2023-03-30 PROCEDURE — 3075F SYST BP GE 130 - 139MM HG: CPT | Mod: CPTII,S$GLB,, | Performed by: INTERNAL MEDICINE

## 2023-03-30 PROCEDURE — 80053 COMPREHEN METABOLIC PANEL: CPT | Performed by: INTERNAL MEDICINE

## 2023-03-30 PROCEDURE — 1159F MED LIST DOCD IN RCRD: CPT | Mod: CPTII,S$GLB,, | Performed by: INTERNAL MEDICINE

## 2023-03-30 PROCEDURE — 3044F HG A1C LEVEL LT 7.0%: CPT | Mod: CPTII,S$GLB,, | Performed by: INTERNAL MEDICINE

## 2023-03-30 PROCEDURE — 1125F PR PAIN SEVERITY QUANTIFIED, PAIN PRESENT: ICD-10-PCS | Mod: CPTII,S$GLB,, | Performed by: INTERNAL MEDICINE

## 2023-03-30 PROCEDURE — 1100F PTFALLS ASSESS-DOCD GE2>/YR: CPT | Mod: CPTII,S$GLB,, | Performed by: INTERNAL MEDICINE

## 2023-03-30 PROCEDURE — 3061F PR NEG MICROALBUMINURIA RESULT DOCUMENTED/REVIEW: ICD-10-PCS | Mod: CPTII,S$GLB,, | Performed by: INTERNAL MEDICINE

## 2023-03-30 PROCEDURE — 3078F DIAST BP <80 MM HG: CPT | Mod: CPTII,S$GLB,, | Performed by: INTERNAL MEDICINE

## 2023-03-30 PROCEDURE — 1100F PR PT FALLS ASSESS DOC 2+ FALLS/FALL W/INJURY/YR: ICD-10-PCS | Mod: CPTII,S$GLB,, | Performed by: INTERNAL MEDICINE

## 2023-03-30 PROCEDURE — 3078F PR MOST RECENT DIASTOLIC BLOOD PRESSURE < 80 MM HG: ICD-10-PCS | Mod: CPTII,S$GLB,, | Performed by: INTERNAL MEDICINE

## 2023-03-30 PROCEDURE — 83735 ASSAY OF MAGNESIUM: CPT | Performed by: INTERNAL MEDICINE

## 2023-03-30 PROCEDURE — 99999 PR PBB SHADOW E&M-EST. PATIENT-LVL V: CPT | Mod: PBBFAC,,, | Performed by: INTERNAL MEDICINE

## 2023-03-30 PROCEDURE — 3008F PR BODY MASS INDEX (BMI) DOCUMENTED: ICD-10-PCS | Mod: CPTII,S$GLB,, | Performed by: INTERNAL MEDICINE

## 2023-03-30 PROCEDURE — 3075F PR MOST RECENT SYSTOLIC BLOOD PRESS GE 130-139MM HG: ICD-10-PCS | Mod: CPTII,S$GLB,, | Performed by: INTERNAL MEDICINE

## 2023-03-30 RX ORDER — AMOXICILLIN 875 MG/1
875 TABLET, FILM COATED ORAL EVERY 12 HOURS
Qty: 20 TABLET | Refills: 0 | Status: SHIPPED | OUTPATIENT
Start: 2023-03-30 | End: 2023-06-28

## 2023-03-30 RX ORDER — ORPHENADRINE CITRATE 100 MG/1
TABLET, EXTENDED RELEASE ORAL
Qty: 30 TABLET | Refills: 0 | Status: SHIPPED | OUTPATIENT
Start: 2023-03-30 | End: 2023-04-29

## 2023-03-30 RX ORDER — LORAZEPAM 0.5 MG/1
TABLET ORAL
Qty: 30 TABLET | Refills: 0 | Status: SHIPPED | OUTPATIENT
Start: 2023-03-30 | End: 2023-08-24 | Stop reason: SDUPTHER

## 2023-03-30 NOTE — PROGRESS NOTES
Subjective:       Patient ID: Marleny Guzman is a 72 y.o. female.    Chief Complaint: Spasms (Legs, neck), Shortness of Breath, and Memory Loss    Spasms  Pertinent negatives include no abdominal pain, chest pain (arm pain or jaw pain), headaches, nausea or vomiting.   Shortness of Breath  Pertinent negatives include no abdominal pain, chest pain (arm pain or jaw pain), headaches or vomiting. Pt recently moved and is trying to unpack.  No CP or SOB. Feels like her memory is worse.    Review of Systems   Respiratory:  Positive for shortness of breath.    Cardiovascular:  Negative for chest pain (arm pain or jaw pain).   Gastrointestinal:  Negative for abdominal pain, diarrhea, nausea and vomiting.   Genitourinary:  Negative for dysuria.   Neurological:  Negative for seizures, syncope and headaches.     Objective:      Physical Exam  Constitutional:       General: She is not in acute distress.     Appearance: She is well-developed.   HENT:      Head: Normocephalic.   Eyes:      Pupils: Pupils are equal, round, and reactive to light.   Neck:      Thyroid: No thyromegaly.      Vascular: No JVD.   Cardiovascular:      Rate and Rhythm: Normal rate and regular rhythm.      Heart sounds: Normal heart sounds. No murmur heard.    No friction rub. No gallop.   Pulmonary:      Effort: Pulmonary effort is normal.      Breath sounds: Normal breath sounds. No wheezing or rales.   Abdominal:      General: Bowel sounds are normal. There is no distension.      Palpations: Abdomen is soft. There is no mass.      Tenderness: There is no abdominal tenderness. There is no guarding or rebound.   Musculoskeletal:      Cervical back: Neck supple.   Lymphadenopathy:      Cervical: No cervical adenopathy.   Skin:     General: Skin is warm and dry.   Neurological:      Mental Status: She is alert and oriented to person, place, and time.      Deep Tendon Reflexes: Reflexes are normal and symmetric.   Psychiatric:         Behavior:  Behavior normal.         Thought Content: Thought content normal.         Judgment: Judgment normal.       Assessment:       1. Interstitial pulmonary disease, unspecified    2. Urinary tract infection without hematuria, site unspecified    3. Dyspnea, unspecified type    4. Screening mammogram, encounter for    5. Primary hypertension    6. Muscle spasm    7. H. pylori infection    8. Screening for colon cancer    9. Memory loss    10. Thoracic aorta atherosclerosis        Plan:   Interstitial pulmonary disease, unspecified  -     CT Chest Without Contrast; Future; Expected date: 03/30/2023    Urinary tract infection without hematuria, site unspecified  -     Urinalysis; Future; Expected date: 03/30/2023  -     Urine culture; Future; Expected date: 03/30/2023    Dyspnea, unspecified type  -     Complete PFT with bronchodilator; Future  -     Ambulatory referral/consult to Pulmonology; Future; Expected date: 04/06/2023    Screening mammogram, encounter for  -     Mammo Digital Screening Bilat w/ Sky; Future; Expected date: 09/30/2023    Primary hypertension  -     Microalbumin/Creatinine Ratio, Urine; Future; Expected date: 03/30/2023  -     CBC Auto Differential; Future; Expected date: 03/30/2023  -     Comprehensive Metabolic Panel; Future; Expected date: 03/30/2023    Muscle spasm  -     Magnesium; Future; Expected date: 03/30/2023    H. pylori infection  -     Ambulatory referral/consult to Endo Procedure ; Future; Expected date: 03/31/2023    Screening for colon cancer  -     Ambulatory referral/consult to Endo Procedure ; Future; Expected date: 03/31/2023    Memory loss  -     Ambulatory referral/consult to Neurology; Future; Expected date: 04/06/2023  -     Vitamin B12; Future; Expected date: 04/30/2023  -     Methylmalonic Acid, Serum; Future; Expected date: 03/30/2023    Thoracic aorta atherosclerosis  Stable on statin  Other orders  -     orphenadrine (NORFLEX) 100 mg tablet; One daily  as needed for muscle spasm  Dispense: 30 tablet; Refill: 0  -     amoxicillin (AMOXIL) 875 MG tablet; Take 1 tablet (875 mg total) by mouth every 12 (twelve) hours.  Dispense: 20 tablet; Refill: 0 - for sinusitis   -     LORazepam (ATIVAN) 0.5 MG tablet; TAKE 1/2 TO 1 TABLET BY MOUTH DAILY AS NEEDED FOR ANXIETY  Dispense: 30 tablet; Refill: 0      Had a negative cardiac workup   [Initial Visit ___] : an initial visit for [unfilled]

## 2023-04-03 ENCOUNTER — PES CALL (OUTPATIENT)
Dept: ADMINISTRATIVE | Facility: CLINIC | Age: 73
End: 2023-04-03
Payer: MEDICARE

## 2023-04-04 LAB — METHYLMALONATE SERPL-SCNC: 0.16 UMOL/L

## 2023-04-10 ENCOUNTER — OFFICE VISIT (OUTPATIENT)
Dept: SPORTS MEDICINE | Facility: CLINIC | Age: 73
End: 2023-04-10
Payer: MEDICARE

## 2023-04-10 VITALS
HEART RATE: 66 BPM | WEIGHT: 202 LBS | DIASTOLIC BLOOD PRESSURE: 49 MMHG | HEIGHT: 70 IN | BODY MASS INDEX: 28.92 KG/M2 | SYSTOLIC BLOOD PRESSURE: 107 MMHG

## 2023-04-10 DIAGNOSIS — M17.12 PRIMARY OSTEOARTHRITIS OF LEFT KNEE: Primary | ICD-10-CM

## 2023-04-10 PROCEDURE — 99499 NO LOS: ICD-10-PCS | Mod: S$GLB,,, | Performed by: ORTHOPAEDIC SURGERY

## 2023-04-10 PROCEDURE — 20610 DRAIN/INJ JOINT/BURSA W/O US: CPT | Mod: LT,S$GLB,, | Performed by: ORTHOPAEDIC SURGERY

## 2023-04-10 PROCEDURE — 20610 LARGE JOINT ASPIRATION/INJECTION: L KNEE: ICD-10-PCS | Mod: LT,S$GLB,, | Performed by: ORTHOPAEDIC SURGERY

## 2023-04-10 PROCEDURE — 99499 UNLISTED E&M SERVICE: CPT | Mod: S$GLB,,, | Performed by: ORTHOPAEDIC SURGERY

## 2023-04-10 PROCEDURE — 99999 PR PBB SHADOW E&M-EST. PATIENT-LVL III: ICD-10-PCS | Mod: PBBFAC,,, | Performed by: ORTHOPAEDIC SURGERY

## 2023-04-10 PROCEDURE — 99999 PR PBB SHADOW E&M-EST. PATIENT-LVL III: CPT | Mod: PBBFAC,,, | Performed by: ORTHOPAEDIC SURGERY

## 2023-04-10 NOTE — PROGRESS NOTES
Patient is here for follow up of her knee arthritis. She   is requesting synvisc injection #1.  [facility] reviewed per encounter record.   She is requesting synvisc injection #1. She has failed other conservative modalities including NSAIDS, activity modification, weight loss    The first shot was tolerated well.  Procedure note:  After viscosupplementation info and post-injection info was given, the left knee was prepped with betadine and alcohol and injected with lidocaine from an anterolateral approach and then 16mg synvisc. Patient tolerated the injection well.

## 2023-04-10 NOTE — PROGRESS NOTES
Patient is here for follow up of Left knee arthritis. Pt is requesting Synvisc injection #2.  [facility] Has failed other conservative modalities including NSAIDS, activity modification, weight loss.      Synvisc Injection Procedure #2 Left     A time out was performed, including verification of patient ID, procedure, site and side, availability of information and equipment, review of safety issues, and agreement with consent, the procedure site was marked.    The patient was prepped aseptically with povidone-iodine swabsticks. A diagnostic and therapeutic injection of 2cc Synvisc was given under sterile technique using a 22g x 1.5 needle from the anterolateral aspect of the Left knee Joint with the patient in the seated position.     Marleny Guzman had no adverse reactions to the medication. Pain decreased. she was instructed to apply ice to the joint for 20 minutes and avoid strenuous activities for 24-36 hours following the injection. she was warned of possible blood pressure changes during that time. Following that time, she can resume activities as prior to the injection.    she was reminded to call the clinic immediately for any adverse side effects as explained in clinic today.    RTC in 2 weeks for injection # 3  All patients questions were answered. Patient was advised to call us with any concerns or questions.

## 2023-04-10 NOTE — PROCEDURES
Large Joint Aspiration/Injection: L knee    Date/Time: 4/10/2023 2:45 PM  Performed by: Evonne Michel MD  Authorized by: Evonne Michel MD     Consent Done?:  Yes (Verbal)  Indications:  Pain  Site marked: the procedure site was marked    Timeout: prior to procedure the correct patient, procedure, and site was verified    Prep: patient was prepped and draped in usual sterile fashion      Details:  Needle Size:  22 G  Approach:  Lateral  Location:  Knee  Site:  L knee  Medications:  16 mg hyaluronate 16 mg/2 mL  Patient tolerance:  Patient tolerated the procedure well with no immediate complications

## 2023-04-10 NOTE — PROCEDURES
Large Joint Aspiration/Injection: L knee    Date/Time: 3/27/2023 9:30 AM  Performed by: Evonne Michel MD  Authorized by: Evonne Michel MD     Consent Done?:  Yes (Verbal)  Indications:  Pain  Site marked: the procedure site was marked    Timeout: prior to procedure the correct patient, procedure, and site was verified    Prep: patient was prepped and draped in usual sterile fashion      Details:  Needle Size:  22 G  Approach:  Lateral  Location:  Knee  Site:  L knee  Medications:  16 mg hyaluronate 16 mg/2 mL  Patient tolerance:  Patient tolerated the procedure well with no immediate complications

## 2023-04-12 ENCOUNTER — HOSPITAL ENCOUNTER (OUTPATIENT)
Dept: RADIOLOGY | Facility: HOSPITAL | Age: 73
Discharge: HOME | End: 2023-04-12
Attending: INTERNAL MEDICINE
Payer: MEDICARE

## 2023-04-12 DIAGNOSIS — Z12.31 SCREENING MAMMOGRAM, ENCOUNTER FOR: ICD-10-CM

## 2023-04-27 ENCOUNTER — TELEPHONE (OUTPATIENT)
Dept: SPORTS MEDICINE | Facility: CLINIC | Age: 73
End: 2023-04-27
Payer: MEDICARE

## 2023-04-27 NOTE — TELEPHONE ENCOUNTER
Spoke with patient regarding rescheduling of Synvisc #3 appointment.  Rescheduled for tomorrow, explained to her we need to get it done as soon as possible due to multiple missed/changed appointment dates.

## 2023-04-28 NOTE — TELEPHONE ENCOUNTER
No care due was identified.  [facility] Embedded Care Due Messages. Reference number: [id number].   4/28/2023 2:13:52 PM CDT

## 2023-04-29 RX ORDER — ORPHENADRINE CITRATE 100 MG/1
TABLET, EXTENDED RELEASE ORAL
Qty: 30 TABLET | Refills: 0 | Status: SHIPPED | OUTPATIENT
Start: 2023-04-29 | End: 2023-05-29 | Stop reason: SDUPTHER

## 2023-05-05 ENCOUNTER — OFFICE VISIT (OUTPATIENT)
Dept: SPORTS MEDICINE | Facility: CLINIC | Age: 73
End: 2023-05-05
Payer: MEDICARE

## 2023-05-05 VITALS
DIASTOLIC BLOOD PRESSURE: 73 MMHG | SYSTOLIC BLOOD PRESSURE: 146 MMHG | BODY MASS INDEX: 28.92 KG/M2 | HEIGHT: 70 IN | WEIGHT: 202 LBS | HEART RATE: 68 BPM

## 2023-05-05 DIAGNOSIS — F41.9 ANXIETY: Chronic | ICD-10-CM

## 2023-05-05 DIAGNOSIS — M25.562 CHRONIC PAIN OF BOTH KNEES: ICD-10-CM

## 2023-05-05 DIAGNOSIS — M25.561 CHRONIC PAIN OF BOTH KNEES: ICD-10-CM

## 2023-05-05 DIAGNOSIS — G89.29 CHRONIC PAIN OF BOTH KNEES: ICD-10-CM

## 2023-05-05 DIAGNOSIS — M17.12 PRIMARY OSTEOARTHRITIS OF LEFT KNEE: Primary | ICD-10-CM

## 2023-05-05 DIAGNOSIS — F32.A DEPRESSION, UNSPECIFIED DEPRESSION TYPE: Chronic | ICD-10-CM

## 2023-05-05 PROCEDURE — 1159F PR MEDICATION LIST DOCUMENTED IN MEDICAL RECORD: ICD-10-PCS | Mod: CPTII,S$GLB,, | Performed by: PHYSICIAN ASSISTANT

## 2023-05-05 PROCEDURE — 99499 NO LOS: ICD-10-PCS | Mod: S$GLB,,, | Performed by: PHYSICIAN ASSISTANT

## 2023-05-05 PROCEDURE — 99999 PR PBB SHADOW E&M-EST. PATIENT-LVL IV: CPT | Mod: PBBFAC,,, | Performed by: PHYSICIAN ASSISTANT

## 2023-05-05 PROCEDURE — 3008F PR BODY MASS INDEX (BMI) DOCUMENTED: ICD-10-PCS | Mod: CPTII,S$GLB,, | Performed by: PHYSICIAN ASSISTANT

## 2023-05-05 PROCEDURE — 3078F PR MOST RECENT DIASTOLIC BLOOD PRESSURE < 80 MM HG: ICD-10-PCS | Mod: CPTII,S$GLB,, | Performed by: PHYSICIAN ASSISTANT

## 2023-05-05 PROCEDURE — 3061F PR NEG MICROALBUMINURIA RESULT DOCUMENTED/REVIEW: ICD-10-PCS | Mod: CPTII,S$GLB,, | Performed by: PHYSICIAN ASSISTANT

## 2023-05-05 PROCEDURE — 3061F NEG MICROALBUMINURIA REV: CPT | Mod: CPTII,S$GLB,, | Performed by: PHYSICIAN ASSISTANT

## 2023-05-05 PROCEDURE — 1101F PT FALLS ASSESS-DOCD LE1/YR: CPT | Mod: CPTII,S$GLB,, | Performed by: PHYSICIAN ASSISTANT

## 2023-05-05 PROCEDURE — 1126F PR PAIN SEVERITY QUANTIFIED, NO PAIN PRESENT: ICD-10-PCS | Mod: CPTII,S$GLB,, | Performed by: PHYSICIAN ASSISTANT

## 2023-05-05 PROCEDURE — 3288F FALL RISK ASSESSMENT DOCD: CPT | Mod: CPTII,S$GLB,, | Performed by: PHYSICIAN ASSISTANT

## 2023-05-05 PROCEDURE — 1126F AMNT PAIN NOTED NONE PRSNT: CPT | Mod: CPTII,S$GLB,, | Performed by: PHYSICIAN ASSISTANT

## 2023-05-05 PROCEDURE — 20610 PR DRAIN/INJECT LARGE JOINT/BURSA: ICD-10-PCS | Mod: LT,S$GLB,, | Performed by: PHYSICIAN ASSISTANT

## 2023-05-05 PROCEDURE — 3077F SYST BP >= 140 MM HG: CPT | Mod: CPTII,S$GLB,, | Performed by: PHYSICIAN ASSISTANT

## 2023-05-05 PROCEDURE — 3008F BODY MASS INDEX DOCD: CPT | Mod: CPTII,S$GLB,, | Performed by: PHYSICIAN ASSISTANT

## 2023-05-05 PROCEDURE — 3288F PR FALLS RISK ASSESSMENT DOCUMENTED: ICD-10-PCS | Mod: CPTII,S$GLB,, | Performed by: PHYSICIAN ASSISTANT

## 2023-05-05 PROCEDURE — 3077F PR MOST RECENT SYSTOLIC BLOOD PRESSURE >= 140 MM HG: ICD-10-PCS | Mod: CPTII,S$GLB,, | Performed by: PHYSICIAN ASSISTANT

## 2023-05-05 PROCEDURE — 3044F PR MOST RECENT HEMOGLOBIN A1C LEVEL <7.0%: ICD-10-PCS | Mod: CPTII,S$GLB,, | Performed by: PHYSICIAN ASSISTANT

## 2023-05-05 PROCEDURE — 3066F NEPHROPATHY DOC TX: CPT | Mod: CPTII,S$GLB,, | Performed by: PHYSICIAN ASSISTANT

## 2023-05-05 PROCEDURE — 3066F PR DOCUMENTATION OF TREATMENT FOR NEPHROPATHY: ICD-10-PCS | Mod: CPTII,S$GLB,, | Performed by: PHYSICIAN ASSISTANT

## 2023-05-05 PROCEDURE — 3044F HG A1C LEVEL LT 7.0%: CPT | Mod: CPTII,S$GLB,, | Performed by: PHYSICIAN ASSISTANT

## 2023-05-05 PROCEDURE — 20610 DRAIN/INJ JOINT/BURSA W/O US: CPT | Mod: LT,S$GLB,, | Performed by: PHYSICIAN ASSISTANT

## 2023-05-05 PROCEDURE — 1101F PR PT FALLS ASSESS DOC 0-1 FALLS W/OUT INJ PAST YR: ICD-10-PCS | Mod: CPTII,S$GLB,, | Performed by: PHYSICIAN ASSISTANT

## 2023-05-05 PROCEDURE — 3078F DIAST BP <80 MM HG: CPT | Mod: CPTII,S$GLB,, | Performed by: PHYSICIAN ASSISTANT

## 2023-05-05 PROCEDURE — 99499 UNLISTED E&M SERVICE: CPT | Mod: S$GLB,,, | Performed by: PHYSICIAN ASSISTANT

## 2023-05-05 PROCEDURE — 1160F RVW MEDS BY RX/DR IN RCRD: CPT | Mod: CPTII,S$GLB,, | Performed by: PHYSICIAN ASSISTANT

## 2023-05-05 PROCEDURE — 1160F PR REVIEW ALL MEDS BY PRESCRIBER/CLIN PHARMACIST DOCUMENTED: ICD-10-PCS | Mod: CPTII,S$GLB,, | Performed by: PHYSICIAN ASSISTANT

## 2023-05-05 PROCEDURE — 99999 PR PBB SHADOW E&M-EST. PATIENT-LVL IV: ICD-10-PCS | Mod: PBBFAC,,, | Performed by: PHYSICIAN ASSISTANT

## 2023-05-05 PROCEDURE — 1159F MED LIST DOCD IN RCRD: CPT | Mod: CPTII,S$GLB,, | Performed by: PHYSICIAN ASSISTANT

## 2023-05-05 RX ORDER — ESCITALOPRAM OXALATE 20 MG/1
TABLET ORAL
Qty: 90 TABLET | Refills: 3 | Status: SHIPPED | OUTPATIENT
Start: 2023-05-05

## 2023-05-05 NOTE — PROGRESS NOTES
Patient is here for follow up of  Left knee arthritis. Pt is requesting synvisc injection #3.  PMFH reviewed per encounter record. Has failed other conservative modalities including NSAIDS, activity modification, weight loss.    She has received good relief with synvisc injections in the past.     The prior shot was tolerated well.    PHYSICAL EXAMINATION:     General: The patient is alert and oriented x 3. Mood is pleasant.   Observation of ears, eyes and nose reveals no gross abnormalities. No   labored breathing observed.     No signs of infection or adverse reaction to knee.    PROCEDURE NOTE:   left KNEE INJECTION  After time out was performed, including verification of patient ID, procedure, site and side, availability of information and equipment, review of safety issues, and agreement with consent, the procedure site was marked and the patient was prepped aseptically.    The left knee was prepped with betadine and injected under sterile conditions from an anterolateral approach with patient seated and then 2cc of Synvisc was injected into left knee joint. Patient tolerated the injection well. The patient had no adverse reactions to the medication.      RTC prn knee pain.  All questions were answered, pt will contact us for questions or concerns in the interim.

## 2023-05-05 NOTE — TELEPHONE ENCOUNTER
No care due was identified.  [facility] Embedded Care Due Messages. Reference number: [id number].   5/05/2023 7:37:15 AM CDT

## 2023-05-05 NOTE — TELEPHONE ENCOUNTER
Refill Routing Note   Medication(s) are not appropriate for processing by [facility] for the following reason(s):      Clarification of medication (Rx) details    [facility] action(s):  Defer     Medication Therapy Plan: 1/2 TAB DAILY OR 1 TAB DAILY      Appointments  past 12m or future 3m with PCP    Date Provider   Last Visit   3/30/2023 Bridget Cantu MD   Next Visit   5/29/2023 Bridget Cantu MD   ED visits in past 90 days: 1        Note composed:7:44 AM 05/05/2023

## 2023-05-11 ENCOUNTER — PES CALL (OUTPATIENT)
Dept: ADMINISTRATIVE | Facility: CLINIC | Age: 73
End: 2023-05-11
Payer: MEDICARE

## 2023-05-15 ENCOUNTER — HOSPITAL ENCOUNTER (OUTPATIENT)
Dept: RADIOLOGY | Facility: HOSPITAL | Age: 73
Discharge: HOME | End: 2023-05-15
Attending: INTERNAL MEDICINE
Payer: MEDICARE

## 2023-05-15 ENCOUNTER — HOSPITAL ENCOUNTER (OUTPATIENT)
Dept: PULMONOLOGY | Facility: CLINIC | Age: 73
Discharge: HOME | End: 2023-05-15
Payer: MEDICARE

## 2023-05-15 ENCOUNTER — OFFICE VISIT (OUTPATIENT)
Dept: PULMONOLOGY | Facility: CLINIC | Age: 73
End: 2023-05-15
Payer: MEDICARE

## 2023-05-15 VITALS
HEIGHT: 70 IN | HEART RATE: 63 BPM | BODY MASS INDEX: 28.62 KG/M2 | SYSTOLIC BLOOD PRESSURE: 138 MMHG | OXYGEN SATURATION: 95 % | DIASTOLIC BLOOD PRESSURE: 78 MMHG | WEIGHT: 199.94 LBS

## 2023-05-15 DIAGNOSIS — R06.00 DYSPNEA, UNSPECIFIED TYPE: ICD-10-CM

## 2023-05-15 DIAGNOSIS — J84.9 ILD (INTERSTITIAL LUNG DISEASE): ICD-10-CM

## 2023-05-15 DIAGNOSIS — R91.1 LUNG NODULE: ICD-10-CM

## 2023-05-15 DIAGNOSIS — J84.9 INTERSTITIAL PULMONARY DISEASE, UNSPECIFIED: ICD-10-CM

## 2023-05-15 DIAGNOSIS — I27.20 PULMONARY HYPERTENSION: ICD-10-CM

## 2023-05-15 DIAGNOSIS — J84.9 INTERSTITIAL PULMONARY DISEASE, UNSPECIFIED: Primary | ICD-10-CM

## 2023-05-15 DIAGNOSIS — J45.20 MILD INTERMITTENT REACTIVE AIRWAY DISEASE WITHOUT COMPLICATION: ICD-10-CM

## 2023-05-15 PROBLEM — N17.9 AKI (ACUTE KIDNEY INJURY): Status: RESOLVED | Noted: 2020-10-10 | Resolved: 2023-05-15

## 2023-05-15 PROBLEM — J18.9 PNEUMONIA OF BOTH LUNGS DUE TO INFECTIOUS ORGANISM: Status: RESOLVED | Noted: 2021-06-07 | Resolved: 2023-05-15

## 2023-05-15 PROCEDURE — 3044F PR MOST RECENT HEMOGLOBIN A1C LEVEL <7.0%: ICD-10-PCS | Mod: CPTII,S$GLB,, | Performed by: INTERNAL MEDICINE

## 2023-05-15 PROCEDURE — 1159F PR MEDICATION LIST DOCUMENTED IN MEDICAL RECORD: ICD-10-PCS | Mod: CPTII,S$GLB,, | Performed by: INTERNAL MEDICINE

## 2023-05-15 PROCEDURE — 3288F PR FALLS RISK ASSESSMENT DOCUMENTED: ICD-10-PCS | Mod: CPTII,S$GLB,, | Performed by: INTERNAL MEDICINE

## 2023-05-15 PROCEDURE — 3066F PR DOCUMENTATION OF TREATMENT FOR NEPHROPATHY: ICD-10-PCS | Mod: CPTII,S$GLB,, | Performed by: INTERNAL MEDICINE

## 2023-05-15 PROCEDURE — 3078F DIAST BP <80 MM HG: CPT | Mod: CPTII,S$GLB,, | Performed by: INTERNAL MEDICINE

## 2023-05-15 PROCEDURE — 94060 PR EVAL OF BRONCHOSPASM: ICD-10-PCS | Mod: S$GLB,,, | Performed by: INTERNAL MEDICINE

## 2023-05-15 PROCEDURE — 3008F BODY MASS INDEX DOCD: CPT | Mod: CPTII,S$GLB,, | Performed by: INTERNAL MEDICINE

## 2023-05-15 PROCEDURE — 3008F PR BODY MASS INDEX (BMI) DOCUMENTED: ICD-10-PCS | Mod: CPTII,S$GLB,, | Performed by: INTERNAL MEDICINE

## 2023-05-15 PROCEDURE — 3075F SYST BP GE 130 - 139MM HG: CPT | Mod: CPTII,S$GLB,, | Performed by: INTERNAL MEDICINE

## 2023-05-15 PROCEDURE — 1101F PT FALLS ASSESS-DOCD LE1/YR: CPT | Mod: CPTII,S$GLB,, | Performed by: INTERNAL MEDICINE

## 2023-05-15 PROCEDURE — 71250 CT CHEST WITHOUT CONTRAST: ICD-10-PCS | Mod: 26,,, | Performed by: RADIOLOGY

## 2023-05-15 PROCEDURE — 1126F AMNT PAIN NOTED NONE PRSNT: CPT | Mod: CPTII,S$GLB,, | Performed by: INTERNAL MEDICINE

## 2023-05-15 PROCEDURE — 99213 OFFICE O/P EST LOW 20 MIN: CPT | Mod: 25,S$GLB,, | Performed by: INTERNAL MEDICINE

## 2023-05-15 PROCEDURE — 3078F PR MOST RECENT DIASTOLIC BLOOD PRESSURE < 80 MM HG: ICD-10-PCS | Mod: CPTII,S$GLB,, | Performed by: INTERNAL MEDICINE

## 2023-05-15 PROCEDURE — 3061F NEG MICROALBUMINURIA REV: CPT | Mod: CPTII,S$GLB,, | Performed by: INTERNAL MEDICINE

## 2023-05-15 PROCEDURE — 3288F FALL RISK ASSESSMENT DOCD: CPT | Mod: CPTII,S$GLB,, | Performed by: INTERNAL MEDICINE

## 2023-05-15 PROCEDURE — 1126F PR PAIN SEVERITY QUANTIFIED, NO PAIN PRESENT: ICD-10-PCS | Mod: CPTII,S$GLB,, | Performed by: INTERNAL MEDICINE

## 2023-05-15 PROCEDURE — 1159F MED LIST DOCD IN RCRD: CPT | Mod: CPTII,S$GLB,, | Performed by: INTERNAL MEDICINE

## 2023-05-15 PROCEDURE — 94729 PR C02/MEMBANE DIFFUSE CAPACITY: ICD-10-PCS | Mod: S$GLB,,, | Performed by: INTERNAL MEDICINE

## 2023-05-15 PROCEDURE — 71250 CT THORAX DX C-: CPT | Mod: 26,,, | Performed by: RADIOLOGY

## 2023-05-15 PROCEDURE — 1101F PR PT FALLS ASSESS DOC 0-1 FALLS W/OUT INJ PAST YR: ICD-10-PCS | Mod: CPTII,S$GLB,, | Performed by: INTERNAL MEDICINE

## 2023-05-15 PROCEDURE — 99213 PR OFFICE/OUTPT VISIT, EST, LEVL III, 20-29 MIN: ICD-10-PCS | Mod: 25,S$GLB,, | Performed by: INTERNAL MEDICINE

## 2023-05-15 PROCEDURE — 3066F NEPHROPATHY DOC TX: CPT | Mod: CPTII,S$GLB,, | Performed by: INTERNAL MEDICINE

## 2023-05-15 PROCEDURE — 99999 PR PBB SHADOW E&M-EST. PATIENT-LVL IV: CPT | Mod: PBBFAC,,, | Performed by: INTERNAL MEDICINE

## 2023-05-15 PROCEDURE — 99999 PR PBB SHADOW E&M-EST. PATIENT-LVL IV: ICD-10-PCS | Mod: PBBFAC,,, | Performed by: INTERNAL MEDICINE

## 2023-05-15 PROCEDURE — 94726 PULM FUNCT TST PLETHYSMOGRAP: ICD-10-PCS | Mod: S$GLB,,, | Performed by: INTERNAL MEDICINE

## 2023-05-15 PROCEDURE — 94729 DIFFUSING CAPACITY: CPT | Mod: S$GLB,,, | Performed by: INTERNAL MEDICINE

## 2023-05-15 PROCEDURE — 3044F HG A1C LEVEL LT 7.0%: CPT | Mod: CPTII,S$GLB,, | Performed by: INTERNAL MEDICINE

## 2023-05-15 PROCEDURE — 71250 CT THORAX DX C-: CPT | Mod: TC

## 2023-05-15 PROCEDURE — 94060 EVALUATION OF WHEEZING: CPT | Mod: S$GLB,,, | Performed by: INTERNAL MEDICINE

## 2023-05-15 PROCEDURE — 3061F PR NEG MICROALBUMINURIA RESULT DOCUMENTED/REVIEW: ICD-10-PCS | Mod: CPTII,S$GLB,, | Performed by: INTERNAL MEDICINE

## 2023-05-15 PROCEDURE — 94726 PLETHYSMOGRAPHY LUNG VOLUMES: CPT | Mod: S$GLB,,, | Performed by: INTERNAL MEDICINE

## 2023-05-15 PROCEDURE — 3075F PR MOST RECENT SYSTOLIC BLOOD PRESS GE 130-139MM HG: ICD-10-PCS | Mod: CPTII,S$GLB,, | Performed by: INTERNAL MEDICINE

## 2023-05-15 RX ORDER — FLUTICASONE PROPIONATE 110 UG/1
1 AEROSOL, METERED RESPIRATORY (INHALATION) 2 TIMES DAILY
Qty: 12 G | Refills: 6 | Status: SHIPPED | OUTPATIENT
Start: 2023-05-15 | End: 2023-05-29 | Stop reason: SDUPTHER

## 2023-05-15 RX ORDER — FLUTICASONE PROPIONATE 110 UG/1
1 AEROSOL, METERED RESPIRATORY (INHALATION) 2 TIMES DAILY
Qty: 12 G | Refills: 6 | Status: SHIPPED | OUTPATIENT
Start: 2023-05-15 | End: 2023-05-15 | Stop reason: SDUPTHER

## 2023-05-15 NOTE — ASSESSMENT & PLAN NOTE
Components of restriction and low dlco  - mild bronchiectasis and some emphysema in the apices.   - unsure exactly what all of this is but may all be related to previous infections  - previous +KEE but no other diagnosis   - previous lymphadenopathy... will reeval with CT with contrast  - given the feeling of reactive airways will trial flovent to see if it improves her symptoms.

## 2023-05-15 NOTE — ASSESSMENT & PLAN NOTE
Noted on previous echo and with decreased DLCO and restriction, may be major contributor to her symptoms  - repeat echo ordered

## 2023-05-15 NOTE — PROGRESS NOTES
"Subjective:     Reason for visit: follow up/sob    Patient ID:  Marleny Guzman is a 72 y.o. female    History:  Ms. Guzman is a 73 yo that was previously seen by Dr. Valentino and Kary Nixon NP but is new to me. She has intermittently had issues with shortness of breath over the years that comes and goes. It doesn't seem to be seasonal and she has not previously had relief with much albuterol. She has no previous known lung disease or diagnosis but did have a nodule seen previously on imaging. She is following up today because she is now having more shortness of breath again. It is typically after a URI and then doesn't improve.       Additional Pulmonary History:  Childhood Illnesses:  NA  Occupational/Environmental:  none  Tobacco/Smoking:  previously smoked but quit int the 1980s    Objective:     Vitals:    05/15/23 1510   BP: 138/78   BP Location: Left arm   Patient Position: Sitting   Pulse: 63   SpO2: 95%   Weight: 90.7 kg (199 lb 15.3 oz)   Height: 5' 10" (1.778 m)         Physical Exam  Constitutional:       General: She is not in acute distress.     Appearance: She is not diaphoretic.   HENT:      Head: Normocephalic and atraumatic.      Right Ear: External ear normal.      Left Ear: External ear normal.   Eyes:      Conjunctiva/sclera: Conjunctivae normal.      Pupils: Pupils are equal, round, and reactive to light.   Neck:      Trachea: No tracheal deviation.   Cardiovascular:      Rate and Rhythm: Normal rate and regular rhythm.      Heart sounds: Normal heart sounds. No murmur heard.  Pulmonary:      Effort: Pulmonary effort is normal. No respiratory distress.      Breath sounds: Normal breath sounds. No stridor. No wheezing or rales.   Abdominal:      General: Bowel sounds are normal. There is no distension.      Palpations: Abdomen is soft.      Tenderness: There is no abdominal tenderness.   Musculoskeletal:         General: Normal range of motion.      Cervical back: Normal range of motion " and neck supple.   Skin:     General: Skin is warm and dry.      Findings: No erythema.   Neurological:      Mental Status: She is alert and oriented to person, place, and time.      Gait: Gait is intact.   Psychiatric:         Mood and Affect: Mood and affect normal.         Cognition and Memory: Memory normal.         Judgment: Judgment normal.        Personal Diagnostic Review and Interpretation  CT scan reviewed see a/p       Pertinent Studies Reviewed & Interpreted:     Pulmonary Function Tests:   Last done in 2021   Today's with restriction and decreased DLCO     6 Minute Walk Tests:   Last done in 2021- no oxygen needs     Echocardiograms:   Results for orders placed during the hospital encounter of 06/07/21    Echo    Interpretation Summary  · The estimated ejection fraction is 69%.  · Indeterminate left ventricular diastolic function.  · The left ventricle is normal in size with normal systolic function.  · Normal right ventricular size with normal right ventricular systolic function.        Assessment & Plan:       Problem List Items Addressed This Visit          Pulmonary    Dyspnea    Relevant Orders    Echo    Stress test, pulmonary    ILD (interstitial lung disease)    Current Assessment & Plan     Components of restriction and low dlco  - mild bronchiectasis and some emphysema in the apices.   - unsure exactly what all of this is but may all be related to previous infections  - previous +KEE but no other diagnosis   - previous lymphadenopathy... will reeval with CT with contrast  - given the feeling of reactive airways will trial flovent to see if it improves her symptoms.            Lung nodule    Overview     Will review final read but seems stable   Previous LAD that is hard to quantify on non contrast study- will repeat CT in 6 months with contrast            Pulmonary hypertension    Current Assessment & Plan     Noted on previous echo and with decreased DLCO and restriction, may be major  contributor to her symptoms  - repeat echo ordered             Other Visit Diagnoses       Interstitial pulmonary disease, unspecified    -  Primary    Relevant Orders    CT Chest With Contrast    Basic Metabolic Panel    Stress test, pulmonary    Mild intermittent reactive airway disease without complication        Relevant Medications    fluticasone propionate (FLOVENT HFA) 110 mcg/actuation inhaler             Portions of the record may have been created with voice-recognition software. Occasional wrong-word or sound-a-like substitutions may have occurred due to the inherent limitations of voice-recognition software. Read the chart carefully and recognize, using context, where substitutions have occurred.  Coleen Real M.D.  Pulmonary/Critical Care

## 2023-05-16 ENCOUNTER — TELEPHONE (OUTPATIENT)
Dept: ENDOSCOPY | Facility: HOSPITAL | Age: 73
End: 2023-05-16

## 2023-05-16 ENCOUNTER — CLINICAL SUPPORT (OUTPATIENT)
Dept: ENDOSCOPY | Facility: HOSPITAL | Age: 73
End: 2023-05-16
Attending: INTERNAL MEDICINE
Payer: MEDICARE

## 2023-05-16 DIAGNOSIS — Z12.11 SCREENING FOR COLON CANCER: ICD-10-CM

## 2023-05-16 DIAGNOSIS — Z86.010 HISTORY OF COLON POLYPS: Primary | ICD-10-CM

## 2023-05-16 LAB
DLCO ADJ PRE: 11.55 ML/(MIN*MMHG) (ref 18.83–30.3)
DLCO SINGLE BREATH LLN: 18.83
DLCO SINGLE BREATH PRE REF: 46.6 %
DLCO SINGLE BREATH REF: 24.57
DLCOC SBVA LLN: 2.95
DLCOC SBVA PRE REF: 72.2 %
DLCOC SBVA REF: 4.13
DLCOC SINGLE BREATH LLN: 18.83
DLCOC SINGLE BREATH PRE REF: 47 %
DLCOC SINGLE BREATH REF: 24.57
DLCOCSBVAULN: 5.32
DLCOCSINGLEBREATHULN: 30.3
DLCOSINGLEBREATHULN: 30.3
DLCOVA LLN: 2.95
DLCOVA PRE REF: 71.5 %
DLCOVA PRE: 2.96 ML/(MIN*MMHG*L) (ref 2.95–5.32)
DLCOVA REF: 4.13
DLCOVAULN: 5.32
DLVAADJ PRE: 2.98 ML/(MIN*MMHG*L) (ref 2.95–5.32)
ERV LLN: -16449.33
ERV PRE REF: 136.1 %
ERV REF: 0.67
ERVULN: ABNORMAL
FEF 25 75 LLN: 0.63
FEF 25 75 PRE REF: 71.3 %
FEF 25 75 REF: 1.74
FET100 CHG: 2.3 %
FEV05 LLN: 1.09
FEV05 REF: 1.95
FEV1 CHG: 5.9 %
FEV1 FVC LLN: 64
FEV1 FVC PRE REF: 95.5 %
FEV1 FVC REF: 77
FEV1 LLN: 1.5
FEV1 PRE REF: 77.9 %
FEV1 REF: 2.19
FEV1 VOL CHG: 0.1
FRCPLETH LLN: 2.23
FRCPLETH PREREF: 99.1 %
FRCPLETH REF: 3.06
FRCPLETHULN: 3.88
FVC CHG: -3.4 %
FVC LLN: 1.99
FVC PRE REF: 80.7 %
FVC REF: 2.86
FVC VOL CHG: -0.08
IVC PRE: 2.33 L (ref 1.99–3.78)
IVC SINGLE BREATH LLN: 1.99
IVC SINGLE BREATH PRE REF: 81.3 %
IVC SINGLE BREATH REF: 2.86
IVCSINGLEBREATHULN: 3.78
LLN IC: -16447.42
PEF LLN: 3.21
PEF PRE REF: 90.8 %
PEF REF: 5.61
PHYSICIAN COMMENT: ABNORMAL
POST FEF 25 75: 1.68 L/S (ref 0.63–3.5)
POST FET 100: 6.3 SEC
POST FEV1 FVC: 81.06 % (ref 64.07–88.87)
POST FEV1: 1.81 L (ref 1.5–2.84)
POST FEV5: 1.41 L (ref 1.09–2.81)
POST FVC: 2.23 L (ref 1.99–3.78)
POST PEF: 5.25 L/S (ref 3.21–8.01)
PRE DLCO: 11.44 ML/(MIN*MMHG) (ref 18.83–30.3)
PRE ERV: 0.91 L (ref -16449.33–16450.67)
PRE FEF 25 75: 1.24 L/S (ref 0.63–3.5)
PRE FET 100: 6.15 SEC
PRE FEV05 REF: 66.2 %
PRE FEV1 FVC: 73.9 % (ref 64.07–88.87)
PRE FEV1: 1.7 L (ref 1.5–2.84)
PRE FEV5: 1.29 L (ref 1.09–2.81)
PRE FRC PL: 3.03 L (ref 2.23–3.88)
PRE FVC: 2.31 L (ref 1.99–3.78)
PRE IC: 1.42 L (ref -16447.42–16452.58)
PRE PEF: 5.09 L/S (ref 3.21–8.01)
PRE REF IC: 54.8 %
PRE RV: 2.12 L (ref 1.81–2.96)
PRE TLC: 4.44 L (ref 4.96–6.93)
PRE VTG: 3.08 L
RAW PRE REF: 123.2 %
RAW PRE: 3.77 CMH2O*S/L (ref 3.06–3.06)
RAW REF: 3.06
REF IC: 2.58
RV LLN: 1.81
RV PRE REF: 88.7 %
RV REF: 2.39
RVTLC LLN: 34
RVTLC PRE REF: 108.8 %
RVTLC PRE: 47.64 % (ref 34.19–53.37)
RVTLC REF: 44
RVTLCULN: 53
RVULN: 2.96
SGAW PRE REF: 74.3 %
SGAW PRE: 0.08 1/(CMH2O*S) (ref 0.1–0.1)
SGAW REF: 0.1
TLC LLN: 4.96
TLC PRE REF: 74.7 %
TLC REF: 5.94
TLC ULN: 6.93
ULN IC: ABNORMAL
VA PRE: 3.87 L (ref 5.79–5.79)
VA SINGLE BREATH LLN: 5.79
VA SINGLE BREATH PRE REF: 66.8 %
VA SINGLE BREATH REF: 5.79
VASINGLEBREATHULN: 5.79
VC LLN: 1.99
VC PRE REF: 81.3 %
VC PRE: 2.33 L (ref 1.99–3.78)
VC REF: 2.86
VC ULN: 3.78

## 2023-05-16 NOTE — TELEPHONE ENCOUNTER
Patient has an order for a colonoscopy.  Is she cleared from a Pulmonology standpoint to proceed with scheduling?  Please advise.  Thank you.    Nidia

## 2023-05-16 NOTE — PLAN OF CARE
Awaiting Pulmonary clearance.  Will call back after.   pulmicort & clonidine given. will continue to monitor pt & will notify provider for changes in pt status.

## 2023-05-18 VITALS — BODY MASS INDEX: 28.49 KG/M2 | HEIGHT: 70 IN | WEIGHT: 199 LBS

## 2023-05-18 RX ORDER — POLYETHYLENE GLYCOL 3350, SODIUM SULFATE ANHYDROUS, SODIUM BICARBONATE, SODIUM CHLORIDE, POTASSIUM CHLORIDE 236; 22.74; 6.74; 5.86; 2.97 G/4L; G/4L; G/4L; G/4L; G/4L
4 POWDER, FOR SOLUTION ORAL ONCE
Qty: 4000 ML | Refills: 0 | Status: SHIPPED | OUTPATIENT
Start: 2023-05-18 | End: 2023-05-18

## 2023-05-29 ENCOUNTER — LAB VISIT (OUTPATIENT)
Dept: LAB | Facility: HOSPITAL | Age: 73
End: 2023-05-29
Attending: INTERNAL MEDICINE
Payer: MEDICARE

## 2023-05-29 ENCOUNTER — OFFICE VISIT (OUTPATIENT)
Dept: INTERNAL MEDICINE | Facility: CLINIC | Age: 73
End: 2023-05-29
Payer: MEDICARE

## 2023-05-29 VITALS
OXYGEN SATURATION: 95 % | HEIGHT: 70 IN | SYSTOLIC BLOOD PRESSURE: 114 MMHG | HEART RATE: 75 BPM | WEIGHT: 199.94 LBS | DIASTOLIC BLOOD PRESSURE: 70 MMHG | BODY MASS INDEX: 28.62 KG/M2

## 2023-05-29 DIAGNOSIS — M54.2 NECK PAIN ON RIGHT SIDE: ICD-10-CM

## 2023-05-29 DIAGNOSIS — R73.9 HYPERGLYCEMIA: ICD-10-CM

## 2023-05-29 DIAGNOSIS — I10 PRIMARY HYPERTENSION: ICD-10-CM

## 2023-05-29 DIAGNOSIS — J45.20 MILD INTERMITTENT REACTIVE AIRWAY DISEASE WITHOUT COMPLICATION: ICD-10-CM

## 2023-05-29 DIAGNOSIS — Z12.31 SCREENING MAMMOGRAM, ENCOUNTER FOR: Primary | ICD-10-CM

## 2023-05-29 LAB
ALBUMIN SERPL BCP-MCNC: 3.4 G/DL (ref 3.5–5.2)
ALP SERPL-CCNC: 148 U/L (ref 55–135)
ALT SERPL W/O P-5'-P-CCNC: 17 U/L (ref 10–44)
ANION GAP SERPL CALC-SCNC: 9 MMOL/L (ref 8–16)
AST SERPL-CCNC: 20 U/L (ref 10–40)
BILIRUB SERPL-MCNC: 0.5 MG/DL (ref 0.1–1)
BUN SERPL-MCNC: 22 MG/DL (ref 8–23)
CALCIUM SERPL-MCNC: 9.7 MG/DL (ref 8.7–10.5)
CHLORIDE SERPL-SCNC: 103 MMOL/L (ref 95–110)
CO2 SERPL-SCNC: 29 MMOL/L (ref 23–29)
CREAT SERPL-MCNC: 1 MG/DL (ref 0.5–1.4)
EST. GFR  (NO RACE VARIABLE): 59.5 ML/MIN/1.73 M^2
ESTIMATED AVG GLUCOSE: 117 MG/DL (ref 68–131)
GLUCOSE SERPL-MCNC: 80 MG/DL (ref 70–110)
HBA1C MFR BLD: 5.7 % (ref 4–5.6)
POTASSIUM SERPL-SCNC: 4 MMOL/L (ref 3.5–5.1)
PROT SERPL-MCNC: 7.1 G/DL (ref 6–8.4)
SODIUM SERPL-SCNC: 141 MMOL/L (ref 136–145)

## 2023-05-29 PROCEDURE — 1159F PR MEDICATION LIST DOCUMENTED IN MEDICAL RECORD: ICD-10-PCS | Mod: CPTII,S$GLB,, | Performed by: INTERNAL MEDICINE

## 2023-05-29 PROCEDURE — 3288F FALL RISK ASSESSMENT DOCD: CPT | Mod: CPTII,S$GLB,, | Performed by: INTERNAL MEDICINE

## 2023-05-29 PROCEDURE — 3288F PR FALLS RISK ASSESSMENT DOCUMENTED: ICD-10-PCS | Mod: CPTII,S$GLB,, | Performed by: INTERNAL MEDICINE

## 2023-05-29 PROCEDURE — 99214 OFFICE O/P EST MOD 30 MIN: CPT | Mod: S$GLB,,, | Performed by: INTERNAL MEDICINE

## 2023-05-29 PROCEDURE — 80053 COMPREHEN METABOLIC PANEL: CPT | Performed by: INTERNAL MEDICINE

## 2023-05-29 PROCEDURE — 99999 PR PBB SHADOW E&M-EST. PATIENT-LVL V: ICD-10-PCS | Mod: PBBFAC,,, | Performed by: INTERNAL MEDICINE

## 2023-05-29 PROCEDURE — 1101F PT FALLS ASSESS-DOCD LE1/YR: CPT | Mod: CPTII,S$GLB,, | Performed by: INTERNAL MEDICINE

## 2023-05-29 PROCEDURE — 3078F DIAST BP <80 MM HG: CPT | Mod: CPTII,S$GLB,, | Performed by: INTERNAL MEDICINE

## 2023-05-29 PROCEDURE — 99999 PR PBB SHADOW E&M-EST. PATIENT-LVL V: CPT | Mod: PBBFAC,,, | Performed by: INTERNAL MEDICINE

## 2023-05-29 PROCEDURE — 36415 COLL VENOUS BLD VENIPUNCTURE: CPT | Performed by: INTERNAL MEDICINE

## 2023-05-29 PROCEDURE — 3074F SYST BP LT 130 MM HG: CPT | Mod: CPTII,S$GLB,, | Performed by: INTERNAL MEDICINE

## 2023-05-29 PROCEDURE — 1125F AMNT PAIN NOTED PAIN PRSNT: CPT | Mod: CPTII,S$GLB,, | Performed by: INTERNAL MEDICINE

## 2023-05-29 PROCEDURE — 3074F PR MOST RECENT SYSTOLIC BLOOD PRESSURE < 130 MM HG: ICD-10-PCS | Mod: CPTII,S$GLB,, | Performed by: INTERNAL MEDICINE

## 2023-05-29 PROCEDURE — 3044F HG A1C LEVEL LT 7.0%: CPT | Mod: CPTII,S$GLB,, | Performed by: INTERNAL MEDICINE

## 2023-05-29 PROCEDURE — 3061F PR NEG MICROALBUMINURIA RESULT DOCUMENTED/REVIEW: ICD-10-PCS | Mod: CPTII,S$GLB,, | Performed by: INTERNAL MEDICINE

## 2023-05-29 PROCEDURE — 3044F PR MOST RECENT HEMOGLOBIN A1C LEVEL <7.0%: ICD-10-PCS | Mod: CPTII,S$GLB,, | Performed by: INTERNAL MEDICINE

## 2023-05-29 PROCEDURE — 3008F PR BODY MASS INDEX (BMI) DOCUMENTED: ICD-10-PCS | Mod: CPTII,S$GLB,, | Performed by: INTERNAL MEDICINE

## 2023-05-29 PROCEDURE — 3078F PR MOST RECENT DIASTOLIC BLOOD PRESSURE < 80 MM HG: ICD-10-PCS | Mod: CPTII,S$GLB,, | Performed by: INTERNAL MEDICINE

## 2023-05-29 PROCEDURE — 3008F BODY MASS INDEX DOCD: CPT | Mod: CPTII,S$GLB,, | Performed by: INTERNAL MEDICINE

## 2023-05-29 PROCEDURE — 83036 HEMOGLOBIN GLYCOSYLATED A1C: CPT | Performed by: INTERNAL MEDICINE

## 2023-05-29 PROCEDURE — 99214 PR OFFICE/OUTPT VISIT, EST, LEVL IV, 30-39 MIN: ICD-10-PCS | Mod: S$GLB,,, | Performed by: INTERNAL MEDICINE

## 2023-05-29 PROCEDURE — 1159F MED LIST DOCD IN RCRD: CPT | Mod: CPTII,S$GLB,, | Performed by: INTERNAL MEDICINE

## 2023-05-29 PROCEDURE — 3061F NEG MICROALBUMINURIA REV: CPT | Mod: CPTII,S$GLB,, | Performed by: INTERNAL MEDICINE

## 2023-05-29 PROCEDURE — 3066F NEPHROPATHY DOC TX: CPT | Mod: CPTII,S$GLB,, | Performed by: INTERNAL MEDICINE

## 2023-05-29 PROCEDURE — 1125F PR PAIN SEVERITY QUANTIFIED, PAIN PRESENT: ICD-10-PCS | Mod: CPTII,S$GLB,, | Performed by: INTERNAL MEDICINE

## 2023-05-29 PROCEDURE — 1101F PR PT FALLS ASSESS DOC 0-1 FALLS W/OUT INJ PAST YR: ICD-10-PCS | Mod: CPTII,S$GLB,, | Performed by: INTERNAL MEDICINE

## 2023-05-29 PROCEDURE — 3066F PR DOCUMENTATION OF TREATMENT FOR NEPHROPATHY: ICD-10-PCS | Mod: CPTII,S$GLB,, | Performed by: INTERNAL MEDICINE

## 2023-05-29 RX ORDER — IRBESARTAN AND HYDROCHLOROTHIAZIDE 300; 12.5 MG/1; MG/1
1 TABLET, FILM COATED ORAL DAILY
Qty: 90 TABLET | Refills: 3 | Status: SHIPPED | OUTPATIENT
Start: 2023-05-29

## 2023-05-29 RX ORDER — FLUTICASONE PROPIONATE 110 UG/1
1 AEROSOL, METERED RESPIRATORY (INHALATION) 2 TIMES DAILY
Qty: 12 G | Refills: 6 | Status: SHIPPED | OUTPATIENT
Start: 2023-05-29 | End: 2023-06-28

## 2023-05-29 RX ORDER — ORPHENADRINE CITRATE 100 MG/1
TABLET, EXTENDED RELEASE ORAL
Qty: 30 TABLET | Refills: 3 | Status: SHIPPED | OUTPATIENT
Start: 2023-05-29

## 2023-05-29 NOTE — PROGRESS NOTES
Subjective:       Patient ID: Marleny Guzman is a 72 y.o. female.    Chief Complaint: Follow-up and Neck Pain    Follow-up  Associated symptoms include neck pain. Pertinent negatives include no abdominal pain, chest pain (arm pain or jaw pain), headaches, nausea or vomiting.   Neck Pain   Pertinent negatives include no chest pain (arm pain or jaw pain) or headaches. Pt with R sided neck pain beneath her ear.  No CP or SOB.  Review of Systems   Respiratory:  Negative for shortness of breath (PND or orthopnea).    Cardiovascular:  Negative for chest pain (arm pain or jaw pain).   Gastrointestinal:  Negative for abdominal pain, diarrhea, nausea and vomiting.   Genitourinary:  Negative for dysuria.   Musculoskeletal:  Positive for neck pain.   Neurological:  Negative for seizures, syncope and headaches.     Objective:      Physical Exam  Constitutional:       General: She is not in acute distress.     Appearance: She is well-developed.   HENT:      Head: Normocephalic.   Eyes:      Pupils: Pupils are equal, round, and reactive to light.   Neck:      Thyroid: No thyromegaly.      Vascular: No JVD.   Cardiovascular:      Rate and Rhythm: Normal rate and regular rhythm.      Heart sounds: Normal heart sounds. No murmur heard.    No friction rub. No gallop.   Pulmonary:      Effort: Pulmonary effort is normal.      Breath sounds: Normal breath sounds. No wheezing or rales.   Abdominal:      General: Bowel sounds are normal. There is no distension.      Palpations: Abdomen is soft. There is no mass.      Tenderness: There is no abdominal tenderness. There is no guarding or rebound.   Musculoskeletal:      Cervical back: Neck supple. Tenderness (beneath ear) present.   Lymphadenopathy:      Cervical: No cervical adenopathy.   Skin:     General: Skin is warm and dry.   Neurological:      Mental Status: She is alert and oriented to person, place, and time.      Deep Tendon Reflexes: Reflexes are normal and symmetric.    Psychiatric:         Behavior: Behavior normal.         Thought Content: Thought content normal.         Judgment: Judgment normal.       Assessment:       1. Screening mammogram, encounter for    2. Mild intermittent reactive airway disease without complication    3. Hyperglycemia    4. Neck pain on right side    5. Primary hypertension        Plan:   Screening mammogram, encounter for  -     Mammo Digital Screening Bilat w/ Sky; Future; Expected date: 11/29/2023    Mild intermittent reactive airway disease without complication  -     fluticasone propionate (FLOVENT HFA) 110 mcg/actuation inhaler; Inhale 1 puff into the lungs 2 (two) times daily. Controller  Dispense: 12 g; Refill: 6    Hyperglycemia  -     Hemoglobin A1C; Future; Expected date: 05/29/2023    Neck pain on right side  -     CT Soft Tissue Neck With Contrast; Future; Expected date: 05/29/2023    Primary hypertension  -     Comprehensive Metabolic Panel; Future; Expected date: 05/29/2023    Other orders  -     orphenadrine (NORFLEX) 100 mg tablet; TAKE 1 TABLET BY MOUTH EVERY DAY AS NEEDED FOR MUSCLE SPASM  Dispense: 30 tablet; Refill: 3  -     irbesartan-hydrochlorothiazide (AVALIDE) 300-12.5 mg per tablet; Take 1 tablet by mouth once daily.  Dispense: 90 tablet; Refill: 3

## 2023-06-07 ENCOUNTER — TELEPHONE (OUTPATIENT)
Dept: INTERNAL MEDICINE | Facility: CLINIC | Age: 73
End: 2023-06-07
Payer: MEDICARE

## 2023-06-07 NOTE — TELEPHONE ENCOUNTER
----- Message from Sheridan Kraus sent at 6/7/2023  4:02 PM CDT -----  Contact: [phone]  Caller is requesting to schedule their annual screening mammogram appointment. Order is not listed in Epic.  Please enter order and contact patient to schedule.  Would the patient like a call back, or a response through their [facility] portal?:   callback @ # [phone]

## 2023-06-12 ENCOUNTER — CLINICAL SUPPORT (OUTPATIENT)
Dept: INTERNAL MEDICINE | Facility: CLINIC | Age: 73
End: 2023-06-12
Payer: MEDICARE

## 2023-06-12 ENCOUNTER — TELEPHONE (OUTPATIENT)
Dept: INTERNAL MEDICINE | Facility: CLINIC | Age: 73
End: 2023-06-12

## 2023-06-12 VITALS — SYSTOLIC BLOOD PRESSURE: 130 MMHG | DIASTOLIC BLOOD PRESSURE: 58 MMHG

## 2023-06-15 ENCOUNTER — HOSPITAL ENCOUNTER (OUTPATIENT)
Dept: RADIOLOGY | Facility: HOSPITAL | Age: 73
Discharge: HOME | End: 2023-06-15
Attending: INTERNAL MEDICINE
Payer: MEDICARE

## 2023-06-15 VITALS — WEIGHT: 199 LBS | BODY MASS INDEX: 28.49 KG/M2 | HEIGHT: 70 IN

## 2023-06-15 DIAGNOSIS — Z12.31 SCREENING MAMMOGRAM, ENCOUNTER FOR: ICD-10-CM

## 2023-06-15 PROCEDURE — 77067 MAMMO DIGITAL SCREENING BILAT WITH TOMO: ICD-10-PCS | Mod: 26,,, | Performed by: RADIOLOGY

## 2023-06-15 PROCEDURE — 77067 SCR MAMMO BI INCL CAD: CPT | Mod: TC

## 2023-06-15 PROCEDURE — 77063 BREAST TOMOSYNTHESIS BI: CPT | Mod: 26,,, | Performed by: RADIOLOGY

## 2023-06-15 PROCEDURE — 77067 SCR MAMMO BI INCL CAD: CPT | Mod: 26,,, | Performed by: RADIOLOGY

## 2023-06-15 PROCEDURE — 77063 MAMMO DIGITAL SCREENING BILAT WITH TOMO: ICD-10-PCS | Mod: 26,,, | Performed by: RADIOLOGY

## 2023-06-19 ENCOUNTER — HOSPITAL ENCOUNTER (OUTPATIENT)
Facility: HOSPITAL | Age: 73
Discharge: HOME | End: 2023-06-19
Attending: INTERNAL MEDICINE | Admitting: INTERNAL MEDICINE
Payer: MEDICARE

## 2023-06-19 ENCOUNTER — ANESTHESIA EVENT (OUTPATIENT)
Dept: ENDOSCOPY | Facility: HOSPITAL | Age: 73
End: 2023-06-19
Payer: MEDICARE

## 2023-06-19 ENCOUNTER — ANESTHESIA (OUTPATIENT)
Dept: ENDOSCOPY | Facility: HOSPITAL | Age: 73
End: 2023-06-19
Payer: MEDICARE

## 2023-06-19 VITALS
TEMPERATURE: 98 F | BODY MASS INDEX: 28.49 KG/M2 | DIASTOLIC BLOOD PRESSURE: 85 MMHG | RESPIRATION RATE: 18 BRPM | HEIGHT: 70 IN | WEIGHT: 199 LBS | HEART RATE: 67 BPM | OXYGEN SATURATION: 100 % | SYSTOLIC BLOOD PRESSURE: 175 MMHG

## 2023-06-19 DIAGNOSIS — Z86.010 HISTORY OF COLON POLYPS: Primary | ICD-10-CM

## 2023-06-19 PROCEDURE — 63600175 PHARM REV CODE 636 W HCPCS: Performed by: NURSE ANESTHETIST, CERTIFIED REGISTERED

## 2023-06-19 PROCEDURE — 37000009 HC ANESTHESIA EA ADD 15 MINS: Performed by: INTERNAL MEDICINE

## 2023-06-19 PROCEDURE — 25000003 PHARM REV CODE 250: Performed by: INTERNAL MEDICINE

## 2023-06-19 PROCEDURE — 88305 TISSUE EXAM BY PATHOLOGIST: ICD-10-PCS | Mod: 26,,, | Performed by: PATHOLOGY

## 2023-06-19 PROCEDURE — 37000008 HC ANESTHESIA 1ST 15 MINUTES: Performed by: INTERNAL MEDICINE

## 2023-06-19 PROCEDURE — D9220A PRA ANESTHESIA: Mod: PT,CRNA,, | Performed by: NURSE ANESTHETIST, CERTIFIED REGISTERED

## 2023-06-19 PROCEDURE — 88305 TISSUE EXAM BY PATHOLOGIST: CPT | Mod: 26,,, | Performed by: PATHOLOGY

## 2023-06-19 PROCEDURE — 27201089 HC SNARE, DISP (ANY): Performed by: INTERNAL MEDICINE

## 2023-06-19 PROCEDURE — 88305 TISSUE EXAM BY PATHOLOGIST: CPT | Performed by: PATHOLOGY

## 2023-06-19 PROCEDURE — 25000003 PHARM REV CODE 250: Performed by: NURSE ANESTHETIST, CERTIFIED REGISTERED

## 2023-06-19 PROCEDURE — 45385 PR COLONOSCOPY,REMV LESN,SNARE: ICD-10-PCS | Mod: PT,,, | Performed by: INTERNAL MEDICINE

## 2023-06-19 PROCEDURE — D9220A PRA ANESTHESIA: ICD-10-PCS | Mod: PT,CRNA,, | Performed by: NURSE ANESTHETIST, CERTIFIED REGISTERED

## 2023-06-19 PROCEDURE — D9220A PRA ANESTHESIA: Mod: PT,ANES,, | Performed by: SURGERY

## 2023-06-19 PROCEDURE — 45385 COLONOSCOPY W/LESION REMOVAL: CPT | Mod: PT | Performed by: INTERNAL MEDICINE

## 2023-06-19 PROCEDURE — 45385 COLONOSCOPY W/LESION REMOVAL: CPT | Mod: PT,,, | Performed by: INTERNAL MEDICINE

## 2023-06-19 PROCEDURE — D9220A PRA ANESTHESIA: ICD-10-PCS | Mod: PT,ANES,, | Performed by: SURGERY

## 2023-06-19 RX ORDER — PROPOFOL 10 MG/ML
VIAL (ML) INTRAVENOUS CONTINUOUS PRN
Status: DISCONTINUED | OUTPATIENT
Start: 2023-06-19 | End: 2023-06-19

## 2023-06-19 RX ORDER — SODIUM CHLORIDE 9 MG/ML
INJECTION, SOLUTION INTRAVENOUS CONTINUOUS
Status: DISCONTINUED | OUTPATIENT
Start: 2023-06-19 | End: 2023-06-19 | Stop reason: HOSPADM

## 2023-06-19 RX ORDER — PROPOFOL 10 MG/ML
VIAL (ML) INTRAVENOUS
Status: DISCONTINUED | OUTPATIENT
Start: 2023-06-19 | End: 2023-06-19

## 2023-06-19 RX ORDER — LIDOCAINE HYDROCHLORIDE 20 MG/ML
INJECTION INTRAVENOUS
Status: DISCONTINUED | OUTPATIENT
Start: 2023-06-19 | End: 2023-06-19

## 2023-06-19 RX ORDER — SODIUM CHLORIDE 0.9 % (FLUSH) 0.9 %
10 SYRINGE (ML) INJECTION
Status: DISCONTINUED | OUTPATIENT
Start: 2023-06-19 | End: 2023-06-19 | Stop reason: HOSPADM

## 2023-06-19 RX ADMIN — LIDOCAINE HYDROCHLORIDE 100 MG: 20 INJECTION INTRAVENOUS at 12:06

## 2023-06-19 RX ADMIN — SODIUM CHLORIDE: 0.9 INJECTION, SOLUTION INTRAVENOUS at 12:06

## 2023-06-19 RX ADMIN — Medication 100 MCG/KG/MIN: at 12:06

## 2023-06-19 RX ADMIN — PROPOFOL 50 MG: 10 INJECTION, EMULSION INTRAVENOUS at 12:06

## 2023-06-19 NOTE — ANESTHESIA PREPROCEDURE EVALUATION
Pre-operative evaluation for Procedure(s) (LRB):  COLONOSCOPY (N/A)    Marleny Guzman is a 72 y.o. female     Patient Active Problem List   Diagnosis    Anxiety    HTN (hypertension)    Osteoarthritis    Depression    Sleep apnea    Postoperative pain    Rotator cuff tear    Cervical stenosis (uterine cervix)    Cervicogenic headache    Cervical myofascial pain syndrome    Subjective memory complaints    Insomnia    HGSIL (high grade squamous intraepithelial lesion) on Pap smear of cervix    Thoracic aorta atherosclerosis    HA (headache)    Fall    Imbalance    Anemia    Vaginal dysplasia    DDD (degenerative disc disease), lumbar    Chronic bilateral low back pain without sciatica    Weakness of left lower extremity    Chronic pain    Dyspnea    Elevated troponin    Syncope    SIRS (systemic inflammatory response syndrome)    Fever    Oral thrush    Invasive fungal sinusitis    Abnormal MRI    Chronic maxillary sinusitis    Acute recurrent maxillary sinusitis    ILD (interstitial lung disease)    WOOTEN (dyspnea on exertion)    Abnormal CT of the chest    Mediastinal adenopathy    Lung nodule    Wears contact lenses    Hematuria    Recurrent major depressive disorder, in partial remission    Pulmonary hypertension       Review of patient's allergies indicates:  No Known Allergies    Current Facility-Administered Medications on File Prior to Encounter   Medication Dose Route Frequency Provider Last Rate Last Admin    0.9%  NaCl infusion   Intravenous Continuous Roney Jackson MD   Stopped at 11/29/21 1755    fentaNYL 50 mcg/mL injection  mcg   mcg Intravenous PRN Anthony Hernandez MD   50 mcg at 11/29/21 1109    LIDOcaine (PF) 10 mg/ml (1%) injection 10 mg  1 mL Intradermal Once Roney Jackson MD        midazolam (VERSED) 1 mg/mL injection 0.5-4 mg  0.5-4 mg Intravenous PRN Anthony Hernandez MD   1 mg at 11/29/21 1109     prochlorperazine injection Soln 5 mg  5 mg Intravenous Q30 Min PRN Beth Ramsey MD        sodium chloride 0.9% flush 10 mL  10 mL Intravenous PRN Beth Ramsey MD         Current Outpatient Medications on File Prior to Encounter   Medication Sig Dispense Refill    atorvastatin (LIPITOR) 20 MG tablet TAKE 1 TABLET BY MOUTH EVERY DAY 90 tablet 3    EScitalopram oxalate (LEXAPRO) 20 MG tablet TAKE 1 TABLET (20 MG TOTAL) BY MOUTH ONCE DAILY 90 tablet 3    metoprolol succinate (TOPROL-XL) 50 MG 24 hr tablet TAKE 1 TABLET BY MOUTH EVERY DAY 90 tablet 3    spironolactone (ALDACTONE) 25 MG tablet Take 1 tablet (25 mg total) by mouth once daily. 30 tablet 3    albuterol (PROVENTIL/VENTOLIN HFA) 90 mcg/actuation inhaler Inhale 1-2 puffs into the lungs every 6 (six) hours as needed for Wheezing or Shortness of Breath (Please dispense with spacer). 6.7 g 0    amoxicillin (AMOXIL) 875 MG tablet Take 1 tablet (875 mg total) by mouth every 12 (twelve) hours. 20 tablet 0    aspirin 81 MG Chew TAKE 1 TABLET BY MOUTH EVERY DAY (Patient taking differently: No sig reported) 90 tablet 1    azelastine (ASTELIN) 137 mcg (0.1 %) nasal spray 1 spray (137 mcg total) by Nasal route 2 (two) times daily. 30 mL 0    ergocalciferol (ERGOCALCIFEROL) 50,000 unit Cap TAKE 1 CAPSULE (50,000 UNITS TOTAL) BY MOUTH EVERY 30 DAYS. 3 capsule 3    estradioL (ESTRACE) 0.01 % (0.1 mg/gram) vaginal cream Use 1 GM nightly vaginally and dime size amount on vulva twice weekly 42.5 g 4    LORazepam (ATIVAN) 0.5 MG tablet TAKE 1/2 TO 1 TABLET BY MOUTH DAILY AS NEEDED FOR ANXIETY 30 tablet 0    nystatin (MYCOSTATIN) cream Apply topically 2 (two) times daily. 30 g 1    pulse oximeter (PULSE OXIMETER) device by Apply Externally route 2 (two) times a day. Use twice daily at 8 AM and 3 PM and record the value in [facility] as directed. 1 each 0    triamcinolone acetonide 0.1% (KENALOG) 0.1 % cream APPLY TOPICALLY TWICE A DAY 45 g 0       Past  Surgical History:   Procedure Laterality Date    CATARACT EXTRACTION  6/18/12    OS    CATARACT EXTRACTION  7/16/12    OD    CERVIX SURGERY      Conization    COLONOSCOPY      COLONOSCOPY N/A 3/4/2016    Procedure: COLONOSCOPY;  Surgeon: Tenzin Thakkar MD;  Location: [facility] (4TH FLR);  Service: Endoscopy;  Laterality: N/A;    COLONOSCOPY Left 1/25/2021    Procedure: COLONOSCOPY;  Surgeon: Wayne Naranjo MD;  Location: [facility] ENDO;  Service: Endoscopy;  Laterality: Left;    CYSTOSCOPY N/A 9/12/2022    Procedure: CYSTOSCOPY;  Surgeon: Bianka Aragon MD;  Location: [facility] OR 1ST FLR;  Service: Urology;  Laterality: N/A;    ESOPHAGOGASTRODUODENOSCOPY Left 1/25/2021    Procedure: EGD (ESOPHAGOGASTRODUODENOSCOPY);  Surgeon: Wayne Naranjo MD;  Location: [facility];  Service: Endoscopy;  Laterality: Left;    EYE SURGERY Bilateral     cataract    HYSTERECTOMY  5-12-15    LEEP      LUMBAR EPIDURAL INJECTION      OOPHORECTOMY      WA REMOVAL OF OVARY/TUBE(S)  5-12-15    RETROGRADE PYELOGRAPHY Bilateral 9/12/2022    Procedure: PYELOGRAM, RETROGRADE;  Surgeon: Bianka Aragon MD;  Location: [facility] OR 1ST FLR;  Service: Urology;  Laterality: Bilateral;    ROBOT-ASSISTED REPAIR OF VENTRAL HERNIA USING DA ASPEN XI N/A 11/29/2021    Procedure: XI ROBOTIC REPAIR, HERNIA, VENTRAL w/ possible mesh;  Surgeon: Roney Jackson MD;  Location: [facility] OR 2ND FLR;  Service: General;  Laterality: N/A;  Anesthesia Block    SALPINGECTOMY Left     SHOULDER SURGERY Right          CBC:  Lab Results   Component Value Date    WBC 11.98 03/30/2023    RBC 4.71 03/30/2023    HGB 13.1 03/30/2023    HCT 42.0 03/30/2023     03/30/2023    MCV 89 03/30/2023    MCH 27.8 03/30/2023    MCHC 31.2 (L) 03/30/2023       CMP:   Lab Results   Component Value Date     05/29/2023    K 4.0 05/29/2023     05/29/2023    CO2 29 05/29/2023    BUN 22 05/29/2023    CREATININE 1.0 05/29/2023    GLU 80 05/29/2023    MG  "2.1 03/30/2023    CALCIUM 9.7 05/29/2023    ALBUMIN 3.4 (L) 05/29/2023    PROT 7.1 05/29/2023    ALKPHOS 148 (H) 05/29/2023    ALT 17 05/29/2023    AST 20 05/29/2023    BILITOT 0.5 05/29/2023       INR:  No results found for: PT, INR, PROTIME, APTT      Diagnostic Studies:      EKG:   Results for orders placed or performed during the hospital encounter of 04/05/22   EKG 12-lead    Collection Time: 04/05/22  3:53 PM    Narrative    Test Reason : R53.1,    Vent. Rate : 089 BPM     Atrial Rate : 089 BPM     P-R Int : 168 ms          QRS Dur : 080 ms      QT Int : 344 ms       P-R-T Axes : 085 064 055 degrees     QTc Int : 418 ms    Normal sinus rhythm  Minimal voltage criteria for LVH, may be normal variant  Nonspecific ST and T wave abnormality  Abnormal ECG    Confirmed by Nida GUILLEN, Ernesto HUSSEIN ([id number]) on 4/6/2022 12:46:32 PM    Referred By: [email or web address]   SELF           Confirmed By:Ernesto Alva MD        2D Echo:  Results for orders placed or performed during the hospital encounter of 09/01/16   2D echo with color flow doppler   Result Value Ref Range    EF + QEF 65 55 - 65    Diastolic Dysfunction No     Est. PA Systolic Pressure 27.01     Tricuspid Valve Regurgitation TRIVIAL        Stress Test:   No results found for this or any previous visit.      Pre-op Vitals [06/19/23 1144]   BP Pulse Resp Temp SpO2   138/65 62 18 36.6 °C (97.9 °F) 99 %      Height Weight BMI (Calculated)     5' 10" 199 lb 28.6         Pre-op Vitals [06/19/23 1144]   BP Pulse Resp Temp SpO2   138/65 62 18 36.6 °C (97.9 °F) 99 %      Height Weight BMI (Calculated)     5' 10" 199 lb 28.6            Pre-op Assessment    I have reviewed the Patient Summary Reports.     I have reviewed the Nursing Notes. I have reviewed the NPO Status.   I have reviewed the Medications.     Review of Systems  Anesthesia Hx:  Denies Family Hx of Anesthesia complications.   Denies Personal Hx of Anesthesia complications.   Social:  Non-Smoker    Cardiovascular:   " Hypertension WOOTEN ILD   Pulmonary:   Shortness of breath Sleep Apnea    Hepatic/GI:   Bowel Prep.    Musculoskeletal:   Arthritis     Neurological:   Headaches    Psych:   depression          Physical Exam  General: Well nourished    Airway:  Mallampati: II   Mouth Opening: Normal  TM Distance: Normal  Tongue: Normal  Neck ROM: Normal ROM    Dental:  Dentures  Any loose and/or missing teeth verified with patient       Anesthesia Plan  Type of Anesthesia, risks & benefits discussed:    Anesthesia Type: Gen ETT, Gen Natural Airway  Intra-op Monitoring Plan: Standard ASA Monitors  Post Op Pain Control Plan: multimodal analgesia  Induction:  IV  Airway Plan: , Post-Induction  Informed Consent: Informed consent signed with the Patient and all parties understand the risks and agree with anesthesia plan.  All questions answered.   ASA Score: 3  Day of Surgery Review of History & Physical: H&P Update referred to the surgeon/provider.    Ready For Surgery From Anesthesia Perspective.     .

## 2023-06-19 NOTE — ANESTHESIA POSTPROCEDURE EVALUATION
Anesthesia Post Evaluation    Patient: Marleny Guzman    Procedure(s) Performed: Procedure(s) (LRB):  COLONOSCOPY (N/A)    Final Anesthesia Type: general      Patient location during evaluation: [facility]  Patient participation: Yes- Able to Participate  Level of consciousness: awake and alert and awake  Post-procedure vital signs: reviewed and stable  Pain management: adequate  Airway patency: patent  ALVAREZ mitigation strategies: Postoperative administration of CPAP, nasopharyngeal airway, or oral appliance in the postanesthesia care unit (PACU)  PONV status at discharge: No PONV  Anesthetic complications: no      Cardiovascular status: blood pressure returned to baseline  Respiratory status: spontaneous ventilation, unassisted and room air  Hydration status: euvolemic  Follow-up not needed.          Vitals Value Taken Time   /85 06/19/23 1417   Temp 36.7 °C (98 °F) 06/19/23 1415   Pulse 67 06/19/23 1419   Resp 18 06/19/23 1415   SpO2 90 % 06/19/23 1419   Vitals shown include unvalidated device data.      No case tracking events are documented in the log.      Pain/Margaret Score: Margaret Score: 9 (6/19/2023  1:30 PM)

## 2023-06-19 NOTE — PROVATION PATIENT INSTRUCTIONS
Discharge Summary/Instructions after an Endoscopic Procedure  Patient Name: Marleny Guzman  Patient MRN: [id number]  Patient YOB: 1950 Monday, June 19, 2023  Tenzin Thakkar MD  Dear patient,  As a result of recent federal legislation (The Federal Cures Act), you may   receive lab or pathology results from your procedure in your [facility]   account before your physician is able to contact you. Your physician or   their representative will relay the results to you with their   recommendations at their soonest availability.  Thank you,  RESTRICTIONS:  During your procedure today, you received medications for sedation.  These   medications may affect your judgment, balance and coordination.  Therefore,   for 24 hours, you have the following restrictions:   - DO NOT drive a car, operate machinery, make legal/financial decisions,   sign important papers or drink alcohol.    ACTIVITY:  Today: no heavy lifting, straining or running due to procedural   sedation/anesthesia.  The following day: return to full activity including work.  DIET:  Eat and drink normally unless instructed otherwise.     TREATMENT FOR COMMON SIDE EFFECTS:  - Mild abdominal pain, nausea, belching, bloating or excessive gas:  rest,   eat lightly and use a heating pad.  - Sore Throat: treat with throat lozenges and/or gargle with warm salt   water.  - Because air was used during the procedure, expelling large amounts of air   from your rectum or belching is normal.  - If a bowel prep was taken, you may not have a bowel movement for 1-3 days.    This is normal.  SYMPTOMS TO WATCH FOR AND REPORT TO YOUR PHYSICIAN:  1. Abdominal pain or bloating, other than gas cramps.  2. Chest pain.  3. Back pain.  4. Signs of infection such as: chills or fever occurring within 24 hours   after the procedure.  5. Rectal bleeding, which would show as bright red, maroon, or black stools.   (A tablespoon of blood from the rectum is not serious, especially if    hemorrhoids are present.)  6. Vomiting.  7. Weakness or dizziness.  GO DIRECTLY TO THE NEAREST EMERGENCY ROOM IF YOU HAVE ANY OF THE FOLLOWING:      Difficulty breathing              Chills and/or fever over 101 F   Persistent vomiting and/or vomiting blood   Severe abdominal pain   Severe chest pain   Black, tarry stools   Bleeding- more than one tablespoon   Any other symptom or condition that you feel may need urgent attention  Your doctor recommends these additional instructions:  If any biopsies were taken, your doctors clinic will contact you in 1 to 2   weeks with any results.  - Discharge patient to home.   - Patient has a contact number available for emergencies.  The signs and   symptoms of potential delayed complications were discussed with the   patient.  Return to normal activities tomorrow.  Written discharge   instructions were provided to the patient.   - Resume previous diet.   - Continue present medications.   - Await pathology results.   - Repeat colonoscopy in 5 years for surveillance.  For questions, problems or results please call your physician - Tenzin Thakkar MD at Work:  [phone].  [facility], EMERGENCY ROOM PHONE NUMBER: [phone]  IF A COMPLICATION OR EMERGENCY SITUATION ARISES AND YOU ARE UNABLE TO REACH   YOUR PHYSICIAN - GO DIRECTLY TO THE EMERGENCY ROOM.  Tenzin Thakkar MD  6/19/2023 1:11:59 PM  This report has been verified and signed electronically.  Dear patient,  As a result of recent federal legislation (The Federal Cures Act), you may   receive lab or pathology results from your procedure in your [facility]   account before your physician is able to contact you. Your physician or   their representative will relay the results to you with their   recommendations at their soonest availability.  Thank you,  PROVATION

## 2023-06-19 NOTE — H&P
Short Stay Endoscopy History and Physical      Procedure - Colonoscopy  ASA - per anesthesia  Mallampati - per anesthesia  History of Anesthesia problems - no  Family history Anesthesia problems - no   Plan of anesthesia - MAC    HPI:  This is a 72 y.o. female here for surveillance of colon polyps.       ROS:  Constitutional: No fevers, chills  CV: No chest pain  Pulm: No cough, No shortness of breath  GI: see HPI    Medical History:  has a past medical history of Abnormal Pap smear of vagina (07/20/2016), Anemia, Cataract, Chronic bilateral low back pain without sciatica (03/13/2018), Depression with anxiety, Hypertension, Osteoarthritis (06/20/2013), Right rotator cuff tear, Sleep apnea, and Urinary incontinence.    Surgical History:  has a past surgical history that includes Salpingectomy (Left); Cataract extraction (6/18/12); Cataract extraction (7/16/12); LEEP; Eye surgery (Bilateral); Shoulder surgery (Right); Cervix surgery; Hysterectomy (5-12-15); pr removal of ovary/tube(s) (5-12-15); Colonoscopy; Lumbar epidural injection; Colonoscopy (N/A, 3/4/2016); Oophorectomy; Esophagogastroduodenoscopy (Left, 1/25/2021); Colonoscopy (Left, 1/25/2021); Robot-assisted repair of ventral hernia using da Jatin Xi (N/A, 11/29/2021); Retrograde pyelography (Bilateral, 9/12/2022); and Cystoscopy (N/A, 9/12/2022).    Family History: family history includes Anuerysm in her sister; Breast cancer in her cousin; Breast cancer (age of onset: 72) in her sister; Cancer in her maternal aunt, mother, and sister; Cataracts in her father; Deep vein thrombosis in her sister; Depression in her maternal grandmother; Drug abuse in her sister; Eczema in her sister; Edema in her sister; Heart disease in her sister and sister; Hypertension in her daughter, father, sister, sister, and sister; No Known Problems in her brother, paternal grandfather, paternal grandmother, sister, and sister; Osteoarthritis in her sister; Other in her daughter;  Pacemaker/defibrilator in her sister; Stroke in her mother; Ulcers in her maternal grandfather.    Social History:  reports that she quit smoking about 26 years ago. Her smoking use included cigarettes. She has a 11.00 pack-year smoking history. She has been exposed to tobacco smoke. She has never used smokeless tobacco. She reports current alcohol use. She reports that she does not use drugs.    Review of patient's allergies indicates:  No Known Allergies    Medications:   Facility-Administered Medications Prior to Admission   Medication Dose Route Frequency Provider Last Rate Last Admin    ciprofloxacin HCl tablet 500 mg  500 mg Oral Once Malu Barragan NP        LIDOcaine HCl 2% urojet   Urethral Once Malu Barragan NP         Medications Prior to Admission   Medication Sig Dispense Refill Last Dose    atorvastatin (LIPITOR) 20 MG tablet TAKE 1 TABLET BY MOUTH EVERY DAY 90 tablet 3 6/18/2023    EScitalopram oxalate (LEXAPRO) 20 MG tablet TAKE 1 TABLET (20 MG TOTAL) BY MOUTH ONCE DAILY 90 tablet 3 6/18/2023    irbesartan-hydrochlorothiazide (AVALIDE) 300-12.5 mg per tablet Take 1 tablet by mouth once daily. 90 tablet 3 6/18/2023    metoprolol succinate (TOPROL-XL) 50 MG 24 hr tablet TAKE 1 TABLET BY MOUTH EVERY DAY 90 tablet 3 6/18/2023    spironolactone (ALDACTONE) 25 MG tablet Take 1 tablet (25 mg total) by mouth once daily. 30 tablet 3 Past Month    albuterol (PROVENTIL/VENTOLIN HFA) 90 mcg/actuation inhaler Inhale 1-2 puffs into the lungs every 6 (six) hours as needed for Wheezing or Shortness of Breath (Please dispense with spacer). 6.7 g 0     amoxicillin (AMOXIL) 875 MG tablet Take 1 tablet (875 mg total) by mouth every 12 (twelve) hours. 20 tablet 0 More than a month    aspirin 81 MG Chew TAKE 1 TABLET BY MOUTH EVERY DAY (Patient taking differently: No sig reported) 90 tablet 1     azelastine (ASTELIN) 137 mcg (0.1 %) nasal spray 1 spray (137 mcg total) by Nasal route 2 (two) times daily. 30 mL 0      ergocalciferol (ERGOCALCIFEROL) 50,000 unit Cap TAKE 1 CAPSULE (50,000 UNITS TOTAL) BY MOUTH EVERY 30 DAYS. 3 capsule 3     estradioL (ESTRACE) 0.01 % (0.1 mg/gram) vaginal cream Use 1 GM nightly vaginally and dime size amount on vulva twice weekly 42.5 g 4     fluticasone propionate (FLOVENT HFA) 110 mcg/actuation inhaler Inhale 1 puff into the lungs 2 (two) times daily. Controller 12 g 6 More than a month    LORazepam (ATIVAN) 0.5 MG tablet TAKE 1/2 TO 1 TABLET BY MOUTH DAILY AS NEEDED FOR ANXIETY 30 tablet 0 More than a month    nystatin (MYCOSTATIN) cream Apply topically 2 (two) times daily. 30 g 1     orphenadrine (NORFLEX) 100 mg tablet TAKE 1 TABLET BY MOUTH EVERY DAY AS NEEDED FOR MUSCLE SPASM 30 tablet 3     pulse oximeter (PULSE OXIMETER) device by Apply Externally route 2 (two) times a day. Use twice daily at 8 AM and 3 PM and [facility] as directed. 1 each 0     triamcinolone acetonide 0.1% (KENALOG) 0.1 % cream APPLY TOPICALLY TWICE A DAY 45 g 0          Physical Exam:    Vital Signs:   Vitals:    06/19/23 1144   BP: 138/65   Pulse: 62   Resp: 18   Temp: 97.9 °F (36.6 °C)       General Appearance: Well appearing in no acute distress  Eyes:    No scleral icterus  Lungs: CTA bilaterally  Heart:  reg rate and rhythm   Abdomen: Soft, non tender, non distended with positive bowel sounds      Labs:  Lab Results   Component Value Date    WBC 11.98 03/30/2023    HGB 13.1 03/30/2023    HCT 42.0 03/30/2023    MCV 89 03/30/2023     03/30/2023        BMP  Lab Results   Component Value Date     05/29/2023    K 4.0 05/29/2023     05/29/2023    CO2 29 05/29/2023    BUN 22 05/29/2023    CREATININE 1.0 05/29/2023    CALCIUM 9.7 05/29/2023    ANIONGAP 9 05/29/2023    ESTGFRAFRICA >60.0 07/25/2022    EGFRNONAA >60.0 07/25/2022     Lab Results   Component Value Date    INR 1.2 01/24/2021    INR 1.3 01/17/2021    INR 1.1 01/31/2020          Assessment:  72 y.o. female with history of  colon polyps.     Plan:  Proceed with colonoscopy today.  I have explained the risks and benefits of endoscopy procedures to the patient including but not limited to bleeding, perforation, infection, and death.  All questions and answered.        Tenzin Thakkar MD

## 2023-06-21 ENCOUNTER — HOSPITAL ENCOUNTER (OUTPATIENT)
Dept: RADIOLOGY | Facility: HOSPITAL | Age: 73
Discharge: HOME | End: 2023-06-21
Attending: INTERNAL MEDICINE
Payer: MEDICARE

## 2023-06-21 DIAGNOSIS — M54.2 NECK PAIN ON RIGHT SIDE: ICD-10-CM

## 2023-06-21 LAB
FINAL PATHOLOGIC DIAGNOSIS: NORMAL
GROSS: NORMAL
Lab: NORMAL

## 2023-06-21 PROCEDURE — 70491 CT SOFT TISSUE NECK W/DYE: CPT | Mod: 26,,, | Performed by: RADIOLOGY

## 2023-06-21 PROCEDURE — 70491 CT SOFT TISSUE NECK W/DYE: CPT | Mod: TC

## 2023-06-21 PROCEDURE — 25500020 PHARM REV CODE 255: Performed by: INTERNAL MEDICINE

## 2023-06-21 PROCEDURE — 70491 CT SOFT TISSUE NECK WITH CONTRAST: ICD-10-PCS | Mod: 26,,, | Performed by: RADIOLOGY

## 2023-06-21 RX ADMIN — IOHEXOL 75 ML: 350 INJECTION, SOLUTION INTRAVENOUS at 11:06

## 2023-06-22 ENCOUNTER — TELEPHONE (OUTPATIENT)
Dept: INTERNAL MEDICINE | Facility: CLINIC | Age: 73
End: 2023-06-22

## 2023-06-22 DIAGNOSIS — J32.9 SINUSITIS, UNSPECIFIED CHRONICITY, UNSPECIFIED LOCATION: Primary | ICD-10-CM

## 2023-06-22 NOTE — PROGRESS NOTES
No worrisome findings in the right side of your neck. Are you have any sinus symptoms? I would like you to see ENT for some changes of chronic sinusitis.  We will call you to schedule.

## 2023-06-28 ENCOUNTER — PES CALL (OUTPATIENT)
Dept: ADMINISTRATIVE | Facility: CLINIC | Age: 73
End: 2023-06-28
Payer: MEDICARE

## 2023-06-28 ENCOUNTER — LAB VISIT (OUTPATIENT)
Dept: LAB | Facility: HOSPITAL | Age: 73
End: 2023-06-28
Attending: PHYSICIAN ASSISTANT
Payer: MEDICARE

## 2023-06-28 ENCOUNTER — OFFICE VISIT (OUTPATIENT)
Dept: OTOLARYNGOLOGY | Facility: CLINIC | Age: 73
End: 2023-06-28
Payer: MEDICARE

## 2023-06-28 VITALS
BODY MASS INDEX: 28.98 KG/M2 | WEIGHT: 201.94 LBS | HEART RATE: 57 BPM | SYSTOLIC BLOOD PRESSURE: 163 MMHG | DIASTOLIC BLOOD PRESSURE: 87 MMHG

## 2023-06-28 DIAGNOSIS — J31.0 CHRONIC RHINITIS: ICD-10-CM

## 2023-06-28 DIAGNOSIS — R49.0 DYSPHONIA: ICD-10-CM

## 2023-06-28 DIAGNOSIS — J38.01 VOCAL FOLD PARESIS, LEFT: ICD-10-CM

## 2023-06-28 DIAGNOSIS — J32.4 CHRONIC PANSINUSITIS: Primary | ICD-10-CM

## 2023-06-28 DIAGNOSIS — J32.4 CHRONIC PANSINUSITIS: ICD-10-CM

## 2023-06-28 DIAGNOSIS — J34.3 HYPERTROPHY OF INFERIOR NASAL TURBINATE: ICD-10-CM

## 2023-06-28 LAB — IGE SERPL-ACNC: 954 IU/ML (ref 0–100)

## 2023-06-28 PROCEDURE — 3066F PR DOCUMENTATION OF TREATMENT FOR NEPHROPATHY: ICD-10-PCS | Mod: CPTII,S$GLB,, | Performed by: PHYSICIAN ASSISTANT

## 2023-06-28 PROCEDURE — 87075 CULTR BACTERIA EXCEPT BLOOD: CPT | Performed by: PHYSICIAN ASSISTANT

## 2023-06-28 PROCEDURE — 3288F PR FALLS RISK ASSESSMENT DOCUMENTED: ICD-10-PCS | Mod: CPTII,S$GLB,, | Performed by: PHYSICIAN ASSISTANT

## 2023-06-28 PROCEDURE — 99999 PR PBB SHADOW E&M-EST. PATIENT-LVL V: CPT | Mod: PBBFAC,,, | Performed by: PHYSICIAN ASSISTANT

## 2023-06-28 PROCEDURE — 1126F AMNT PAIN NOTED NONE PRSNT: CPT | Mod: CPTII,S$GLB,, | Performed by: PHYSICIAN ASSISTANT

## 2023-06-28 PROCEDURE — 87076 CULTURE ANAEROBE IDENT EACH: CPT | Performed by: PHYSICIAN ASSISTANT

## 2023-06-28 PROCEDURE — 1159F PR MEDICATION LIST DOCUMENTED IN MEDICAL RECORD: ICD-10-PCS | Mod: CPTII,S$GLB,, | Performed by: PHYSICIAN ASSISTANT

## 2023-06-28 PROCEDURE — 31575 DIAGNOSTIC LARYNGOSCOPY: CPT | Mod: S$GLB,,, | Performed by: PHYSICIAN ASSISTANT

## 2023-06-28 PROCEDURE — 3008F PR BODY MASS INDEX (BMI) DOCUMENTED: ICD-10-PCS | Mod: CPTII,S$GLB,, | Performed by: PHYSICIAN ASSISTANT

## 2023-06-28 PROCEDURE — 3077F SYST BP >= 140 MM HG: CPT | Mod: CPTII,S$GLB,, | Performed by: PHYSICIAN ASSISTANT

## 2023-06-28 PROCEDURE — 87070 CULTURE OTHR SPECIMN AEROBIC: CPT | Performed by: PHYSICIAN ASSISTANT

## 2023-06-28 PROCEDURE — 3077F PR MOST RECENT SYSTOLIC BLOOD PRESSURE >= 140 MM HG: ICD-10-PCS | Mod: CPTII,S$GLB,, | Performed by: PHYSICIAN ASSISTANT

## 2023-06-28 PROCEDURE — 3079F DIAST BP 80-89 MM HG: CPT | Mod: CPTII,S$GLB,, | Performed by: PHYSICIAN ASSISTANT

## 2023-06-28 PROCEDURE — 3288F FALL RISK ASSESSMENT DOCD: CPT | Mod: CPTII,S$GLB,, | Performed by: PHYSICIAN ASSISTANT

## 2023-06-28 PROCEDURE — 3066F NEPHROPATHY DOC TX: CPT | Mod: CPTII,S$GLB,, | Performed by: PHYSICIAN ASSISTANT

## 2023-06-28 PROCEDURE — 86003 ALLG SPEC IGE CRUDE XTRC EA: CPT | Mod: 59 | Performed by: PHYSICIAN ASSISTANT

## 2023-06-28 PROCEDURE — 3044F PR MOST RECENT HEMOGLOBIN A1C LEVEL <7.0%: ICD-10-PCS | Mod: CPTII,S$GLB,, | Performed by: PHYSICIAN ASSISTANT

## 2023-06-28 PROCEDURE — 3008F BODY MASS INDEX DOCD: CPT | Mod: CPTII,S$GLB,, | Performed by: PHYSICIAN ASSISTANT

## 2023-06-28 PROCEDURE — 99205 OFFICE O/P NEW HI 60 MIN: CPT | Mod: 25,S$GLB,, | Performed by: PHYSICIAN ASSISTANT

## 2023-06-28 PROCEDURE — 1100F PR PT FALLS ASSESS DOC 2+ FALLS/FALL W/INJURY/YR: ICD-10-PCS | Mod: CPTII,S$GLB,, | Performed by: PHYSICIAN ASSISTANT

## 2023-06-28 PROCEDURE — 1159F MED LIST DOCD IN RCRD: CPT | Mod: CPTII,S$GLB,, | Performed by: PHYSICIAN ASSISTANT

## 2023-06-28 PROCEDURE — 99205 PR OFFICE/OUTPT VISIT, NEW, LEVL V, 60-74 MIN: ICD-10-PCS | Mod: 25,S$GLB,, | Performed by: PHYSICIAN ASSISTANT

## 2023-06-28 PROCEDURE — 86003 ALLG SPEC IGE CRUDE XTRC EA: CPT | Performed by: PHYSICIAN ASSISTANT

## 2023-06-28 PROCEDURE — 3061F PR NEG MICROALBUMINURIA RESULT DOCUMENTED/REVIEW: ICD-10-PCS | Mod: CPTII,S$GLB,, | Performed by: PHYSICIAN ASSISTANT

## 2023-06-28 PROCEDURE — 3079F PR MOST RECENT DIASTOLIC BLOOD PRESSURE 80-89 MM HG: ICD-10-PCS | Mod: CPTII,S$GLB,, | Performed by: PHYSICIAN ASSISTANT

## 2023-06-28 PROCEDURE — 31575 LARYNGOSCOPY: ICD-10-PCS | Mod: S$GLB,,, | Performed by: PHYSICIAN ASSISTANT

## 2023-06-28 PROCEDURE — 1100F PTFALLS ASSESS-DOCD GE2>/YR: CPT | Mod: CPTII,S$GLB,, | Performed by: PHYSICIAN ASSISTANT

## 2023-06-28 PROCEDURE — 82785 ASSAY OF IGE: CPT | Performed by: PHYSICIAN ASSISTANT

## 2023-06-28 PROCEDURE — 3061F NEG MICROALBUMINURIA REV: CPT | Mod: CPTII,S$GLB,, | Performed by: PHYSICIAN ASSISTANT

## 2023-06-28 PROCEDURE — 3044F HG A1C LEVEL LT 7.0%: CPT | Mod: CPTII,S$GLB,, | Performed by: PHYSICIAN ASSISTANT

## 2023-06-28 PROCEDURE — 1126F PR PAIN SEVERITY QUANTIFIED, NO PAIN PRESENT: ICD-10-PCS | Mod: CPTII,S$GLB,, | Performed by: PHYSICIAN ASSISTANT

## 2023-06-28 PROCEDURE — 36415 COLL VENOUS BLD VENIPUNCTURE: CPT | Performed by: PHYSICIAN ASSISTANT

## 2023-06-28 PROCEDURE — 99999 PR PBB SHADOW E&M-EST. PATIENT-LVL V: ICD-10-PCS | Mod: PBBFAC,,, | Performed by: PHYSICIAN ASSISTANT

## 2023-06-28 RX ORDER — AMOXICILLIN AND CLAVULANATE POTASSIUM 875; 125 MG/1; MG/1
1 TABLET, FILM COATED ORAL EVERY 12 HOURS
Qty: 20 TABLET | Refills: 0 | Status: SHIPPED | OUTPATIENT
Start: 2023-06-28 | End: 2023-07-08

## 2023-06-28 NOTE — PATIENT INSTRUCTIONS
Medication for the sinus rinse will be sent from the compounding pharmacy, [facility], in [place], [place].  Use it according to directions.     NeilMed Sinus rinse   Try purchasing this nasal rinse below.  Its over the counter and can use several times daily without any side effects, but please use at least 1-2 times daily.  Use it before applying your nasal spray medications.  This spray can help wash away mucus and crusting and allow for better absorption of the medications.  Please use the nasal saline spray about 15 minutes before applying your medicated nasal sprays. This bottle uses distilled water (NOT tap water).  The Instructions in the box are detailed so please read them before using.

## 2023-06-28 NOTE — PROGRESS NOTES
Subjective:     HPI: Marleny Guzman is a 72 y.o. female who was referred to me by Dr. Bridget Catnu in consultation for sinusitis.    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  Patient reports since then she has had intermittent right more than left nasal pressure and nasal obstruction.  Patient denies any facial pressure, hyposmia, sneezing, watery/itchy eyes.  Patient has less frequent sinus infections in the last sinus infection was more than 6 months ago.  Patient states she was recently evaluated for right neck pain, underwent CT sinus, and found to have right maxillary sinusitis.  Patient was not started on antibiotics time (per chart review her PCP started her on amoxicillin).    Patient also reports chronic postnasal drip with associated globus sensation and throat clearing.  Patient reports a remote history of GERD but denies any active acid reflux.    Patient also reports chronic dysphonia.  She was seen by throat specialist years ago but does not recall the diagnosis or advised treatment.     Current sinonasal medications include flonase 1 SEN once daily and azelastine 1 SEN once daily.  The last course of antibiotics was a long time ago.    She does not recall previously having allergy testing.  She denies a history of asthma.  She relates a history of reflux symptoms which is not currently managed with medication.    She denies a diagnosis of obstructive sleep apnea.   She does not recall a prior history of nasal trauma.  She has previously had sinonasal surgery as above.    She has not had a tonsillectomy.  She is not a tobacco smoker.     Past Medical/Past Surgical History  Past Medical History:   Diagnosis Date    Abnormal Pap smear of vagina 07/20/2016    Anemia     Cataract     Chronic bilateral low back pain without sciatica 03/13/2018    Depression with anxiety     Hypertension     Osteoarthritis 06/20/2013    Right rotator cuff tear     Sleep  apnea     Cipap machine at home    Urinary incontinence     Leaks urine with laughing/ coughing/ sneezing     She has a past surgical history that includes Salpingectomy (Left); Cataract extraction (6/18/12); Cataract extraction (7/16/12); LEEP; Eye surgery (Bilateral); Shoulder surgery (Right); Cervix surgery; Hysterectomy (5-12-15); pr removal of ovary/tube(s) (5-12-15); Colonoscopy; Lumbar epidural injection; Colonoscopy (N/A, 3/4/2016); Oophorectomy; Esophagogastroduodenoscopy (Left, 1/25/2021); Colonoscopy (Left, 1/25/2021); Robot-assisted repair of ventral hernia using da Jatin Xi (N/A, 11/29/2021); Retrograde pyelography (Bilateral, 9/12/2022); Cystoscopy (N/A, 9/12/2022); and Colonoscopy (N/A, 6/19/2023).    Family History/Social History  Her family history includes Anuerysm in her sister; Breast cancer in her cousin; Breast cancer (age of onset: 72) in her sister; Cancer in her maternal aunt, mother, and sister; Cataracts in her father; Deep vein thrombosis in her sister; Depression in her maternal grandmother; Drug abuse in her sister; Eczema in her sister; Edema in her sister; Heart disease in her sister and sister; Hypertension in her daughter, father, sister, sister, and sister; No Known Problems in her brother, paternal grandfather, paternal grandmother, sister, and sister; Osteoarthritis in her sister; Other in her daughter; Pacemaker/defibrilator in her sister; Stroke in her mother; Ulcers in her maternal grandfather.  She reports that she quit smoking about 26 years ago. Her smoking use included cigarettes. She has a 11.00 pack-year smoking history. She has been exposed to tobacco smoke. She has never used smokeless tobacco. She reports current alcohol use. She reports that she does not use drugs.    Allergies/Immunizations  She has No Known Allergies.  Immunization History   Administered Date(s) Administered    COVID-19 Vaccine 07/27/2021, 08/20/2021    COVID-19, MRNA, LN-S, PF (Pfizer) (Purple  Cap) 07/27/2021, 08/20/2021    Influenza 11/10/2008, 09/20/2010, 09/19/2011, 01/09/2013, 10/22/2013    Influenza (FLUAD) - Quadrivalent - Adjuvanted - PF *Preferred* (65+) 10/28/2022    Influenza - High Dose - PF (65 years and older) 12/29/2014, 09/29/2015, 09/01/2016, 09/27/2017, 10/22/2017, 10/16/2018, 11/26/2019, 10/15/2020    Influenza - Quadrivalent - High Dose - PF (65 years and older) 10/08/2020, 11/17/2021    Influenza - Trivalent (ADULT) 01/09/2013, 10/22/2013    Influenza Split 11/10/2008, 09/20/2010, 09/19/2011, 01/09/2013, 10/22/2013    Pneumococcal Conjugate - 13 Valent 09/29/2015    Pneumococcal Polysaccharide - 23 Valent 12/29/2014    Tdap 10/22/2013, 02/01/2020    Zoster 01/29/2018    Zoster Recombinant 08/22/2019, 12/04/2019        Medications   amoxicillin  aspirin Chew  atorvastatin  ciprofloxacin HCl  ergocalciferol Cap  EScitalopram oxalate  estradioL  fentaNYL  fluticasone propionate  irbesartan-hydrochlorothiazide  LIDOcaine (PF) 10 mg/ml (1%)  LIDOcaine HCl 2%  LORazepam  metoprolol succinate  midazolam  nystatin  orphenadrine  prochlorperazine Soln  pulse oximeter  sodium chloride 0.9%  spironolactone  triamcinolone acetonide 0.1%     Review of Systems     Constitutional: Negative for chills and fever.      HENT: Positive for postnasal drip, runny nose, sinus pressure, stuffy nose and voice change.  Negative for ear discharge, ear pain, hearing loss and nosebleeds.      Eyes:  Negative for eye drainage and eye itching.     Respiratory:  Negative for cough and snoring.      Allergy: Negative for seasonal allergies.     Neurological: Negative for dizziness and headaches.      Hematologic: Negative for swollen glands.      Psychiatric: Negative for sleep disturbance.          Objective:     BP (!) 163/87 (BP Location: Left arm, Patient Position: Sitting)   Pulse (!) 57   Wt 91.6 kg (201 lb 15.1 oz)   BMI 28.98 kg/m²        Constitutional:   She appears well-developed and well-nourished.    Mild raspy, strained voice quality     Head:  Normocephalic and atraumatic. Facial strength is normal.      Ears:    Right Ear: No drainage or swelling. Tympanic membrane is not perforated and not erythematous. No middle ear effusion.   Left Ear: No drainage or swelling. Tympanic membrane is not perforated and not erythematous.  No middle ear effusion.     Nose:  Rhinorrhea (L anterior, clear) present. No mucosal edema, septal deviation or polyps.  No foreign bodies. Turbinate hypertrophy (cyanotic).  Turbinates normal and no turbinate masses.  Right sinus exhibits no maxillary sinus tenderness and no frontal sinus tenderness. Left sinus exhibits no maxillary sinus tenderness and no frontal sinus tenderness.     Mouth/Throat  Oropharynx clear and moist without lesions or asymmetry and normal uvula midline. She has dentures (upper). No trismus or mucous membrane lesions. No oropharyngeal exudate, posterior oropharyngeal edema or posterior oropharyngeal erythema. Tonsils present, +1.      Neck:  Neck normal without thyromegaly masses, asymmetry, normal tracheal structure, crepitus, and tenderness and phonation normal.        Head (right side): Jugulodigastric adenopathy present.     Pulmonary/Chest:   Effort normal.     Psychiatric:   She has a normal mood and affect. Her behavior is normal.     Procedure    Flexible laryngoscopy performed.  See procedure note.     L NV     L MT     R NV     R MT with white/yellow purulence and dried green mucous near MM     R ET     Hypopharynx/larynx     VF mobility; appears limited on Left, prominent L false VF      Data Reviewed  I personally reviewed the chart, including any outside records, and pertinent data below:    WBC (K/uL)   Date Value   03/30/2023 11.98     Eosinophil % (%)   Date Value   03/30/2023 2.4     Eos # (K/uL)   Date Value   03/30/2023 0.3     Platelets (K/uL)   Date Value   03/30/2023 333     Glucose (mg/dL)   Date Value   05/29/2023 80     No results found  for: IGE    I independently reviewed the images of the CT sinuses dated 3/5/23. Pertinent findings include R maxillary periosteal thickening and osteitis, R blocked OMC, mild R anterior ethmoid partial opacifications, left sphenopid sinus partial opacification, and patent frontal sinuses.    Assessment & Plan:     1. Chronic pansinusitis  -     recent CT sinus an endoscopy today reveals an acute on chronic sinusitis  - I swabbed her sinus exudate for culture and will use the results to direct antibiotic therapy as indicated.  -     checking S.pneumoniae IgG Serotypes; Future; Expected date: 08/09/202  -     amoxicillin-clavulanate 875-125mg (AUGMENTIN) 875-125 mg per tablet; Take 1 tablet by mouth every 12 (twelve) hours. 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  She was counseled on the off-label nature of this therapy and she consented to its use.    2. Chronic rhinitis  3. Hypertrophy of inferior nasal turbinate  -     IgE; Future; Expected date: 06/28/2023  -     Dermatophagoides Nichols; Future; Expected date: 06/28/2023  -     Dermatophagoides Pteronyssinus; Future; Expected date: 06/28/2023  -     Bermuda; Future; Expected date: 06/28/2023  -     Ivan; Future; Expected date: 06/28/2023  -     Washburn; Future; Expected date: 06/28/2023  -     English Plantain; Future; Expected date: 06/28/2023  -     Orono; Future; Expected date: 06/28/2023  -     Pecan; Future; Expected date: 06/28/2023  -     Gilmore Elder; Future; Expected date: 06/28/2023  -     Ragweed; Future; Expected date: 06/28/2023  -     Alternaria; Future; Expected date: 06/28/2023  -     Aspergillus; Future; Expected date: 06/28/2023  -     Cat; Future; Expected date: 06/28/2023  -     Cockroach; Future; Expected date: 06/28/2023  -     Dog; Future; Expected date: 06/28/2023  -     S.pneumoniae IgG Serotypes; Future; Expected  date: 08/09/2023  -     Culture, Anaerobic  -     Aerobic culture  -     Ambulatory referral/consult to ENT; Future; Expected date: 07/05/2023    4. Dysphonia  5. Vocal fold paresis, left  -     Ambulatory referral/consult to ENT; Future; Expected date: 07/05/2023  - Referred patient to laryngologist at [facility] Dr. Valera    She will Follow up in about 3 weeks (around 7/19/2023) for re-evaluate post treatment.  I had a discussion with the patient regarding her condition and the further workup and management options.    All questions were answered, and the patient is in agreement with the above.     Disclaimer:  This note may have been prepared utilizing voice recognition software which may result in occasional typographical errors in the text such as sound alike words.   If further clarification is needed, please contact the ENT department of [facility].

## 2023-06-28 NOTE — PROCEDURES
"Laryngoscopy    Date/Time: 6/28/2023 9:30 AM  Performed by: Chava Moncada PA-C  Authorized by: Chava Moncada PA-C     Time out: Immediately prior to procedure a "time out" was called to verify the correct patient, procedure, equipment, support staff and site/side marked as required.    Consent Done?:  Yes (Verbal)  Anesthesia:     Local anesthetic:  Lidocaine 4% and Perry-Synephrine 1/2%    Patient tolerance:  Patient tolerated the procedure well with no immediate complications  Laryngoscopy:     Areas examined:  Nasal cavities, nasopharynx, oropharynx, hypopharynx, larynx and vocal cords    Laryngoscope size:  4 mm  Nose External:      No external nasal deformity  Nose Intranasal:      Mucosa no polyps     Mucosa ulcers not present     No mucosa lesions present     No septum gross deformity     Enlarged turbinates  Nasopharynx:      No mucosa lesions     Adenoids present     Posterior choanae patent     Eustachian tube patent  Larynx/hypopharynx:      No epiglottis lesions     No epiglottis edema     No AE folds lesions     No vocal cord polyps     No hypopharynx lesions     No piriform sinus pooling     No piriform sinus lesions     No post cricoid edema     No post cricoid erythema     R maxillary antrostomy with yellow purulence draining to naso pharynx over R IT  Cobblestoning and erythema to nasopharynx  Extensive clear mucous in nasopharynx  VF mobility appears limited on left; R arytenoid appears to compensate for left  "

## 2023-06-30 ENCOUNTER — PATIENT MESSAGE (OUTPATIENT)
Dept: OTOLARYNGOLOGY | Facility: CLINIC | Age: 73
End: 2023-06-30
Payer: MEDICARE

## 2023-07-01 LAB — BACTERIA SPEC AEROBE CULT: NORMAL

## 2023-07-03 ENCOUNTER — TELEPHONE (OUTPATIENT)
Dept: OTOLARYNGOLOGY | Facility: CLINIC | Age: 73
End: 2023-07-03
Payer: MEDICARE

## 2023-07-03 LAB
A ALTERNATA IGE QN: <0.1 KU/L
A FUMIGATUS IGE QN: <0.1 KU/L
BACTERIA SPEC ANAEROBE CULT: ABNORMAL
BERMUDA GRASS IGE QN: 0.11 KU/L
CAT DANDER IGE QN: <0.1 KU/L
CEDAR IGE QN: <0.1 KU/L
D FARINAE IGE QN: <0.1 KU/L
D PTERONYSS IGE QN: <0.1 KU/L
DEPRECATED A ALTERNATA IGE RAST QL: NORMAL
DEPRECATED A FUMIGATUS IGE RAST QL: NORMAL
DEPRECATED BERMUDA GRASS IGE RAST QL: ABNORMAL
DEPRECATED CAT DANDER IGE RAST QL: NORMAL
DEPRECATED CEDAR IGE RAST QL: NORMAL
DEPRECATED D FARINAE IGE RAST QL: NORMAL
DEPRECATED D PTERONYSS IGE RAST QL: NORMAL
DEPRECATED DOG DANDER IGE RAST QL: NORMAL
DEPRECATED ELDER IGE RAST QL: ABNORMAL
DEPRECATED ENGL PLANTAIN IGE RAST QL: ABNORMAL
DEPRECATED PECAN/HICK TREE IGE RAST QL: ABNORMAL
DEPRECATED ROACH IGE RAST QL: NORMAL
DEPRECATED TIMOTHY IGE RAST QL: ABNORMAL
DEPRECATED WEST RAGWEED IGE RAST QL: ABNORMAL
DEPRECATED WHITE OAK IGE RAST QL: ABNORMAL
DOG DANDER IGE QN: <0.1 KU/L
ELDER IGE QN: 0.15 KU/L
ENGL PLANTAIN IGE QN: 0.13 KU/L
PECAN/HICK TREE IGE QN: 0.15 KU/L
ROACH IGE QN: <0.1 KU/L
TIMOTHY IGE QN: 0.12 KU/L
WEST RAGWEED IGE QN: 0.14 KU/L
WHITE OAK IGE QN: 0.17 KU/L

## 2023-07-03 NOTE — TELEPHONE ENCOUNTER
----- Message from Delicia Short sent at 6/29/2023  1:26 PM CDT -----  Regarding: appt access  Contact: [phone]  Pt calling in regards to miss test that provider ordered, needing to see can he rescheduled. Pls call

## 2023-07-03 NOTE — ASSESSMENT & PLAN NOTE
Patient presenting with  with generalized weakness with myalgia.  Febrile to the 101.8 with elevated procalcitonin indicative of bacterial infection.  COVID-19 negative.  Flu negative.  Chest x-ray no focal infiltrates.  Patient with recent sinus surgery and given CT findings, evaluating for invasive fungal sinusitis versus other sinusitis as invasive fungal does not currently fit the clinical picture.  ENT consulted for evaluation and culture. MRI did not reveal any cerebral abscess. No meningeal signs on physical exam.  She is not immunocompromised that we are aware of.  does have oral candidiasis- treating with Nystatin   does have epigastric/right upper quadrant tenderness on examination with normal LFTs- US ultimately did not support acute cholecystitis. Serial abdominal exams.  ID following   Prescription sent to preferred pharmacy per protocol.

## 2023-07-05 DIAGNOSIS — R76.8 ELEVATED IGE LEVEL: ICD-10-CM

## 2023-07-05 DIAGNOSIS — J31.0 CHRONIC RHINITIS: Primary | ICD-10-CM

## 2023-07-09 RX ORDER — ERGOCALCIFEROL 1.25 MG/1
CAPSULE ORAL
Qty: 3 CAPSULE | Refills: 3 | Status: SHIPPED | OUTPATIENT
Start: 2023-07-09

## 2023-07-11 ENCOUNTER — TELEPHONE (OUTPATIENT)
Dept: OTOLARYNGOLOGY | Facility: CLINIC | Age: 73
End: 2023-07-11
Payer: MEDICARE

## 2023-07-26 ENCOUNTER — OFFICE VISIT (OUTPATIENT)
Dept: OTOLARYNGOLOGY | Facility: CLINIC | Age: 73
End: 2023-07-26
Payer: MEDICARE

## 2023-07-26 ENCOUNTER — LAB VISIT (OUTPATIENT)
Dept: LAB | Facility: HOSPITAL | Age: 73
End: 2023-07-26
Attending: PHYSICIAN ASSISTANT
Payer: MEDICARE

## 2023-07-26 VITALS
SYSTOLIC BLOOD PRESSURE: 161 MMHG | HEART RATE: 56 BPM | DIASTOLIC BLOOD PRESSURE: 77 MMHG | WEIGHT: 203.69 LBS | BODY MASS INDEX: 29.23 KG/M2

## 2023-07-26 DIAGNOSIS — J32.4 CHRONIC PANSINUSITIS: Primary | ICD-10-CM

## 2023-07-26 DIAGNOSIS — J31.0 CHRONIC RHINITIS: ICD-10-CM

## 2023-07-26 DIAGNOSIS — R49.0 DYSPHONIA: ICD-10-CM

## 2023-07-26 DIAGNOSIS — J38.01 VOCAL FOLD PARESIS, LEFT: ICD-10-CM

## 2023-07-26 DIAGNOSIS — J34.3 HYPERTROPHY OF INFERIOR NASAL TURBINATE: ICD-10-CM

## 2023-07-26 DIAGNOSIS — J32.4 CHRONIC PANSINUSITIS: ICD-10-CM

## 2023-07-26 PROCEDURE — 1159F MED LIST DOCD IN RCRD: CPT | Mod: CPTII,S$GLB,, | Performed by: PHYSICIAN ASSISTANT

## 2023-07-26 PROCEDURE — 3061F NEG MICROALBUMINURIA REV: CPT | Mod: CPTII,S$GLB,, | Performed by: PHYSICIAN ASSISTANT

## 2023-07-26 PROCEDURE — 3061F PR NEG MICROALBUMINURIA RESULT DOCUMENTED/REVIEW: ICD-10-PCS | Mod: CPTII,S$GLB,, | Performed by: PHYSICIAN ASSISTANT

## 2023-07-26 PROCEDURE — 3066F PR DOCUMENTATION OF TREATMENT FOR NEPHROPATHY: ICD-10-PCS | Mod: CPTII,S$GLB,, | Performed by: PHYSICIAN ASSISTANT

## 2023-07-26 PROCEDURE — 36415 COLL VENOUS BLD VENIPUNCTURE: CPT | Performed by: PHYSICIAN ASSISTANT

## 2023-07-26 PROCEDURE — 99999 PR PBB SHADOW E&M-EST. PATIENT-LVL IV: CPT | Mod: PBBFAC,,, | Performed by: PHYSICIAN ASSISTANT

## 2023-07-26 PROCEDURE — 86317 IMMUNOASSAY INFECTIOUS AGENT: CPT | Mod: 59 | Performed by: PHYSICIAN ASSISTANT

## 2023-07-26 PROCEDURE — 1159F PR MEDICATION LIST DOCUMENTED IN MEDICAL RECORD: ICD-10-PCS | Mod: CPTII,S$GLB,, | Performed by: PHYSICIAN ASSISTANT

## 2023-07-26 PROCEDURE — 99213 OFFICE O/P EST LOW 20 MIN: CPT | Mod: S$GLB,,, | Performed by: PHYSICIAN ASSISTANT

## 2023-07-26 PROCEDURE — 3288F FALL RISK ASSESSMENT DOCD: CPT | Mod: CPTII,S$GLB,, | Performed by: PHYSICIAN ASSISTANT

## 2023-07-26 PROCEDURE — 1101F PT FALLS ASSESS-DOCD LE1/YR: CPT | Mod: CPTII,S$GLB,, | Performed by: PHYSICIAN ASSISTANT

## 2023-07-26 PROCEDURE — 99213 PR OFFICE/OUTPT VISIT, EST, LEVL III, 20-29 MIN: ICD-10-PCS | Mod: S$GLB,,, | Performed by: PHYSICIAN ASSISTANT

## 2023-07-26 PROCEDURE — 3078F DIAST BP <80 MM HG: CPT | Mod: CPTII,S$GLB,, | Performed by: PHYSICIAN ASSISTANT

## 2023-07-26 PROCEDURE — 1101F PR PT FALLS ASSESS DOC 0-1 FALLS W/OUT INJ PAST YR: ICD-10-PCS | Mod: CPTII,S$GLB,, | Performed by: PHYSICIAN ASSISTANT

## 2023-07-26 PROCEDURE — 1126F PR PAIN SEVERITY QUANTIFIED, NO PAIN PRESENT: ICD-10-PCS | Mod: CPTII,S$GLB,, | Performed by: PHYSICIAN ASSISTANT

## 2023-07-26 PROCEDURE — 3077F SYST BP >= 140 MM HG: CPT | Mod: CPTII,S$GLB,, | Performed by: PHYSICIAN ASSISTANT

## 2023-07-26 PROCEDURE — 3288F PR FALLS RISK ASSESSMENT DOCUMENTED: ICD-10-PCS | Mod: CPTII,S$GLB,, | Performed by: PHYSICIAN ASSISTANT

## 2023-07-26 PROCEDURE — 3044F PR MOST RECENT HEMOGLOBIN A1C LEVEL <7.0%: ICD-10-PCS | Mod: CPTII,S$GLB,, | Performed by: PHYSICIAN ASSISTANT

## 2023-07-26 PROCEDURE — 3077F PR MOST RECENT SYSTOLIC BLOOD PRESSURE >= 140 MM HG: ICD-10-PCS | Mod: CPTII,S$GLB,, | Performed by: PHYSICIAN ASSISTANT

## 2023-07-26 PROCEDURE — 3066F NEPHROPATHY DOC TX: CPT | Mod: CPTII,S$GLB,, | Performed by: PHYSICIAN ASSISTANT

## 2023-07-26 PROCEDURE — 1126F AMNT PAIN NOTED NONE PRSNT: CPT | Mod: CPTII,S$GLB,, | Performed by: PHYSICIAN ASSISTANT

## 2023-07-26 PROCEDURE — 3078F PR MOST RECENT DIASTOLIC BLOOD PRESSURE < 80 MM HG: ICD-10-PCS | Mod: CPTII,S$GLB,, | Performed by: PHYSICIAN ASSISTANT

## 2023-07-26 PROCEDURE — 99999 PR PBB SHADOW E&M-EST. PATIENT-LVL IV: ICD-10-PCS | Mod: PBBFAC,,, | Performed by: PHYSICIAN ASSISTANT

## 2023-07-26 PROCEDURE — 3044F HG A1C LEVEL LT 7.0%: CPT | Mod: CPTII,S$GLB,, | Performed by: PHYSICIAN ASSISTANT

## 2023-07-26 PROCEDURE — 3008F PR BODY MASS INDEX (BMI) DOCUMENTED: ICD-10-PCS | Mod: CPTII,S$GLB,, | Performed by: PHYSICIAN ASSISTANT

## 2023-07-26 PROCEDURE — 3008F BODY MASS INDEX DOCD: CPT | Mod: CPTII,S$GLB,, | Performed by: PHYSICIAN ASSISTANT

## 2023-07-26 RX ORDER — AZELASTINE 1 MG/ML
1 SPRAY, METERED NASAL 2 TIMES DAILY
Qty: 15 ML | Refills: 2 | Status: SHIPPED | OUTPATIENT
Start: 2023-07-26 | End: 2024-01-04

## 2023-07-26 NOTE — PROGRESS NOTES
Subjective:      Marleny is a 73 y.o. female who comes for follow-up of rhinitis.  Her last visit with me was on 6/28/2023.      Reports significant improvement in sneezing as well as mild improvement in left-sided pressure/nasal obstruction.  Patient does report a globus sensation in the morning but states she is able to clear that sensation and eventually resolve.  Symptoms are not worse at night.    Referred to Allergy as recent allergy testing borderline positive and IgE level elevated.     She reports compliance with nasal saline rinse with budesonide.    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  Patient reports since then she has had intermittent right more than left nasal pressure and nasal obstruction.  Patient denies any facial pressure, hyposmia, sneezing, watery/itchy eyes.  Patient has less frequent sinus infections in the last sinus infection was more than 6 months ago.  Patient states she was recently evaluated for right neck pain, underwent CT sinus, and found to have right maxillary sinusitis.  Patient was not started on antibiotics time (per chart review her PCP started her on amoxicillin).     Patient also reports chronic postnasal drip with associated globus sensation and throat clearing.  Patient reports a remote history of GERD but denies any active acid reflux.     Patient also reports chronic dysphonia.  She was seen by throat specialist years ago but does not recall the diagnosis or advised treatment.      Current sinonasal medications include flonase 1 SEN once daily and azelastine 1 SEN once daily.  The last course of antibiotics was a long time ago.    She does not recall previously having allergy testing.  She denies a history of asthma.  She relates a history of reflux symptoms which is not currently managed with medication.    She denies a diagnosis of obstructive sleep apnea.   She does not recall a prior  history of nasal trauma.  She has previously had sinonasal surgery as above.    She has not had a tonsillectomy.  She is not a tobacco smoker.     1. Chronic pansinusitis  -     recent CT sinus an endoscopy today reveals an acute on chronic sinusitis  -     I swabbed her sinus exudate for culture and will use the results to direct antibiotic therapy as indicated.  -     checking S.pneumoniae IgG Serotypes; Future; Expected date: 08/09/202  -     amoxicillin-clavulanate 875-125mg (AUGMENTIN) 875-125 mg per tablet; Take 1 tablet by mouth every 12 (twelve) hours. 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  She was counseled on the off-label nature of this therapy and she consented to its use.  2. Chronic rhinitis  3. Hypertrophy of inferior nasal turbinate  -     checking inhalant Immunocaps  4. Dysphonia  5. Vocal fold paresis, left  -     Ambulatory referral/consult to ENT; Future; Expected date: 07/05/2023  -     Referred patient to laryngologist at [facility] Dr. Valera    The patient's medications, allergies, past medical, surgical, social and family histories were reviewed and updated as appropriate.    A detailed review of systems was obtained with pertinent positives as per the above HPI, and otherwise negative.        Objective:     BP (!) 161/77 (BP Location: Right arm, Patient Position: Sitting)   Pulse (!) 56   Wt 92.4 kg (203 lb 11.3 oz)   BMI 29.23 kg/m²        Constitutional:   She appears well-developed and well-nourished. She is active. Normal speech.      Head:  Normocephalic and atraumatic.     Nose:  Mucosal edema and rhinorrhea (L) present. No septal deviation or nasal septal hematoma. Turbinate hypertrophy (cyanotic 3+ L>R).  Turbinates normal and no turbinate masses.      Pulmonary/Chest:   Effort normal.     Psychiatric:   She has a normal mood and affect. Her  speech is normal and behavior is normal.      Procedure    None    Data Reviewed    WBC (K/uL)   Date Value   03/30/2023 11.98     Eosinophil % (%)   Date Value   03/30/2023 2.4     Eos # (K/uL)   Date Value   03/30/2023 0.3     Platelets (K/uL)   Date Value   03/30/2023 333     Glucose (mg/dL)   Date Value   05/29/2023 80     IgE (IU/mL)   Date Value   06/28/2023 954 (H)       Assessment:     1. Chronic pansinusitis    2. Chronic rhinitis    3. Hypertrophy of inferior nasal turbinate    4. Vocal fold paresis, left    5. Dysphonia         Plan:     Checking strep pneumo titers patient with frequent sinus infections  Prescribed patient to RESTART & azelastine and also educated patient on how to properly use nasal sprays; 2 SEN BID  Continue nasal saline rinses  Follow up with Dr. Valera  Establish care with Allergy with Dr. Iyer    She will Follow up in about 3 months (around 10/26/2023), or if symptoms worsen or fail to improve, for re-evaluate post treatment.  I had a discussion with the patient regarding her condition and the further workup and management options.    All questions were answered, and the patient is in agreement with the above.     I spent a total of 25 minutes on the day of the visit.  This includes face to face time and non-face to face time preparing to see the patient (eg, review of tests), obtaining and/or reviewing separately obtained history, documenting clinical information in the electronic or other health record, independently interpreting results and communicating results to the patient/family/caregiver, or care coordinator.    Disclaimer:  This note may have been prepared utilizing voice recognition software which may result in occasional typographical errors in the text such as sound alike words.   If further clarification is needed, please contact the ENT department of [facility].

## 2023-07-26 NOTE — PATIENT INSTRUCTIONS
Continue saline rinse daily without the budesonide.  Please restart the budesonide if your symptoms start to worsen. Wait about 5-10 minutes after the rinse to use the spray below so the nose can drain properly.  Use the azelastine spray 2 sprays each nostril twice daily; you have used this in the past.   Follow up with Allergy  Follow Up with Dr. Valera  Complete the bloodwork today- I will be in touch with the results    Astelin (Azelastine of Astepro)  This is a nasal spray that primarily treats nasal drainage/runny nose (rhinorrhea) and nasal itching.    This works fairly quickly after onset and can be used as needed (does not have to be used as a scheduled medication).  Use as directed, up to 2 times daily.    Helpful hints for maximizing nasal spray medication into the nose  - Use the opposite hand to spray the nostril (example: right hand for left nostril). This will help avoid spraying the medication onto the septum (the area that divides the left and right nasal cavity.)  - Tilt the bottle so that it is facing at a slight angle up or straight back, but avoid pointing the bottle straight up while spraying.   - Gently sniff (do not snort) a few seconds after spraying.

## 2023-08-02 LAB
DEPRECATED S PNEUM12 IGG SER-MCNC: <0.3 UG/ML
DEPRECATED S PNEUM23 IGG SER-MCNC: 0.8 UG/ML
DEPRECATED S PNEUM4 IGG SER-MCNC: <0.3 UG/ML
DEPRECATED S PNEUM8 IGG SER-MCNC: 2.3 UG/ML
DEPRECATED S PNEUM9 IGG SER-MCNC: <0.3 UG/ML
S PN DA SERO 19F IGG SER-MCNC: 0.6 UG/ML
S PNEUM DA 1 IGG SER-MCNC: 2.2 UG/ML
S PNEUM DA 14 IGG SER-MCNC: 2.1
S PNEUM DA 18C IGG SER-MCNC: 0.4
S PNEUM DA 3 IGG SER-MCNC: <0.3 UG/ML
S PNEUM DA 5 IGG SER-MCNC: 0.8 UG/ML
S PNEUM DA 6B IGG SER-MCNC: <0.3 UG/ML
S PNEUM DA 7F IGG SER-MCNC: 9.7 UG/ML
S PNEUM DA 9V IGG SER-MCNC: 0.9 UG/ML

## 2023-08-16 ENCOUNTER — OFFICE VISIT (OUTPATIENT)
Dept: OTOLARYNGOLOGY | Facility: CLINIC | Age: 73
End: 2023-08-16
Payer: MEDICARE

## 2023-08-16 VITALS — SYSTOLIC BLOOD PRESSURE: 149 MMHG | DIASTOLIC BLOOD PRESSURE: 64 MMHG | HEART RATE: 63 BPM

## 2023-08-16 DIAGNOSIS — J38.4 PRE-NODULAR EDEMA OF THE VOCAL FOLDS: ICD-10-CM

## 2023-08-16 DIAGNOSIS — J38.1 POLYPOID CORDITIS: ICD-10-CM

## 2023-08-16 DIAGNOSIS — J38.02 BILATERAL PARTIAL VOCAL CORD PARALYSIS: ICD-10-CM

## 2023-08-16 DIAGNOSIS — J38.02 BILATERAL PARTIAL VOCAL FOLD PARALYSIS: ICD-10-CM

## 2023-08-16 DIAGNOSIS — R49.0 DYSPHONIA: Primary | ICD-10-CM

## 2023-08-16 PROCEDURE — 31579 PR LARYNGOSCOPY, FLEX/RIGID TELESCOPIC, W/STROBOSCOPY: ICD-10-PCS | Mod: S$GLB,,, | Performed by: OTOLARYNGOLOGY

## 2023-08-16 PROCEDURE — 3061F NEG MICROALBUMINURIA REV: CPT | Mod: CPTII,S$GLB,, | Performed by: OTOLARYNGOLOGY

## 2023-08-16 PROCEDURE — 3061F PR NEG MICROALBUMINURIA RESULT DOCUMENTED/REVIEW: ICD-10-PCS | Mod: CPTII,S$GLB,, | Performed by: OTOLARYNGOLOGY

## 2023-08-16 PROCEDURE — 3078F DIAST BP <80 MM HG: CPT | Mod: CPTII,S$GLB,, | Performed by: OTOLARYNGOLOGY

## 2023-08-16 PROCEDURE — 1159F MED LIST DOCD IN RCRD: CPT | Mod: CPTII,S$GLB,, | Performed by: OTOLARYNGOLOGY

## 2023-08-16 PROCEDURE — 3077F SYST BP >= 140 MM HG: CPT | Mod: CPTII,S$GLB,, | Performed by: OTOLARYNGOLOGY

## 2023-08-16 PROCEDURE — 3044F PR MOST RECENT HEMOGLOBIN A1C LEVEL <7.0%: ICD-10-PCS | Mod: CPTII,S$GLB,, | Performed by: OTOLARYNGOLOGY

## 2023-08-16 PROCEDURE — 99999 PR PBB SHADOW E&M-EST. PATIENT-LVL III: ICD-10-PCS | Mod: PBBFAC,,, | Performed by: OTOLARYNGOLOGY

## 2023-08-16 PROCEDURE — 99214 PR OFFICE/OUTPT VISIT, EST, LEVL IV, 30-39 MIN: ICD-10-PCS | Mod: 25,S$GLB,, | Performed by: OTOLARYNGOLOGY

## 2023-08-16 PROCEDURE — 1159F PR MEDICATION LIST DOCUMENTED IN MEDICAL RECORD: ICD-10-PCS | Mod: CPTII,S$GLB,, | Performed by: OTOLARYNGOLOGY

## 2023-08-16 PROCEDURE — 1160F RVW MEDS BY RX/DR IN RCRD: CPT | Mod: CPTII,S$GLB,, | Performed by: OTOLARYNGOLOGY

## 2023-08-16 PROCEDURE — 99999 PR PBB SHADOW E&M-EST. PATIENT-LVL III: CPT | Mod: PBBFAC,,, | Performed by: OTOLARYNGOLOGY

## 2023-08-16 PROCEDURE — 1160F PR REVIEW ALL MEDS BY PRESCRIBER/CLIN PHARMACIST DOCUMENTED: ICD-10-PCS | Mod: CPTII,S$GLB,, | Performed by: OTOLARYNGOLOGY

## 2023-08-16 PROCEDURE — 3044F HG A1C LEVEL LT 7.0%: CPT | Mod: CPTII,S$GLB,, | Performed by: OTOLARYNGOLOGY

## 2023-08-16 PROCEDURE — 3077F PR MOST RECENT SYSTOLIC BLOOD PRESSURE >= 140 MM HG: ICD-10-PCS | Mod: CPTII,S$GLB,, | Performed by: OTOLARYNGOLOGY

## 2023-08-16 PROCEDURE — 3078F PR MOST RECENT DIASTOLIC BLOOD PRESSURE < 80 MM HG: ICD-10-PCS | Mod: CPTII,S$GLB,, | Performed by: OTOLARYNGOLOGY

## 2023-08-16 PROCEDURE — 1125F PR PAIN SEVERITY QUANTIFIED, PAIN PRESENT: ICD-10-PCS | Mod: CPTII,S$GLB,, | Performed by: OTOLARYNGOLOGY

## 2023-08-16 PROCEDURE — 99214 OFFICE O/P EST MOD 30 MIN: CPT | Mod: 25,S$GLB,, | Performed by: OTOLARYNGOLOGY

## 2023-08-16 PROCEDURE — 31579 LARYNGOSCOPY TELESCOPIC: CPT | Mod: S$GLB,,, | Performed by: OTOLARYNGOLOGY

## 2023-08-16 PROCEDURE — 1125F AMNT PAIN NOTED PAIN PRSNT: CPT | Mod: CPTII,S$GLB,, | Performed by: OTOLARYNGOLOGY

## 2023-08-16 PROCEDURE — 3066F NEPHROPATHY DOC TX: CPT | Mod: CPTII,S$GLB,, | Performed by: OTOLARYNGOLOGY

## 2023-08-16 PROCEDURE — 3066F PR DOCUMENTATION OF TREATMENT FOR NEPHROPATHY: ICD-10-PCS | Mod: CPTII,S$GLB,, | Performed by: OTOLARYNGOLOGY

## 2023-08-16 RX ORDER — LIDOCAINE HYDROCHLORIDE 10 MG/ML
1 INJECTION, SOLUTION EPIDURAL; INFILTRATION; INTRACAUDAL; PERINEURAL ONCE
Status: CANCELLED | OUTPATIENT
Start: 2023-08-16 | End: 2023-08-16

## 2023-08-16 RX ORDER — DEXAMETHASONE SODIUM PHOSPHATE 4 MG/ML
8 INJECTION, SOLUTION INTRA-ARTICULAR; INTRALESIONAL; INTRAMUSCULAR; INTRAVENOUS; SOFT TISSUE
Status: CANCELLED | OUTPATIENT
Start: 2023-08-16

## 2023-08-16 NOTE — PATIENT INSTRUCTIONS
MICROLARYNGOSCOPY    Description  Laryngoscopy is a procedure in which the surgeon closely examines the larynx (voice box). The key instrument for laryngoscopy is the laryngoscope, which is a hollow metal tube inserted into the mouth. Microlaryngoscopy entails--at minimum--a magnified examination of the larynx using a microscope and/or telescopes. This is performed in the operating room. This allows the surgeon to achieve a diagnosis and also to perform precise treatment for problems on the vocal folds (vocal cords) or other parts of the larynx. Additional interventions may be performed in conjunction with microlaryngoscopy. Common interventions include but are not limited to  Biopsy  Removal of abnormal tissue (polyps, cysts, nodules, leukoplakia)  Resection of cancer  Laser treatment of abnormal tissue (papilloma, leukoplakia)  Vocal fold injection augmentation  Injection of medication (steroid, Avastin)    Instructions: before the procedure  NPO after midnight: This is a medical expression for nothing to eat or drink after midnight. It is important to refrain from eating or drinking anything after midnight the night before your surgery.   Please refrain from taking any anti-platelet medications such as aspirin; ibuprofen (Advil, Motrin); Aleve; or Plavix (clopidogrel) for 7 days prior to the procedure.  If you usually take Coumadin (warfarin), you may need to stop using this a few days prior to the injection.   If you are any type of blood thinning (anti-platelet or anti-coagulant) medication and it is not clear what you should do, please clarify this with your surgeon ahead of time. In some cases we rely on other physicians such as cardiologists or hematologists to help with these decisions.  Diabetes precautions: If you are usually take oral diabetes medications (such as metformin), refrain from taking your morning dose the day of the procedure and use sliding-scale insulin for blood sugar  control.  On the morning of your procedure, it is OK to take your other regular morning medications with a small sip of water. It is particularly important to take any medications that treat heart problems (such as blood pressure, heart rate, heart rhythm) and lung problems  (asthma, COPD). Otherwise, please remember: nothing to eat or drink after midnight.      What to expect during the procedure  Microlaryngoscopy is performed in the operating room while you are asleep under general anesthesia. In most instances, you can expect to be under general anesthesia for approximately 1 to 1 ½ hours.  Nevertheless, the duration of the procedure varies depending on the indication for surgery, intraoperative findings, and other patient-specific/anatomic issues. Our primary concerns are your safety and comfort. Our secondary goal is to provide you with the best possible surgical treatment for your problem. Your surgery will take as long as necessary to accomplish these goals.    What to expect afterwards  The laryngoscope is inserted through the mouth and presses on the tongue. The most common postoperative issues patients encounter are related to the laryngoscope being positioned in the mouth. The extent of these issues is related to patient-specific anatomic issues, the indications for surgery, and the duration of the procedure.    Pain. We will do everything we can to make sure you are as comfortable as possible. Most patients experience very little discomfort after this surgery. Nevertheless, you should expect some soreness in the mouth and throat. Because the ear and the throat share the same sensory nerve, you may also experience some discomfort in the ear. The discomfort is usually worst for the first 48-72 hours and usually resolves within a week.     Laceration. Some patients may notice a small cut in the tongue or in another part of the mouth or throat. This may result in a minor about of blood-tinged saliva for the  first 24 hours. This usually heals on its own over the course of a week.    Other tongue problems. As the scope presses down on the tongue, the taste buds get compressed. In addition, sometimes the nerves to the tongue also get compressed. As a result, some patients notice a disturbance in taste, numbness of the tongue, or (even more rarely) weakness of the tongue after surgery. Although sometimes it may take several weeks to months for the problem to completely go away, the tongue problems are temporary in almost every instance.    Tooth problems. Reinforced mouth guards are placed on the teeth to protect them during the surgery and we give a great deal of care and attention to minimizing any pressure on the teeth. However, a chipped or cracked tooth, loss of a tooth, and/or other tooth irregularities are rare but well-recognized complications of laryngoscopy.    Jaw problems. You may experience some pain in the jaw joints (in front of the ears). Even less frequent would be dislocation of the joint. This usually occurs in patients who already have jaw problems.    Instructions: after the procedure  Please call our office at [phone] if  You have a temperature above 101°F  You develop Increasing pain not relieved by medications  You have any other questions or concerns  Please go immediately to the nearest emergency room if you are experiencing  Shortness of breath  Difficulty breathing  Severe bleeding

## 2023-08-16 NOTE — PROGRESS NOTES
[facility]  Department of Otorhinolaryngology and Communication Sciences    Marleny Guzman is a 73 y.o. female who presents to the [facility] for consultation at the kind request of Chava Moncada for further management of  vocal fold paresis .     She complains of hoarseness. Onset was fairly sudden, without any clear inciting event, at least 10 years ago. Time course is constant. Symptoms are stable. Exacerbating factors include speaking with more excitement/animation in the voice. She denies any alleviating factors. She denies any associated symptoms. She denies dysphagia.    CT neck with contrast 6/21/2023: negative for etiology of VF paresis; possible left periaortic opacity  CT chest without 5/15/2023:  - bronchiectasis/fibrosis  - pulmonary nodules  - Few prominent to enlarged mediastinal lymph nodes, which are unchanged from multiple priors.  The largest is a precarinal lymph node which measures 1.3 cm (2-56), and is unchanged from CT 01/17/2021.    She has recently seen pulmonology regarding intermittent dyspnea.There is possible interstitial lung disease. A CT chest w contrast was recommended but not yet scheduled to evaluate thoracic adenopathy.    She has no personal or family history of sarcoidosis.    Past Medical History  She has a past medical history of Abnormal Pap smear of vagina, Anemia, Cataract, Chronic bilateral low back pain without sciatica, Depression with anxiety, Hypertension, Osteoarthritis, Right rotator cuff tear, Sleep apnea, and Urinary incontinence.    Past Surgical History  She has a past surgical history that includes Salpingectomy (Left); Cataract extraction (6/18/12); Cataract extraction (7/16/12); LEEP; Eye surgery (Bilateral); Shoulder surgery (Right); Cervix surgery; Hysterectomy (5-12-15); pr removal of ovary/tube(s) (5-12-15); Colonoscopy; Lumbar epidural injection; Colonoscopy (N/A, 3/4/2016); Oophorectomy; Esophagogastroduodenoscopy (Left,  1/25/2021); Colonoscopy (Left, 1/25/2021); Robot-assisted repair of ventral hernia using da Jatin Xi (N/A, 11/29/2021); Retrograde pyelography (Bilateral, 9/12/2022); Cystoscopy (N/A, 9/12/2022); and Colonoscopy (N/A, 6/19/2023).    Family History  Her family history includes Anuerysm in her sister; Breast cancer in her cousin; Breast cancer (age of onset: 72) in her sister; Cancer in her maternal aunt, mother, and sister; Cataracts in her father; Deep vein thrombosis in her sister; Depression in her maternal grandmother; Drug abuse in her sister; Eczema in her sister; Edema in her sister; Heart disease in her sister and sister; Hypertension in her daughter, father, sister, sister, and sister; No Known Problems in her brother, paternal grandfather, paternal grandmother, sister, and sister; Osteoarthritis in her sister; Other in her daughter; Pacemaker/defibrilator in her sister; Stroke in her mother; Ulcers in her maternal grandfather.    Social History  She reports that she quit smoking about 26 years ago. Her smoking use included cigarettes. She started smoking about 48 years ago. She has a 11.0 pack-year smoking history. She has been exposed to tobacco smoke. She has never used smokeless tobacco. She reports current alcohol use. She reports that she does not use drugs.    Allergies  She has No Known Allergies.    Medications  She has a current medication list which includes the following prescription(s): albuterol, aspirin, atorvastatin, azelastine, ergocalciferol, escitalopram oxalate, estradiol, fluticasone propionate, irbesartan-hydrochlorothiazide, lorazepam, metoprolol succinate, nystatin, orphenadrine, pulse oximeter, spironolactone, and triamcinolone acetonide 0.1%, and the following Facility-Administered Medications: sodium chloride 0.9%, ciprofloxacin hcl, fentanyl, lidocaine (pf) 10 mg/ml (1%), lidocaine hcl 2%, midazolam, prochlorperazine, and sodium chloride 0.9%.    Review of Systems    Constitutional:  Negative for fever.   HENT:  Negative for sore throat.    Eyes:  Negative for visual disturbance.   Respiratory:  Negative for wheezing.    Cardiovascular:  Negative for chest pain.   Gastrointestinal:  Negative for nausea.   Musculoskeletal:  Negative for arthralgias.   Skin:  Negative for rash.   Neurological:  Negative for tremors.   Hematological:  Does not bruise/bleed easily.   Psychiatric/Behavioral:  The patient is not nervous/anxious.         Objective:     VS reviewed  Physical Exam  Constitutional: comfortable, well dressed  Psychiatric: appropriate affect  Respiratory: comfortably breathing, symmetric chest rise, no stridor  Voice: variable mild breathiness/roughness, impaired flexibility    Cardiovascular: upper extremities non-edematous  Lymphatic: no cervical lymphadenopathy  Neurologic: alert and oriented to time, place, person, and situation; cranial nerves 3-12 grossly intact  Head: normocephalic  Eyes: conjunctivae and sclerae clear  Ears: normal pinnae, normal external auditory canals, tympanic membranes intact  Nose: mucosa pink and noncongested, no masses, no mucopurulence, no polyps  Oral cavity / oropharynx: no mucosal lesions  Neck: soft, full range of motion, laryngotracheal complex palpable with appropriate landmarks, larynx elevates on swallowing  Indirect laryngoscopy: limited due to gag    Procedure  Rigid Laryngeal Videostroboscopy (48248): Laryngeal videostroboscopy is indicated to assess the laryngeal vibratory biomechanics and vocal fold oscillation, which cannot be assessed with a plain light examination. This was carried out with a 70 degree endoscope. After verbal consent was obtained, the patient was positioned and the tongue was gently secured with a gauze sponge. The endoscope was passed transorally and positioned to image the larynx and hypopharynx in detail. The following features were examined: laryngeal and hypopharyngeal masses; vocal fold range and  symmetry of motion; laryngeal mucosal edema, erythema, inflammation, and hydration; salivary pooling; and gross laryngeal sensation. During phonation, the vocal folds were assessed for glottal closure; mucosal wave; vocal fold lesions; vibratory periodicity, amplitude, and phase symmetry; and vertical height match. The equipment was removed. The patient tolerated the procedure well without complication. All findings were normal except:  - mild true vocal fold bowing, left>right  - mild bilateral mid fold sulcus  - mild false vocal fold atrophy, right>left  - very mild superficial glottic edema  - subtle midmembranous prenodular convexity along free edge of bilateral true vocal folds, left>right, only evident in high pitch phonation  - mild variable glottal insufficiency, with spindle shaped gap    Assessment:     Marleny Guzman is a 73 y.o. 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       Plan:        I had a discussion with the patient regarding her condition and the further workup and management options.      The patient is reassured that these symptoms do not appear to represent a serious or threatening condition.    Treatment we discussed included voice therapy, potentially in tandem with vocal fold injection augmentation. She would like to pursue multimodality treatment for her voice problem. We discussed doing the injection in the clinic or in the OR under general anesthesia. She prefers the latter. I discussed with her the risks and benefits thereof. I gave the patient the opportunity to ask questions and I answered all of them. She wishes to proceed. We will arrange this in the coming weeks, with preop testing triage to be completed by the anesthesiology team.    SLP voice evaluation and subsequent therapy sessions are medically necessary for restoration of laryngeal function. This will  with our team in the postoperative  period.    All questions were answered, and the patient is in agreement with the above.     Andre Valera M.D.  [facility]  Department of Otorhinolaryngology and Communication Sciences

## 2023-08-23 ENCOUNTER — HOSPITAL ENCOUNTER (OUTPATIENT)
Dept: RADIOLOGY | Facility: HOSPITAL | Age: 73
Discharge: HOME | End: 2023-08-23
Attending: INTERNAL MEDICINE
Payer: MEDICARE

## 2023-08-23 DIAGNOSIS — J84.9 INTERSTITIAL PULMONARY DISEASE, UNSPECIFIED: ICD-10-CM

## 2023-08-23 LAB
CREAT SERPL-MCNC: 0.8 MG/DL (ref 0.5–1.4)
SAMPLE: NORMAL

## 2023-08-23 PROCEDURE — 71260 CT THORAX DX C+: CPT | Mod: 26,,, | Performed by: STUDENT IN AN ORGANIZED HEALTH CARE EDUCATION/TRAINING PROGRAM

## 2023-08-23 PROCEDURE — 71260 CT THORAX DX C+: CPT | Mod: TC

## 2023-08-23 PROCEDURE — 71260 CT CHEST WITH CONTRAST: ICD-10-PCS | Mod: 26,,, | Performed by: STUDENT IN AN ORGANIZED HEALTH CARE EDUCATION/TRAINING PROGRAM

## 2023-08-23 PROCEDURE — 25500020 PHARM REV CODE 255: Performed by: INTERNAL MEDICINE

## 2023-08-23 RX ADMIN — IOHEXOL 100 ML: 350 INJECTION, SOLUTION INTRAVENOUS at 02:08

## 2023-08-25 ENCOUNTER — TELEPHONE (OUTPATIENT)
Dept: GYNECOLOGIC ONCOLOGY | Facility: CLINIC | Age: 73
End: 2023-08-25
Payer: MEDICARE

## 2023-08-25 NOTE — TELEPHONE ENCOUNTER
No care due was identified.  [facility] Embedded Care Due Messages. Reference number: [id number].   8/24/2023 11:53:22 PM CDT

## 2023-08-25 NOTE — TELEPHONE ENCOUNTER
----- Message from Tre Morris RN sent at 8/25/2023  2:11 PM CDT -----    ----- Message -----  From: Marycruz Partida  Sent: 8/25/2023   2:05 PM CDT  To: Dimitri Feldman Staff    Type:  Needs Medical Advice    Who Called: pt     Would the patient rather a call back or a response via [facility]? [facility]  Best Call Back Number: [phone]  Additional Information: pt is requesting to get a appointment to get a annual/pap done Preferred Date Range: 9/4/2023 - 9/11/2023

## 2023-08-27 RX ORDER — LORAZEPAM 0.5 MG/1
TABLET ORAL
Qty: 30 TABLET | Refills: 0 | Status: SHIPPED | OUTPATIENT
Start: 2023-08-27 | End: 2024-01-18 | Stop reason: SDUPTHER

## 2023-08-30 ENCOUNTER — OFFICE VISIT (OUTPATIENT)
Dept: NEUROLOGY | Facility: CLINIC | Age: 73
End: 2023-08-30
Payer: MEDICARE

## 2023-08-30 VITALS
HEIGHT: 70 IN | SYSTOLIC BLOOD PRESSURE: 149 MMHG | HEART RATE: 69 BPM | WEIGHT: 203 LBS | DIASTOLIC BLOOD PRESSURE: 64 MMHG | BODY MASS INDEX: 29.06 KG/M2

## 2023-08-30 DIAGNOSIS — G31.84 MILD COGNITIVE IMPAIRMENT WITH MEMORY LOSS: Primary | ICD-10-CM

## 2023-08-30 PROCEDURE — 3078F DIAST BP <80 MM HG: CPT | Mod: CPTII,S$GLB,, | Performed by: PSYCHIATRY & NEUROLOGY

## 2023-08-30 PROCEDURE — 99204 OFFICE O/P NEW MOD 45 MIN: CPT | Mod: S$GLB,,, | Performed by: PSYCHIATRY & NEUROLOGY

## 2023-08-30 PROCEDURE — 3066F NEPHROPATHY DOC TX: CPT | Mod: CPTII,S$GLB,, | Performed by: PSYCHIATRY & NEUROLOGY

## 2023-08-30 PROCEDURE — 1159F MED LIST DOCD IN RCRD: CPT | Mod: CPTII,S$GLB,, | Performed by: PSYCHIATRY & NEUROLOGY

## 2023-08-30 PROCEDURE — 3077F SYST BP >= 140 MM HG: CPT | Mod: CPTII,S$GLB,, | Performed by: PSYCHIATRY & NEUROLOGY

## 2023-08-30 PROCEDURE — 1101F PT FALLS ASSESS-DOCD LE1/YR: CPT | Mod: CPTII,S$GLB,, | Performed by: PSYCHIATRY & NEUROLOGY

## 2023-08-30 PROCEDURE — 1160F PR REVIEW ALL MEDS BY PRESCRIBER/CLIN PHARMACIST DOCUMENTED: ICD-10-PCS | Mod: CPTII,S$GLB,, | Performed by: PSYCHIATRY & NEUROLOGY

## 2023-08-30 PROCEDURE — 3008F BODY MASS INDEX DOCD: CPT | Mod: CPTII,S$GLB,, | Performed by: PSYCHIATRY & NEUROLOGY

## 2023-08-30 PROCEDURE — 1125F AMNT PAIN NOTED PAIN PRSNT: CPT | Mod: CPTII,S$GLB,, | Performed by: PSYCHIATRY & NEUROLOGY

## 2023-08-30 PROCEDURE — 3077F PR MOST RECENT SYSTOLIC BLOOD PRESSURE >= 140 MM HG: ICD-10-PCS | Mod: CPTII,S$GLB,, | Performed by: PSYCHIATRY & NEUROLOGY

## 2023-08-30 PROCEDURE — 99999 PR PBB SHADOW E&M-EST. PATIENT-LVL V: CPT | Mod: PBBFAC,,, | Performed by: PSYCHIATRY & NEUROLOGY

## 2023-08-30 PROCEDURE — 3061F PR NEG MICROALBUMINURIA RESULT DOCUMENTED/REVIEW: ICD-10-PCS | Mod: CPTII,S$GLB,, | Performed by: PSYCHIATRY & NEUROLOGY

## 2023-08-30 PROCEDURE — 1160F RVW MEDS BY RX/DR IN RCRD: CPT | Mod: CPTII,S$GLB,, | Performed by: PSYCHIATRY & NEUROLOGY

## 2023-08-30 PROCEDURE — 99204 PR OFFICE/OUTPT VISIT, NEW, LEVL IV, 45-59 MIN: ICD-10-PCS | Mod: S$GLB,,, | Performed by: PSYCHIATRY & NEUROLOGY

## 2023-08-30 PROCEDURE — 3061F NEG MICROALBUMINURIA REV: CPT | Mod: CPTII,S$GLB,, | Performed by: PSYCHIATRY & NEUROLOGY

## 2023-08-30 PROCEDURE — 1101F PR PT FALLS ASSESS DOC 0-1 FALLS W/OUT INJ PAST YR: ICD-10-PCS | Mod: CPTII,S$GLB,, | Performed by: PSYCHIATRY & NEUROLOGY

## 2023-08-30 PROCEDURE — 3288F PR FALLS RISK ASSESSMENT DOCUMENTED: ICD-10-PCS | Mod: CPTII,S$GLB,, | Performed by: PSYCHIATRY & NEUROLOGY

## 2023-08-30 PROCEDURE — 3008F PR BODY MASS INDEX (BMI) DOCUMENTED: ICD-10-PCS | Mod: CPTII,S$GLB,, | Performed by: PSYCHIATRY & NEUROLOGY

## 2023-08-30 PROCEDURE — 3078F PR MOST RECENT DIASTOLIC BLOOD PRESSURE < 80 MM HG: ICD-10-PCS | Mod: CPTII,S$GLB,, | Performed by: PSYCHIATRY & NEUROLOGY

## 2023-08-30 PROCEDURE — 99999 PR PBB SHADOW E&M-EST. PATIENT-LVL V: ICD-10-PCS | Mod: PBBFAC,,, | Performed by: PSYCHIATRY & NEUROLOGY

## 2023-08-30 PROCEDURE — 3044F HG A1C LEVEL LT 7.0%: CPT | Mod: CPTII,S$GLB,, | Performed by: PSYCHIATRY & NEUROLOGY

## 2023-08-30 PROCEDURE — 3288F FALL RISK ASSESSMENT DOCD: CPT | Mod: CPTII,S$GLB,, | Performed by: PSYCHIATRY & NEUROLOGY

## 2023-08-30 PROCEDURE — 3044F PR MOST RECENT HEMOGLOBIN A1C LEVEL <7.0%: ICD-10-PCS | Mod: CPTII,S$GLB,, | Performed by: PSYCHIATRY & NEUROLOGY

## 2023-08-30 PROCEDURE — 1125F PR PAIN SEVERITY QUANTIFIED, PAIN PRESENT: ICD-10-PCS | Mod: CPTII,S$GLB,, | Performed by: PSYCHIATRY & NEUROLOGY

## 2023-08-30 PROCEDURE — 1159F PR MEDICATION LIST DOCUMENTED IN MEDICAL RECORD: ICD-10-PCS | Mod: CPTII,S$GLB,, | Performed by: PSYCHIATRY & NEUROLOGY

## 2023-08-30 PROCEDURE — 3066F PR DOCUMENTATION OF TREATMENT FOR NEPHROPATHY: ICD-10-PCS | Mod: CPTII,S$GLB,, | Performed by: PSYCHIATRY & NEUROLOGY

## 2023-08-30 NOTE — PATIENT INSTRUCTIONS
Neuropsychology Testing:    [facility] [phone]    Once you have an appointment with neuropsychology, please contact our office to make a follow up appointment with Dr. Serrano for after the testing has been completed.

## 2023-08-30 NOTE — PROGRESS NOTES
[facility] NEUROLOGY  [facility], [place]    Date: 8/30/23  Patient Name: Marleny Guzman   MRN: [id number]   PCP: Bridget Cantu  Referring Provider: Bridget Cantu MD    Chief Complaint:  Changes in memory  Subjective:   Patient seen in consultation at the request of Bridget Cantu MD for the evaluation of changes in memory. A copy of this note will be sent to the referring physician.      HPI:   Ms. Marleny Guzman is a 73 y.o. female with pmhx as below who is presenting today to discuss changes in memory. They share that memory changes were first noted >1 year ago. Examples of these concerns included forgetting short term information, forgets where she is going sometimes, loses track of tasks, word finding. They currently live daughter who reportedly has also noticed a shift. The patient shares that they independently complete activities of daily living including cooking, cleaning, dressing, and financial responsibilities. There have been no safety concerns about the home such as recent falls or leaving the stove unattended. Still working as a caregiver; no major issues professionally, just has to take a minute to remember sometimes. They continue to drive and report no recent accidents; has had navigation issues. Has had to call daughter for help at least once.   With prompting, hallucinations, delusions, and paranoid thinking are denied by patient and family. Sleep quality is reportedly intact without disturbances. Appetite is intact with no reported unintended weight loss. Mood is described as stable; denies depression, some mild anxiety. There have not been noted changes in speech volume, peng, or content by friends or family.     There is no significant family history of neurodegenerative diseases/memory disturbances. Patient has decision making authority.     PAST MEDICAL HISTORY:  Past Medical History:   Diagnosis Date    Abnormal Pap smear of  vagina 07/20/2016    Anemia     Cataract     Chronic bilateral low back pain without sciatica 03/13/2018    Depression with anxiety     Hypertension     Osteoarthritis 06/20/2013    Right rotator cuff tear     Sleep apnea     Cipap machine at home    Urinary incontinence     Leaks urine with laughing/ coughing/ sneezing       PAST SURGICAL HISTORY:  Past Surgical History:   Procedure Laterality Date    CATARACT EXTRACTION  6/18/12    OS    CATARACT EXTRACTION  7/16/12    OD    CERVIX SURGERY      Conization    COLONOSCOPY      COLONOSCOPY N/A 3/4/2016    Procedure: COLONOSCOPY;  Surgeon: Tenzin Thakkar MD;  Location: [facility] ENDO (4TH FLR);  Service: Endoscopy;  Laterality: N/A;    COLONOSCOPY Left 1/25/2021    Procedure: COLONOSCOPY;  Surgeon: Wayne Naranjo MD;  Location: [facility] ENDO;  Service: Endoscopy;  Laterality: Left;    COLONOSCOPY N/A 6/19/2023    Procedure: COLONOSCOPY;  Surgeon: Tenzin Thakkar MD;  Location: [facility] ENDO (2ND FLR);  Service: Endoscopy;  Laterality: N/A;  lung issues  referral Dr [id number] portal-  6/13/23- no answer for precall- ks    CYSTOSCOPY N/A 9/12/2022    Procedure: CYSTOSCOPY;  Surgeon: Bianka Aragon MD;  Location: [facility] OR 1ST FLR;  Service: Urology;  Laterality: N/A;    ESOPHAGOGASTRODUODENOSCOPY Left 1/25/2021    Procedure: EGD (ESOPHAGOGASTRODUODENOSCOPY);  Surgeon: Wayne Naranjo MD;  Location: [facility];  Service: Endoscopy;  Laterality: Left;    EYE SURGERY Bilateral     cataract    HYSTERECTOMY  5-12-15    LEEP      LUMBAR EPIDURAL INJECTION      OOPHORECTOMY      MO REMOVAL OF OVARY/TUBE(S)  5-12-15    RETROGRADE PYELOGRAPHY Bilateral 9/12/2022    Procedure: PYELOGRAM, RETROGRADE;  Surgeon: Bianka Aragon MD;  Location: [facility] OR 1ST FLR;  Service: Urology;  Laterality: Bilateral;    ROBOT-ASSISTED REPAIR OF VENTRAL HERNIA USING DA ASPEN XI N/A 11/29/2021    Procedure: XI ROBOTIC REPAIR, HERNIA, VENTRAL w/ possible mesh;  Surgeon: Roney BOOKER  MD Manuel;  Location: [address];  Service: General;  Laterality: N/A;  Anesthesia Block    SALPINGECTOMY Left     SHOULDER SURGERY Right        CURRENT MEDS:  Current Outpatient Medications   Medication Sig Dispense Refill    aspirin 81 MG Chew TAKE 1 TABLET BY MOUTH EVERY DAY (Patient taking differently: No sig reported) 90 tablet 1    atorvastatin (LIPITOR) 20 MG tablet TAKE 1 TABLET BY MOUTH EVERY DAY 90 tablet 3    azelastine (ASTELIN) 137 mcg (0.1 %) nasal spray 1 spray (137 mcg total) by Nasal route 2 (two) times daily. 15 mL 2    ergocalciferol (ERGOCALCIFEROL) 50,000 unit Cap TAKE 1 CAPSULE (50,000 UNITS TOTAL) BY MOUTH EVERY 30 DAYS 3 capsule 3    EScitalopram oxalate (LEXAPRO) 20 MG tablet TAKE 1 TABLET (20 MG TOTAL) BY MOUTH ONCE DAILY 90 tablet 3    estradioL (ESTRACE) 0.01 % (0.1 mg/gram) vaginal cream Use 1 GM nightly vaginally and dime size amount on vulva twice weekly 42.5 g 4    irbesartan-hydrochlorothiazide (AVALIDE) 300-12.5 mg per tablet Take 1 tablet by mouth once daily. 90 tablet 3    LORazepam (ATIVAN) 0.5 MG tablet TAKE 1/2 TO 1 TABLET BY MOUTH DAILY AS NEEDED FOR ANXIETY 30 tablet 0    metoprolol succinate (TOPROL-XL) 50 MG 24 hr tablet TAKE 1 TABLET BY MOUTH EVERY DAY 90 tablet 3    nystatin (MYCOSTATIN) cream Apply topically 2 (two) times daily. 30 g 1    orphenadrine (NORFLEX) 100 mg tablet TAKE 1 TABLET BY MOUTH EVERY DAY AS NEEDED FOR MUSCLE SPASM 30 tablet 3    pulse oximeter (PULSE OXIMETER) device by Apply Externally route 2 (two) times a day. Use twice daily at 8 AM and 3 PM and record the value in Mimvit as directed. 1 each 0    spironolactone (ALDACTONE) 25 MG tablet Take 1 tablet (25 mg total) by mouth once daily. 30 tablet 3    triamcinolone acetonide 0.1% (KENALOG) 0.1 % cream APPLY TOPICALLY TWICE A DAY 45 g 0    albuterol (PROVENTIL/VENTOLIN HFA) 90 mcg/actuation inhaler Inhale 1-2 puffs into the lungs every 6 (six) hours as needed for Wheezing or Shortness of  "Breath (Please dispense with spacer). 6.7 g 0    fluticasone propionate (FLOVENT HFA) 110 mcg/actuation inhaler Inhale 1 puff into the lungs 2 (two) times daily. Controller 12 g 6     Current Facility-Administered Medications   Medication Dose Route Frequency Provider Last Rate Last Admin    ciprofloxacin HCl tablet 500 mg  500 mg Oral Once Malu Barragan, ELVIA        LIDOcaine HCl 2% urojet   Urethral Once Malu Barragan NP         Facility-Administered Medications Ordered in Other Visits   Medication Dose Route Frequency Provider Last Rate Last Admin    0.9%  NaCl infusion   Intravenous Continuous Roney Jackson MD   Stopped at 11/29/21 1755    fentaNYL 50 mcg/mL injection  mcg   mcg Intravenous PRN Anthony Hernandez MD   50 mcg at 11/29/21 1109    LIDOcaine (PF) 10 mg/ml (1%) injection 10 mg  1 mL Intradermal Once Roney Jackson MD        midazolam (VERSED) 1 mg/mL injection 0.5-4 mg  0.5-4 mg Intravenous PRN Anthony Hernandez MD   1 mg at 11/29/21 1109    prochlorperazine injection Soln 5 mg  5 mg Intravenous Q30 Min PRN Beth Ramsey MD        sodium chloride 0.9% flush 10 mL  10 mL Intravenous PRN Beth Ramsey MD           ALLERGIES:  Review of patient's allergies indicates:  No Known Allergies    FAMILY HISTORY:  Family History   Problem Relation Age of Onset    Cancer Mother         THYROID CANCER    Stroke Mother     Hypertension Father     Cataracts Father     Heart disease Sister     Pacemaker/defibrilator Sister     Hypertension Sister     Deep vein thrombosis Sister     Heart disease Sister         CABG    Osteoarthritis Sister     Edema Sister     Eczema Sister     Hypertension Sister     Breast cancer Sister 72    Anuerysm Sister     Drug abuse Sister     Hypertension Sister     No Known Problems Sister     Cancer Sister         Cancer cells or "growth in her stomach"    No Known Problems Sister     No Known Problems Brother     Depression Maternal " "Grandmother     Ulcers Maternal Grandfather         Legs    No Known Problems Paternal Grandmother     No Known Problems Paternal Grandfather     Hypertension Daughter     Other Daughter         Heart palpitations    Cancer Maternal Aunt     Breast cancer Cousin     ADD / ADHD Neg Hx     Alcohol abuse Neg Hx     Anxiety disorder Neg Hx     Bipolar disorder Neg Hx     Dementia Neg Hx     OCD Neg Hx     Paranoid behavior Neg Hx     Physical abuse Neg Hx     Schizophrenia Neg Hx     Seizures Neg Hx     Sexual abuse Neg Hx     Amblyopia Neg Hx     Blindness Neg Hx     Glaucoma Neg Hx     Macular degeneration Neg Hx     Retinal detachment Neg Hx     Strabismus Neg Hx     Anesthesia problems Neg Hx        SOCIAL HISTORY:  Social History     Tobacco Use    Smoking status: Former     Current packs/day: 0.00     Average packs/day: 0.5 packs/day for 22.0 years (11.0 ttl pk-yrs)     Types: Cigarettes     Start date: 1975     Quit date: 1997     Years since quittin.6     Passive exposure: Past    Smokeless tobacco: Never    Tobacco comments:     Some wheezing since COVID-19 infection-2020   Substance Use Topics    Alcohol use: Yes     Alcohol/week: 0.0 standard drinks of alcohol     Comment: Occassionally for social events will have a glass of wine    Drug use: No       Review of Systems:  Gen: no fever, no chills, no generalized feeling of weakness   HEENT: no double vision, no blurred vision, no eye pain, no eye exudates. no nasal congestion,   no traumatic injury of head, no neck pain, no neck stiffness. no photophobia or phonophobia at this time. ?    Heart: no chest pain, no SOB    Lungs: no SOB, no cough    MSK: no weakness of legs, intact ROM    ABD: no abd pain, no N/V/D/C, no difficulty with defecation.    Extremities: No leg pain, no edema.       Objective:     Vitals:    23 1006   BP: (!) 149/64   Pulse: 69   Weight: 92.1 kg (203 lb)   Height: 5' 10" (1.778 m)       General:  Female in NAD, alert " and awake, Aox3, well groomed. ?    ? ?    HEENT: Head is NC/AT EOMI, pupil size: 4 mm B/L, no nystagmus noted; hearing grossly intact b/l. Mucous membrane moist, uvula midline, no pharyngeal erythema, exudates or discharges.      Neck: Supple. no nuchal rigidity.      Cardiovascular: well perfused, no cyanosis        Respiratory: Symmetric chest rise noted       Musculoskeletal: Muscle tone noted to be adequate for patient age, muscle mass is WNL. No spontaneous movements or fasciculations noted during this examination.       Extremities: No pedal edema or calf tenderness. No cogwheel rigidity noted on B/L UE extremities.       Neurological Examination.    Mental status: AA&O x3; Affect/mood is euthymic/congruent; no aphasia noted during examination. Patient answers simple questions appropriately & follows simple commands; no dysarthria or expressive aphasia; no chinedu-neglect or extinction. Vocabulary/word finding: excellent.       Cranial Nerves: II-XII grossly intact. visual fields intact to confrontation, EOMI, no nystagmus, PERRLA,?facial muscles symmetric and no facial droop noted, patient hearing is grossly intact b/l, ?facial sensation to sharp and light touch grossly intact B/L. Patient smiles, frowns, closes eyes ?forcefully uneventfully. Uvula midline and no difficulty with pronunciation. No SCM/trapezius/muscle of mastication weakness noted B/L. Patient has adequate control of tongue and may protrude it and move it adequately.       Muscle Function: Tone WNL and Muscle bulk WNL. Left elbow flexors/extensors (5/5), Left shoulder (5/5); left Finger flexor/extensors (5/5). Right elbow flexors/extensors (5/5). Right shoulder abduction/flexion (5/5), Right finger flexors/extensors (5/5). Left LE hip flexion/extension (5/5), Left Knee flexion/extension (5/5) and Left ankle flexion/extension/Eversion/inversion (5/5). Right LE hip flexion/extension (5/5), Right Knee flexion/extension (5/5) and ankle  "flexion/extension/Eversion/inversion (5/5).       Sensory:  Intact to light touch throughout     Reflexes: Left and Right biceps, triceps, brachioradialis are 2+/4. patellar 2+/4 on Left and 2+/4 on Right, B/L Achilles 2+/4     Coordination: no dysmetria (finger to nose negative)     Gait: adequate casual gait with stride length and arm swing WNL. Tandem gait and walking on toes and heels are WNL     Other:  08/30/23 MOCA: 24/30  Visuospatial/Executive 3, Naming 2, Attention 6, Language 2, Abstraction 2, Delayed Recall 3, Orientation 6    03/05/2023 CT head without:  FINDINGS:  The ventricular system, sulcal pattern and parenchymal attenuation characteristics are consistent with chronic change.  Involutional change noted, chronic appearing white matter change noted.  There is no evidence for acute intracranial process.  There is no evidence for intracranial mass, mass effect or midline shift and there is no evidence for acute intracranial hemorrhage.  Appropriate CSF spaces are seen at the skull base.  The visualized orbits appear intact.  The mastoid air cells appear well aerated.  There is mild paranasal sinus mucosal thickening, there is a finding of the left sphenoid sinus that may relate to a viscous air-fluid level or a mucous retention cyst.  There is osseous wall thickening of the visualized right maxillary antrum this may relate to chronic sinus disease.  The osseous structures appear intact.  Impression:  Chronic change noted, there is no evidence for acute intracranial process.  Paranasal sinus findings as above."  I personally reviewed this imaging during today's encounter.  Agree that there is a pattern chronic small-vessel ischemic changes but no other significant abnormalities.    Assessment:   Marleny Guzman is a 73 y.o. female presenting for evaluation of changes in memory.  Patient relatively minor changes in cognition or short term memory that are not currently severely impacting life or " function.  Cognitive assessment was found to be outside normal range.  We had a wide ranging conversation regarding these results and agreed to proceed with neuropsychology evaluation for further case definition.  We also briefly discussed the possibility of memory protective medications and agreed to proceed conservatively for now and revisit this discussion after neuropsychology evaluation has been completed.    Plan:     Problem List Items Addressed This Visit    None  Visit Diagnoses       Mild cognitive impairment with memory loss    -  Primary          - encourage cognitively stimulating activities including regular socialization, reading, puzzles  - encourage regular physical activity for good vascular and brain health  - encouraged tight control blood pressure, glucose, cholesterol  - continue multivitamin  - Neuropsychology testing  - Follow up after neuropsychology testing or in 6 months.     I spent a total of 45 minutes on the day of the visit. This includes face to face time and non-face to face time preparing to see the patient (eg, review of tests), obtaining and/or reviewing separately obtained history, documenting clinical information in the electronic or other health record, independently interpreting results and communicating results to the patient/family/caregiver, or care coordinator.    A dictation device was used to produce this document. Use of such devices sometimes results in grammatical errors or replacement of words that sound similarly.    Andrew Serrano, DO

## 2023-09-06 ENCOUNTER — OFFICE VISIT (OUTPATIENT)
Dept: ALLERGY | Facility: CLINIC | Age: 73
End: 2023-09-06
Payer: MEDICARE

## 2023-09-06 VITALS
SYSTOLIC BLOOD PRESSURE: 145 MMHG | BODY MASS INDEX: 29.32 KG/M2 | HEART RATE: 59 BPM | OXYGEN SATURATION: 97 % | DIASTOLIC BLOOD PRESSURE: 72 MMHG | WEIGHT: 204.38 LBS

## 2023-09-06 DIAGNOSIS — R76.0 ABNORMAL ANTIBODY TITER: ICD-10-CM

## 2023-09-06 DIAGNOSIS — I10 PRIMARY HYPERTENSION: ICD-10-CM

## 2023-09-06 DIAGNOSIS — Z86.19 FREQUENT INFECTIONS: ICD-10-CM

## 2023-09-06 DIAGNOSIS — R76.0 ABNORMAL ANTIBODY TITER: Primary | ICD-10-CM

## 2023-09-06 DIAGNOSIS — J31.0 CHRONIC RHINITIS: Primary | ICD-10-CM

## 2023-09-06 DIAGNOSIS — J84.9 ILD (INTERSTITIAL LUNG DISEASE): ICD-10-CM

## 2023-09-06 DIAGNOSIS — Z87.01 HISTORY OF RECURRENT PNEUMONIA: ICD-10-CM

## 2023-09-06 PROCEDURE — 1160F RVW MEDS BY RX/DR IN RCRD: CPT | Mod: CPTII,S$GLB,, | Performed by: STUDENT IN AN ORGANIZED HEALTH CARE EDUCATION/TRAINING PROGRAM

## 2023-09-06 PROCEDURE — 3044F PR MOST RECENT HEMOGLOBIN A1C LEVEL <7.0%: ICD-10-PCS | Mod: CPTII,S$GLB,, | Performed by: STUDENT IN AN ORGANIZED HEALTH CARE EDUCATION/TRAINING PROGRAM

## 2023-09-06 PROCEDURE — 3066F PR DOCUMENTATION OF TREATMENT FOR NEPHROPATHY: ICD-10-PCS | Mod: CPTII,S$GLB,, | Performed by: STUDENT IN AN ORGANIZED HEALTH CARE EDUCATION/TRAINING PROGRAM

## 2023-09-06 PROCEDURE — 3066F NEPHROPATHY DOC TX: CPT | Mod: CPTII,S$GLB,, | Performed by: STUDENT IN AN ORGANIZED HEALTH CARE EDUCATION/TRAINING PROGRAM

## 2023-09-06 PROCEDURE — 99999 PR PBB SHADOW E&M-EST. PATIENT-LVL IV: ICD-10-PCS | Mod: PBBFAC,,, | Performed by: STUDENT IN AN ORGANIZED HEALTH CARE EDUCATION/TRAINING PROGRAM

## 2023-09-06 PROCEDURE — 3008F BODY MASS INDEX DOCD: CPT | Mod: CPTII,S$GLB,, | Performed by: STUDENT IN AN ORGANIZED HEALTH CARE EDUCATION/TRAINING PROGRAM

## 2023-09-06 PROCEDURE — 3078F PR MOST RECENT DIASTOLIC BLOOD PRESSURE < 80 MM HG: ICD-10-PCS | Mod: CPTII,S$GLB,, | Performed by: STUDENT IN AN ORGANIZED HEALTH CARE EDUCATION/TRAINING PROGRAM

## 2023-09-06 PROCEDURE — 99204 PR OFFICE/OUTPT VISIT, NEW, LEVL IV, 45-59 MIN: ICD-10-PCS | Mod: S$GLB,,, | Performed by: STUDENT IN AN ORGANIZED HEALTH CARE EDUCATION/TRAINING PROGRAM

## 2023-09-06 PROCEDURE — 99204 OFFICE O/P NEW MOD 45 MIN: CPT | Mod: S$GLB,,, | Performed by: STUDENT IN AN ORGANIZED HEALTH CARE EDUCATION/TRAINING PROGRAM

## 2023-09-06 PROCEDURE — 3061F PR NEG MICROALBUMINURIA RESULT DOCUMENTED/REVIEW: ICD-10-PCS | Mod: CPTII,S$GLB,, | Performed by: STUDENT IN AN ORGANIZED HEALTH CARE EDUCATION/TRAINING PROGRAM

## 2023-09-06 PROCEDURE — 99999 PR PBB SHADOW E&M-EST. PATIENT-LVL IV: CPT | Mod: PBBFAC,,, | Performed by: STUDENT IN AN ORGANIZED HEALTH CARE EDUCATION/TRAINING PROGRAM

## 2023-09-06 PROCEDURE — 1160F PR REVIEW ALL MEDS BY PRESCRIBER/CLIN PHARMACIST DOCUMENTED: ICD-10-PCS | Mod: CPTII,S$GLB,, | Performed by: STUDENT IN AN ORGANIZED HEALTH CARE EDUCATION/TRAINING PROGRAM

## 2023-09-06 PROCEDURE — 3078F DIAST BP <80 MM HG: CPT | Mod: CPTII,S$GLB,, | Performed by: STUDENT IN AN ORGANIZED HEALTH CARE EDUCATION/TRAINING PROGRAM

## 2023-09-06 PROCEDURE — 3061F NEG MICROALBUMINURIA REV: CPT | Mod: CPTII,S$GLB,, | Performed by: STUDENT IN AN ORGANIZED HEALTH CARE EDUCATION/TRAINING PROGRAM

## 2023-09-06 PROCEDURE — 3008F PR BODY MASS INDEX (BMI) DOCUMENTED: ICD-10-PCS | Mod: CPTII,S$GLB,, | Performed by: STUDENT IN AN ORGANIZED HEALTH CARE EDUCATION/TRAINING PROGRAM

## 2023-09-06 PROCEDURE — 3077F SYST BP >= 140 MM HG: CPT | Mod: CPTII,S$GLB,, | Performed by: STUDENT IN AN ORGANIZED HEALTH CARE EDUCATION/TRAINING PROGRAM

## 2023-09-06 PROCEDURE — 1159F PR MEDICATION LIST DOCUMENTED IN MEDICAL RECORD: ICD-10-PCS | Mod: CPTII,S$GLB,, | Performed by: STUDENT IN AN ORGANIZED HEALTH CARE EDUCATION/TRAINING PROGRAM

## 2023-09-06 PROCEDURE — 3077F PR MOST RECENT SYSTOLIC BLOOD PRESSURE >= 140 MM HG: ICD-10-PCS | Mod: CPTII,S$GLB,, | Performed by: STUDENT IN AN ORGANIZED HEALTH CARE EDUCATION/TRAINING PROGRAM

## 2023-09-06 PROCEDURE — 1159F MED LIST DOCD IN RCRD: CPT | Mod: CPTII,S$GLB,, | Performed by: STUDENT IN AN ORGANIZED HEALTH CARE EDUCATION/TRAINING PROGRAM

## 2023-09-06 PROCEDURE — 1126F AMNT PAIN NOTED NONE PRSNT: CPT | Mod: CPTII,S$GLB,, | Performed by: STUDENT IN AN ORGANIZED HEALTH CARE EDUCATION/TRAINING PROGRAM

## 2023-09-06 PROCEDURE — 3044F HG A1C LEVEL LT 7.0%: CPT | Mod: CPTII,S$GLB,, | Performed by: STUDENT IN AN ORGANIZED HEALTH CARE EDUCATION/TRAINING PROGRAM

## 2023-09-06 PROCEDURE — 1126F PR PAIN SEVERITY QUANTIFIED, NO PAIN PRESENT: ICD-10-PCS | Mod: CPTII,S$GLB,, | Performed by: STUDENT IN AN ORGANIZED HEALTH CARE EDUCATION/TRAINING PROGRAM

## 2023-09-06 RX ORDER — LORATADINE 10 MG/1
10 TABLET ORAL DAILY
Qty: 30 TABLET | Refills: 11 | Status: SHIPPED | OUTPATIENT
Start: 2023-09-06 | End: 2024-09-05

## 2023-09-06 RX ORDER — FLUTICASONE PROPIONATE 50 MCG
SPRAY, SUSPENSION (ML) NASAL
Qty: 16 G | Refills: 11 | Status: SHIPPED | OUTPATIENT
Start: 2023-09-06

## 2023-09-06 NOTE — PROGRESS NOTES
ALLERGY & IMMUNOLOGY CLINIC - INITIAL CONSULTATION      HISTORY OF PRESENT ILLNESS     Patient ID: Marleny Guzman is a 73 y.o. female    CC: chronic rhinitis, history of frequent infections, low pneumococcal titers    HPI: Marleny Guzman is a 73 y.o. female with ILD and HTN, presenting for chronic rhinitis, frequent infections, and low pneumococcal titers.  Patient was referred by Chava Moncada PA-C (ENT).    Per chart review, in 6/2023, ENT prescribed her amoxicillin and budesonide for sinus rinses. She was cultured, and they checked pneumococcal titers (low) and immunocaps (equivocal to tree, grass, and weed pollen).  In July, they advised her to restart azelastine 2 SEN BID.    Per patient, she has been having a lot of sneezing and runny nose. She is always blowing and coughing.   Symptoms started a couple of years ago.  Patient endorses congestion, sneezing, rhinorrhea, post nasal drip, cough, nasal pruritus, ocular pruritus.  Symptoms are perennial.   Symptoms occur almost every day.  There is no noticeable difference between indoors and outdoors.   The patient has found triggers to include: smoke, cooking with seasoning.  The patient denies heartburn/reflux symptoms.  The patient denies anosmia.     She is still doing sinus rinses (no longer with budesonide). She does this prn.  She is using azelastine 1-2 daily. She finds it helpful.  She has been on flonase before, but didn't take it consistently.  She has taken claritin in the past, but doesn't remember if it was helpful.     She says she isn't sure what is going on with her lungs. She follows with pulm. She isn't currently taking the flovent.     She says she is having a pain in the left temple right now, coming and going. This isn't typical for her. It started when she got into the exam room. It comes, lasts for seconds, goes away for seconds, comes back.     MEDICAL HISTORY     Vaccines:   Immunization History   Administered Date(s)  Administered    COVID-19 Vaccine 07/27/2021, 08/20/2021    COVID-19, MRNA, LN-S, PF (Pfizer) (Purple Cap) 07/27/2021, 08/20/2021    Influenza 11/10/2008, 09/20/2010, 09/19/2011, 01/09/2013, 10/22/2013    Influenza (FLUAD) - Quadrivalent - Adjuvanted - PF *Preferred* (65+) 10/28/2022    Influenza - High Dose - PF (65 years and older) 12/29/2014, 09/29/2015, 09/01/2016, 09/27/2017, 10/22/2017, 10/16/2018, 11/26/2019, 10/15/2020    Influenza - Quadrivalent - High Dose - PF (65 years and older) 10/08/2020, 11/17/2021    Influenza - Trivalent (ADULT) 01/09/2013, 10/22/2013    Influenza Split 11/10/2008, 09/20/2010, 09/19/2011, 01/09/2013, 10/22/2013    Pneumococcal Conjugate - 13 Valent 09/29/2015    Pneumococcal Polysaccharide - 23 Valent 12/29/2014    Tdap 10/22/2013, 02/01/2020    Zoster 01/29/2018    Zoster Recombinant 08/22/2019, 12/04/2019     Medical Hx:   Patient Active Problem List   Diagnosis    Anxiety    HTN (hypertension)    Osteoarthritis    Depression    Sleep apnea    Postoperative pain    Rotator cuff tear    Cervical stenosis (uterine cervix)    Cervicogenic headache    Cervical myofascial pain syndrome    Subjective memory complaints    Insomnia    HGSIL (high grade squamous intraepithelial lesion) on Pap smear of cervix    Thoracic aorta atherosclerosis    HA (headache)    Fall    Imbalance    Anemia    Vaginal dysplasia    DDD (degenerative disc disease), lumbar    Chronic bilateral low back pain without sciatica    Weakness of left lower extremity    Chronic pain    Dyspnea    Elevated troponin    Syncope    SIRS (systemic inflammatory response syndrome)    Fever    Oral thrush    Invasive fungal sinusitis    Abnormal MRI    Chronic maxillary sinusitis    Acute recurrent maxillary sinusitis    ILD (interstitial lung disease)    WOOTEN (dyspnea on exertion)    Abnormal CT of the chest    Mediastinal adenopathy    Lung nodule    Wears contact lenses    Hematuria    Recurrent major depressive disorder, in  partial remission    Pulmonary hypertension     Surgical Hx:   Past Surgical History:   Procedure Laterality Date    CATARACT EXTRACTION  06/18/2012    OS    CATARACT EXTRACTION  07/16/2012    OD    CERVIX SURGERY      Conization    COLONOSCOPY      COLONOSCOPY N/A 03/04/2016    Procedure: COLONOSCOPY;  Surgeon: Tenzin Thakkar MD;  Location: [facility] ENDO (4TH FLR);  Service: Endoscopy;  Laterality: N/A;    COLONOSCOPY Left 01/25/2021    Procedure: COLONOSCOPY;  Surgeon: Wayne Naranjo MD;  Location: [facility] ENDO;  Service: Endoscopy;  Laterality: Left;    COLONOSCOPY N/A 06/19/2023    Procedure: COLONOSCOPY;  Surgeon: Tenzin Thakkar MD;  Location: [facility] ENDO (2ND FLR);  Service: Endoscopy;  Laterality: N/A;  lung issues  referral Dr [id number] portal-  6/13/23- no answer for precall- ks    CYSTOSCOPY N/A 09/12/2022    Procedure: CYSTOSCOPY;  Surgeon: Bianka Aragon MD;  Location: [facility] OR 1ST FLR;  Service: Urology;  Laterality: N/A;    ESOPHAGOGASTRODUODENOSCOPY Left 01/25/2021    Procedure: EGD (ESOPHAGOGASTRODUODENOSCOPY);  Surgeon: Wayne Naranjo MD;  Location: [facility];  Service: Endoscopy;  Laterality: Left;    EYE SURGERY Bilateral     cataract    HYSTERECTOMY  05/12/2015    LEEP      LUMBAR EPIDURAL INJECTION      OOPHORECTOMY      OK REMOVAL OF OVARY/TUBE(S)  05/12/2015    RETROGRADE PYELOGRAPHY Bilateral 09/12/2022    Procedure: PYELOGRAM, RETROGRADE;  Surgeon: Bianka Aragon MD;  Location: [facility] OR 1ST FLR;  Service: Urology;  Laterality: Bilateral;    ROBOT-ASSISTED REPAIR OF VENTRAL HERNIA USING DA ASPEN XI N/A 11/29/2021    Procedure: XI ROBOTIC REPAIR, HERNIA, VENTRAL w/ possible mesh;  Surgeon: Roney Jackson MD;  Location: [facility] OR 2ND FLR;  Service: General;  Laterality: N/A;  Anesthesia Block    SALPINGECTOMY Left     SHOULDER SURGERY Right     SINUS SURGERY       Medications:   Current Outpatient Medications on File Prior to Visit   Medication Sig Dispense Refill     aspirin 81 MG Chew TAKE 1 TABLET BY MOUTH EVERY DAY (Patient taking differently: No sig reported) 90 tablet 1    atorvastatin (LIPITOR) 20 MG tablet TAKE 1 TABLET BY MOUTH EVERY DAY 90 tablet 3    azelastine (ASTELIN) 137 mcg (0.1 %) nasal spray 1 spray (137 mcg total) by Nasal route 2 (two) times daily. 15 mL 2    ergocalciferol (ERGOCALCIFEROL) 50,000 unit Cap TAKE 1 CAPSULE (50,000 UNITS TOTAL) BY MOUTH EVERY 30 DAYS 3 capsule 3    EScitalopram oxalate (LEXAPRO) 20 MG tablet TAKE 1 TABLET (20 MG TOTAL) BY MOUTH ONCE DAILY 90 tablet 3    estradioL (ESTRACE) 0.01 % (0.1 mg/gram) vaginal cream Use 1 GM nightly vaginally and dime size amount on vulva twice weekly 42.5 g 4    irbesartan-hydrochlorothiazide (AVALIDE) 300-12.5 mg per tablet Take 1 tablet by mouth once daily. 90 tablet 3    LORazepam (ATIVAN) 0.5 MG tablet TAKE 1/2 TO 1 TABLET BY MOUTH DAILY AS NEEDED FOR ANXIETY 30 tablet 0    metoprolol succinate (TOPROL-XL) 50 MG 24 hr tablet TAKE 1 TABLET BY MOUTH EVERY DAY 90 tablet 3    nystatin (MYCOSTATIN) cream Apply topically 2 (two) times daily. 30 g 1    orphenadrine (NORFLEX) 100 mg tablet TAKE 1 TABLET BY MOUTH EVERY DAY AS NEEDED FOR MUSCLE SPASM 30 tablet 3    pulse oximeter (PULSE OXIMETER) device by Apply Externally route 2 (two) times a day. Use twice daily at 8 AM and 3 PM and record the value in [employer] as directed. 1 each 0    spironolactone (ALDACTONE) 25 MG tablet Take 1 tablet (25 mg total) by mouth once daily. 30 tablet 3    triamcinolone acetonide 0.1% (KENALOG) 0.1 % cream APPLY TOPICALLY TWICE A DAY 45 g 0    albuterol (PROVENTIL/VENTOLIN HFA) 90 mcg/actuation inhaler Inhale 1-2 puffs into the lungs every 6 (six) hours as needed for Wheezing or Shortness of Breath (Please dispense with spacer). 6.7 g 0    fluticasone propionate (FLOVENT HFA) 110 mcg/actuation inhaler Inhale 1 puff into the lungs 2 (two) times daily. Controller 12 g 6     Current Facility-Administered Medications on File  Prior to Visit   Medication Dose Route Frequency Provider Last Rate Last Admin    0.9%  NaCl infusion   Intravenous Continuous Roney Jackson MD   Stopped at 21 1755    ciprofloxacin HCl tablet 500 mg  500 mg Oral Once Malu Barragan NP        fentaNYL 50 mcg/mL injection  mcg   mcg Intravenous PRN Anthony Hernandez MD   50 mcg at 21 1109    LIDOcaine (PF) 10 mg/ml (1%) injection 10 mg  1 mL Intradermal Once Roney Jackson MD        LIDOcaine HCl 2% urojet   Urethral Once Malu Barragan NP        midazolam (VERSED) 1 mg/mL injection 0.5-4 mg  0.5-4 mg Intravenous PRN Anthony Hernandez MD   1 mg at 21 1109    prochlorperazine injection Soln 5 mg  5 mg Intravenous Q30 Min PRN Beth Ramsey MD        sodium chloride 0.9% flush 10 mL  10 mL Intravenous PRN Beth Ramsey MD         H/o Asthma: denies  H/o Rhinitis: endorses    Drug Allergies: Review of patient's allergies indicates:  No Known Allergies    Env/Occ:   Pets: 2 dogs; she hasn't noticed that they trigger symptoms   Occupation: caregiver    Infection Hx: Denies frequent infections requiring antibiotics over the past year. She says she used to get more infections. Based on chart review, in the last 12 months, she has had 2 episodes of sinusitis requiring antibiotics, and one episode of bronchitis requiring antibiotics. She reports she has had pneumonia, last occurred in 2022. She thinks she has had pneumonia 2-3 times.    Social Hx:   Social History     Tobacco Use    Smoking status: Former     Current packs/day: 0.00     Average packs/day: 0.5 packs/day for 22.0 years (11.0 ttl pk-yrs)     Types: Cigarettes     Start date: 1975     Quit date: 1997     Years since quittin.6     Passive exposure: Past    Smokeless tobacco: Never    Tobacco comments:     Some wheezing since COVID-19 infection-2020   Substance Use Topics    Alcohol use: Yes     Alcohol/week: 0.0 standard  "drinks of alcohol     Comment: Occassionally for social events will have a glass of wine    Drug use: No     Family Hx:   Family History   Problem Relation Age of Onset    Cancer Mother         THYROID CANCER    Stroke Mother     Hypertension Father     Cataracts Father     Heart disease Sister     Pacemaker/defibrilator Sister     Hypertension Sister     Deep vein thrombosis Sister     Heart disease Sister         CABG    Osteoarthritis Sister     Edema Sister     Eczema Sister     Hypertension Sister     Breast cancer Sister 72    Anuerysm Sister     Drug abuse Sister     Hypertension Sister     No Known Problems Sister     Cancer Sister         Cancer cells or "growth in her stomach"    No Known Problems Sister     No Known Problems Brother     Depression Maternal Grandmother     Ulcers Maternal Grandfather         Legs    No Known Problems Paternal Grandmother     No Known Problems Paternal Grandfather     Hypertension Daughter     Other Daughter         Heart palpitations    Cancer Maternal Aunt     Breast cancer Cousin     ADD / ADHD Neg Hx     Alcohol abuse Neg Hx     Anxiety disorder Neg Hx     Bipolar disorder Neg Hx     Dementia Neg Hx     OCD Neg Hx     Paranoid behavior Neg Hx     Physical abuse Neg Hx     Schizophrenia Neg Hx     Seizures Neg Hx     Sexual abuse Neg Hx     Amblyopia Neg Hx     Blindness Neg Hx     Glaucoma Neg Hx     Macular degeneration Neg Hx     Retinal detachment Neg Hx     Strabismus Neg Hx     Anesthesia problems Neg Hx       PHYSICAL EXAM     VS: BP (!) 145/72 (BP Location: Right arm, Patient Position: Sitting, BP Method: Large (Automatic))   Pulse (!) 59   Wt 92.7 kg (204 lb 5.9 oz)   SpO2 97%   BMI 29.32 kg/m²   GENERAL: Alert, NAD, well-appearing  EYES: EOMI, no conjunctival injection, no discharge, no infraorbital shiners  NOSE: NT 2+ B/L, no stringing mucus, no polyps visualized  ORAL: MMM, no ulcers, no thrush  LUNGS: CTAB, no w/r/c, no increased WOB  HEART: RRR, normal " S1/S2, no m/g/r  DERM: no rashes, no skin breaks  NEURO: normal speech, normal gait, no facial asymmetry     LABORATORY STUDIES      7/26/2023   S.pneumoniae Type 1 2.2    S.pneumoniae Type 3 <0.3    Strep pneumo Type 4 <0.3    S.pneumoniae Type 5 0.8    S.pneumoniae Type 8 2.3    S.pneumoniae Type 9N <0.3    S.pneumoniae Type 12F <0.3    Strep pneumo Type 14 2.1    S.pneumoniae Type 19F 0.6    S.pneumoniae Type 23F 0.8    S.pneumoniae Type 6B <0.3    S.pneumoniae Type 7F 9.7    S.pneumoniae Type 18C 0.4    S.pneumoniae Type 9V Abs 0.9      Component      Latest Ref Rng 3/30/2023 5/29/2023   WBC      3.90 - 12.70 K/uL 11.98     RBC      4.00 - 5.40 M/uL 4.71     Hemoglobin      12.0 - 16.0 g/dL 13.1     Hematocrit      37.0 - 48.5 % 42.0     MCV      82 - 98 fL 89     MCH      27.0 - 31.0 pg 27.8     MCHC      32.0 - 36.0 g/dL 31.2 (L)     RDW      11.5 - 14.5 % 15.0 (H)     Platelets      150 - 450 K/uL 333     MPV      9.2 - 12.9 fL 10.7     Immature Granulocytes      0.0 - 0.5 % 0.3     Gran # (ANC)      1.8 - 7.7 K/uL 8.5 (H)     Immature Grans (Abs)      0.00 - 0.04 K/uL 0.04     Lymph #      1.0 - 4.8 K/uL 2.5     Mono #      0.3 - 1.0 K/uL 0.6     Eos #      0.0 - 0.5 K/uL 0.3     Baso #      0.00 - 0.20 K/uL 0.06     Differential Method Automated     Sodium      136 - 145 mmol/L 139  141    Potassium      3.5 - 5.1 mmol/L 4.7  4.0    Chloride      95 - 110 mmol/L 103  103    CO2      23 - 29 mmol/L 30 (H)  29    Glucose      70 - 110 mg/dL 80  80    BUN      8 - 23 mg/dL 24 (H)  22    Creatinine      0.5 - 1.4 mg/dL 1.1  1.0    Calcium      8.7 - 10.5 mg/dL 9.9  9.7    PROTEIN TOTAL      6.0 - 8.4 g/dL 7.7  7.1    Albumin      3.5 - 5.2 g/dL 3.7  3.4 (L)    BILIRUBIN TOTAL      0.1 - 1.0 mg/dL 0.5  0.5    Alkaline Phosphatase      55 - 135 U/L 161 (H)  148 (H)    AST      10 - 40 U/L 18  20    ALT      10 - 44 U/L 20  17    Hemoglobin A1C External      4.0 - 5.6 %  5.7 (H)       ALLERGEN TESTING      Immunocaps:   Component      Latest Ref Rn 2023   D. farinae      <0.10 kU/L <0.10    D. farinae Class CLASS 0    Mite Dust Pteronyssinus IgE      <0.10 kU/L <0.10    D. pteronyssinus Class CLASS 0    BERMUDA GRASS      <0.10 kU/L 0.11 (H)    Bermuda Grass Class CLASS 0/1    Ivan Grass      <0.10 kU/L 0.12 (H)    Ivan Grass Class CLASS 0/1    Darke IgE      <0.10 kU/L <0.10    Darke Class CLASS 0    Plantain      <0.10 kU/L 0.13 (H)    English Plantain Class CLASS 0/1    [place](Quercus alba) IgE      <0.10 kU/L 0.17 (H)    Oak, Class CLASS 0/1    Pecan [place] Tree      <0.10 kU/L 0.15 (H)    Pecan, Class CLASS 0/1    Marshelder IgE      <0.10 kU/L 0.15 (H)    Marshelder Class CLASS 0/1    Ragweed, Western IgE      <0.10 kU/L 0.14 (H)    Ragweed, Western, Class CLASS 0/1    Alternaria alternata      <0.10 kU/L <0.10    Altern. alternata Class CLASS 0    Aspergillus Fumigatus IgE      <0.10 kU/L <0.10    A. fumigatus Class CLASS 0    Cat Dander      <0.10 kU/L <0.10    Cat Epithelium Class CLASS 0    Cockroach, IgE      <0.10 kU/L <0.10    Cockroach, IgE       CLASS 0    Dog Dander, IgE      <0.10 kU/L <0.10    Dog Dander Class CLASS 0    IgE      0 - 100 IU/mL 954 (H)       PULMONARY FUNCTION TESTING     Date 5/15/23:  FVC:         81%ref -> - 3%  FEV1:         78%ref -> + 6%  FEV1/FVC: 74%  FEF 25-75: 71%ref  T%ref  DLCO:        47%ref  Interpretation: Spirometry is normal. Spirometry remains unimproved following bronchodilator. Lung volumes show mild restriction is present. Airway mechanics are abnormal showing increased airway resistance and decreased conductance. DLCO is moderately decreased.     IMAGING & OTHER DIAGNOSTICS     CT chest 23:  Impression:  1. Prominence of the pulmonary trunk which can be seen in setting of pulmonary arterial hypertension.  2. Stable appearance of scattered bilateral lung nodules.  Recommend follow-up with low-dose CT chest scan 6 in 12  "months.  3. Chronic appearing bibasilar subsegmental atelectasis versus scarring, with left traction bronchiectasis.    CT soft tissue neck 6/21/23 noted "Prominent mucosal thickening/fluid within the sphenoid sinuses bilaterally with chronic mild mucosal thickening and surrounding osseous sclerosis within the right maxillary sinus."    CXR 4/5/22:  FINDINGS:  There is airspace opacity in the medial aspect of the right lower lobe.  Left lung appears relatively clear.  The cardiac silhouette is normal in size. The hilar and mediastinal contours are unremarkable.  Bones are intact.  Impression:  Airspace opacity in the medial aspect of the right lower lobe concerning for pneumonia.     CHART REVIEW     Reviewed ENT notes, pulm note, labs, imaging, PFT's.     ASSESSMENT & PLAN     Marleny Guzman is a 73 y.o. female with     # Chronic rhinitis: Likely mixed allergic and non-allergic etiology. Immunocaps with equivocal results to tree, grass, and weed pollens, but she gets symptoms perennially (including winter). She is following with ENT. She has noticed some improvement with azelastine.  -increase azelastine nasal spray to 1-2 SEN BID.  -start flonase 1-2 SEN BID.  -continue saline sinus rinses prn (advised that if she adds back budesonide to her rinses, this would take the place of flonase).  -restart daily claritin.    # Frequent infections, history of recurrent pneumonia, low pneumococcal titers: Patient has required antibiotics at least 3 times for sinusitis or bronchitis in the past year. And she reports having 2-3 episodes of pneumonia in the past (last in 4/2022). She had pneumovax in 2014, prevnar-13 in 2015. Recently, ENT checked pneumococcal titers which were low (4 out of 14).  -will give her pneumovax again.  -4-6 weeks after she receives pneumovax, will recheck pneumococcal titers, along with total IgG/A/M.    # ILD: She is due for follow up with her pulmonologist.  -recommended she schedule f/u " with her pulmonologist.    # HTN: Patient is on a beta blocker (metoprolol) so is not currently a candidate for allergen immunotherapy injections.      Follow up: 2 months    I spent a total of 45 minutes on the day of the visit.  This includes face to face time and non-face to face time preparing to see the patient (eg, review of tests), obtaining and/or reviewing separately obtained history, documenting clinical information in the electronic or other health record, independently interpreting results.    James Cooper MD  Allergy/Immunology

## 2023-09-07 ENCOUNTER — PATIENT MESSAGE (OUTPATIENT)
Dept: ALLERGY | Facility: CLINIC | Age: 73
End: 2023-09-07
Payer: MEDICARE

## 2023-09-20 ENCOUNTER — ANESTHESIA EVENT (OUTPATIENT)
Dept: SURGERY | Facility: HOSPITAL | Age: 73
End: 2023-09-20
Payer: MEDICARE

## 2023-09-20 NOTE — ANESTHESIA PREPROCEDURE EVALUATION
09/20/2023  Marleny Guzman is a 73 y.o., female.      Pre-op Assessment    I have reviewed the Patient Summary Reports.       I have reviewed the Medications.     Review of Systems  Hematology/Oncology:         -- Anemia:   EENT/Dental:   Bilateral partial vocal cord paralysis  Eyes:    Cardiovascular:   Hypertension WOOTEN PAP 32 last year echo  H/o syncope   Pulmonary:   Shortness of breath Sleep Apnea Lung nodules   Musculoskeletal:   Arthritis  Shoulder problems Spine Disorders: cervical Chronic Pain    Neurological:   Neuromuscular Disease, Headaches    Psych:   Psychiatric History anxiety depression        Chronic bilateral low back pain without sciatica    Other Medical History   Cataract Hypertension   Osteoarthritis Right rotator cuff tear   Depression with anxiety Sleep apnea   Anemia Urinary incontinence   Abnormal Pap smear of vagina Recurrent upper respiratory infection (URI)     Surgical History    SALPINGECTOMY CATARACT EXTRACTION   CATARACT EXTRACTION LEEP   EYE SURGERY SHOULDER SURGERY   CERVIX SURGERY HYSTERECTOMY   DC REMOVAL OF OVARY/TUBE(S) COLONOSCOPY   LUMBAR EPIDURAL INJECTION COLONOSCOPY   OOPHORECTOMY ESOPHAGOGASTRODUODENOSCOPY   COLONOSCOPY ROBOT-ASSISTED REPAIR OF VENTRAL HERNIA USING DA ASPEN XI   RETROGRADE PYELOGRAPHY CYSTOSCOPY   COLONOSCOPY SINUS SURGERY     Prominence of the pulmonary trunk which can be seen in setting of pulmonary arterial hypertension.  2. Stable appearance of scattered bilateral lung nodules.  Recommend follow-up with low-dose CT chest scan 6 in 12 months.  3. Chronic appearing bibasilar subsegmental atelectasis versus scarring, with left traction bronchiectasis.    The stress echo portion of this study is negative for myocardial ischemia. Sensitivity is impaired due to the patient's failure to achieve target heart rate. Achieved 70% max predicted  heart rate.   The ECG portion of this study is negative for myocardial ischemia.   During stress, the following significant arrhythmias were observed: occasional PACs, rare PVCs.   The patient's exercise capacity was average. Achieved 7 METs.   The test was stopped because the patient experienced fatigue.   The left ventricle is normal in size with normal systolic function.   The estimated ejection fraction is 65%.   Normal left ventricular diastolic function.   Normal right ventricular size with normal right ventricular systolic function.   The estimated PA systolic pressure is 32 mmHg.   Normal central venous pressure (3 mmHg).         Anesthesia Plan  Type of Anesthesia, risks & benefits discussed:    Anesthesia Type: Gen ETT  Intra-op Monitoring Plan: Standard ASA Monitors  Post Op Pain Control Plan: multimodal analgesia  Induction:  IV  Airway Plan: Direct, Post-Induction  Informed Consent: Informed consent signed with the Patient and all parties understand the risks and agree with anesthesia plan.  All questions answered.   ASA Score: 2    Ready For Surgery From Anesthesia Perspective.     .

## 2023-09-27 NOTE — PRE-PROCEDURE INSTRUCTIONS
Unable to reach pt via phone. Left message with instructions along with a request for a call back. The following was sent to pt portal.    Dear Marleny ,    You are scheduled for a procedure with Dr. Valera on 9/28/2023. Your scheduled arrival time is 6:20am.  This arrival time is roughly 2 hours before your anticipated procedure time to allow sufficient time for pre-op.  Please wear comfortable clothes.  This procedure will take place at the [facility] at the corner of [address] and [address].  [facility] [place] [facility] [place] [facility] to [facility].  The address is:    [address].  [place] [id number]    After entering the building, you will proceed to the second floor where you can check in with registration. You should take any medications that you routinely take for blood pressure (other than those listed below), heart medications, thyroid, cholesterol, etc.     If you wear contact lenses, please wear glasses to your procedure.    Your fasting instructions are as follow:  Nothing to eat or drink after midnight tonight. You may have small amounts of water to take medications in the morning. You MUST have a responsible adult to bring you home.      This evening (9/27/2023) and tomorrow morning, please hold the following medications:  -Aspirin and Aspirin-containing products (Goody's powder, Excedrin)  -NSAIDs (Advil, Ibuprofen, Aleve, Diclofenac)  -Vitamins/Supplements  -Herbal remedies/Teas  -Stimulants (Adderall, Vyvanse, Adipex)  -Diabetic medication (Please bring with you day of procedure)  -ASPIRIN  -ERGOCALCIFEROL  -IRBESARTAN-HYDROCHLOROTHIAZIDE  -ALDACTONE  -PRESCRIPTION CREAMS/LOTIONS    -May take Tylenol      This evening (9/27/2023) and tomorrow morning, take a shower using antibacterial soap (ex: Hibiclens or Dial antibacterial soap). DO NOT apply deodorant, lotion, cologne, or anything else to the skin. Wear loose, comfortable fitting clothing. Do not wear  jewelry or bring any valuables with you. If you wear dentures or contacts, please bring your case with you or leave them at home.    If you have any procedure-specific questions, please call your surgeon's office. Any other questions, don't hesitate to call at [phone].    Thanks,  MAZIN Hensley  Pre-Admit Dept OC

## 2023-09-28 ENCOUNTER — ANESTHESIA (OUTPATIENT)
Dept: SURGERY | Facility: HOSPITAL | Age: 73
End: 2023-09-28
Payer: MEDICARE

## 2023-09-28 ENCOUNTER — HOSPITAL ENCOUNTER (OUTPATIENT)
Facility: HOSPITAL | Age: 73
Discharge: HOME | End: 2023-09-28
Attending: OTOLARYNGOLOGY | Admitting: OTOLARYNGOLOGY
Payer: MEDICARE

## 2023-09-28 VITALS
DIASTOLIC BLOOD PRESSURE: 78 MMHG | SYSTOLIC BLOOD PRESSURE: 165 MMHG | OXYGEN SATURATION: 94 % | RESPIRATION RATE: 20 BRPM | HEART RATE: 61 BPM | WEIGHT: 201 LBS | HEIGHT: 70 IN | BODY MASS INDEX: 28.77 KG/M2 | TEMPERATURE: 97 F

## 2023-09-28 DIAGNOSIS — J38.02 BILATERAL PARTIAL VOCAL CORD PARALYSIS: Primary | ICD-10-CM

## 2023-09-28 DIAGNOSIS — J38.4 PRE-NODULAR EDEMA OF THE VOCAL FOLDS: ICD-10-CM

## 2023-09-28 DIAGNOSIS — J38.02 BILATERAL PARTIAL VOCAL FOLD PARALYSIS: ICD-10-CM

## 2023-09-28 DIAGNOSIS — J38.02 BILATERAL PARTIAL VOCAL CORD PARALYSIS: ICD-10-CM

## 2023-09-28 DIAGNOSIS — J38.3 VOCAL FOLD SCAR: ICD-10-CM

## 2023-09-28 PROCEDURE — 37000008 HC ANESTHESIA 1ST 15 MINUTES: Performed by: OTOLARYNGOLOGY

## 2023-09-28 PROCEDURE — 36000709 HC OR TIME LEV III EA ADD 15 MIN: Performed by: OTOLARYNGOLOGY

## 2023-09-28 PROCEDURE — 31571 LARYNGOSCOP W/VC INJ + SCOPE: CPT | Mod: ,,, | Performed by: OTOLARYNGOLOGY

## 2023-09-28 PROCEDURE — 99900035 HC TECH TIME PER 15 MIN (STAT)

## 2023-09-28 PROCEDURE — C1878 MATRL FOR VOCAL CORD: HCPCS | Performed by: OTOLARYNGOLOGY

## 2023-09-28 PROCEDURE — 37000009 HC ANESTHESIA EA ADD 15 MINS: Performed by: OTOLARYNGOLOGY

## 2023-09-28 PROCEDURE — 25000003 PHARM REV CODE 250: Performed by: SURGERY

## 2023-09-28 PROCEDURE — 63600175 PHARM REV CODE 636 W HCPCS: Performed by: ANESTHESIOLOGY

## 2023-09-28 PROCEDURE — D9220A PRA ANESTHESIA: Mod: ,,, | Performed by: NURSE ANESTHETIST, CERTIFIED REGISTERED

## 2023-09-28 PROCEDURE — 25000003 PHARM REV CODE 250: Performed by: OTOLARYNGOLOGY

## 2023-09-28 PROCEDURE — 63600175 PHARM REV CODE 636 W HCPCS: Performed by: NURSE ANESTHETIST, CERTIFIED REGISTERED

## 2023-09-28 PROCEDURE — 25000003 PHARM REV CODE 250: Performed by: ANESTHESIOLOGY

## 2023-09-28 PROCEDURE — 31571 PR LARYNGOSCOPY,DIRECT,SCOPE,INJ CORDS: ICD-10-PCS | Mod: ,,, | Performed by: OTOLARYNGOLOGY

## 2023-09-28 PROCEDURE — 63600175 PHARM REV CODE 636 W HCPCS: Performed by: SURGERY

## 2023-09-28 PROCEDURE — 63600175 PHARM REV CODE 636 W HCPCS: Performed by: OTOLARYNGOLOGY

## 2023-09-28 PROCEDURE — 94761 N-INVAS EAR/PLS OXIMETRY MLT: CPT

## 2023-09-28 PROCEDURE — 71000033 HC RECOVERY, INTIAL HOUR: Performed by: OTOLARYNGOLOGY

## 2023-09-28 PROCEDURE — D9220A PRA ANESTHESIA: ICD-10-PCS | Mod: ,,, | Performed by: NURSE ANESTHETIST, CERTIFIED REGISTERED

## 2023-09-28 PROCEDURE — 36000708 HC OR TIME LEV III 1ST 15 MIN: Performed by: OTOLARYNGOLOGY

## 2023-09-28 PROCEDURE — 71000015 HC POSTOP RECOV 1ST HR: Performed by: OTOLARYNGOLOGY

## 2023-09-28 RX ORDER — ONDANSETRON 2 MG/ML
INJECTION INTRAMUSCULAR; INTRAVENOUS
Status: DISCONTINUED | OUTPATIENT
Start: 2023-09-28 | End: 2023-09-28

## 2023-09-28 RX ORDER — LIDOCAINE HYDROCHLORIDE 40 MG/ML
INJECTION, SOLUTION RETROBULBAR
Status: DISCONTINUED | OUTPATIENT
Start: 2023-09-28 | End: 2023-09-28 | Stop reason: HOSPADM

## 2023-09-28 RX ORDER — LIDOCAINE HYDROCHLORIDE 20 MG/ML
INJECTION, SOLUTION EPIDURAL; INFILTRATION; INTRACAUDAL; PERINEURAL
Status: DISCONTINUED | OUTPATIENT
Start: 2023-09-28 | End: 2023-09-28

## 2023-09-28 RX ORDER — DEXAMETHASONE SODIUM PHOSPHATE 4 MG/ML
INJECTION, SOLUTION INTRA-ARTICULAR; INTRALESIONAL; INTRAMUSCULAR; INTRAVENOUS; SOFT TISSUE
Status: DISCONTINUED | OUTPATIENT
Start: 2023-09-28 | End: 2023-09-28

## 2023-09-28 RX ORDER — BUDESONIDE 0.25 MG/2ML
INHALANT ORAL
COMMUNITY
Start: 2023-07-06

## 2023-09-28 RX ORDER — LIDOCAINE HYDROCHLORIDE 10 MG/ML
1 INJECTION, SOLUTION EPIDURAL; INFILTRATION; INTRACAUDAL; PERINEURAL ONCE
Status: DISCONTINUED | OUTPATIENT
Start: 2023-09-28 | End: 2023-09-28 | Stop reason: HOSPADM

## 2023-09-28 RX ORDER — HYDROCODONE BITARTRATE AND ACETAMINOPHEN 5; 325 MG/1; MG/1
1 TABLET ORAL EVERY 4 HOURS PRN
Status: DISCONTINUED | OUTPATIENT
Start: 2023-09-28 | End: 2023-09-28 | Stop reason: HOSPADM

## 2023-09-28 RX ORDER — PROPOFOL 10 MG/ML
VIAL (ML) INTRAVENOUS
Status: DISCONTINUED | OUTPATIENT
Start: 2023-09-28 | End: 2023-09-28

## 2023-09-28 RX ORDER — LABETALOL HYDROCHLORIDE 5 MG/ML
INJECTION, SOLUTION INTRAVENOUS
Status: DISCONTINUED | OUTPATIENT
Start: 2023-09-28 | End: 2023-09-28

## 2023-09-28 RX ORDER — EPINEPHRINE 1 MG/ML
INJECTION, SOLUTION, CONCENTRATE INTRAVENOUS
Status: DISCONTINUED | OUTPATIENT
Start: 2023-09-28 | End: 2023-09-28 | Stop reason: HOSPADM

## 2023-09-28 RX ORDER — ROCURONIUM BROMIDE 10 MG/ML
INJECTION, SOLUTION INTRAVENOUS
Status: DISCONTINUED | OUTPATIENT
Start: 2023-09-28 | End: 2023-09-28

## 2023-09-28 RX ADMIN — SUGAMMADEX 200 MG: 100 INJECTION, SOLUTION INTRAVENOUS at 09:09

## 2023-09-28 RX ADMIN — SUGAMMADEX 100 MG: 100 INJECTION, SOLUTION INTRAVENOUS at 09:09

## 2023-09-28 RX ADMIN — PROPOFOL 150 MG: 10 INJECTION, EMULSION INTRAVENOUS at 08:09

## 2023-09-28 RX ADMIN — FENTANYL CITRATE 100 MCG: 50 INJECTION INTRAMUSCULAR; INTRAVENOUS at 08:09

## 2023-09-28 RX ADMIN — LABETALOL HYDROCHLORIDE 5 MG: 5 INJECTION INTRAVENOUS at 09:09

## 2023-09-28 RX ADMIN — SODIUM CHLORIDE: 0.9 INJECTION, SOLUTION INTRAVENOUS at 08:09

## 2023-09-28 RX ADMIN — LIDOCAINE HYDROCHLORIDE 100 MG: 20 INJECTION, SOLUTION EPIDURAL; INFILTRATION; INTRACAUDAL; PERINEURAL at 08:09

## 2023-09-28 RX ADMIN — DEXAMETHASONE SODIUM PHOSPHATE 4 MG: 4 INJECTION INTRA-ARTICULAR; INTRALESIONAL; INTRAMUSCULAR; INTRAVENOUS; SOFT TISSUE at 08:09

## 2023-09-28 RX ADMIN — ONDANSETRON 4 MG: 2 INJECTION INTRAMUSCULAR; INTRAVENOUS at 08:09

## 2023-09-28 RX ADMIN — ROCURONIUM BROMIDE 50 MG: 10 INJECTION INTRAVENOUS at 08:09

## 2023-09-28 RX ADMIN — PROPOFOL 50 MG: 10 INJECTION, EMULSION INTRAVENOUS at 09:09

## 2023-09-28 RX ADMIN — GLYCOPYRROLATE 0.2 MG: 0.2 INJECTION, SOLUTION INTRAMUSCULAR; INTRAVENOUS at 09:09

## 2023-09-28 NOTE — ANESTHESIA PROCEDURE NOTES
Intubation    Date/Time: 9/28/2023 8:55 AM    Performed by: Ludwig Gale MD  Authorized by: Ludwig Gale MD    Intubation:     Induction:  Intravenous    Intubated:  Postinduction    Mask Ventilation:  Easy with oral airway    Attempts:  1    Attempted By:  CRNA    Method of Intubation:  Video laryngoscopy    Blade:  [place] 3    Laryngeal View Grade: Grade I - full view of cords      Difficult Airway Encountered?: No      Complications:  None    Airway Device Size:  5.5    Style/Cuff Inflation:  Cuffed (inflated to minimal occlusive pressure)    Tube secured:  22    Secured at:  The lips    Placement Verified By:  Capnometry    Complicating Factors:  None    Findings Post-Intubation:  BS equal bilateral

## 2023-09-28 NOTE — BRIEF OP NOTE
[facility]  Brief Operative Note    Surgery Date: 9/28/2023     Surgeon(s) and Role:     * Andre Valera MD - Primary    Assisting Surgeon: None    Pre-op Diagnosis:  Bilateral partial vocal cord paralysis [J38.02]    Post-op Diagnosis:  Post-Op Diagnosis Codes:     * Bilateral partial vocal cord paralysis [J38.02]    Procedure(s) (LRB):  Suspension microlaryngoscopy with bilateral vocal fold injection augmentation - Restylane (N/A)    Anesthesia: General    Operative Findings: see op note    Estimated Blood Loss: * No values recorded between 9/28/2023  9:07 AM and 9/28/2023  9:25 AM *         Specimens: None        Discharge Note    OUTCOME: Patient tolerated treatment/procedure well without complication and is now ready for discharge.    DISPOSITION: Home or Self Care    FINAL DIAGNOSIS:  vocal fold atrophy    FOLLOWUP: In clinic    DISCHARGE INSTRUCTIONS: Diet and activity as tolerated

## 2023-09-28 NOTE — OP NOTE
DATE OF SERVICE:  09/28/2023    PRE-OPERATIVE DIAGNOSIS:   Bilateral vocal fold atrophy with sulcus.  Left vocal fold phono traumatic lesion.  Right vocal fold paresis.    POST-OPERATIVE DIAGNOSIS:   1.  Bilateral vocal fold atrophy with sulcus.  2.  Left vocal fold phono traumatic lesion.  3.  Right vocal fold paresis.    PROCEDURE(S):  Suspension microlaryngoscopy with bilateral vocal fold injection augmentation with hyaluronic acid    SURGEON: Andre Valera M.D.    ASSISTANT:  None    ANESTHESIA: General.    BLOOD LOSS: Less than 5 mL.    SPECIMENS: None.    COMPLICATIONS: None.    FINDINGS:   Mild bilateral vocal atrophy, right greater than left, consistent with chronic right vocal fold paresis.  Mild vocal fold sulcus deformity along infraglottic free edge of true vocal folds  Left vocal fold with sessile polypoid lesion at mid membranous aspect  Augmented with a total volume 0.5 mL Restylane-L    CONDITION: Stable.    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  She presents to the operating room for injection augmentation of the vocal folds. I discussed the risks, benefits and alternatives to surgery with the patient, as well as the expected postoperative course. Risks included but were not limited to pain; bleeding; infection; scarring; worsening of voice; recurrence; need for additional and/or more extensive procedures; oral/dental problems (pain, laceration, broken or missing teeth); jaw joint problems (pain, dislocation); and tongue problems (pain, laceration, numbness, weakness, taste disturbance). Any surgery on the larynx also carries with it the risks of airway obstruction necessitating tracheotomy. Also inherent in the procedure are the risks of general anesthesia, including but not limited to cardiovascular complications (heart attack, arrhythmia); pulmonary (respiratory failure); neurologic  (stroke); and death.  I gave the patient the opportunity to ask questions, and I answered all them to her satisfaction.  She wishes to proceed with surgery.  Informed consent was obtained.      PROCEDURE IN DETAIL:   The patient was positively identified in the preoperative holding area, then was brought to the operating room and placed in the flat supine position. General endotracheal anesthesia was obtained using a 6-0 endotracheal tube which was secured to the left lower lip. The eyes were protected with moist sponges. The teeth and/or upper alveolar ridge was protected using a reinforced mouthguard and/or moist sponges, as appropriate. A final timeout was performed for verification purposes. The Ossoff-Pilling laryngoscope was inserted into the oral cavity and was utilized to expose the larynx. The patient was suspended from the [place]. Magnified laryngeal endoscopy was carried out using a 0 degree Salmeron benita telescope connected to a video monitor.  The larynx was carefully palpated. Findings were as noted above. Photodocumentation was obtained.  Next, attention was turned to performing surgical intervention as indicated.   A vial of Restylane L was loaded onto the laryngotracheal injector.  Under the maximal magnification of the 0 degree Salmeron benita telescope connected to a video monitor, the needle tip was advanced into the deep right true vocal fold on axis with the arcuate line in the vicinity of the vocal process.  Slow injection was performed, resulting in a gradual increase in convexity/support of the free edge of the vocal fold.  Thereafter, the needle tip was advanced into the deep left true vocal fold on axis with the arcuate line in the vicinity of the vocal process.  Slow injection was performed, resulting gradual increase in convexity/support of the free edge of the vocal fold.  Total volume administered was 0.5 mL.  With this, the procedure was brought to completion. The larynx was topically  anesthetized with 3 mL of 4% lidocaine. All equipment was removed. The patient was turned back over to the anesthesiology team for awakening and extubation, which were uneventful. The patient was escorted to the recovery room in good condition. The patient tolerated the procedure well without complications. All needle, sponge, and instrument counts were correct at the completion of the case.    ATTESTATION:  As the attending of record, I was present and participated in all portions of this procedure.

## 2023-09-28 NOTE — PLAN OF CARE
Pt AAOx3, VSS on room air.Pt tolerated procedure well. Pt denies any pain, Dizziness or N/V. IV removed with catheter tip intact. Assisted up for the first time, steady on feet. Pt Discharge instructions given and explained to patient and family (daughter Anne) with verbalization of understanding all instructions. Pt denies any further questions at this time. Pt DC'd home VIA wheelchair with family.

## 2023-09-28 NOTE — H&P
[facility]  Department of Otorhinolaryngology and Communication Sciences     Marleny Guzman is a 73 y.o. female who presents to the [facility] for consultation at the kind request of Chava Moncada for further management of  vocal fold paresis .      She complains of hoarseness. Onset was fairly sudden, without any clear inciting event, at least 10 years ago. Time course is constant. Symptoms are stable. Exacerbating factors include speaking with more excitement/animation in the voice. She denies any alleviating factors. She denies any associated symptoms. She denies dysphagia.     CT neck with contrast 6/21/2023: negative for etiology of VF paresis; possible left periaortic opacity  CT chest without 5/15/2023:  - bronchiectasis/fibrosis  - pulmonary nodules  - Few prominent to enlarged mediastinal lymph nodes, which are unchanged from multiple priors.  The largest is a precarinal lymph node which measures 1.3 cm (2-56), and is unchanged from CT 01/17/2021.     She has recently seen pulmonology regarding intermittent dyspnea.There is possible interstitial lung disease. A CT chest w contrast was recommended but not yet scheduled to evaluate thoracic adenopathy.     She has no personal or family history of sarcoidosis.     Past Medical History  She has a past medical history of Abnormal Pap smear of vagina, Anemia, Cataract, Chronic bilateral low back pain without sciatica, Depression with anxiety, Hypertension, Osteoarthritis, Right rotator cuff tear, Sleep apnea, and Urinary incontinence.     Past Surgical History  She has a past surgical history that includes Salpingectomy (Left); Cataract extraction (6/18/12); Cataract extraction (7/16/12); LEEP; Eye surgery (Bilateral); Shoulder surgery (Right); Cervix surgery; Hysterectomy (5-12-15); pr removal of ovary/tube(s) (5-12-15); Colonoscopy; Lumbar epidural injection; Colonoscopy (N/A, 3/4/2016); Oophorectomy; Esophagogastroduodenoscopy (Left,  1/25/2021); Colonoscopy (Left, 1/25/2021); Robot-assisted repair of ventral hernia using da Jatin Xi (N/A, 11/29/2021); Retrograde pyelography (Bilateral, 9/12/2022); Cystoscopy (N/A, 9/12/2022); and Colonoscopy (N/A, 6/19/2023).     Family History  Her family history includes Anuerysm in her sister; Breast cancer in her cousin; Breast cancer (age of onset: 72) in her sister; Cancer in her maternal aunt, mother, and sister; Cataracts in her father; Deep vein thrombosis in her sister; Depression in her maternal grandmother; Drug abuse in her sister; Eczema in her sister; Edema in her sister; Heart disease in her sister and sister; Hypertension in her daughter, father, sister, sister, and sister; No Known Problems in her brother, paternal grandfather, paternal grandmother, sister, and sister; Osteoarthritis in her sister; Other in her daughter; Pacemaker/defibrilator in her sister; Stroke in her mother; Ulcers in her maternal grandfather.     Social History  She reports that she quit smoking about 26 years ago. Her smoking use included cigarettes. She started smoking about 48 years ago. She has a 11.0 pack-year smoking history. She has been exposed to tobacco smoke. She has never used smokeless tobacco. She reports current alcohol use. She reports that she does not use drugs.     Allergies  She has No Known Allergies.     Medications  She has a current medication list which includes the following prescription(s): albuterol, aspirin, atorvastatin, azelastine, ergocalciferol, escitalopram oxalate, estradiol, fluticasone propionate, irbesartan-hydrochlorothiazide, lorazepam, metoprolol succinate, nystatin, orphenadrine, pulse oximeter, spironolactone, and triamcinolone acetonide 0.1%, and the following Facility-Administered Medications: sodium chloride 0.9%, ciprofloxacin hcl, fentanyl, lidocaine (pf) 10 mg/ml (1%), lidocaine hcl 2%, midazolam, prochlorperazine, and sodium chloride 0.9%.     Review of Systems    Constitutional:  Negative for fever.   HENT:  Negative for sore throat.    Eyes:  Negative for visual disturbance.   Respiratory:  Negative for wheezing.    Cardiovascular:  Negative for chest pain.   Gastrointestinal:  Negative for nausea.   Musculoskeletal:  Negative for arthralgias.   Skin:  Negative for rash.   Neurological:  Negative for tremors.   Hematological:  Does not bruise/bleed easily.   Psychiatric/Behavioral:  The patient is not nervous/anxious.          Objective:      VS reviewed  Physical Exam  Constitutional: comfortable, well dressed  Psychiatric: appropriate affect  Respiratory: comfortably breathing, symmetric chest rise, no stridor  Voice: variable mild breathiness/roughness, impaired flexibility    Cardiovascular: upper extremities non-edematous  Lymphatic: no cervical lymphadenopathy  Neurologic: alert and oriented to time, place, person, and situation; cranial nerves 3-12 grossly intact  Head: normocephalic  Eyes: conjunctivae and sclerae clear  Ears: normal pinnae, normal external auditory canals, tympanic membranes intact  Nose: mucosa pink and noncongested, no masses, no mucopurulence, no polyps  Oral cavity / oropharynx: no mucosal lesions  Neck: soft, full range of motion, laryngotracheal complex palpable with appropriate landmarks, larynx elevates on swallowing  Indirect laryngoscopy: limited due to gag     Procedure  Rigid Laryngeal Videostroboscopy (90140): Laryngeal videostroboscopy is indicated to assess the laryngeal vibratory biomechanics and vocal fold oscillation, which cannot be assessed with a plain light examination. This was carried out with a 70 degree endoscope. After verbal consent was obtained, the patient was positioned and the tongue was gently secured with a gauze sponge. The endoscope was passed transorally and positioned to image the larynx and hypopharynx in detail. The following features were examined: laryngeal and hypopharyngeal masses; vocal fold range and  symmetry of motion; laryngeal mucosal edema, erythema, inflammation, and hydration; salivary pooling; and gross laryngeal sensation. During phonation, the vocal folds were assessed for glottal closure; mucosal wave; vocal fold lesions; vibratory periodicity, amplitude, and phase symmetry; and vertical height match. The equipment was removed. The patient tolerated the procedure well without complication. All findings were normal except:  - mild true vocal fold bowing, left>right  - mild bilateral mid fold sulcus  - mild false vocal fold atrophy, right>left  - very mild superficial glottic edema  - subtle midmembranous prenodular convexity along free edge of bilateral true vocal folds, left>right, only evident in high pitch phonation  - mild variable glottal insufficiency, with spindle shaped gap     Assessment:      Marleny Guzman is a 73 y.o. 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        Plan:        I had a discussion with the patient regarding her condition and the further workup and management options.       The patient is reassured that these symptoms do not appear to represent a serious or threatening condition.     Treatment we discussed included voice therapy, potentially in tandem with vocal fold injection augmentation. She would like to pursue multimodality treatment for her voice problem. We discussed doing the injection in the clinic or in the OR under general anesthesia. She prefers the latter. I discussed with her the risks and benefits thereof. I gave the patient the opportunity to ask questions and I answered all of them. She wishes to proceed. We will arrange this in the coming weeks, with preop testing triage to be completed by the anesthesiology team.     SLP voice evaluation and subsequent therapy sessions are medically necessary for restoration of laryngeal function. This will  with our team in the postoperative  period.     All questions were answered, and the patient is in agreement with the above.      Andre Valera M.D.  [facility]  Department of Otorhinolaryngology and Communication Sciences

## 2023-09-28 NOTE — TRANSFER OF CARE
"Anesthesia Transfer of Care Note    Patient: Marleny Guzman    Procedure(s) Performed: Procedure(s) (LRB):  Suspension microlaryngoscopy with bilateral vocal fold injection augmentation - Restylane (N/A)    Patient location: PACU    Anesthesia Type: general    Transport from OR: Transported from OR on 6-10 L/min O2 by face mask with adequate spontaneous ventilation    Post pain: adequate analgesia    Post assessment: no apparent anesthetic complications    Post vital signs: stable    Level of consciousness: awake, alert and oriented    Nausea/Vomiting: no nausea/vomiting    Complications: none    Transfer of care protocol was followed      Last vitals:   Visit Vitals  BP (!) 170/77   Pulse 71   Temp 36.9 °C (98.4 °F) (Temporal)   Resp (!) 24   Ht 5' 10" (1.778 m)   Wt 91.2 kg (201 lb)   SpO2 99%   Breastfeeding No   BMI 28.84 kg/m²     "

## 2023-09-28 NOTE — ANESTHESIA POSTPROCEDURE EVALUATION
Anesthesia Post Evaluation    Patient: Marleny Guzman    Procedure(s) Performed: Procedure(s) (LRB):  Suspension microlaryngoscopy with bilateral vocal fold injection augmentation - Restylane (N/A)    Final Anesthesia Type: general      Patient location during evaluation: PACU  Patient participation: Yes- Able to Participate  Level of consciousness: awake and alert  Post-procedure vital signs: reviewed and stable  Pain management: adequate  Airway patency: patent    PONV status at discharge: No PONV  Anesthetic complications: no      Cardiovascular status: blood pressure returned to baseline and hemodynamically stable  Respiratory status: unassisted  Hydration status: euvolemic  Follow-up not needed.          Vitals Value Taken Time   /78 09/28/23 1017   Temp 36.2 °C (97.2 °F) 09/28/23 0942   Pulse 65 09/28/23 1025   Resp 26 09/28/23 1025   SpO2 94 % 09/28/23 1025   Vitals shown include unvalidated device data.      Event Time   Out of Recovery 10:10:00         Pain/Margaret Score: Margaret Score: 10 (9/28/2023 10:15 AM)

## 2023-09-28 NOTE — PLAN OF CARE
Chart reviewed. Pre-op nursing care completed per orders. Safe surgery checklist complete. Family at bedside, pt belongings placed in PACU locker 11. Waiting for H&P, anesthesia and procedural consent prior to procedure. Call button within reach.

## 2023-10-03 ENCOUNTER — OFFICE VISIT (OUTPATIENT)
Dept: GYNECOLOGIC ONCOLOGY | Facility: CLINIC | Age: 73
End: 2023-10-03
Payer: MEDICARE

## 2023-10-03 VITALS
HEIGHT: 70 IN | WEIGHT: 202.81 LBS | BODY MASS INDEX: 29.03 KG/M2 | DIASTOLIC BLOOD PRESSURE: 69 MMHG | HEART RATE: 66 BPM | SYSTOLIC BLOOD PRESSURE: 152 MMHG

## 2023-10-03 DIAGNOSIS — R87.629 ABNORMAL PAP SMEAR OF VAGINA: ICD-10-CM

## 2023-10-03 DIAGNOSIS — N90.89 VULVAR IRRITATION: ICD-10-CM

## 2023-10-03 DIAGNOSIS — N95.2 VAGINAL ATROPHY: ICD-10-CM

## 2023-10-03 PROCEDURE — 3008F PR BODY MASS INDEX (BMI) DOCUMENTED: ICD-10-PCS | Mod: CPTII,S$GLB,, | Performed by: OBSTETRICS & GYNECOLOGY

## 2023-10-03 PROCEDURE — 3077F SYST BP >= 140 MM HG: CPT | Mod: CPTII,S$GLB,, | Performed by: OBSTETRICS & GYNECOLOGY

## 2023-10-03 PROCEDURE — 1126F AMNT PAIN NOTED NONE PRSNT: CPT | Mod: CPTII,S$GLB,, | Performed by: OBSTETRICS & GYNECOLOGY

## 2023-10-03 PROCEDURE — 1101F PT FALLS ASSESS-DOCD LE1/YR: CPT | Mod: CPTII,S$GLB,, | Performed by: OBSTETRICS & GYNECOLOGY

## 2023-10-03 PROCEDURE — 3078F DIAST BP <80 MM HG: CPT | Mod: CPTII,S$GLB,, | Performed by: OBSTETRICS & GYNECOLOGY

## 2023-10-03 PROCEDURE — 3077F PR MOST RECENT SYSTOLIC BLOOD PRESSURE >= 140 MM HG: ICD-10-PCS | Mod: CPTII,S$GLB,, | Performed by: OBSTETRICS & GYNECOLOGY

## 2023-10-03 PROCEDURE — 1126F PR PAIN SEVERITY QUANTIFIED, NO PAIN PRESENT: ICD-10-PCS | Mod: CPTII,S$GLB,, | Performed by: OBSTETRICS & GYNECOLOGY

## 2023-10-03 PROCEDURE — 3008F BODY MASS INDEX DOCD: CPT | Mod: CPTII,S$GLB,, | Performed by: OBSTETRICS & GYNECOLOGY

## 2023-10-03 PROCEDURE — 99999 PR PBB SHADOW E&M-EST. PATIENT-LVL III: CPT | Mod: PBBFAC,,, | Performed by: OBSTETRICS & GYNECOLOGY

## 2023-10-03 PROCEDURE — 3078F PR MOST RECENT DIASTOLIC BLOOD PRESSURE < 80 MM HG: ICD-10-PCS | Mod: CPTII,S$GLB,, | Performed by: OBSTETRICS & GYNECOLOGY

## 2023-10-03 PROCEDURE — 1159F PR MEDICATION LIST DOCUMENTED IN MEDICAL RECORD: ICD-10-PCS | Mod: CPTII,S$GLB,, | Performed by: OBSTETRICS & GYNECOLOGY

## 2023-10-03 PROCEDURE — 3066F PR DOCUMENTATION OF TREATMENT FOR NEPHROPATHY: ICD-10-PCS | Mod: CPTII,S$GLB,, | Performed by: OBSTETRICS & GYNECOLOGY

## 2023-10-03 PROCEDURE — 3044F HG A1C LEVEL LT 7.0%: CPT | Mod: CPTII,S$GLB,, | Performed by: OBSTETRICS & GYNECOLOGY

## 2023-10-03 PROCEDURE — 3061F PR NEG MICROALBUMINURIA RESULT DOCUMENTED/REVIEW: ICD-10-PCS | Mod: CPTII,S$GLB,, | Performed by: OBSTETRICS & GYNECOLOGY

## 2023-10-03 PROCEDURE — 99214 OFFICE O/P EST MOD 30 MIN: CPT | Mod: S$GLB,,, | Performed by: OBSTETRICS & GYNECOLOGY

## 2023-10-03 PROCEDURE — 99214 PR OFFICE/OUTPT VISIT, EST, LEVL IV, 30-39 MIN: ICD-10-PCS | Mod: S$GLB,,, | Performed by: OBSTETRICS & GYNECOLOGY

## 2023-10-03 PROCEDURE — 1159F MED LIST DOCD IN RCRD: CPT | Mod: CPTII,S$GLB,, | Performed by: OBSTETRICS & GYNECOLOGY

## 2023-10-03 PROCEDURE — 3066F NEPHROPATHY DOC TX: CPT | Mod: CPTII,S$GLB,, | Performed by: OBSTETRICS & GYNECOLOGY

## 2023-10-03 PROCEDURE — 99999 PR PBB SHADOW E&M-EST. PATIENT-LVL III: ICD-10-PCS | Mod: PBBFAC,,, | Performed by: OBSTETRICS & GYNECOLOGY

## 2023-10-03 PROCEDURE — 3061F NEG MICROALBUMINURIA REV: CPT | Mod: CPTII,S$GLB,, | Performed by: OBSTETRICS & GYNECOLOGY

## 2023-10-03 PROCEDURE — 1101F PR PT FALLS ASSESS DOC 0-1 FALLS W/OUT INJ PAST YR: ICD-10-PCS | Mod: CPTII,S$GLB,, | Performed by: OBSTETRICS & GYNECOLOGY

## 2023-10-03 PROCEDURE — 3288F FALL RISK ASSESSMENT DOCD: CPT | Mod: CPTII,S$GLB,, | Performed by: OBSTETRICS & GYNECOLOGY

## 2023-10-03 PROCEDURE — 3044F PR MOST RECENT HEMOGLOBIN A1C LEVEL <7.0%: ICD-10-PCS | Mod: CPTII,S$GLB,, | Performed by: OBSTETRICS & GYNECOLOGY

## 2023-10-03 PROCEDURE — 3288F PR FALLS RISK ASSESSMENT DOCUMENTED: ICD-10-PCS | Mod: CPTII,S$GLB,, | Performed by: OBSTETRICS & GYNECOLOGY

## 2023-10-03 RX ORDER — CLOBETASOL PROPIONATE 0.5 MG/G
OINTMENT TOPICAL 2 TIMES DAILY
Qty: 60 G | Refills: 1 | Status: SHIPPED | OUTPATIENT
Start: 2023-10-03 | End: 2024-03-20 | Stop reason: SDUPTHER

## 2023-10-03 RX ORDER — ESTRADIOL 0.1 MG/G
1 CREAM VAGINAL
Qty: 42.5 G | Refills: 4 | Status: SHIPPED | OUTPATIENT
Start: 2023-10-05 | End: 2024-03-20 | Stop reason: SDUPTHER

## 2023-10-03 NOTE — PROGRESS NOTES
Subjective     Patient ID: Marleny Guzman is a 73 y.o. female.    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 Gonzalez. Vaginal pap smear 6/15/16 HSIL. She was referred for consult by Dr. Valles. 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      Follow up pap 1/30/17 HSIL. 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      Pap 7/24/17 ASCUS HPV 18+   Pap 1/22/18 LSIL, normal exam  Pap 8/2018 normal, normal exam.   Pap 9/2019 ASCUS, HPV 18+, normal exam   Pap 3/2020 negative   Pap/HPV 3/2021 negative  Pap 4/2022 negative.   Pap 2/2023 negative     Presents today for follow up. Denies bleeding. Using vaginal estrogen.   Endorses some vulvar irritation.     Review of Systems   Constitutional:  Negative for chills and fever.   HENT:  Negative for mouth sores.    Respiratory:  Negative for chest tightness and shortness of breath.    Cardiovascular:  Negative for chest pain.   Gastrointestinal:  Negative for abdominal distention, nausea and vomiting.   Genitourinary:  Negative for dysuria, hematuria, pelvic pain, vaginal bleeding and vaginal discharge.   Neurological:  Negative for syncope and weakness.          Objective     Physical Exam  Vitals reviewed. Exam conducted with a chaperone present.   Constitutional:       Appearance: Normal appearance.   HENT:      Head: Normocephalic and atraumatic.   Eyes:      Extraocular Movements: Extraocular movements intact.      Conjunctiva/sclera: Conjunctivae normal.      Pupils: Pupils are equal, round, and reactive to light.   Pulmonary:      Effort: Pulmonary effort is normal. No respiratory distress.   Abdominal:      General: There is no distension.      Palpations: Abdomen is soft.      Tenderness: There is no abdominal tenderness.   Musculoskeletal:         General: Normal range of motion.   Skin:     General: Skin is warm and dry.   Neurological:       General: No focal deficit present.      Mental Status: She is alert and oriented to person, place, and time. Mental status is at baseline.   Psychiatric:         Mood and Affect: Mood normal.         Behavior: Behavior normal.         Thought Content: Thought content normal.         Judgment: Judgment normal.          Assessment and Plan     1. Abnormal Pap smear of vagina  -     estradioL (ESTRACE) 0.01 % (0.1 mg/gram) vaginal cream; Place 1 g vaginally twice a week.  Dispense: 42.5 g; Refill: 4    2. Vulvar irritation  -     estradioL (ESTRACE) 0.01 % (0.1 mg/gram) vaginal cream; Place 1 g vaginally twice a week.  Dispense: 42.5 g; Refill: 4  -     clobetasol 0.05% (TEMOVATE) 0.05 % Oint; Apply topically 2 (two) times daily.  Dispense: 60 g; Refill: 1    3. Vaginal atrophy  -     estradioL (ESTRACE) 0.01 % (0.1 mg/gram) vaginal cream; Place 1 g vaginally twice a week.  Dispense: 42.5 g; Refill: 4  -     clobetasol 0.05% (TEMOVATE) 0.05 % Oint; Apply topically 2 (two) times daily.  Dispense: 60 g; Refill: 1        Recent pap this year negative.   Pap smears have now been normal for several years (2020). I would recommend continued routine follow up with your ob/gyn. If there are any issues that should arise I am always available   Endorses vulvar irritation with excoriation but no corresponding lesion. Will trial topical clobetasol. If no improvement will present back. Also can be monitored by primary ob/gyn team.   No oncologic interventions are needed at this time.     I spent approximately 30 minutes reviewing the available records and evaluating the patient, out of which over 50% of the time was spent face to face with the patient in counseling and coordinating this patient's care.           No follow-ups on file.

## 2023-10-16 ENCOUNTER — LAB VISIT (OUTPATIENT)
Dept: LAB | Facility: HOSPITAL | Age: 73
End: 2023-10-16
Attending: STUDENT IN AN ORGANIZED HEALTH CARE EDUCATION/TRAINING PROGRAM
Payer: MEDICARE

## 2023-10-16 DIAGNOSIS — R76.0 ABNORMAL ANTIBODY TITER: ICD-10-CM

## 2023-10-16 DIAGNOSIS — Z86.19 FREQUENT INFECTIONS: ICD-10-CM

## 2023-10-16 LAB
IGA SERPL-MCNC: 254 MG/DL (ref 40–350)
IGG SERPL-MCNC: 1073 MG/DL (ref 650–1600)
IGM SERPL-MCNC: 85 MG/DL (ref 50–300)

## 2023-10-16 PROCEDURE — 82784 ASSAY IGA/IGD/IGG/IGM EACH: CPT | Performed by: STUDENT IN AN ORGANIZED HEALTH CARE EDUCATION/TRAINING PROGRAM

## 2023-10-16 PROCEDURE — 36415 COLL VENOUS BLD VENIPUNCTURE: CPT | Performed by: STUDENT IN AN ORGANIZED HEALTH CARE EDUCATION/TRAINING PROGRAM

## 2023-10-16 PROCEDURE — 86317 IMMUNOASSAY INFECTIOUS AGENT: CPT | Performed by: STUDENT IN AN ORGANIZED HEALTH CARE EDUCATION/TRAINING PROGRAM

## 2023-10-19 ENCOUNTER — TELEPHONE (OUTPATIENT)
Dept: ALLERGY | Facility: CLINIC | Age: 73
End: 2023-10-19
Payer: MEDICARE

## 2023-10-19 NOTE — TELEPHONE ENCOUNTER
Good morning Dr. Cooper,    Kindly note I have assisted the patient to schedule her Injection appointment.    She says she missed her appointment on October 13th    Kind Regards,  Rosemary Hinkle MA        ----- Message from Shikha White sent at 10/18/2023  3:06 PM CDT -----  Regarding: appt reschedule request  Contact: pt [phone]  PATIENT CALL    Pt called regarding her missed appt from 10/13. She went to the wrong location, looking to resched her injection for the soonest available appt next week.  Please call back at [phone]

## 2023-10-19 NOTE — TELEPHONE ENCOUNTER
----- Message from Shikha White sent at 10/18/2023  3:06 PM CDT -----  Regarding: appt reschedule request  Contact: pt [phone]  PATIENT CALL    Pt called regarding her missed appt from 10/13. She went to the wrong location, looking to resched her injection for the soonest available appt next week.  Please call back at [phone]

## 2023-10-20 ENCOUNTER — TELEPHONE (OUTPATIENT)
Dept: UROLOGY | Facility: CLINIC | Age: 73
End: 2023-10-20
Payer: MEDICARE

## 2023-10-20 NOTE — TELEPHONE ENCOUNTER
Message was left on the patient's VM.    GABBIE Corrigan      ----- Message from Marcellus Anderson MD sent at 10/11/2023  3:55 PM CDT -----  Regarding: Follow up with [facility]  Please schedule this patient for follow up in [facility] clinic next available to obtain a UA.    Patient underwent cysto with bilateral retrograde pyelograms for hematuria workup.    Thanks,    Marcellus Anderson

## 2023-10-23 LAB
IMMUNOLOGIST REVIEW: NORMAL
S PN DA SERO 19F IGG SER-MCNC: 1.5 MCG/ML
S PNEUM DA 1 IGG SER-MCNC: 1.3 MCG/ML
S PNEUM DA 10A IGG SER-MCNC: 2.4 MCG/ML
S PNEUM DA 11A IGG SER-MCNC: 0.2 MCG/ML
S PNEUM DA 12F IGG SER-MCNC: 0.3 MCG/ML
S PNEUM DA 14 IGG SER-MCNC: 2.6 MCG/ML
S PNEUM DA 15B IGG SER-MCNC: 1.3 MCG/ML
S PNEUM DA 17F IGG SER-MCNC: 6.7 MCG/ML
S PNEUM DA 18C IGG SER-MCNC: 0.3 MCG/ML
S PNEUM DA 19A IGG SER-MCNC: 6 MCG/ML
S PNEUM DA 2 IGG SER-MCNC: 11 MCG/ML
S PNEUM DA 20A IGG SER-MCNC: 2.4 MCG/ML
S PNEUM DA 22F IGG SER-MCNC: 1.1 MCG/ML
S PNEUM DA 23F IGG SER-MCNC: 1 MCG/ML
S PNEUM DA 3 IGG SER-MCNC: 0.2 MCG/ML
S PNEUM DA 33F IGG SER-MCNC: 5.9 MCG/ML
S PNEUM DA 4 IGG SER-MCNC: 0.3 MCG/ML
S PNEUM DA 5 IGG SER-MCNC: 0.3 MCG/ML
S PNEUM DA 6B IGG SER-MCNC: 0.4 MCG/ML
S PNEUM DA 7F IGG SER-MCNC: 3.3 MCG/ML
S PNEUM DA 8 IGG SER-MCNC: 1.6 MCG/ML
S PNEUM DA 9N IGG SER-MCNC: 0.5 MCG/ML
S PNEUM DA 9V IGG SER-MCNC: 0.5 MCG/ML

## 2023-10-30 ENCOUNTER — OFFICE VISIT (OUTPATIENT)
Dept: ALLERGY | Facility: CLINIC | Age: 73
End: 2023-10-30
Payer: MEDICARE

## 2023-10-30 VITALS
HEART RATE: 58 BPM | BODY MASS INDEX: 29.73 KG/M2 | DIASTOLIC BLOOD PRESSURE: 69 MMHG | SYSTOLIC BLOOD PRESSURE: 147 MMHG | WEIGHT: 207.25 LBS

## 2023-10-30 DIAGNOSIS — R76.0 ABNORMAL ANTIBODY TITER: ICD-10-CM

## 2023-10-30 DIAGNOSIS — Z86.19 FREQUENT INFECTIONS: ICD-10-CM

## 2023-10-30 DIAGNOSIS — J31.0 CHRONIC RHINITIS: Primary | ICD-10-CM

## 2023-10-30 DIAGNOSIS — J84.9 ILD (INTERSTITIAL LUNG DISEASE): ICD-10-CM

## 2023-10-30 DIAGNOSIS — Z87.01 HISTORY OF RECURRENT PNEUMONIA: ICD-10-CM

## 2023-10-30 PROCEDURE — 3008F PR BODY MASS INDEX (BMI) DOCUMENTED: ICD-10-PCS | Mod: CPTII,S$GLB,, | Performed by: STUDENT IN AN ORGANIZED HEALTH CARE EDUCATION/TRAINING PROGRAM

## 2023-10-30 PROCEDURE — 1159F MED LIST DOCD IN RCRD: CPT | Mod: CPTII,S$GLB,, | Performed by: STUDENT IN AN ORGANIZED HEALTH CARE EDUCATION/TRAINING PROGRAM

## 2023-10-30 PROCEDURE — 3077F SYST BP >= 140 MM HG: CPT | Mod: CPTII,S$GLB,, | Performed by: STUDENT IN AN ORGANIZED HEALTH CARE EDUCATION/TRAINING PROGRAM

## 2023-10-30 PROCEDURE — 3078F PR MOST RECENT DIASTOLIC BLOOD PRESSURE < 80 MM HG: ICD-10-PCS | Mod: CPTII,S$GLB,, | Performed by: STUDENT IN AN ORGANIZED HEALTH CARE EDUCATION/TRAINING PROGRAM

## 2023-10-30 PROCEDURE — 3044F PR MOST RECENT HEMOGLOBIN A1C LEVEL <7.0%: ICD-10-PCS | Mod: CPTII,S$GLB,, | Performed by: STUDENT IN AN ORGANIZED HEALTH CARE EDUCATION/TRAINING PROGRAM

## 2023-10-30 PROCEDURE — 1159F PR MEDICATION LIST DOCUMENTED IN MEDICAL RECORD: ICD-10-PCS | Mod: CPTII,S$GLB,, | Performed by: STUDENT IN AN ORGANIZED HEALTH CARE EDUCATION/TRAINING PROGRAM

## 2023-10-30 PROCEDURE — 99215 PR OFFICE/OUTPT VISIT, EST, LEVL V, 40-54 MIN: ICD-10-PCS | Mod: S$GLB,,, | Performed by: STUDENT IN AN ORGANIZED HEALTH CARE EDUCATION/TRAINING PROGRAM

## 2023-10-30 PROCEDURE — 99999 PR PBB SHADOW E&M-EST. PATIENT-LVL III: CPT | Mod: PBBFAC,,, | Performed by: STUDENT IN AN ORGANIZED HEALTH CARE EDUCATION/TRAINING PROGRAM

## 2023-10-30 PROCEDURE — 3066F NEPHROPATHY DOC TX: CPT | Mod: CPTII,S$GLB,, | Performed by: STUDENT IN AN ORGANIZED HEALTH CARE EDUCATION/TRAINING PROGRAM

## 2023-10-30 PROCEDURE — 1160F PR REVIEW ALL MEDS BY PRESCRIBER/CLIN PHARMACIST DOCUMENTED: ICD-10-PCS | Mod: CPTII,S$GLB,, | Performed by: STUDENT IN AN ORGANIZED HEALTH CARE EDUCATION/TRAINING PROGRAM

## 2023-10-30 PROCEDURE — 99999 PR PBB SHADOW E&M-EST. PATIENT-LVL III: ICD-10-PCS | Mod: PBBFAC,,, | Performed by: STUDENT IN AN ORGANIZED HEALTH CARE EDUCATION/TRAINING PROGRAM

## 2023-10-30 PROCEDURE — 3077F PR MOST RECENT SYSTOLIC BLOOD PRESSURE >= 140 MM HG: ICD-10-PCS | Mod: CPTII,S$GLB,, | Performed by: STUDENT IN AN ORGANIZED HEALTH CARE EDUCATION/TRAINING PROGRAM

## 2023-10-30 PROCEDURE — 3078F DIAST BP <80 MM HG: CPT | Mod: CPTII,S$GLB,, | Performed by: STUDENT IN AN ORGANIZED HEALTH CARE EDUCATION/TRAINING PROGRAM

## 2023-10-30 PROCEDURE — 3008F BODY MASS INDEX DOCD: CPT | Mod: CPTII,S$GLB,, | Performed by: STUDENT IN AN ORGANIZED HEALTH CARE EDUCATION/TRAINING PROGRAM

## 2023-10-30 PROCEDURE — 3061F NEG MICROALBUMINURIA REV: CPT | Mod: CPTII,S$GLB,, | Performed by: STUDENT IN AN ORGANIZED HEALTH CARE EDUCATION/TRAINING PROGRAM

## 2023-10-30 PROCEDURE — 3061F PR NEG MICROALBUMINURIA RESULT DOCUMENTED/REVIEW: ICD-10-PCS | Mod: CPTII,S$GLB,, | Performed by: STUDENT IN AN ORGANIZED HEALTH CARE EDUCATION/TRAINING PROGRAM

## 2023-10-30 PROCEDURE — 99215 OFFICE O/P EST HI 40 MIN: CPT | Mod: S$GLB,,, | Performed by: STUDENT IN AN ORGANIZED HEALTH CARE EDUCATION/TRAINING PROGRAM

## 2023-10-30 PROCEDURE — 1126F AMNT PAIN NOTED NONE PRSNT: CPT | Mod: CPTII,S$GLB,, | Performed by: STUDENT IN AN ORGANIZED HEALTH CARE EDUCATION/TRAINING PROGRAM

## 2023-10-30 PROCEDURE — 1126F PR PAIN SEVERITY QUANTIFIED, NO PAIN PRESENT: ICD-10-PCS | Mod: CPTII,S$GLB,, | Performed by: STUDENT IN AN ORGANIZED HEALTH CARE EDUCATION/TRAINING PROGRAM

## 2023-10-30 PROCEDURE — 3044F HG A1C LEVEL LT 7.0%: CPT | Mod: CPTII,S$GLB,, | Performed by: STUDENT IN AN ORGANIZED HEALTH CARE EDUCATION/TRAINING PROGRAM

## 2023-10-30 PROCEDURE — 3066F PR DOCUMENTATION OF TREATMENT FOR NEPHROPATHY: ICD-10-PCS | Mod: CPTII,S$GLB,, | Performed by: STUDENT IN AN ORGANIZED HEALTH CARE EDUCATION/TRAINING PROGRAM

## 2023-10-30 PROCEDURE — 1160F RVW MEDS BY RX/DR IN RCRD: CPT | Mod: CPTII,S$GLB,, | Performed by: STUDENT IN AN ORGANIZED HEALTH CARE EDUCATION/TRAINING PROGRAM

## 2023-10-30 NOTE — PROGRESS NOTES
ALLERGY & IMMUNOLOGY CLINIC - FOLLOW UP     HISTORY OF PRESENT ILLNESS     Patient ID: Marleny Guzman is a 73 y.o. female    CC: chronic rhinitis, history of frequent infections, low pneumococcal titers     HPI: Marleny Guzman is a 73 y.o. female with ILD and HTN, following up for chronic rhinitis, frequent infections, and low pneumococcal titers.    She hasn't had pneumovax yet (she missed the appointment). She hasn't had any infections since our last appointment.  She says her nasal symptoms have been pretty good. Every now and then, she still gets a clogged/blocked feeling, but overall its been better.   She is using flonase and azelastine 1 SEN daily each. She finds it helpful.   She is using the saline sinus rinses prn.  She has an appointment with the voice specialist on Nov 1st.      MEDICAL HISTORY     Vaccines:   Immunization History   Administered Date(s) Administered    COVID-19 Vaccine 07/27/2021, 08/20/2021    COVID-19, MRNA, LN-S, PF (Pfizer) (Purple Cap) 07/27/2021, 08/20/2021    Influenza 11/10/2008, 09/20/2010, 09/19/2011, 01/09/2013, 10/22/2013    Influenza (FLUAD) - Quadrivalent - Adjuvanted - PF *Preferred* (65+) 10/28/2022    Influenza - High Dose - PF (65 years and older) 12/29/2014, 09/29/2015, 09/01/2016, 09/27/2017, 10/22/2017, 10/16/2018, 11/26/2019, 10/15/2020    Influenza - Quadrivalent - High Dose - PF (65 years and older) 10/08/2020, 11/17/2021    Influenza - Trivalent (ADULT) 01/09/2013, 10/22/2013    Influenza Split 11/10/2008, 09/20/2010, 09/19/2011, 01/09/2013, 10/22/2013    Pneumococcal Conjugate - 13 Valent 09/29/2015    Pneumococcal Polysaccharide - 23 Valent 12/29/2014    Tdap 10/22/2013, 02/01/2020    Zoster 01/29/2018    Zoster Recombinant 08/22/2019, 12/04/2019     Medical Hx:   Patient Active Problem List   Diagnosis    Anxiety    HTN (hypertension)    Osteoarthritis    Depression    Sleep apnea    Postoperative pain    Rotator cuff tear    Cervical stenosis  (uterine cervix)    Cervicogenic headache    Cervical myofascial pain syndrome    Subjective memory complaints    Insomnia    HGSIL (high grade squamous intraepithelial lesion) on Pap smear of cervix    Thoracic aorta atherosclerosis    HA (headache)    Fall    Imbalance    Anemia    Vaginal dysplasia    DDD (degenerative disc disease), lumbar    Chronic bilateral low back pain without sciatica    Weakness of left lower extremity    Chronic pain    Dyspnea    Elevated troponin    Syncope    SIRS (systemic inflammatory response syndrome)    Fever    Oral thrush    Invasive fungal sinusitis    Abnormal MRI    Chronic maxillary sinusitis    Acute recurrent maxillary sinusitis    ILD (interstitial lung disease)    WOOTEN (dyspnea on exertion)    Abnormal CT of the chest    Mediastinal adenopathy    Lung nodule    Wears contact lenses    Hematuria    Recurrent major depressive disorder, in partial remission    Pulmonary hypertension    Bilateral partial vocal cord paralysis     Surgical Hx:   Past Surgical History:   Procedure Laterality Date    CATARACT EXTRACTION  06/18/2012    OS    CATARACT EXTRACTION  07/16/2012    OD    CERVIX SURGERY      [place]    COLONOSCOPY      COLONOSCOPY N/A 03/04/2016    Procedure: COLONOSCOPY;  Surgeon: Tenzin Thakkar MD;  Location: [facility] (4TH FLR);  Service: Endoscopy;  Laterality: N/A;    COLONOSCOPY Left 01/25/2021    Procedure: COLONOSCOPY;  Surgeon: Wayne Naranjo MD;  Location: [facility];  Service: Endoscopy;  Laterality: Left;    COLONOSCOPY N/A 06/19/2023    Procedure: COLONOSCOPY;  Surgeon: Tenzin Thakkar MD;  Location: [facility] (2ND FLR);  Service: Endoscopy;  Laterality: N/A;  lung issues  referral Dr [id number] portal-  6/13/23- no answer for precall- ks    CYSTOSCOPY N/A 09/12/2022    Procedure: CYSTOSCOPY;  Surgeon: Bianka Aragon MD;  Location: [facility] 1ST FLR;  Service: Urology;  Laterality: N/A;    ESOPHAGOGASTRODUODENOSCOPY Left 01/25/2021     Procedure: EGD (ESOPHAGOGASTRODUODENOSCOPY);  Surgeon: Wayne Naranjo MD;  Location: [facility] ENDO;  Service: Endoscopy;  Laterality: Left;    EYE SURGERY Bilateral     cataract    HYSTERECTOMY  05/12/2015    LARYNGOSCOPY N/A 9/28/2023    Procedure: Suspension microlaryngoscopy with bilateral vocal fold injection augmentation - Restylane;  Surgeon: Andre Valera MD;  Location: [facility] OR;  Service: ENT;  Laterality: N/A;  Microscope, telescopes, tower, injector, Restylane    LEEP      LUMBAR EPIDURAL INJECTION      OOPHORECTOMY      AR REMOVAL OF OVARY/TUBE(S)  05/12/2015    RETROGRADE PYELOGRAPHY Bilateral 09/12/2022    Procedure: PYELOGRAM, RETROGRADE;  Surgeon: Bianka Aragon MD;  Location: [facility] OR 1ST FLR;  Service: Urology;  Laterality: Bilateral;    ROBOT-ASSISTED REPAIR OF VENTRAL HERNIA USING DA ASPEN XI N/A 11/29/2021    Procedure: XI ROBOTIC REPAIR, HERNIA, VENTRAL w/ possible mesh;  Surgeon: Roney Jackson MD;  Location: [facility] OR 2ND FLR;  Service: General;  Laterality: N/A;  Anesthesia Block    SALPINGECTOMY Left     SHOULDER SURGERY Right     SINUS SURGERY       Medications:   Current Outpatient Medications on File Prior to Visit   Medication Sig Dispense Refill    aspirin 81 MG Chew TAKE 1 TABLET BY MOUTH EVERY DAY (Patient taking differently: No sig reported) 90 tablet 1    atorvastatin (LIPITOR) 20 MG tablet TAKE 1 TABLET BY MOUTH EVERY DAY 90 tablet 3    azelastine (ASTELIN) 137 mcg (0.1 %) nasal spray 1 spray (137 mcg total) by Nasal route 2 (two) times daily. 15 mL 2    budesonide (PULMICORT) 0.25 mg/2 mL nebulizer solution EMPTY CONTENTS OF 1 CAPSULE INTO NASAL IRRIGATION SYSTEM, ADD DISTILLED WATER, SALT PACK, MIX & IRRIGATE. PERFORM 1-2 TIMES DAILY.      clobetasol 0.05% (TEMOVATE) 0.05 % Oint Apply topically 2 (two) times daily. 60 g 1    ergocalciferol (ERGOCALCIFEROL) 50,000 unit Cap TAKE 1 CAPSULE (50,000 UNITS TOTAL) BY MOUTH EVERY 30 DAYS 3 capsule 3    EScitalopram  oxalate (LEXAPRO) 20 MG tablet TAKE 1 TABLET (20 MG TOTAL) BY MOUTH ONCE DAILY 90 tablet 3    estradioL (ESTRACE) 0.01 % (0.1 mg/gram) vaginal cream Place 1 g vaginally twice a week. 42.5 g 4    fluticasone propionate (FLONASE) 50 mcg/actuation nasal spray Use 1-2 sprays in each nostril twice daily 16 g 11    irbesartan-hydrochlorothiazide (AVALIDE) 300-12.5 mg per tablet Take 1 tablet by mouth once daily. 90 tablet 3    loratadine (CLARITIN) 10 mg tablet Take 1 tablet (10 mg total) by mouth once daily. 30 tablet 11    LORazepam (ATIVAN) 0.5 MG tablet TAKE 1/2 TO 1 TABLET BY MOUTH DAILY AS NEEDED FOR ANXIETY 30 tablet 0    metoprolol succinate (TOPROL-XL) 50 MG 24 hr tablet TAKE 1 TABLET BY MOUTH EVERY DAY 90 tablet 3    nystatin (MYCOSTATIN) cream Apply topically 2 (two) times daily. 30 g 1    orphenadrine (NORFLEX) 100 mg tablet TAKE 1 TABLET BY MOUTH EVERY DAY AS NEEDED FOR MUSCLE SPASM 30 tablet 3    pulse oximeter (PULSE OXIMETER) device by Apply Externally route 2 (two) times a day. Use twice daily at 8 AM and 3 PM and record the value in [employer]! as directed. 1 each 0    spironolactone (ALDACTONE) 25 MG tablet Take 1 tablet (25 mg total) by mouth once daily. 30 tablet 3    triamcinolone acetonide 0.1% (KENALOG) 0.1 % cream APPLY TOPICALLY TWICE A DAY 45 g 0    albuterol (PROVENTIL/VENTOLIN HFA) 90 mcg/actuation inhaler Inhale 1-2 puffs into the lungs every 6 (six) hours as needed for Wheezing or Shortness of Breath (Please dispense with spacer). 6.7 g 0     Current Facility-Administered Medications on File Prior to Visit   Medication Dose Route Frequency Provider Last Rate Last Admin    0.9%  NaCl infusion   Intravenous Continuous Roney Jackson MD 0 mL/hr at 11/29/21 7236 New Bag at 09/28/23 0851    ciprofloxacin HCl tablet 500 mg  500 mg Oral Once Malu Barragan NP        fentaNYL 50 mcg/mL injection  mcg   mcg Intravenous PRN Anthony Hernandez MD   100 mcg at 09/28/23 0875     LIDOcaine (PF) 10 mg/ml (1%) injection 10 mg  1 mL Intradermal Once Roney Jackson MD        LIDOcaine HCl 2% urojet   Urethral Once Malu Barragan, NP        midazolam (VERSED) 1 mg/mL injection 0.5-4 mg  0.5-4 mg Intravenous PRN Anthony Hernandez MD   1 mg at 21 1109    prochlorperazine injection Soln 5 mg  5 mg Intravenous Q30 Min PRN Beth Ramsey MD        sodium chloride 0.9% flush 10 mL  10 mL Intravenous PRN Beth Ramsey MD         Drug Allergies: Review of patient's allergies indicates:  No Known Allergies    Social Hx:   Social History     Tobacco Use    Smoking status: Former     Current packs/day: 0.00     Average packs/day: 0.5 packs/day for 22.0 years (11.0 ttl pk-yrs)     Types: Cigarettes     Start date: 1975     Quit date: 1997     Years since quittin.8     Passive exposure: Past    Smokeless tobacco: Never    Tobacco comments:     Some wheezing since COVID-19 infection-2020   Substance Use Topics    Alcohol use: Yes     Alcohol/week: 0.0 standard drinks of alcohol     Comment: Occassionally for social events will have a glass of wine    Drug use: No     Additional History Obtained at Initial Visit:  H/o Asthma: denies  H/o Rhinitis: endorses  Env/Occ:   Pets: 2 dogs; she hasn't noticed that they trigger symptoms   Occupation: caregiver  Infection Hx: Denies frequent infections requiring antibiotics over the past year. She says she used to get more infections. Based on chart review, in the last 12 months, she has had 2 episodes of sinusitis requiring antibiotics, and one episode of bronchitis requiring antibiotics. She reports she has had pneumonia, last occurred in 2022. She thinks she has had pneumonia 2-3 times.     PHYSICAL EXAM     VS: BP (!) 147/69 (BP Location: Right arm, Patient Position: Sitting, BP Method: Large (Automatic))   Pulse (!) 58   Wt 94 kg (207 lb 3.7 oz)   BMI 29.73 kg/m²   GENERAL: Alert, NAD, well-appearing, + hoarse  voice  EYES: EOMI, no conjunctival injection, no discharge, no infraorbital shiners  NOSE: NT 2-3+ B/L, no stringing mucus, no polyps visualized  ORAL: MMM, no ulcers, no thrush  LUNGS: CTAB, no w/r/c, no increased WOB  HEART: RRR, normal S1/S2, no m/g/r  DERM: no rashes  NEURO: normal gait, no facial asymmetry     LABORATORY STUDIES     Component      Latest Ref Rn 7/26/2023 10/16/2023   S.pneumoniae Type 1      >=1.0 mcg/mL 2.2  1.3    Pneumococcal Serotype 2 IgG (PNX)      >=1.0 mcg/mL  11.0    S.pneumoniae Type 3      >=1.0 mcg/mL <0.3  0.2    Pneumococcal Serotype 4 IgG (P7,P13,PNX)      >=1.0 mcg/mL  0.3    S.pneumoniae Type 5      >=1.0 mcg/mL 0.8  0.3    S.pneumoniae Type 8      >=1.0 mcg/mL 2.3  1.6    S.pneumoniae Type 9N      >=1.0 mcg/mL <0.3  0.5    S.pneumoniae Type 12F      >=1.0 mcg/mL <0.3  0.3    Pneumococcal Serotype 14 IgG (P7,P13,PNX)      >=1.0 mcg/mL  2.6    Pneumococcal Serotype 17F IgG (PNX)      >=1.0 mcg/mL  6.7    Pneumococcal Serotype 17F IgG (PNX)      >=1.0 mcg/mL  1.1    S.pneumoniae Type 19F      >=1.0 mcg/mL 0.6  1.5    Pneumococcal Serotype 20 IgG (PNX)      >=1.0 mcg/mL  2.4    S.pneumoniae Type 23F      >=1.0 mcg/mL 0.8  1.0    S.pneumoniae Type 6B      >=1.0 mcg/mL <0.3  0.4    Pneumococcal Serotype 10A IgG (PNX)      >=1.0 mcg/mL  2.4    Pneumococcal Serotype 11A IgG (PNX)      >=1.0 mcg/mL  0.2    S.pneumoniae Type 7F      >=1.0 mcg/mL 9.7  3.3    Pneumococcal Serotype 15B IgG (PNX)      >=1.0 mcg/mL  1.3    S.pneumoniae Type 18C      >=1.0 mcg/mL 0.4  0.3    Pneumococcal Serotype 19A IgG (P13,PNX)      >=1.0 mcg/mL  6.0    S.pneumoniae Type 9V Abs      >=1.0 mcg/mL 0.9  0.5    Pneumococcal Serotype 33 IgG (PNX)      >=1.0 mcg/mL  5.9    Strep pneumo Type 4 <0.3     Strep pneumo Type 14 2.1       Component      Latest Ref [facility] 10/16/2023   IgG      650 - 1600 mg/dL 1073    IgA      40 - 350 mg/dL 254    IgM      50 - 300 mg/dL 85      Component      Latest Ref [facility] 3/30/2023  5/29/2023   WBC      3.90 - 12.70 K/uL 11.98     RBC      4.00 - 5.40 M/uL 4.71     Hemoglobin      12.0 - 16.0 g/dL 13.1     Hematocrit      37.0 - 48.5 % 42.0     MCV      82 - 98 fL 89     MCH      27.0 - 31.0 pg 27.8     MCHC      32.0 - 36.0 g/dL 31.2 (L)     RDW      11.5 - 14.5 % 15.0 (H)     Platelets      150 - 450 K/uL 333     MPV      9.2 - 12.9 fL 10.7     Immature Granulocytes      0.0 - 0.5 % 0.3     Gran # (ANC)      1.8 - 7.7 K/uL 8.5 (H)     Immature Grans (Abs)      0.00 - 0.04 K/uL 0.04     Lymph #      1.0 - 4.8 K/uL 2.5     Mono #      0.3 - 1.0 K/uL 0.6     Eos #      0.0 - 0.5 K/uL 0.3     Baso #      0.00 - 0.20 K/uL 0.06     Differential Method Automated     Sodium      136 - 145 mmol/L 139  141    Potassium      3.5 - 5.1 mmol/L 4.7  4.0    Chloride      95 - 110 mmol/L 103  103    CO2      23 - 29 mmol/L 30 (H)  29    Glucose      70 - 110 mg/dL 80  80    BUN      8 - 23 mg/dL 24 (H)  22    Creatinine      0.5 - 1.4 mg/dL 1.1  1.0    Calcium      8.7 - 10.5 mg/dL 9.9  9.7    PROTEIN TOTAL      6.0 - 8.4 g/dL 7.7  7.1    Albumin      3.5 - 5.2 g/dL 3.7  3.4 (L)    BILIRUBIN TOTAL      0.1 - 1.0 mg/dL 0.5  0.5    Alkaline Phosphatase      55 - 135 U/L 161 (H)  148 (H)    AST      10 - 40 U/L 18  20    ALT      10 - 44 U/L 20  17    Hemoglobin A1C External      4.0 - 5.6 %  5.7 (H)       ALLERGEN TESTING     Immunocaps:   Component      Latest Ref [facility] 6/28/2023   D. farinae      <0.10 kU/L <0.10    D. farinae Class CLASS 0    Mite Dust Pteronyssinus IgE      <0.10 kU/L <0.10    D. pteronyssinus Class CLASS 0    BERMUDA GRASS      <0.10 kU/L 0.11 (H)    [place] Grass Class CLASS 0/1    Ivan Grass      <0.10 kU/L 0.12 (H)    Ivan Grass Class CLASS 0/1    [place] IgE      <0.10 kU/L <0.10    [place] Class CLASS 0    Plantain      <0.10 kU/L 0.13 (H)    English Plantain Class CLASS 0/1    [place](Quercus alba) IgE      <0.10 kU/L 0.17 (H)    Oak, Class CLASS 0/1    Pecan [place] Tree       "<0.10 kU/L 0.15 (H)    Pecan, Class CLASS 0/1    Marshelder IgE      <0.10 kU/L 0.15 (H)    Marshelder Class CLASS 0/1    Ragweed, Western IgE      <0.10 kU/L 0.14 (H)    Ragweed, Western, Class CLASS 0/1    Alternaria alternata      <0.10 kU/L <0.10    Altern. alternata Class CLASS 0    Aspergillus Fumigatus IgE      <0.10 kU/L <0.10    A. fumigatus Class CLASS 0    Cat Dander      <0.10 kU/L <0.10    Cat Epithelium Class CLASS 0    Cockroach, IgE      <0.10 kU/L <0.10    Cockroach, IgE       CLASS 0    Dog Dander, IgE      <0.10 kU/L <0.10    Dog Dander Class CLASS 0    IgE      0 - 100 IU/mL 954 (H)       PULMONARY FUNCTION TESTING     Date 5/15/23:  FVC:         81%ref -> - 3%  FEV1:         78%ref -> + 6%  FEV1/FVC: 74%  FEF 25-75: 71%ref  T%ref  DLCO:        47%ref  Interpretation: Spirometry is normal. Spirometry remains unimproved following bronchodilator. Lung volumes show mild restriction is present. Airway mechanics are abnormal showing increased airway resistance and decreased conductance. DLCO is moderately decreased.     IMAGING & OTHER DIAGNOSTICS     CT chest 23:  Impression:  1. Prominence of the pulmonary trunk which can be seen in setting of pulmonary arterial hypertension.  2. Stable appearance of scattered bilateral lung nodules.  Recommend follow-up with low-dose CT chest scan 6 in 12 months.  3. Chronic appearing bibasilar subsegmental atelectasis versus scarring, with left traction bronchiectasis.    CT soft tissue neck 23 noted "Prominent mucosal thickening/fluid within the sphenoid sinuses bilaterally with chronic mild mucosal thickening and surrounding osseous sclerosis within the right maxillary sinus."    CXR 22:  FINDINGS:  There is airspace opacity in the medial aspect of the right lower lobe.  Left lung appears relatively clear.  The cardiac silhouette is normal in size. The hilar and mediastinal contours are unremarkable.  Bones are " intact.  Impression:  Airspace opacity in the medial aspect of the right lower lobe concerning for pneumonia.     ASSESSMENT & PLAN     Marleny Guzman is a 73 y.o. female with     # Chronic rhinitis: Likely mixed allergic and non-allergic etiology. Immunocaps with equivocal results to tree, grass, and weed pollens, but she gets symptoms perennially (including winter). She is also following with ENT. She finds her nasal sprays helpful.  -increase azelastine nasal spray to 1-2 SEN BID.  -increase fluticasone nasal spray to 1-2 SEN BID.  -continue saline sinus rinses prn.  -continue daily loratadine.    # Frequent infections, history of recurrent pneumonia, low pneumococcal titers: This past year, infections requiring antibiotics have been less frequent than her usual. But she reports having 2-3 episodes of pneumonia in the past (last in 4/2022). She had pneumovax in 2014, prevnar-13 in 2015. Recently, ENT checked pneumococcal titers which were low. She missed her vaccine appointment for the pneumovax booster. Total IgG/A/M are normal.  -vaccine appointment for pneumovax rescheduled.   -4-6 weeks after she receives pneumovax, will recheck pneumococcal titers.    # ILD: She is due for follow up with her pulmonologist. Assisted her with scheduling f/u through her mychart.    # HTN: Patient is on a beta blocker (metoprolol) so is not currently a candidate for allergen immunotherapy injections.      Follow up: 2 months     I spent a total of 40 minutes on the day of the visit.  This includes face to face time and non-face to face time preparing to see the patient (eg, review of tests), obtaining and/or reviewing separately obtained history, documenting clinical information in the electronic or other health record, independently interpreting results.    James Cooper MD  Allergy/Immunology

## 2023-11-01 ENCOUNTER — OFFICE VISIT (OUTPATIENT)
Dept: OTOLARYNGOLOGY | Facility: CLINIC | Age: 73
End: 2023-11-01
Payer: MEDICARE

## 2023-11-01 ENCOUNTER — CLINICAL SUPPORT (OUTPATIENT)
Dept: SPEECH THERAPY | Facility: HOSPITAL | Age: 73
End: 2023-11-01
Attending: OTOLARYNGOLOGY
Payer: MEDICARE

## 2023-11-01 VITALS — SYSTOLIC BLOOD PRESSURE: 138 MMHG | DIASTOLIC BLOOD PRESSURE: 56 MMHG | HEART RATE: 65 BPM

## 2023-11-01 DIAGNOSIS — J38.3 VOCAL FOLD SCAR: ICD-10-CM

## 2023-11-01 DIAGNOSIS — J38.02 BILATERAL PARTIAL VOCAL CORD PARALYSIS: ICD-10-CM

## 2023-11-01 DIAGNOSIS — J38.02 BILATERAL PARTIAL VOCAL CORD PARALYSIS: Primary | ICD-10-CM

## 2023-11-01 DIAGNOSIS — J38.4 PRE-NODULAR EDEMA OF THE VOCAL FOLDS: ICD-10-CM

## 2023-11-01 PROCEDURE — 3066F PR DOCUMENTATION OF TREATMENT FOR NEPHROPATHY: ICD-10-PCS | Mod: CPTII,S$GLB,, | Performed by: OTOLARYNGOLOGY

## 2023-11-01 PROCEDURE — 1126F PR PAIN SEVERITY QUANTIFIED, NO PAIN PRESENT: ICD-10-PCS | Mod: CPTII,S$GLB,, | Performed by: OTOLARYNGOLOGY

## 2023-11-01 PROCEDURE — 3078F DIAST BP <80 MM HG: CPT | Mod: CPTII,S$GLB,, | Performed by: OTOLARYNGOLOGY

## 2023-11-01 PROCEDURE — 99213 OFFICE O/P EST LOW 20 MIN: CPT | Mod: 25,S$GLB,, | Performed by: OTOLARYNGOLOGY

## 2023-11-01 PROCEDURE — 1160F PR REVIEW ALL MEDS BY PRESCRIBER/CLIN PHARMACIST DOCUMENTED: ICD-10-PCS | Mod: CPTII,S$GLB,, | Performed by: OTOLARYNGOLOGY

## 2023-11-01 PROCEDURE — 3044F HG A1C LEVEL LT 7.0%: CPT | Mod: CPTII,S$GLB,, | Performed by: OTOLARYNGOLOGY

## 2023-11-01 PROCEDURE — 99999 PR PBB SHADOW E&M-EST. PATIENT-LVL III: CPT | Mod: PBBFAC,,, | Performed by: OTOLARYNGOLOGY

## 2023-11-01 PROCEDURE — 3061F PR NEG MICROALBUMINURIA RESULT DOCUMENTED/REVIEW: ICD-10-PCS | Mod: CPTII,S$GLB,, | Performed by: OTOLARYNGOLOGY

## 2023-11-01 PROCEDURE — 31579 LARYNGOSCOPY TELESCOPIC: CPT | Mod: S$GLB,,, | Performed by: OTOLARYNGOLOGY

## 2023-11-01 PROCEDURE — 3066F NEPHROPATHY DOC TX: CPT | Mod: CPTII,S$GLB,, | Performed by: OTOLARYNGOLOGY

## 2023-11-01 PROCEDURE — 1160F RVW MEDS BY RX/DR IN RCRD: CPT | Mod: CPTII,S$GLB,, | Performed by: OTOLARYNGOLOGY

## 2023-11-01 PROCEDURE — 3044F PR MOST RECENT HEMOGLOBIN A1C LEVEL <7.0%: ICD-10-PCS | Mod: CPTII,S$GLB,, | Performed by: OTOLARYNGOLOGY

## 2023-11-01 PROCEDURE — 99213 PR OFFICE/OUTPT VISIT, EST, LEVL III, 20-29 MIN: ICD-10-PCS | Mod: 25,S$GLB,, | Performed by: OTOLARYNGOLOGY

## 2023-11-01 PROCEDURE — 99999 PR PBB SHADOW E&M-EST. PATIENT-LVL III: ICD-10-PCS | Mod: PBBFAC,,, | Performed by: OTOLARYNGOLOGY

## 2023-11-01 PROCEDURE — 1159F MED LIST DOCD IN RCRD: CPT | Mod: CPTII,S$GLB,, | Performed by: OTOLARYNGOLOGY

## 2023-11-01 PROCEDURE — 1126F AMNT PAIN NOTED NONE PRSNT: CPT | Mod: CPTII,S$GLB,, | Performed by: OTOLARYNGOLOGY

## 2023-11-01 PROCEDURE — 31579 PR LARYNGOSCOPY, FLEX/RIGID TELESCOPIC, W/STROBOSCOPY: ICD-10-PCS | Mod: S$GLB,,, | Performed by: OTOLARYNGOLOGY

## 2023-11-01 PROCEDURE — 3075F PR MOST RECENT SYSTOLIC BLOOD PRESS GE 130-139MM HG: ICD-10-PCS | Mod: CPTII,S$GLB,, | Performed by: OTOLARYNGOLOGY

## 2023-11-01 PROCEDURE — 92524 BEHAVRAL QUALIT ANALYS VOICE: CPT | Mod: GN | Performed by: SPEECH-LANGUAGE PATHOLOGIST

## 2023-11-01 PROCEDURE — 3061F NEG MICROALBUMINURIA REV: CPT | Mod: CPTII,S$GLB,, | Performed by: OTOLARYNGOLOGY

## 2023-11-01 PROCEDURE — 1159F PR MEDICATION LIST DOCUMENTED IN MEDICAL RECORD: ICD-10-PCS | Mod: CPTII,S$GLB,, | Performed by: OTOLARYNGOLOGY

## 2023-11-01 PROCEDURE — 3078F PR MOST RECENT DIASTOLIC BLOOD PRESSURE < 80 MM HG: ICD-10-PCS | Mod: CPTII,S$GLB,, | Performed by: OTOLARYNGOLOGY

## 2023-11-01 PROCEDURE — 3075F SYST BP GE 130 - 139MM HG: CPT | Mod: CPTII,S$GLB,, | Performed by: OTOLARYNGOLOGY

## 2023-11-01 NOTE — PROGRESS NOTES
Referring provider: Dr. Andre Valera  Reason for visit:  Behavioral and qualitative analysis of voice and resonance (CPT 80798)    Subjective / History    Ms. Marleny Guzman is a 73 y.o. female referred for voice evaluation (CPT 65833) by Dr. Valera.  She presents with complaints of hoarseness which began many years ago.  The patient also reported the following complaints:  voice worse in afternoon/evening, fatigue with use, and changes in modal pitch.  She reports it is often hard to be understood.  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  Recently underwent microlaryngoscopy w/ bilateral vocal fold injection augmentation HA.   Swallowing: no c/o   Breathing:  reports intermittent difficulty getting a deep breath     Smoking: previously  Reflux:  no c/o  Water: 16-24 oz/day     Stroboscopy findings (per Dr. Valera on 11/1/23)  - mild true vocal fold bowing; interval increase in convexity/support  - mild bilateral mid fold sulcus, left> right  - mild false vocal fold atrophy, right>left  - very mild superficial glottic edema  - subtle midmembranous prenodular convexity along free edge of bilateral true vocal folds, left>right, interval decrease since last assessment  - mild variable glottal insufficiency, though with interval increase in efficiency since last evaluation  - inconsistent aperiodic vibration     Past Medical History:   Diagnosis Date    Abnormal Pap smear of vagina 07/20/2016    Anemia     Cataract     Chronic bilateral low back pain without sciatica 03/13/2018    Depression with anxiety     Hypertension     Osteoarthritis 06/20/2013    Recurrent upper respiratory infection (URI)     Right rotator cuff tear     Sleep apnea     Cipap machine at home    Urinary incontinence     Leaks urine with laughing/ coughing/ sneezing     Current Outpatient Medications on File Prior to Visit   Medication Sig Dispense  Refill    albuterol (PROVENTIL/VENTOLIN HFA) 90 mcg/actuation inhaler Inhale 1-2 puffs into the lungs every 6 (six) hours as needed for Wheezing or Shortness of Breath (Please dispense with spacer). 6.7 g 0    aspirin 81 MG Chew TAKE 1 TABLET BY MOUTH EVERY DAY (Patient taking differently: No sig reported) 90 tablet 1    atorvastatin (LIPITOR) 20 MG tablet TAKE 1 TABLET BY MOUTH EVERY DAY 90 tablet 3    azelastine (ASTELIN) 137 mcg (0.1 %) nasal spray 1 spray (137 mcg total) by Nasal route 2 (two) times daily. 15 mL 2    budesonide (PULMICORT) 0.25 mg/2 mL nebulizer solution EMPTY CONTENTS OF 1 CAPSULE INTO NASAL IRRIGATION SYSTEM, ADD DISTILLED WATER, SALT PACK, MIX & IRRIGATE. PERFORM 1-2 TIMES DAILY.      clobetasol 0.05% (TEMOVATE) 0.05 % Oint Apply topically 2 (two) times daily. 60 g 1    ergocalciferol (ERGOCALCIFEROL) 50,000 unit Cap TAKE 1 CAPSULE (50,000 UNITS TOTAL) BY MOUTH EVERY 30 DAYS 3 capsule 3    EScitalopram oxalate (LEXAPRO) 20 MG tablet TAKE 1 TABLET (20 MG TOTAL) BY MOUTH ONCE DAILY 90 tablet 3    estradioL (ESTRACE) 0.01 % (0.1 mg/gram) vaginal cream Place 1 g vaginally twice a week. 42.5 g 4    fluticasone propionate (FLONASE) 50 mcg/actuation nasal spray Use 1-2 sprays in each nostril twice daily 16 g 11    irbesartan-hydrochlorothiazide (AVALIDE) 300-12.5 mg per tablet Take 1 tablet by mouth once daily. 90 tablet 3    loratadine (CLARITIN) 10 mg tablet Take 1 tablet (10 mg total) by mouth once daily. 30 tablet 11    LORazepam (ATIVAN) 0.5 MG tablet TAKE 1/2 TO 1 TABLET BY MOUTH DAILY AS NEEDED FOR ANXIETY 30 tablet 0    metoprolol succinate (TOPROL-XL) 50 MG 24 hr tablet TAKE 1 TABLET BY MOUTH EVERY DAY 90 tablet 3    nystatin (MYCOSTATIN) cream Apply topically 2 (two) times daily. 30 g 1    orphenadrine (NORFLEX) 100 mg tablet TAKE 1 TABLET BY MOUTH EVERY DAY AS NEEDED FOR MUSCLE SPASM 30 tablet 3    pulse oximeter (PULSE OXIMETER) device by Apply Externally route 2 (two) times a day. Use  twice daily at 8 AM and 3 PM and record the value in MyChart as directed. 1 each 0    triamcinolone acetonide 0.1% (KENALOG) 0.1 % cream APPLY TOPICALLY TWICE A DAY 45 g 0    [DISCONTINUED] spironolactone (ALDACTONE) 25 MG tablet Take 1 tablet (25 mg total) by mouth once daily. 30 tablet 3     Current Facility-Administered Medications on File Prior to Visit   Medication Dose Route Frequency Provider Last Rate Last Admin    0.9%  NaCl infusion   Intravenous Continuous Roney Jackson MD 0 mL/hr at 11/29/21 1755 New Bag at 09/28/23 0851    ciprofloxacin HCl tablet 500 mg  500 mg Oral Once Malu Barragan NP        fentaNYL 50 mcg/mL injection  mcg   mcg Intravenous PRN Anthony Hernandez MD   100 mcg at 09/28/23 0854    LIDOcaine (PF) 10 mg/ml (1%) injection 10 mg  1 mL Intradermal Once Roney Jackson MD        LIDOcaine HCl 2% urojet   Urethral Once Malu Barragan NP        midazolam (VERSED) 1 mg/mL injection 0.5-4 mg  0.5-4 mg Intravenous PRN Anthony Hernandez MD   1 mg at 11/29/21 1109    prochlorperazine injection Soln 5 mg  5 mg Intravenous Q30 Min PRN Beth Ramsey MD        sodium chloride 0.9% flush 10 mL  10 mL Intravenous PRN Beth Ramsey MD           Objective    Perceptual/behavioral assessment  -CAPE-V Overall Score: 36  -Quality: breaks, intermittent roughness, pitch breaks, breathiness  -Volume: appropriate for age and gender  -Pitch: appropriate for age and gender identity  -Flexibility: diminished  -Habitual respiratory pattern: chest/clavicular    Education / Stimulability Trials  Discussed importance of vocal hygiene including: hydration.  Patient was modestly/inconsistently stimulable for improved voice using SOVT/resonant exercises.  Some stimulability noted in focus on chest resonance and reducing pitch breaks.  Encouraged practicing exercises several times daily in isolation and into short phrases to solidify muscle memory patterns and  reduce extralaryngeal tension during speech tasks.  She was amenable to all suggestions.     Functional goals  Length Status Goal   Long term Initiated  Patient will implement and adhere to vocal hygiene protocols on a daily basis, including the elimination of phonotraumatic behaviors.    Long term Initiated  Patient and clinician will facilitate changes in vocal function in order to restore functional use of voice for daily occupational, social, and emotional demands.    Long term Initiated  Patient will re-establish phonation with adequate balance of airflow and resonance with decreased muscle tension.    Long term Initiated   Patient will improve coordination of respiration and phonation for efficient vocal production at a conversational level.    Short term Initiated  Patient will complete SOVT exercises and/or resonant-focused exercises 3-5x daily to strengthen and balance the intrinsic laryngeal musculature and maximize glottic closure without medial hyperfunction.   Short term Initiated  Patient will improve the quality, efficiency, and ease of phonation through resonant and/or airflow exercises from the syllable to conversation level with 80% accuracy.   Short term Initiated  Patient will discriminate between easy and strained phonation with 80% accuracy.        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 Valera.  Prognosis for continued improvement is fair.     Recommendations / POC    -Recommend 2-4 sessions of voice therapy over 4-6 weeks with a speech-language pathologist to optimize glottal postures for improved vocal function, vocal efficiency, and ease of phonation  -Continue exercises as discussed in session  -Contact clinician with any further questions

## 2023-11-01 NOTE — PROGRESS NOTES
[facility]  Department of Otorhinolaryngology and Communication Sciences    Subjective:      Marleny Guzman is a 73 y.o. female who presents for follow-up. 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    09/28/2023  Microlaryngoscopy, bilateral vocal fold injection augmentation HA. Findings:  Mild bilateral vocal atrophy, right greater than left, consistent with chronic right vocal fold paresis.  Mild vocal fold sulcus deformity along infraglottic free edge of true vocal folds  Left vocal fold with sessile polypoid lesion at mid membranous aspect  Augmented with a total volume 0.5 mL Restylane-L    Her voice initially deteriorated right after the injection.  It has since improved a bit.  She worked with our voice therapy team in tandem with today's visit.    The patient's medications, allergies, past medical, surgical, social and family histories were reviewed and updated as appropriate.    A detailed review of systems was obtained with pertinent positives as per the above HPI, and otherwise negative.      Objective:     BP (!) 138/56 (BP Location: Left arm, Patient Position: Sitting, BP Method: Large (Automatic))   Pulse 65      Constitutional: comfortable, well dressed  Psychiatric: appropriate affect  Respiratory: comfortably breathing, symmetric chest rise, no stridor  Voice:  mild-moderate variable roughness; slightly inconsistently pressed  Head: normocephalic  Eyes: conjunctivae and sclerae clear  Indirect laryngoscopy is limited due to gag    Procedure  Rigid Laryngeal Videostroboscopy (53439): Laryngeal videostroboscopy is indicated to assess the laryngeal vibratory biomechanics and vocal fold oscillation, which cannot be assessed with a plain light examination. This was carried out with a 70 degree endoscope. After verbal consent was obtained, the patient was positioned and the tongue was gently secured with a  gauze sponge. The endoscope was passed transorally and positioned to image the larynx and hypopharynx in detail. The following features were examined: laryngeal and hypopharyngeal masses; vocal fold range and symmetry of motion; laryngeal mucosal edema, erythema, inflammation, and hydration; salivary pooling; and gross laryngeal sensation. During phonation, the vocal folds were assessed for glottal closure; mucosal wave; vocal fold lesions; vibratory periodicity, amplitude, and phase symmetry; and vertical height match. The equipment was removed. The patient tolerated the procedure well without complication. All findings were normal except:  - mild true vocal fold bowing; interval increase in convexity/support  - mild bilateral mid fold sulcus, left> right  - mild false vocal fold atrophy, right>left  - very mild superficial glottic edema  - subtle midmembranous prenodular convexity along free edge of bilateral true vocal folds, left>right, interval decrease since last assessment  - mild variable glottal insufficiency, though with interval increase in efficiency since last evaluation  - inconsistent aperiodic vibration      Assessment:     Marleny Guzman is a 73 y.o. 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    She is made slight progress following recent vocal fold injection augmentation.     Plan:     SLP voice evaluation and subsequent therapy sessions are medically necessary for restoration of laryngeal function.  She had an initial session in tandem with today's visit.  This will continue with our team in the coming weeks.    She will follow up with me in about 2 months.    All questions were answered, and the patient is in agreement with the plan.    Andre Valera M.D.  [facility]  Department of Otorhinolaryngology and Communication Sciences

## 2023-11-06 ENCOUNTER — CLINICAL SUPPORT (OUTPATIENT)
Dept: ALLERGY | Facility: CLINIC | Age: 73
End: 2023-11-06
Payer: MEDICARE

## 2023-11-06 DIAGNOSIS — R76.0 ABNORMAL ANTIBODY TITER: Primary | ICD-10-CM

## 2023-11-06 PROCEDURE — 90732 PPSV23 VACC 2 YRS+ SUBQ/IM: CPT | Mod: S$GLB,,, | Performed by: STUDENT IN AN ORGANIZED HEALTH CARE EDUCATION/TRAINING PROGRAM

## 2023-11-06 PROCEDURE — 90732 PNEUMOCOCCAL POLYSACCHARIDE VACCINE 23-VALENT =>2YO SQ IM: ICD-10-PCS | Mod: S$GLB,,, | Performed by: STUDENT IN AN ORGANIZED HEALTH CARE EDUCATION/TRAINING PROGRAM

## 2023-11-06 PROCEDURE — G0009 ADMIN PNEUMOCOCCAL VACCINE: HCPCS | Mod: S$GLB,,, | Performed by: STUDENT IN AN ORGANIZED HEALTH CARE EDUCATION/TRAINING PROGRAM

## 2023-11-06 PROCEDURE — G0009 PNEUMOCOCCAL POLYSACCHARIDE VACCINE 23-VALENT =>2YO SQ IM: ICD-10-PCS | Mod: S$GLB,,, | Performed by: STUDENT IN AN ORGANIZED HEALTH CARE EDUCATION/TRAINING PROGRAM

## 2023-11-07 ENCOUNTER — PATIENT MESSAGE (OUTPATIENT)
Dept: ALLERGY | Facility: CLINIC | Age: 73
End: 2023-11-07
Payer: MEDICARE

## 2023-11-10 NOTE — PLAN OF CARE
Problem: Fall Injury Risk  Goal: Absence of Fall and Fall-Related Injury  Outcome: Ongoing, Progressing     Problem: Adult Inpatient Plan of Care  Goal: Plan of Care Review  Outcome: Ongoing, Progressing      no

## 2023-11-15 ENCOUNTER — OFFICE VISIT (OUTPATIENT)
Dept: ALLERGY | Facility: CLINIC | Age: 73
End: 2023-11-15
Payer: MEDICARE

## 2023-11-15 VITALS
BODY MASS INDEX: 31.32 KG/M2 | WEIGHT: 218.25 LBS | SYSTOLIC BLOOD PRESSURE: 166 MMHG | DIASTOLIC BLOOD PRESSURE: 77 MMHG | HEART RATE: 63 BPM

## 2023-11-15 DIAGNOSIS — J31.0 CHRONIC RHINITIS: ICD-10-CM

## 2023-11-15 DIAGNOSIS — Z86.19 FREQUENT INFECTIONS: Primary | ICD-10-CM

## 2023-11-15 PROCEDURE — 99999 PR PBB SHADOW E&M-EST. PATIENT-LVL III: ICD-10-PCS | Mod: PBBFAC,,, | Performed by: STUDENT IN AN ORGANIZED HEALTH CARE EDUCATION/TRAINING PROGRAM

## 2023-11-15 PROCEDURE — 99499 NO LOS: ICD-10-PCS | Mod: S$GLB,,, | Performed by: STUDENT IN AN ORGANIZED HEALTH CARE EDUCATION/TRAINING PROGRAM

## 2023-11-15 PROCEDURE — 99499 UNLISTED E&M SERVICE: CPT | Mod: S$GLB,,, | Performed by: STUDENT IN AN ORGANIZED HEALTH CARE EDUCATION/TRAINING PROGRAM

## 2023-11-15 PROCEDURE — 99999 PR PBB SHADOW E&M-EST. PATIENT-LVL III: CPT | Mod: PBBFAC,,, | Performed by: STUDENT IN AN ORGANIZED HEALTH CARE EDUCATION/TRAINING PROGRAM

## 2023-11-15 NOTE — PROGRESS NOTES
"Patient showed up for her appointment, but it seems as if the appointment was scheduled by mistake. I just saw her 2 weeks ago on 10/30/23. She got her pneumovax 11/6/23. Plan is to get repeat labs drawn on 12/4/23. And then we have follow up scheduled 1/4/24.  I am not sure how or why today's appointment was scheduled (and the patient doesn't know either). I asked the patient if she had any new or worsening complaints, and she said no. I explained that if she wanted to further discuss anything, we could keep the appointment, but otherwise we could bill this as a "no level of service" and have her copay refunded. She opted for the latter.     Also she had an additional appointment with me scheduled for 11/29/23. As this will be before we have any new labs back, I have canceled the appointment. We still have follow up scheduled for 1/4/24.    James Cooper MD  Allergy/Immunology    "

## 2023-11-20 ENCOUNTER — CLINICAL SUPPORT (OUTPATIENT)
Dept: SPEECH THERAPY | Facility: HOSPITAL | Age: 73
End: 2023-11-20
Attending: OTOLARYNGOLOGY
Payer: MEDICARE

## 2023-11-20 DIAGNOSIS — J38.3 VOCAL FOLD SCAR: ICD-10-CM

## 2023-11-20 DIAGNOSIS — J38.02 BILATERAL PARTIAL VOCAL CORD PARALYSIS: ICD-10-CM

## 2023-11-20 DIAGNOSIS — J38.4 PRE-NODULAR EDEMA OF THE VOCAL FOLDS: ICD-10-CM

## 2023-11-20 PROCEDURE — 92507 TX SP LANG VOICE COMM INDIV: CPT | Mod: GN | Performed by: SPEECH-LANGUAGE PATHOLOGIST

## 2023-11-20 NOTE — PROGRESS NOTES
Referring provider: Dr. Andre Valera  Reason for visit:  Voice treatment (CPT 08073)  Session #1    History / Subjective   I had the pleasure of seeing Ms. Marleny Guzman for her first treatment session following complete voice evaluation on 11/1/23.  During that time, improvements were noted on chest voice focused exercises.  Since evaluation, she reports modest improvement.  She has been practicing at home.      Objective   The primary goal of todays session was to review HEP.  This was targeted using exuberant/chest voice treatment modalities.    Perceptual/behavioral assessment  -CAPE-V Overall Score: 42  -Quality: strained, rough, breaks  -Volume: decreased projection  -Pitch: appropriate for age and gender identity  -Flexibility: diminished  -Habitual respiratory pattern: chest/clavicular    Treatment  Practiced forward focused resonance with easy volume - not too loud or soft. She was able to access improved voice with resonant /u/.  She was able to continue stabilizing her voice with intention.  For home practice, clinician reviewed home exercises as practiced during the session with the patient.     Functional goals  Length Status Goal   Long term Ongoing  Patient will implement and adhere to vocal hygiene protocols on a daily basis, including the elimination of phonotraumatic behaviors.    Long term Ongoing  Patient and clinician will facilitate changes in vocal function in order to restore functional use of voice for daily occupational, social, and emotional demands.    Long term Ongoing  Patient will re-establish phonation with adequate balance of airflow and resonance with decreased muscle tension.    Long term Ongoing  Patient will improve coordination of respiration and phonation for efficient vocal production at a conversational level.    Short term Ongoing  Patient will complete SOVT exercises and/or resonant-focused exercises 3-5x daily to strengthen and balance the intrinsic laryngeal  musculature and maximize glottic closure without medial hyperfunction.   Short term Ongoing  Patient will improve the quality, efficiency, and ease of phonation through resonant and/or airflow exercises from the syllable to conversation level with 80% accuracy.   Short term Ongoing  Patient will discriminate between easy and strained phonation with 80% accuracy.        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 Valera.  Prognosis for continued improvement is fair.     Recommendations / POC    -Recommend 2-4 sessions of voice therapy over 4-6 weeks with a speech-language pathologist to optimize glottal postures for improved vocal function, vocal efficiency, and ease of phonation  -Continue exercises as discussed in session  -Contact clinician with any further questions

## 2023-12-01 NOTE — TELEPHONE ENCOUNTER
Care Due:                  Date            Visit Type   Department     Provider  --------------------------------------------------------------------------------                                MYCHART                              FOLLOWUP/OF  [facility] PRIMARY  Last Visit: 05-      FICE VISIT   CARE           Bridget Cantu                              MYCHART                              FOLLOWUP/OF  [facility] INTERNAL  Next Visit: 01-      FICE VISIT   MEDICINE       Bridget Cantu                                                            Last  Test          Frequency    Reason                     Performed    Due Date  --------------------------------------------------------------------------------    Lipid Panel.  12 months..  atorvastatin.............  02- 02-    [facility] Embedded Care Due Messages. Reference number: [id number].   12/01/2023 5:58:43 PM CST

## 2023-12-04 ENCOUNTER — LAB VISIT (OUTPATIENT)
Dept: LAB | Facility: HOSPITAL | Age: 73
End: 2023-12-04
Attending: STUDENT IN AN ORGANIZED HEALTH CARE EDUCATION/TRAINING PROGRAM
Payer: MEDICARE

## 2023-12-04 DIAGNOSIS — R76.0 ABNORMAL ANTIBODY TITER: ICD-10-CM

## 2023-12-04 DIAGNOSIS — Z86.19 FREQUENT INFECTIONS: ICD-10-CM

## 2023-12-04 PROCEDURE — 86317 IMMUNOASSAY INFECTIOUS AGENT: CPT | Mod: 59 | Performed by: STUDENT IN AN ORGANIZED HEALTH CARE EDUCATION/TRAINING PROGRAM

## 2023-12-04 PROCEDURE — 36415 COLL VENOUS BLD VENIPUNCTURE: CPT | Performed by: STUDENT IN AN ORGANIZED HEALTH CARE EDUCATION/TRAINING PROGRAM

## 2023-12-11 ENCOUNTER — OFFICE VISIT (OUTPATIENT)
Dept: UROLOGY | Facility: CLINIC | Age: 73
End: 2023-12-11
Payer: MEDICARE

## 2023-12-11 VITALS
DIASTOLIC BLOOD PRESSURE: 76 MMHG | BODY MASS INDEX: 29.35 KG/M2 | SYSTOLIC BLOOD PRESSURE: 134 MMHG | HEIGHT: 70 IN | WEIGHT: 205 LBS | HEART RATE: 62 BPM

## 2023-12-11 DIAGNOSIS — N39.41 URGE INCONTINENCE: ICD-10-CM

## 2023-12-11 DIAGNOSIS — R31.29 MICROSCOPIC HEMATURIA: Primary | ICD-10-CM

## 2023-12-11 DIAGNOSIS — I10 PRIMARY HYPERTENSION: ICD-10-CM

## 2023-12-11 LAB
BACTERIA #/AREA URNS AUTO: ABNORMAL /HPF
BILIRUB SERPL-MCNC: NORMAL MG/DL
BLOOD URINE, POC: NORMAL
CLARITY, POC UA: NORMAL
COLOR, POC UA: YELLOW
GLUCOSE UR QL STRIP: NORMAL
IMMUNOLOGIST REVIEW: NORMAL
KETONES UR QL STRIP: NORMAL
LEUKOCYTE ESTERASE URINE, POC: NORMAL
MICROSCOPIC COMMENT: ABNORMAL
NITRITE, POC UA: NORMAL
PH, POC UA: 6
PROTEIN, POC: NORMAL
RBC #/AREA URNS AUTO: 10 /HPF (ref 0–4)
S PN DA SERO 19F IGG SER-MCNC: 3.5 MCG/ML
S PNEUM DA 1 IGG SER-MCNC: 2.2 MCG/ML
S PNEUM DA 10A IGG SER-MCNC: 18.8 MCG/ML
S PNEUM DA 11A IGG SER-MCNC: 0.4 MCG/ML
S PNEUM DA 12F IGG SER-MCNC: 0.5 MCG/ML
S PNEUM DA 14 IGG SER-MCNC: 5.1 MCG/ML
S PNEUM DA 15B IGG SER-MCNC: 2.6 MCG/ML
S PNEUM DA 17F IGG SER-MCNC: 17.8 MCG/ML
S PNEUM DA 18C IGG SER-MCNC: 1.3 MCG/ML
S PNEUM DA 19A IGG SER-MCNC: 12.8 MCG/ML
S PNEUM DA 2 IGG SER-MCNC: >100 MCG/ML
S PNEUM DA 20A IGG SER-MCNC: 3.6 MCG/ML
S PNEUM DA 22F IGG SER-MCNC: 2.1 MCG/ML
S PNEUM DA 23F IGG SER-MCNC: 7 MCG/ML
S PNEUM DA 3 IGG SER-MCNC: 0.3 MCG/ML
S PNEUM DA 33F IGG SER-MCNC: 11.3 MCG/ML
S PNEUM DA 4 IGG SER-MCNC: 1.1 MCG/ML
S PNEUM DA 5 IGG SER-MCNC: 0.4 MCG/ML
S PNEUM DA 6B IGG SER-MCNC: 0.9 MCG/ML
S PNEUM DA 7F IGG SER-MCNC: 15.9 MCG/ML
S PNEUM DA 8 IGG SER-MCNC: 3.3 MCG/ML
S PNEUM DA 9N IGG SER-MCNC: 1.5 MCG/ML
S PNEUM DA 9V IGG SER-MCNC: 3.3 MCG/ML
SPECIFIC GRAVITY, POC UA: 1.02
SQUAMOUS #/AREA URNS AUTO: 5 /HPF
UROBILINOGEN, POC UA: NORMAL
WBC #/AREA URNS AUTO: 1 /HPF (ref 0–5)

## 2023-12-11 PROCEDURE — 3044F HG A1C LEVEL LT 7.0%: CPT | Mod: CPTII,S$GLB,,

## 2023-12-11 PROCEDURE — 1159F PR MEDICATION LIST DOCUMENTED IN MEDICAL RECORD: ICD-10-PCS | Mod: CPTII,S$GLB,,

## 2023-12-11 PROCEDURE — 3061F NEG MICROALBUMINURIA REV: CPT | Mod: CPTII,S$GLB,,

## 2023-12-11 PROCEDURE — 3066F NEPHROPATHY DOC TX: CPT | Mod: CPTII,S$GLB,,

## 2023-12-11 PROCEDURE — 1101F PT FALLS ASSESS-DOCD LE1/YR: CPT | Mod: CPTII,S$GLB,,

## 2023-12-11 PROCEDURE — 1159F MED LIST DOCD IN RCRD: CPT | Mod: CPTII,S$GLB,,

## 2023-12-11 PROCEDURE — 3078F PR MOST RECENT DIASTOLIC BLOOD PRESSURE < 80 MM HG: ICD-10-PCS | Mod: CPTII,S$GLB,,

## 2023-12-11 PROCEDURE — 81002 URINALYSIS NONAUTO W/O SCOPE: CPT | Mod: S$GLB,,,

## 2023-12-11 PROCEDURE — 3288F FALL RISK ASSESSMENT DOCD: CPT | Mod: CPTII,S$GLB,,

## 2023-12-11 PROCEDURE — 3075F PR MOST RECENT SYSTOLIC BLOOD PRESS GE 130-139MM HG: ICD-10-PCS | Mod: CPTII,S$GLB,,

## 2023-12-11 PROCEDURE — 3008F BODY MASS INDEX DOCD: CPT | Mod: CPTII,S$GLB,,

## 2023-12-11 PROCEDURE — 1126F AMNT PAIN NOTED NONE PRSNT: CPT | Mod: CPTII,S$GLB,,

## 2023-12-11 PROCEDURE — 81002 POCT URINE DIPSTICK WITHOUT MICROSCOPE: ICD-10-PCS | Mod: S$GLB,,,

## 2023-12-11 PROCEDURE — 1160F PR REVIEW ALL MEDS BY PRESCRIBER/CLIN PHARMACIST DOCUMENTED: ICD-10-PCS | Mod: CPTII,S$GLB,,

## 2023-12-11 PROCEDURE — 1126F PR PAIN SEVERITY QUANTIFIED, NO PAIN PRESENT: ICD-10-PCS | Mod: CPTII,S$GLB,,

## 2023-12-11 PROCEDURE — 3075F SYST BP GE 130 - 139MM HG: CPT | Mod: CPTII,S$GLB,,

## 2023-12-11 PROCEDURE — 3044F PR MOST RECENT HEMOGLOBIN A1C LEVEL <7.0%: ICD-10-PCS | Mod: CPTII,S$GLB,,

## 2023-12-11 PROCEDURE — 3078F DIAST BP <80 MM HG: CPT | Mod: CPTII,S$GLB,,

## 2023-12-11 PROCEDURE — 81001 URINALYSIS AUTO W/SCOPE: CPT

## 2023-12-11 PROCEDURE — 1101F PR PT FALLS ASSESS DOC 0-1 FALLS W/OUT INJ PAST YR: ICD-10-PCS | Mod: CPTII,S$GLB,,

## 2023-12-11 PROCEDURE — 99999 PR PBB SHADOW E&M-EST. PATIENT-LVL IV: CPT | Mod: PBBFAC,,,

## 2023-12-11 PROCEDURE — 99214 PR OFFICE/OUTPT VISIT, EST, LEVL IV, 30-39 MIN: ICD-10-PCS | Mod: S$GLB,,,

## 2023-12-11 PROCEDURE — 3288F PR FALLS RISK ASSESSMENT DOCUMENTED: ICD-10-PCS | Mod: CPTII,S$GLB,,

## 2023-12-11 PROCEDURE — 99999 PR PBB SHADOW E&M-EST. PATIENT-LVL IV: ICD-10-PCS | Mod: PBBFAC,,,

## 2023-12-11 PROCEDURE — 1160F RVW MEDS BY RX/DR IN RCRD: CPT | Mod: CPTII,S$GLB,,

## 2023-12-11 PROCEDURE — 3008F PR BODY MASS INDEX (BMI) DOCUMENTED: ICD-10-PCS | Mod: CPTII,S$GLB,,

## 2023-12-11 PROCEDURE — 3061F PR NEG MICROALBUMINURIA RESULT DOCUMENTED/REVIEW: ICD-10-PCS | Mod: CPTII,S$GLB,,

## 2023-12-11 PROCEDURE — 99214 OFFICE O/P EST MOD 30 MIN: CPT | Mod: S$GLB,,,

## 2023-12-11 PROCEDURE — 3066F PR DOCUMENTATION OF TREATMENT FOR NEPHROPATHY: ICD-10-PCS | Mod: CPTII,S$GLB,,

## 2023-12-11 NOTE — PROGRESS NOTES
CHIEF COMPLAINT:  Microscopic hematuria follow up      HISTORY OF PRESENTING ILLINESS:  Marleny Guzman is a 72 y.o. established to [facility]. This is a new patient to for me. I personally reviewed their recent medical records as well as their outside medical, surgical, family, & social history. She had a negative hematuria workup 9/12/23 with Dr. Aragon. Presents today for annual follow up. She has urge incontinence, wears pads daily. Experiences nocturia as well. She has never tried an OAB medication.      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 Gonzalez     She is here today due to Hematuria.   Consult from Dr. Cantu due to microscopic hematuria.     07/25/2022 RBC's 5  04/05/2022 RBC's 10  03/24/2022 RBC's 4  10/10/2020 RBC's 11     Denies ever seeing blood in her urine.     08/11/2022 CTUrogram  -no masses or obstructive uropathy.  -Small calcifications along the medullary pyramid suspicious for medullary nephrocalcinosis.          REVIEW OF SYSTEMS:  Review of Systems   Constitutional:  Negative for chills and fever.   HENT:  Negative for congestion and sore throat.    Respiratory:  Negative for cough and shortness of breath.    Cardiovascular:  Negative for chest pain and palpitations.   Gastrointestinal:  Negative for nausea and vomiting.   Genitourinary:  Positive for urgency. Negative for dysuria, flank pain, frequency and hematuria.   Neurological:  Negative for dizziness and headaches.         PATIENT HISTORY:  Past Medical History:   Diagnosis Date    Abnormal Pap smear of vagina 07/20/2016    Anemia     Cataract     Chronic bilateral low back pain without sciatica 03/13/2018    Depression with anxiety     Hypertension     Osteoarthritis 06/20/2013    Recurrent upper respiratory infection (URI)     Right rotator cuff tear     Sleep apnea     Cipap machine at home    Urinary incontinence     Leaks urine with laughing/  coughing/ sneezing       Past Surgical History:   Procedure Laterality Date    CATARACT EXTRACTION  06/18/2012    OS    CATARACT EXTRACTION  07/16/2012    OD    CERVIX SURGERY      Conization    COLONOSCOPY      COLONOSCOPY N/A 03/04/2016    Procedure: COLONOSCOPY;  Surgeon: Tenzin Thakkar MD;  Location: [facility] ENDO (4TH FLR);  Service: Endoscopy;  Laterality: N/A;    COLONOSCOPY Left 01/25/2021    Procedure: COLONOSCOPY;  Surgeon: Wayne Naranjo MD;  Location: [facility] ENDO;  Service: Endoscopy;  Laterality: Left;    COLONOSCOPY N/A 06/19/2023    Procedure: COLONOSCOPY;  Surgeon: Tenzin Thakkar MD;  Location: [facility] ENDO (2ND FLR);  Service: Endoscopy;  Laterality: N/A;  lung issues  referral Dr [facility]ard[facility]kftjw-yozp-ewcm[facility] portal-  6/13/23- no answer for precall- ks    CYSTOSCOPY N/A 09/12/2022    Procedure: CYSTOSCOPY;  Surgeon: Bianka Aragon MD;  Location: [facility] 1ST FLR;  Service: Urology;  Laterality: N/A;    ESOPHAGOGASTRODUODENOSCOPY Left 01/25/2021    Procedure: EGD (ESOPHAGOGASTRODUODENOSCOPY);  Surgeon: Wayne Naranjo MD;  Location: [facility];  Service: Endoscopy;  Laterality: Left;    EYE SURGERY Bilateral     cataract    HYSTERECTOMY  05/12/2015    LARYNGOSCOPY N/A 9/28/2023    Procedure: Suspension microlaryngoscopy with bilateral vocal fold injection augmentation - Restylane;  Surgeon: Andre Valera MD;  Location: [facility];  Service: ENT;  Laterality: N/A;  Microscope, telescopes, tower, injector, Restylane    LEEP      LUMBAR EPIDURAL INJECTION      OOPHORECTOMY      WY REMOVAL OF OVARY/TUBE(S)  05/12/2015    RETROGRADE PYELOGRAPHY Bilateral 09/12/2022    Procedure: PYELOGRAM, RETROGRADE;  Surgeon: Bianka Aragon MD;  Location: [facility] 1ST FLR;  Service: Urology;  Laterality: Bilateral;    ROBOT-ASSISTED REPAIR OF VENTRAL HERNIA USING DA ASPEN XI N/A 11/29/2021    Procedure: XI ROBOTIC REPAIR, HERNIA, VENTRAL w/ possible mesh;  Surgeon: Roney Jackson MD;  Location: [facility]  "2ND FLR;  Service: General;  Laterality: N/A;  Anesthesia Block    SALPINGECTOMY Left     SHOULDER SURGERY Right     SINUS SURGERY         Family History   Problem Relation Age of Onset    Cancer Mother         THYROID CANCER    Stroke Mother     Hypertension Father     Cataracts Father     Heart disease Sister     Pacemaker/defibrilator Sister     Hypertension Sister     Deep vein thrombosis Sister     Heart disease Sister         CABG    Osteoarthritis Sister     Edema Sister     Eczema Sister     Hypertension Sister     Breast cancer Sister 72    Anuerysm Sister     Drug abuse Sister     Hypertension Sister     No Known Problems Sister     Cancer Sister         Cancer cells or "growth in her stomach"    No Known Problems Sister     No Known Problems Brother     Depression Maternal Grandmother     Ulcers Maternal Grandfather         Legs    No Known Problems Paternal Grandmother     No Known Problems Paternal Grandfather     Hypertension Daughter     Other Daughter         Heart palpitations    Cancer Maternal Aunt     Breast cancer Cousin     ADD / ADHD Neg Hx     Alcohol abuse Neg Hx     Anxiety disorder Neg Hx     Bipolar disorder Neg Hx     Dementia Neg Hx     OCD Neg Hx     Paranoid behavior Neg Hx     Physical abuse Neg Hx     Schizophrenia Neg Hx     Seizures Neg Hx     Sexual abuse Neg Hx     Amblyopia Neg Hx     Blindness Neg Hx     Glaucoma Neg Hx     Macular degeneration Neg Hx     Retinal detachment Neg Hx     Strabismus Neg Hx     Anesthesia problems Neg Hx        Social History     Socioeconomic History    Marital status:    Occupational History    Occupation: Disability     Comment: Depression     Employer: DISABLED   Tobacco Use    Smoking status: Former     Current packs/day: 0.00     Average packs/day: 0.5 packs/day for 22.0 years (11.0 ttl pk-yrs)     Types: Cigarettes     Start date: 1975     Quit date: 1997     Years since quittin.9     Passive exposure: Past    Smokeless " tobacco: Never    Tobacco comments:     Some wheezing since COVID-19 infection-2/2020   Substance and Sexual Activity    Alcohol use: Yes     Alcohol/week: 0.0 standard drinks of alcohol     Comment: Occassionally for social events will have a glass of wine    Drug use: No    Sexual activity: Not Currently     Partners: Male   Other Topics Concern    Caffeine Use Yes    Financial Status: Disabled Yes    Firearms: Does patient have access to a firearm? No    Home situation: lives alone Yes    Spirituality: Active Participation Yes    Education: Unfinished college Yes    Spirituality: Organized Alevism Yes    Legal: Arrest history No   Social History Narrative    Marleny currently does operate an automobile.     Social Determinants of Health     Financial Resource Strain: Low Risk  (3/17/2022)    Overall Financial Resource Strain (CARDIA)     Difficulty of Paying Living Expenses: Not hard at all   Food Insecurity: No Food Insecurity (3/17/2022)    Hunger Vital Sign     Worried About Running Out of Food in the Last Year: Never true     Ran Out of Food in the Last Year: Never true   Transportation Needs: No Transportation Needs (3/17/2022)    PRAPARE - Transportation     Lack of Transportation (Medical): No     Lack of Transportation (Non-Medical): No   Physical Activity: Inactive (3/17/2022)    Exercise Vital Sign     Days of Exercise per Week: 0 days     Minutes of Exercise per Session: 0 min   Stress: Stress Concern Present (3/17/2022)    [place] [employer] - Occupational Stress Questionnaire     Feeling of Stress : To some extent   Social Connections: Socially Isolated (3/17/2022)    Social Connection and Isolation Panel [NHANES]     Frequency of Communication with Friends and Family: More than three times a week     Frequency of Social Gatherings with Friends and Family: Twice a week     Attends Latter-day Services: Never     Active Member of Clubs or Organizations: No     Attends Club or  Organization Meetings: Never     Marital Status:    Housing Stability: Low Risk  (3/17/2022)    Housing Stability Vital Sign     Unable to Pay for Housing in the Last Year: No     Number of Places Lived in the Last Year: 1     Unstable Housing in the Last Year: No       Allergies:  Flowers, Grass pollen-june grass standard, House dust, Peppermint and derivatives, and Spray band    Medications:    Current Outpatient Medications:     aspirin 81 MG Chew, TAKE 1 TABLET BY MOUTH EVERY DAY (Patient taking differently: No sig reported), Disp: 90 tablet, Rfl: 1    atorvastatin (LIPITOR) 20 MG tablet, TAKE 1 TABLET BY MOUTH EVERY DAY, Disp: 90 tablet, Rfl: 3    azelastine (ASTELIN) 137 mcg (0.1 %) nasal spray, 1 spray (137 mcg total) by Nasal route 2 (two) times daily., Disp: 15 mL, Rfl: 2    budesonide (PULMICORT) 0.25 mg/2 mL nebulizer solution, EMPTY CONTENTS OF 1 CAPSULE INTO NASAL IRRIGATION SYSTEM, ADD DISTILLED WATER, SALT PACK, MIX & IRRIGATE. PERFORM 1-2 TIMES DAILY., Disp: , Rfl:     clobetasol 0.05% (TEMOVATE) 0.05 % Oint, Apply topically 2 (two) times daily., Disp: 60 g, Rfl: 1    ergocalciferol (ERGOCALCIFEROL) 50,000 unit Cap, TAKE 1 CAPSULE (50,000 UNITS TOTAL) BY MOUTH EVERY 30 DAYS, Disp: 3 capsule, Rfl: 3    EScitalopram oxalate (LEXAPRO) 20 MG tablet, TAKE 1 TABLET (20 MG TOTAL) BY MOUTH ONCE DAILY, Disp: 90 tablet, Rfl: 3    estradioL (ESTRACE) 0.01 % (0.1 mg/gram) vaginal cream, Place 1 g vaginally twice a week., Disp: 42.5 g, Rfl: 4    fluticasone propionate (FLONASE) 50 mcg/actuation nasal spray, Use 1-2 sprays in each nostril twice daily, Disp: 16 g, Rfl: 11    irbesartan-hydrochlorothiazide (AVALIDE) 300-12.5 mg per tablet, Take 1 tablet by mouth once daily., Disp: 90 tablet, Rfl: 3    loratadine (CLARITIN) 10 mg tablet, Take 1 tablet (10 mg total) by mouth once daily., Disp: 30 tablet, Rfl: 11    LORazepam (ATIVAN) 0.5 MG tablet, TAKE 1/2 TO 1 TABLET BY MOUTH DAILY AS NEEDED FOR ANXIETY,  Disp: 30 tablet, Rfl: 0    metoprolol succinate (TOPROL-XL) 50 MG 24 hr tablet, TAKE 1 TABLET BY MOUTH EVERY DAY, Disp: 90 tablet, Rfl: 3    nystatin (MYCOSTATIN) cream, Apply topically 2 (two) times daily., Disp: 30 g, Rfl: 1    orphenadrine (NORFLEX) 100 mg tablet, TAKE 1 TABLET BY MOUTH EVERY DAY AS NEEDED FOR MUSCLE SPASM, Disp: 30 tablet, Rfl: 3    pulse oximeter (PULSE OXIMETER) device, by Apply Externally route 2 (two) times a day. Use twice daily at 8 AM and 3 PM and record the value in MyChart as directed., Disp: 1 each, Rfl: 0    triamcinolone acetonide 0.1% (KENALOG) 0.1 % cream, APPLY TOPICALLY TWICE A DAY, Disp: 45 g, Rfl: 0    albuterol (PROVENTIL/VENTOLIN HFA) 90 mcg/actuation inhaler, Inhale 1-2 puffs into the lungs every 6 (six) hours as needed for Wheezing or Shortness of Breath (Please dispense with spacer)., Disp: 6.7 g, Rfl: 0    vibegron 75 mg Tab, Take 75 mg by mouth once. for 1 dose, Disp: 90 tablet, Rfl: 3    Current Facility-Administered Medications:     ciprofloxacin HCl tablet 500 mg, 500 mg, Oral, Once, Malu Barragan, NP    LIDOcaine HCl 2% urojet, , Urethral, Once, Malu Barragan, NP    Facility-Administered Medications Ordered in Other Visits:     0.9%  NaCl infusion, , Intravenous, Continuous, Roney Jackson MD, Last Rate: 0 mL/hr at 11/29/21 1755, New Bag at 09/28/23 0851    fentaNYL 50 mcg/mL injection  mcg,  mcg, Intravenous, PRN, Anthony Hernandez MD, 100 mcg at 09/28/23 0854    LIDOcaine (PF) 10 mg/ml (1%) injection 10 mg, 1 mL, Intradermal, Once, Roney Jackson MD    midazolam (VERSED) 1 mg/mL injection 0.5-4 mg, 0.5-4 mg, Intravenous, PRN, Anthony Hernandez MD, 1 mg at 11/29/21 1109    prochlorperazine injection Soln 5 mg, 5 mg, Intravenous, Q30 Min PRN, Beth Ramsey MD    sodium chloride 0.9% flush 10 mL, 10 mL, Intravenous, PRN, Beth Ramsey MD    PHYSICAL EXAMINATION:  Physical Exam  Constitutional:       Appearance:  Normal appearance.   HENT:      Head: Normocephalic and atraumatic.      Right Ear: External ear normal.      Left Ear: External ear normal.      Nose: Nose normal.      Mouth/Throat:      Mouth: Mucous membranes are moist.   Pulmonary:      Effort: Pulmonary effort is normal. No respiratory distress.   Skin:     General: Skin is warm and dry.   Neurological:      General: No focal deficit present.      Mental Status: She is alert and oriented to person, place, and time.   Psychiatric:         Mood and Affect: Mood normal.         Behavior: Behavior normal.           LABS:  UA trace protein, trace blood     Lab Results   Component Value Date    CREATININE 1.0 05/29/2023    EGFRNORACEVR 59.5 (A) 05/29/2023             IMPRESSION:    Encounter Diagnoses   Name Primary?    Microscopic hematuria Yes    Urge incontinence     Primary hypertension          Assessment:       1. Microscopic hematuria    2. Urge incontinence    3. Primary hypertension        Plan:   - Clean catch urine sent for micro UA.  - Trial of 75 mg vibegron sent to pharmacy. Discussed MoA and possible SE.    RTC 6 weeks for medication check    I spent 30 minutes with the patient of which more than half was spent in direct consultation with the patient in regards to our treatment and plan.  We addressed the office findings and recent labs.   Education and recommendations of today's plan of care including home remedies and needed follow up with PCP.   We discussed the chief complaint; reviewed the LUTS and the possible contributory factors.

## 2023-12-12 ENCOUNTER — PATIENT MESSAGE (OUTPATIENT)
Dept: UROLOGY | Facility: CLINIC | Age: 73
End: 2023-12-12
Payer: MEDICARE

## 2023-12-15 ENCOUNTER — PATIENT MESSAGE (OUTPATIENT)
Dept: ALLERGY | Facility: CLINIC | Age: 73
End: 2023-12-15
Payer: MEDICARE

## 2023-12-26 ENCOUNTER — TELEPHONE (OUTPATIENT)
Dept: INTERNAL MEDICINE | Facility: CLINIC | Age: 73
End: 2023-12-26
Payer: MEDICARE

## 2023-12-26 NOTE — TELEPHONE ENCOUNTER
Patient thought she hqd and appointment on today but she schedule for next week so asked that we disregard  her message .  She schedule for next Tuesday

## 2023-12-26 NOTE — TELEPHONE ENCOUNTER
----- Message from Melvin Abel MA sent at 12/26/2023  2:34 PM CST -----  Contact: [phone]  Pt called              In regards to needing staff to please give a call back.

## 2023-12-29 ENCOUNTER — TELEPHONE (OUTPATIENT)
Dept: INTERNAL MEDICINE | Facility: CLINIC | Age: 73
End: 2023-12-29
Payer: MEDICARE

## 2023-12-29 NOTE — TELEPHONE ENCOUNTER
----- Message from Betsey Pal sent at 12/28/2023 12:43 PM CST -----  Contact: Pt [phone]  Patient is requesting a call back concerning her appointment on 1/2/2024, for rescheduling.    Please call and advise.    Thank You    ##Please do NOT reply to sender as this is from the call center and they answer incoming calls only.

## 2024-01-04 ENCOUNTER — OFFICE VISIT (OUTPATIENT)
Dept: ALLERGY | Facility: CLINIC | Age: 74
End: 2024-01-04
Payer: MEDICARE

## 2024-01-04 VITALS
SYSTOLIC BLOOD PRESSURE: 136 MMHG | HEART RATE: 61 BPM | WEIGHT: 207.44 LBS | BODY MASS INDEX: 29.77 KG/M2 | DIASTOLIC BLOOD PRESSURE: 79 MMHG

## 2024-01-04 DIAGNOSIS — Z87.01 HISTORY OF RECURRENT PNEUMONIA: ICD-10-CM

## 2024-01-04 DIAGNOSIS — Z86.19 FREQUENT INFECTIONS: ICD-10-CM

## 2024-01-04 DIAGNOSIS — J31.0 CHRONIC RHINITIS: Primary | ICD-10-CM

## 2024-01-04 PROCEDURE — 1159F MED LIST DOCD IN RCRD: CPT | Mod: CPTII,S$GLB,, | Performed by: STUDENT IN AN ORGANIZED HEALTH CARE EDUCATION/TRAINING PROGRAM

## 2024-01-04 PROCEDURE — 3008F BODY MASS INDEX DOCD: CPT | Mod: CPTII,S$GLB,, | Performed by: STUDENT IN AN ORGANIZED HEALTH CARE EDUCATION/TRAINING PROGRAM

## 2024-01-04 PROCEDURE — 3078F DIAST BP <80 MM HG: CPT | Mod: CPTII,S$GLB,, | Performed by: STUDENT IN AN ORGANIZED HEALTH CARE EDUCATION/TRAINING PROGRAM

## 2024-01-04 PROCEDURE — 99999 PR PBB SHADOW E&M-EST. PATIENT-LVL IV: CPT | Mod: PBBFAC,,, | Performed by: STUDENT IN AN ORGANIZED HEALTH CARE EDUCATION/TRAINING PROGRAM

## 2024-01-04 PROCEDURE — 1160F RVW MEDS BY RX/DR IN RCRD: CPT | Mod: CPTII,S$GLB,, | Performed by: STUDENT IN AN ORGANIZED HEALTH CARE EDUCATION/TRAINING PROGRAM

## 2024-01-04 PROCEDURE — 99213 OFFICE O/P EST LOW 20 MIN: CPT | Mod: S$GLB,,, | Performed by: STUDENT IN AN ORGANIZED HEALTH CARE EDUCATION/TRAINING PROGRAM

## 2024-01-04 PROCEDURE — 1126F AMNT PAIN NOTED NONE PRSNT: CPT | Mod: CPTII,S$GLB,, | Performed by: STUDENT IN AN ORGANIZED HEALTH CARE EDUCATION/TRAINING PROGRAM

## 2024-01-04 PROCEDURE — 3075F SYST BP GE 130 - 139MM HG: CPT | Mod: CPTII,S$GLB,, | Performed by: STUDENT IN AN ORGANIZED HEALTH CARE EDUCATION/TRAINING PROGRAM

## 2024-01-04 RX ORDER — AZELASTINE 1 MG/ML
SPRAY, METERED NASAL
Qty: 30 ML | Refills: 11 | Status: SHIPPED | OUTPATIENT
Start: 2024-01-04

## 2024-01-04 NOTE — PROGRESS NOTES
ALLERGY & IMMUNOLOGY CLINIC - FOLLOW UP     HISTORY OF PRESENT ILLNESS     Patient ID: Marleny Guzman is a 73 y.o. female    CC: chronic rhinitis, history of frequent infections    HPI: Marleny Guzman is a 73 y.o. female with ILD and HTN, following up for chronic rhinitis and frequent infections.    She says she had a bad cold and sinus infection the week of xmas (1-2 weeks ago). She had some augmentin leftover from a previous infection, so she took it BID for a week, and it helped. She was dealing with symptoms for about a week before she started the antibiotics. She was having coughing, sneezing, chills.   She has otherwise not required antibiotics.     She reports her nasal symptoms are doing well. She is using the nasal sprays. She is not using them every day, maybe twice per week. But she does think they help.  She stopped using the claritin every day as well. But she thinks it was helpful.  She is using the sinus rinses prn.    Her ears have been buzzing and itching.      MEDICAL HISTORY     Vaccines:   Immunization History   Administered Date(s) Administered    COVID-19 Vaccine 07/27/2021, 08/20/2021    COVID-19, MRNA, LN-S, PF (Pfizer) (Purple Cap) 07/27/2021, 08/20/2021    Influenza 11/10/2008, 09/20/2010, 09/19/2011, 01/09/2013, 10/22/2013    Influenza (FLUAD) - Quadrivalent - Adjuvanted - PF *Preferred* (65+) 10/28/2022    Influenza - High Dose - PF (65 years and older) 12/29/2014, 09/29/2015, 09/01/2016, 09/27/2017, 10/22/2017, 10/16/2018, 11/26/2019, 10/15/2020    Influenza - Quadrivalent - High Dose - PF (65 years and older) 10/08/2020, 11/17/2021    Influenza - Trivalent (ADULT) 01/09/2013, 10/22/2013    Influenza Split 11/10/2008, 09/20/2010, 09/19/2011, 01/09/2013, 10/22/2013    Pneumococcal Conjugate - 13 Valent 09/29/2015    Pneumococcal Polysaccharide - 23 Valent 12/29/2014, 11/06/2023    Tdap 10/22/2013, 02/01/2020    Zoster 01/29/2018    Zoster Recombinant 08/22/2019, 12/04/2019      Medical Hx:   Patient Active Problem List   Diagnosis    Anxiety    HTN (hypertension)    Osteoarthritis    Depression    Sleep apnea    Postoperative pain    Rotator cuff tear    Cervical stenosis (uterine cervix)    Cervicogenic headache    Cervical myofascial pain syndrome    Subjective memory complaints    Insomnia    HGSIL (high grade squamous intraepithelial lesion) on Pap smear of cervix    Thoracic aorta atherosclerosis    HA (headache)    Fall    Imbalance    Anemia    Vaginal dysplasia    DDD (degenerative disc disease), lumbar    Chronic bilateral low back pain without sciatica    Weakness of left lower extremity    Chronic pain    Dyspnea    Elevated troponin    Syncope    SIRS (systemic inflammatory response syndrome)    Fever    Oral thrush    Invasive fungal sinusitis    Abnormal MRI    Chronic maxillary sinusitis    Acute recurrent maxillary sinusitis    ILD (interstitial lung disease)    WOOTEN (dyspnea on exertion)    Abnormal CT of the chest    Mediastinal adenopathy    Lung nodule    Wears contact lenses    Hematuria    Recurrent major depressive disorder, in partial remission    Pulmonary hypertension    Bilateral partial vocal cord paralysis     Surgical Hx:   Past Surgical History:   Procedure Laterality Date    CATARACT EXTRACTION  06/18/2012    OS    CATARACT EXTRACTION  07/16/2012    OD    CERVIX SURGERY      Conization    COLONOSCOPY      COLONOSCOPY N/A 03/04/2016    Procedure: COLONOSCOPY;  Surgeon: Tenzin Thakkar MD;  Location: [facility] (4TH FLR);  Service: Endoscopy;  Laterality: N/A;    COLONOSCOPY Left 01/25/2021    Procedure: COLONOSCOPY;  Surgeon: Wayne Naranjo MD;  Location: [facility];  Service: Endoscopy;  Laterality: Left;    COLONOSCOPY N/A 06/19/2023    Procedure: COLONOSCOPY;  Surgeon: Tenzin Thakkar MD;  Location: [facility] (2ND FLR);  Service: Endoscopy;  Laterality: N/A;  lung issues  referral Dr [id number] portal-GT  6/13/23- no answer for precall- ks     CYSTOSCOPY N/A 09/12/2022    Procedure: CYSTOSCOPY;  Surgeon: Bianka Aragon MD;  [facility] [facility] [place] 1[facility];  Service: Urology;  Laterality: N/A;    ESOPHAGOGASTRODUODENOSCOPY Left 01/25/2021    Procedure: EGD (ESOPHAGOGASTRODUODENOSCOPY);  Surgeon: Wayne Naranjo MD;  Location: [facility] ENDO;  Service: Endoscopy;  Laterality: Left;    EYE SURGERY Bilateral     cataract    HYSTERECTOMY  05/12/2015    LARYNGOSCOPY N/A 9/28/2023    Procedure: Suspension microlaryngoscopy with bilateral vocal fold injection augmentation - Restylane;  Surgeon: Andre Valera MD;  Location: [facility];  Service: ENT;  Laterality: N/A;  Microscope, telescopes, tower, injector, Restylane    LEEP      LUMBAR EPIDURAL INJECTION      OOPHORECTOMY      [place] REMOVAL OF OVARY/TUBE(S)  05/12/2015    RETROGRADE PYELOGRAPHY Bilateral 09/12/2022    Procedure: PYELOGRAM, RETROGRADE;  Surgeon: Bianka Aragon MD;  Location: [facility] 1ST FLR;  Service: Urology;  Laterality: Bilateral;    ROBOT-ASSISTED REPAIR OF VENTRAL HERNIA USING DA ASPEN XI N/A 11/29/2021    Procedure: XI ROBOTIC REPAIR, HERNIA, VENTRAL w/ possible mesh;  Surgeon: Roney Jackson MD;  Location: [facility] 2ND FLR;  Service: General;  Laterality: N/A;  Anesthesia Block    SALPINGECTOMY Left     SHOULDER SURGERY Right     SINUS SURGERY       Medications:   Current Outpatient Medications on File Prior to Visit   Medication Sig Dispense Refill    aspirin 81 MG Chew TAKE 1 TABLET BY MOUTH EVERY DAY (Patient taking differently: No sig reported) 90 tablet 1    atorvastatin (LIPITOR) 20 MG tablet TAKE 1 TABLET BY MOUTH EVERY DAY 90 tablet 3    azelastine (ASTELIN) 137 mcg (0.1 %) nasal spray 1 spray (137 mcg total) by Nasal route 2 (two) times daily. 15 mL 2    budesonide (PULMICORT) 0.25 mg/2 mL nebulizer solution EMPTY CONTENTS OF 1 CAPSULE INTO NASAL IRRIGATION SYSTEM, ADD DISTILLED WATER, SALT PACK, MIX & IRRIGATE. PERFORM 1-2 TIMES DAILY.      clobetasol 0.05%  (TEMOVATE) 0.05 % Oint Apply topically 2 (two) times daily. 60 g 1    ergocalciferol (ERGOCALCIFEROL) 50,000 unit Cap TAKE 1 CAPSULE (50,000 UNITS TOTAL) BY MOUTH EVERY 30 DAYS 3 capsule 3    EScitalopram oxalate (LEXAPRO) 20 MG tablet TAKE 1 TABLET (20 MG TOTAL) BY MOUTH ONCE DAILY 90 tablet 3    estradioL (ESTRACE) 0.01 % (0.1 mg/gram) vaginal cream Place 1 g vaginally twice a week. 42.5 g 4    fluticasone propionate (FLONASE) 50 mcg/actuation nasal spray Use 1-2 sprays in each nostril twice daily 16 g 11    irbesartan-hydrochlorothiazide (AVALIDE) 300-12.5 mg per tablet Take 1 tablet by mouth once daily. 90 tablet 3    loratadine (CLARITIN) 10 mg tablet Take 1 tablet (10 mg total) by mouth once daily. 30 tablet 11    LORazepam (ATIVAN) 0.5 MG tablet TAKE 1/2 TO 1 TABLET BY MOUTH DAILY AS NEEDED FOR ANXIETY 30 tablet 0    metoprolol succinate (TOPROL-XL) 50 MG 24 hr tablet TAKE 1 TABLET BY MOUTH EVERY DAY 90 tablet 3    nystatin (MYCOSTATIN) cream Apply topically 2 (two) times daily. 30 g 1    orphenadrine (NORFLEX) 100 mg tablet TAKE 1 TABLET BY MOUTH EVERY DAY AS NEEDED FOR MUSCLE SPASM 30 tablet 3    pulse oximeter (PULSE OXIMETER) device by Apply Externally route 2 (two) times a day. Use twice daily at 8 AM and 3 PM and record the value in [employer] as directed. 1 each 0    triamcinolone acetonide 0.1% (KENALOG) 0.1 % cream APPLY TOPICALLY TWICE A DAY 45 g 0    albuterol (PROVENTIL/VENTOLIN HFA) 90 mcg/actuation inhaler Inhale 1-2 puffs into the lungs every 6 (six) hours as needed for Wheezing or Shortness of Breath (Please dispense with spacer). 6.7 g 0     Current Facility-Administered Medications on File Prior to Visit   Medication Dose Route Frequency Provider Last Rate Last Admin    0.9%  NaCl infusion   Intravenous Continuous Roney Jackson MD 0 mL/hr at 11/29/21 7539 New Bag at 09/28/23 0868    ciprofloxacin HCl tablet 500 mg  500 mg Oral Once Malu Barragan NP        fentaNYL 50 mcg/mL  injection  mcg   mcg Intravenous PRN Anthony Hernandez MD   100 mcg at 23 0854    LIDOcaine (PF) 10 mg/ml (1%) injection 10 mg  1 mL Intradermal Once Roney Jackson MD        LIDOcaine HCl 2% urojet   Urethral Once Malu Barragan NP        midazolam (VERSED) 1 mg/mL injection 0.5-4 mg  0.5-4 mg Intravenous PRN Anthony Hernandez MD   1 mg at 21 1109    prochlorperazine injection Soln 5 mg  5 mg Intravenous Q30 Min PRN Beth Ramsey MD        sodium chloride 0.9% flush 10 mL  10 mL Intravenous PRN Beth Ramsey MD         Drug Allergies:   Review of patient's allergies indicates:   Allergen Reactions    Flowers     Grass pollen- grass standard     House dust     Peppermint and derivatives     Spray band      Social Hx:   Social History     Tobacco Use    Smoking status: Former     Current packs/day: 0.00     Average packs/day: 0.5 packs/day for 22.0 years (11.0 ttl pk-yrs)     Types: Cigarettes     Start date: 1975     Quit date: 1997     Years since quittin.0     Passive exposure: Past    Smokeless tobacco: Never    Tobacco comments:     Some wheezing since COVID-19 infection-2020   Substance Use Topics    Alcohol use: Yes     Alcohol/week: 0.0 standard drinks of alcohol     Comment: Occassionally for social events will have a glass of wine    Drug use: No     Additional History Obtained at Initial Visit:  H/o Asthma: denies  H/o Rhinitis: endorses  Env/Occ:   Pets: 2 dogs; she hasn't noticed that they trigger symptoms   Occupation: caregiver  Infection Hx: Denies frequent infections requiring antibiotics over the past year. She says she used to get more infections. Based on chart review, in the last 12 months, she has had 2 episodes of sinusitis requiring antibiotics, and one episode of bronchitis requiring antibiotics. She reports she has had pneumonia, last occurred in 2022. She thinks she has had pneumonia 2-3 times.     PHYSICAL EXAM      VS: /79 (BP Location: Left arm, Patient Position: Sitting, BP Method: Medium (Automatic))   Pulse 61   Wt 94.1 kg (207 lb 7.3 oz)   BMI 29.77 kg/m²   GENERAL: Alert, NAD, well-appearing  EYES: EOMI, no conjunctival injection, no discharge, no infraorbital shiners  EARS: external auditory canals normal B/L, TM normal B/L  NOSE: NT 2+ B/L, no stringing mucus, no polyps visualized  ORAL: MMM, no ulcers, no thrush  LUNGS: CTAB, no w/r/c, no increased WOB  HEART: RRR, normal S1/S2, no m/g/r  DERM: no rashes  NEURO: normal gait, no facial asymmetry     LABORATORY STUDIES     Component      Latest Ref [facility] 7/26/2023 10/16/2023 12/4/2023   S.pneumoniae Type 1      >=1.0 mcg/mL 2.2  1.3  2.2    Pneumococcal Serotype 2 IgG (PNX)      >=1.0 mcg/mL  11.0  >100.0    S.pneumoniae Type 3      >=1.0 mcg/mL <0.3  0.2  0.3    Pneumococcal Serotype 4 IgG (P7,P13,PNX)      >=1.0 mcg/mL  0.3  1.1    S.pneumoniae Type 5      >=1.0 mcg/mL 0.8  0.3  0.4    S.pneumoniae Type 8      >=1.0 mcg/mL 2.3  1.6  3.3    S.pneumoniae Type 9N      >=1.0 mcg/mL <0.3  0.5  1.5    S.pneumoniae Type 12F      >=1.0 mcg/mL <0.3  0.3  0.5    Pneumococcal Serotype 14 IgG (P7,P13,PNX)      >=1.0 mcg/mL  2.6  5.1    Pneumococcal Serotype 17F IgG (PNX)      >=1.0 mcg/mL  6.7  17.8    Pneumococcal Serotype 17F IgG (PNX)      >=1.0 mcg/mL  1.1  2.1    S.pneumoniae Type 19F      >=1.0 mcg/mL 0.6  1.5  3.5    Pneumococcal Serotype 20 IgG (PNX)      >=1.0 mcg/mL  2.4  3.6    S.pneumoniae Type 23F      >=1.0 mcg/mL 0.8  1.0  7.0    S.pneumoniae Type 6B      >=1.0 mcg/mL <0.3  0.4  0.9    Pneumococcal Serotype 10A IgG (PNX)      >=1.0 mcg/mL  2.4  18.8    Pneumococcal Serotype 11A IgG (PNX)      >=1.0 mcg/mL  0.2  0.4    S.pneumoniae Type 7F      >=1.0 mcg/mL 9.7  3.3  15.9    Pneumococcal Serotype 15B IgG (PNX)      >=1.0 mcg/mL  1.3  2.6    S.pneumoniae Type 18C      >=1.0 mcg/mL 0.4  0.3  1.3    Pneumococcal Serotype 19A IgG (P13,PNX)      >=1.0 mcg/mL   6.0  12.8    S.pneumoniae Type 9V Abs      >=1.0 mcg/mL 0.9  0.5  3.3    Pneumococcal Serotype 33 IgG (PNX)      >=1.0 mcg/mL  5.9  11.3    Strep pneumo Type 4 <0.3      Strep pneumo Type 14 2.1        Component      Latest Ref [facility] 10/16/2023   IgG      650 - 1600 mg/dL 1073    IgA      40 - 350 mg/dL 254    IgM      50 - 300 mg/dL 85      Component      Latest Ref Rn 3/30/2023 5/29/2023   WBC      3.90 - 12.70 K/uL 11.98     RBC      4.00 - 5.40 M/uL 4.71     Hemoglobin      12.0 - 16.0 g/dL 13.1     Hematocrit      37.0 - 48.5 % 42.0     MCV      82 - 98 fL 89     MCH      27.0 - 31.0 pg 27.8     MCHC      32.0 - 36.0 g/dL 31.2 (L)     RDW      11.5 - 14.5 % 15.0 (H)     Platelets      150 - 450 K/uL 333     MPV      9.2 - 12.9 fL 10.7     Immature Granulocytes      0.0 - 0.5 % 0.3     Gran # (ANC)      1.8 - 7.7 K/uL 8.5 (H)     Immature Grans (Abs)      0.00 - 0.04 K/uL 0.04     Lymph #      1.0 - 4.8 K/uL 2.5     Mono #      0.3 - 1.0 K/uL 0.6     Eos #      0.0 - 0.5 K/uL 0.3     Baso #      0.00 - 0.20 K/uL 0.06     Differential Method Automated     Sodium      136 - 145 mmol/L 139  141    Potassium      3.5 - 5.1 mmol/L 4.7  4.0    Chloride      95 - 110 mmol/L 103  103    CO2      23 - 29 mmol/L 30 (H)  29    Glucose      70 - 110 mg/dL 80  80    BUN      8 - 23 mg/dL 24 (H)  22    Creatinine      0.5 - 1.4 mg/dL 1.1  1.0    Calcium      8.7 - 10.5 mg/dL 9.9  9.7    PROTEIN TOTAL      6.0 - 8.4 g/dL 7.7  7.1    Albumin      3.5 - 5.2 g/dL 3.7  3.4 (L)    BILIRUBIN TOTAL      0.1 - 1.0 mg/dL 0.5  0.5    Alkaline Phosphatase      55 - 135 U/L 161 (H)  148 (H)    AST      10 - 40 U/L 18  20    ALT      10 - 44 U/L 20  17    Hemoglobin A1C External      4.0 - 5.6 %  5.7 (H)       ALLERGEN TESTING     Immunocaps:   Component      Latest Ref [facility] 6/28/2023   D. farinae      <0.10 kU/L <0.10    D. farinae Class CLASS 0    Mite Dust Pteronyssinus IgE      <0.10 kU/L <0.10    D. pteronyssinus Class CLASS 0   "  BERMUDA GRASS      <0.10 kU/L 0.11 (H)    Bermuda Grass Class CLASS 0/1    Ivan Grass      <0.10 kU/L 0.12 (H)    Ivan Grass Class CLASS 0/1    Luzerne IgE      <0.10 kU/L <0.10    Luzerne Class CLASS 0    Plantain      <0.10 kU/L 0.13 (H)    English Plantain Class CLASS 0/1    [place](Quercus alba) IgE      <0.10 kU/L 0.17 (H)    Oak, Class CLASS 0/1    Pecan Nye Tree      <0.10 kU/L 0.15 (H)    Pecan, Class CLASS 0/1    Marshelder IgE      <0.10 kU/L 0.15 (H)    Marshelder Class CLASS 0/1    Ragweed, Western IgE      <0.10 kU/L 0.14 (H)    Ragweed, Western, Class CLASS 0/1    Alternaria alternata      <0.10 kU/L <0.10    Altern. alternata Class CLASS 0    Aspergillus Fumigatus IgE      <0.10 kU/L <0.10    A. fumigatus Class CLASS 0    Cat Dander      <0.10 kU/L <0.10    Cat Epithelium Class CLASS 0    Cockroach, IgE      <0.10 kU/L <0.10    Cockroach, IgE       CLASS 0    Dog Dander, IgE      <0.10 kU/L <0.10    Dog Dander Class CLASS 0    IgE      0 - 100 IU/mL 954 (H)       PULMONARY FUNCTION TESTING     Date 5/15/23:  FVC:         81%ref -> - 3%  FEV1:         78%ref -> + 6%  FEV1/FVC: 74%  FEF 25-75: 71%ref  T%ref  DLCO:        47%ref  Interpretation: Spirometry is normal. Spirometry remains unimproved following bronchodilator. Lung volumes show mild restriction is present. Airway mechanics are abnormal showing increased airway resistance and decreased conductance. DLCO is moderately decreased.     IMAGING & OTHER DIAGNOSTICS     CT chest 23:  Impression:  1. Prominence of the pulmonary trunk which can be seen in setting of pulmonary arterial hypertension.  2. Stable appearance of scattered bilateral lung nodules.  Recommend follow-up with low-dose CT chest scan 6 in 12 months.  3. Chronic appearing bibasilar subsegmental atelectasis versus scarring, with left traction bronchiectasis.    CT soft tissue neck 23 noted "Prominent mucosal thickening/fluid within the sphenoid " "sinuses bilaterally with chronic mild mucosal thickening and surrounding osseous sclerosis within the right maxillary sinus."    CXR 4/5/22:  FINDINGS:  There is airspace opacity in the medial aspect of the right lower lobe.  Left lung appears relatively clear.  The cardiac silhouette is normal in size. The hilar and mediastinal contours are unremarkable.  Bones are intact.  Impression:  Airspace opacity in the medial aspect of the right lower lobe concerning for pneumonia.     ASSESSMENT & PLAN     Marleny Guzman is a 73 y.o. female with     # Chronic rhinitis: Likely mixed allergic and non-allergic etiology. Immunocaps with equivocal results to tree, grass, and weed pollens, but she gets symptoms perennially (including winter). She is also following with ENT. She finds her medications helpful, but hasn't been using them every day.  -increase azelastine nasal spray to more consistent use, 1-2 SEN BID.  -increase fluticasone nasal spray to more consistent use, 1-2 SEN BID.  -continue saline sinus rinses prn.  -resume daily loratadine.    # History of frequent infections, history of recurrent pneumonia: In 2023, infections requiring antibiotics have been less frequent than her usual. But she reports having 2-3 episodes of pneumonia in the past (last in 4/2022). She had pneumovax in 2014, prevnar-13 in 2015. In 2023, pneumococcal titers were found to be low, but responded well to a pneumovax booster. Total IgG/A/M are normal. Since last visit, she reports one episode of sinusitis, which she self-treated with leftover augmentin.   -will continue to monitor. If infections requiring antibiotics become more frequent, or if she gets pneumonia again, will recheck pneumococcal titers.     # ILD: She has f/u scheduled with pulm.    # HTN: Patient is on a beta blocker (metoprolol) so is not currently a candidate for allergen immunotherapy injections.      Follow up: 1 year or sooner if needed    James Cooper" MD  Allergy/Immunology

## 2024-01-05 ENCOUNTER — PATIENT MESSAGE (OUTPATIENT)
Dept: OTOLARYNGOLOGY | Facility: CLINIC | Age: 74
End: 2024-01-05
Payer: MEDICARE

## 2024-01-18 NOTE — TELEPHONE ENCOUNTER
No care due was identified.  [facility] Embedded Care Due Messages. Reference number: [id number].   1/18/2024 12:29:59 AM CST

## 2024-01-24 RX ORDER — LORAZEPAM 0.5 MG/1
TABLET ORAL
Qty: 30 TABLET | Refills: 0 | Status: SHIPPED | OUTPATIENT
Start: 2024-01-24 | End: 2024-05-20 | Stop reason: SDUPTHER

## 2024-01-25 ENCOUNTER — TELEPHONE (OUTPATIENT)
Dept: INTERNAL MEDICINE | Facility: CLINIC | Age: 74
End: 2024-01-25
Payer: MEDICARE

## 2024-01-25 ENCOUNTER — OFFICE VISIT (OUTPATIENT)
Dept: INTERNAL MEDICINE | Facility: CLINIC | Age: 74
End: 2024-01-25
Payer: MEDICARE

## 2024-01-25 VITALS
WEIGHT: 209.44 LBS | SYSTOLIC BLOOD PRESSURE: 134 MMHG | HEIGHT: 70 IN | BODY MASS INDEX: 29.98 KG/M2 | HEART RATE: 62 BPM | OXYGEN SATURATION: 93 % | DIASTOLIC BLOOD PRESSURE: 78 MMHG

## 2024-01-25 DIAGNOSIS — F33.41 RECURRENT MAJOR DEPRESSIVE DISORDER, IN PARTIAL REMISSION: ICD-10-CM

## 2024-01-25 DIAGNOSIS — J84.9 ILD (INTERSTITIAL LUNG DISEASE): ICD-10-CM

## 2024-01-25 DIAGNOSIS — Z12.31 SCREENING MAMMOGRAM, ENCOUNTER FOR: ICD-10-CM

## 2024-01-25 DIAGNOSIS — M85.80 OSTEOPENIA, UNSPECIFIED LOCATION: ICD-10-CM

## 2024-01-25 DIAGNOSIS — M81.8 OTHER OSTEOPOROSIS WITHOUT CURRENT PATHOLOGICAL FRACTURE: ICD-10-CM

## 2024-01-25 DIAGNOSIS — I70.0 THORACIC AORTA ATHEROSCLEROSIS: ICD-10-CM

## 2024-01-25 DIAGNOSIS — R73.9 HYPERGLYCEMIA: ICD-10-CM

## 2024-01-25 DIAGNOSIS — I10 PRIMARY HYPERTENSION: ICD-10-CM

## 2024-01-25 DIAGNOSIS — E78.5 HYPERLIPIDEMIA, UNSPECIFIED HYPERLIPIDEMIA TYPE: ICD-10-CM

## 2024-01-25 DIAGNOSIS — E55.9 MILD VITAMIN D DEFICIENCY: ICD-10-CM

## 2024-01-25 DIAGNOSIS — M25.511 ACUTE PAIN OF RIGHT SHOULDER: Primary | ICD-10-CM

## 2024-01-25 DIAGNOSIS — N39.0 URINARY TRACT INFECTION WITHOUT HEMATURIA, SITE UNSPECIFIED: ICD-10-CM

## 2024-01-25 DIAGNOSIS — I27.20 PULMONARY HYPERTENSION: ICD-10-CM

## 2024-01-25 DIAGNOSIS — Z01.419 NORMAL GYNECOLOGIC EXAMINATION: ICD-10-CM

## 2024-01-25 PROCEDURE — 3078F DIAST BP <80 MM HG: CPT | Mod: CPTII,S$GLB,, | Performed by: INTERNAL MEDICINE

## 2024-01-25 PROCEDURE — 3288F FALL RISK ASSESSMENT DOCD: CPT | Mod: CPTII,S$GLB,, | Performed by: INTERNAL MEDICINE

## 2024-01-25 PROCEDURE — 3008F BODY MASS INDEX DOCD: CPT | Mod: CPTII,S$GLB,, | Performed by: INTERNAL MEDICINE

## 2024-01-25 PROCEDURE — 1159F MED LIST DOCD IN RCRD: CPT | Mod: CPTII,S$GLB,, | Performed by: INTERNAL MEDICINE

## 2024-01-25 PROCEDURE — 3075F SYST BP GE 130 - 139MM HG: CPT | Mod: CPTII,S$GLB,, | Performed by: INTERNAL MEDICINE

## 2024-01-25 PROCEDURE — 1125F AMNT PAIN NOTED PAIN PRSNT: CPT | Mod: CPTII,S$GLB,, | Performed by: INTERNAL MEDICINE

## 2024-01-25 PROCEDURE — 99999 PR PBB SHADOW E&M-EST. PATIENT-LVL V: CPT | Mod: PBBFAC,,, | Performed by: INTERNAL MEDICINE

## 2024-01-25 PROCEDURE — 99214 OFFICE O/P EST MOD 30 MIN: CPT | Mod: S$GLB,,, | Performed by: INTERNAL MEDICINE

## 2024-01-25 PROCEDURE — 1101F PT FALLS ASSESS-DOCD LE1/YR: CPT | Mod: CPTII,S$GLB,, | Performed by: INTERNAL MEDICINE

## 2024-01-25 RX ORDER — LEVOFLOXACIN 500 MG/1
500 TABLET, FILM COATED ORAL DAILY
Qty: 7 TABLET | Refills: 0 | Status: SHIPPED | OUTPATIENT
Start: 2024-01-25 | End: 2024-05-16

## 2024-01-25 RX ORDER — MIRABEGRON 25 MG/1
25 TABLET, FILM COATED, EXTENDED RELEASE ORAL DAILY
Qty: 30 TABLET | Refills: 2 | Status: SHIPPED | OUTPATIENT
Start: 2024-01-25 | End: 2024-05-08

## 2024-01-25 RX ORDER — BETAMETHASONE DIPROPIONATE 0.5 MG/G
LOTION TOPICAL
Qty: 60 ML | Refills: 0 | Status: SHIPPED | OUTPATIENT
Start: 2024-01-25 | End: 2024-05-28

## 2024-01-25 NOTE — TELEPHONE ENCOUNTER
----- Message from Charlee Terry sent at 1/25/2024 12:19 PM CST -----  Contact: [phone] Patient  Patient would like to get medical advice.  Symptoms (please be specific):   pt states she was advised by her sister  Sawyerozzie is not in the office today and appts were being r/s. Pt is requesting a new appt   How long have you had these symptoms: N/A  Would you like a call back, or a response through your [facility] portal?:  call back   Pharmacy name and phone # (copy from chart):   N/A  Comments:

## 2024-01-25 NOTE — PROGRESS NOTES
Subjective:       Patient ID: Marleny Guzman is a 73 y.o. female.    Chief Complaint: Follow-up, Cough, Arm Pain, Shoulder Pain, and Nasal Congestion    Follow-up  Associated symptoms include coughing. Pertinent negatives include no abdominal pain, chest pain (arm pain or jaw pain), headaches, nausea or vomiting.   Cough  Pertinent negatives include no chest pain (arm pain or jaw pain), headaches or shortness of breath (PND or orthopnea).   Arm Pain   Pertinent negatives include no chest pain (arm pain or jaw pain).   Shoulder Pain   Pertinent negatives include no headaches.   Pt with new significant pain (some burning pain)in her R shoulder - had surgery on it 10 years ago.  No CP or SOB. She is having sinus symptoms with nasal congestion and discolored mucous. No fever or chills - slight cough.   Review of Systems   Respiratory:  Positive for cough. Negative for shortness of breath (PND or orthopnea).    Cardiovascular:  Negative for chest pain (arm pain or jaw pain).   Gastrointestinal:  Negative for abdominal pain, diarrhea, nausea and vomiting.   Genitourinary:  Negative for dysuria.   Neurological:  Negative for seizures, syncope and headaches.       Objective:      Physical Exam  Constitutional:       General: She is not in acute distress.     Appearance: She is well-developed.   HENT:      Head: Normocephalic.   Eyes:      Pupils: Pupils are equal, round, and reactive to light.   Neck:      Thyroid: No thyromegaly.      Vascular: No JVD.   Cardiovascular:      Rate and Rhythm: Normal rate and regular rhythm.      Heart sounds: Normal heart sounds. No murmur heard.     No friction rub. No gallop.   Pulmonary:      Effort: Pulmonary effort is normal.      Breath sounds: Normal breath sounds. No wheezing or rales.   Abdominal:      General: Bowel sounds are normal. There is no distension.      Palpations: Abdomen is soft. There is no mass.      Tenderness: There is no abdominal tenderness. There is no  guarding or rebound.   Musculoskeletal:      Cervical back: Neck supple.   Lymphadenopathy:      Cervical: No cervical adenopathy.   Skin:     General: Skin is warm and dry.   Neurological:      Mental Status: She is alert and oriented to person, place, and time.      Deep Tendon Reflexes: Reflexes are normal and symmetric.   Psychiatric:         Behavior: Behavior normal.         Thought Content: Thought content normal.         Judgment: Judgment normal.         Assessment:       1. Acute pain of right shoulder    2. Primary hypertension    3. Hyperlipidemia, unspecified hyperlipidemia type    4. Hyperglycemia    5. Mild vitamin D deficiency    6. Urinary tract infection without hematuria, site unspecified    7. Normal gynecologic examination    8. Screening mammogram, encounter for    9. Osteopenia, unspecified location    10. Other osteoporosis without current pathological fracture    11. ILD (interstitial lung disease)    12. Pulmonary hypertension    13. Recurrent major depressive disorder, in partial remission    14. Thoracic aorta atherosclerosis        Plan:   Acute pain of right shoulder  -     X-Ray Shoulder Trauma 3 view Right; Future; Expected date: 01/25/2024  -     Ambulatory referral/consult to Orthopedics; Future; Expected date: 02/01/2024    Primary hypertension  -     CBC Auto Differential; Future; Expected date: 01/25/2024  -     Comprehensive Metabolic Panel; Future; Expected date: 01/25/2024  -     TSH; Future; Expected date: 01/25/2024  -     Microalbumin/Creatinine Ratio, Urine; Future; Expected date: 01/25/2024  -     Urinalysis; Future; Expected date: 01/25/2024  Controlled - continue current meds    Hyperlipidemia, unspecified hyperlipidemia type  -     Lipid Panel; Future; Expected date: 01/25/2024    Hyperglycemia  -     Hemoglobin A1C; Future; Expected date: 01/25/2024    Mild vitamin D deficiency  -     Vitamin D; Future; Expected date: 01/25/2024    Urinary tract infection without  hematuria, site unspecified  -     Urine culture; Future; Expected date: 01/25/2024    Normal gynecologic examination  -     Ambulatory referral/consult to Gynecology; Future; Expected date: 02/01/2024    Screening mammogram, encounter for  -     Mammo Digital Screening Bilat w/ Sky; Future; Expected date: 07/25/2024    Osteopenia, unspecified location  -     DXA Bone Density Axial Skeleton 1 or more sites; Future; Expected date: 01/25/2024    Other osteoporosis without current pathological fracture  -     DXA Bone Density Axial Skeleton 1 or more sites; Future; Expected date: 01/25/2024    ILD (interstitial lung disease)  Sees pulmonary soon  Pulmonary hypertension  PA pressure is monitored - consider echo this year  Recurrent major depressive disorder, in partial remission  Stable on current meds - lost two nephews age 58 and 59 in the last 4 months    Thoracic aorta atherosclerosis  On statin  Other orders  -     levoFLOXacin (LEVAQUIN) 500 MG tablet; Take 1 tablet (500 mg total) by mouth once daily.  Dispense: 7 tablet; Refill: 0- sinusitis - had recent augmentin treatment without improvement  -     mirabegron (MYRBETRIQ) 25 mg Tb24 ER tablet; Take 1 tablet (25 mg total) by mouth once daily.  Dispense: 30 tablet; Refill: 2- for urinary incontinence - (vibegron not affordable)  -     betamethasone dipropionate (DIPROLENE) 0.05 % lotion; One-two drops to ears daily needed for itching  Dispense: 60 mL; Refill: 0    Try gabapentin for R shoulder with nerve type pain - consider MRI C spine if not felt to be shoulder related

## 2024-01-26 PROBLEM — Z82.49 FAMILY HISTORY OF CAROTID ARTERY STENOSIS: Status: ACTIVE | Noted: 2024-01-26

## 2024-01-26 RX ORDER — GABAPENTIN 100 MG/1
CAPSULE ORAL
Qty: 90 CAPSULE | Refills: 6 | Status: SHIPPED | OUTPATIENT
Start: 2024-01-26 | End: 2024-05-16

## 2024-01-27 RX ORDER — LORAZEPAM 0.5 MG/1
TABLET ORAL
Qty: 30 TABLET | Refills: 0 | OUTPATIENT
Start: 2024-01-27

## 2024-01-27 RX ORDER — NYSTATIN 100000 U/G
CREAM TOPICAL 2 TIMES DAILY
Qty: 30 G | Refills: 1 | Status: SHIPPED | OUTPATIENT
Start: 2024-01-27

## 2024-01-29 ENCOUNTER — OFFICE VISIT (OUTPATIENT)
Dept: OTOLARYNGOLOGY | Facility: CLINIC | Age: 74
End: 2024-01-29
Payer: MEDICARE

## 2024-01-29 ENCOUNTER — HOSPITAL ENCOUNTER (OUTPATIENT)
Dept: RADIOLOGY | Facility: HOSPITAL | Age: 74
Discharge: HOME | End: 2024-01-29
Attending: INTERNAL MEDICINE
Payer: MEDICARE

## 2024-01-29 VITALS
WEIGHT: 207.88 LBS | SYSTOLIC BLOOD PRESSURE: 135 MMHG | HEART RATE: 67 BPM | DIASTOLIC BLOOD PRESSURE: 75 MMHG | BODY MASS INDEX: 29.83 KG/M2

## 2024-01-29 DIAGNOSIS — J38.02 BILATERAL PARTIAL VOCAL CORD PARALYSIS: Primary | ICD-10-CM

## 2024-01-29 DIAGNOSIS — M25.511 ACUTE PAIN OF RIGHT SHOULDER: ICD-10-CM

## 2024-01-29 DIAGNOSIS — J01.01 ACUTE RECURRENT MAXILLARY SINUSITIS: ICD-10-CM

## 2024-01-29 PROCEDURE — 73030 X-RAY EXAM OF SHOULDER: CPT | Mod: TC,RT

## 2024-01-29 PROCEDURE — 99213 OFFICE O/P EST LOW 20 MIN: CPT | Mod: S$GLB,,, | Performed by: NURSE PRACTITIONER

## 2024-01-29 PROCEDURE — 99213 OFFICE O/P EST LOW 20 MIN: CPT | Mod: PBBFAC | Performed by: NURSE PRACTITIONER

## 2024-01-29 PROCEDURE — 99999 PR PBB SHADOW E&M-EST. PATIENT-LVL III: CPT | Mod: PBBFAC,,, | Performed by: NURSE PRACTITIONER

## 2024-01-29 PROCEDURE — 73030 X-RAY EXAM OF SHOULDER: CPT | Mod: 26,RT,, | Performed by: RADIOLOGY

## 2024-01-29 NOTE — PROGRESS NOTES
[facility]  Department of Otorhinolaryngology and Communication Sciences    Subjective:      Marleny Guzman is a 73 y.o. female who presents for follow-up. 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    09/28/2023  Microlaryngoscopy, bilateral vocal fold injection augmentation HA. Findings:  Mild bilateral vocal atrophy, right greater than left, consistent with chronic right vocal fold paresis.  Mild vocal fold sulcus deformity along infraglottic free edge of true vocal folds  Left vocal fold with sessile polypoid lesion at mid membranous aspect  Augmented with a total volume 0.5 mL Restylane-L    Overall her voice has improved. She is breathing well. She does have a cold today and feels that her voice sounds more hoarse today than it has been. She reports frequent cough and throat clearing. She saw Dr. Cantu a few days ago who gave her Levaquin.    The patient's medications, allergies, past medical, surgical, social and family histories were reviewed and updated as appropriate.    A detailed review of systems was obtained with pertinent positives as per the above HPI, and otherwise negative.      Objective:     /75 (BP Location: Right arm, Patient Position: Sitting, BP Method: Large (Automatic))   Pulse 67   Wt 94.3 kg (207 lb 14.3 oz)   BMI 29.83 kg/m²      Constitutional: comfortable, well dressed  Psychiatric: appropriate affect  Respiratory: comfortably breathing, symmetric chest rise, no stridor, frequent cough  Voice:  mild-moderate variable roughness; slightly inconsistently pressed  Head: normocephalic  Eyes: conjunctivae and sclerae clear  Indirect laryngoscopy is limited due to gag      Assessment:     Marleny Guzman is a 73 y.o. 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    She is made slight progress  following recent vocal fold injection augmentation. Laryngoscopy deferred due to coughing and sneezing.     Plan:      Unable to complete laryngoscopy due to current URI. She will RTC when symptoms have resolved.

## 2024-01-31 NOTE — PROGRESS NOTES
Assessment    1  Hypertension (401 9) (I10)    Plan  Health Maintenance    · *VB-Depression Screening; Status:Resulted - Requires Verification;   Done: 84BKZ5048  11:39AM   · Stop: Fluzone Quadrivalent Intramuscular Suspension  Hypertension    · Lisinopril 20 MG Oral Tablet; TAKE 1 TABLET EVERY MORNING   · Continue with our present treatment plan ; Status:Complete;   Done: 16CJV2864 11:40AM   · Eat a low fat and low cholesterol diet ; Status:Complete;   Done: 61CHM5161 11:40AM   · Restrict the salt in your diet by avoiding highly salted foods ; Status:Complete;   Done:  42IUP7767 11:40AM   · Call [phone] if: You become dizzy or lightheaded, especially when you stand up  after sitting for a while ; Status:Complete;   Done: 88ZOA7832 11:40AM   · Call [phone] if: You develop double vision (see two of everything) ;  Status:Complete;   Done: 57RPQ5920 11:40AM   · Call [phone] if: Your blood pressure is frequently higher than 140/90 ;  Status:Complete;   Done: 81NZC6108 11:40AM   · Call 911 if: You experience a new kind of chest pain (angina) or pressure ;  Status:Complete;   Done: 43WTT2881 11:40AM   · Call 911 if: You have any symptoms of a stroke ; Status:Complete;   Done: 00VWE3661  11:40AM   · Seek Immediate Medical Attention if: You have a severe headache that will not go away ;  Status:Complete;   Done: 32FHC3922 11:40AM   · Seek Immediate Medical Attention if: Your blood pressure is greater than 250/120 for 2  consecutive readings ; Status:Complete;   Done: 93NOJ8953 11:40AM    Chief Complaint  FOLLOW UP HTN  REFILL MED 90 DAY      History of Present Illness  Patient is here for followup on his hypertension Patient is overall doing well Patient has no complaints today Patient is physically active Pateitn last labs from May 2015 were reveiwed and normal   The patient presents for follow-up of essential hypertension   The patient states he has been doing well with his blood pressure control You do have some arthritis and probably some calcium that could be causing a frozen shoulder.  Would you like to see Ortho/sports med for an injection and then start PT? since the last visit  He has no comorbid illnesses  Symptoms: The patient is currently asymptomatic  Associated symptoms include no headache, no focal neurologic deficits and no memory loss  Home monitoring: The patient checks his blood pressure sporadically  Blood pressure control has been good  Medications: the patient is adherent with his medication regimen  He denies medication side effects  Disease Management: the patient is doing well with his blood pressure goals  Review of Systems    Constitutional: no fever, not feeling poorly, no chills and not feeling tired  Eyes: no eye pain and no eyesight problems  Cardiovascular: as noted in HPI  Respiratory: as noted in HPI  Musculoskeletal: no arthralgias and no myalgias  Integumentary: no rashes  Neurological: no headache and no dizziness  Psychiatric: no anxiety, no sleep disturbances and no depression  Active Problems    1  Hypertension (401 9) (I10)    Past Medical History    The active problems and past medical history were reviewed and updated today  Surgical History    The surgical history was reviewed and updated today  Family History    1  Family history of Hypertension (V17 49)    2  Family history of Aneurysm Of Abdominal Aorta   3  Family history of Hypertension (V17 49)    4  Family history of Crohn's (Granulomatous) Colitis    The family history was reviewed and updated today  Social History    · Being A Social Drinker   · Never A Smoker  The social history was reviewed and is unchanged  Current Meds   1  Lisinopril 20 MG Oral Tablet; TAKE 1 TABLET EVERY MORNING; Therapy: 14LOG3224 to (Evaluate:15Jan2016)  Requested for: 90DCZ5796; Last   Rx:[id number] Ordered    The medication list was reviewed and updated today  Allergies    1   No Known Drug Allergies    Vitals  Vital Signs [Data Includes: Current Encounter]    Recorded: 61ZBQ7247 27:87PJ   Systolic 703   Diastolic 82   Height 6 ft    Weight 190 lb 6 oz   BMI Calculated 25 82   BSA Calculated 2 09     Physical Exam    Constitutional   General appearance: No acute distress, well appearing and well nourished  Eyes   Conjunctiva and lids: No swelling, erythema, or discharge  Pupils and irises: Equal, round and reactive to light  Ears, Nose, Mouth, and Throat   External inspection of ears and nose: Normal     Otoscopic examination: Tympanic membrance translucent with normal light reflex  Canals patent without erythema  Nasal mucosa, septum, and turbinates: Normal without edema or erythema  Oropharynx: Normal with no erythema, edema, exudate or lesions  Pulmonary   Respiratory effort: No increased work of breathing or signs of respiratory distress  Auscultation of lungs: Clear to auscultation, equal breath sounds bilaterally, no wheezes, no rales, no rhonci  Cardiovascular   Auscultation of heart: Normal rate and rhythm, normal S1 and S2, without murmurs  Examination of extremities for edema and/or varicosities: Normal     Abdomen   Abdomen: Non-tender, no masses  Liver and spleen: No hepatomegaly or splenomegaly  Lymphatic   Palpation of lymph nodes in neck: No lymphadenopathy  Musculoskeletal   Gait and station: Normal     Skin   Skin and subcutaneous tissue: Normal without rashes or lesions  Neurologic   Cranial nerves: Cranial nerves 2-12 intact      Psychiatric   Orientation to person, place and time: Normal     Mood and affect: Normal          Signatures   Electronically signed by : Emerson Ruiz DO; Jan 27 2016 11:51AM EST                       (Author)

## 2024-02-02 ENCOUNTER — LAB VISIT (OUTPATIENT)
Dept: LAB | Facility: HOSPITAL | Age: 74
End: 2024-02-02
Payer: MEDICARE

## 2024-02-02 DIAGNOSIS — N39.0 URINARY TRACT INFECTION WITHOUT HEMATURIA, SITE UNSPECIFIED: ICD-10-CM

## 2024-02-02 DIAGNOSIS — I10 PRIMARY HYPERTENSION: ICD-10-CM

## 2024-02-02 LAB
ALBUMIN/CREAT UR: 5.7 UG/MG (ref 0–30)
BILIRUB UR QL STRIP: NEGATIVE
CLARITY UR REFRACT.AUTO: CLEAR
COLOR UR AUTO: YELLOW
CREAT UR-MCNC: 245 MG/DL (ref 15–325)
GLUCOSE UR QL STRIP: NEGATIVE
HGB UR QL STRIP: NEGATIVE
KETONES UR QL STRIP: NEGATIVE
LEUKOCYTE ESTERASE UR QL STRIP: NEGATIVE
MICROALBUMIN UR DL<=1MG/L-MCNC: 14 UG/ML
NITRITE UR QL STRIP: NEGATIVE
PH UR STRIP: 6 [PH] (ref 5–8)
PROT UR QL STRIP: ABNORMAL
SP GR UR STRIP: >1.03 (ref 1–1.03)
URN SPEC COLLECT METH UR: ABNORMAL

## 2024-02-02 PROCEDURE — 81003 URINALYSIS AUTO W/O SCOPE: CPT | Performed by: INTERNAL MEDICINE

## 2024-02-02 PROCEDURE — 87077 CULTURE AEROBIC IDENTIFY: CPT | Performed by: INTERNAL MEDICINE

## 2024-02-02 PROCEDURE — 87086 URINE CULTURE/COLONY COUNT: CPT | Performed by: INTERNAL MEDICINE

## 2024-02-02 PROCEDURE — 82043 UR ALBUMIN QUANTITATIVE: CPT | Performed by: INTERNAL MEDICINE

## 2024-02-02 PROCEDURE — 87186 SC STD MICRODIL/AGAR DIL: CPT | Performed by: INTERNAL MEDICINE

## 2024-02-02 PROCEDURE — 87088 URINE BACTERIA CULTURE: CPT | Performed by: INTERNAL MEDICINE

## 2024-02-03 ENCOUNTER — HOSPITAL ENCOUNTER (EMERGENCY)
Facility: HOSPITAL | Age: 74
Discharge: HOME | End: 2024-02-03
Attending: EMERGENCY MEDICINE
Payer: MEDICARE

## 2024-02-03 VITALS
TEMPERATURE: 99 F | RESPIRATION RATE: 18 BRPM | SYSTOLIC BLOOD PRESSURE: 144 MMHG | DIASTOLIC BLOOD PRESSURE: 68 MMHG | HEIGHT: 70 IN | OXYGEN SATURATION: 98 % | WEIGHT: 200 LBS | HEART RATE: 88 BPM | BODY MASS INDEX: 28.63 KG/M2

## 2024-02-03 DIAGNOSIS — R05.9 COUGH IN ADULT PATIENT: Primary | ICD-10-CM

## 2024-02-03 DIAGNOSIS — R07.9 CHEST PAIN: ICD-10-CM

## 2024-02-03 LAB
ALBUMIN SERPL BCP-MCNC: 3.3 G/DL (ref 3.5–5.2)
ALP SERPL-CCNC: 128 U/L (ref 55–135)
ALT SERPL W/O P-5'-P-CCNC: 21 U/L (ref 10–44)
ANION GAP SERPL CALC-SCNC: 7 MMOL/L (ref 8–16)
AST SERPL-CCNC: 21 U/L (ref 10–40)
BACTERIA #/AREA URNS AUTO: NORMAL /HPF
BASOPHILS # BLD AUTO: 0.05 K/UL (ref 0–0.2)
BASOPHILS NFR BLD: 0.5 % (ref 0–1.9)
BILIRUB SERPL-MCNC: 0.5 MG/DL (ref 0.1–1)
BILIRUB UR QL STRIP: NEGATIVE
BUN SERPL-MCNC: 18 MG/DL (ref 8–23)
CALCIUM SERPL-MCNC: 9.4 MG/DL (ref 8.7–10.5)
CHLORIDE SERPL-SCNC: 106 MMOL/L (ref 95–110)
CLARITY UR REFRACT.AUTO: CLEAR
CO2 SERPL-SCNC: 28 MMOL/L (ref 23–29)
COLOR UR AUTO: YELLOW
CREAT SERPL-MCNC: 0.9 MG/DL (ref 0.5–1.4)
DIFFERENTIAL METHOD BLD: ABNORMAL
EOSINOPHIL # BLD AUTO: 0.8 K/UL (ref 0–0.5)
EOSINOPHIL NFR BLD: 7.9 % (ref 0–8)
ERYTHROCYTE [DISTWIDTH] IN BLOOD BY AUTOMATED COUNT: 15 % (ref 11.5–14.5)
EST. GFR  (NO RACE VARIABLE): >60 ML/MIN/1.73 M^2
GLUCOSE SERPL-MCNC: 99 MG/DL (ref 70–110)
GLUCOSE UR QL STRIP: NEGATIVE
HCT VFR BLD AUTO: 39 % (ref 37–48.5)
HCV AB SERPL QL IA: NORMAL
HGB BLD-MCNC: 12.7 G/DL (ref 12–16)
HGB UR QL STRIP: NEGATIVE
HIV 1+2 AB+HIV1 P24 AG SERPL QL IA: NORMAL
IMM GRANULOCYTES # BLD AUTO: 0.02 K/UL (ref 0–0.04)
IMM GRANULOCYTES NFR BLD AUTO: 0.2 % (ref 0–0.5)
INFLUENZA A, MOLECULAR: NOT DETECTED
INFLUENZA B, MOLECULAR: NOT DETECTED
KETONES UR QL STRIP: NEGATIVE
LEUKOCYTE ESTERASE UR QL STRIP: NEGATIVE
LIPASE SERPL-CCNC: 17 U/L (ref 4–60)
LYMPHOCYTES # BLD AUTO: 2.6 K/UL (ref 1–4.8)
LYMPHOCYTES NFR BLD: 27.1 % (ref 18–48)
MAGNESIUM SERPL-MCNC: 1.8 MG/DL (ref 1.6–2.6)
MCH RBC QN AUTO: 28.5 PG (ref 27–31)
MCHC RBC AUTO-ENTMCNC: 32.6 G/DL (ref 32–36)
MCV RBC AUTO: 88 FL (ref 82–98)
MICROSCOPIC COMMENT: NORMAL
MONOCYTES # BLD AUTO: 0.6 K/UL (ref 0.3–1)
MONOCYTES NFR BLD: 5.7 % (ref 4–15)
NEUTROPHILS # BLD AUTO: 5.7 K/UL (ref 1.8–7.7)
NEUTROPHILS NFR BLD: 58.6 % (ref 38–73)
NITRITE UR QL STRIP: NEGATIVE
NRBC BLD-RTO: 0 /100 WBC
PH UR STRIP: 6 [PH] (ref 5–8)
PLATELET # BLD AUTO: 321 K/UL (ref 150–450)
PMV BLD AUTO: 9.6 FL (ref 9.2–12.9)
POTASSIUM SERPL-SCNC: 3.7 MMOL/L (ref 3.5–5.1)
PROT SERPL-MCNC: 6.8 G/DL (ref 6–8.4)
PROT UR QL STRIP: NEGATIVE
RBC # BLD AUTO: 4.45 M/UL (ref 4–5.4)
RBC #/AREA URNS AUTO: 3 /HPF (ref 0–4)
RSV AG BY MOLECULAR METHOD: NOT DETECTED
SARS-COV-2 RNA RESP QL NAA+PROBE: NOT DETECTED
SODIUM SERPL-SCNC: 141 MMOL/L (ref 136–145)
SP GR UR STRIP: 1.02 (ref 1–1.03)
TROPONIN I SERPL DL<=0.01 NG/ML-MCNC: 0.01 NG/ML (ref 0–0.03)
TSH SERPL DL<=0.005 MIU/L-ACNC: 3.49 UIU/ML (ref 0.4–4)
URN SPEC COLLECT METH UR: NORMAL
WBC # BLD AUTO: 9.7 K/UL (ref 3.9–12.7)
WBC #/AREA URNS AUTO: 0 /HPF (ref 0–5)

## 2024-02-03 PROCEDURE — 25000003 PHARM REV CODE 250: Performed by: EMERGENCY MEDICINE

## 2024-02-03 PROCEDURE — 85025 COMPLETE CBC W/AUTO DIFF WBC: CPT | Performed by: EMERGENCY MEDICINE

## 2024-02-03 PROCEDURE — 81001 URINALYSIS AUTO W/SCOPE: CPT | Performed by: EMERGENCY MEDICINE

## 2024-02-03 PROCEDURE — 93005 ELECTROCARDIOGRAM TRACING: CPT

## 2024-02-03 PROCEDURE — 84443 ASSAY THYROID STIM HORMONE: CPT | Performed by: EMERGENCY MEDICINE

## 2024-02-03 PROCEDURE — 25000242 PHARM REV CODE 250 ALT 637 W/ HCPCS: Performed by: EMERGENCY MEDICINE

## 2024-02-03 PROCEDURE — 94664 DEMO&/EVAL PT USE INHALER: CPT | Mod: XB

## 2024-02-03 PROCEDURE — 0241U SARS-COV2 (COVID) WITH FLU/RSV BY PCR: CPT | Performed by: EMERGENCY MEDICINE

## 2024-02-03 PROCEDURE — 86803 HEPATITIS C AB TEST: CPT | Performed by: PHYSICIAN ASSISTANT

## 2024-02-03 PROCEDURE — 84484 ASSAY OF TROPONIN QUANT: CPT | Performed by: EMERGENCY MEDICINE

## 2024-02-03 PROCEDURE — 93010 ELECTROCARDIOGRAM REPORT: CPT | Mod: ,,, | Performed by: INTERNAL MEDICINE

## 2024-02-03 PROCEDURE — 87389 HIV-1 AG W/HIV-1&-2 AB AG IA: CPT | Performed by: PHYSICIAN ASSISTANT

## 2024-02-03 PROCEDURE — 94640 AIRWAY INHALATION TREATMENT: CPT

## 2024-02-03 PROCEDURE — 80053 COMPREHEN METABOLIC PANEL: CPT | Performed by: EMERGENCY MEDICINE

## 2024-02-03 PROCEDURE — 27100098 HC SPACER

## 2024-02-03 PROCEDURE — 83735 ASSAY OF MAGNESIUM: CPT | Performed by: EMERGENCY MEDICINE

## 2024-02-03 PROCEDURE — 99285 EMERGENCY DEPT VISIT HI MDM: CPT | Mod: 25

## 2024-02-03 PROCEDURE — 83690 ASSAY OF LIPASE: CPT | Performed by: EMERGENCY MEDICINE

## 2024-02-03 RX ORDER — ALBUTEROL SULFATE 90 UG/1
2 AEROSOL, METERED RESPIRATORY (INHALATION)
Status: COMPLETED | OUTPATIENT
Start: 2024-02-03 | End: 2024-02-03

## 2024-02-03 RX ORDER — BENZONATATE 100 MG/1
100 CAPSULE ORAL 3 TIMES DAILY PRN
Qty: 20 CAPSULE | Refills: 0 | Status: SHIPPED | OUTPATIENT
Start: 2024-02-03 | End: 2024-02-13

## 2024-02-03 RX ORDER — OLOPATADINE HYDROCHLORIDE 1 MG/ML
1 SOLUTION/ DROPS OPHTHALMIC 2 TIMES DAILY
Qty: 5 ML | Refills: 0 | Status: SHIPPED | OUTPATIENT
Start: 2024-02-03 | End: 2024-05-08

## 2024-02-03 RX ORDER — OXYMETAZOLINE HCL 0.05 %
2 SPRAY, NON-AEROSOL (ML) NASAL
Status: COMPLETED | OUTPATIENT
Start: 2024-02-03 | End: 2024-02-03

## 2024-02-03 RX ADMIN — ALBUTEROL SULFATE 2 PUFF: 108 INHALANT RESPIRATORY (INHALATION) at 09:02

## 2024-02-03 RX ADMIN — OXYMETAZOLINE HYDROCHLORIDE 2 SPRAY: 0.05 SPRAY NASAL at 09:02

## 2024-02-03 NOTE — ED NOTES
Pt c/o continued productive cough, congestion, and facial pain x1 month. Denies fevers. States s/s started end of December and has taken 2 rounds of antibx, last finished Levaquin 2 days ago without change in symptoms. Also c/o rt shoulder pain, has arthritis.      Patient identifiers for Marleny Guzman 73 y.o. female checked and correct.  Chief Complaint   Patient presents with    Cough    Nasal Congestion    Facial Pain     Past Medical History:   Diagnosis Date    Abnormal Pap smear of vagina 07/20/2016    Anemia     Cataract     Chronic bilateral low back pain without sciatica 03/13/2018    Depression with anxiety     Hypertension     Osteoarthritis 06/20/2013    Recurrent upper respiratory infection (URI)     Right rotator cuff tear     Sleep apnea     Cipap machine at home    Urinary incontinence     Leaks urine with laughing/ coughing/ sneezing     Allergies reported:   Review of patient's allergies indicates:   Allergen Reactions    Flowers     Grass pollen-june grass standard     House dust     Peppermint and derivatives     Spray band        Appearance: Pt awake, alert & oriented to person, place & time. Pt in no acute distress at present time. Pt is clean and well groomed with clothes appropriately fastened.   Skin: Skin warm, dry & intact. Color consistent with ethnicity. Mucous membranes moist. No breakdown or brusing noted.   Musculoskeletal: Patient moving all extremities well, no obvious swelling or deformities noted.   Respiratory: Respirations spontaneous, even, and non-labored. Visible chest rise noted. Airway is open and patent. No accessory muscle use noted.   Neurologic: Sensation is intact. Speech is clear and appropriate. Eyes open spontaneously, behavior appropriate to situation, follows commands, facial expression symmetrical, bilateral hand grasp equal and even, purposeful motor response noted.  Cardiac: All peripheral pulses present. No Bilateral lower extremity edema. Cap refill  is <3 seconds.  Abdomen: Abdomen soft, non distended, non tender to palpation.   : Pt voids independently, denies dysuria, hematuria, frequency.

## 2024-02-03 NOTE — ED PROVIDER NOTES
Encounter Date: 2/3/2024       History     Chief Complaint   Patient presents with    Cough    Nasal Congestion    Facial Pain     73-year-old female with a history of hypertension presents with over 1 month cough and congestion.  Symptoms worse in the last several days.  She has associated chest pain with cough.  She also has associated dysuria and fatigue today.  She denies nausea, vomiting, diarrhea, or fever.  No sick contacts or children in the home.  She took some leftover levofloxacin she had at home without resolution.  She does not actively smoke, but used to smoke years ago.  She does not carry a diagnosis of asthma or COPD.  The patients available PMH, PSH, Social History, medications, allergies, and triage vital signs were reviewed just prior to their medical evaluation.         Review of patient's allergies indicates:   Allergen Reactions    Flowers     Grass pollen-ken grass standard     House dust     Peppermint and derivatives     Fouke band      Past Medical History:   Diagnosis Date    Abnormal Pap smear of vagina 07/20/2016    Anemia     Cataract     Chronic bilateral low back pain without sciatica 03/13/2018    Depression with anxiety     Hypertension     Osteoarthritis 06/20/2013    Recurrent upper respiratory infection (URI)     Right rotator cuff tear     Sleep apnea     Cipap machine at home    Urinary incontinence     Leaks urine with laughing/ coughing/ sneezing     Past Surgical History:   Procedure Laterality Date    CATARACT EXTRACTION  06/18/2012    OS    CATARACT EXTRACTION  07/16/2012    OD    CERVIX SURGERY      Conization    COLONOSCOPY      COLONOSCOPY N/A 03/04/2016    Procedure: COLONOSCOPY;  Surgeon: Tenzin Thakkar MD;  Location: [facility] ([address] [place] [address]);  Service: Endoscopy;  Laterality: N/A;    COLONOSCOPY Left 01/25/2021    Procedure: COLONOSCOPY;  Surgeon: Wayne Naranjo MD;  Location: [facility];  Service: Endoscopy;  Laterality: Left;    COLONOSCOPY N/A 06/19/2023     Procedure: COLONOSCOPY;  Surgeon: Tenzin Thakkar MD;  Location: [facility] ENDO (2ND FLR);  Service: Endoscopy;  Laterality: N/A;  lung issues  referral Dr WoodardJxngqoufvu-vtfg-hokip portal-GT  6/13/23- no answer for precall- ks    CYSTOSCOPY N/A 09/12/2022    Procedure: CYSTOSCOPY;  Surgeon: Bianka Aragon MD;  Location: [facility] OR 1ST FLR;  Service: Urology;  Laterality: N/A;    ESOPHAGOGASTRODUODENOSCOPY Left 01/25/2021    Procedure: EGD (ESOPHAGOGASTRODUODENOSCOPY);  Surgeon: Wayne Naranjo MD;  Location: [facility] ENDO;  Service: Endoscopy;  Laterality: Left;    EYE SURGERY Bilateral     cataract    HYSTERECTOMY  05/12/2015    LARYNGOSCOPY N/A 9/28/2023    Procedure: Suspension microlaryngoscopy with bilateral vocal fold injection augmentation - Restylane;  Surgeon: Andre Valera MD;  Location: [facility] OR;  Service: ENT;  Laterality: N/A;  Microscope, telescopes, tower, injector, Restylane    LEEP      LUMBAR EPIDURAL INJECTION      OOPHORECTOMY      NC REMOVAL OF OVARY/TUBE(S)  05/12/2015    RETROGRADE PYELOGRAPHY Bilateral 09/12/2022    Procedure: PYELOGRAM, RETROGRADE;  Surgeon: Bianka Aragon MD;  [facility] [employer];  Service: Urology;  Laterality: Bilateral;    ROBOT-ASSISTED REPAIR OF VENTRAL HERNIA USING DA ASPEN XI N/A 11/29/2021    Procedure: XI ROBOTIC REPAIR, HERNIA, VENTRAL w/ possible mesh;  Surgeon: Roney Jackson MD;  Location: [facility] OR 2ND FLR;  Service: General;  Laterality: N/A;  Anesthesia Block    SALPINGECTOMY Left     SHOULDER SURGERY Right     SINUS SURGERY       Family History   Problem Relation Age of Onset    Cancer Mother         THYROID CANCER    Stroke Mother     Hypertension Father     Cataracts Father     Heart disease Sister     Pacemaker/defibrilator Sister     Hypertension Sister     Deep vein thrombosis Sister     Heart disease Sister         CABG    Osteoarthritis Sister     Edema Sister     Eczema Sister     Hypertension Sister     Breast cancer Sister 72     "Anuerysm Sister     Drug abuse Sister     Hypertension Sister     No Known Problems Sister     Cancer Sister         Cancer cells or "growth in her stomach"    No Known Problems Sister     No Known Problems Brother     Depression Maternal Grandmother     Ulcers Maternal Grandfather         Legs    No Known Problems Paternal Grandmother     No Known Problems Paternal Grandfather     Hypertension Daughter     Other Daughter         Heart palpitations    Cancer Maternal Aunt     Breast cancer Cousin     ADD / ADHD Neg Hx     Alcohol abuse Neg Hx     Anxiety disorder Neg Hx     Bipolar disorder Neg Hx     Dementia Neg Hx     OCD Neg Hx     Paranoid behavior Neg Hx     Physical abuse Neg Hx     Schizophrenia Neg Hx     Seizures Neg Hx     Sexual abuse Neg Hx     Amblyopia Neg Hx     Blindness Neg Hx     Glaucoma Neg Hx     Macular degeneration Neg Hx     Retinal detachment Neg Hx     Strabismus Neg Hx     Anesthesia problems Neg Hx      Social History     Tobacco Use    Smoking status: Former     Current packs/day: 0.00     Average packs/day: 0.5 packs/day for 22.0 years (11.0 ttl pk-yrs)     Types: Cigarettes     Start date: 1975     Quit date: 1997     Years since quittin.1     Passive exposure: Past    Smokeless tobacco: Never    Tobacco comments:     Some wheezing since COVID-19 infection-2020   Substance Use Topics    Alcohol use: Yes     Alcohol/week: 0.0 standard drinks of alcohol     Comment: Occassionally for social events will have a glass of wine    Drug use: No     Review of Systems   Constitutional:  Negative for fever.   HENT:  Positive for congestion.    Respiratory:  Positive for cough. Negative for shortness of breath.    Cardiovascular:  Positive for chest pain.   Gastrointestinal:  Negative for abdominal pain, diarrhea, nausea and vomiting.   Genitourinary:  Positive for dysuria.       Physical Exam     Initial Vitals [24 0922]   BP Pulse Resp Temp SpO2   (!) 141/66 69 16 99 °F " (37.2 °C) 96 %      MAP       --         Physical Exam    Nursing note and vitals reviewed.  Constitutional: She appears well-developed and well-nourished. She is not diaphoretic. No distress.   HENT:   Head: Normocephalic and atraumatic.   Nose: Nose normal.   Eyes: Conjunctivae are normal. Right eye exhibits no discharge. Left eye exhibits no discharge.   Neck: Neck supple.   Normal range of motion.  Cardiovascular:  Normal rate, regular rhythm and normal heart sounds.     Exam reveals no gallop and no friction rub.       No murmur heard.  Pulmonary/Chest: Breath sounds normal. No respiratory distress. She has no wheezes. She has no rhonchi. She has no rales.   Productive cough   Abdominal: Abdomen is soft. She exhibits no distension. There is no abdominal tenderness. There is no rebound and no guarding.   Musculoskeletal:         General: No tenderness or edema. Normal range of motion.      Cervical back: Normal range of motion and neck supple.     Neurological: She is alert and oriented to person, place, and time. She has normal strength. GCS score is 15. GCS eye subscore is 4. GCS verbal subscore is 5. GCS motor subscore is 6.   Skin: Skin is warm and dry. No rash noted. No erythema.   Psychiatric: She has a normal mood and affect. Her behavior is normal. Judgment and thought content normal.         ED Course   Procedures  Labs Reviewed   CBC W/ AUTO DIFFERENTIAL - Abnormal; Notable for the following components:       Result Value    RDW 15.0 (*)     Eos # 0.8 (*)     All other components within normal limits   COMPREHENSIVE METABOLIC PANEL - Abnormal; Notable for the following components:    Albumin 3.3 (*)     Anion Gap 7 (*)     All other components within normal limits   HIV 1 / 2 ANTIBODY    Narrative:     Release to patient->Immediate   HEPATITIS C ANTIBODY    Narrative:     Release to patient->Immediate   MAGNESIUM   LIPASE   TROPONIN I   TSH   URINALYSIS, REFLEX TO URINE CULTURE    Narrative:      Specimen Source->Urine   SARS-COV2 (COVID) WITH FLU/RSV BY PCR   URINALYSIS MICROSCOPIC    Narrative:     Specimen Source->Urine     EKG Readings: (Independently Interpreted)   Initial Reading: No STEMI. Rhythm: Normal Sinus Rhythm. Heart Rate: 68. Ectopy: No Ectopy. Conduction: Normal. ST Segments: Normal ST Segments. T Waves: Normal. Clinical Impression: Normal Sinus Rhythm       Imaging Results              X-Ray Chest PA And Lateral (Final result)  Result time 02/03/24 11:40:52      Final result by Kurtsi Larsen DO (02/03/24 11:40:52)                   Impression:      See above      Electronically signed by: Kurtis Larsen DO  Date:    02/03/2024  Time:    11:40               Narrative:    EXAMINATION:  XR CHEST PA AND LATERAL    CLINICAL HISTORY:  Cough, unspecified    TECHNIQUE:  PA and lateral views of the chest were performed.    COMPARISON:  07/25/2022    FINDINGS:  No significant change from prior.  No new lung opacity.  Continued ill-defined apical lung opacities which may represent scarring and fibrosis.  There is no pleural effusion or pneumothorax.  Continued atherosclerotic aorta.  Heart size relatively stable within normal limits.  Degenerative change of the spine with convex right curvature.  Further evaluation as warranted clinically.                                       Medications   oxymetazoline 0.05 % nasal spray 2 spray (2 sprays Each Nostril Given 2/3/24 2538)   albuterol inhaler 2 puff (2 puffs Inhalation Given 2/3/24 9365)     Medical Decision Making  73-year-old female presents with chronic cough and congestion.  Vitals with slight hypertension.  Physical exam as above.  Labs  unremarkable.  No anemia, UTI, ACS, or acute life threat.  Chronic cough could be asthma/copd/post nasal drip/GERD.  Improved with albuterol and afrin.  Rx tessalon.  She will consider adding prilosec.  Patient will call their primary physician tomorrow to schedule close follow-up. Patient will return to ED for  worsening symptoms, inability to eat/drink, fever greater than 100.4, or any other concerns. Did bedside teaching with return precautions.  All questions answered.  The patient acknowledges understanding.  Gave verbal discharge instructions.     Amount and/or Complexity of Data Reviewed  Labs: ordered. Decision-making details documented in ED Course.  Radiology: ordered. Decision-making details documented in ED Course.  ECG/medicine tests: ordered and independent interpretation performed. Decision-making details documented in ED Course.    Risk  OTC drugs.  Prescription drug management.                                      Clinical Impression:  Final diagnoses:  [R05.9] Cough in adult patient (Primary)  [R07.9] Chest pain          ED Disposition Condition    Discharge Stable          ED Prescriptions       Medication Sig Dispense Start Date End Date Auth. Provider    benzonatate (TESSALON) 100 MG capsule Take 1 capsule (100 mg total) by mouth 3 (three) times daily as needed for Cough. 20 capsule 2/3/2024 2/13/2024 Curt Barrera MD    olopatadine (PATANOL) 0.1 % ophthalmic solution Place 1 drop into both eyes 2 (two) times daily. 5 mL 2/3/2024 3/4/2024 Curt Barrera MD          Follow-up Information       Follow up With Specialties Details Why Contact Info    Follow up with primary physician as soon as possible.  Call tomorrow for an appointment.        [facility] - Emergency Dept Emergency Medicine  Return to ED for worsening symptoms, inability to eat/drink, fever greater than 100.4, or any other concerns. [address]  [facility] [address]  [phone]             Curt Barrera MD  02/03/24 0303

## 2024-02-03 NOTE — DISCHARGE INSTRUCTIONS
Use inhaler as shown:  2 puffs every 4-6 hours as needed for wheezing or shortness of breath.    Our goal in the emergency department is to always give you outstanding care and exceptional service. You may receive a survey by mail or e-mail in the next week regarding your experience in our ED. We would greatly appreciate your completing and returning the survey. Your feedback provides us with a way to recognize our staff who give very good care and it helps us learn how to improve when your experience was below our aspiration of excellence.

## 2024-02-05 LAB — BACTERIA UR CULT: ABNORMAL

## 2024-02-07 ENCOUNTER — LAB VISIT (OUTPATIENT)
Dept: LAB | Facility: HOSPITAL | Age: 74
End: 2024-02-07
Attending: INTERNAL MEDICINE
Payer: MEDICARE

## 2024-02-07 DIAGNOSIS — E78.5 HYPERLIPIDEMIA, UNSPECIFIED HYPERLIPIDEMIA TYPE: ICD-10-CM

## 2024-02-07 DIAGNOSIS — I10 PRIMARY HYPERTENSION: ICD-10-CM

## 2024-02-07 DIAGNOSIS — E55.9 MILD VITAMIN D DEFICIENCY: ICD-10-CM

## 2024-02-07 DIAGNOSIS — R73.9 HYPERGLYCEMIA: ICD-10-CM

## 2024-02-07 LAB
25(OH)D3+25(OH)D2 SERPL-MCNC: 37 NG/ML (ref 30–96)
ALBUMIN SERPL BCP-MCNC: 3.5 G/DL (ref 3.5–5.2)
ALP SERPL-CCNC: 121 U/L (ref 55–135)
ALT SERPL W/O P-5'-P-CCNC: 15 U/L (ref 10–44)
ANION GAP SERPL CALC-SCNC: 10 MMOL/L (ref 8–16)
AST SERPL-CCNC: 18 U/L (ref 10–40)
BASOPHILS # BLD AUTO: 0.05 K/UL (ref 0–0.2)
BASOPHILS NFR BLD: 0.4 % (ref 0–1.9)
BILIRUB SERPL-MCNC: 0.6 MG/DL (ref 0.1–1)
BUN SERPL-MCNC: 15 MG/DL (ref 8–23)
CALCIUM SERPL-MCNC: 9.7 MG/DL (ref 8.7–10.5)
CHLORIDE SERPL-SCNC: 106 MMOL/L (ref 95–110)
CHOLEST SERPL-MCNC: 103 MG/DL (ref 120–199)
CHOLEST/HDLC SERPL: 2.2 {RATIO} (ref 2–5)
CO2 SERPL-SCNC: 27 MMOL/L (ref 23–29)
CREAT SERPL-MCNC: 0.8 MG/DL (ref 0.5–1.4)
DIFFERENTIAL METHOD BLD: ABNORMAL
EOSINOPHIL # BLD AUTO: 0.6 K/UL (ref 0–0.5)
EOSINOPHIL NFR BLD: 5 % (ref 0–8)
ERYTHROCYTE [DISTWIDTH] IN BLOOD BY AUTOMATED COUNT: 15.3 % (ref 11.5–14.5)
EST. GFR  (NO RACE VARIABLE): >60 ML/MIN/1.73 M^2
ESTIMATED AVG GLUCOSE: 120 MG/DL (ref 68–131)
GLUCOSE SERPL-MCNC: 87 MG/DL (ref 70–110)
HBA1C MFR BLD: 5.8 % (ref 4–5.6)
HCT VFR BLD AUTO: 39.2 % (ref 37–48.5)
HDLC SERPL-MCNC: 47 MG/DL (ref 40–75)
HDLC SERPL: 45.6 % (ref 20–50)
HGB BLD-MCNC: 12.6 G/DL (ref 12–16)
IMM GRANULOCYTES # BLD AUTO: 0.04 K/UL (ref 0–0.04)
IMM GRANULOCYTES NFR BLD AUTO: 0.3 % (ref 0–0.5)
LDLC SERPL CALC-MCNC: 47.4 MG/DL (ref 63–159)
LYMPHOCYTES # BLD AUTO: 2.3 K/UL (ref 1–4.8)
LYMPHOCYTES NFR BLD: 19.5 % (ref 18–48)
MCH RBC QN AUTO: 28.6 PG (ref 27–31)
MCHC RBC AUTO-ENTMCNC: 32.1 G/DL (ref 32–36)
MCV RBC AUTO: 89 FL (ref 82–98)
MONOCYTES # BLD AUTO: 0.6 K/UL (ref 0.3–1)
MONOCYTES NFR BLD: 5.2 % (ref 4–15)
NEUTROPHILS # BLD AUTO: 8.1 K/UL (ref 1.8–7.7)
NEUTROPHILS NFR BLD: 69.6 % (ref 38–73)
NONHDLC SERPL-MCNC: 56 MG/DL
NRBC BLD-RTO: 0 /100 WBC
PLATELET # BLD AUTO: 323 K/UL (ref 150–450)
PMV BLD AUTO: 11.1 FL (ref 9.2–12.9)
POTASSIUM SERPL-SCNC: 4 MMOL/L (ref 3.5–5.1)
PROT SERPL-MCNC: 7.1 G/DL (ref 6–8.4)
RBC # BLD AUTO: 4.4 M/UL (ref 4–5.4)
SODIUM SERPL-SCNC: 143 MMOL/L (ref 136–145)
TRIGL SERPL-MCNC: 43 MG/DL (ref 30–150)
TSH SERPL DL<=0.005 MIU/L-ACNC: 2.56 UIU/ML (ref 0.4–4)
WBC # BLD AUTO: 11.69 K/UL (ref 3.9–12.7)

## 2024-02-07 PROCEDURE — 83036 HEMOGLOBIN GLYCOSYLATED A1C: CPT | Performed by: INTERNAL MEDICINE

## 2024-02-07 PROCEDURE — 36415 COLL VENOUS BLD VENIPUNCTURE: CPT | Performed by: INTERNAL MEDICINE

## 2024-02-07 PROCEDURE — 85025 COMPLETE CBC W/AUTO DIFF WBC: CPT | Performed by: INTERNAL MEDICINE

## 2024-02-07 PROCEDURE — 84443 ASSAY THYROID STIM HORMONE: CPT | Performed by: INTERNAL MEDICINE

## 2024-02-07 PROCEDURE — 80061 LIPID PANEL: CPT | Performed by: INTERNAL MEDICINE

## 2024-02-07 PROCEDURE — 82306 VITAMIN D 25 HYDROXY: CPT | Performed by: INTERNAL MEDICINE

## 2024-02-07 PROCEDURE — 80053 COMPREHEN METABOLIC PANEL: CPT | Performed by: INTERNAL MEDICINE

## 2024-02-08 ENCOUNTER — TELEPHONE (OUTPATIENT)
Dept: ORTHOPEDICS | Facility: CLINIC | Age: 74
End: 2024-02-08
Payer: MEDICARE

## 2024-02-20 ENCOUNTER — OFFICE VISIT (OUTPATIENT)
Dept: PULMONOLOGY | Facility: CLINIC | Age: 74
End: 2024-02-20
Payer: MEDICARE

## 2024-02-20 ENCOUNTER — LAB VISIT (OUTPATIENT)
Dept: LAB | Facility: HOSPITAL | Age: 74
End: 2024-02-20
Attending: INTERNAL MEDICINE
Payer: MEDICARE

## 2024-02-20 VITALS
HEART RATE: 74 BPM | OXYGEN SATURATION: 97 % | WEIGHT: 204.81 LBS | DIASTOLIC BLOOD PRESSURE: 75 MMHG | SYSTOLIC BLOOD PRESSURE: 135 MMHG | HEIGHT: 70 IN | BODY MASS INDEX: 29.32 KG/M2

## 2024-02-20 DIAGNOSIS — J84.9 INTERSTITIAL PULMONARY DISEASE, UNSPECIFIED: ICD-10-CM

## 2024-02-20 DIAGNOSIS — R76.8 ANA POSITIVE: ICD-10-CM

## 2024-02-20 DIAGNOSIS — J84.9 INTERSTITIAL PULMONARY DISEASE, UNSPECIFIED: Primary | ICD-10-CM

## 2024-02-20 LAB
ALBUMIN SERPL BCP-MCNC: 3.5 G/DL (ref 3.5–5.2)
ALP SERPL-CCNC: 130 U/L (ref 55–135)
ALT SERPL W/O P-5'-P-CCNC: 17 U/L (ref 10–44)
ANION GAP SERPL CALC-SCNC: 8 MMOL/L (ref 8–16)
AST SERPL-CCNC: 19 U/L (ref 10–40)
BASOPHILS # BLD AUTO: 0.06 K/UL (ref 0–0.2)
BASOPHILS NFR BLD: 0.6 % (ref 0–1.9)
BILIRUB SERPL-MCNC: 0.6 MG/DL (ref 0.1–1)
BUN SERPL-MCNC: 19 MG/DL (ref 8–23)
CALCIUM SERPL-MCNC: 9.7 MG/DL (ref 8.7–10.5)
CCP AB SER IA-ACNC: <0.5 U/ML
CHLORIDE SERPL-SCNC: 105 MMOL/L (ref 95–110)
CK SERPL-CCNC: 108 U/L (ref 20–180)
CO2 SERPL-SCNC: 30 MMOL/L (ref 23–29)
CREAT SERPL-MCNC: 0.8 MG/DL (ref 0.5–1.4)
DIFFERENTIAL METHOD BLD: ABNORMAL
EOSINOPHIL # BLD AUTO: 0.4 K/UL (ref 0–0.5)
EOSINOPHIL NFR BLD: 4.3 % (ref 0–8)
ERYTHROCYTE [DISTWIDTH] IN BLOOD BY AUTOMATED COUNT: 14.9 % (ref 11.5–14.5)
EST. GFR  (NO RACE VARIABLE): >60 ML/MIN/1.73 M^2
GLUCOSE SERPL-MCNC: 74 MG/DL (ref 70–110)
HCT VFR BLD AUTO: 38.7 % (ref 37–48.5)
HGB BLD-MCNC: 12.5 G/DL (ref 12–16)
IMM GRANULOCYTES # BLD AUTO: 0.02 K/UL (ref 0–0.04)
IMM GRANULOCYTES NFR BLD AUTO: 0.2 % (ref 0–0.5)
LYMPHOCYTES # BLD AUTO: 2.2 K/UL (ref 1–4.8)
LYMPHOCYTES NFR BLD: 21.2 % (ref 18–48)
MCH RBC QN AUTO: 28.4 PG (ref 27–31)
MCHC RBC AUTO-ENTMCNC: 32.3 G/DL (ref 32–36)
MCV RBC AUTO: 88 FL (ref 82–98)
MONOCYTES # BLD AUTO: 0.4 K/UL (ref 0.3–1)
MONOCYTES NFR BLD: 3.9 % (ref 4–15)
NEUTROPHILS # BLD AUTO: 7.1 K/UL (ref 1.8–7.7)
NEUTROPHILS NFR BLD: 69.8 % (ref 38–73)
NRBC BLD-RTO: 0 /100 WBC
PLATELET # BLD AUTO: 331 K/UL (ref 150–450)
PMV BLD AUTO: 9.6 FL (ref 9.2–12.9)
POTASSIUM SERPL-SCNC: 4 MMOL/L (ref 3.5–5.1)
PROT SERPL-MCNC: 7.3 G/DL (ref 6–8.4)
RBC # BLD AUTO: 4.4 M/UL (ref 4–5.4)
RHEUMATOID FACT SERPL-ACNC: <13 IU/ML (ref 0–15)
SODIUM SERPL-SCNC: 143 MMOL/L (ref 136–145)
WBC # BLD AUTO: 10.23 K/UL (ref 3.9–12.7)

## 2024-02-20 PROCEDURE — 80053 COMPREHEN METABOLIC PANEL: CPT | Performed by: INTERNAL MEDICINE

## 2024-02-20 PROCEDURE — 86235 NUCLEAR ANTIGEN ANTIBODY: CPT | Performed by: INTERNAL MEDICINE

## 2024-02-20 PROCEDURE — 83516 IMMUNOASSAY NONANTIBODY: CPT | Mod: 59 | Performed by: INTERNAL MEDICINE

## 2024-02-20 PROCEDURE — 86039 ANTINUCLEAR ANTIBODIES (ANA): CPT | Performed by: INTERNAL MEDICINE

## 2024-02-20 PROCEDURE — 86235 NUCLEAR ANTIGEN ANTIBODY: CPT | Mod: 59 | Performed by: INTERNAL MEDICINE

## 2024-02-20 PROCEDURE — 36415 COLL VENOUS BLD VENIPUNCTURE: CPT | Performed by: INTERNAL MEDICINE

## 2024-02-20 PROCEDURE — 83516 IMMUNOASSAY NONANTIBODY: CPT | Performed by: INTERNAL MEDICINE

## 2024-02-20 PROCEDURE — 86431 RHEUMATOID FACTOR QUANT: CPT | Performed by: INTERNAL MEDICINE

## 2024-02-20 PROCEDURE — 85025 COMPLETE CBC W/AUTO DIFF WBC: CPT | Performed by: INTERNAL MEDICINE

## 2024-02-20 PROCEDURE — 86225 DNA ANTIBODY NATIVE: CPT | Mod: 59 | Performed by: INTERNAL MEDICINE

## 2024-02-20 PROCEDURE — 83520 IMMUNOASSAY QUANT NOS NONAB: CPT | Performed by: INTERNAL MEDICINE

## 2024-02-20 PROCEDURE — 86200 CCP ANTIBODY: CPT | Performed by: INTERNAL MEDICINE

## 2024-02-20 PROCEDURE — 82550 ASSAY OF CK (CPK): CPT | Performed by: INTERNAL MEDICINE

## 2024-02-20 PROCEDURE — 82085 ASSAY OF ALDOLASE: CPT | Performed by: INTERNAL MEDICINE

## 2024-02-20 PROCEDURE — 86038 ANTINUCLEAR ANTIBODIES: CPT | Performed by: INTERNAL MEDICINE

## 2024-02-20 PROCEDURE — 86225 DNA ANTIBODY NATIVE: CPT | Performed by: INTERNAL MEDICINE

## 2024-02-20 PROCEDURE — 99999 PR PBB SHADOW E&M-EST. PATIENT-LVL III: CPT | Mod: PBBFAC,,, | Performed by: INTERNAL MEDICINE

## 2024-02-20 PROCEDURE — 99214 OFFICE O/P EST MOD 30 MIN: CPT | Mod: S$GLB,,, | Performed by: INTERNAL MEDICINE

## 2024-02-20 RX ORDER — VIBEGRON 75 MG/1
1 TABLET, FILM COATED ORAL
COMMUNITY
Start: 2023-12-12

## 2024-02-20 RX ORDER — MELOXICAM 15 MG/1
TABLET ORAL
COMMUNITY

## 2024-02-20 NOTE — ASSESSMENT & PLAN NOTE
Saw her last in May of 2023 and at that time there was concern for ILD CT scan done in August shows evidence of fibrotic changes in the bases and bronchiectasis.  Previously KEE positive but with no follow-up and no other diagnoses.    We will restart ILD workup and evaluation including repeat PFTs for comparison, 6 minute walk test, echo, repeat CT scan for comparison, KEE along with multiple other autoimmune labs  Also obtaining a sputum culture  Encouraged continued use of her inhaler as it seems to help.

## 2024-02-20 NOTE — PROGRESS NOTES
"Subjective:     Reason for visit: follow up/sob    Patient ID:  Marleny Guzman is a 73 y.o. female    Interval History 2/2024   Ms. Guzman was last seen in May of last year and since that time she has continued to have sometimes dyspnea on exertion, cough and congestion with some relief with her albuterol.  She did have the CT scan done in August but has not been able to get back to an appointment since then.  We reviewed her possible risks risk factors for ILD and autoimmune diseases in have been unable to identify any.  No family history of lung disease or autoimmune diseases.    History 5/2023:  Ms. Guzman is a 73 yo that was previously seen by Dr. Valentino and Kary Nixon NP but is new to me. She has intermittently had issues with shortness of breath over the years that comes and goes. It doesn't seem to be seasonal and she has not previously had relief with much albuterol. She has no previous known lung disease or diagnosis but did have a nodule seen previously on imaging. She is following up today because she is now having more shortness of breath again. It is typically after a URI and then doesn't improve.       Additional Pulmonary History:  Childhood Illnesses:  NA  Occupational/Environmental:  none  Tobacco/Smoking:  previously smoked but quit int the 1980s    Objective:     Vitals:    02/20/24 1043   BP: 135/75   BP Location: Left arm   Patient Position: Sitting   Pulse: 74   SpO2: 97%   Weight: 92.9 kg (204 lb 12.9 oz)   Height: 5' 10" (1.778 m)         Physical Exam  Constitutional:       General: She is not in acute distress.     Appearance: She is not diaphoretic.   HENT:      Head: Normocephalic and atraumatic.      Right Ear: External ear normal.      Left Ear: External ear normal.   Eyes:      Conjunctiva/sclera: Conjunctivae normal.      Pupils: Pupils are equal, round, and reactive to light.   Neck:      Trachea: No tracheal deviation.   Cardiovascular:      Rate and Rhythm: Normal rate " and regular rhythm.      Heart sounds: Normal heart sounds. No murmur heard.  Pulmonary:      Effort: Pulmonary effort is normal. No respiratory distress.      Breath sounds: Normal breath sounds. No stridor. No wheezing or rales.   Abdominal:      General: Bowel sounds are normal. There is no distension.      Palpations: Abdomen is soft.      Tenderness: There is no abdominal tenderness.   Musculoskeletal:         General: Normal range of motion.      Cervical back: Normal range of motion and neck supple.   Skin:     General: Skin is warm and dry.      Findings: No erythema.   Neurological:      Mental Status: She is alert and oriented to person, place, and time.      Gait: Gait is intact.   Psychiatric:         Mood and Affect: Mood and affect normal.         Cognition and Memory: Memory normal.         Judgment: Judgment normal.          Personal Diagnostic Review and Interpretation  CT scan reviewed see a/p       Pertinent Studies Reviewed & Interpreted:     Pulmonary Function Tests:   Last done in 2021   Today's with restriction and decreased DLCO     6 Minute Walk Tests:   Last done in 2021- no oxygen needs     Echocardiograms:   Results for orders placed during the hospital encounter of 06/07/21    Echo    Interpretation Summary  · The estimated ejection fraction is 69%.  · Indeterminate left ventricular diastolic function.  · The left ventricle is normal in size with normal systolic function.  · Normal right ventricular size with normal right ventricular systolic function.        Assessment & Plan:       Problem List Items Addressed This Visit          Pulmonary    Interstitial pulmonary disease, unspecified - Primary    Current Assessment & Plan      Saw her last in May of 2023 and at that time there was concern for ILD CT scan done in August shows evidence of fibrotic changes in the bases and bronchiectasis.  Previously KEE positive but with no follow-up and no other diagnoses.    We will restart ILD  workup and evaluation including repeat PFTs for comparison, 6 minute walk test, echo, repeat CT scan for comparison, KEE along with multiple other autoimmune labs  Also obtaining a sputum culture  Encouraged continued use of her inhaler as it seems to help.         Relevant Orders    KEE    ANTI-DNA ANTIBODY, DOUBLE-STRANDED    ANTI -SSA ANTIBODY    ANTI-SSB ANTIBODY    Anti-scleroderma antibody    RNA polymerase III Ab, IgG    PM-Scl Antibody by Immunodiffusion    Anti Sm/RNP Antibody    Th/To Antibody    MyoMarker Panel 3    CK    RHEUMATOID FACTOR    CYCLIC CITRUL PEPTIDE ANTIBODY, IGG    QUANTIFERON GOLD TB    ALDOLASE    Complete PFT w/ bronchodilator    Stress test, pulmonary    Echo    CT Chest Without Contrast    CBC auto differential    Comprehensive Metabolic Panel    Culture, Respiratory with Gram Stain    AFB Culture & Smear    Ambulatory referral/consult to Rheumatology       Immunology/Multi System    KEE positive    Current Assessment & Plan       Positive KEE with concern for ILD, no other obvious symptoms at this time   Labs ordered and rheumatology referral placed             Portions of the record may have been created with voice-recognition software. Occasional wrong-word or sound-a-like substitutions may have occurred due to the inherent limitations of voice-recognition software. Read the chart carefully and recognize, using context, where substitutions have occurred.  Coleen Real M.D.  Pulmonary/Critical Care

## 2024-02-20 NOTE — ASSESSMENT & PLAN NOTE
Positive KEE with concern for ILD, no other obvious symptoms at this time   Labs ordered and rheumatology referral placed

## 2024-02-21 LAB
ANA PATTERN 1: NORMAL
ANA SER QL IF: POSITIVE
ANA TITR SER IF: NORMAL {TITER}
ANTI SM/RNP ANTIBODY: 0.12 RATIO (ref 0–0.99)
ANTI-SM/RNP INTERPRETATION: NEGATIVE
ANTI-SSA ANTIBODY: 0.07 RATIO (ref 0–0.99)
ANTI-SSA INTERPRETATION: NEGATIVE
ANTI-SSB ANTIBODY: 0.08 RATIO (ref 0–0.99)
ANTI-SSB INTERPRETATION: NEGATIVE
ENA SCL70 IGG SER IA-ACNC: <0.2 U

## 2024-02-22 LAB
ALDOLASE SERPL-CCNC: 4.3 U/L (ref 1.2–7.6)
DNA TITER: NORMAL
DSDNA AB SER-ACNC: POSITIVE [IU]/ML

## 2024-02-23 LAB
ANTI SM ANTIBODY: 0.08 RATIO (ref 0–0.99)
ANTI SM/RNP ANTIBODY: 0.12 RATIO (ref 0–0.99)
ANTI-SM INTERPRETATION: NEGATIVE
ANTI-SM/RNP INTERPRETATION: NEGATIVE
ANTI-SSA ANTIBODY: 0.07 RATIO (ref 0–0.99)
ANTI-SSA INTERPRETATION: NEGATIVE
ANTI-SSB ANTIBODY: 0.08 RATIO (ref 0–0.99)
ANTI-SSB INTERPRETATION: NEGATIVE
DSDNA AB SER-ACNC: POSITIVE [IU]/ML
ENA SCL70 AB SER-ACNC: <0.6 U/ML

## 2024-03-04 LAB — RNAP III AB SER-ACNC: 31 UNITS

## 2024-03-06 ENCOUNTER — OFFICE VISIT (OUTPATIENT)
Dept: URGENT CARE | Facility: CLINIC | Age: 74
End: 2024-03-06
Payer: MEDICARE

## 2024-03-06 ENCOUNTER — HOSPITAL ENCOUNTER (OUTPATIENT)
Dept: RADIOLOGY | Facility: CLINIC | Age: 74
Discharge: HOME | End: 2024-03-06
Attending: INTERNAL MEDICINE
Payer: MEDICARE

## 2024-03-06 VITALS
SYSTOLIC BLOOD PRESSURE: 164 MMHG | TEMPERATURE: 98 F | BODY MASS INDEX: 29.2 KG/M2 | RESPIRATION RATE: 16 BRPM | DIASTOLIC BLOOD PRESSURE: 67 MMHG | HEART RATE: 69 BPM | HEIGHT: 70 IN | WEIGHT: 204 LBS | OXYGEN SATURATION: 98 %

## 2024-03-06 DIAGNOSIS — R06.2 WHEEZING: ICD-10-CM

## 2024-03-06 DIAGNOSIS — M81.8 OTHER OSTEOPOROSIS WITHOUT CURRENT PATHOLOGICAL FRACTURE: ICD-10-CM

## 2024-03-06 DIAGNOSIS — J40 BRONCHITIS: Primary | ICD-10-CM

## 2024-03-06 DIAGNOSIS — R05.9 COUGH, UNSPECIFIED TYPE: ICD-10-CM

## 2024-03-06 DIAGNOSIS — M85.80 OSTEOPENIA, UNSPECIFIED LOCATION: ICD-10-CM

## 2024-03-06 LAB
CTP QC/QA: YES
SARS-COV-2 AG RESP QL IA.RAPID: NEGATIVE

## 2024-03-06 PROCEDURE — 77080 DXA BONE DENSITY AXIAL: CPT | Mod: 26,ICN,, | Performed by: INTERNAL MEDICINE

## 2024-03-06 PROCEDURE — 77080 DXA BONE DENSITY AXIAL: CPT | Mod: TC

## 2024-03-06 PROCEDURE — 71046 X-RAY EXAM CHEST 2 VIEWS: CPT | Mod: S$GLB,,, | Performed by: RADIOLOGY

## 2024-03-06 PROCEDURE — 99213 OFFICE O/P EST LOW 20 MIN: CPT | Mod: S$GLB,,,

## 2024-03-06 PROCEDURE — 87811 SARS-COV-2 COVID19 W/OPTIC: CPT | Mod: QW,S$GLB,,

## 2024-03-06 RX ORDER — PREDNISONE 20 MG/1
40 TABLET ORAL DAILY
Qty: 10 TABLET | Refills: 0 | Status: SHIPPED | OUTPATIENT
Start: 2024-03-06 | End: 2024-03-11

## 2024-03-06 NOTE — PROGRESS NOTES
"Subjective:      Patient ID: Marleny Guzman is a 73 y.o. female.    Vitals:  height is 5' 10" (1.778 m) and weight is 92.5 kg (204 lb). Her temperature is 98 °F (36.7 °C). Her blood pressure is 164/67 (abnormal) and her pulse is 69. Her respiration is 16 and oxygen saturation is 98%.     Chief Complaint: Cough    Pt presents complaints of cough and congestion. Symptoms started over 2 months ago. Cough is constant with thick phlegm and chest tightness. OTC tylenol, tessalon pearls, robitussin taken with no relief. She reports now with body aches and fatigue x a few days. Feeling like chest is congestion and unable to cough up secretions. Wheezing mainly at night. She has worsening SOB on exertion. Using albuterol and pulmicort daily. Hx of ILD; next pulmonlogy appt on 4/8.    Cough  This is a new problem. The current episode started more than 1 month ago. The problem has been unchanged. The problem occurs constantly. The cough is Productive of sputum. Associated symptoms include myalgias, shortness of breath and wheezing. Pertinent negatives include no chest pain, chills, ear congestion, ear pain, fever, headaches, heartburn, hemoptysis, nasal congestion, postnasal drip, rash, rhinorrhea, sore throat, sweats or weight loss. The symptoms are aggravated by lying down. She has tried OTC cough suppressant for the symptoms. The treatment provided no relief. Her past medical history is significant for environmental allergies. There is no history of asthma, bronchiectasis, bronchitis, COPD or pneumonia.     Constitution: Positive for fatigue. Negative for chills and fever.   HENT:  Negative for ear pain, postnasal drip, sinus pressure and sore throat.    Cardiovascular:  Negative for chest pain.   Respiratory:  Positive for cough, shortness of breath and wheezing. Negative for bloody sputum.    Gastrointestinal:  Negative for abdominal pain, nausea, vomiting, diarrhea and heartburn.   Musculoskeletal:  Positive for " muscle ache.   Skin:  Negative for rash.   Allergic/Immunologic: Positive for environmental allergies.   Neurological:  Negative for headaches.      Objective:     Physical Exam   Constitutional: She is oriented to person, place, and time. She appears well-developed. She is cooperative.  Non-toxic appearance. She does not appear ill. No distress.   HENT:   Head: Normocephalic and atraumatic.   Ears:   Right Ear: Hearing, tympanic membrane, external ear and ear canal normal.   Left Ear: Hearing, tympanic membrane, external ear and ear canal normal.   Nose: Nose normal. No mucosal edema, rhinorrhea, nasal deformity or congestion. No epistaxis. Right sinus exhibits no maxillary sinus tenderness and no frontal sinus tenderness. Left sinus exhibits no maxillary sinus tenderness and no frontal sinus tenderness.   Mouth/Throat: Uvula is midline, oropharynx is clear and moist and mucous membranes are normal. No trismus in the jaw. Normal dentition. No uvula swelling. No oropharyngeal exudate, posterior oropharyngeal edema or posterior oropharyngeal erythema.   Eyes: Conjunctivae and lids are normal. Pupils are equal, round, and reactive to light. No scleral icterus.   Neck: Trachea normal and phonation normal. Neck supple. No edema present. No erythema present. No neck rigidity present.   Cardiovascular: Normal rate, regular rhythm, normal heart sounds and normal pulses.   Pulmonary/Chest: Effort normal and breath sounds normal. No stridor. No respiratory distress. She has no decreased breath sounds. She has no wheezes. She has no rhonchi. She has no rales.   Abdominal: Normal appearance.   Musculoskeletal: Normal range of motion.         General: No deformity. Normal range of motion.   Neurological: She is alert and oriented to person, place, and time. She exhibits normal muscle tone. Coordination normal.   Skin: Skin is warm, dry, intact, not diaphoretic and not pale.   Psychiatric: Her speech is normal and behavior is  normal. Judgment and thought content normal.   Nursing note and vitals reviewed.      Assessment:     1. Bronchitis    2. Cough, unspecified type    3. Wheezing        Plan:     Results for orders placed or performed in visit on 03/06/24   SARS Coronavirus 2 Antigen, POCT Manual Read   Result Value Ref Range    SARS Coronavirus 2 Antigen Negative Negative     Acceptable Yes      *Note: Due to a large number of results and/or encounters for the requested time period, some results have not been displayed. A complete set of results can be found in Results Review.     EXAMINATION:  XR CHEST PA AND LATERAL     TECHNIQUE:  PA and lateral views of the chest were performed.     COMPARISON:  02/03/2024     FINDINGS:  The cardiac silhouette is slightly prominent     The pulmonary vascularity is normal.     The pulmonary j luis appear normal bilaterally.     The lungs are well expanded and clear.     No focal airspace disease.     No pleural effusion, no pneumothorax.     The osseous structures appear within normal limits for age.     Atherosclerotic plaque of the aorta.     Impression:     No acute intrathoracic process seen.        Electronically signed by: Lakesha Gibbs MD  Date:                                            03/06/2024  Time:                                           16:49    Bronchitis    Cough, unspecified type  -     SARS Coronavirus 2 Antigen, POCT Manual Read  -     XR CHEST PA AND LATERAL; Future; Expected date: 03/06/2024    Wheezing  -     predniSONE (DELTASONE) 20 MG tablet; Take 2 tablets (40 mg total) by mouth once daily. for 5 days  Dispense: 10 tablet; Refill: 0            Discussed results/diagnosis/plan with patient in clinic. Strict precautions given to patient to monitor for worsening signs and symptoms. Advised to follow up with PCP or specialist.  Explained side effects of medications prescribed with patient and informed him/her to discontinue use if he/she has any side  effects and to inform UC or PCP if this occurs. All questions answered. Strict ED verses clinic return precautions stressed and given in depth. Advised if symptoms worsens of fail to improve he/she should go to the Emergency Room. Discharge and follow-up instructions given verbally/printed with the patient who expressed understanding and willingness to comply with my recommendations. Patient voiced understanding and in agreement with current treatment plan. Patient exits the exam room in no acute distress. Conversant and engaged during discharge discussion, verbalized understanding.

## 2024-03-06 NOTE — PATIENT INSTRUCTIONS
Negative for COVID.    Continue use of albuterol & Pulmicort inhalers for chest tightness/shortness of breath/wheezing/coughing fits as directed.     Take prednisone (steroid) as directed x 5 days. Take in the morning so it does not keep you up at night. Take with food and water. This medication can increase your BP and sugar level. Please monitor and eat a well balanced diet. Avoid high salt content foods and eat more foods with potassium, magnesium and calcium.     Continue your already prescribed tessalon perles as directed during the day for your cough. It will help numb the back of your throat so you do not have the urge to cough.      Try a decongestant and corticosteroid nasal spray like flonase for the next few days for sinus relief. Initial: 2 sprays in each nostril once daily for 1 week. Reduce to 1 spray in each nostril once per day. Stop taking if you develop a nose bleed. Nasal saline spray can be used together with flonase to help moisten nostrils. An antihistamine like zyrtec, claritin, allegra can also be helpful for sinus relief and will help dry nasal passages.     Regular (Guaifenesin) Mucinex 1200 mg twice per day for 10 days can help thin secretions for better clearance. Drink plenty of fluids with this.     Honey is a natural cough suppressant.     If you do have Hypertension or palpitations, it is safe to take Coricidin HBP (multi-symptom flu) for relief of sinus symptoms.  Try DASH diet to help lower BP and buy a blood pressure cuff for home monitoring. Check blood pressure at least 2 times per day and create a log. Avoid eating foods that are high in salt. Eat more foods with potassium, magnesium and calcium which will help dilate your vessels and decrease your BP.     Warm tea/warm liquids will help soothe the back of your throat. Warm water salt gurgles can also be helpful. A dry throat will cause pain. Make sure to stay hydrated. Water and pedilyte are the best to drink. Neti pot  irrigation, humidifier in your room, avoiding fans, warm compresses to face, eating/drinking hot soups, hot shower before bedtime can help.     The recommended daily fluid intake for women is 2.7 liters (five 16 oz bottles).      Alternate Tylenol and ibuprofen every 4 hours as needed for fever and body aches.  Please take NSAIDs with a full glass of water and food to avoid GI upset.      Please only use over the counter cough and cold medications for 3-5 days at a time to avoid rebound symptoms.     Getting plenty of rest can aid in a faster recovery of illnesses.     Please follow-up with your primary care provider or return to the clinic if not better/worsening symptoms in 1 week.     Report to the ER if you have chest pain, shortness of breath, palpitations.

## 2024-03-07 ENCOUNTER — TELEPHONE (OUTPATIENT)
Dept: ORTHOPEDICS | Facility: CLINIC | Age: 74
End: 2024-03-07
Payer: MEDICARE

## 2024-03-07 NOTE — TELEPHONE ENCOUNTER
Spoke to patient and scheduled her for 3/26----- Message from Melisa De La Cruz sent at 3/7/2024 11:43 AM CST -----  Regarding: Ambulatory referral/consult to Orthopedics  Who Called: Patient    What is the request in detail: Requesting call back to discuss scheduling NP appointment from attached referral to be seen for the following:      Can the clinic reply by [facility]? NO    Best Call Back Number: [phone]    DX: Acute pain of right shoulder [M25.511]    Additional Information:

## 2024-03-14 LAB

## 2024-03-20 ENCOUNTER — OFFICE VISIT (OUTPATIENT)
Dept: OBSTETRICS AND GYNECOLOGY | Facility: CLINIC | Age: 74
End: 2024-03-20
Payer: MEDICARE

## 2024-03-20 ENCOUNTER — TELEPHONE (OUTPATIENT)
Dept: ORTHOPEDICS | Facility: CLINIC | Age: 74
End: 2024-03-20
Payer: MEDICARE

## 2024-03-20 VITALS
DIASTOLIC BLOOD PRESSURE: 66 MMHG | WEIGHT: 207 LBS | SYSTOLIC BLOOD PRESSURE: 148 MMHG | BODY MASS INDEX: 29.63 KG/M2 | HEIGHT: 70 IN

## 2024-03-20 DIAGNOSIS — N39.46 MIXED URGE AND STRESS INCONTINENCE: ICD-10-CM

## 2024-03-20 DIAGNOSIS — N95.2 VAGINAL ATROPHY: ICD-10-CM

## 2024-03-20 DIAGNOSIS — Z01.419 WELL WOMAN EXAM WITH ROUTINE GYNECOLOGICAL EXAM: Primary | ICD-10-CM

## 2024-03-20 DIAGNOSIS — R87.629 ABNORMAL PAP SMEAR OF VAGINA: ICD-10-CM

## 2024-03-20 DIAGNOSIS — N90.89 VULVAR IRRITATION: ICD-10-CM

## 2024-03-20 DIAGNOSIS — Z01.419 NORMAL GYNECOLOGIC EXAMINATION: ICD-10-CM

## 2024-03-20 PROCEDURE — 88175 CYTOPATH C/V AUTO FLUID REDO: CPT | Performed by: STUDENT IN AN ORGANIZED HEALTH CARE EDUCATION/TRAINING PROGRAM

## 2024-03-20 PROCEDURE — G0101 CA SCREEN;PELVIC/BREAST EXAM: HCPCS | Mod: S$GLB,,, | Performed by: STUDENT IN AN ORGANIZED HEALTH CARE EDUCATION/TRAINING PROGRAM

## 2024-03-20 PROCEDURE — 99999 PR PBB SHADOW E&M-EST. PATIENT-LVL V: CPT | Mod: PBBFAC,,, | Performed by: STUDENT IN AN ORGANIZED HEALTH CARE EDUCATION/TRAINING PROGRAM

## 2024-03-20 RX ORDER — CLOBETASOL PROPIONATE 0.5 MG/G
OINTMENT TOPICAL 2 TIMES DAILY
Qty: 60 G | Refills: 1 | Status: SHIPPED | OUTPATIENT
Start: 2024-03-20

## 2024-03-20 RX ORDER — ESTRADIOL 0.1 MG/G
CREAM VAGINAL
Qty: 42.5 G | Refills: 4 | Status: SHIPPED | OUTPATIENT
Start: 2024-03-21

## 2024-03-20 NOTE — PROGRESS NOTES
HPI: Pt is a 73 y.o.  female who presents for routine annual exam.   Transitioning from Dr Mosley's clinic    Today c/o vulvar itch and daily RACHELLE/UUI. Uses wipes, constant depends.     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 Gonzalez.     6/15/16 HSIL  > colpo VAIN1  1/30/17 HSIL > colpo VAIN1  Pap 7/24/17 ASCUS HPV 18+   Pap 1/22/18 LSIL, normal exam  Pap 8/2018 normal, normal exam.   Pap 9/2019 ASCUS, HPV 18+, normal exam   Pap 3/2020 negative   Pap/HPV 3/2021 negative  Pap 4/2022 negative.   Pap 2/2023 negative     Breast cancer screening: UTD due June   Colon cancer screening: UTD 6/2023; 10-yr interval  DXA UTD    Family history breast cancer: sister  Family history ovarian cancer: no  Family history colon cancer: no        ROS:  GENERAL: Feeling well overall. Denies fever or chills.   SKIN: Denies rash or lesions.   HEAD: Denies head injury or headache.   NODES: Denies enlarged lymph nodes.   CHEST: Denies chest pain or shortness of breath.   CARDIOVASCULAR: Denies palpitations or left sided chest pain.   ABDOMEN: No abdominal pain, constipation, diarrhea, nausea or vomiting   URINARY: No dysuria, hematuria, or burning on urination.  REPRODUCTIVE: See HPI.   BREASTS: Denies pain, lumps, or nipple discharge.   HEMATOLOGIC: No easy bruisability or excessive bleeding.   MUSCULOSKELETAL: Denies joint pain or swelling.   NEUROLOGIC: Denies syncope or weakness.   PSYCHIATRIC: Denies acute depression or anxiety     PE:   APPEARANCE: Well nourished, well developed,  female in no acute distress.  NODES: no inguinal lymphadenopathy  BREASTS: Symmetrical, no skin changes or visible lesions. No palpable masses, nipple discharge or adenopathy bilaterally.  ABDOMEN: Soft. No tenderness or masses. No distention.   VULVA: No lesions. Normal external female genitalia.  URETHRAL MEATUS: Normal size and location, no lesions, no prolapse.  URETHRA: No masses, tenderness, or  prolapse.  VAGINA: Moist. No lesions or lacerations noted. No abnormal discharge present. No odor present.   CERVIX: absent  UTERUS: absent  ADNEXA: No tenderness. No fullness or masses palpated in the adnexal regions.   ANUS PERINEUM: Normal.      Diagnosis:  1. Well woman exam with routine gynecological exam    2. Normal gynecologic examination    3. Vulvar irritation    4. Vaginal atrophy    5. Abnormal Pap smear of vagina    6. Mixed urge and stress incontinence        Plan:   Marleny was seen today for well woman.    Diagnoses and all orders for this visit:    Well woman exam with routine gynecological exam  -     Liquid-Based Pap Smear, Screening    Normal gynecologic examination  -     Ambulatory referral/consult to Gynecology    Vulvar irritation  -     clobetasol 0.05% (TEMOVATE) 0.05 % Oint; Apply topically 2 (two) times daily.  -     estradioL (ESTRACE) 0.01 % (0.1 mg/gram) vaginal cream; Use pea-sized amount vaginally nightly for 2 weeks, then 2-3x/week     Behavioral  modifications including products to avoid, products to use like barrier creams and gentle cloth in lieu of baby wipes, traditional toilet tissue etc    Vaginal atrophy  -     clobetasol 0.05% (TEMOVATE) 0.05 % Oint; Apply topically 2 (two) times daily.  -     estradioL (ESTRACE) 0.01 % (0.1 mg/gram) vaginal cream; Use pea-sized amount vaginally nightly for 2 weeks, then 2-3x/week    Abnormal Pap smear of vagina  -     Liquid-Based Pap Smear, Screening    Mixed urge and stress incontinence  -     Ambulatory referral/consult to Urogynecology; Future        Orders Placed This Encounter   Procedures    Ambulatory referral/consult to Urogynecology       Patient was counseled today on the current  ACS guidelines for cervical cytology screening as well as the current recommendations for breast cancer screening.   She was counseled to follow up with her PCP for other routine health maintenance.        RTC 1 year

## 2024-03-25 ENCOUNTER — HOSPITAL ENCOUNTER (OUTPATIENT)
Dept: CARDIOLOGY | Facility: HOSPITAL | Age: 74
Discharge: HOME | End: 2024-03-25
Attending: INTERNAL MEDICINE
Payer: MEDICARE

## 2024-03-25 VITALS
WEIGHT: 207 LBS | HEART RATE: 65 BPM | BODY MASS INDEX: 29.63 KG/M2 | DIASTOLIC BLOOD PRESSURE: 66 MMHG | HEIGHT: 70 IN | SYSTOLIC BLOOD PRESSURE: 148 MMHG

## 2024-03-25 DIAGNOSIS — J84.9 INTERSTITIAL PULMONARY DISEASE, UNSPECIFIED: ICD-10-CM

## 2024-03-25 LAB
ASCENDING AORTA: 2.69 CM
AV INDEX (PROSTH): 0.73
AV MEAN GRADIENT: 4 MMHG
AV PEAK GRADIENT: 7 MMHG
AV VALVE AREA BY VELOCITY RATIO: 3.3 CM²
AV VALVE AREA: 3.14 CM²
AV VELOCITY RATIO: 0.77
BSA FOR ECHO PROCEDURE: 2.15 M2
CV ECHO LV RWT: 0.33 CM
DOP CALC AO PEAK VEL: 1.33 M/S
DOP CALC AO VTI: 34.81 CM
DOP CALC LVOT AREA: 4.3 CM2
DOP CALC LVOT DIAMETER: 2.34 CM
DOP CALC LVOT PEAK VEL: 1.02 M/S
DOP CALC LVOT STROKE VOLUME: 109.18 CM3
DOP CALCLVOT PEAK VEL VTI: 25.4 CM
E WAVE DECELERATION TIME: 197.19 MSEC
E/A RATIO: 0.95
E/E' RATIO: 9.65 M/S
ECHO LV POSTERIOR WALL: 0.79 CM (ref 0.6–1.1)
EJECTION FRACTION: 65 %
FRACTIONAL SHORTENING: 34 % (ref 28–44)
INTERVENTRICULAR SEPTUM: 0.61 CM (ref 0.6–1.1)
IVRT: 48.53 MSEC
LA MAJOR: 5.15 CM
LA MINOR: 5.26 CM
LA WIDTH: 4.09 CM
LEFT ATRIUM SIZE: 3.58 CM
LEFT ATRIUM VOLUME INDEX MOD: 23.7 ML/M2
LEFT ATRIUM VOLUME INDEX: 30.7 ML/M2
LEFT ATRIUM VOLUME MOD: 50.03 CM3
LEFT ATRIUM VOLUME: 64.77 CM3
LEFT INTERNAL DIMENSION IN SYSTOLE: 3.12 CM (ref 2.1–4)
LEFT VENTRICLE DIASTOLIC VOLUME INDEX: 49.27 ML/M2
LEFT VENTRICLE DIASTOLIC VOLUME: 103.97 ML
LEFT VENTRICLE MASS INDEX: 49 G/M2
LEFT VENTRICLE SYSTOLIC VOLUME INDEX: 18.2 ML/M2
LEFT VENTRICLE SYSTOLIC VOLUME: 38.48 ML
LEFT VENTRICULAR INTERNAL DIMENSION IN DIASTOLE: 4.73 CM (ref 3.5–6)
LEFT VENTRICULAR MASS: 104.2 G
LV LATERAL E/E' RATIO: 9.11 M/S
LV SEPTAL E/E' RATIO: 10.25 M/S
MV A" WAVE DURATION": 15.98 MSEC
MV PEAK A VEL: 0.86 M/S
MV PEAK E VEL: 0.82 M/S
PISA TR MAX VEL: 3.18 M/S
PULM VEIN S/D RATIO: 1.42
PV PEAK D VEL: 0.52 M/S
PV PEAK S VEL: 0.74 M/S
RA MAJOR: 4.63 CM
RA PRESSURE ESTIMATED: 3 MMHG
RA WIDTH: 3.64 CM
RIGHT VENTRICULAR END-DIASTOLIC DIMENSION: 3.81 CM
RV TB RVSP: 6 MMHG
SINUS: 3.15 CM
STJ: 2.66 CM
TDI LATERAL: 0.09 M/S
TDI SEPTAL: 0.08 M/S
TDI: 0.09 M/S
TR MAX PG: 40 MMHG
TRICUSPID ANNULAR PLANE SYSTOLIC EXCURSION: 2.16 CM
TV REST PULMONARY ARTERY PRESSURE: 43 MMHG
Z-SCORE OF LEFT VENTRICULAR DIMENSION IN END DIASTOLE: -3.35
Z-SCORE OF LEFT VENTRICULAR DIMENSION IN END SYSTOLE: -2.04

## 2024-03-25 PROCEDURE — 93306 TTE W/DOPPLER COMPLETE: CPT | Mod: 26,,, | Performed by: INTERNAL MEDICINE

## 2024-03-25 PROCEDURE — 93306 TTE W/DOPPLER COMPLETE: CPT

## 2024-03-26 ENCOUNTER — DOCUMENTATION ONLY (OUTPATIENT)
Dept: ORTHOPEDICS | Facility: CLINIC | Age: 74
End: 2024-03-26
Payer: MEDICARE

## 2024-04-01 ENCOUNTER — OFFICE VISIT (OUTPATIENT)
Dept: OPTOMETRY | Facility: CLINIC | Age: 74
End: 2024-04-01
Payer: MEDICARE

## 2024-04-01 DIAGNOSIS — Z96.1 PSEUDOPHAKIA OF BOTH EYES: ICD-10-CM

## 2024-04-01 DIAGNOSIS — H26.491 PCO (POSTERIOR CAPSULAR OPACIFICATION), RIGHT: Primary | ICD-10-CM

## 2024-04-01 DIAGNOSIS — H52.13 MYOPIA WITH ASTIGMATISM AND PRESBYOPIA, BILATERAL: ICD-10-CM

## 2024-04-01 DIAGNOSIS — H52.203 MYOPIA WITH ASTIGMATISM AND PRESBYOPIA, BILATERAL: ICD-10-CM

## 2024-04-01 DIAGNOSIS — H52.4 MYOPIA WITH ASTIGMATISM AND PRESBYOPIA, BILATERAL: ICD-10-CM

## 2024-04-01 DIAGNOSIS — Z46.0 FITTING AND ADJUSTMENT OF SPECTACLES AND CONTACT LENSES: Primary | ICD-10-CM

## 2024-04-01 PROCEDURE — 92015 DETERMINE REFRACTIVE STATE: CPT | Mod: S$GLB,,, | Performed by: OPTOMETRIST

## 2024-04-01 PROCEDURE — 92310 CONTACT LENS FITTING OU: CPT | Mod: CSM,S$GLB,, | Performed by: OPTOMETRIST

## 2024-04-01 PROCEDURE — 99999 PR PBB SHADOW E&M-EST. PATIENT-LVL III: CPT | Mod: PBBFAC,,, | Performed by: OPTOMETRIST

## 2024-04-01 PROCEDURE — 92014 COMPRE OPH EXAM EST PT 1/>: CPT | Mod: S$GLB,,, | Performed by: OPTOMETRIST

## 2024-04-01 NOTE — PROGRESS NOTES
HPI    Last eye exam was 9/23/22 with Dr. Gaspar.  Patient states decrease in overall vision without glasses. Hasn't been   wearing SCL's because she ran out of them. Currently using +2.50-+2.75 OTC   readers for small print.   Patient denies diplopia, headaches, flashes/floaters, and pain.    Last edited by Clau Monae MA on 4/1/2024  3:06 PM.            Assessment /Plan     For exam results, see Encounter Report.      PCO (posterior capsular opacification), right  Pseudophakia of both eyes  Good result.     Myopia with astigmatism and presbyopia, bilateral  CLRx and SRx released to patient. Patient educated on lens options. Normal ocular health. RTC 1 year for routine exam.

## 2024-04-03 ENCOUNTER — HOSPITAL ENCOUNTER (OUTPATIENT)
Dept: RADIOLOGY | Facility: HOSPITAL | Age: 74
Discharge: HOME | End: 2024-04-03
Attending: INTERNAL MEDICINE
Payer: MEDICARE

## 2024-04-03 DIAGNOSIS — J84.9 INTERSTITIAL PULMONARY DISEASE, UNSPECIFIED: ICD-10-CM

## 2024-04-03 PROCEDURE — 71250 CT THORAX DX C-: CPT | Mod: TC

## 2024-04-03 PROCEDURE — 71250 CT THORAX DX C-: CPT | Mod: 26,,, | Performed by: RADIOLOGY

## 2024-04-08 ENCOUNTER — HOSPITAL ENCOUNTER (OUTPATIENT)
Dept: PULMONOLOGY | Facility: CLINIC | Age: 74
Discharge: HOME | End: 2024-04-08
Payer: MEDICARE

## 2024-04-08 ENCOUNTER — OFFICE VISIT (OUTPATIENT)
Dept: PULMONOLOGY | Facility: CLINIC | Age: 74
End: 2024-04-08
Payer: MEDICARE

## 2024-04-08 VITALS
HEART RATE: 67 BPM | WEIGHT: 207.25 LBS | BODY MASS INDEX: 29.67 KG/M2 | HEIGHT: 70 IN | DIASTOLIC BLOOD PRESSURE: 70 MMHG | OXYGEN SATURATION: 97 % | SYSTOLIC BLOOD PRESSURE: 163 MMHG

## 2024-04-08 VITALS — HEIGHT: 70 IN | WEIGHT: 207.25 LBS | BODY MASS INDEX: 29.67 KG/M2

## 2024-04-08 DIAGNOSIS — J84.9 INTERSTITIAL PULMONARY DISEASE, UNSPECIFIED: ICD-10-CM

## 2024-04-08 DIAGNOSIS — I27.20 PULMONARY HYPERTENSION: ICD-10-CM

## 2024-04-08 DIAGNOSIS — J84.9 INTERSTITIAL PULMONARY DISEASE, UNSPECIFIED: Primary | ICD-10-CM

## 2024-04-08 PROCEDURE — 1159F MED LIST DOCD IN RCRD: CPT | Mod: CPTII,S$GLB,, | Performed by: INTERNAL MEDICINE

## 2024-04-08 PROCEDURE — 3061F NEG MICROALBUMINURIA REV: CPT | Mod: CPTII,S$GLB,, | Performed by: INTERNAL MEDICINE

## 2024-04-08 PROCEDURE — 3077F SYST BP >= 140 MM HG: CPT | Mod: CPTII,S$GLB,, | Performed by: INTERNAL MEDICINE

## 2024-04-08 PROCEDURE — 3044F HG A1C LEVEL LT 7.0%: CPT | Mod: CPTII,S$GLB,, | Performed by: INTERNAL MEDICINE

## 2024-04-08 PROCEDURE — 1100F PTFALLS ASSESS-DOCD GE2>/YR: CPT | Mod: CPTII,S$GLB,, | Performed by: INTERNAL MEDICINE

## 2024-04-08 PROCEDURE — 94727 GAS DIL/WSHOT DETER LNG VOL: CPT | Mod: S$GLB,,, | Performed by: INTERNAL MEDICINE

## 2024-04-08 PROCEDURE — 99213 OFFICE O/P EST LOW 20 MIN: CPT | Mod: 25,S$GLB,, | Performed by: INTERNAL MEDICINE

## 2024-04-08 PROCEDURE — 94060 EVALUATION OF WHEEZING: CPT | Mod: 59,S$GLB,, | Performed by: INTERNAL MEDICINE

## 2024-04-08 PROCEDURE — 99999 PR PBB SHADOW E&M-EST. PATIENT-LVL V: CPT | Mod: PBBFAC,,, | Performed by: INTERNAL MEDICINE

## 2024-04-08 PROCEDURE — 94729 DIFFUSING CAPACITY: CPT | Mod: S$GLB,,, | Performed by: INTERNAL MEDICINE

## 2024-04-08 PROCEDURE — 94618 PULMONARY STRESS TESTING: CPT | Mod: S$GLB,,, | Performed by: INTERNAL MEDICINE

## 2024-04-08 PROCEDURE — 3288F FALL RISK ASSESSMENT DOCD: CPT | Mod: CPTII,S$GLB,, | Performed by: INTERNAL MEDICINE

## 2024-04-08 PROCEDURE — 3066F NEPHROPATHY DOC TX: CPT | Mod: CPTII,S$GLB,, | Performed by: INTERNAL MEDICINE

## 2024-04-08 PROCEDURE — 3008F BODY MASS INDEX DOCD: CPT | Mod: CPTII,S$GLB,, | Performed by: INTERNAL MEDICINE

## 2024-04-08 PROCEDURE — 3078F DIAST BP <80 MM HG: CPT | Mod: CPTII,S$GLB,, | Performed by: INTERNAL MEDICINE

## 2024-04-08 RX ORDER — ALBUTEROL SULFATE 90 UG/1
1-2 AEROSOL, METERED RESPIRATORY (INHALATION) EVERY 6 HOURS PRN
Qty: 6.7 G | Refills: 0 | Status: SHIPPED | OUTPATIENT
Start: 2024-04-08 | End: 2024-04-08 | Stop reason: SDUPTHER

## 2024-04-08 RX ORDER — GUAIFENESIN 600 MG/1
1200 TABLET, EXTENDED RELEASE ORAL 2 TIMES DAILY
Qty: 60 TABLET | Refills: 3 | Status: SHIPPED | OUTPATIENT
Start: 2024-04-08

## 2024-04-08 RX ORDER — ALBUTEROL SULFATE 90 UG/1
1-2 AEROSOL, METERED RESPIRATORY (INHALATION) EVERY 6 HOURS PRN
Qty: 6.7 G | Refills: 6 | Status: SHIPPED | OUTPATIENT
Start: 2024-04-08 | End: 2024-05-08

## 2024-04-08 NOTE — PROCEDURES
Marleny Guzman is a 73 y.o.  female patient, who presents for a 6 minute walk test ordered by MD Farhan.  The diagnosis is Shortness of Breath.  The patient's BMI is 29.7 kg/m2.  Predicted distance (lower limit of normal) is 261.25 meters.      Test Results:    The test was completed without stopping.  The total time walked was 360 seconds.  During walking, the patient reported:  Other (Comment) (hip pain). The patient used no assistive devices during testing.     04/08/2024---------Distance: 354.18 meters (1162 feet)     O2 Sat % Supplemental Oxygen Heart Rate Blood Pressure Juancarlos Scale   Pre-exercise  (Resting) 96 % Room Air 67 bpm 163/70 mmHg 4   During Exercise 95 % Room Air 79 bpm 183/79 mmHg 5-6   Post-exercise  (Recovery) 97 % Room Air  80 bpm       Recovery Time: 126 seconds    Performing nurse/tech: ELIANE Suarez      PREVIOUS STUDY:   The patient has not had a previous study.      CLINICAL INTERPRETATION:  Six minute walk distance is 354.18 meters (1162 feet) with heavy dyspnea.  During exercise, there was no significant desaturation while breathing room air.  Both blood pressure and heart rate remained stable with walking.  Hypertension was present prior to exercise.  The patient reported non-pulmonary symptoms during exercise.  No previous study performed.  Based upon age and body mass index, exercise capacity is normal.

## 2024-04-08 NOTE — PROGRESS NOTES
"Subjective:     Reason for visit: follow up/sob    Patient ID:  Marleny Guzman is a 73 y.o. female      Interval History 4/8/24  Ms. Guzman has continued to have shortness of breath and most recent we increased and thickened sputum production.  No other new symptoms as far as no rashes, no new joint pains but continued diffuse joint pains periodically.  No fevers or chills    Interval History 2/2024   Ms. Guzman was last seen in May of last year and since that time she has continued to have sometimes dyspnea on exertion, cough and congestion with some relief with her albuterol.  She did have the CT scan done in August but has not been able to get back to an appointment since then.  We reviewed her possible risks risk factors for ILD and autoimmune diseases in have been unable to identify any.  No family history of lung disease or autoimmune diseases.    History 5/2023:  Ms. Guzman is a 73 yo that was previously seen by Dr. Valentino and Kary Nixon NP but is new to me. She has intermittently had issues with shortness of breath over the years that comes and goes. It doesn't seem to be seasonal and she has not previously had relief with much albuterol. She has no previous known lung disease or diagnosis but did have a nodule seen previously on imaging. She is following up today because she is now having more shortness of breath again. It is typically after a URI and then doesn't improve.       Additional Pulmonary History:  Childhood Illnesses:  NA  Occupational/Environmental:  none  Tobacco/Smoking:  previously smoked but quit int the 1980s    Objective:     Vitals:    04/08/24 1003   BP: (!) 163/70   Pulse: 67   SpO2: 97%   Weight: 94 kg (207 lb 3.7 oz)   Height: 5' 10" (1.778 m)         Physical Exam  Constitutional:       General: She is not in acute distress.     Appearance: She is not diaphoretic.   HENT:      Head: Normocephalic and atraumatic.      Right Ear: External ear normal.      Left Ear: External " ear normal.   Eyes:      Conjunctiva/sclera: Conjunctivae normal.      Pupils: Pupils are equal, round, and reactive to light.   Neck:      Trachea: No tracheal deviation.   Cardiovascular:      Rate and Rhythm: Normal rate and regular rhythm.      Heart sounds: Normal heart sounds. No murmur heard.  Pulmonary:      Effort: Pulmonary effort is normal. No respiratory distress.      Breath sounds: Normal breath sounds. No stridor. No wheezing or rales.   Abdominal:      General: Bowel sounds are normal. There is no distension.      Palpations: Abdomen is soft.      Tenderness: There is no abdominal tenderness.   Musculoskeletal:         General: Normal range of motion.      Cervical back: Normal range of motion and neck supple.   Skin:     General: Skin is warm and dry.      Findings: No erythema.   Neurological:      Mental Status: She is alert and oriented to person, place, and time.      Gait: Gait is intact.   Psychiatric:         Mood and Affect: Mood and affect normal.         Cognition and Memory: Memory normal.         Judgment: Judgment normal.          Personal Diagnostic Review and Interpretation  CT scan reviewed see a/p       Pertinent Studies Reviewed & Interpreted:     Pulmonary Function Tests:   Last done in 2021   Today's with restriction and decreased DLCO     6 Minute Walk Tests:   Last done in 2021- no oxygen needs     Echocardiograms:   Results for orders placed during the hospital encounter of 06/07/21    Echo    Interpretation Summary  · The estimated ejection fraction is 69%.  · Indeterminate left ventricular diastolic function.  · The left ventricle is normal in size with normal systolic function.  · Normal right ventricular size with normal right ventricular systolic function.        Assessment & Plan:       Problem List Items Addressed This Visit          Pulmonary    Interstitial pulmonary disease, unspecified - Primary    Current Assessment & Plan     KEE and dsDNA +   Sees rheumatology  next week  Upper lobe blebs with lower lobe bronchiectasis   Obtaining sputum samples and will defer starting treatment until she has been evaluated by Rheumatology.   Continue albuterol and guaifenesin as needed  PFTs were mildly decreased over the last year we will repeat in 6 months to establish a trend         Relevant Medications    albuterol (PROVENTIL/VENTOLIN HFA) 90 mcg/actuation inhaler    guaiFENesin (MUCINEX) 600 mg 12 hr tablet    Other Relevant Orders    Culture, Respiratory with Gram Stain    AFB Culture & Smear    Complete PFT w/ bronchodilator    Lung Volumes    DLCO-Carbon Monoxide Diffusing Capacity    Ambulatory referral/consult to Pulmonary Hypertension       Cardiac/Vascular    Pulmonary hypertension    Current Assessment & Plan     Estimated PASP on her last echo was in the 40s  We will place a referral to pulmonary hypertension         Relevant Orders    Ambulatory referral/consult to Pulmonary Hypertension        Portions of the record may have been created with voice-recognition software. Occasional wrong-word or sound-a-like substitutions may have occurred due to the inherent limitations of voice-recognition software. Read the chart carefully and recognize, using context, where substitutions have occurred.  Coleen Real M.D.  Pulmonary/Critical Care

## 2024-04-09 ENCOUNTER — PATIENT MESSAGE (OUTPATIENT)
Dept: OPTOMETRY | Facility: CLINIC | Age: 74
End: 2024-04-09
Payer: MEDICARE

## 2024-04-15 ENCOUNTER — OFFICE VISIT (OUTPATIENT)
Dept: RHEUMATOLOGY | Facility: CLINIC | Age: 74
End: 2024-04-15
Payer: MEDICARE

## 2024-04-15 ENCOUNTER — TELEPHONE (OUTPATIENT)
Dept: TRANSPLANT | Facility: CLINIC | Age: 74
End: 2024-04-15
Payer: MEDICARE

## 2024-04-15 VITALS
DIASTOLIC BLOOD PRESSURE: 68 MMHG | HEIGHT: 70 IN | HEART RATE: 54 BPM | SYSTOLIC BLOOD PRESSURE: 146 MMHG | BODY MASS INDEX: 29.51 KG/M2 | WEIGHT: 206.13 LBS

## 2024-04-15 DIAGNOSIS — Z79.899 POLYPHARMACY: Primary | ICD-10-CM

## 2024-04-15 DIAGNOSIS — I27.20 PULMONARY HYPERTENSION: ICD-10-CM

## 2024-04-15 DIAGNOSIS — J84.9 INTERSTITIAL PULMONARY DISEASE, UNSPECIFIED: ICD-10-CM

## 2024-04-15 DIAGNOSIS — E66.3 OVERWEIGHT (BMI 25.0-29.9): ICD-10-CM

## 2024-04-15 DIAGNOSIS — R76.8 ANA POSITIVE: Primary | ICD-10-CM

## 2024-04-15 DIAGNOSIS — R06.82 TACHYPNEA: ICD-10-CM

## 2024-04-15 PROCEDURE — 99999 PR PBB SHADOW E&M-EST. PATIENT-LVL V: CPT | Mod: PBBFAC,,, | Performed by: STUDENT IN AN ORGANIZED HEALTH CARE EDUCATION/TRAINING PROGRAM

## 2024-04-15 PROCEDURE — 3077F SYST BP >= 140 MM HG: CPT | Mod: CPTII,S$GLB,, | Performed by: STUDENT IN AN ORGANIZED HEALTH CARE EDUCATION/TRAINING PROGRAM

## 2024-04-15 PROCEDURE — 1126F AMNT PAIN NOTED NONE PRSNT: CPT | Mod: CPTII,S$GLB,, | Performed by: STUDENT IN AN ORGANIZED HEALTH CARE EDUCATION/TRAINING PROGRAM

## 2024-04-15 PROCEDURE — 3044F HG A1C LEVEL LT 7.0%: CPT | Mod: CPTII,S$GLB,, | Performed by: STUDENT IN AN ORGANIZED HEALTH CARE EDUCATION/TRAINING PROGRAM

## 2024-04-15 PROCEDURE — 99205 OFFICE O/P NEW HI 60 MIN: CPT | Mod: S$GLB,,, | Performed by: STUDENT IN AN ORGANIZED HEALTH CARE EDUCATION/TRAINING PROGRAM

## 2024-04-15 PROCEDURE — 3061F NEG MICROALBUMINURIA REV: CPT | Mod: CPTII,S$GLB,, | Performed by: STUDENT IN AN ORGANIZED HEALTH CARE EDUCATION/TRAINING PROGRAM

## 2024-04-15 PROCEDURE — 1100F PTFALLS ASSESS-DOCD GE2>/YR: CPT | Mod: CPTII,S$GLB,, | Performed by: STUDENT IN AN ORGANIZED HEALTH CARE EDUCATION/TRAINING PROGRAM

## 2024-04-15 PROCEDURE — 1159F MED LIST DOCD IN RCRD: CPT | Mod: CPTII,S$GLB,, | Performed by: STUDENT IN AN ORGANIZED HEALTH CARE EDUCATION/TRAINING PROGRAM

## 2024-04-15 PROCEDURE — 3288F FALL RISK ASSESSMENT DOCD: CPT | Mod: CPTII,S$GLB,, | Performed by: STUDENT IN AN ORGANIZED HEALTH CARE EDUCATION/TRAINING PROGRAM

## 2024-04-15 PROCEDURE — 3078F DIAST BP <80 MM HG: CPT | Mod: CPTII,S$GLB,, | Performed by: STUDENT IN AN ORGANIZED HEALTH CARE EDUCATION/TRAINING PROGRAM

## 2024-04-15 PROCEDURE — 3066F NEPHROPATHY DOC TX: CPT | Mod: CPTII,S$GLB,, | Performed by: STUDENT IN AN ORGANIZED HEALTH CARE EDUCATION/TRAINING PROGRAM

## 2024-04-15 NOTE — PROGRESS NOTES
RHEUMATOLOGY OUTPATIENT CLINIC NOTE    4/15/2024    Attending Rheumatologist: Belen Angeles  Primary Care Provider: Bridget Cantu MD   Physician Requesting Consultation: Coleen Real MD  [address] [place] [address]  Chief Complaint/Reason For Consultation:  Consult      Subjective:       HPI  Marleny Guzman is a 73 y.o. Black or  female with pmhx noted below referred for positive KEE/dsdna. Patient reports that her main symptom has been SOB. On 5/2023 she started having worsening SOB (used to see Dr. Valentino/Kary Nixon NP now Dr. Coleen Real) and that is how she started been seen by Pulmonary. At this time rescue inhalers not enough- she was always intermittently SOB.   Beginning of the year she was seen again by pulmonary- at this time worsening WOOTEN, cough and intermittent congestion - having only minimal relief with albuterol. She had a Ct chest showing what could be ILD and also a TTE that shows elevated PAP elevated. No other symptoms of autoimmune disease. No family history of AI disease.   No occupational exposure  Quit smoking in 1980      Review of Systems   Constitutional:  Positive for fatigue. Negative for appetite change, chills, diaphoresis, fever and unexpected weight change.   HENT:  Positive for nasal congestion. Negative for mouth dryness, mouth sores, sore throat and tinnitus.    Respiratory:  Positive for cough, chest tightness and shortness of breath.    Cardiovascular:  Negative for chest pain.   Gastrointestinal:  Negative for abdominal pain.   Musculoskeletal:  Negative for arthralgias, back pain and gait problem.   Integumentary:  Negative for rash.   Neurological:  Negative for seizures, speech difficulty, numbness and memory loss.   Hematological:  Negative for adenopathy.   Psychiatric/Behavioral:  Negative for agitation, behavioral problems and confusion.         Chronic comorbid conditions affecting medical decision making  today:  Past Medical History:   Diagnosis Date    Abnormal Pap smear of vagina 07/20/2016    Anemia     Cataract     Chronic bilateral low back pain without sciatica 03/13/2018    Depression with anxiety     Hypertension     Osteoarthritis 06/20/2013    Recurrent upper respiratory infection (URI)     Right rotator cuff tear     Sleep apnea     Cipap machine at home    Urinary incontinence     Leaks urine with laughing/ coughing/ sneezing     Past Surgical History:   Procedure Laterality Date    CATARACT EXTRACTION  06/18/2012    OS    CATARACT EXTRACTION  07/16/2012    OD    CERVIX SURGERY      Conization    COLONOSCOPY      COLONOSCOPY N/A 03/04/2016    Procedure: COLONOSCOPY;  Surgeon: Tenzin Thakkar MD;  Location: [facility] ENDO (4TH FLR);  Service: Endoscopy;  Laterality: N/A;    COLONOSCOPY Left 01/25/2021    Procedure: COLONOSCOPY;  Surgeon: Wayne Naranjo MD;  Location: [facility];  Service: Endoscopy;  Laterality: Left;    COLONOSCOPY N/A 06/19/2023    Procedure: COLONOSCOPY;  Surgeon: Tenzin Thakkar MD;  Location: [facility] ENDO (2ND FLR);  Service: Endoscopy;  Laterality: N/A;  lung issues  referral Dr [id number] portal-  6/13/23- no answer for precall- ks    CYSTOSCOPY N/A 09/12/2022    Procedure: CYSTOSCOPY;  Surgeon: Bianka Aragon MD;  Location: [facility] 1ST FLR;  Service: Urology;  Laterality: N/A;    ESOPHAGOGASTRODUODENOSCOPY Left 01/25/2021    Procedure: EGD (ESOPHAGOGASTRODUODENOSCOPY);  Surgeon: Wayne Naranjo MD;  Location: [facility];  Service: Endoscopy;  Laterality: Left;    EYE SURGERY Bilateral     cataract    HYSTERECTOMY  05/12/2015    LARYNGOSCOPY N/A 9/28/2023    Procedure: Suspension microlaryngoscopy with bilateral vocal fold injection augmentation - Restylane;  Surgeon: Andre Valera MD;  Location: [facility] OR;  Service: ENT;  Laterality: N/A;  Microscope, telescopes, tower, injector, Restylane    LEEP      LUMBAR EPIDURAL INJECTION      OOPHORECTOMY      CT REMOVAL  "OF OVARY/TUBE(S)  05/12/2015    RETROGRADE PYELOGRAPHY Bilateral 09/12/2022    Procedure: PYELOGRAM, RETROGRADE;  Surgeon: Bianka Aragon MD;  Location: [facility] OR 1ST FLR;  Service: Urology;  Laterality: Bilateral;    ROBOT-ASSISTED REPAIR OF VENTRAL HERNIA USING DA ASPEN XI N/A 11/29/2021    Procedure: XI ROBOTIC REPAIR, HERNIA, VENTRAL w/ possible mesh;  Surgeon: Roney Jackson MD;  Location: [facility] OR 2ND FLR;  Service: General;  Laterality: N/A;  Anesthesia Block    SALPINGECTOMY Left     SHOULDER SURGERY Right     SINUS SURGERY       Family History   Problem Relation Name Age of Onset    Cancer Mother          THYROID CANCER    Stroke Mother      Hypertension Father      Cataracts Father      Heart disease Sister Mary     Pacemaker/defibrilator Sister Mary     Hypertension Sister Mary     Deep vein thrombosis Sister Mary     Heart disease Sister Oksana         CABG    Osteoarthritis Sister Oksnaa     Edema Sister Oksana     Eczema Sister Ines     Hypertension Sister Ines     Breast cancer Sister Ines 72    Anuerysm Sister Shirlita     Drug abuse Sister Shirlita     Hypertension Sister Shirlita     No Known Problems Sister Pat     Cancer Sister Halima         Cancer cells or "growth in her stomach"    No Known Problems Sister Bre     No Known Problems Brother Jitendra     Depression Maternal Grandmother      Ulcers Maternal Grandfather          Legs    No Known Problems Paternal Grandmother      No Known Problems Paternal Grandfather      Hypertension Daughter      Other Daughter          Heart palpitations    Cancer Maternal Aunt      Breast cancer Cousin      ADD / ADHD Neg Hx      Alcohol abuse Neg Hx      Anxiety disorder Neg Hx      Bipolar disorder Neg Hx      Dementia Neg Hx      OCD Neg Hx      Paranoid behavior Neg Hx      Physical abuse Neg Hx      Schizophrenia Neg Hx      Seizures Neg Hx      Sexual abuse Neg Hx      Amblyopia Neg Hx      Blindness Neg Hx   "    Glaucoma Neg Hx      Macular degeneration Neg Hx      Retinal detachment Neg Hx      Strabismus Neg Hx      Anesthesia problems Neg Hx       Social History     Substance and Sexual Activity   Alcohol Use Yes    Alcohol/week: 0.0 standard drinks of alcohol    Comment: Occassionally for social events will have a glass of wine     Social History     Tobacco Use   Smoking Status Former    Current packs/day: 0.00    Average packs/day: 0.5 packs/day for 22.0 years (11.0 ttl pk-yrs)    Types: Cigarettes    Start date: 1975    Quit date: 1997    Years since quittin.3    Passive exposure: Past   Smokeless Tobacco Never   Tobacco Comments    Some wheezing since COVID-19 infection-2020     Social History     Substance and Sexual Activity   Drug Use No       Current Outpatient Medications:     albuterol (PROVENTIL/VENTOLIN HFA) 90 mcg/actuation inhaler, Inhale 1-2 puffs into the lungs every 6 (six) hours as needed for Wheezing or Shortness of Breath (Please dispense with spacer)., Disp: 6.7 g, Rfl: 6    aspirin 81 MG Chew, TAKE 1 TABLET BY MOUTH EVERY DAY (Patient taking differently: No sig reported), Disp: 90 tablet, Rfl: 1    atorvastatin (LIPITOR) 20 MG tablet, TAKE 1 TABLET BY MOUTH EVERY DAY, Disp: 90 tablet, Rfl: 3    azelastine (ASTELIN) 137 mcg (0.1 %) nasal spray, Use 1-2 sprays in each nostril twice daily, Disp: 30 mL, Rfl: 11    betamethasone dipropionate (DIPROLENE) 0.05 % lotion, One-two drops to ears daily needed for itching, Disp: 60 mL, Rfl: 0    budesonide (PULMICORT) 0.25 mg/2 mL nebulizer solution, EMPTY CONTENTS OF 1 CAPSULE INTO NASAL IRRIGATION SYSTEM, ADD DISTILLED WATER, SALT PACK, MIX & IRRIGATE. PERFORM 1-2 TIMES DAILY., Disp: , Rfl:     clobetasol 0.05% (TEMOVATE) 0.05 % Oint, Apply topically 2 (two) times daily., Disp: 60 g, Rfl: 1    ergocalciferol (ERGOCALCIFEROL) 50,000 unit Cap, TAKE 1 CAPSULE (50,000 UNITS TOTAL) BY MOUTH EVERY 30 DAYS, Disp: 3 capsule, Rfl: 3    EScitalopram  oxalate (LEXAPRO) 20 MG tablet, TAKE 1 TABLET (20 MG TOTAL) BY MOUTH ONCE DAILY, Disp: 90 tablet, Rfl: 3    estradioL (ESTRACE) 0.01 % (0.1 mg/gram) vaginal cream, Use pea-sized amount vaginally nightly for 2 weeks, then 2-3x/week, Disp: 42.5 g, Rfl: 4    fluticasone propionate (FLONASE) 50 mcg/actuation nasal spray, Use 1-2 sprays in each nostril twice daily, Disp: 16 g, Rfl: 11    gabapentin (NEURONTIN) 100 MG capsule, One at bedtime for 4 days then two at bedtime for 4 days then three at bedtime indefinitely, Disp: 90 capsule, Rfl: 6    GEMTESA 75 mg Tab, Take 1 tablet by mouth., Disp: , Rfl:     guaiFENesin (MUCINEX) 600 mg 12 hr tablet, Take 2 tablets (1,200 mg total) by mouth 2 (two) times daily., Disp: 60 tablet, Rfl: 3    irbesartan-hydrochlorothiazide (AVALIDE) 300-12.5 mg per tablet, Take 1 tablet by mouth once daily., Disp: 90 tablet, Rfl: 3    levoFLOXacin (LEVAQUIN) 500 MG tablet, Take 1 tablet (500 mg total) by mouth once daily., Disp: 7 tablet, Rfl: 0    loratadine (CLARITIN) 10 mg tablet, Take 1 tablet (10 mg total) by mouth once daily., Disp: 30 tablet, Rfl: 11    LORazepam (ATIVAN) 0.5 MG tablet, TAKE 1/2 TO 1 TABLET BY MOUTH DAILY AS NEEDED FOR ANXIETY, Disp: 30 tablet, Rfl: 0    meloxicam (MOBIC) 15 MG tablet, , Disp: , Rfl:     metoprolol succinate (TOPROL-XL) 50 MG 24 hr tablet, TAKE 1 TABLET BY MOUTH EVERY DAY, Disp: 90 tablet, Rfl: 3    mirabegron (MYRBETRIQ) 25 mg Tb24 ER tablet, Take 1 tablet (25 mg total) by mouth once daily., Disp: 30 tablet, Rfl: 2    nystatin (MYCOSTATIN) cream, Apply topically 2 (two) times daily., Disp: 30 g, Rfl: 1    orphenadrine (NORFLEX) 100 mg tablet, TAKE 1 TABLET BY MOUTH EVERY DAY AS NEEDED FOR MUSCLE SPASM, Disp: 30 tablet, Rfl: 3    pulse oximeter (PULSE OXIMETER) device, by Apply Externally route 2 (two) times a day. Use twice daily at 8 AM and 3 PM and record the value in MyChart as directed., Disp: 1 each, Rfl: 0    triamcinolone acetonide 0.1% (KENALOG)  0.1 % cream, APPLY TOPICALLY TWICE A DAY, Disp: 45 g, Rfl: 0    olopatadine (PATANOL) 0.1 % ophthalmic solution, Place 1 drop into both eyes 2 (two) times daily., Disp: 5 mL, Rfl: 0    Current Facility-Administered Medications:     ciprofloxacin HCl tablet 500 mg, 500 mg, Oral, Once, Malu Barragan, NP    LIDOcaine HCl 2% urojet, , Urethral, Once, Malu Barragan, NP    Facility-Administered Medications Ordered in Other Visits:     0.9%  NaCl infusion, , Intravenous, Continuous, Roney Jackson MD, Last Rate: 0 mL/hr at 11/29/21 1755, New Bag at 09/28/23 0851    fentaNYL 50 mcg/mL injection  mcg,  mcg, Intravenous, PRN, Anthony Hernandez MD, 100 mcg at 09/28/23 0854    LIDOcaine (PF) 10 mg/ml (1%) injection 10 mg, 1 mL, Intradermal, Once, Roney Jackson MD    midazolam (VERSED) 1 mg/mL injection 0.5-4 mg, 0.5-4 mg, Intravenous, PRN, Anthony Hernandez MD, 1 mg at 11/29/21 1109    prochlorperazine injection Soln 5 mg, 5 mg, Intravenous, Q30 Min PRN, Beth Ramsey MD    sodium chloride 0.9% flush 10 mL, 10 mL, Intravenous, PRN, Beth Ramsey MD     Objective:         Vitals:    04/15/24 1416   BP: (!) 146/68   Pulse: (!) 54     Physical Exam   Constitutional: normal appearance.   HENT:   Head: Normocephalic and atraumatic.   Nose: Nose normal.   Mouth/Throat: Mucous membranes are moist.   Eyes: Conjunctivae are normal.   Cardiovascular: Normal rate and regular rhythm.   Pulmonary/Chest: Effort normal.   Musculoskeletal:         General: Tenderness (Bilateral knees) present. No swelling. Normal range of motion.      Cervical back: Normal range of motion.   Neurological: She is alert.   Psychiatric: Mood normal.       Reviewed old and all outside pertinent medical records available.    All lab results personally reviewed and interpreted by me.  Lab Results   Component Value Date    WBC 10.23 02/20/2024    HGB 12.5 02/20/2024    HCT 38.7 02/20/2024    MCV 88 02/20/2024     MCH 28.4 02/20/2024    MCHC 32.3 02/20/2024    RDW 14.9 (H) 02/20/2024     02/20/2024    MPV 9.6 02/20/2024    PLTEST Appears normal 07/15/2021       Lab Results   Component Value Date     02/20/2024    K 4.0 02/20/2024     02/20/2024    CO2 30 (H) 02/20/2024    GLU 74 02/20/2024    BUN 19 02/20/2024    CALCIUM 9.7 02/20/2024    PROT 7.3 02/20/2024    ALBUMIN 3.5 02/20/2024    BILITOT 0.6 02/20/2024    AST 19 02/20/2024    ALKPHOS 130 02/20/2024    ALT 17 02/20/2024       Lab Results   Component Value Date    COLORU Yellow 02/03/2024    APPEARANCEUA Clear 02/03/2024    SPECGRAV 1.025 02/03/2024    PHUR 6.0 02/03/2024    PROTEINUA Negative 02/03/2024    KETONESU Negative 02/03/2024    LEUKOCYTESUR Negative 02/03/2024    NITRITE Negative 02/03/2024    UROBILINOGEN normal 12/11/2023       Lab Results   Component Value Date    CRP 2.5 02/22/2023       Lab Results   Component Value Date    SEDRATE 25 (H) 02/22/2023       Lab Results   Component Value Date    KEE Pos, Titer to follow (A) 01/29/2009    RF <13.0 02/20/2024    SEDRATE 25 (H) 02/22/2023           Lab Results   Component Value Date    KEE Pos, Titer to follow (A) 01/29/2009        Imaging:  All imaging reviewed and independently interpreted by me.         ASSESSMENT / PLAN:     Marleny Guzman is a 73 y.o. Black or  female with:      1. Interstitial pulmonary disease, unspecified  2. KEE, dsdna and RNA polymerase III positivity  - At this time patient with positive- anti-dsdna, KEE and RNA polymerase III but no evidence of active CTD   - On CT chest report -- no evidence of ILD   - Will wait for pulmonary appointment to see what they think  - Ambulatory referral/consult to Rheumatology    3. Pulm HTN   - Referred to pulm HTN clinic   - Will continue to follow     4. Other specified counseling  - over 10 minutes spent regarding below topics:  - Immunization counseling done.  - Weight loss counseling done.  -  Nutrition and exercise counseling.  - Limitation of alcohol consumption.  - Regular exercise:  Aerobic and resistance.  - Medication counseling provided.    5. Overweight  - would benefit from decreasing at least 10% of body weight.  - recommended goal of losing 1 lb per week.  - consider nutritionist evaluation.    Follow up in about 3 months (around 7/15/2024).    Method of contact with patient concerns: [facility]    Disclaimer:  This note is prepared using voice recognition software and as such is likely to have errors and has not been proof read. Please contact me for questions.     Time spent: 60 minutes in face to face discussion concerning diagnosis, prognosis, review of lab and test results, benefits of treatment as well as management of disease, counseling of patient and coordination of care between various health care providers.  Greater than half the time spent was used for coordination of care and counseling of patient.    Belen Angeles M.D.  Rheumatology Department   [facility]

## 2024-04-23 ENCOUNTER — TELEPHONE (OUTPATIENT)
Dept: OPTOMETRY | Facility: CLINIC | Age: 74
End: 2024-04-23
Payer: MEDICARE

## 2024-04-23 NOTE — TELEPHONE ENCOUNTER
----- Message from Jose J Gaspar OD sent at 4/23/2024  3:22 PM CDT -----  Added color CL to Rx. Available on [facility].  ----- Message -----  From: Mame Carr  Sent: 4/23/2024   1:09 PM CDT  To: Jose J Gaspar OD      ----- Message -----  From: Andria Hidalgo  Sent: 4/23/2024  12:07 PM CDT  To: [facility]ert Lux Staff    Pt calling back to inform clinic the color needs to be on nicolasa script , faxed to [facility]     Confirmed patient's contact info below:  Contact Name: Marleny Guzman  Phone Number: [phone]

## 2024-04-23 NOTE — TELEPHONE ENCOUNTER
----- Message from Stephanie Salcedo sent at 4/23/2024 11:43 AM CDT -----  Regarding: Rx Fax Raymundo  Patient called in regards to having her rx for eyeglasses and contacts faxed to [phone] Alejandra    Please call back to further assist- [phone]

## 2024-04-25 DIAGNOSIS — I10 ESSENTIAL HYPERTENSION: ICD-10-CM

## 2024-04-25 RX ORDER — ERGOCALCIFEROL 1.25 MG/1
CAPSULE ORAL
Qty: 3 CAPSULE | Refills: 3 | Status: SHIPPED | OUTPATIENT
Start: 2024-04-25

## 2024-04-25 RX ORDER — METOPROLOL SUCCINATE 50 MG/1
50 TABLET, EXTENDED RELEASE ORAL DAILY
Qty: 90 TABLET | Refills: 3 | Status: SHIPPED | OUTPATIENT
Start: 2024-04-25

## 2024-04-25 NOTE — TELEPHONE ENCOUNTER
----- Message from Abby Nath sent at 4/25/2024 10:35 AM CDT -----  Regarding: Patient Needs Refill  Contact: Marleny @ [phone]  Requesting an RX refill or new RX.  Is this a refill or new RX: refill  RX name and strength (copy/paste from chart):  metoprolol succinate (TOPROL-XL) 50 MG 24 hr tablet  Is this a 30 day or 90 day RX:   Pharmacy name and phone # (copy/paste from chart):      [facility]/pharmacy #[id number] - [place], [place] - [address]  [address]  [facility] [address]  Phone: [phone] Fax: [phone]      The doctors have asked that we provide their patients with the following 2 reminders -- prescription refills can take up to 72 hours, and a friendly reminder that in the future you can use your [facility] account to request refills: Call

## 2024-04-25 NOTE — TELEPHONE ENCOUNTER
No care due was identified.  [facility] Embedded Care Due Messages. Reference number: [id number].   4/25/2024 10:35:02 AM CDT

## 2024-04-25 NOTE — TELEPHONE ENCOUNTER
Refill Routing Note   Medication(s) are not appropriate for processing by [facility] for the following reason(s):        Outside of protocol    [facility] action(s):  Route               Appointments  past 12m or future 3m with PCP    Date Provider   Last Visit   1/25/2024 Bridget Cantu MD   Next Visit   Visit date not found Bridget Cantu MD   ED visits in past 90 days: 1        Note composed:10:37 AM 04/25/2024

## 2024-04-25 NOTE — TELEPHONE ENCOUNTER
No care due was identified.  [facility] Embedded Care Due Messages. Reference number: [id number].   4/25/2024 11:37:24 AM CDT

## 2024-05-03 PROBLEM — E66.3 OVERWEIGHT (BMI 25.0-29.9): Status: ACTIVE | Noted: 2024-05-03

## 2024-05-08 ENCOUNTER — OFFICE VISIT (OUTPATIENT)
Dept: TRANSPLANT | Facility: CLINIC | Age: 74
End: 2024-05-08
Payer: MEDICARE

## 2024-05-08 ENCOUNTER — LAB VISIT (OUTPATIENT)
Dept: LAB | Facility: HOSPITAL | Age: 74
End: 2024-05-08
Attending: INTERNAL MEDICINE
Payer: MEDICARE

## 2024-05-08 VITALS
WEIGHT: 205.69 LBS | HEART RATE: 59 BPM | SYSTOLIC BLOOD PRESSURE: 178 MMHG | BODY MASS INDEX: 29.45 KG/M2 | HEIGHT: 70 IN | DIASTOLIC BLOOD PRESSURE: 77 MMHG

## 2024-05-08 DIAGNOSIS — R06.82 TACHYPNEA: ICD-10-CM

## 2024-05-08 DIAGNOSIS — I27.9 CHRONIC PULMONARY HEART DISEASE: Primary | ICD-10-CM

## 2024-05-08 DIAGNOSIS — J84.9 INTERSTITIAL PULMONARY DISEASE, UNSPECIFIED: ICD-10-CM

## 2024-05-08 DIAGNOSIS — I10 PRIMARY HYPERTENSION: ICD-10-CM

## 2024-05-08 DIAGNOSIS — I70.0 THORACIC AORTA ATHEROSCLEROSIS: ICD-10-CM

## 2024-05-08 DIAGNOSIS — I27.20 PULMONARY HYPERTENSION: ICD-10-CM

## 2024-05-08 DIAGNOSIS — Z79.899 POLYPHARMACY: ICD-10-CM

## 2024-05-08 LAB
ALBUMIN SERPL BCP-MCNC: 3.3 G/DL (ref 3.5–5.2)
ALP SERPL-CCNC: 158 U/L (ref 55–135)
ALT SERPL W/O P-5'-P-CCNC: 12 U/L (ref 10–44)
ANION GAP SERPL CALC-SCNC: 9 MMOL/L (ref 8–16)
AST SERPL-CCNC: 16 U/L (ref 10–40)
BASOPHILS # BLD AUTO: 0.05 K/UL (ref 0–0.2)
BASOPHILS NFR BLD: 0.6 % (ref 0–1.9)
BILIRUB SERPL-MCNC: 0.5 MG/DL (ref 0.1–1)
BNP SERPL-MCNC: 76 PG/ML (ref 0–99)
BUN SERPL-MCNC: 16 MG/DL (ref 8–23)
CALCIUM SERPL-MCNC: 9.7 MG/DL (ref 8.7–10.5)
CHLORIDE SERPL-SCNC: 104 MMOL/L (ref 95–110)
CO2 SERPL-SCNC: 27 MMOL/L (ref 23–29)
CREAT SERPL-MCNC: 0.8 MG/DL (ref 0.5–1.4)
DIFFERENTIAL METHOD BLD: ABNORMAL
EOSINOPHIL # BLD AUTO: 0.8 K/UL (ref 0–0.5)
EOSINOPHIL NFR BLD: 8.6 % (ref 0–8)
ERYTHROCYTE [DISTWIDTH] IN BLOOD BY AUTOMATED COUNT: 14.9 % (ref 11.5–14.5)
EST. GFR  (NO RACE VARIABLE): >60 ML/MIN/1.73 M^2
GLUCOSE SERPL-MCNC: 91 MG/DL (ref 70–110)
HCT VFR BLD AUTO: 36.7 % (ref 37–48.5)
HGB BLD-MCNC: 11.8 G/DL (ref 12–16)
IMM GRANULOCYTES # BLD AUTO: 0.02 K/UL (ref 0–0.04)
IMM GRANULOCYTES NFR BLD AUTO: 0.2 % (ref 0–0.5)
LYMPHOCYTES # BLD AUTO: 1.7 K/UL (ref 1–4.8)
LYMPHOCYTES NFR BLD: 19.9 % (ref 18–48)
MAGNESIUM SERPL-MCNC: 1.8 MG/DL (ref 1.6–2.6)
MCH RBC QN AUTO: 28.6 PG (ref 27–31)
MCHC RBC AUTO-ENTMCNC: 32.2 G/DL (ref 32–36)
MCV RBC AUTO: 89 FL (ref 82–98)
MONOCYTES # BLD AUTO: 0.6 K/UL (ref 0.3–1)
MONOCYTES NFR BLD: 6.9 % (ref 4–15)
NEUTROPHILS # BLD AUTO: 5.5 K/UL (ref 1.8–7.7)
NEUTROPHILS NFR BLD: 63.8 % (ref 38–73)
NRBC BLD-RTO: 0 /100 WBC
PLATELET # BLD AUTO: 326 K/UL (ref 150–450)
PMV BLD AUTO: 10.2 FL (ref 9.2–12.9)
POTASSIUM SERPL-SCNC: 3.6 MMOL/L (ref 3.5–5.1)
PROT SERPL-MCNC: 6.9 G/DL (ref 6–8.4)
RBC # BLD AUTO: 4.13 M/UL (ref 4–5.4)
SODIUM SERPL-SCNC: 140 MMOL/L (ref 136–145)
WBC # BLD AUTO: 8.69 K/UL (ref 3.9–12.7)

## 2024-05-08 PROCEDURE — 1101F PT FALLS ASSESS-DOCD LE1/YR: CPT | Mod: CPTII,S$GLB,, | Performed by: INTERNAL MEDICINE

## 2024-05-08 PROCEDURE — 3288F FALL RISK ASSESSMENT DOCD: CPT | Mod: CPTII,S$GLB,, | Performed by: INTERNAL MEDICINE

## 2024-05-08 PROCEDURE — 3044F HG A1C LEVEL LT 7.0%: CPT | Mod: CPTII,S$GLB,, | Performed by: INTERNAL MEDICINE

## 2024-05-08 PROCEDURE — 3061F NEG MICROALBUMINURIA REV: CPT | Mod: CPTII,S$GLB,, | Performed by: INTERNAL MEDICINE

## 2024-05-08 PROCEDURE — 3077F SYST BP >= 140 MM HG: CPT | Mod: CPTII,S$GLB,, | Performed by: INTERNAL MEDICINE

## 2024-05-08 PROCEDURE — 83735 ASSAY OF MAGNESIUM: CPT | Performed by: INTERNAL MEDICINE

## 2024-05-08 PROCEDURE — 36415 COLL VENOUS BLD VENIPUNCTURE: CPT | Performed by: INTERNAL MEDICINE

## 2024-05-08 PROCEDURE — 1126F AMNT PAIN NOTED NONE PRSNT: CPT | Mod: CPTII,S$GLB,, | Performed by: INTERNAL MEDICINE

## 2024-05-08 PROCEDURE — 99999 PR PBB SHADOW E&M-EST. PATIENT-LVL V: CPT | Mod: PBBFAC,,, | Performed by: INTERNAL MEDICINE

## 2024-05-08 PROCEDURE — 99214 OFFICE O/P EST MOD 30 MIN: CPT | Mod: S$GLB,,, | Performed by: INTERNAL MEDICINE

## 2024-05-08 PROCEDURE — 3066F NEPHROPATHY DOC TX: CPT | Mod: CPTII,S$GLB,, | Performed by: INTERNAL MEDICINE

## 2024-05-08 PROCEDURE — 1159F MED LIST DOCD IN RCRD: CPT | Mod: CPTII,S$GLB,, | Performed by: INTERNAL MEDICINE

## 2024-05-08 PROCEDURE — 85025 COMPLETE CBC W/AUTO DIFF WBC: CPT | Performed by: INTERNAL MEDICINE

## 2024-05-08 PROCEDURE — 3078F DIAST BP <80 MM HG: CPT | Mod: CPTII,S$GLB,, | Performed by: INTERNAL MEDICINE

## 2024-05-08 PROCEDURE — 83880 ASSAY OF NATRIURETIC PEPTIDE: CPT | Performed by: INTERNAL MEDICINE

## 2024-05-08 PROCEDURE — 80053 COMPREHEN METABOLIC PANEL: CPT | Performed by: INTERNAL MEDICINE

## 2024-05-08 NOTE — PROGRESS NOTES
Subjective   Patient ID:  Marleny Guzman is a 73 y.o. female who presents for evaluation of possible PH    72 YO F w/ positive KEE/ds-DNA, RNA poly III, possible ILD, HTN, HLD who presents for evaluation of possible PH.    She was seen by pulmonlogy for WOOTEN and workup showed positive KEE, and possible ILD (although PFTs more consistent with ILD than her lung imaging). Sees rheumatology as well who noted not stigmata of CTD despite positive KEE. TTE showed elevated PA pressures and sent to me for further evaluation.    When talking with her, she says that her shortness of breath goes back a few years.  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  In addition to this she also walks her dogs.  However over the past few years she has noticed progressively worsening shortness of breath.  At present she thinks she can walk approximately 2 blocks before she has to pause because of the shortness of breath.  Denies any associated chest tightness, but does endorse chronic bilateral lower extremity swelling.  With the shortness of breath she also notes palpitations.  Has never had any syncopal episodes, but does note occasional exertional lightheadedness.  Has difficulty lying flat, but not due to shortness of breath but because of a productive cough.    She says that along with the shortness of breath she has also noticed worsening cough over the same time.  The cough is productive, making yellow sputum.  It is continuous, and she can not think of any specific aggravating or alleviating factors.  She has no associated hemoptysis.  Denies any fevers, chills, infectious symptoms, or sick contacts.    As noted above, her medical history is significant for the interstitial lung disease, hypertension, dyslipidemia, and possible pulmonary hypertension.    No family history of heart or lung issues.    She is a former smoker, having smoked for approximately 10 years  in the 1980s, averaging 5-6 cigarettes per day.  Also smoked marijuana in the remote past.  Will significant alcohol use, or recreational drug use.  No history of taking diet medications such as Fen-Phen, or medications for ADHD such as Ritalin or Adderall.  Does endorse however history of asbestos exposure related to work.    TTE 3/25/24    Left Ventricle: The left ventricle is normal in size. Normal wall thickness. There is normal systolic function. Ejection fraction by visual approximation is 65%. There is normal diastolic function.    Right Ventricle: Normal right ventricular cavity size. Wall thickness is normal. Right ventricle wall motion  is normal. Systolic function is normal.    Tricuspid Valve: There is mild regurgitation.    Pulmonary Artery: There is mild pulmonary hypertension. The estimated pulmonary artery systolic pressure is 43 mmHg.    IVC/SVC: Normal venous pressure at 3 mmHg.    CT Chest 4/3/24  Impression:     No imaging evidence of interstitial lung disease.     Stable bilateral lower lobe multi linear subsegmental atelectasis versus scarring.     Stable pulmonary nodules.  Grossly stable mediastinal lymphadenopathy measuring 1.2 cm in the station 4R.    PFTs 4/8/24  FEV1 62%  FVC 67%  DLCO 37%    Review of Systems   Constitutional: Positive for malaise/fatigue. Negative for chills and fever.   HENT:  Negative for hearing loss.    Eyes:  Negative for visual disturbance.   Cardiovascular:  Positive for chest pain, dyspnea on exertion, leg swelling, near-syncope and palpitations. Negative for irregular heartbeat, orthopnea, paroxysmal nocturnal dyspnea and syncope.   Respiratory:  Positive for cough, shortness of breath, sputum production and wheezing.    Musculoskeletal:  Negative for muscle weakness.   Gastrointestinal:  Negative for diarrhea, nausea and vomiting.   Neurological:  Negative for focal weakness.   Psychiatric/Behavioral:  Negative for memory loss.           Objective   Vitals:     05/08/24 0949   BP: (!) 178/77   Pulse: (!) 59       Physical Exam  Constitutional:       Appearance: Normal appearance.   HENT:      Head: Atraumatic.   Eyes:      Extraocular Movements: Extraocular movements intact.   Cardiovascular:      Rate and Rhythm: Normal rate and regular rhythm.      Pulses: Normal pulses.      Comments: S1, S2 noted. Loud P2. JVP not visible at 45 degrees.  Pulmonary:      Breath sounds: Rales (Bilateral dry crackles in lower lung fields) present.   Abdominal:      Palpations: Abdomen is soft.      Tenderness: There is no abdominal tenderness.   Musculoskeletal:         General: Normal range of motion.      Right lower leg: Edema present.      Left lower leg: Edema present.   Neurological:      General: No focal deficit present.      Mental Status: She is alert and oriented to person, place, and time.            Assessment and Plan     1. Chronic pulmonary heart disease    2. Interstitial pulmonary disease, unspecified    3. Pulmonary hypertension    4. Primary hypertension    5. Thoracic aorta atherosclerosis        Plan:  - presents today for evaluation of possible pulmonary hypertension.  Echocardiogram does demonstrate elevated pulmonary artery pressures.  - it is unclear based on her workup thus far what is the etiology as to her pulmonary symptoms.  There is mild interstitial lung disease on the imaging, but her pulmonary function testing is more dramatic.  On auscultation she does have dry bibasilar crackles in the lower 1/3 lung fields corresponding with the area of atelectasis versus scarring on the CT scans.  Although she does have positive KEE, no other stigmata of connective tissue disease.  - we will have her get right heart catheterization to evaluate her pulmonary artery pressures.  After discussing the procedure, including the risks/benefits she was amenable to proceed with right heart catheterization.  Consent was signed in clinic.  - depending on the severity of her  pulmonary hypertension will treat her with Tyvaso +/- PDE5 inhibitor (group 3 pulmonary hypertension versus group 1 pulmonary hypertension pulmonary phenotype)

## 2024-05-14 ENCOUNTER — TELEPHONE (OUTPATIENT)
Dept: INTERNAL MEDICINE | Facility: CLINIC | Age: 74
End: 2024-05-14
Payer: MEDICARE

## 2024-05-14 NOTE — TELEPHONE ENCOUNTER
----- Message from Clare Anderson sent at 5/13/2024  2:44 PM CDT -----  Contact: [phone]  [email or web address]     Patient is calling for Medical Advice regarding: Patient would like a call back to discuss her test results and the way she has been feeling and she would also like as appointment sooner than later, please call to advise.

## 2024-05-16 ENCOUNTER — OFFICE VISIT (OUTPATIENT)
Dept: INTERNAL MEDICINE | Facility: CLINIC | Age: 74
End: 2024-05-16
Payer: MEDICARE

## 2024-05-16 VITALS
HEIGHT: 70 IN | HEART RATE: 74 BPM | BODY MASS INDEX: 29.4 KG/M2 | SYSTOLIC BLOOD PRESSURE: 144 MMHG | DIASTOLIC BLOOD PRESSURE: 64 MMHG | WEIGHT: 205.38 LBS | OXYGEN SATURATION: 95 %

## 2024-05-16 DIAGNOSIS — F32.A DEPRESSION, UNSPECIFIED DEPRESSION TYPE: Chronic | ICD-10-CM

## 2024-05-16 DIAGNOSIS — I10 PRIMARY HYPERTENSION: ICD-10-CM

## 2024-05-16 DIAGNOSIS — F41.9 ANXIETY: Chronic | ICD-10-CM

## 2024-05-16 DIAGNOSIS — M25.511 CHRONIC RIGHT SHOULDER PAIN: Primary | ICD-10-CM

## 2024-05-16 DIAGNOSIS — G89.29 CHRONIC RIGHT SHOULDER PAIN: Primary | ICD-10-CM

## 2024-05-16 DIAGNOSIS — J40 BRONCHITIS: ICD-10-CM

## 2024-05-16 DIAGNOSIS — R06.00 DYSPNEA, UNSPECIFIED TYPE: ICD-10-CM

## 2024-05-16 PROCEDURE — 99214 OFFICE O/P EST MOD 30 MIN: CPT | Mod: S$GLB,,, | Performed by: INTERNAL MEDICINE

## 2024-05-16 PROCEDURE — 1159F MED LIST DOCD IN RCRD: CPT | Mod: CPTII,S$GLB,, | Performed by: INTERNAL MEDICINE

## 2024-05-16 PROCEDURE — 99999 PR PBB SHADOW E&M-EST. PATIENT-LVL V: CPT | Mod: PBBFAC,,, | Performed by: INTERNAL MEDICINE

## 2024-05-16 PROCEDURE — 3288F FALL RISK ASSESSMENT DOCD: CPT | Mod: CPTII,S$GLB,, | Performed by: INTERNAL MEDICINE

## 2024-05-16 PROCEDURE — 3044F HG A1C LEVEL LT 7.0%: CPT | Mod: CPTII,S$GLB,, | Performed by: INTERNAL MEDICINE

## 2024-05-16 PROCEDURE — 3061F NEG MICROALBUMINURIA REV: CPT | Mod: CPTII,S$GLB,, | Performed by: INTERNAL MEDICINE

## 2024-05-16 PROCEDURE — 3077F SYST BP >= 140 MM HG: CPT | Mod: CPTII,S$GLB,, | Performed by: INTERNAL MEDICINE

## 2024-05-16 PROCEDURE — 3078F DIAST BP <80 MM HG: CPT | Mod: CPTII,S$GLB,, | Performed by: INTERNAL MEDICINE

## 2024-05-16 PROCEDURE — 1125F AMNT PAIN NOTED PAIN PRSNT: CPT | Mod: CPTII,S$GLB,, | Performed by: INTERNAL MEDICINE

## 2024-05-16 PROCEDURE — 1100F PTFALLS ASSESS-DOCD GE2>/YR: CPT | Mod: CPTII,S$GLB,, | Performed by: INTERNAL MEDICINE

## 2024-05-16 PROCEDURE — 3066F NEPHROPATHY DOC TX: CPT | Mod: CPTII,S$GLB,, | Performed by: INTERNAL MEDICINE

## 2024-05-16 RX ORDER — PREDNISONE 20 MG/1
20 TABLET ORAL DAILY
Qty: 5 TABLET | Refills: 0 | Status: SHIPPED | OUTPATIENT
Start: 2024-05-16

## 2024-05-16 RX ORDER — DOXYCYCLINE HYCLATE 100 MG
TABLET ORAL
Qty: 20 TABLET | Refills: 0 | Status: SHIPPED | OUTPATIENT
Start: 2024-05-16

## 2024-05-16 RX ORDER — SPIRONOLACTONE 25 MG/1
TABLET ORAL
Qty: 30 TABLET | Refills: 6 | Status: SHIPPED | OUTPATIENT
Start: 2024-05-16

## 2024-05-16 RX ORDER — ATORVASTATIN CALCIUM 20 MG/1
20 TABLET, FILM COATED ORAL DAILY
Qty: 90 TABLET | Refills: 3 | Status: SHIPPED | OUTPATIENT
Start: 2024-05-16

## 2024-05-16 RX ORDER — ESCITALOPRAM OXALATE 20 MG/1
20 TABLET ORAL DAILY
Qty: 90 TABLET | Refills: 3 | Status: SHIPPED | OUTPATIENT
Start: 2024-05-16

## 2024-05-16 NOTE — PROGRESS NOTES
Subjective:       Patient ID: Marleny Guzman is a 73 y.o. female.    Chief Complaint: Follow-up, Cough, Shortness of Breath, and Joint Pain    Follow-up  Associated symptoms include coughing. Pertinent negatives include no abdominal pain, chest pain (arm pain or jaw pain), headaches, nausea or vomiting.   Cough  Associated symptoms include shortness of breath. Pertinent negatives include no chest pain (arm pain or jaw pain) or headaches.   Shortness of Breath  Pertinent negatives include no abdominal pain, chest pain (arm pain or jaw pain), headaches or vomiting.   Joint Pain  Associated symptoms include coughing. Pertinent negatives include no abdominal pain, chest pain (arm pain or jaw pain), headaches, nausea or vomiting.   Pt with cough and dyspnea.  Appears to have ILD having a RHC soon for pulmonary HTN.  She is coughing with mucous production.  No fever or chills.   Review of Systems   Respiratory:  Positive for cough and shortness of breath.    Cardiovascular:  Negative for chest pain (arm pain or jaw pain).   Gastrointestinal:  Negative for abdominal pain, diarrhea, nausea and vomiting.   Genitourinary:  Negative for dysuria.   Neurological:  Negative for seizures, syncope and headaches.       Objective:      Physical Exam  Constitutional:       General: She is not in acute distress.     Appearance: She is well-developed.   HENT:      Head: Normocephalic.   Eyes:      Pupils: Pupils are equal, round, and reactive to light.   Neck:      Thyroid: No thyromegaly.      Vascular: No JVD.   Cardiovascular:      Rate and Rhythm: Normal rate and regular rhythm.      Heart sounds: Normal heart sounds. No murmur heard.     No friction rub. No gallop.   Pulmonary:      Effort: Pulmonary effort is normal.      Breath sounds: Normal breath sounds. No wheezing or rales.      Comments: Coarse breath sounds greater on left than R  Abdominal:      General: Bowel sounds are normal. There is no distension.       Palpations: Abdomen is soft. There is no mass.      Tenderness: There is no abdominal tenderness. There is no guarding or rebound.   Musculoskeletal:      Cervical back: Neck supple.   Lymphadenopathy:      Cervical: No cervical adenopathy.   Skin:     General: Skin is warm and dry.   Neurological:      Mental Status: She is alert and oriented to person, place, and time.      Deep Tendon Reflexes: Reflexes are normal and symmetric.   Psychiatric:         Behavior: Behavior normal.         Thought Content: Thought content normal.         Judgment: Judgment normal.         Assessment:       1. Chronic right shoulder pain    2. Anxiety    3. Depression, unspecified depression type    4. Primary hypertension    5. Dyspnea, unspecified type        Plan:   Chronic right shoulder pain  -     Ambulatory referral/consult to Orthopedics; Future; Expected date: 05/23/2024    Anxiety  -     EScitalopram oxalate (LEXAPRO) 20 MG tablet; Take 1 tablet (20 mg total) by mouth once daily.  Dispense: 90 tablet; Refill: 3    Depression, unspecified depression type  -     EScitalopram oxalate (LEXAPRO) 20 MG tablet; Take 1 tablet (20 mg total) by mouth once daily.  Dispense: 90 tablet; Refill: 3    Primary hypertension  -     Comprehensive Metabolic Panel; Future; Expected date: 05/16/2024  Add spironolactone 1/2 tablet of 25 mg - recheck lab in 7-10 days  Dyspnea, unspecified type  -     D-DIMER, QUANTITATIVE; Future; Expected date: 05/16/2024    Other orders  -     atorvastatin (LIPITOR) 20 MG tablet; Take 1 tablet (20 mg total) by mouth once daily.  Dispense: 90 tablet; Refill: 3  -     spironolactone (ALDACTONE) 25 MG tablet; Half tablet daily  Dispense: 30 tablet; Refill: 6  Bronchitis  -     doxycycline (VIBRA-TABS) 100 MG tablet; One tablet twice daily with food and a full glass of water  Dispense: 20 tablet; Refill: 0  -     predniSONE (DELTASONE) 20 MG tablet; Take 1 tablet (20 mg total) by mouth once daily.  Dispense: 5  tablet; Refill: 0

## 2024-05-20 RX ORDER — LORATADINE 10 MG/1
10 TABLET ORAL DAILY
Qty: 30 TABLET | Refills: 11 | Status: SHIPPED | OUTPATIENT
Start: 2024-05-20 | End: 2025-05-20

## 2024-05-20 NOTE — TELEPHONE ENCOUNTER
Patient is requesting a refill of LORazepam (ATIVAN) 0.5 MG tablet     LOV:2024  Start: 2024   Upcomin2024

## 2024-05-20 NOTE — TELEPHONE ENCOUNTER
No care due was identified.  [facility] Embedded Care Due Messages. Reference number: [id number].   5/20/2024 1:07:57 PM CDT

## 2024-05-21 RX ORDER — LORAZEPAM 0.5 MG/1
TABLET ORAL
Qty: 30 TABLET | Refills: 0 | Status: SHIPPED | OUTPATIENT
Start: 2024-05-21

## 2024-05-22 ENCOUNTER — LAB VISIT (OUTPATIENT)
Dept: LAB | Facility: HOSPITAL | Age: 74
End: 2024-05-22
Attending: INTERNAL MEDICINE
Payer: MEDICARE

## 2024-05-22 ENCOUNTER — TELEPHONE (OUTPATIENT)
Dept: UROGYNECOLOGY | Facility: CLINIC | Age: 74
End: 2024-05-22
Payer: MEDICARE

## 2024-05-22 DIAGNOSIS — R06.00 DYSPNEA, UNSPECIFIED TYPE: ICD-10-CM

## 2024-05-22 DIAGNOSIS — I10 PRIMARY HYPERTENSION: ICD-10-CM

## 2024-05-22 LAB
ALBUMIN SERPL BCP-MCNC: 3 G/DL (ref 3.5–5.2)
ALP SERPL-CCNC: 195 U/L (ref 55–135)
ALT SERPL W/O P-5'-P-CCNC: 22 U/L (ref 10–44)
ANION GAP SERPL CALC-SCNC: 7 MMOL/L (ref 8–16)
AST SERPL-CCNC: 18 U/L (ref 10–40)
BILIRUB SERPL-MCNC: 0.3 MG/DL (ref 0.1–1)
BUN SERPL-MCNC: 19 MG/DL (ref 8–23)
CALCIUM SERPL-MCNC: 9.2 MG/DL (ref 8.7–10.5)
CHLORIDE SERPL-SCNC: 107 MMOL/L (ref 95–110)
CO2 SERPL-SCNC: 29 MMOL/L (ref 23–29)
CREAT SERPL-MCNC: 0.8 MG/DL (ref 0.5–1.4)
D DIMER PPP IA.FEU-MCNC: 0.9 MG/L FEU
EST. GFR  (NO RACE VARIABLE): >60 ML/MIN/1.73 M^2
GLUCOSE SERPL-MCNC: 91 MG/DL (ref 70–110)
POTASSIUM SERPL-SCNC: 3.7 MMOL/L (ref 3.5–5.1)
PROT SERPL-MCNC: 6.7 G/DL (ref 6–8.4)
SODIUM SERPL-SCNC: 143 MMOL/L (ref 136–145)

## 2024-05-22 PROCEDURE — 80053 COMPREHEN METABOLIC PANEL: CPT | Performed by: INTERNAL MEDICINE

## 2024-05-22 PROCEDURE — 36415 COLL VENOUS BLD VENIPUNCTURE: CPT | Performed by: INTERNAL MEDICINE

## 2024-05-22 PROCEDURE — 85379 FIBRIN DEGRADATION QUANT: CPT | Performed by: INTERNAL MEDICINE

## 2024-05-22 NOTE — TELEPHONE ENCOUNTER
Patient states that she was having stomach problems and could not attend appointment. Unfortunately our next available isnt until August. Patient was rescheduled.

## 2024-05-23 ENCOUNTER — TELEPHONE (OUTPATIENT)
Dept: TRANSPLANT | Facility: CLINIC | Age: 74
End: 2024-05-23
Payer: MEDICARE

## 2024-05-23 NOTE — TELEPHONE ENCOUNTER
----- Message from Luz Angelo sent at 5/22/2024  9:43 AM CDT -----  Regarding: Rs appts  Pt [phone] would like a call in ref to her appts on 6/26/24. She would like sooner appts if possible.    Thanks

## 2024-05-23 NOTE — TELEPHONE ENCOUNTER
Returned patient's call about rescheduling her appointments sooner then, 6/26. I explained to her that Dr. Baez does not have anything in the Cath lab. Patient verbalized understanding and will keep the 6/26 appointments.

## 2024-05-24 NOTE — TELEPHONE ENCOUNTER
Refill Routing Note   Medication(s) are not appropriate for processing by [facility] for the following reason(s):        Required vitals abnormal    [facility] action(s):  Defer             Appointments  past 12m or future 3m with PCP    Date Provider   Last Visit   5/16/2024 Bridget Cantu MD   Next Visit   7/8/2024 Bridget Cantu MD   ED visits in past 90 days: 0        Note composed:4:48 AM 05/24/2024

## 2024-05-24 NOTE — TELEPHONE ENCOUNTER
No care due was identified.  [facility] Embedded Care Due Messages. Reference number: [id number].   5/24/2024 12:14:54 AM CDT

## 2024-06-01 RX ORDER — IRBESARTAN AND HYDROCHLOROTHIAZIDE 300; 12.5 MG/1; MG/1
1 TABLET, FILM COATED ORAL
Qty: 90 TABLET | Refills: 3 | Status: SHIPPED | OUTPATIENT
Start: 2024-06-01

## 2024-06-05 ENCOUNTER — TELEPHONE (OUTPATIENT)
Dept: PULMONOLOGY | Facility: CLINIC | Age: 74
End: 2024-06-05
Payer: MEDICARE

## 2024-06-05 ENCOUNTER — OFFICE VISIT (OUTPATIENT)
Dept: URGENT CARE | Facility: CLINIC | Age: 74
End: 2024-06-05
Payer: MEDICARE

## 2024-06-05 VITALS
TEMPERATURE: 99 F | BODY MASS INDEX: 29.42 KG/M2 | RESPIRATION RATE: 18 BRPM | HEIGHT: 70 IN | SYSTOLIC BLOOD PRESSURE: 135 MMHG | HEART RATE: 72 BPM | DIASTOLIC BLOOD PRESSURE: 53 MMHG | OXYGEN SATURATION: 96 % | WEIGHT: 205.5 LBS

## 2024-06-05 DIAGNOSIS — J40 BRONCHITIS: Primary | ICD-10-CM

## 2024-06-05 DIAGNOSIS — R06.02 SHORTNESS OF BREATH: ICD-10-CM

## 2024-06-05 DIAGNOSIS — R05.1 ACUTE COUGH: ICD-10-CM

## 2024-06-05 PROCEDURE — 71046 X-RAY EXAM CHEST 2 VIEWS: CPT | Mod: S$GLB,,, | Performed by: RADIOLOGY

## 2024-06-05 PROCEDURE — 99213 OFFICE O/P EST LOW 20 MIN: CPT | Mod: S$GLB,,,

## 2024-06-05 RX ORDER — BENZONATATE 100 MG/1
100 CAPSULE ORAL 3 TIMES DAILY PRN
Qty: 30 CAPSULE | Refills: 0 | Status: SHIPPED | OUTPATIENT
Start: 2024-06-05 | End: 2024-06-15

## 2024-06-05 NOTE — LETTER
June 5, 2024      [facility] [address] [address]  Phone: [phone]  Fax: [phone]       Patient: Marleny Guzman   YOB: 1950  Date of Visit: 06/05/2024    To Whom It May Concern:    Shellie Guzman  was at [facility] on 06/05/2024. The patient may return to work/school on 6/10/2024 with no restrictions. If you have any questions or concerns, or if I can be of further assistance, please do not hesitate to contact me.    Sincerely,    Charissa Olson, NP

## 2024-06-05 NOTE — PATIENT INSTRUCTIONS
Try tessalon perles as directed during the day for your cough. It will help numb the back of your throat so you do not have the urge to cough.      Try a decongestant and corticosteroid nasal spray like flonase for the next few days for sinus relief. Initial: 2 sprays in each nostril once daily for 1 week. Reduce to 1 spray in each nostril once per day. Stop taking if you develop a nose bleed. Nasal saline spray can be used together with flonase to help moisten nostrils. An antihistamine like zyrtec, claritin, allegra can also be helpful for sinus relief and will help dry nasal passages.     Regular (Guaifenesin) Mucinex 1200 mg twice per day for 10 days can help thin secretions for better clearance. Drink plenty of fluids with this.     Honey is a natural cough suppressant.     -If you do NOT have high blood pressure, you may use a decongestant form (D)  of this medication (ie. Claritin- D, zyrtec-D, allegra-D, Mucinex-D) or if you do not take the D form, you can take sudafed (pseudoephedrine) over the counter, which is a powerful decongestant. Do NOT take two decongestant (D) medications at the same time (such as mucinex-D and claritin-D or plain sudafed and claritin D). Dextromethorphan (DM) is a cough suppressant over the counter (ie. mucinex DM, robitussin, delsym; dayquil/nyquil has DM as well.) Try Afrin for nasal congestion at bedtime. Only use for 3 days as this can cause a rebound of congestion. Try cepacol for sore throat.     If you do have Hypertension or palpitations, it is safe to take Coricidin HBP (multi-symptom flu) for relief of sinus symptoms.  Try DASH diet to help lower BP and buy a blood pressure cuff for home monitoring. Check blood pressure at least 2 times per day and create a log. Avoid eating foods that are high in salt. Eat more foods with potassium, magnesium and calcium which will help dilate your vessels and decrease your BP.     Warm tea/warm liquids will help soothe the back of your  throat. Warm water salt gurgles can also be helpful. A dry throat will cause pain. Make sure to stay hydrated. Water and pedilyte are the best to drink. Neti pot irrigation, humidifier in your room, avoiding fans, warm compresses to face, eating/drinking hot soups, hot shower before bedtime can help.     The recommended daily fluid intake for women is 2.7 liters (five 16 oz bottles).      Alternate Tylenol and ibuprofen every 4 hours as needed for fever and body aches.  Please take NSAIDs with a full glass of water and food to avoid GI upset.      Please only use over the counter cough and cold medications for 3-5 days at a time to avoid rebound symptoms.     Getting plenty of rest can aid in a faster recovery of illnesses.     Please follow-up with your primary care provider or return to the clinic if not better/worsening symptoms in 1 week.     Report to the ER if you have chest pain, shortness of breath, palpitations.

## 2024-06-05 NOTE — Clinical Note
"Hi Dr. Cantu. I saw this patient in UC today for ongoing cough x 6 months. She reports hearing wheezing at night but not present on exam. Feels more chest congestion now. VSS.  I heard faint rhonchi to right lung fields. Chest x-ray today showed " Coarse interstitial attenuation, similar to the previous exam.  No large focal consolidation.  Underlying edema not excluded". Last chest x-ray was with me in 3/2024. She did complete a course of doxy and prednisone with you 3 weeks ago. I suspect ongoing bronchitis today.  I advised to continue albuterol, flonase, zyrtec, benzonatate. She will need a follow up visit with you for further care. I gave her strict ED precautions if symptoms worsen. "

## 2024-06-05 NOTE — TELEPHONE ENCOUNTER
LVM for patient to let her know appointment with Dr Real needed to be rescheduled due to book out on 7/15/24 at 1:30pm to 7/17/4 at 3pm. Appointment mailed. Patient to call back if she has any questions.

## 2024-06-05 NOTE — PROGRESS NOTES
"Subjective:      Patient ID: Marleny Guzman is a 73 y.o. female.    Vitals:  height is 5' 10" (1.778 m) and weight is 93.2 kg (205 lb 7.5 oz). Her temperature is 98.5 °F (36.9 °C). Her blood pressure is 135/53 (abnormal) and her pulse is 72. Her respiration is 18 and oxygen saturation is 96%.     Chief Complaint: Cough    Past Medical History:  07/20/2016: Abnormal Pap smear of vagina  No date: Anemia  No date: Cataract  03/13/2018: Chronic bilateral low back pain without sciatica  No date: Depression with anxiety  No date: Hypertension  06/20/2013: Osteoarthritis  No date: Recurrent upper respiratory infection (URI)  No date: Right rotator cuff tear  No date: Sleep apnea      Comment:  Cipap machine at home  No date: Urinary incontinence      Comment:  Leaks urine with laughing/ coughing/ sneezing      Pt states she has had a cough since December and it is worst at night when she lays down with wheezing. Pt states the mucus she cough up either green or yellow mucus. She reports worsening SOB, fatigue and chest congestion. Notes taking doxy and prednisone 3 weeks ago from IM for bronchitis.     Cough  This is a new problem. The current episode started more than 1 month ago (december). The problem has been waxing and waning. The problem occurs constantly. The cough is Productive of purulent sputum. Associated symptoms include chest pain, headaches, myalgias, shortness of breath and wheezing. Pertinent negatives include no chills, ear congestion, ear pain, fever, heartburn, hemoptysis, postnasal drip, rash, rhinorrhea, sore throat, sweats or weight loss. Associated symptoms comments: Chest congestion. Treatments tried: mucinex. The treatment provided no relief. Her past medical history is significant for pneumonia.       Constitution: Positive for fatigue. Negative for chills and fever.   HENT:  Positive for congestion and sinus pressure. Negative for ear pain, postnasal drip and sore throat.  "   Cardiovascular:  Positive for chest pain.   Respiratory:  Positive for cough, shortness of breath and wheezing. Negative for bloody sputum.    Gastrointestinal:  Negative for abdominal pain, nausea, vomiting, diarrhea and heartburn.   Musculoskeletal:  Positive for muscle ache.   Skin:  Negative for rash.   Neurological:  Positive for headaches.      Objective:     Physical Exam   Constitutional: She is oriented to person, place, and time. She appears well-developed. She is cooperative.  Non-toxic appearance. She does not appear ill. No distress.   HENT:   Head: Normocephalic and atraumatic.   Ears:   Right Ear: Hearing, tympanic membrane, external ear and ear canal normal.   Left Ear: Hearing, tympanic membrane, external ear and ear canal normal.   Nose: No mucosal edema, rhinorrhea, nasal deformity or congestion. No epistaxis. Right sinus exhibits maxillary sinus tenderness. Right sinus exhibits no frontal sinus tenderness. Left sinus exhibits maxillary sinus tenderness. Left sinus exhibits no frontal sinus tenderness.   Mouth/Throat: Uvula is midline, oropharynx is clear and moist and mucous membranes are normal. No trismus in the jaw. Normal dentition. No uvula swelling. No oropharyngeal exudate, posterior oropharyngeal edema or posterior oropharyngeal erythema.   Eyes: Conjunctivae and lids are normal. Pupils are equal, round, and reactive to light. No scleral icterus.   Neck: Trachea normal and phonation normal. Neck supple. No edema present. No erythema present. No neck rigidity present.   Cardiovascular: Normal rate, regular rhythm, normal heart sounds and normal pulses.   Pulmonary/Chest: Effort normal. No accessory muscle usage. No tachypnea. No respiratory distress. She has no decreased breath sounds. She has no wheezes. She has rhonchi (faint) in the right upper field. She has no rales.   Abdominal: Normal appearance.   Musculoskeletal: Normal range of motion.         General: No deformity. Normal  range of motion.   Lymphadenopathy:     She has cervical adenopathy.   Neurological: She is alert and oriented to person, place, and time. She exhibits normal muscle tone. Coordination normal.   Skin: Skin is warm, dry, intact, not diaphoretic and not pale.   Psychiatric: Her speech is normal and behavior is normal. Judgment and thought content normal.   Nursing note and vitals reviewed.      Assessment:     1. Bronchitis    2. Shortness of breath    3. Acute cough        Plan:       Bronchitis    Shortness of breath  -     XR CHEST PA AND LATERAL; Future; Expected date: 06/05/2024    Acute cough  -     benzonatate (TESSALON) 100 MG capsule; Take 1 capsule (100 mg total) by mouth 3 (three) times daily as needed for Cough.  Dispense: 30 capsule; Refill: 0      EXAMINATION:  XR CHEST PA AND LATERAL     CLINICAL HISTORY:  Shortness of breath     TECHNIQUE:  PA and lateral views of the chest were performed.     COMPARISON:  03/06/2024     FINDINGS:  The cardiomediastinal silhouette is not enlarged noting calcification of the aorta..  There is no pleural effusion.  The trachea is midline.  The lungs are symmetrically expanded bilaterally with coarse interstitial attenuation.  There is biapical atelectasis or scarring.  There is bilateral basilar subsegmental atelectasis..  No large focal consolidation seen.  There is no pneumothorax.  The osseous structures are remarkable for degenerative change noting levo scoliotic curvature of the spine..     Impression:     1. Coarse interstitial attenuation, similar to the previous exam.  No large focal consolidation.  Underlying edema not excluded.        Electronically signed by:Mani Alcala MD  Date:                                            06/05/2024  Time:                                           13:14          Discussed results/diagnosis/plan with patient in clinic. Strict precautions given to patient to monitor for worsening signs and symptoms. Advised to follow up with  PCP or specialist.  Explained side effects of medications prescribed with patient and informed him/her to discontinue use if he/she has any side effects and to inform UC or PCP if this occurs. All questions answered. Strict ED verses clinic return precautions stressed and given in depth. Advised if symptoms worsens of fail to improve he/she should go to the Emergency Room. Discharge and follow-up instructions given verbally/printed with the patient who expressed understanding and willingness to comply with my recommendations. Patient voiced understanding and in agreement with current treatment plan. Patient exits the exam room in no acute distress. Conversant and engaged during discharge discussion, verbalized understanding.

## 2024-06-26 ENCOUNTER — TELEPHONE (OUTPATIENT)
Dept: TRANSPLANT | Facility: CLINIC | Age: 74
End: 2024-06-26
Payer: MEDICARE

## 2024-06-26 ENCOUNTER — HOSPITAL ENCOUNTER (OUTPATIENT)
Facility: HOSPITAL | Age: 74
Discharge: HOME | End: 2024-06-26
Attending: INTERNAL MEDICINE | Admitting: INTERNAL MEDICINE
Payer: MEDICARE

## 2024-06-26 VITALS
DIASTOLIC BLOOD PRESSURE: 64 MMHG | SYSTOLIC BLOOD PRESSURE: 146 MMHG | HEART RATE: 67 BPM | TEMPERATURE: 97 F | RESPIRATION RATE: 18 BRPM | OXYGEN SATURATION: 98 %

## 2024-06-26 DIAGNOSIS — J84.9 INTERSTITIAL PULMONARY DISEASE, UNSPECIFIED: ICD-10-CM

## 2024-06-26 DIAGNOSIS — I27.20 PULMONARY HYPERTENSION: ICD-10-CM

## 2024-06-26 DIAGNOSIS — I27.9 CHRONIC PULMONARY HEART DISEASE: ICD-10-CM

## 2024-06-26 PROCEDURE — C1751 CATH, INF, PER/CENT/MIDLINE: HCPCS | Performed by: INTERNAL MEDICINE

## 2024-06-26 PROCEDURE — 25000003 PHARM REV CODE 250: Performed by: INTERNAL MEDICINE

## 2024-06-26 PROCEDURE — 93451 RIGHT HEART CATH: CPT | Performed by: INTERNAL MEDICINE

## 2024-06-26 PROCEDURE — 93451 RIGHT HEART CATH: CPT | Mod: 26,,, | Performed by: INTERNAL MEDICINE

## 2024-06-26 PROCEDURE — C1894 INTRO/SHEATH, NON-LASER: HCPCS | Performed by: INTERNAL MEDICINE

## 2024-06-26 RX ORDER — LIDOCAINE HYDROCHLORIDE 20 MG/ML
INJECTION, SOLUTION INFILTRATION; PERINEURAL
Status: DISCONTINUED | OUTPATIENT
Start: 2024-06-26 | End: 2024-06-26 | Stop reason: HOSPADM

## 2024-06-26 RX ORDER — TADALAFIL 20 MG/1
40 TABLET ORAL DAILY
COMMUNITY
End: 2024-06-26 | Stop reason: SDUPTHER

## 2024-06-26 RX ORDER — TADALAFIL 20 MG/1
40 TABLET ORAL DAILY
Qty: 60 TABLET | Refills: 11 | Status: SHIPPED | OUTPATIENT
Start: 2024-06-26

## 2024-06-26 NOTE — NURSING
Received report from [facility]NAA. Patient s/p [facility], AAOx4.   VSS, no c/o pain or discomfort at this time, resp even and unlabored.   Gauze/tegaderm dressing to R side of neck is CDI. No active bleeding. No hematoma noted.   Post procedure protocol reviewed with patient and patient's family. Understanding verbalized. Family members at bedside. Nurse call bell within reach.

## 2024-06-26 NOTE — Clinical Note
The PA catheter was repositioned to the main pulmonary artery. Hemodynamics were performed. O2 saturation was measured at 64%. CO = 5.13  CI = 2.43

## 2024-06-26 NOTE — TELEPHONE ENCOUNTER
NN received a call from Dr. Baez. Patient had RHC and he would like to start patient on tadalafil and Tyvaso. Patient will be sent to clinic for patient education once discharged from [facility].

## 2024-06-26 NOTE — Clinical Note
The PA catheter was inserted into the superior vena cava. Hemodynamics were performed. O2 saturation was measured at 62%.

## 2024-06-26 NOTE — DISCHARGE INSTRUCTIONS

## 2024-06-26 NOTE — TELEPHONE ENCOUNTER
NN met with patient per Dr. Baez's request. Patient had RHC today and per Dr. Baez, he would like to have patient start Tadalafil and Tyvaso for WHO Group 1 PH.     Spoke with patient and daughter in regard to the initiation of Adcirca/tadalafil. Patient was informed that adcirca is started at 20 mg for 2 weeks and then increase to 40 mg thereafter daily. Patient can take medication with or without food. The most common side effects are headache, muscle pain, flushing, nausea, pain in arms, legs, back, or upset stomach, and/or congested nose.Patient was made aware that adcirca can drop blood pressure so it is very important for her check BP before starting to make sure it is within range and to monitor BP while taking PH medication. Nitrates are contraindicated with the use of adcirca because it can drop BP too quickly and too much.     Patient was informed that prescription will be sent to specialty pharmacy and will be processed through insurance. Information pamphlet/webiste provided to patient on medication information. Patient verbalized understanding and had no further questions. Nurse coordinator is available for further questions or concerns at any time, patient verbalized understanding.      Adcirca/tadalafil prescription was sent in to [facility].    Spoke with patient and daughter about the PH medication Tyvaso DPI. Discussed pathophysiology of PH, indication for Tyvaso, and the role of Specialty Pharmacy. Once approval has been obtained, the Specialty Pharmacy nurse will schedule an in home visit to start patient on Tyvaso DPI; patient should not start until nurse arrives and helps initiate the medication. They will provide additional education at that time and will be available 24 hours, 7 days a week for any questions or concerns.  Noted the route of delivery and dosage for Tyvaso DPI. Patient will use a small portable inhaler and will load pre-filled cartridges themselves. The initial dose will starts  at 16 mcg 4 times a day and increase every 1-2 weeks until target maintenance dose is reached(between 64-80mcg). Doses should be taken and scheduled during waking hours.  Discussed side effects including,but not limited too; cough, low blood pressure, headache, throat irritation, nausea, increased risk of bleeding, flushing and syncope. Instructed patient to contact our office should they have any concerning SE's.     Answered all questions and concerns.

## 2024-06-26 NOTE — OP NOTE
Patient was brought to cath lab, draped, and prepped in the usual sterile fashion. Right IJ was accessed via modified seldinger technique with ultrasound guidance and guidewire was introduced into the IJ. Sequential dilation with micropuncture kit and sheath was performed. [place] Medardo catheter was introduced via the sheath. Measurements were obtained, and sheath was removed. Patient tolerated the procedure well and discharged in stable condition.     None

## 2024-06-26 NOTE — H&P
[facility] - Short Stay Cardiac Unit  Interventional Cardiology  H&P    Patient Name: Marleny Guzman  MRN: [id number]  Admission Date: 6/26/2024  Code Status: Prior   Attending Provider: Bishop Baez MD  Primary Care Physician: Bridget Cantu MD  Principal Problem:<principal problem not specified>    Patient information was obtained from patient and past medical records.     Subjective:     Chief Complaint:  PH evaluation     HPI: 72 YO F w/ positive KEE/ds-DNA, RNA poly III, possible ILD, HTN, HLD who presents for evaluation of possible PH.     She was seen by pulmonlogy for WOOTEN and workup showed positive KEE, and possible ILD (although PFTs more consistent with ILD than her lung imaging). Sees rheumatology as well who noted not stigmata of CTD despite positive KEE. TTE showed elevated PA pressures and sent to me for further evaluation.    I saw her in clinic in May. Comes in today for [facility] for evaluation for PH.    Past Medical History:   Diagnosis Date    Abnormal Pap smear of vagina 07/20/2016    Anemia     Cataract     Chronic bilateral low back pain without sciatica 03/13/2018    Depression with anxiety     Hypertension     Osteoarthritis 06/20/2013    Recurrent upper respiratory infection (URI)     Right rotator cuff tear     Sleep apnea     Cipap machine at home    Urinary incontinence     Leaks urine with laughing/ coughing/ sneezing       Past Surgical History:   Procedure Laterality Date    CATARACT EXTRACTION  06/18/2012    OS    CATARACT EXTRACTION  07/16/2012    OD    CERVIX SURGERY      [place]    COLONOSCOPY      COLONOSCOPY N/A 03/04/2016    Procedure: COLONOSCOPY;  Surgeon: Tenzin Thakkar MD;  Location: [address];  Service: Endoscopy;  Laterality: N/A;    COLONOSCOPY Left 01/25/2021    Procedure: COLONOSCOPY;  Surgeon: Wayne Naranjo MD;  Location: [facility] ENDO;  Service: Endoscopy;  Laterality: Left;    COLONOSCOPY N/A 06/19/2023    Procedure: COLONOSCOPY;  Surgeon: Tenzin  MAGNUS Thakkar MD;  Location: [facility] ENDO (2ND FLR);  Service: Endoscopy;  Laterality: N/A;  lung issues  referral Dr WoodardOesljilvtv-eqko-bhnhv portal-GT  6/13/23- no answer for precall- ks    CYSTOSCOPY N/A 09/12/2022    Procedure: CYSTOSCOPY;  Surgeon: Bianka Aragon MD;  Location: [facility] OR 1ST FLR;  Service: Urology;  Laterality: N/A;    ESOPHAGOGASTRODUODENOSCOPY Left 01/25/2021    Procedure: EGD (ESOPHAGOGASTRODUODENOSCOPY);  Surgeon: Wayne Naranjo MD;  Location: [facility] ENDO;  Service: Endoscopy;  Laterality: Left;    EYE SURGERY Bilateral     cataract    HYSTERECTOMY  05/12/2015    LARYNGOSCOPY N/A 9/28/2023    Procedure: Suspension microlaryngoscopy with bilateral vocal fold injection augmentation - Restylane;  Surgeon: Andre Vlaera MD;  Location: [facility] OR;  Service: ENT;  Laterality: N/A;  Microscope, telescopes, tower, injector, Restylane    LEEP      LUMBAR EPIDURAL INJECTION      OOPHORECTOMY      LA REMOVAL OF OVARY/TUBE(S)  05/12/2015    RETROGRADE PYELOGRAPHY Bilateral 09/12/2022    Procedure: PYELOGRAM, RETROGRADE;  Surgeon: Bianka Aragon MD;  Location: [facility] OR 1ST FLR;  Service: Urology;  Laterality: Bilateral;    ROBOT-ASSISTED REPAIR OF VENTRAL HERNIA USING DA ASPEN XI N/A 11/29/2021    Procedure: XI ROBOTIC REPAIR, HERNIA, VENTRAL w/ possible mesh;  Surgeon: Roney Jackson MD;  Location: [facility] OR 2ND FLR;  Service: General;  Laterality: N/A;  Anesthesia Block    SALPINGECTOMY Left     SHOULDER SURGERY Right     SINUS SURGERY         Review of patient's allergies indicates:   Allergen Reactions    Flowers     Grass pollen-june grass standard     House dust        Facility-Administered Medications Prior to Admission   Medication    ciprofloxacin HCl tablet 500 mg    LIDOcaine HCl 2% urojet     PTA Medications   Medication Sig    aspirin 81 MG Chew TAKE 1 TABLET BY MOUTH EVERY DAY    atorvastatin (LIPITOR) 20 MG tablet Take 1 tablet (20 mg total) by mouth once daily.    azelastine  (ASTELIN) 137 mcg (0.1 %) nasal spray Use 1-2 sprays in each nostril twice daily    ergocalciferol (ERGOCALCIFEROL) 50,000 unit Cap TAKE 1 CAPSULE (50,000 UNITS TOTAL) BY MOUTH EVERY 30 DAYS    EScitalopram oxalate (LEXAPRO) 20 MG tablet Take 1 tablet (20 mg total) by mouth once daily.    estradioL (ESTRACE) 0.01 % (0.1 mg/gram) vaginal cream Use pea-sized amount vaginally nightly for 2 weeks, then 2-3x/week    guaiFENesin (MUCINEX) 600 mg 12 hr tablet Take 2 tablets (1,200 mg total) by mouth 2 (two) times daily.    irbesartan-hydrochlorothiazide (AVALIDE) 300-12.5 mg per tablet TAKE 1 TABLET BY MOUTH EVERY DAY    loratadine (CLARITIN) 10 mg tablet Take 1 tablet (10 mg total) by mouth once daily.    LORazepam (ATIVAN) 0.5 MG tablet TAKE 1/2 TO 1 TABLET BY MOUTH DAILY AS NEEDED FOR ANXIETY    metoprolol succinate (TOPROL-XL) 50 MG 24 hr tablet Take 1 tablet (50 mg total) by mouth once daily.    nystatin (MYCOSTATIN) cream Apply topically 2 (two) times daily.    predniSONE (DELTASONE) 20 MG tablet Take 1 tablet (20 mg total) by mouth once daily.    spironolactone (ALDACTONE) 25 MG tablet Half tablet daily    triamcinolone acetonide 0.1% (KENALOG) 0.1 % cream APPLY TOPICALLY TWICE A DAY    albuterol (PROVENTIL/VENTOLIN HFA) 90 mcg/actuation inhaler Inhale 1-2 puffs into the lungs every 6 (six) hours as needed for Wheezing or Shortness of Breath (Please dispense with spacer).    betamethasone dipropionate (DIPROLENE) 0.05 % lotion ONE-TWO DROPS TO EARS DAILY NEEDED FOR ITCHING    clobetasol 0.05% (TEMOVATE) 0.05 % Oint Apply topically 2 (two) times daily.    doxycycline (VIBRA-TABS) 100 MG tablet One tablet twice daily with food and a full glass of water    fluticasone propionate (FLONASE) 50 mcg/actuation nasal spray Use 1-2 sprays in each nostril twice daily    GEMTESA 75 mg Tab Take 1 tablet by mouth.    meloxicam (MOBIC) 15 MG tablet     olopatadine (PATANOL) 0.1 % ophthalmic solution Place 1 drop into both eyes 2  (two) times daily.    pulse oximeter (PULSE OXIMETER) device by Apply Externally route 2 (two) times a day. Use twice daily at 8 AM and 3 PM and record the value in MyChart as directed. (Patient not taking: Reported on 2024)     Family History       Problem Relation (Age of Onset)    Anuerysm Sister    Breast cancer Sister (72), Cousin    Cancer Mother, Sister, Maternal Aunt    Cataracts Father    Deep vein thrombosis Sister    Depression Maternal Grandmother    Drug abuse Sister    Eczema Sister    Edema Sister    Heart disease Sister, Sister    Hypertension Father, Sister, Sister, Sister, Daughter    No Known Problems Sister, Sister, Brother, Paternal Grandmother, Paternal Grandfather    Osteoarthritis Sister    Other Daughter    Pacemaker/defibrilator Sister    Stroke Mother    Ulcers Maternal Grandfather          Tobacco Use    Smoking status: Former     Current packs/day: 0.00     Average packs/day: 0.5 packs/day for 22.0 years (11.0 ttl pk-yrs)     Types: Cigarettes     Start date: 1975     Quit date: 1997     Years since quittin.5     Passive exposure: Past    Smokeless tobacco: Never    Tobacco comments:     Some wheezing since COVID-19 infection-2020   Substance and Sexual Activity    Alcohol use: Yes     Alcohol/week: 0.0 standard drinks of alcohol     Comment: Occassionally for social events will have a glass of wine    Drug use: No    Sexual activity: Not Currently     Partners: Male     ROS  Objective:     Vital Signs (Most Recent):  BP: 131/63 (24 0833) Vital Signs (24h Range):  BP: (131)/(63) 131/63        There is no height or weight on file to calculate BMI.            No intake or output data in the 24 hours ending 24 0853    Lines/Drains/Airways       None                   Physical Exam  Constitutional:       Appearance: Normal appearance.   HENT:      Head: Atraumatic.   Eyes:      Extraocular Movements: Extraocular movements intact.   Cardiovascular:      Rate and  Rhythm: Normal rate and regular rhythm.      Pulses: Normal pulses.      Comments: S1, S2 noted. Loud P2. JVP not visible at 45 degrees.  Pulmonary:      Breath sounds: Rales (Bilateral dry crackles in lower lung fields) present.   Abdominal:      Palpations: Abdomen is soft.      Tenderness: There is no abdominal tenderness.   Musculoskeletal:         General: Normal range of motion.      Right lower leg: Edema present.      Left lower leg: Edema present.   Neurological:      General: No focal deficit present.      Mental Status: She is alert and oriented to person, place, and time.         Significant Labs: BMP:   Recent Labs   Lab 06/26/24  0741   GLU 95      K 3.9      CO2 29   BUN 21   CREATININE 0.9   CALCIUM 9.6   , CMP   Recent Labs   Lab 06/26/24  0741      K 3.9      CO2 29   GLU 95   BUN 21   CREATININE 0.9   CALCIUM 9.6   PROT 6.9   ALBUMIN 3.2*   BILITOT 0.4   ALKPHOS 172*   AST 16   ALT 13   ANIONGAP 6*   , CBC   Recent Labs   Lab 06/26/24  0741   WBC 11.19   HGB 12.2   HCT 38.4      , and INR   Recent Labs   Lab 06/26/24  0741   INR 1.0       Significant Imaging: Echocardiogram: 2D echo with color flow doppler: No results found. However, due to the size of the patient record, not all encounters were searched. Please check Results Review for a complete set of results. and Transthoracic echo (TTE) complete (Cupid Only):   Results for orders placed or performed during the hospital encounter of 03/25/24   Echo   Result Value Ref Range    RA Width 3.64 cm    LA volume (mod) 50.03 cm3    Left Atrium Major Axis 5.15 cm    Left Atrium Minor Axis 5.26 cm    RA Major Axis 4.63 cm    LV Diastolic Volume 103.97 mL    LV Systolic Volume 38.48 mL    PV Peak D Yahir 0.52 m/s    PV Peak S Yahir 0.74 m/s    MV Peak A Yahir 0.86 m/s    TR Max Yahir 3.18 m/s    MV Peak E Yahir 0.82 m/s    Ao VTI 34.81 cm    Ao peak yahir 1.33 m/s    LVOT peak VTI 25.40 cm    LVOT peak yahir 1.02 m/s    LVOT diameter  "2.34 cm    MV "A" wave duration 15.98 msec    IVRT 48.53 msec    E wave deceleration time 197.19 msec    AV mean gradient 4 mmHg    TAPSE 2.16 cm    RVDD 3.81 cm    LA size 3.58 cm    Ascending aorta 2.69 cm    STJ 2.66 cm    Sinus 3.15 cm    LVIDs 3.12 2.1 - 4.0 cm    Posterior Wall 0.79 0.6 - 1.1 cm    IVS 0.61 0.6 - 1.1 cm    LVIDd 4.73 3.5 - 6.0 cm    TDI LATERAL 0.09 m/s    LA WIDTH 4.09 cm    TDI SEPTAL 0.08 m/s    LV LATERAL E/E' RATIO 9.11 m/s    LV SEPTAL E/E' RATIO 10.25 m/s    FS 34 28 - 44 %    LA volume 64.77 cm3    LV mass 104.20 g    Left Ventricle Relative Wall Thickness 0.33 cm    AV valve area 3.14 cm²    AV Velocity Ratio 0.77     AV index (prosthetic) 0.73     E/A ratio 0.95     Mean e' 0.09 m/s    Pulm vein S/D ratio 1.42     LVOT area 4.3 cm2    LVOT stroke volume 109.18 cm3    AV peak gradient 7 mmHg    E/E' ratio 9.65 m/s    Triscuspid Valve Regurgitation Peak Gradient 40 mmHg    THERESA by Velocity Ratio 3.30 cm²    BSA 2.15 m2    LV Systolic Volume Index 18.2 mL/m2    LV Diastolic Volume Index 49.27 mL/m2    LV Mass Index 49 g/m2    LA Volume Index 30.7 mL/m2    LA Volume Index (Mod) 23.7 mL/m2    ZLVIDS -2.04     ZLVIDD -3.35     TV resting pulmonary artery pressure 43 mmHg    Est. RA pres 3 mmHg    RV TB RVSP 6 mmHg    EF 65 %    Narrative      Left Ventricle: The left ventricle is normal in size. Normal wall   thickness. There is normal systolic function. Ejection fraction by visual   approximation is 65%. There is normal diastolic function.    Right Ventricle: Normal right ventricular cavity size. Wall thickness   is normal. Right ventricle wall motion  is normal. Systolic function is   normal.    Tricuspid Valve: There is mild regurgitation.    Pulmonary Artery: There is mild pulmonary hypertension. The estimated   pulmonary artery systolic pressure is 43 mmHg.    IVC/SVC: Normal venous pressure at 3 mmHg.       Assessment and Plan:     There are no hospital problems to display for this " patient.      PH    VTE Risk Mitigation (From admission, onward)      None            Bishop Baez MD  Interventional Cardiology   [facility] - Short Stay Cardiac Unit

## 2024-06-26 NOTE — NURSING
Pt DC'd per orders.  DC paperwork reviewed w pt.  Pt verbalized DC instructions.    Pt being wheeled off unit per daughter in wheelchair. Pt going to clinic for a follow up at this time.

## 2024-06-26 NOTE — DISCHARGE SUMMARY
[facility] - Cath Lab  Discharge Note  Short Stay    Procedure(s) (LRB):  INSERTION, CATHETER, RIGHT HEART (Right)      OUTCOME: Patient tolerated treatment/procedure well without complication and is now ready for discharge.    DISPOSITION: Home or Self Care    FINAL DIAGNOSIS:  <principal problem not specified>    FOLLOWUP: In clinic    DISCHARGE INSTRUCTIONS:  No discharge procedures on file.     TIME SPENT ON DISCHARGE: 30 minutes

## 2024-06-26 NOTE — Clinical Note
The PA catheter was repositioned to the right atrium. Hemodynamics were performed. O2 saturation was measured at 65%.

## 2024-06-27 ENCOUNTER — PATIENT OUTREACH (OUTPATIENT)
Dept: ADMINISTRATIVE | Facility: HOSPITAL | Age: 74
End: 2024-06-27
Payer: MEDICARE

## 2024-06-27 NOTE — PROGRESS NOTES
Population Health Chart Review & Patient Outreach Details      Additional [facility] Health Notes:               Updates Requested / Reviewed:      Care Everywhere, , and Immunizations Reconciliation Completed or Queried: [facility] Topics Overdue:      [facility] Score: 0     Patient is not due for any topics at this time.    RSV Vaccine                  Health Maintenance Topic(s) Outreach Outcomes & Actions Taken:    Primary Care Appt - Outreach Outcomes & Actions Taken  : Primary Care Appt Scheduled

## 2024-07-01 ENCOUNTER — TELEPHONE (OUTPATIENT)
Dept: TRANSPLANT | Facility: CLINIC | Age: 74
End: 2024-07-01
Payer: MEDICARE

## 2024-07-01 NOTE — TELEPHONE ENCOUNTER
PA approval for Tadalafil 20 mg:  Approved today  Request Reference Number: [id number]. TADALAFIL TAB 20MG is approved through 12/31/2024. Your patient may now fill this prescription and it will be covered.  Authorization Expiration Date: 12/31/2024

## 2024-07-01 NOTE — TELEPHONE ENCOUNTER
PA approval for Tyvaso DPI:  Approved today  Request Reference Number: [id number]. TYVASO DPI POW [id number] is approved through 12/31/2024. Your patient may now fill this prescription and it will be covered.

## 2024-07-08 ENCOUNTER — OFFICE VISIT (OUTPATIENT)
Dept: INTERNAL MEDICINE | Facility: CLINIC | Age: 74
End: 2024-07-08
Payer: MEDICARE

## 2024-07-08 VITALS
HEIGHT: 70 IN | SYSTOLIC BLOOD PRESSURE: 132 MMHG | WEIGHT: 200.75 LBS | OXYGEN SATURATION: 95 % | BODY MASS INDEX: 28.74 KG/M2 | DIASTOLIC BLOOD PRESSURE: 68 MMHG | HEART RATE: 63 BPM

## 2024-07-08 DIAGNOSIS — R10.9 LEFT FLANK DISCOMFORT: ICD-10-CM

## 2024-07-08 DIAGNOSIS — R06.00 DYSPNEA, UNSPECIFIED TYPE: Primary | ICD-10-CM

## 2024-07-08 PROCEDURE — 3044F HG A1C LEVEL LT 7.0%: CPT | Mod: CPTII,S$GLB,, | Performed by: INTERNAL MEDICINE

## 2024-07-08 PROCEDURE — 3078F DIAST BP <80 MM HG: CPT | Mod: CPTII,S$GLB,, | Performed by: INTERNAL MEDICINE

## 2024-07-08 PROCEDURE — 99999 PR PBB SHADOW E&M-EST. PATIENT-LVL V: CPT | Mod: PBBFAC,,, | Performed by: INTERNAL MEDICINE

## 2024-07-08 PROCEDURE — 3075F SYST BP GE 130 - 139MM HG: CPT | Mod: CPTII,S$GLB,, | Performed by: INTERNAL MEDICINE

## 2024-07-08 PROCEDURE — 99214 OFFICE O/P EST MOD 30 MIN: CPT | Mod: S$GLB,,, | Performed by: INTERNAL MEDICINE

## 2024-07-08 PROCEDURE — 1159F MED LIST DOCD IN RCRD: CPT | Mod: CPTII,S$GLB,, | Performed by: INTERNAL MEDICINE

## 2024-07-08 PROCEDURE — 1126F AMNT PAIN NOTED NONE PRSNT: CPT | Mod: CPTII,S$GLB,, | Performed by: INTERNAL MEDICINE

## 2024-07-08 PROCEDURE — 1101F PT FALLS ASSESS-DOCD LE1/YR: CPT | Mod: CPTII,S$GLB,, | Performed by: INTERNAL MEDICINE

## 2024-07-08 PROCEDURE — 3061F NEG MICROALBUMINURIA REV: CPT | Mod: CPTII,S$GLB,, | Performed by: INTERNAL MEDICINE

## 2024-07-08 PROCEDURE — 3008F BODY MASS INDEX DOCD: CPT | Mod: CPTII,S$GLB,, | Performed by: INTERNAL MEDICINE

## 2024-07-08 PROCEDURE — 3288F FALL RISK ASSESSMENT DOCD: CPT | Mod: CPTII,S$GLB,, | Performed by: INTERNAL MEDICINE

## 2024-07-08 PROCEDURE — 3066F NEPHROPATHY DOC TX: CPT | Mod: CPTII,S$GLB,, | Performed by: INTERNAL MEDICINE

## 2024-07-08 RX ORDER — LORAZEPAM 0.5 MG/1
TABLET ORAL
Qty: 30 TABLET | Refills: 0 | Status: SHIPPED | OUTPATIENT
Start: 2024-07-08

## 2024-07-08 RX ORDER — BENZONATATE 200 MG/1
200 CAPSULE ORAL 3 TIMES DAILY PRN
Qty: 30 CAPSULE | Refills: 1 | Status: SHIPPED | OUTPATIENT
Start: 2024-07-08 | End: 2024-07-18

## 2024-07-08 NOTE — PROGRESS NOTES
Subjective:       Patient ID: Marleny Guzman is a 73 y.o. female.    Chief Complaint: Follow-up    Follow-up  Pertinent negatives include no abdominal pain, chest pain (arm pain or jaw pain), headaches, nausea or vomiting.   Pt diagnosed with pulmonary HTN/interstital lung disease - prescribed tyvaso and tadalafil but she has not started these yet.  Had positive D dimer.  She reports some left flank pain.  Review of Systems   Respiratory:  Negative for shortness of breath (PND or orthopnea).    Cardiovascular:  Negative for chest pain (arm pain or jaw pain).   Gastrointestinal:  Negative for abdominal pain, diarrhea, nausea and vomiting.   Genitourinary:  Negative for dysuria.   Neurological:  Negative for seizures, syncope and headaches.       Objective:      Physical Exam  Constitutional:       General: She is not in acute distress.     Appearance: She is well-developed.   HENT:      Head: Normocephalic.   Eyes:      Pupils: Pupils are equal, round, and reactive to light.   Neck:      Thyroid: No thyromegaly.      Vascular: No JVD.   Cardiovascular:      Rate and Rhythm: Normal rate and regular rhythm.      Heart sounds: Normal heart sounds. No murmur heard.     No friction rub. No gallop.   Pulmonary:      Effort: Pulmonary effort is normal.      Breath sounds: No wheezing or rales.      Comments: Coarse BS in bases  Abdominal:      General: Bowel sounds are normal. There is no distension.      Palpations: Abdomen is soft. There is no mass.      Tenderness: There is no abdominal tenderness. There is no guarding or rebound.   Musculoskeletal:      Cervical back: Neck supple.   Lymphadenopathy:      Cervical: No cervical adenopathy.   Skin:     General: Skin is warm and dry.   Neurological:      Mental Status: She is alert and oriented to person, place, and time.      Deep Tendon Reflexes: Reflexes are normal and symmetric.   Psychiatric:         Behavior: Behavior normal.         Thought Content: Thought  content normal.         Judgment: Judgment normal.         Assessment:       1. Dyspnea, unspecified type    2. Left flank discomfort        Plan:   Dyspnea, unspecified type  -     CTA Chest Non-Coronary (PE Studies); Future; Expected date: 07/08/2024    Left flank discomfort  -     CT Renal Stone Study ABD Pelvis WO; Future; Expected date: 07/08/2024    Other orders  -     LORazepam (ATIVAN) 0.5 MG tablet; TAKE 1/2 TO 1 TABLET BY MOUTH DAILY AS NEEDED FOR ANXIETY  Dispense: 30 tablet; Refill: 0  -     benzonatate (TESSALON) 200 MG capsule; Take 1 capsule (200 mg total) by mouth 3 (three) times daily as needed for Cough.  Dispense: 30 capsule; Refill: 1    Please keep pulmonary appt  Tolerating spironolactone  Pap 3/2024  MMG scheduled  Nl dexa 3/24  Colonoscopy June 2023 due 2028

## 2024-07-09 ENCOUNTER — TELEPHONE (OUTPATIENT)
Dept: TRANSPLANT | Facility: CLINIC | Age: 74
End: 2024-07-09
Payer: MEDICARE

## 2024-07-09 NOTE — TELEPHONE ENCOUNTER
----- Message from Arelis Siddiqi sent at 7/9/2024  4:07 PM CDT -----  Regarding: rx  Pls call pt's daughter Anne at [phone].  Pt's rx for tadalafil (ADCIRCA) 20 mg Tab was supposed to be sent on 6/26/24 and the pharmacy never received it and she has left a few messages without response.    Thank you

## 2024-07-12 ENCOUNTER — HOSPITAL ENCOUNTER (OUTPATIENT)
Dept: RADIOLOGY | Facility: HOSPITAL | Age: 74
Discharge: HOME | End: 2024-07-12
Attending: INTERNAL MEDICINE
Payer: MEDICARE

## 2024-07-12 DIAGNOSIS — R06.00 DYSPNEA, UNSPECIFIED TYPE: ICD-10-CM

## 2024-07-12 DIAGNOSIS — R10.9 LEFT FLANK DISCOMFORT: ICD-10-CM

## 2024-07-12 PROCEDURE — 74176 CT ABD & PELVIS W/O CONTRAST: CPT | Mod: TC

## 2024-07-12 PROCEDURE — 25500020 PHARM REV CODE 255: Performed by: INTERNAL MEDICINE

## 2024-07-12 PROCEDURE — 71275 CT ANGIOGRAPHY CHEST: CPT | Mod: TC

## 2024-07-12 PROCEDURE — 71275 CT ANGIOGRAPHY CHEST: CPT | Mod: 26,,, | Performed by: RADIOLOGY

## 2024-07-12 PROCEDURE — 74176 CT ABD & PELVIS W/O CONTRAST: CPT | Mod: 26,,, | Performed by: RADIOLOGY

## 2024-07-12 RX ADMIN — IOHEXOL 75 ML: 350 INJECTION, SOLUTION INTRAVENOUS at 08:07

## 2024-07-17 ENCOUNTER — PATIENT MESSAGE (OUTPATIENT)
Dept: TRANSPLANT | Facility: CLINIC | Age: 74
End: 2024-07-17
Payer: MEDICARE

## 2024-07-17 ENCOUNTER — TELEPHONE (OUTPATIENT)
Dept: PULMONOLOGY | Facility: CLINIC | Age: 74
End: 2024-07-17

## 2024-07-17 ENCOUNTER — OFFICE VISIT (OUTPATIENT)
Dept: PULMONOLOGY | Facility: CLINIC | Age: 74
End: 2024-07-17
Payer: MEDICARE

## 2024-07-17 ENCOUNTER — TELEPHONE (OUTPATIENT)
Dept: TRANSPLANT | Facility: CLINIC | Age: 74
End: 2024-07-17
Payer: MEDICARE

## 2024-07-17 VITALS
SYSTOLIC BLOOD PRESSURE: 122 MMHG | BODY MASS INDEX: 28.91 KG/M2 | HEIGHT: 70 IN | WEIGHT: 201.94 LBS | HEART RATE: 68 BPM | OXYGEN SATURATION: 95 % | DIASTOLIC BLOOD PRESSURE: 68 MMHG

## 2024-07-17 DIAGNOSIS — J84.9 INTERSTITIAL PULMONARY DISEASE, UNSPECIFIED: Primary | ICD-10-CM

## 2024-07-17 DIAGNOSIS — I27.20 PULMONARY HYPERTENSION: ICD-10-CM

## 2024-07-17 DIAGNOSIS — R59.0 MEDIASTINAL LYMPHADENOPATHY: ICD-10-CM

## 2024-07-17 DIAGNOSIS — R91.1 LUNG NODULE: ICD-10-CM

## 2024-07-17 DIAGNOSIS — J47.1 BRONCHIECTASIS WITH ACUTE EXACERBATION: ICD-10-CM

## 2024-07-17 DIAGNOSIS — R76.8 ANA POSITIVE: ICD-10-CM

## 2024-07-17 DIAGNOSIS — M35.1 MIXED CONNECTIVE TISSUE DISEASE: ICD-10-CM

## 2024-07-17 PROCEDURE — 3066F NEPHROPATHY DOC TX: CPT | Mod: CPTII,S$GLB,, | Performed by: INTERNAL MEDICINE

## 2024-07-17 PROCEDURE — 1101F PT FALLS ASSESS-DOCD LE1/YR: CPT | Mod: CPTII,S$GLB,, | Performed by: INTERNAL MEDICINE

## 2024-07-17 PROCEDURE — 3078F DIAST BP <80 MM HG: CPT | Mod: CPTII,S$GLB,, | Performed by: INTERNAL MEDICINE

## 2024-07-17 PROCEDURE — 3061F NEG MICROALBUMINURIA REV: CPT | Mod: CPTII,S$GLB,, | Performed by: INTERNAL MEDICINE

## 2024-07-17 PROCEDURE — 3044F HG A1C LEVEL LT 7.0%: CPT | Mod: CPTII,S$GLB,, | Performed by: INTERNAL MEDICINE

## 2024-07-17 PROCEDURE — 99214 OFFICE O/P EST MOD 30 MIN: CPT | Mod: S$GLB,,, | Performed by: INTERNAL MEDICINE

## 2024-07-17 PROCEDURE — 99999 PR PBB SHADOW E&M-EST. PATIENT-LVL IV: CPT | Mod: PBBFAC,,, | Performed by: INTERNAL MEDICINE

## 2024-07-17 PROCEDURE — 3074F SYST BP LT 130 MM HG: CPT | Mod: CPTII,S$GLB,, | Performed by: INTERNAL MEDICINE

## 2024-07-17 PROCEDURE — 1159F MED LIST DOCD IN RCRD: CPT | Mod: CPTII,S$GLB,, | Performed by: INTERNAL MEDICINE

## 2024-07-17 PROCEDURE — 3288F FALL RISK ASSESSMENT DOCD: CPT | Mod: CPTII,S$GLB,, | Performed by: INTERNAL MEDICINE

## 2024-07-17 RX ORDER — SODIUM CHLORIDE FOR INHALATION 0.9 %
3 VIAL, NEBULIZER (ML) INHALATION
Qty: 15 ML | Refills: 12 | Status: SHIPPED | OUTPATIENT
Start: 2024-07-17 | End: 2025-07-17

## 2024-07-17 NOTE — PROGRESS NOTES
Subjective:     Reason for visit: follow up/sob    Patient ID:  Marleny Guzman is a 74 y.o. female    Interval History 7/17/24  Since our last visit she has continued to have ongoing sputum production and difficulty with clearing her airways. She is also experiencing joint pains all over and weakness in her hands and legs. She can still complete her daily activities but needs assistance getting into higher vehicles and has to struggle standing up from chairs.  No fevers or chills but ongoing dyspnea on exertion as well.    Interval History 4/8/24  Ms. Guzman has continued to have shortness of breath and most recent we increased and thickened sputum production.  No other new symptoms as far as no rashes, no new joint pains but continued diffuse joint pains periodically.  No fevers or chills    Interval History 2/2024   Ms. Guzman was last seen in May of last year and since that time she has continued to have sometimes dyspnea on exertion, cough and congestion with some relief with her albuterol.  She did have the CT scan done in August but has not been able to get back to an appointment since then.  We reviewed her possible risks risk factors for ILD and autoimmune diseases in have been unable to identify any.  No family history of lung disease or autoimmune diseases.    History 5/2023:  Ms. Guzman is a 73 yo that was previously seen by Dr. Valentino and Kary Nixon NP but is new to me. She has intermittently had issues with shortness of breath over the years that comes and goes. It doesn't seem to be seasonal and she has not previously had relief with much albuterol. She has no previous known lung disease or diagnosis but did have a nodule seen previously on imaging. She is following up today because she is now having more shortness of breath again. It is typically after a URI and then doesn't improve.       Additional Pulmonary History:  Childhood Illnesses:  NA  Occupational/Environmental:   "none  Tobacco/Smoking:  previously smoked but quit int the 1980s    Objective:     Vitals:    07/17/24 1441   BP: 122/68   BP Location: Left arm   Patient Position: Sitting   Pulse: 68   SpO2: 95%   Weight: 91.6 kg (201 lb 15.1 oz)   Height: 5' 10" (1.778 m)         Physical Exam  Constitutional:       General: She is not in acute distress.     Appearance: She is not diaphoretic.   HENT:      Head: Normocephalic and atraumatic.      Right Ear: External ear normal.      Left Ear: External ear normal.   Eyes:      Conjunctiva/sclera: Conjunctivae normal.      Pupils: Pupils are equal, round, and reactive to light.   Neck:      Trachea: No tracheal deviation.   Cardiovascular:      Rate and Rhythm: Normal rate and regular rhythm.      Heart sounds: Normal heart sounds. No murmur heard.  Pulmonary:      Effort: Pulmonary effort is normal. No respiratory distress.      Breath sounds: Normal breath sounds. No stridor. No wheezing or rales.   Abdominal:      General: Bowel sounds are normal. There is no distension.      Palpations: Abdomen is soft.      Tenderness: There is no abdominal tenderness.   Musculoskeletal:         General: Normal range of motion.      Cervical back: Normal range of motion and neck supple.   Skin:     General: Skin is warm and dry.      Findings: No erythema.   Neurological:      Mental Status: She is alert and oriented to person, place, and time.      Gait: Gait is intact.   Psychiatric:         Mood and Affect: Mood and affect normal.         Cognition and Memory: Memory normal.         Judgment: Judgment normal.          Personal Diagnostic Review and Interpretation  CT scan reviewed see a/p       Pertinent Studies Reviewed & Interpreted:     Pulmonary Function Tests:   Last done in 2021   Today's with restriction and decreased DLCO     6 Minute Walk Tests:   Last done in 2021- no oxygen needs     Echocardiograms:   Results for orders placed during the hospital encounter of " 06/07/21    Echo    Interpretation Summary  · The estimated ejection fraction is 69%.  · Indeterminate left ventricular diastolic function.  · The left ventricle is normal in size with normal systolic function.  · Normal right ventricular size with normal right ventricular systolic function.        Assessment & Plan:       Problem List Items Addressed This Visit          Pulmonary    Interstitial pulmonary disease, unspecified - Primary    Current Assessment & Plan     Restriction on PFTs and bronchiectasis on imaging. No obvious reticulations or fibrotic changes that would be more typical for ILD related to autoimmune disease.   She has also had a history of severe sinusitis so her lung findings could be related to indolent infectious process.   She is seeing rheumatology tomorrow to follow up on her labs and all over joint and muscle pains and discuss treatment.     I do think that prior to initiating immunosuppressant medication we should consider bronchoscopy with BAL to rule out an indolent infection.   I will follow up with her and her rheumatologist after her visit with them to discuss plans moving forward.            Relevant Orders    NEBULIZER FOR HOME USE    NEBULIZER KIT (SUPPLIES) FOR HOME USE    Lung nodule    Overview     Intermittent nodules that appear to be lymph nodes           Bronchiectasis with acute exacerbation    Overview     Starting guaifenesin daily and prescribing saline nebulizer solution for airway clearance               Cardiac/Vascular    Pulmonary hypertension    Current Assessment & Plan     S/P RHC with cardiology and started on PH medication for her pre-capillary PH            Immunology/Multi System    KEE positive    Current Assessment & Plan     Following with  rheumatology - now with overall joint pains and muscle aches/weakness.              Portions of the record may have been created with voice-recognition software. Occasional wrong-word or sound-a-like substitutions  may have occurred due to the inherent limitations of voice-recognition software. Read the chart carefully and recognize, using context, where substitutions have occurred.    Coleen Real M.D.  Pulmonary/Critical Care

## 2024-07-17 NOTE — TELEPHONE ENCOUNTER
Adela GAN notified about the issues with getting in touch with the patient to start her on medication. Adela GAN notified NN that the patient's daughter Anne needs to be the  and she can be contacted at [phone]. NN called the patient's daughter Anne and left a voicemail asking for her to call the specialty pharmacy ASAP at [phone] for the patient to start on a new medication. Specialty pharmacy also notified via email to contact the patient's daughter Anne as point of first contact.

## 2024-07-17 NOTE — TELEPHONE ENCOUNTER
----- Message from Coleen Real MD sent at 7/17/2024  4:35 PM CDT -----  Regarding: Case for EBUS BAL  This is the patient with ILD/autoimmune disease

## 2024-07-17 NOTE — ASSESSMENT & PLAN NOTE
Restriction on PFTs and bronchiectasis on imaging. No obvious reticulations or fibrotic changes that would be more typical for ILD related to autoimmune disease.   She has also had a history of severe sinusitis so her lung findings could be related to indolent infectious process.   She is seeing rheumatology tomorrow to follow up on her labs and all over joint and muscle pains and discuss treatment.     I do think that prior to initiating immunosuppressant medication we should consider bronchoscopy with BAL to rule out an indolent infection.   I will follow up with her and her rheumatologist after her visit with them to discuss plans moving forward.

## 2024-07-17 NOTE — TELEPHONE ENCOUNTER
Reviewed images with Dr Real.  Mild PH, on therapy, no RV failure.    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    Mediastinal LAD with size up to 1.5 cm likely passive congestion but with ILD/nodular and mild bronchiectasis with autoimmune would benefit from Bronch with BAL to rule out infectious etiology and LN sampling to rule out lymphoproliferative dz.    Scheduled with Dr Tracy, next available 8/6/2024

## 2024-07-17 NOTE — TELEPHONE ENCOUNTER
"Specialty pharmacy emailed NN the following: "Good morning, Pt Marleny Osorio ( Tyvaso DPI referral ) has not responded to numerous attempts from Nursing to complete nursing introductory call. Are you able to reach out to her and have her call me?" NN called the patient left a voicemail asking for the patient to call the specialty pharmacy and gave the patient the contact number to the specialty pharmacy at [phone]. NN also messaged the patient in my chart the following message: "Good morning, The specialty pharmacy is trying to get in touch with you about starting your Tyvaso. Please call them ASAP at [phone]. You will not be able to start this medication until you speak with the nurse at the number previously provided. Please call me back at [phone] or Tammie at [phone]." NN will continue to monitor.  "

## 2024-07-18 ENCOUNTER — OFFICE VISIT (OUTPATIENT)
Dept: RHEUMATOLOGY | Facility: CLINIC | Age: 74
End: 2024-07-18
Payer: MEDICARE

## 2024-07-18 ENCOUNTER — LAB VISIT (OUTPATIENT)
Dept: LAB | Facility: HOSPITAL | Age: 74
End: 2024-07-18
Attending: STUDENT IN AN ORGANIZED HEALTH CARE EDUCATION/TRAINING PROGRAM
Payer: MEDICARE

## 2024-07-18 VITALS
TEMPERATURE: 98 F | RESPIRATION RATE: 18 BRPM | SYSTOLIC BLOOD PRESSURE: 131 MMHG | HEIGHT: 70 IN | WEIGHT: 203.25 LBS | DIASTOLIC BLOOD PRESSURE: 67 MMHG | HEART RATE: 65 BPM | OXYGEN SATURATION: 97 % | BODY MASS INDEX: 29.1 KG/M2

## 2024-07-18 DIAGNOSIS — R76.8 ANA POSITIVE: ICD-10-CM

## 2024-07-18 DIAGNOSIS — Q99.8 MYOSITIS ASSOCIATED ANTIBODY POSITIVE: ICD-10-CM

## 2024-07-18 DIAGNOSIS — M25.619 STIFFNESS OF SHOULDER JOINT, UNSPECIFIED LATERALITY: Primary | ICD-10-CM

## 2024-07-18 DIAGNOSIS — R76.8 POSITIVE DOUBLE STRANDED DNA ANTIBODY TEST: ICD-10-CM

## 2024-07-18 DIAGNOSIS — M54.2 CERVICAL PAIN: ICD-10-CM

## 2024-07-18 DIAGNOSIS — E66.3 OVERWEIGHT (BMI 25.0-29.9): ICD-10-CM

## 2024-07-18 DIAGNOSIS — M25.619 STIFFNESS OF SHOULDER JOINT, UNSPECIFIED LATERALITY: ICD-10-CM

## 2024-07-18 DIAGNOSIS — I27.20 PULMONARY HYPERTENSION: ICD-10-CM

## 2024-07-18 LAB
CK SERPL-CCNC: 95 U/L (ref 20–180)
CRP SERPL-MCNC: 9.8 MG/L (ref 0–8.2)
ERYTHROCYTE [SEDIMENTATION RATE] IN BLOOD BY PHOTOMETRIC METHOD: <2 MM/HR (ref 0–36)

## 2024-07-18 PROCEDURE — 36415 COLL VENOUS BLD VENIPUNCTURE: CPT | Performed by: STUDENT IN AN ORGANIZED HEALTH CARE EDUCATION/TRAINING PROGRAM

## 2024-07-18 PROCEDURE — 82550 ASSAY OF CK (CPK): CPT | Performed by: STUDENT IN AN ORGANIZED HEALTH CARE EDUCATION/TRAINING PROGRAM

## 2024-07-18 PROCEDURE — 3066F NEPHROPATHY DOC TX: CPT | Mod: CPTII,S$GLB,, | Performed by: STUDENT IN AN ORGANIZED HEALTH CARE EDUCATION/TRAINING PROGRAM

## 2024-07-18 PROCEDURE — 99999 PR PBB SHADOW E&M-EST. PATIENT-LVL III: CPT | Mod: PBBFAC,,, | Performed by: STUDENT IN AN ORGANIZED HEALTH CARE EDUCATION/TRAINING PROGRAM

## 2024-07-18 PROCEDURE — 3044F HG A1C LEVEL LT 7.0%: CPT | Mod: CPTII,S$GLB,, | Performed by: STUDENT IN AN ORGANIZED HEALTH CARE EDUCATION/TRAINING PROGRAM

## 2024-07-18 PROCEDURE — 85652 RBC SED RATE AUTOMATED: CPT | Performed by: STUDENT IN AN ORGANIZED HEALTH CARE EDUCATION/TRAINING PROGRAM

## 2024-07-18 PROCEDURE — 82085 ASSAY OF ALDOLASE: CPT | Performed by: STUDENT IN AN ORGANIZED HEALTH CARE EDUCATION/TRAINING PROGRAM

## 2024-07-18 PROCEDURE — 3061F NEG MICROALBUMINURIA REV: CPT | Mod: CPTII,S$GLB,, | Performed by: STUDENT IN AN ORGANIZED HEALTH CARE EDUCATION/TRAINING PROGRAM

## 2024-07-18 PROCEDURE — 1126F AMNT PAIN NOTED NONE PRSNT: CPT | Mod: CPTII,S$GLB,, | Performed by: STUDENT IN AN ORGANIZED HEALTH CARE EDUCATION/TRAINING PROGRAM

## 2024-07-18 PROCEDURE — 86140 C-REACTIVE PROTEIN: CPT | Performed by: STUDENT IN AN ORGANIZED HEALTH CARE EDUCATION/TRAINING PROGRAM

## 2024-07-18 PROCEDURE — 3078F DIAST BP <80 MM HG: CPT | Mod: CPTII,S$GLB,, | Performed by: STUDENT IN AN ORGANIZED HEALTH CARE EDUCATION/TRAINING PROGRAM

## 2024-07-18 PROCEDURE — 3075F SYST BP GE 130 - 139MM HG: CPT | Mod: CPTII,S$GLB,, | Performed by: STUDENT IN AN ORGANIZED HEALTH CARE EDUCATION/TRAINING PROGRAM

## 2024-07-18 PROCEDURE — 99214 OFFICE O/P EST MOD 30 MIN: CPT | Mod: S$GLB,,, | Performed by: STUDENT IN AN ORGANIZED HEALTH CARE EDUCATION/TRAINING PROGRAM

## 2024-07-18 PROCEDURE — 3288F FALL RISK ASSESSMENT DOCD: CPT | Mod: CPTII,S$GLB,, | Performed by: STUDENT IN AN ORGANIZED HEALTH CARE EDUCATION/TRAINING PROGRAM

## 2024-07-18 PROCEDURE — 1101F PT FALLS ASSESS-DOCD LE1/YR: CPT | Mod: CPTII,S$GLB,, | Performed by: STUDENT IN AN ORGANIZED HEALTH CARE EDUCATION/TRAINING PROGRAM

## 2024-07-18 NOTE — PROGRESS NOTES
RHEUMATOLOGY OUTPATIENT CLINIC NOTE        Attending Rheumatologist: Belen Angeles  Primary Care Provider: Bridget Cantu MD   Physician Requesting Consultation: No referring provider defined for this encounter.  Chief Complaint/Reason For Consultation:  Positive KEE      Subjective:       HPI  Marleny Guzman is a 74 y.o. Black or  female with pmhx noted below referred for positive KEE/dsdna. Patient reports that her main symptom has been SOB. On 5/2023 she started having worsening SOB (used to see Dr. Valentino/Kary Nixon NP now Dr. Coleen Real) and that is how she started been seen by Pulmonary. At this time rescue inhalers not enough- she was always intermittently SOB.   Beginning of the year she was seen again by pulmonary- at this time worsening WOOTEN, cough and intermittent congestion - having only minimal relief with albuterol. She had a Ct chest showing what could be ILD and also a TTE that shows elevated PAP elevated. No other symptoms of autoimmune disease. No family history of AI disease.   No occupational exposure  Quit smoking in 1980    Today:    Review of Systems   Constitutional:  Positive for fatigue. Negative for appetite change, chills, diaphoresis, fever and unexpected weight change.   HENT:  Positive for nasal congestion. Negative for mouth dryness, mouth sores, sore throat and tinnitus.    Respiratory:  Positive for cough, chest tightness and shortness of breath.    Cardiovascular:  Negative for chest pain.   Gastrointestinal:  Negative for abdominal pain.   Musculoskeletal:  Negative for arthralgias, back pain and gait problem.   Integumentary:  Negative for rash.   Neurological:  Negative for seizures, speech difficulty, numbness and memory loss.   Hematological:  Negative for adenopathy.   Psychiatric/Behavioral:  Negative for agitation, behavioral problems and confusion.         Chronic comorbid conditions affecting medical decision making  today:  Past Medical History:   Diagnosis Date    Abnormal Pap smear of vagina 07/20/2016    Anemia     Cataract     Chronic bilateral low back pain without sciatica 03/13/2018    Depression with anxiety     Hypertension     Osteoarthritis 06/20/2013    Recurrent upper respiratory infection (URI)     Right rotator cuff tear     Sleep apnea     Cipap machine at home    Urinary incontinence     Leaks urine with laughing/ coughing/ sneezing     Past Surgical History:   Procedure Laterality Date    CATARACT EXTRACTION  06/18/2012    OS    CATARACT EXTRACTION  07/16/2012    OD    CERVIX SURGERY      Conization    COLONOSCOPY      COLONOSCOPY N/A 03/04/2016    Procedure: COLONOSCOPY;  Surgeon: Tenzin Thakkar MD;  Location: [facility] ENDO (4TH FLR);  Service: Endoscopy;  Laterality: N/A;    COLONOSCOPY Left 01/25/2021    Procedure: COLONOSCOPY;  Surgeon: Wayne Naranjo MD;  Location: [facility] ENDO;  Service: Endoscopy;  Laterality: Left;    COLONOSCOPY N/A 06/19/2023    Procedure: COLONOSCOPY;  Surgeon: Tenzin Thakkar MD;  Location: [facility] ENDO (2ND FLR);  Service: Endoscopy;  Laterality: N/A;  lung issues  referral Dr WoodardYfbtaeskyg-oovk-tojqz portal-  6/13/23- no answer for precall- ks    CYSTOSCOPY N/A 09/12/2022    Procedure: CYSTOSCOPY;  Surgeon: Bianka Aragon MD;  Location: [facility] OR 1ST FLR;  Service: Urology;  Laterality: N/A;    ENDOBRONCHIAL ULTRASOUND N/A 8/6/2024    Procedure: ENDOBRONCHIAL ULTRASOUND (EBUS);  Surgeon: Praneeth Tracy MD;  Location: [facility] OR 2ND FLR;  Service: Pulmonary;  Laterality: N/A;    ESOPHAGOGASTRODUODENOSCOPY Left 01/25/2021    Procedure: EGD (ESOPHAGOGASTRODUODENOSCOPY);  Surgeon: Wayne Naranjo MD;  Location: [facility] ENDO;  Service: Endoscopy;  Laterality: Left;    EYE SURGERY Bilateral     cataract    HYSTERECTOMY  05/12/2015    LARYNGOSCOPY N/A 9/28/2023    Procedure: Suspension microlaryngoscopy with bilateral vocal fold injection augmentation - Restylane;  Surgeon: Soto  "Andre YBARRA MD;  Location: [facility] OR;  Service: ENT;  Laterality: N/A;  Microscope, telescopes, tower, injector, Restylane    LEEP      LUMBAR EPIDURAL INJECTION      OOPHORECTOMY      LA REMOVAL OF OVARY/TUBE(S)  05/12/2015    RETROGRADE PYELOGRAPHY Bilateral 09/12/2022    Procedure: PYELOGRAM, RETROGRADE;  Surgeon: Bianka Aragon MD;  Location: [facility] OR 1ST FLR;  Service: Urology;  Laterality: Bilateral;    RIGHT HEART CATHETERIZATION Right 6/26/2024    Procedure: INSERTION, CATHETER, RIGHT HEART;  Surgeon: Bishop Baez MD;  Location: [facility] CATH LAB;  Service: Cardiology;  Laterality: Right;    ROBOT-ASSISTED REPAIR OF VENTRAL HERNIA USING DA ASPEN XI N/A 11/29/2021    Procedure: XI ROBOTIC REPAIR, HERNIA, VENTRAL w/ possible mesh;  Surgeon: Roney Jackson MD;  Location: [facility] OR 2ND FLR;  Service: General;  Laterality: N/A;  Anesthesia Block    SALPINGECTOMY Left     SHOULDER SURGERY Right     SINUS SURGERY       Family History   Problem Relation Name Age of Onset    Cancer Mother          THYROID CANCER    Stroke Mother      Hypertension Father      Cataracts Father      Heart disease Sister Mary     Pacemaker/defibrilator Sister Mary     Hypertension Sister Mary     Deep vein thrombosis Sister Mary     Heart disease Sister Oksana         CABG    Osteoarthritis Sister Oksana     Edema Sister Oksana     Eczema Sister Ines     Hypertension Sister Ines     Breast cancer Sister Ines 72    Anuerysm Sister Shirlita     Drug abuse Sister Shirlita     Hypertension Sister Shirlita     No Known Problems Sister Pat     Cancer Sister Halima         Cancer cells or "growth in her stomach"    No Known Problems Sister Bre     No Known Problems Brother Jitendra     Depression Maternal Grandmother      Ulcers Maternal Grandfather          Legs    No Known Problems Paternal Grandmother      No Known Problems Paternal Grandfather      Hypertension Daughter      Other Daughter          " Heart palpitations    Cancer Maternal Aunt      Breast cancer Cousin      ADD / ADHD Neg Hx      Alcohol abuse Neg Hx      Anxiety disorder Neg Hx      Bipolar disorder Neg Hx      Dementia Neg Hx      OCD Neg Hx      Paranoid behavior Neg Hx      Physical abuse Neg Hx      Schizophrenia Neg Hx      Seizures Neg Hx      Sexual abuse Neg Hx      Amblyopia Neg Hx      Blindness Neg Hx      Glaucoma Neg Hx      Macular degeneration Neg Hx      Retinal detachment Neg Hx      Strabismus Neg Hx      Anesthesia problems Neg Hx       Social History     Substance and Sexual Activity   Alcohol Use Yes    Alcohol/week: 0.0 standard drinks of alcohol    Comment: Occassionally for social events will have a glass of wine     Social History     Tobacco Use   Smoking Status Former    Current packs/day: 0.00    Average packs/day: 0.5 packs/day for 22.0 years (11.0 ttl pk-yrs)    Types: Cigarettes    Start date: 1975    Quit date: 1997    Years since quittin.6    Passive exposure: Past   Smokeless Tobacco Never   Tobacco Comments    Some wheezing since COVID-19 infection-2020     Social History     Substance and Sexual Activity   Drug Use No       Current Outpatient Medications:     aspirin 81 MG Chew, TAKE 1 TABLET BY MOUTH EVERY DAY, Disp: 90 tablet, Rfl: 1    atorvastatin (LIPITOR) 20 MG tablet, Take 1 tablet (20 mg total) by mouth once daily., Disp: 90 tablet, Rfl: 3    betamethasone dipropionate (DIPROLENE) 0.05 % lotion, ONE-TWO DROPS TO EARS DAILY NEEDED FOR ITCHING, Disp: 60 mL, Rfl: 0    clobetasol 0.05% (TEMOVATE) 0.05 % Oint, Apply topically 2 (two) times daily., Disp: 60 g, Rfl: 1    doxycycline (VIBRA-TABS) 100 MG tablet, One tablet twice daily with food and a full glass of water, Disp: 20 tablet, Rfl: 0    ergocalciferol (ERGOCALCIFEROL) 50,000 unit Cap, TAKE 1 CAPSULE (50,000 UNITS TOTAL) BY MOUTH EVERY 30 DAYS, Disp: 3 capsule, Rfl: 3    EScitalopram oxalate (LEXAPRO) 20 MG tablet, Take 1 tablet (20 mg  total) by mouth once daily., Disp: 90 tablet, Rfl: 3    estradioL (ESTRACE) 0.01 % (0.1 mg/gram) vaginal cream, Use pea-sized amount vaginally nightly for 2 weeks, then 2-3x/week, Disp: 42.5 g, Rfl: 4    fluticasone propionate (FLONASE) 50 mcg/actuation nasal spray, Use 1-2 sprays in each nostril twice daily, Disp: 16 g, Rfl: 11    GEMTESA 75 mg Tab, Take 1 tablet by mouth., Disp: , Rfl:     guaiFENesin (MUCINEX) 600 mg 12 hr tablet, Take 2 tablets (1,200 mg total) by mouth 2 (two) times daily., Disp: 60 tablet, Rfl: 3    irbesartan-hydrochlorothiazide (AVALIDE) 300-12.5 mg per tablet, TAKE 1 TABLET BY MOUTH EVERY DAY, Disp: 90 tablet, Rfl: 3    loratadine (CLARITIN) 10 mg tablet, Take 1 tablet (10 mg total) by mouth once daily., Disp: 30 tablet, Rfl: 11    LORazepam (ATIVAN) 0.5 MG tablet, TAKE 1/2 TO 1 TABLET BY MOUTH DAILY AS NEEDED FOR ANXIETY, Disp: 30 tablet, Rfl: 0    meloxicam (MOBIC) 15 MG tablet, , Disp: , Rfl:     metoprolol succinate (TOPROL-XL) 50 MG 24 hr tablet, Take 1 tablet (50 mg total) by mouth once daily., Disp: 90 tablet, Rfl: 3    nystatin (MYCOSTATIN) cream, Apply topically 2 (two) times daily., Disp: 30 g, Rfl: 1    predniSONE (DELTASONE) 20 MG tablet, Take 1 tablet (20 mg total) by mouth once daily., Disp: 5 tablet, Rfl: 0    pulse oximeter (PULSE OXIMETER) device, by Apply Externally route 2 (two) times a day. Use twice daily at 8 AM and 3 PM and [facility]., Disp: 1 each, Rfl: 0    sodium chloride for inhalation (SODIUM CHLORIDE 0.9%) 0.9 % nebulizer solution, Take 3 mLs by nebulization as needed for Wheezing., Disp: 15 mL, Rfl: 12    spironolactone (ALDACTONE) 25 MG tablet, Half tablet daily, Disp: 30 tablet, Rfl: 6    tadalafil (ADCIRCA) 20 mg Tab, Take 2 tablets (40 mg total) by mouth once daily. take 20 mg (1 Tablet) once daily for 2 weeks, then increase to 40 mg (2 tablets) thereafter, Disp: 60 tablet, Rfl: 11    triamcinolone acetonide 0.1% (KENALOG) 0.1  % cream, APPLY TOPICALLY TWICE A DAY, Disp: 45 g, Rfl: 0    albuterol (PROVENTIL/VENTOLIN HFA) 90 mcg/actuation inhaler, Inhale 1-2 puffs into the lungs every 6 (six) hours as needed for Wheezing or Shortness of Breath (Please dispense with spacer)., Disp: 6.7 g, Rfl: 6    olopatadine (PATANOL) 0.1 % ophthalmic solution, Place 1 drop into both eyes 2 (two) times daily., Disp: 5 mL, Rfl: 0    treprostiniL (TYVASO DPI) 16 mcg (112)- 32 mcg (84) CtDv, Inhale 16 mcg into the lungs 4 (four) times daily. Titration to max dose tolerated, Disp: , Rfl:     Current Facility-Administered Medications:     ciprofloxacin HCl tablet 500 mg, 500 mg, Oral, Once, Malu Barragan, NP    LIDOcaine HCl 2% urojet, , Urethral, Once, Malu Barragan, NP    Facility-Administered Medications Ordered in Other Visits:     0.9%  NaCl infusion, , Intravenous, Continuous, Roney Jackson MD, Last Rate: 0 mL/hr at 11/29/21 1755, New Bag at 09/28/23 0851    fentaNYL 50 mcg/mL injection  mcg,  mcg, Intravenous, PRN, Anthony Hernandez MD, 100 mcg at 09/28/23 0854    LIDOcaine (PF) 10 mg/ml (1%) injection 10 mg, 1 mL, Intradermal, Once, Roney Jackson MD    midazolam (VERSED) 1 mg/mL injection 0.5-4 mg, 0.5-4 mg, Intravenous, PRN, Anthony Hernandez MD, 1 mg at 11/29/21 1109    prochlorperazine injection Soln 5 mg, 5 mg, Intravenous, Q30 Min PRN, Beth Ramsey MD    sodium chloride 0.9% flush 10 mL, 10 mL, Intravenous, PRN, Beth Ramsey MD     Objective:         Vitals:    07/18/24 1515   BP: 131/67   Pulse: 65   Resp: 18   Temp: 98.4 °F (36.9 °C)     Physical Exam   Constitutional: normal appearance.   HENT:   Head: Normocephalic and atraumatic.   Nose: Nose normal.   Mouth/Throat: Mucous membranes are moist.   Eyes: Conjunctivae are normal.   Cardiovascular: Normal rate and regular rhythm.   Pulmonary/Chest: Effort normal.   Musculoskeletal:         General: Tenderness (Bilateral knees) present. No  swelling. Normal range of motion.      Cervical back: Normal range of motion.   Neurological: She is alert.   Psychiatric: Mood normal.       Reviewed old and all outside pertinent medical records available.    All lab results personally reviewed and interpreted by me.  Lab Results   Component Value Date    WBC 11.19 06/26/2024    HGB 12.2 06/26/2024    HCT 38.4 06/26/2024    MCV 88 06/26/2024    MCH 28.0 06/26/2024    MCHC 31.8 (L) 06/26/2024    RDW 15.3 (H) 06/26/2024     06/26/2024    MPV 9.5 06/26/2024    PLTEST Appears normal 07/15/2021       Lab Results   Component Value Date     06/26/2024    K 3.9 06/26/2024     06/26/2024    CO2 29 06/26/2024    GLU 95 06/26/2024    BUN 21 06/26/2024    CALCIUM 9.6 06/26/2024    PROT 6.9 06/26/2024    ALBUMIN 3.2 (L) 06/26/2024    BILITOT 0.4 06/26/2024    AST 16 06/26/2024    ALKPHOS 172 (H) 06/26/2024    ALT 13 06/26/2024       Lab Results   Component Value Date    COLORU Yellow 02/03/2024    APPEARANCEUA Clear 02/03/2024    SPECGRAV 1.025 02/03/2024    PHUR 6.0 02/03/2024    PROTEINUA Negative 02/03/2024    KETONESU Negative 02/03/2024    LEUKOCYTESUR Negative 02/03/2024    NITRITE Negative 02/03/2024    UROBILINOGEN normal 12/11/2023       Lab Results   Component Value Date    CRP 9.8 (H) 07/18/2024       Lab Results   Component Value Date    SEDRATE <2 07/18/2024       Lab Results   Component Value Date    KEE Pos, Titer to follow (A) 01/29/2009    RF <13.0 02/20/2024    SEDRATE <2 07/18/2024           Lab Results   Component Value Date    KEE Pos, Titer to follow (A) 01/29/2009        Imaging:  All imaging reviewed and independently interpreted by me.         ASSESSMENT / PLAN:     Marleny Guzman is a 74 y.o. Black or  female with:    1. KEE, dsdna and RNA polymerase III positivity  - At this time patient with positive- anti-dsdna, KEE and RNA polymerase III but no evidence of active CTD   - On CT chest report -- no evidence  "of ILD   - Pulm: "  Restriction on PFTs and bronchiectasis on imaging. No obvious reticulations or fibrotic changes that would be more typical for ILD related to autoimmune disease.   She has also had a history of severe sinusitis so her lung findings could be related to indolent infectious process.    I do think that prior to initiating immunosuppressant medication we should consider bronchoscopy with BAL to rule out an indolent infection.   For now will trend CK and aldolase and inflammatory markers  - not starting immunosuppression     3. Pulm HTN   - Referred to pulm HTN clinic   - Will continue to follow     4. Other specified counseling  - over 10 minutes spent regarding below topics:  - Immunization counseling done.  - Weight loss counseling done.  - Nutrition and exercise counseling.  - Limitation of alcohol consumption.  - Regular exercise:  Aerobic and resistance.  - Medication counseling provided.    5. Overweight  - would benefit from decreasing at least 10% of body weight.  - recommended goal of losing 1 lb per week.  - consider nutritionist evaluation.    6. Stiffness of shoulders  - Sed rate and crp   - Will evaluate for PMR    7. Neck pain   Cervical xray       Follow up in about 3 months (around 10/18/2024).    Method of contact with patient concerns: [facility]    Disclaimer:  This note is prepared using voice recognition software and as such is likely to have errors and has not been proof read. Please contact me for questions.     Time spent: 30 minutes in face to face discussion concerning diagnosis, prognosis, review of lab and test results, benefits of treatment as well as management of disease, counseling of patient and coordination of care between various health care providers.  Greater than half the time spent was used for coordination of care and counseling of patient.    Belen Angeles M.D.  Rheumatology Department   [facility]     "

## 2024-07-19 LAB — ALDOLASE SERPL-CCNC: 5 U/L (ref 1.2–7.6)

## 2024-07-23 RX ORDER — TADALAFIL 20 MG/1
TABLET ORAL
Qty: 60 TABLET | Refills: 3 | OUTPATIENT
Start: 2024-07-23

## 2024-07-23 NOTE — TELEPHONE ENCOUNTER
NN called the patient to obtain information on where we were with the patient needing Tyvaso shipment. The patient explained that last week she was able to get in touch with the pharmacy to verify that she wanted the Tyvaso shipped to her. NN stated that she would contact the pharmacy and call the patient back to let her know what she finds out about the shipment.    @ 12:54pm NN was notified by the pharmacy that they have not been able to get verification from the patient to ship her Tyvaso. Patient was called and asked to call the pharmacy ASAP at [phone] to verify that the med can be shipped to her. Patient verbalized understanding. Pharmacy notified of all of the above information.

## 2024-07-25 ENCOUNTER — HOSPITAL ENCOUNTER (OUTPATIENT)
Dept: RADIOLOGY | Facility: HOSPITAL | Age: 74
Discharge: HOME | End: 2024-07-25
Attending: INTERNAL MEDICINE
Payer: MEDICARE

## 2024-07-25 ENCOUNTER — HOSPITAL ENCOUNTER (OUTPATIENT)
Dept: RADIOLOGY | Facility: HOSPITAL | Age: 74
Discharge: HOME | End: 2024-07-25
Attending: STUDENT IN AN ORGANIZED HEALTH CARE EDUCATION/TRAINING PROGRAM
Payer: MEDICARE

## 2024-07-25 ENCOUNTER — TELEPHONE (OUTPATIENT)
Dept: PULMONOLOGY | Facility: CLINIC | Age: 74
End: 2024-07-25
Payer: MEDICARE

## 2024-07-25 DIAGNOSIS — M54.2 CERVICAL PAIN: ICD-10-CM

## 2024-07-25 DIAGNOSIS — Z12.31 SCREENING MAMMOGRAM, ENCOUNTER FOR: ICD-10-CM

## 2024-07-25 PROCEDURE — 72040 X-RAY EXAM NECK SPINE 2-3 VW: CPT | Mod: TC

## 2024-07-25 PROCEDURE — 72040 X-RAY EXAM NECK SPINE 2-3 VW: CPT | Mod: 26,,, | Performed by: INTERNAL MEDICINE

## 2024-07-25 PROCEDURE — 77063 BREAST TOMOSYNTHESIS BI: CPT | Mod: TC

## 2024-07-25 PROCEDURE — 77067 SCR MAMMO BI INCL CAD: CPT | Mod: TC

## 2024-07-31 ENCOUNTER — TELEPHONE (OUTPATIENT)
Dept: TRANSPLANT | Facility: CLINIC | Age: 74
End: 2024-07-31
Payer: MEDICARE

## 2024-07-31 ENCOUNTER — HOSPITAL ENCOUNTER (EMERGENCY)
Facility: HOSPITAL | Age: 74
Discharge: HOME | End: 2024-07-31
Attending: STUDENT IN AN ORGANIZED HEALTH CARE EDUCATION/TRAINING PROGRAM
Payer: MEDICARE

## 2024-07-31 VITALS
BODY MASS INDEX: 28.63 KG/M2 | RESPIRATION RATE: 18 BRPM | HEIGHT: 70 IN | DIASTOLIC BLOOD PRESSURE: 72 MMHG | HEART RATE: 54 BPM | OXYGEN SATURATION: 97 % | SYSTOLIC BLOOD PRESSURE: 164 MMHG | WEIGHT: 200 LBS | TEMPERATURE: 98 F
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
## 2024-07-31 DIAGNOSIS — T14.90XA INJURY: ICD-10-CM

## 2024-07-31 PROCEDURE — 25000003 PHARM REV CODE 250: Performed by: STUDENT IN AN ORGANIZED HEALTH CARE EDUCATION/TRAINING PROGRAM

## 2024-07-31 PROCEDURE — 99284 EMERGENCY DEPT VISIT MOD MDM: CPT | Mod: 25

## 2024-07-31 RX ORDER — IBUPROFEN 400 MG/1
400 TABLET ORAL
Status: COMPLETED | OUTPATIENT
Start: 2024-07-31 | End: 2024-07-31

## 2024-07-31 RX ORDER — LIDOCAINE 50 MG/G
2 PATCH TOPICAL ONCE
Status: DISCONTINUED | OUTPATIENT
Start: 2024-07-31 | End: 2024-07-31 | Stop reason: HOSPADM

## 2024-07-31 RX ORDER — ACETAMINOPHEN 500 MG
1000 TABLET ORAL
Status: COMPLETED | OUTPATIENT
Start: 2024-07-31 | End: 2024-07-31

## 2024-07-31 RX ADMIN — IBUPROFEN 400 MG: 400 TABLET, FILM COATED ORAL at 02:07

## 2024-07-31 RX ADMIN — ACETAMINOPHEN 1000 MG: 500 TABLET, FILM COATED ORAL at 02:07

## 2024-07-31 RX ADMIN — LIDOCAINE 2 PATCH: 50 PATCH CUTANEOUS at 02:07

## 2024-07-31 NOTE — TELEPHONE ENCOUNTER
Arsh Teixeira, [facility] When [facility] arrived to patients home, the patient fell. Per Arsh, the patient did not lose consciousness but she does have some bruising to her face, knees and has left rib and left flank pain. Patient's daughter was informed and will be taking patient to the ER to be evaluated.

## 2024-08-01 ENCOUNTER — PATIENT OUTREACH (OUTPATIENT)
Dept: EMERGENCY MEDICINE | Facility: HOSPITAL | Age: 74
End: 2024-08-01

## 2024-08-02 ENCOUNTER — TELEPHONE (OUTPATIENT)
Dept: PULMONOLOGY | Facility: CLINIC | Age: 74
End: 2024-08-02
Payer: MEDICARE

## 2024-08-02 NOTE — TELEPHONE ENCOUNTER
I spoke with patient to let her know arrival time to [facility] for 7am on 8/2/24 with Dr Tracy. I went over bronchoscopy instructions. NPO after midnight. Patient stopped ASA on 8/1/24. I answered all questions. Patient confirmed and verbalized understanding.

## 2024-08-05 ENCOUNTER — ANESTHESIA EVENT (OUTPATIENT)
Dept: SURGERY | Facility: HOSPITAL | Age: 74
End: 2024-08-05
Payer: MEDICARE

## 2024-08-06 ENCOUNTER — ANESTHESIA (OUTPATIENT)
Dept: SURGERY | Facility: HOSPITAL | Age: 74
End: 2024-08-06
Payer: MEDICARE

## 2024-08-06 ENCOUNTER — TELEPHONE (OUTPATIENT)
Dept: TRANSPLANT | Facility: CLINIC | Age: 74
End: 2024-08-06
Payer: MEDICARE

## 2024-08-06 ENCOUNTER — HOSPITAL ENCOUNTER (OUTPATIENT)
Facility: HOSPITAL | Age: 74
Discharge: HOME | End: 2024-08-06
Attending: EMERGENCY MEDICINE | Admitting: EMERGENCY MEDICINE
Payer: MEDICARE

## 2024-08-06 VITALS
OXYGEN SATURATION: 93 % | DIASTOLIC BLOOD PRESSURE: 61 MMHG | HEART RATE: 66 BPM | WEIGHT: 200 LBS | HEIGHT: 70 IN | BODY MASS INDEX: 28.63 KG/M2 | TEMPERATURE: 98 F | SYSTOLIC BLOOD PRESSURE: 136 MMHG | RESPIRATION RATE: 18 BRPM

## 2024-08-06 DIAGNOSIS — R59.0 MEDIASTINAL ADENOPATHY: Primary | ICD-10-CM

## 2024-08-06 DIAGNOSIS — R59.9 ADENOPATHY: ICD-10-CM

## 2024-08-06 LAB
GRAM STN SPEC: NORMAL
GRAM STN SPEC: NORMAL

## 2024-08-06 PROCEDURE — 87015 SPECIMEN INFECT AGNT CONCNTJ: CPT | Performed by: EMERGENCY MEDICINE

## 2024-08-06 PROCEDURE — 25000003 PHARM REV CODE 250

## 2024-08-06 PROCEDURE — 71000044 HC DOSC ROUTINE RECOVERY FIRST HOUR: Performed by: EMERGENCY MEDICINE

## 2024-08-06 PROCEDURE — 88305 TISSUE EXAM BY PATHOLOGIST: CPT | Performed by: PATHOLOGY

## 2024-08-06 PROCEDURE — 88112 CYTOPATH CELL ENHANCE TECH: CPT | Mod: 59 | Performed by: PATHOLOGY

## 2024-08-06 PROCEDURE — 87075 CULTR BACTERIA EXCEPT BLOOD: CPT | Performed by: EMERGENCY MEDICINE

## 2024-08-06 PROCEDURE — 88173 CYTOPATH EVAL FNA REPORT: CPT | Performed by: PATHOLOGY

## 2024-08-06 PROCEDURE — 87116 MYCOBACTERIA CULTURE: CPT | Performed by: EMERGENCY MEDICINE

## 2024-08-06 PROCEDURE — 88172 CYTP DX EVAL FNA 1ST EA SITE: CPT | Mod: 59 | Performed by: PATHOLOGY

## 2024-08-06 PROCEDURE — 87206 SMEAR FLUORESCENT/ACID STAI: CPT | Performed by: EMERGENCY MEDICINE

## 2024-08-06 PROCEDURE — 63600175 PHARM REV CODE 636 W HCPCS

## 2024-08-06 PROCEDURE — 36000705 HC OR TIME LEV I EA ADD 15 MIN: Performed by: EMERGENCY MEDICINE

## 2024-08-06 PROCEDURE — 87205 SMEAR GRAM STAIN: CPT | Performed by: EMERGENCY MEDICINE

## 2024-08-06 PROCEDURE — 37000009 HC ANESTHESIA EA ADD 15 MINS: Performed by: EMERGENCY MEDICINE

## 2024-08-06 PROCEDURE — 36000704 HC OR TIME LEV I 1ST 15 MIN: Performed by: EMERGENCY MEDICINE

## 2024-08-06 PROCEDURE — 88342 IMHCHEM/IMCYTCHM 1ST ANTB: CPT | Performed by: PATHOLOGY

## 2024-08-06 PROCEDURE — 31624 DX BRONCHOSCOPE/LAVAGE: CPT | Mod: 51,,, | Performed by: EMERGENCY MEDICINE

## 2024-08-06 PROCEDURE — 88177 CYTP FNA EVAL EA ADDL: CPT | Performed by: PATHOLOGY

## 2024-08-06 PROCEDURE — 71000015 HC POSTOP RECOV 1ST HR: Performed by: EMERGENCY MEDICINE

## 2024-08-06 PROCEDURE — 88341 IMHCHEM/IMCYTCHM EA ADD ANTB: CPT | Mod: 59 | Performed by: PATHOLOGY

## 2024-08-06 PROCEDURE — C1726 CATH, BAL DIL, NON-VASCULAR: HCPCS | Performed by: EMERGENCY MEDICINE

## 2024-08-06 PROCEDURE — 88184 FLOWCYTOMETRY/ TC 1 MARKER: CPT | Performed by: PATHOLOGY

## 2024-08-06 PROCEDURE — 88189 FLOWCYTOMETRY/READ 16 & >: CPT | Mod: ,,, | Performed by: PATHOLOGY

## 2024-08-06 PROCEDURE — 87102 FUNGUS ISOLATION CULTURE: CPT | Performed by: EMERGENCY MEDICINE

## 2024-08-06 PROCEDURE — 25000003 PHARM REV CODE 250: Performed by: EMERGENCY MEDICINE

## 2024-08-06 PROCEDURE — 37000008 HC ANESTHESIA 1ST 15 MINUTES: Performed by: EMERGENCY MEDICINE

## 2024-08-06 PROCEDURE — 88185 FLOWCYTOMETRY/TC ADD-ON: CPT | Mod: 59 | Performed by: PATHOLOGY

## 2024-08-06 PROCEDURE — 31652 BRONCH EBUS SAMPLNG 1/2 NODE: CPT | Mod: ,,, | Performed by: EMERGENCY MEDICINE

## 2024-08-06 PROCEDURE — 27201423 OPTIME MED/SURG SUP & DEVICES STERILE SUPPLY: Performed by: EMERGENCY MEDICINE

## 2024-08-06 PROCEDURE — 87070 CULTURE OTHR SPECIMN AEROBIC: CPT | Performed by: EMERGENCY MEDICINE

## 2024-08-06 RX ORDER — LIDOCAINE HYDROCHLORIDE 20 MG/ML
INJECTION, SOLUTION EPIDURAL; INFILTRATION; INTRACAUDAL; PERINEURAL
Status: DISCONTINUED | OUTPATIENT
Start: 2024-08-06 | End: 2024-08-06

## 2024-08-06 RX ORDER — DEXAMETHASONE SODIUM PHOSPHATE 4 MG/ML
INJECTION, SOLUTION INTRA-ARTICULAR; INTRALESIONAL; INTRAMUSCULAR; INTRAVENOUS; SOFT TISSUE
Status: DISCONTINUED | OUTPATIENT
Start: 2024-08-06 | End: 2024-08-06

## 2024-08-06 RX ORDER — ROCURONIUM BROMIDE 10 MG/ML
INJECTION, SOLUTION INTRAVENOUS
Status: DISCONTINUED | OUTPATIENT
Start: 2024-08-06 | End: 2024-08-06

## 2024-08-06 RX ORDER — LIDOCAINE HYDROCHLORIDE 10 MG/ML
INJECTION, SOLUTION EPIDURAL; INFILTRATION; INTRACAUDAL; PERINEURAL
Status: DISCONTINUED | OUTPATIENT
Start: 2024-08-06 | End: 2024-08-06 | Stop reason: HOSPADM

## 2024-08-06 RX ORDER — ONDANSETRON HYDROCHLORIDE 2 MG/ML
INJECTION, SOLUTION INTRAVENOUS
Status: DISCONTINUED | OUTPATIENT
Start: 2024-08-06 | End: 2024-08-06

## 2024-08-06 RX ORDER — GLUCAGON 1 MG
1 KIT INJECTION
Status: DISCONTINUED | OUTPATIENT
Start: 2024-08-06 | End: 2024-08-06 | Stop reason: HOSPADM

## 2024-08-06 RX ORDER — SODIUM CHLORIDE 0.9 % (FLUSH) 0.9 %
10 SYRINGE (ML) INJECTION
Status: DISCONTINUED | OUTPATIENT
Start: 2024-08-06 | End: 2024-08-06 | Stop reason: HOSPADM

## 2024-08-06 RX ORDER — HALOPERIDOL 5 MG/ML
0.5 INJECTION INTRAMUSCULAR EVERY 10 MIN PRN
Status: DISCONTINUED | OUTPATIENT
Start: 2024-08-06 | End: 2024-08-06 | Stop reason: HOSPADM

## 2024-08-06 RX ORDER — HYDROMORPHONE HYDROCHLORIDE 1 MG/ML
0.2 INJECTION, SOLUTION INTRAMUSCULAR; INTRAVENOUS; SUBCUTANEOUS EVERY 5 MIN PRN
Status: DISCONTINUED | OUTPATIENT
Start: 2024-08-06 | End: 2024-08-06 | Stop reason: HOSPADM

## 2024-08-06 RX ORDER — PROPOFOL 10 MG/ML
VIAL (ML) INTRAVENOUS CONTINUOUS PRN
Status: DISCONTINUED | OUTPATIENT
Start: 2024-08-06 | End: 2024-08-06

## 2024-08-06 RX ORDER — TREPROSTINIL 16-32 MCG
16 KIT INHALATION 4 TIMES DAILY
COMMUNITY
Start: 2024-07-31

## 2024-08-06 RX ORDER — FENTANYL CITRATE 50 UG/ML
INJECTION, SOLUTION INTRAMUSCULAR; INTRAVENOUS
Status: DISCONTINUED | OUTPATIENT
Start: 2024-08-06 | End: 2024-08-06

## 2024-08-06 RX ADMIN — LIDOCAINE HYDROCHLORIDE 100 MG: 20 INJECTION, SOLUTION EPIDURAL; INFILTRATION; INTRACAUDAL; PERINEURAL at 09:08

## 2024-08-06 RX ADMIN — SUGAMMADEX 300 MG: 100 INJECTION, SOLUTION INTRAVENOUS at 10:08

## 2024-08-06 RX ADMIN — SODIUM CHLORIDE: 0.9 INJECTION, SOLUTION INTRAVENOUS at 09:08

## 2024-08-06 RX ADMIN — FENTANYL CITRATE 50 MCG: 50 INJECTION, SOLUTION INTRAMUSCULAR; INTRAVENOUS at 10:08

## 2024-08-06 RX ADMIN — ROCURONIUM BROMIDE 10 MG: 10 INJECTION, SOLUTION INTRAVENOUS at 10:08

## 2024-08-06 RX ADMIN — PROPOFOL 150 MCG/KG/MIN: 10 INJECTION, EMULSION INTRAVENOUS at 09:08

## 2024-08-06 RX ADMIN — ROCURONIUM BROMIDE 50 MG: 10 INJECTION, SOLUTION INTRAVENOUS at 09:08

## 2024-08-06 RX ADMIN — PROPOFOL 30 MG: 10 INJECTION, EMULSION INTRAVENOUS at 10:08

## 2024-08-06 RX ADMIN — DEXAMETHASONE SODIUM PHOSPHATE 4 MG: 4 INJECTION, SOLUTION INTRAMUSCULAR; INTRAVENOUS at 10:08

## 2024-08-06 RX ADMIN — ONDANSETRON 4 MG: 2 INJECTION INTRAMUSCULAR; INTRAVENOUS at 10:08

## 2024-08-06 RX ADMIN — FENTANYL CITRATE 50 MCG: 50 INJECTION, SOLUTION INTRAMUSCULAR; INTRAVENOUS at 09:08

## 2024-08-06 RX ADMIN — PROPOFOL 125 MCG/KG/MIN: 10 INJECTION, EMULSION INTRAVENOUS at 09:08

## 2024-08-06 RX ADMIN — PROPOFOL 20 MG: 10 INJECTION, EMULSION INTRAVENOUS at 10:08

## 2024-08-06 NOTE — H&P
[facility] Note      Subjective:      History of Present Illness:  Marleny Guzman is a 74 y.o. woman who follows with pulm clinic for shortness of breath and a persistent cough. She most recently saw Dr. Real in clinic on 7/17, at that time she reported ongoing productive cough with difficulty clearing her airways. She ws also have some shortness of breath with exertion.     Since last visit she reports she has noticed an improvement in both her cough and shortness of breath since starting tadalafil. She still has a productive cough but frequency has decreased. No change in sputum production. She reports dyspnea has also improved and she has been able to walk more.     Denies any fevers/chills.    Past Medical History:  Past Medical History:   Diagnosis Date    Abnormal Pap smear of vagina 07/20/2016    Anemia     Cataract     Chronic bilateral low back pain without sciatica 03/13/2018    Depression with anxiety     Hypertension     Osteoarthritis 06/20/2013    Recurrent upper respiratory infection (URI)     Right rotator cuff tear     Sleep apnea     Cipap machine at home    Urinary incontinence     Leaks urine with laughing/ coughing/ sneezing       Past Surgical History:  Past Surgical History:   Procedure Laterality Date    CATARACT EXTRACTION  06/18/2012    OS    CATARACT EXTRACTION  07/16/2012    OD    CERVIX SURGERY      [place]    COLONOSCOPY      COLONOSCOPY N/A 03/04/2016    Procedure: COLONOSCOPY;  Surgeon: Tenzin Thakkar MD;  [facility]  Service: Endoscopy;  Laterality: N/A;    COLONOSCOPY Left 01/25/2021    Procedure: COLONOSCOPY;  Surgeon: Wayne Naranjo MD;  Location: [facility];  Service: Endoscopy;  Laterality: Left;    COLONOSCOPY N/A 06/19/2023    Procedure: COLONOSCOPY;  Surgeon: Tenzin Thakkar MD;  Location: [facility] (2ND FLR);  Service: Endoscopy;  Laterality: N/A;  lung issues  referral Dr Holt  portal-GT  6/13/23- no answer for precall- ks    CYSTOSCOPY N/A 09/12/2022    Procedure: CYSTOSCOPY;  Surgeon: Bianka Aragon MD;  Location: [facility] 1ST FLR;  Service: Urology;  Laterality: N/A;    ESOPHAGOGASTRODUODENOSCOPY Left 01/25/2021    Procedure: EGD (ESOPHAGOGASTRODUODENOSCOPY);  Surgeon: Wayne Naranjo MD;  Location: [facility] ENDO;  Service: Endoscopy;  Laterality: Left;    EYE SURGERY Bilateral     cataract    HYSTERECTOMY  05/12/2015    LARYNGOSCOPY N/A 9/28/2023    Procedure: Suspension microlaryngoscopy with bilateral vocal fold injection augmentation - Restylane;  Surgeon: Andre Valera MD;  Location: [facility] OR;  Service: ENT;  Laterality: N/A;  Microscope, telescopes, tower, injector, Restylane    LEEP      LUMBAR EPIDURAL INJECTION      OOPHORECTOMY      [place] REMOVAL OF OVARY/TUBE(S)  05/12/2015    RETROGRADE PYELOGRAPHY Bilateral 09/12/2022    Procedure: PYELOGRAM, RETROGRADE;  Surgeon: Bianka Aragon MD;  Location: [address];  Service: Urology;  Laterality: Bilateral;    RIGHT HEART CATHETERIZATION Right 6/26/2024    Procedure: INSERTION, CATHETER, RIGHT HEART;  Surgeon: Bishop Baez MD;  Location: [facility] CATH LAB;  Service: Cardiology;  Laterality: Right;    ROBOT-ASSISTED REPAIR OF VENTRAL HERNIA USING DA ASPEN XI N/A 11/29/2021    Procedure: XI ROBOTIC REPAIR, HERNIA, VENTRAL w/ possible mesh;  Surgeon: Roney Jackson MD;  Location: [address];  Service: General;  Laterality: N/A;  Anesthesia Block    SALPINGECTOMY Left     SHOULDER SURGERY Right     SINUS SURGERY         Allergies:  Review of patient's allergies indicates:   Allergen Reactions    Flowers     Grass pollen-june grass standard     House dust        Medications:   Home Medications:  Prior to Admission medications    Medication Sig Start Date End Date Taking? Authorizing Provider   atorvastatin (LIPITOR) 20 MG tablet Take 1 tablet (20 mg total) by mouth once daily. 5/16/24  Yes Bridget Cantu  MD BETHEL   doxycycline (VIBRA-TABS) 100 MG tablet One tablet twice daily with food and a full glass of water 5/16/24  Yes Bridget Cantu MD   ergocalciferol (ERGOCALCIFEROL) 50,000 unit Cap TAKE 1 CAPSULE (50,000 UNITS TOTAL) BY MOUTH EVERY 30 DAYS 4/25/24  Yes Bridget Cantu MD   EScitalopram oxalate (LEXAPRO) 20 MG tablet Take 1 tablet (20 mg total) by mouth once daily. 5/16/24  Yes Bridget Cantu MD   irbesartan-hydrochlorothiazide (AVALIDE) 300-12.5 mg per tablet TAKE 1 TABLET BY MOUTH EVERY DAY 6/1/24  Yes Bridget Cantu MD   loratadine (CLARITIN) 10 mg tablet Take 1 tablet (10 mg total) by mouth once daily. 5/20/24 5/20/25 Yes James Cooper MD   metoprolol succinate (TOPROL-XL) 50 MG 24 hr tablet Take 1 tablet (50 mg total) by mouth once daily. 4/25/24  Yes Bridget Cantu MD   spironolactone (ALDACTONE) 25 MG tablet Half tablet daily 5/16/24  Yes Bridget Cantu MD   tadalafil (ADCIRCA) 20 mg Tab Take 2 tablets (40 mg total) by mouth once daily. take 20 mg (1 Tablet) once daily for 2 weeks, then increase to 40 mg (2 tablets) thereafter 6/26/24  Yes Bishop Baez MD   albuterol (PROVENTIL/VENTOLIN HFA) 90 mcg/actuation inhaler Inhale 1-2 puffs into the lungs every 6 (six) hours as needed for Wheezing or Shortness of Breath (Please dispense with spacer). 4/8/24 5/8/24  Coleen Real MD   aspirin 81 MG Chew TAKE 1 TABLET BY MOUTH EVERY DAY 4/15/21   Alton Guerrero MD   betamethasone dipropionate (DIPROLENE) 0.05 % lotion ONE-TWO DROPS TO EARS DAILY NEEDED FOR ITCHING 5/28/24   Bridget Cantu MD   clobetasol 0.05% (TEMOVATE) 0.05 % Oint Apply topically 2 (two) times daily. 3/20/24   Krista Cuba MD   estradioL (ESTRACE) 0.01 % (0.1 mg/gram) vaginal cream Use pea-sized amount vaginally nightly for 2 weeks, then 2-3x/week 3/21/24   Krista Cuba MD   fluticasone propionate (FLONASE) 50 mcg/actuation nasal spray Use 1-2 sprays in each nostril twice  daily 9/6/23   James Cooper MD   GEMTESA 75 mg Tab Take 1 tablet by mouth. 12/12/23   Provider, Historical   guaiFENesin (MUCINEX) 600 mg 12 hr tablet Take 2 tablets (1,200 mg total) by mouth 2 (two) times daily. 4/8/24   Coleen Real MD   LORazepam (ATIVAN) 0.5 MG tablet TAKE 1/2 TO 1 TABLET BY MOUTH DAILY AS NEEDED FOR ANXIETY 7/8/24   Bridget Cantu MD   meloxicam (MOBIC) 15 MG tablet     Provider, Historical   nystatin (MYCOSTATIN) cream Apply topically 2 (two) times daily. 1/27/24   Bridget Cantu MD   olopatadine (PATANOL) 0.1 % ophthalmic solution Place 1 drop into both eyes 2 (two) times daily. 2/3/24 5/8/24  Curt Barrera MD   predniSONE (DELTASONE) 20 MG tablet Take 1 tablet (20 mg total) by mouth once daily. 5/16/24   Bridget Cantu MD   pulse oximeter (PULSE OXIMETER) device by Apply Externally route 2 (two) times a day. Use twice daily at 8 AM and 3 PM and record the value in [employer] as directed. 12/14/21   Bridget Cantu MD   sodium chloride for inhalation (SODIUM CHLORIDE 0.9%) 0.9 % nebulizer solution Take 3 mLs by nebulization as needed for Wheezing. 7/17/24 7/17/25  Coleen Real MD   triamcinolone acetonide 0.1% (KENALOG) 0.1 % cream APPLY TOPICALLY TWICE A DAY 4/19/23   Bridget Cantu MD       Family History:  Family History   Problem Relation Name Age of Onset    Cancer Mother          THYROID CANCER    Stroke Mother      Hypertension Father      Cataracts Father      Heart disease Sister Mary     Pacemaker/defibrilator Sister Mary     Hypertension Sister Mary     Deep vein thrombosis Sister Mary     Heart disease Sister Oksana         CABG    Osteoarthritis Sister Oksana     Edema Sister Oksana     Eczema Sister Ines     Hypertension Sister Ines     Breast cancer Sister Ines 72    Anuerysm Sister Shirlita     Drug abuse Sister Shirlita     Hypertension Sister Shirlita     No Known Problems Sister Pat     Cancer Sister  "Halima         Cancer cells or "growth in her stomach"    No Known Problems Sister Bre     No Known Problems Brother Jitendra     Depression Maternal Grandmother      Ulcers Maternal Grandfather          Legs    No Known Problems Paternal Grandmother      No Known Problems Paternal Grandfather      Hypertension Daughter      Other Daughter          Heart palpitations    Cancer Maternal Aunt      Breast cancer Cousin      ADD / ADHD Neg Hx      Alcohol abuse Neg Hx      Anxiety disorder Neg Hx      Bipolar disorder Neg Hx      Dementia Neg Hx      OCD Neg Hx      Paranoid behavior Neg Hx      Physical abuse Neg Hx      Schizophrenia Neg Hx      Seizures Neg Hx      Sexual abuse Neg Hx      Amblyopia Neg Hx      Blindness Neg Hx      Glaucoma Neg Hx      Macular degeneration Neg Hx      Retinal detachment Neg Hx      Strabismus Neg Hx      Anesthesia problems Neg Hx         Social History:  Social History     Tobacco Use    Smoking status: Former     Current packs/day: 0.00     Average packs/day: 0.5 packs/day for 22.0 years (11.0 ttl pk-yrs)     Types: Cigarettes     Start date: 1975     Quit date: 1997     Years since quittin.6     Passive exposure: Past    Smokeless tobacco: Never    Tobacco comments:     Some wheezing since COVID-19 infection-2020   Substance Use Topics    Alcohol use: Yes     Alcohol/week: 0.0 standard drinks of alcohol     Comment: Occassionally for social events will have a glass of wine    Drug use: No       Review of Systems:  Pertinent items are noted in HPI. All other systems are reviewed and are negative.     Objective:   Last 24 Hour Vital Signs:  BP  Min: 170/79  Max: 170/79  Temp  Av °F (36.7 °C)  Min: 98 °F (36.7 °C)  Max: 98 °F (36.7 °C)  Pulse  Av  Min: 62  Max: 62  Resp  Av  Min: 20  Max: 20  SpO2  Av %  Min: 97 %  Max: 97 %  Height  Av' 10" (177.8 cm)  Min: 5' 10" (177.8 cm)  Max: 5' 10" (177.8 cm)  Weight  Av.7 kg (200 lb)  Min: 90.7 " kg (200 lb)  Max: 90.7 kg (200 lb)  No intake/output data recorded.    Physical Examination:  GEN: older woman, resting in bed in NAD  HEENT: face symmetric, conjunctivae anicteric  CV: regular rhythm, normal rate  PULM: faint expiratory wheezing, intermittent wet cough, no increased WOB  Abd: non-distended  MSK: moving all extremities  Neuro: alert, answering questions appropriately     Radiology  FINDINGS:  Base of Neck: No significant abnormality.     Thoracic soft tissues: Normal.     Aorta/vasculature: Left-sided aortic arch.  No aneurysm.  Mild to moderate calcific atherosclerosis.     Heart: Normal size. No effusion.  Mild coronary artery calcifications.     Pulmonary vasculature: This study is satisfactory for the evaluation of the pulmonary arteries. There is no filling defect of the pulmonary arteries to the segmental level to suggest pulmonary thromboembolism.     Kelley/Mediastinum: Multiple enlarged mediastinal lymph nodes, including a station 4R lymph node measuring 1.3 cm (series 2, images 217),  station 7 lymph nodes 1.3 cm (series 2, image 238), and 1.1 cm (series 2, image 243).  Prominent right hilar lymph nodes.     Airways: Patent.     Lungs/Pleura: Biapical scarring with subpleural cystic changes, similar to prior.  Similar appearance of bibasilar bandlike opacities, may represent subsegmental atelectasis versus scarring.  There is a 5 mm right lower lobe perifissural nodule (series 3, image 276), unchanged and likely representing an intrapulmonary lymph node.  Additional scattered micronodules and calcified granulomas, similar to prior.  No focal consolidation, pleural effusion, or pneumothorax.     Esophagus: Normal.     Upper Abdomen: Stable 1.1 cm right adrenal hypodensity, likely a myelolipoma.  Otherwise visualized upper abdomen grossly unremarkable     Bones: No acute fracture. No suspicious lytic or sclerotic lesions.  Degenerative changes of the spine.       I have personally reviewed the  above imaging.         Assessment:     Marleny Guzman is a 74 y.o. female with pulmonary hypertension who presents for further work-up of mediastinal lymphadenopathy.     # Mediastinal lymphadenopathy   Patient with suspected ILD and positive KEE, dsDNA, and RNA polymerase III following with rheumatology, found to have mediastinal lymphadenopathy. Referred for EBUS to exclude infectious etiology of lymphoproliferative disease prior to initiation of immunosuppressive therapy.     - proceed with EBUS    Procedure discussed with patient. Risks/benefits described. Patient agreeable to proceed. Consent signed.      Sunitha Bell MD  Pulmonary & Critical Care Medicine Fellow

## 2024-08-06 NOTE — PLAN OF CARE
Pt Aox4. VSS. No signs of distress. Patient and pt's daughter educated and given discharge instructions at bedside. IV removed. All questions answered. Pt ready for discharge.

## 2024-08-06 NOTE — DISCHARGE SUMMARY
[facility] (2nd Fl)  Discharge Note  Short Stay    Procedure(s) (LRB):  ENDOBRONCHIAL ULTRASOUND (EBUS) (N/A)      OUTCOME: Patient tolerated treatment/procedure well without complication and is now ready for discharge.    DISPOSITION: Home or Self Care    FINAL DIAGNOSIS:  mediastinal LAD    FOLLOWUP: will call with results     DISCHARGE INSTRUCTIONS:  No discharge procedures on file.      Clinical Reference Documents Added to Patient Instructions         Document    ENDOBRONCHIAL ULTRASOUND (ENGLISH)            TIME SPENT ON DISCHARGE: 5 minutes    Jose Soto MD  Pulmonary/Critical Care Fellow

## 2024-08-07 LAB
ACID FAST MOD KINY STN SPEC: NORMAL
FLOW CYTOMETRY ANTIBODIES ANALYZED - LYMPH NODE: NORMAL
FLOW CYTOMETRY COMMENT - LYMPH NODE: NORMAL
FLOW CYTOMETRY INTERPRETATION - LYMPH NODE: NORMAL
MYCOBACTERIUM SPEC QL CULT: NORMAL

## 2024-08-09 ENCOUNTER — TELEPHONE (OUTPATIENT)
Dept: TRANSPLANT | Facility: CLINIC | Age: 74
End: 2024-08-09
Payer: MEDICARE

## 2024-08-09 LAB
BACTERIA SPEC AEROBE CULT: NO GROWTH
COMMENT: NORMAL
FINAL PATHOLOGIC DIAGNOSIS: NORMAL
Lab: NORMAL
MICROSCOPIC EXAM: NORMAL

## 2024-08-12 LAB
BACTERIA SPEC ANAEROBE CULT: NORMAL
FUNGUS SPEC CULT: NORMAL

## 2024-08-16 ENCOUNTER — TELEPHONE (OUTPATIENT)
Dept: UROGYNECOLOGY | Facility: CLINIC | Age: 74
End: 2024-08-16
Payer: MEDICARE

## 2024-08-16 ENCOUNTER — OFFICE VISIT (OUTPATIENT)
Dept: UROGYNECOLOGY | Facility: CLINIC | Age: 74
End: 2024-08-16
Payer: MEDICARE

## 2024-08-16 VITALS
HEART RATE: 57 BPM | HEIGHT: 70 IN | BODY MASS INDEX: 29.07 KG/M2 | DIASTOLIC BLOOD PRESSURE: 64 MMHG | SYSTOLIC BLOOD PRESSURE: 140 MMHG | WEIGHT: 203.06 LBS

## 2024-08-16 DIAGNOSIS — L29.2 VULVAR ITCHING: ICD-10-CM

## 2024-08-16 DIAGNOSIS — N39.46 MIXED URGE AND STRESS INCONTINENCE: Primary | ICD-10-CM

## 2024-08-16 DIAGNOSIS — M79.18 MYOFASCIAL PAIN: ICD-10-CM

## 2024-08-16 DIAGNOSIS — R39.15 URGENCY OF URINATION: ICD-10-CM

## 2024-08-16 LAB
BILIRUB SERPL-MCNC: NORMAL MG/DL
BLOOD URINE, POC: NORMAL
CLARITY, POC UA: CLEAR
COLOR, POC UA: YELLOW
GLUCOSE UR QL STRIP: NORMAL
KETONES UR QL STRIP: NORMAL
LEUKOCYTE ESTERASE URINE, POC: NORMAL
NITRITE, POC UA: NORMAL
PH, POC UA: 5
PROTEIN, POC: NORMAL
SPECIFIC GRAVITY, POC UA: 1.02
UROBILINOGEN, POC UA: NORMAL

## 2024-08-16 PROCEDURE — 99999 PR PBB SHADOW E&M-EST. PATIENT-LVL V: CPT | Mod: PBBFAC,,, | Performed by: OBSTETRICS & GYNECOLOGY

## 2024-08-16 RX ORDER — VIBEGRON 75 MG/1
1 TABLET, FILM COATED ORAL DAILY
Qty: 90 TABLET | Refills: 3 | Status: SHIPPED | OUTPATIENT
Start: 2024-08-16 | End: 2025-08-16

## 2024-08-16 NOTE — PROGRESS NOTES
"Chief Complaint   Patient presents with    Urinary Incontinence        HPI: Patient is a 74 y.o. female  who presents today with c/o urinary incontinence. States that she has been having symptoms for a while, "some years". Believes that it has been about 3-4 years. She reports that she has pulmonary hypertension and that when she coughs she will leak urine. Will also leak urine with a sneeze, laugh and exercise. Has not tried any treatment.     She reports urinary urgency and urge incontinence. She also endorses urinary frequency, voiding every 1.5-2 hours. She wakes 2 times per night. She reports occasional enuresis.   Has not tried any treatment for these symptoms either.   Urgency urinary incontinence and rushing to get to the bathroom is more bothersome than the stress urinary incontinence.     Reports a continuous and strong urine stream.     She denies vaginal heaviness, pressure and bulge.     She reports recent trouble with constipation. States that stool consistency is "like little balls and is hard to come". She eat Activia daily. She denies accidental bowel leakage.     She drinks water about 32 ounces or less during the day. She has one cup of coffee in the morning. May occasionally have a cup of hot tea.       Review of Systems   Constitutional:  Negative for activity change, appetite change, chills, fatigue, fever and unexpected weight change.   HENT:  Negative for nasal congestion, dental problem, hearing loss, mouth dryness and sore throat.    Eyes:  Negative for pain and eye dryness.   Respiratory:  Negative for cough, shortness of breath and wheezing.    Cardiovascular:  Negative for chest pain, palpitations and leg swelling.   Gastrointestinal:  Negative for abdominal distention, abdominal pain, blood in stool, constipation and rectal pain.   Endocrine: Negative for cold intolerance and heat intolerance.   Genitourinary:  Negative for dyspareunia, hot flashes and vaginal dryness. "   Musculoskeletal:  Negative for arthralgias, back pain, gait problem, joint swelling, myalgias, neck pain and neck stiffness.   Integumentary:  Negative for rash.   Neurological:  Negative for dizziness, tremors, seizures, speech difficulty, weakness, light-headedness, numbness and headaches.   Psychiatric/Behavioral:  Negative for confusion, dysphoric mood and sleep disturbance. The patient is not nervous/anxious.         Past Medical History:   Diagnosis Date    Abnormal Pap smear of vagina 07/20/2016    Anemia     Cataract     Chronic bilateral low back pain without sciatica 03/13/2018    Depression with anxiety     Hypertension     Osteoarthritis 06/20/2013    Pulmonary hypertension     Recurrent upper respiratory infection (URI)     Right rotator cuff tear     Sleep apnea     Cipap machine at home    Urinary incontinence     Leaks urine with laughing/ coughing/ sneezing       Past Surgical History:   Procedure Laterality Date    CATARACT EXTRACTION  06/18/2012    OS    CATARACT EXTRACTION  07/16/2012    OD    CERVIX SURGERY      [place]    COLONOSCOPY      COLONOSCOPY N/A 03/04/2016    Procedure: COLONOSCOPY;  Surgeon: Tenzin Thakkar MD;  Location: [facility] (4TH FLR);  Service: Endoscopy;  Laterality: N/A;    COLONOSCOPY Left 01/25/2021    Procedure: COLONOSCOPY;  Surgeon: Wayne Naranjo MD;  Location: [facility];  Service: Endoscopy;  Laterality: Left;    COLONOSCOPY N/A 06/19/2023    Procedure: COLONOSCOPY;  Surgeon: Tenzin Thakkar MD;  Location: [facility] (2ND FLR);  Service: Endoscopy;  Laterality: N/A;  lung issues  referral Dr [id number] portal-  6/13/23- no answer for precall- ks    CYSTOSCOPY N/A 09/12/2022    Procedure: CYSTOSCOPY;  Surgeon: Bianka Aragon MD;  Location: [facility] OR 1ST FLR;  Service: Urology;  Laterality: N/A;    ENDOBRONCHIAL ULTRASOUND N/A 8/6/2024    Procedure: ENDOBRONCHIAL ULTRASOUND (EBUS);  Surgeon: Praneeth Tracy MD;  Location: [facility] OR 2ND FLR;   Service: Pulmonary;  Laterality: N/A;    ESOPHAGOGASTRODUODENOSCOPY Left 01/25/2021    Procedure: EGD (ESOPHAGOGASTRODUODENOSCOPY);  Surgeon: Wayne Naranjo MD;  Location: [facility] ENDO;  Service: Endoscopy;  Laterality: Left;    EYE SURGERY Bilateral     cataract    HYSTERECTOMY  05/12/2015    LARYNGOSCOPY N/A 9/28/2023    Procedure: Suspension microlaryngoscopy with bilateral vocal fold injection augmentation - Restylane;  Surgeon: Andre Valera MD;  Location: [facility] OR;  Service: ENT;  Laterality: N/A;  Microscope, telescopes, tower, injector, Restylane    LEEP      LUMBAR EPIDURAL INJECTION      OOPHORECTOMY      RI REMOVAL OF OVARY/TUBE(S)  05/12/2015    RETROGRADE PYELOGRAPHY Bilateral 09/12/2022    Procedure: PYELOGRAM, RETROGRADE;  Surgeon: Bianka Aragon MD;  [address]  Service: Urology;  Laterality: Bilateral;    RIGHT HEART CATHETERIZATION Right 6/26/2024    Procedure: INSERTION, CATHETER, RIGHT HEART;  Surgeon: Bishop Baez MD;  Location: [facility] CATH LAB;  Service: Cardiology;  Laterality: Right;    ROBOT-ASSISTED REPAIR OF VENTRAL HERNIA USING DA ASPEN XI N/A 11/29/2021    Procedure: XI ROBOTIC REPAIR, HERNIA, VENTRAL w/ possible mesh;  Surgeon: Roney Jackson MD;  Location: [facility] 2ND FLR;  Service: General;  Laterality: N/A;  Anesthesia Block    SALPINGECTOMY Left     SHOULDER SURGERY Right     SINUS SURGERY           Current Outpatient Medications:     aspirin 81 MG Chew, TAKE 1 TABLET BY MOUTH EVERY DAY, Disp: 90 tablet, Rfl: 1    atorvastatin (LIPITOR) 20 MG tablet, Take 1 tablet (20 mg total) by mouth once daily., Disp: 90 tablet, Rfl: 3    betamethasone dipropionate (DIPROLENE) 0.05 % lotion, ONE-TWO DROPS TO EARS DAILY NEEDED FOR ITCHING, Disp: 60 mL, Rfl: 0    clobetasol 0.05% (TEMOVATE) 0.05 % Oint, Apply topically 2 (two) times daily., Disp: 60 g, Rfl: 1    doxycycline (VIBRA-TABS) 100 MG tablet, One tablet twice daily with food and a full glass of water,  Disp: 20 tablet, Rfl: 0    ergocalciferol (ERGOCALCIFEROL) 50,000 unit Cap, TAKE 1 CAPSULE (50,000 UNITS TOTAL) BY MOUTH EVERY 30 DAYS, Disp: 3 capsule, Rfl: 3    EScitalopram oxalate (LEXAPRO) 20 MG tablet, Take 1 tablet (20 mg total) by mouth once daily., Disp: 90 tablet, Rfl: 3    estradioL (ESTRACE) 0.01 % (0.1 mg/gram) vaginal cream, Use pea-sized amount vaginally nightly for 2 weeks, then 2-3x/week, Disp: 42.5 g, Rfl: 4    fluticasone propionate (FLONASE) 50 mcg/actuation nasal spray, Use 1-2 sprays in each nostril twice daily, Disp: 16 g, Rfl: 11    guaiFENesin (MUCINEX) 600 mg 12 hr tablet, Take 2 tablets (1,200 mg total) by mouth 2 (two) times daily., Disp: 60 tablet, Rfl: 3    irbesartan-hydrochlorothiazide (AVALIDE) 300-12.5 mg per tablet, TAKE 1 TABLET BY MOUTH EVERY DAY, Disp: 90 tablet, Rfl: 3    loratadine (CLARITIN) 10 mg tablet, Take 1 tablet (10 mg total) by mouth once daily., Disp: 30 tablet, Rfl: 11    LORazepam (ATIVAN) 0.5 MG tablet, TAKE 1/2 TO 1 TABLET BY MOUTH DAILY AS NEEDED FOR ANXIETY, Disp: 30 tablet, Rfl: 0    meloxicam (MOBIC) 15 MG tablet, , Disp: , Rfl:     metoprolol succinate (TOPROL-XL) 50 MG 24 hr tablet, Take 1 tablet (50 mg total) by mouth once daily., Disp: 90 tablet, Rfl: 3    nystatin (MYCOSTATIN) cream, Apply topically 2 (two) times daily., Disp: 30 g, Rfl: 1    predniSONE (DELTASONE) 20 MG tablet, Take 1 tablet (20 mg total) by mouth once daily., Disp: 5 tablet, Rfl: 0    pulse oximeter (PULSE OXIMETER) device, by Apply Externally route 2 (two) times a day. Use twice daily at 8 AM and 3 PM and record the value in [facility] as directed., Disp: 1 each, Rfl: 0    sodium chloride for inhalation (SODIUM CHLORIDE 0.9%) 0.9 % nebulizer solution, Take 3 mLs by nebulization as needed for Wheezing., Disp: 15 mL, Rfl: 12    spironolactone (ALDACTONE) 25 MG tablet, Half tablet daily, Disp: 30 tablet, Rfl: 6    tadalafil (ADCIRCA) 20 mg Tab, Take 2 tablets (40 mg total) by mouth once  daily. take 20 mg (1 Tablet) once daily for 2 weeks, then increase to 40 mg (2 tablets) thereafter, Disp: 60 tablet, Rfl: 11    treprostiniL (TYVASO DPI) 16 mcg (112)- 32 mcg (84) CtDv, Inhale 16 mcg into the lungs 4 (four) times daily. Titration to max dose tolerated, Disp: , Rfl:     triamcinolone acetonide 0.1% (KENALOG) 0.1 % cream, APPLY TOPICALLY TWICE A DAY, Disp: 45 g, Rfl: 0    albuterol (PROVENTIL/VENTOLIN HFA) 90 mcg/actuation inhaler, Inhale 1-2 puffs into the lungs every 6 (six) hours as needed for Wheezing or Shortness of Breath (Please dispense with spacer)., Disp: 6.7 g, Rfl: 6    GEMTESA 75 mg Tab, Take 1 tablet (75 mg total) by mouth once daily., Disp: 90 tablet, Rfl: 3    olopatadine (PATANOL) 0.1 % ophthalmic solution, Place 1 drop into both eyes 2 (two) times daily., Disp: 5 mL, Rfl: 0    Current Facility-Administered Medications:     ciprofloxacin HCl tablet 500 mg, 500 mg, Oral, Once, Malu Barragan, NP    LIDOcaine HCl 2% urojet, , Urethral, Once, Malu Barragan, NP    Facility-Administered Medications Ordered in Other Visits:     0.9%  NaCl infusion, , Intravenous, Continuous, Roney Jackson MD, Last Rate: 0 mL/hr at 11/29/21 1755, New Bag at 09/28/23 0851    fentaNYL 50 mcg/mL injection  mcg,  mcg, Intravenous, PRN, Anthony Hernandez MD, 100 mcg at 09/28/23 0854    LIDOcaine (PF) 10 mg/ml (1%) injection 10 mg, 1 mL, Intradermal, Once, Roney Jackson MD    midazolam (VERSED) 1 mg/mL injection 0.5-4 mg, 0.5-4 mg, Intravenous, PRN, Anthony Hernandez MD, 1 mg at 11/29/21 1109    prochlorperazine injection Soln 5 mg, 5 mg, Intravenous, Q30 Min PRN, Beth Ramsey MD    sodium chloride 0.9% flush 10 mL, 10 mL, Intravenous, PRN, Beth Ramsey MD    Review of patient's allergies indicates:   Allergen Reactions    Flowers     Grass pollen-june grass standard     House dust        Family History   Problem Relation Name Age of Onset    Cancer Mother  "         THYROID CANCER    Stroke Mother      Hypertension Father      Cataracts Father      Heart disease Sister Mary     Pacemaker/defibrilator Sister Mary     Hypertension Sister Mary     Deep vein thrombosis Sister Mary     Heart disease Sister Oksana         CABG    Osteoarthritis Sister Oksana     Edema Sister Oksana     Eczema Sister Ines     Hypertension Sister Ines     Breast cancer Sister Ines 72    Anuerysm Sister Shirlita     Drug abuse Sister Shirlita     Hypertension Sister Shirlita     No Known Problems Sister Pat     Cancer Sister Halima         Cancer cells or "growth in her stomach"    No Known Problems Sister Bre     No Known Problems Brother Jitendra     Depression Maternal Grandmother      Ulcers Maternal Grandfather          Legs    No Known Problems Paternal Grandmother      No Known Problems Paternal Grandfather      Hypertension Daughter      Other Daughter          Heart palpitations    Cancer Maternal Aunt      Breast cancer Cousin      ADD / ADHD Neg Hx      Alcohol abuse Neg Hx      Anxiety disorder Neg Hx      Bipolar disorder Neg Hx      Dementia Neg Hx      OCD Neg Hx      Paranoid behavior Neg Hx      Physical abuse Neg Hx      Schizophrenia Neg Hx      Seizures Neg Hx      Sexual abuse Neg Hx      Amblyopia Neg Hx      Blindness Neg Hx      Glaucoma Neg Hx      Macular degeneration Neg Hx      Retinal detachment Neg Hx      Strabismus Neg Hx      Anesthesia problems Neg Hx         Social History     Socioeconomic History    Marital status:    Occupational History    Occupation: Disability     Comment: Depression     Employer: DISABLED   Tobacco Use    Smoking status: Former     Current packs/day: 0.00     Average packs/day: 0.5 packs/day for 22.0 years (11.0 ttl pk-yrs)     Types: Cigarettes     Start date: 1975     Quit date: 1997     Years since quittin.6     Passive exposure: Past    Smokeless tobacco: Never    Tobacco comments: "     Some wheezing since COVID-19 infection-2020   Substance and Sexual Activity    Alcohol use: Yes     Alcohol/week: 0.0 standard drinks of alcohol     Comment: Occassionally for social events will have a glass of wine    Drug use: No    Sexual activity: Not Currently     Partners: Male   Other Topics Concern    Caffeine Use Yes    Financial Status: Disabled Yes    Firearms: Does patient have access to a firearm? No    Home situation: lives alone Yes    Spirituality: Active Participation Yes    Education: Unfinished college Yes    Spirituality: Organized [employer] Yes    Legal: Arrest history No   Social History Narrative    Marleny currently does operate an automobile.    Lives with daughter. Feels safe in her home.      Social Determinants of Health     Financial Resource Strain: Low Risk  (3/17/2022)    Overall Financial Resource Strain (CARDIA)     Difficulty of Paying Living Expenses: Not hard at all   Food Insecurity: No Food Insecurity (3/17/2022)    Hunger Vital Sign     Worried About Running Out of Food in the Last Year: Never true     Ran Out of Food in the Last Year: Never true   Transportation Needs: No Transportation Needs (3/17/2022)    PRAPARE - Transportation     Lack of Transportation (Medical): No     Lack of Transportation (Non-Medical): No   Physical Activity: Inactive (3/17/2022)    Exercise Vital Sign     Days of Exercise per Week: 0 days     Minutes of Exercise per Session: 0 min   Stress: Stress Concern Present (3/17/2022)    [employer] - Occupational Stress Questionnaire     Feeling of Stress : To some extent   Housing Stability: Low Risk  (3/17/2022)    Housing Stability Vital Sign     Unable to Pay for Housing in the Last Year: No     Number of Places Lived in the Last Year: 1     Unstable Housing in the Last Year: No       OB History          3    Para   1    Term   1            AB   2    Living   1         SAB   0    IAB        Ectopic   1     Multiple        Live Births                     Gyn History    Mammogram: 7/25/24 BIRADS 1 negative  Pap smear: s/p hysterectomy  LMP: No LMP recorded. Patient has had a hysterectomy.   Postmenopausal bleeding: n/a      INITIAL PHYSICAL EXAMINATION    Vitals:    08/16/24 0925   BP: (!) 140/64   Pulse: (!) 57      General: Healthy in appearance, Well nourished, Affect Normal, NAD.  Neck: Supple, No masses, Trachea midline, Thyroid normal size,  non-tender, no masses or nodules.  Nodes: No clavicular/cervical adenopathy.  Skin: Normal temperature, No atypical lesions or rash.  Heart: Normal sounds, no murmurs  Lungs: Normal respiratory effort.  Gastrointestinal: Non tender, Non distended, No masses, guarding or  rebound, No hepatosplenomegally, No hernia.  Ext: No clubbing, cyanosis, edema or varicosities.  DTR's: 2+ bilaterally  Strength 5/5 bilateral upper and lower extremities    Genitourinary- (Verbal consent obtained; Female chaperone present during the pelvic exam)    Vulva: normal without lesions, masses, atrophy or pain  Urethra: meatus central and normal in appearance, no prolapse/caruncle, no masses or discharge. Empty supine stress test was negative.  Bladder: non-tender, no masses  Vagina: No discharge or lesions, moderate atrophy, no masses appreciated.  [See POP-Q]  Cervix: absent  Uterus:  absent  Adnexa: no masses, non tender.  Rectal: deferred   Neuro: S2-4- pin prick and light touch intact, Anal wink present  Levator strength: 2/5  Levator tenderness: left 7/10 and right 7/10    POP-Q Exam- pelvic organ prolapse quantitative    Aa -3  Anterior Wall Ba -3  Anterior wall C -8  Cervix or cuff   Gh 3  Genital hiatus pb 3.5  perineal body tvl 8.5  Total vaginal length   Ap -3  Posterior wall Bp -3  Posterior wall D   Posterior fornix     without Uterus    POP-Q Stage:0    Office Visit on 08/16/2024   Component Date Value Ref Range Status    Glucose, UA 08/16/2024 n   Final    Bilirubin, POC 08/16/2024 n    Final    Ketones, UA 08/16/2024 n   Final    Spec Grav UA 08/16/2024 1.020   Final    Blood, UA 08/16/2024 n   Final    pH, UA 08/16/2024 5   Final    Protein, POC 08/16/2024 n   Final    Urobilinogen, UA 08/16/2024 n   Final    Nitrite, UA 08/16/2024 n   Final    WBC, UA 08/16/2024 n   Final    Color, UA 08/16/2024 Yellow   Final    Clarity, UA 08/16/2024 Clear   Final        ASSESSMENT & PLAN:    Mixed urge and stress incontinence  -     Ambulatory referral/consult to Urogynecology  -     POCT URINE DIPSTICK WITHOUT MICROSCOPE  -     POCT Bladder Scan  -     Ambulatory referral/consult to Physical/Occupational Therapy; Future; Expected date: 08/23/2024    Myofascial pain  -     Ambulatory referral/consult to Physical/Occupational Therapy; Future; Expected date: 08/23/2024    Vulvar itching    Urgency of urination  -     GEMTESA 75 mg Tab; Take 1 tablet (75 mg total) by mouth once daily.  Dispense: 90 tablet; Refill: 3       Exam findings and treatment options were discussed in detail with the patient. Her symptoms are consistent with mixed urinary incontinence. We spent time in discussion today of the differences between UUI and RACHELLE. We reviewed treatment options of each type of incontinence as well, discussing in detail that for UUI she can try conservative measures such as bladder retraining, PT or medications and for RACHELLE options such as PT, Revive, pessary use or surgery.   She was provided with handouts about both types of incontinence to review. She is interested in trying medication for the OAB/ urgency urinary incontinence. Due to her underlying pulmonary hypertension she was prescribed Gemtesa. Side effects were reviewed. Her records indicate this medication was prescribed 12/2023 by Urology but the patient does not recall ever filling the prescription.     She also was referred to pelvic floor PT as she was noted to have myofascial pain of the pelvic floor. Patient has left hip issues and we reviewed how  both of these can be contributing to the poor function of the pelvic floor.     Patient will be seen in about 6 weeks in [place] for a follow up appointment to re-assess symptoms.     All questions were answered today. The patient was encouraged to contact the office as needed with any additional questions or concerns.     Total time spent on visit was 40 minutes.  This includes face to face time and non-face to face time preparing to see the patient (eg, review of tests), Obtaining and/or reviewing separately obtained history, Documenting clinical information in the electronic or other health record, Independently interpreting resultsand communicating results to the patient/family/caregiver, or Care coordination.    Carley Oliva MD

## 2024-08-16 NOTE — TELEPHONE ENCOUNTER
----- Message from Margo Beard sent at 8/16/2024  8:31 AM CDT -----  Contact: [phone]  [email or web address]     Patient is calling for Medical Advice regarding: Pt said she needs a call back cause she is in traffic    How long has patient had these symptoms:    Pharmacy name and phone#:    Patient [facility] a call back or [facility]:call back     Comments:    Please advise patient replies from provider may take up to 48 hours.

## 2024-08-20 ENCOUNTER — PATIENT MESSAGE (OUTPATIENT)
Dept: INTERNAL MEDICINE | Facility: CLINIC | Age: 74
End: 2024-08-20
Payer: MEDICARE

## 2024-08-27 ENCOUNTER — OFFICE VISIT (OUTPATIENT)
Dept: INTERNAL MEDICINE | Facility: CLINIC | Age: 74
End: 2024-08-27
Payer: MEDICARE

## 2024-08-27 ENCOUNTER — LAB VISIT (OUTPATIENT)
Dept: LAB | Facility: HOSPITAL | Age: 74
End: 2024-08-27
Attending: INTERNAL MEDICINE
Payer: MEDICARE

## 2024-08-27 VITALS
OXYGEN SATURATION: 96 % | WEIGHT: 203.69 LBS | DIASTOLIC BLOOD PRESSURE: 62 MMHG | SYSTOLIC BLOOD PRESSURE: 142 MMHG | HEIGHT: 70 IN | HEART RATE: 57 BPM | BODY MASS INDEX: 29.16 KG/M2

## 2024-08-27 DIAGNOSIS — M79.10 MYALGIA: ICD-10-CM

## 2024-08-27 DIAGNOSIS — R49.0 HOARSENESS: Primary | ICD-10-CM

## 2024-08-27 LAB
ALBUMIN SERPL BCP-MCNC: 3.3 G/DL (ref 3.5–5.2)
ALP SERPL-CCNC: 165 U/L (ref 55–135)
ALT SERPL W/O P-5'-P-CCNC: 11 U/L (ref 10–44)
ANION GAP SERPL CALC-SCNC: 9 MMOL/L (ref 8–16)
AST SERPL-CCNC: 16 U/L (ref 10–40)
BASOPHILS # BLD AUTO: 0.07 K/UL (ref 0–0.2)
BASOPHILS NFR BLD: 0.6 % (ref 0–1.9)
BILIRUB SERPL-MCNC: 0.5 MG/DL (ref 0.1–1)
BUN SERPL-MCNC: 16 MG/DL (ref 8–23)
CALCIUM SERPL-MCNC: 9.9 MG/DL (ref 8.7–10.5)
CHLORIDE SERPL-SCNC: 105 MMOL/L (ref 95–110)
CK SERPL-CCNC: 90 U/L (ref 20–180)
CO2 SERPL-SCNC: 25 MMOL/L (ref 23–29)
CREAT SERPL-MCNC: 0.9 MG/DL (ref 0.5–1.4)
CRP SERPL-MCNC: 11.5 MG/L (ref 0–8.2)
DIFFERENTIAL METHOD BLD: ABNORMAL
EOSINOPHIL # BLD AUTO: 1.4 K/UL (ref 0–0.5)
EOSINOPHIL NFR BLD: 12.4 % (ref 0–8)
ERYTHROCYTE [DISTWIDTH] IN BLOOD BY AUTOMATED COUNT: 15.6 % (ref 11.5–14.5)
ERYTHROCYTE [SEDIMENTATION RATE] IN BLOOD BY PHOTOMETRIC METHOD: 36 MM/HR (ref 0–36)
EST. GFR  (NO RACE VARIABLE): >60 ML/MIN/1.73 M^2
GLUCOSE SERPL-MCNC: 92 MG/DL (ref 70–110)
HCT VFR BLD AUTO: 36.6 % (ref 37–48.5)
HGB BLD-MCNC: 11.7 G/DL (ref 12–16)
IMM GRANULOCYTES # BLD AUTO: 0.03 K/UL (ref 0–0.04)
IMM GRANULOCYTES NFR BLD AUTO: 0.3 % (ref 0–0.5)
LYMPHOCYTES # BLD AUTO: 2.8 K/UL (ref 1–4.8)
LYMPHOCYTES NFR BLD: 24.5 % (ref 18–48)
MCH RBC QN AUTO: 28.1 PG (ref 27–31)
MCHC RBC AUTO-ENTMCNC: 32 G/DL (ref 32–36)
MCV RBC AUTO: 88 FL (ref 82–98)
MONOCYTES # BLD AUTO: 0.7 K/UL (ref 0.3–1)
MONOCYTES NFR BLD: 5.8 % (ref 4–15)
NEUTROPHILS # BLD AUTO: 6.4 K/UL (ref 1.8–7.7)
NEUTROPHILS NFR BLD: 56.4 % (ref 38–73)
NRBC BLD-RTO: 0 /100 WBC
PLATELET # BLD AUTO: 389 K/UL (ref 150–450)
PMV BLD AUTO: 10.4 FL (ref 9.2–12.9)
POTASSIUM SERPL-SCNC: 4 MMOL/L (ref 3.5–5.1)
PROT SERPL-MCNC: 7 G/DL (ref 6–8.4)
RBC # BLD AUTO: 4.17 M/UL (ref 4–5.4)
SODIUM SERPL-SCNC: 139 MMOL/L (ref 136–145)
T4 FREE SERPL-MCNC: 1 NG/DL (ref 0.71–1.51)
TSH SERPL DL<=0.005 MIU/L-ACNC: 4.08 UIU/ML (ref 0.4–4)
WBC # BLD AUTO: 11.37 K/UL (ref 3.9–12.7)

## 2024-08-27 PROCEDURE — 3078F DIAST BP <80 MM HG: CPT | Mod: CPTII,S$GLB,, | Performed by: INTERNAL MEDICINE

## 2024-08-27 PROCEDURE — 85025 COMPLETE CBC W/AUTO DIFF WBC: CPT | Performed by: INTERNAL MEDICINE

## 2024-08-27 PROCEDURE — 99999 PR PBB SHADOW E&M-EST. PATIENT-LVL III: CPT | Mod: PBBFAC,,, | Performed by: INTERNAL MEDICINE

## 2024-08-27 PROCEDURE — 85652 RBC SED RATE AUTOMATED: CPT | Performed by: INTERNAL MEDICINE

## 2024-08-27 PROCEDURE — 36415 COLL VENOUS BLD VENIPUNCTURE: CPT | Mod: PO | Performed by: INTERNAL MEDICINE

## 2024-08-27 PROCEDURE — 1125F AMNT PAIN NOTED PAIN PRSNT: CPT | Mod: CPTII,S$GLB,, | Performed by: INTERNAL MEDICINE

## 2024-08-27 PROCEDURE — 3061F NEG MICROALBUMINURIA REV: CPT | Mod: CPTII,S$GLB,, | Performed by: INTERNAL MEDICINE

## 2024-08-27 PROCEDURE — 3288F FALL RISK ASSESSMENT DOCD: CPT | Mod: CPTII,S$GLB,, | Performed by: INTERNAL MEDICINE

## 2024-08-27 PROCEDURE — 84443 ASSAY THYROID STIM HORMONE: CPT | Performed by: INTERNAL MEDICINE

## 2024-08-27 PROCEDURE — 1101F PT FALLS ASSESS-DOCD LE1/YR: CPT | Mod: CPTII,S$GLB,, | Performed by: INTERNAL MEDICINE

## 2024-08-27 PROCEDURE — 80053 COMPREHEN METABOLIC PANEL: CPT | Performed by: INTERNAL MEDICINE

## 2024-08-27 PROCEDURE — 84439 ASSAY OF FREE THYROXINE: CPT | Performed by: INTERNAL MEDICINE

## 2024-08-27 PROCEDURE — 3044F HG A1C LEVEL LT 7.0%: CPT | Mod: CPTII,S$GLB,, | Performed by: INTERNAL MEDICINE

## 2024-08-27 PROCEDURE — 3077F SYST BP >= 140 MM HG: CPT | Mod: CPTII,S$GLB,, | Performed by: INTERNAL MEDICINE

## 2024-08-27 PROCEDURE — 3066F NEPHROPATHY DOC TX: CPT | Mod: CPTII,S$GLB,, | Performed by: INTERNAL MEDICINE

## 2024-08-27 PROCEDURE — 1159F MED LIST DOCD IN RCRD: CPT | Mod: CPTII,S$GLB,, | Performed by: INTERNAL MEDICINE

## 2024-08-27 PROCEDURE — 86140 C-REACTIVE PROTEIN: CPT | Performed by: INTERNAL MEDICINE

## 2024-08-27 PROCEDURE — 82550 ASSAY OF CK (CPK): CPT | Performed by: INTERNAL MEDICINE

## 2024-08-27 PROCEDURE — 99214 OFFICE O/P EST MOD 30 MIN: CPT | Mod: S$GLB,,, | Performed by: INTERNAL MEDICINE

## 2024-08-28 ENCOUNTER — TELEPHONE (OUTPATIENT)
Dept: TRANSPLANT | Facility: CLINIC | Age: 74
End: 2024-08-28
Payer: MEDICARE

## 2024-08-28 NOTE — TELEPHONE ENCOUNTER
"NN called the patient to follow up regarding the patient starting new medication Tyvaso. Patient did not answer. Voicemail was left asking for a return call. Patient message also sent in my chart.    @1:50 pm  NN received a message that the patient would like a return call. NN called the patient to see how she was doing on the Tyvaso. The patient has been experiencing all over body aches that last all day. The patient explained that she increased her Tadalafil at around the same time she started the Tyvaso so she wasn't sure which medication could be the cause. NN educated the patient about each medication's side effects. Tadalafil is the most likely cause of the muscle ache side effects. NN asked the patient if she was taking her blood pressure. Patient stated yes and it has been normal except today. NN asked what is "normal" Patient stated its usually around 130/60. But today her BP was 113/40. Patient did not endorse any other side effects. Patient was informed that the MD would need to make a decision around next steps and NN will call her back ASAP.    @4:00 pm  Dr. Jimenez and NN discussed the patient's above symptoms. Per Dr. Jimenez patient should titrate back down on the Tyvaso and see if the muscle aches decrease. NN called the patient and asked what dose she was currently on. The patient stated that she was currently on 48 mcg of the Tyvaso. NN informed the patient that per Dr. Jimenez she should go back down to 32 Mcg and stay there for a couple of weeks. Patient was notified to use Tylenol for pain relief instead of Ibuprofen. NN asked that if the symptoms get worse to call or message NN. NN will touch base with the patient in two weeks.  "

## 2024-09-02 NOTE — PROGRESS NOTES
Subjective:       Patient ID: Marleny Guzman is a 74 y.o. female.    Chief Complaint: Follow-up, Neck Pain, Shoulder Pain, Arm Pain, Joint Pain, Hip Pain (left), and Heel Pain (right)    Follow-up  Associated symptoms include neck pain. Pertinent negatives include no abdominal pain, chest pain (arm pain or jaw pain), headaches, nausea or vomiting.   Neck Pain   Pertinent negatives include no chest pain (arm pain or jaw pain) or headaches.   Shoulder Pain   Pertinent negatives include no headaches.   Arm Pain   Pertinent negatives include no chest pain (arm pain or jaw pain).   Joint Pain  Associated symptoms include neck pain. Pertinent negatives include no abdominal pain, chest pain (arm pain or jaw pain), headaches, nausea or vomiting.   Hip Pain     Pt recently on new meds for ILD, pulmonary HTN.  Breathing is much better but she is feeling very achy.  Painful to walk - neck and hips.  No CP or SOB.  Review of Systems   Respiratory:  Negative for shortness of breath (PND or orthopnea).    Cardiovascular:  Negative for chest pain (arm pain or jaw pain).   Gastrointestinal:  Negative for abdominal pain, diarrhea, nausea and vomiting.   Genitourinary:  Negative for dysuria.   Musculoskeletal:  Positive for neck pain.   Neurological:  Negative for seizures, syncope and headaches.       Objective:      Physical Exam  Constitutional:       General: She is not in acute distress.     Appearance: She is well-developed.   HENT:      Head: Normocephalic.   Eyes:      Pupils: Pupils are equal, round, and reactive to light.   Neck:      Thyroid: No thyromegaly.      Vascular: No JVD.   Cardiovascular:      Rate and Rhythm: Normal rate and regular rhythm.      Heart sounds: Normal heart sounds. No murmur heard.     No friction rub. No gallop.   Pulmonary:      Effort: Pulmonary effort is normal.      Breath sounds: Normal breath sounds. No wheezing or rales.   Abdominal:      General: Bowel sounds are normal. There is  no distension.      Palpations: Abdomen is soft. There is no mass.      Tenderness: There is no abdominal tenderness. There is no guarding or rebound.   Musculoskeletal:      Cervical back: Neck supple.   Lymphadenopathy:      Cervical: No cervical adenopathy.   Skin:     General: Skin is warm and dry.   Neurological:      Mental Status: She is alert and oriented to person, place, and time.      Deep Tendon Reflexes: Reflexes are normal and symmetric.   Psychiatric:         Behavior: Behavior normal.         Thought Content: Thought content normal.         Judgment: Judgment normal.         Assessment:       1. Hoarseness    2. Myalgia        Plan:   Hoarseness  -     Ambulatory referral/consult to ENT; Future; Expected date: 09/03/2024    Myalgia  -     CBC Auto Differential; Future; Expected date: 08/27/2024  -     Comprehensive Metabolic Panel; Future; Expected date: 08/27/2024  -     Sedimentation rate; Future; Expected date: 08/27/2024  -     C-Reactive Protein; Future; Expected date: 08/27/2024  -     TSH; Future; Expected date: 08/27/2024  -     CK; Future; Expected date: 08/27/2024    Hold lipitor  ?myalgia from tadalafil or tyvaso?

## 2024-09-02 NOTE — PROGRESS NOTES
No inflammation seen - will message your other docs about decreasing or holding adcirca (tadalafil)please also hold the atorvastatin.

## 2024-09-03 ENCOUNTER — TELEPHONE (OUTPATIENT)
Dept: TRANSPLANT | Facility: CLINIC | Age: 74
End: 2024-09-03
Payer: MEDICARE

## 2024-09-03 NOTE — TELEPHONE ENCOUNTER
"Patient called NN regarding her generalized pain. Patient reports not being able to get out of bed due to pain and nothing helps even her medications that usually give her some relief. NN asked if the patient was having BP issues, lightheaded, dizziness, diarrhea or retaining any fluid. Patient denied all symptoms except generalized pain.     @11:00 NN dicussed above patient concerns with ELVIA Escobar. ELVIA Escobar ordered patient to stop her Tadalafil and to follow up with the patient in a week. NN called the patient and informed the patient that per ELVIA Escobar she needs to stop her Tadalafil. Patient reiterated that amount of pain she is experiencing and that her pain seems worse after her Tyvaso treatments. Patient stated "so I am to stop both medications"? NN explained to the patient that per ELVIA Escobar the patient is supposed to only stop the Tadalafil. NN then stated but if you feel you need to stop both the Tadalafil and the Tyvaso I will inform ELVIA Escobar. Patient stated, "yes I think I need to stop both of the medications". NN stated that she would contact the patient next week to see how she is doing. NN also told the patient to call NN back if she is having issues before next week. Patient verbalized understanding. NN will continue to follow.  "

## 2024-09-05 ENCOUNTER — TELEPHONE (OUTPATIENT)
Dept: TRANSPLANT | Facility: CLINIC | Age: 74
End: 2024-09-05
Payer: MEDICARE

## 2024-09-05 DIAGNOSIS — R06.02 SHORTNESS OF BREATH: ICD-10-CM

## 2024-09-05 DIAGNOSIS — R06.82 TACHYPNEA: Primary | ICD-10-CM

## 2024-09-05 DIAGNOSIS — I27.9 CHRONIC PULMONARY HEART DISEASE: ICD-10-CM

## 2024-09-06 ENCOUNTER — TELEPHONE (OUTPATIENT)
Dept: TRANSPLANT | Facility: CLINIC | Age: 74
End: 2024-09-06
Payer: MEDICARE

## 2024-09-07 ENCOUNTER — HOSPITAL ENCOUNTER (EMERGENCY)
Facility: HOSPITAL | Age: 74
Discharge: HOME | End: 2024-09-07
Attending: EMERGENCY MEDICINE
Payer: MEDICARE

## 2024-09-07 VITALS
BODY MASS INDEX: 29.2 KG/M2 | HEART RATE: 68 BPM | OXYGEN SATURATION: 98 % | WEIGHT: 204 LBS | HEIGHT: 70 IN | RESPIRATION RATE: 16 BRPM | DIASTOLIC BLOOD PRESSURE: 69 MMHG | SYSTOLIC BLOOD PRESSURE: 178 MMHG | TEMPERATURE: 98 F

## 2024-09-07 DIAGNOSIS — R07.9 CHEST PAIN: ICD-10-CM

## 2024-09-07 DIAGNOSIS — M79.10 MUSCLE PAIN: ICD-10-CM

## 2024-09-07 DIAGNOSIS — T88.7XXA MEDICATION SIDE EFFECT: Primary | ICD-10-CM

## 2024-09-07 LAB
ALBUMIN SERPL BCP-MCNC: 3.2 G/DL (ref 3.5–5.2)
ALP SERPL-CCNC: 145 U/L (ref 55–135)
ALT SERPL W/O P-5'-P-CCNC: 35 U/L (ref 10–44)
ANION GAP SERPL CALC-SCNC: 8 MMOL/L (ref 8–16)
AST SERPL-CCNC: 35 U/L (ref 10–40)
BASOPHILS # BLD AUTO: 0.04 K/UL (ref 0–0.2)
BASOPHILS NFR BLD: 0.4 % (ref 0–1.9)
BILIRUB SERPL-MCNC: 0.5 MG/DL (ref 0.1–1)
BNP SERPL-MCNC: 45 PG/ML (ref 0–99)
BUN SERPL-MCNC: 34 MG/DL (ref 8–23)
CALCIUM SERPL-MCNC: 8.4 MG/DL (ref 8.7–10.5)
CHLORIDE SERPL-SCNC: 104 MMOL/L (ref 95–110)
CO2 SERPL-SCNC: 25 MMOL/L (ref 23–29)
CREAT SERPL-MCNC: 1.2 MG/DL (ref 0.5–1.4)
DIFFERENTIAL METHOD BLD: ABNORMAL
EOSINOPHIL # BLD AUTO: 0.2 K/UL (ref 0–0.5)
EOSINOPHIL NFR BLD: 1.8 % (ref 0–8)
ERYTHROCYTE [DISTWIDTH] IN BLOOD BY AUTOMATED COUNT: 15 % (ref 11.5–14.5)
EST. GFR  (NO RACE VARIABLE): 47.2 ML/MIN/1.73 M^2
GLUCOSE SERPL-MCNC: 113 MG/DL (ref 70–110)
HCT VFR BLD AUTO: 31.3 % (ref 37–48.5)
HGB BLD-MCNC: 10 G/DL (ref 12–16)
IMM GRANULOCYTES # BLD AUTO: 0.03 K/UL (ref 0–0.04)
IMM GRANULOCYTES NFR BLD AUTO: 0.3 % (ref 0–0.5)
LYMPHOCYTES # BLD AUTO: 1.6 K/UL (ref 1–4.8)
LYMPHOCYTES NFR BLD: 14.3 % (ref 18–48)
MCH RBC QN AUTO: 27.9 PG (ref 27–31)
MCHC RBC AUTO-ENTMCNC: 31.9 G/DL (ref 32–36)
MCV RBC AUTO: 87 FL (ref 82–98)
MONOCYTES # BLD AUTO: 0.8 K/UL (ref 0.3–1)
MONOCYTES NFR BLD: 7 % (ref 4–15)
NEUTROPHILS # BLD AUTO: 8.7 K/UL (ref 1.8–7.7)
NEUTROPHILS NFR BLD: 76.2 % (ref 38–73)
NRBC BLD-RTO: 0 /100 WBC
OHS QRS DURATION: 82 MS
OHS QTC CALCULATION: 441 MS
PLATELET # BLD AUTO: 388 K/UL (ref 150–450)
PMV BLD AUTO: 10.2 FL (ref 9.2–12.9)
POTASSIUM SERPL-SCNC: 4.7 MMOL/L (ref 3.5–5.1)
PROT SERPL-MCNC: 6.5 G/DL (ref 6–8.4)
RBC # BLD AUTO: 3.59 M/UL (ref 4–5.4)
SODIUM SERPL-SCNC: 137 MMOL/L (ref 136–145)
TROPONIN I SERPL HS-MCNC: 5.1 PG/ML (ref 0–14.9)
WBC # BLD AUTO: 11.42 K/UL (ref 3.9–12.7)

## 2024-09-07 PROCEDURE — 96360 HYDRATION IV INFUSION INIT: CPT

## 2024-09-07 PROCEDURE — 96361 HYDRATE IV INFUSION ADD-ON: CPT

## 2024-09-07 PROCEDURE — 83880 ASSAY OF NATRIURETIC PEPTIDE: CPT | Performed by: STUDENT IN AN ORGANIZED HEALTH CARE EDUCATION/TRAINING PROGRAM

## 2024-09-07 PROCEDURE — 84484 ASSAY OF TROPONIN QUANT: CPT | Performed by: STUDENT IN AN ORGANIZED HEALTH CARE EDUCATION/TRAINING PROGRAM

## 2024-09-07 PROCEDURE — 85025 COMPLETE CBC W/AUTO DIFF WBC: CPT | Performed by: STUDENT IN AN ORGANIZED HEALTH CARE EDUCATION/TRAINING PROGRAM

## 2024-09-07 PROCEDURE — 36415 COLL VENOUS BLD VENIPUNCTURE: CPT | Performed by: STUDENT IN AN ORGANIZED HEALTH CARE EDUCATION/TRAINING PROGRAM

## 2024-09-07 PROCEDURE — 25000003 PHARM REV CODE 250: Performed by: STUDENT IN AN ORGANIZED HEALTH CARE EDUCATION/TRAINING PROGRAM

## 2024-09-07 PROCEDURE — 99284 EMERGENCY DEPT VISIT MOD MDM: CPT | Mod: 25

## 2024-09-07 PROCEDURE — 63600175 PHARM REV CODE 636 W HCPCS: Performed by: STUDENT IN AN ORGANIZED HEALTH CARE EDUCATION/TRAINING PROGRAM

## 2024-09-07 PROCEDURE — 80053 COMPREHEN METABOLIC PANEL: CPT | Performed by: STUDENT IN AN ORGANIZED HEALTH CARE EDUCATION/TRAINING PROGRAM

## 2024-09-07 RX ORDER — CAPSAICIN 0.75 MG/G
CREAM TOPICAL 3 TIMES DAILY PRN
Qty: 57 G | Refills: 0 | Status: SHIPPED | OUTPATIENT
Start: 2024-09-07

## 2024-09-07 RX ORDER — HYDROCODONE BITARTRATE AND ACETAMINOPHEN 5; 325 MG/1; MG/1
1 TABLET ORAL
Status: COMPLETED | OUTPATIENT
Start: 2024-09-07 | End: 2024-09-07

## 2024-09-07 RX ORDER — METHOCARBAMOL 500 MG/1
500 TABLET, FILM COATED ORAL
Status: COMPLETED | OUTPATIENT
Start: 2024-09-07 | End: 2024-09-07

## 2024-09-07 RX ORDER — HYDROCODONE BITARTRATE AND ACETAMINOPHEN 5; 325 MG/1; MG/1
1 TABLET ORAL EVERY 8 HOURS PRN
Qty: 6 TABLET | Refills: 0 | Status: SHIPPED | OUTPATIENT
Start: 2024-09-07

## 2024-09-07 RX ORDER — NYSTATIN 100000 U/G
CREAM TOPICAL 2 TIMES DAILY PRN
Qty: 15 G | Refills: 0 | Status: SHIPPED | OUTPATIENT
Start: 2024-09-07

## 2024-09-07 RX ADMIN — HYDROCODONE BITARTRATE AND ACETAMINOPHEN 1 TABLET: 5; 325 TABLET ORAL at 03:09

## 2024-09-07 RX ADMIN — SODIUM CHLORIDE, POTASSIUM CHLORIDE, SODIUM LACTATE AND CALCIUM CHLORIDE 1000 ML: 600; 310; 30; 20 INJECTION, SOLUTION INTRAVENOUS at 06:09

## 2024-09-07 RX ADMIN — METHOCARBAMOL TABLETS 500 MG: 500 TABLET, COATED ORAL at 03:09

## 2024-09-07 NOTE — ED PROVIDER NOTES
Encounter Date: 9/7/2024       History     Chief Complaint   Patient presents with    Altered Mental Status     Per EMS, family states patient having an intermittent spell of confusion. Patient currently A&O X4. Patient states having a fall yesterday and being in pain all over.      HPI  Patient is a 74-year-old female with a history of pulmonary hypertension currently on Tyvaso who is presenting stating she has pain all over due to the medication that she is taking.  Patient states that she developed muscle pain after starting the medication 2 weeks ago and has had very limited movement due to the pain. patient states she tried taking muscle relaxers, gabapentin, and Tylenol without complete symptom resolution.  Patient denies chest pain, shortness of breath, abdominal symptoms, and urinary symptoms.      Review of patient's allergies indicates:   Allergen Reactions    Flowers     Grass pollen-june grass standard     House dust      Past Medical History:   Diagnosis Date    Abnormal Pap smear of vagina 07/20/2016    Anemia     Cataract     Chronic bilateral low back pain without sciatica 03/13/2018    Depression with anxiety     Hypertension     Osteoarthritis 06/20/2013    Pulmonary hypertension     Recurrent upper respiratory infection (URI)     Right rotator cuff tear     Sleep apnea     Cipap machine at home    Urinary incontinence     Leaks urine with laughing/ coughing/ sneezing     Past Surgical History:   Procedure Laterality Date    CATARACT EXTRACTION  06/18/2012    OS    CATARACT EXTRACTION  07/16/2012    OD    CERVIX SURGERY      Conization    COLONOSCOPY      COLONOSCOPY N/A 03/04/2016    Procedure: COLONOSCOPY;  Surgeon: Tenzin Thakkar MD;  Location: [facility] ([address] [place] [address]);  Service: Endoscopy;  Laterality: N/A;    COLONOSCOPY Left 01/25/2021    Procedure: COLONOSCOPY;  Surgeon: Wayne Naranjo MD;  Location: [facility];  Service: Endoscopy;  Laterality: Left;    COLONOSCOPY N/A 06/19/2023    Procedure:  COLONOSCOPY;  Surgeon: Tenzin Thakkar MD;  Location: [facility] (2ND FLR);  Service: Endoscopy;  Laterality: N/A;  lung issues  referral Dr WoodardPagrpeacei-lkix-qujgj portal-GT  6/13/23- no answer for precall- ks    CYSTOSCOPY N/A 09/12/2022    Procedure: CYSTOSCOPY;  Surgeon: Bianka Aragon MD;  Location: [facility] OR 1ST FLR;  Service: Urology;  Laterality: N/A;    ENDOBRONCHIAL ULTRASOUND N/A 8/6/2024    Procedure: ENDOBRONCHIAL ULTRASOUND (EBUS);  Surgeon: Praneeth Tracy MD;  [facility];  Service: Pulmonary;  Laterality: N/A;    ESOPHAGOGASTRODUODENOSCOPY Left 01/25/2021    Procedure: EGD (ESOPHAGOGASTRODUODENOSCOPY);  Surgeon: Wayne Naranjo MD;  Location: [facility];  Service: Endoscopy;  Laterality: Left;    EYE SURGERY Bilateral     cataract    HYSTERECTOMY  05/12/2015    LARYNGOSCOPY N/A 9/28/2023    Procedure: Suspension microlaryngoscopy with bilateral vocal fold injection augmentation - Restylane;  Surgeon: Andre Valera MD;  Location: [facility] OR;  Service: ENT;  Laterality: N/A;  Microscope, telescopes, tower, injector, Restylane    LEEP      LUMBAR EPIDURAL INJECTION      OOPHORECTOMY      ID REMOVAL OF OVARY/TUBE(S)  05/12/2015    RETROGRADE PYELOGRAPHY Bilateral 09/12/2022    Procedure: PYELOGRAM, RETROGRADE;  Surgeon: Bianka Aragon MD;  [address];  Service: Urology;  Laterality: Bilateral;    RIGHT HEART CATHETERIZATION Right 6/26/2024    Procedure: INSERTION, CATHETER, RIGHT HEART;  Surgeon: Bishop Baez MD;  Location: [facility] CATH LAB;  Service: Cardiology;  Laterality: Right;    ROBOT-ASSISTED REPAIR OF VENTRAL HERNIA USING DA ASPEN XI N/A 11/29/2021    Procedure: XI ROBOTIC REPAIR, HERNIA, VENTRAL w/ possible mesh;  Surgeon: Roney Jackson MD;  [facility];  Service: General;  Laterality: N/A;  Anesthesia Block    SALPINGECTOMY Left     SHOULDER SURGERY Right     SINUS SURGERY       Family History   Problem Relation Name Age  "of Onset    Cancer Mother          THYROID CANCER    Stroke Mother      Hypertension Father      Cataracts Father      Heart disease Sister Mary     Pacemaker/defibrilator Sister Mary     Hypertension Sister Mary     Deep vein thrombosis Sister Mary     Heart disease Sister Oksana         CABG    Osteoarthritis Sister Oksana     Edema Sister Oksana     Eczema Sister Ines     Hypertension Sister Ines     Breast cancer Sister Ines 72    Anuerysm Sister Shirlita     Drug abuse Sister Shirlita     Hypertension Sister Shirlita     No Known Problems Sister Pat     Cancer Sister Halima         Cancer cells or "growth in her stomach"    No Known Problems Sister Bre     No Known Problems Brother Jitendra     Depression Maternal Grandmother      Ulcers Maternal Grandfather          Legs    No Known Problems Paternal Grandmother      No Known Problems Paternal Grandfather      Hypertension Daughter      Other Daughter          Heart palpitations    Cancer Maternal Aunt      Breast cancer Cousin      ADD / ADHD Neg Hx      Alcohol abuse Neg Hx      Anxiety disorder Neg Hx      Bipolar disorder Neg Hx      Dementia Neg Hx      OCD Neg Hx      Paranoid behavior Neg Hx      Physical abuse Neg Hx      Schizophrenia Neg Hx      Seizures Neg Hx      Sexual abuse Neg Hx      Amblyopia Neg Hx      Blindness Neg Hx      Glaucoma Neg Hx      Macular degeneration Neg Hx      Retinal detachment Neg Hx      Strabismus Neg Hx      Anesthesia problems Neg Hx       Social History     Tobacco Use    Smoking status: Former     Current packs/day: 0.00     Average packs/day: 0.5 packs/day for 22.0 years (11.0 ttl pk-yrs)     Types: Cigarettes     Start date: 1975     Quit date: 1997     Years since quittin.7     Passive exposure: Past    Smokeless tobacco: Never    Tobacco comments:     Some wheezing since COVID-19 infection-2020   Substance Use Topics    Alcohol use: Yes     Alcohol/week: 0.0 standard " drinks of alcohol     Comment: Occassionally for social events will have a glass of wine    Drug use: No     Review of Systems   Musculoskeletal:  Positive for myalgias.   All other systems reviewed and are negative.      Physical Exam     Initial Vitals [09/07/24 1522]   BP Pulse Resp Temp SpO2   (!) 112/56 68 17 98.5 °F (36.9 °C) 96 %      MAP       --         Physical Exam    Constitutional: She appears well-developed and well-nourished.   HENT:   Head: Normocephalic and atraumatic.   Right Ear: External ear normal.   Left Ear: External ear normal.   Eyes: Conjunctivae and EOM are normal.   Neck:   Normal range of motion.  Cardiovascular:  Normal rate, regular rhythm and normal heart sounds.           Pulmonary/Chest: Breath sounds normal.   Abdominal: Abdomen is soft.   Musculoskeletal:         General: Tenderness and edema present. Normal range of motion.      Cervical back: Normal range of motion.      Comments: Tenderness in BLE, mild pitting edema in BLE     Neurological: She is alert and oriented to person, place, and time.   Decreased strength in upper and lower extremities 2/2 pain.  Sensation intact.  Patient A&O x4   Skin: Skin is warm.         ED Course   Procedures  Labs Reviewed   CBC W/ AUTO DIFFERENTIAL - Abnormal       Result Value    WBC 11.42      RBC 3.59 (*)     Hemoglobin 10.0 (*)     Hematocrit 31.3 (*)     MCV 87      MCH 27.9      MCHC 31.9 (*)     RDW 15.0 (*)     Platelets 388      MPV 10.2      Immature Granulocytes 0.3      Gran # (ANC) 8.7 (*)     Immature Grans (Abs) 0.03      Lymph # 1.6      Mono # 0.8      Eos # 0.2      Baso # 0.04      nRBC 0      Gran % 76.2 (*)     Lymph % 14.3 (*)     Mono % 7.0      Eosinophil % 1.8      Basophil % 0.4      Differential Method Automated     COMPREHENSIVE METABOLIC PANEL - Abnormal    Sodium 137      Potassium 4.7      Chloride 104      CO2 25      Glucose 113 (*)     BUN 34 (*)     Creatinine 1.2      Calcium 8.4 (*)     Total Protein 6.5       Albumin 3.2 (*)     Total Bilirubin 0.5      Alkaline Phosphatase 145 (*)     AST 35      ALT 35      eGFR 47.2 (*)     Anion Gap 8      Narrative:     Collection has been rescheduled by CZB at 09/07/2024 18:10 Reason:   Nurse drawing it    B-TYPE NATRIURETIC PEPTIDE    BNP 45     TROPONIN I HIGH SENSITIVITY    Troponin I High Sensitivity 5.1      Narrative:     Collection has been rescheduled by CZB at 09/07/2024 18:10 Reason:   Nurse drawing it         ECG Results              EKG 12-lead (In process)        Collection Time Result Time QRS Duration OHS QTC Calculation    09/07/24 15:34:00 09/07/24 16:51:51 82 441                     In process by Interface, Lab In [facility] (09/07/24 16:52:00)                   Narrative:    Test Reason : R07.9,    Vent. Rate : 067 BPM     Atrial Rate : 067 BPM     P-R Int : 182 ms          QRS Dur : 082 ms      QT Int : 418 ms       P-R-T Axes : 082 057 038 degrees     QTc Int : 441 ms    Sinus rhythm with Premature atrial complexes  Septal infarct ,age undetermined  Abnormal ECG  When compared with ECG of 03-FEB-2024 09:57,  Premature atrial complexes are now Present  Septal infarct is now Present    Referred By: [email or web address]   SELF           Confirmed By:                                   Imaging Results    None          Medications   HYDROcodone-acetaminophen 5-325 mg per tablet 1 tablet (1 tablet Oral Given 9/7/24 1549)   methocarbamoL tablet 500 mg (500 mg Oral Given 9/7/24 1549)   lactated ringers bolus 1,000 mL (0 mLs Intravenous Stopped 9/7/24 2015)     Medical Decision Making  Amount and/or Complexity of Data Reviewed  Labs: ordered.    Risk  OTC drugs.  Prescription drug management.    Patient is a 74-year-old female who is presenting with diffuse muscle pain after starting a new medication.  Upon arrival to the ED, VSS and nontoxic appearing.  Physical exam as above.  Initial differential included but not limited to medication side effect, electrolyte abnormalities.   Workup included CBC, CMP, BNP, troponin, EKG.  Patient provided with Norco and Robaxin.  Labs interpreted by were within normal limits.  On reassessment, patient reported significant symptom improvement.  Patient was able to ambulate and felt good going home.  Patient provided with prescription for Norco and counseled on medication therapy.  It is likely patient still has the remnants of her medication which could be causing her to continue to have muscle pain.  Patient's daughter also requested capsaicin cream and nystatin cream as a prescription for patient.  Patient and patient's daughter counseled on medication therapy with the understanding.  Patient discharged with strict return precautions and recommendation to follow up with her PCP and pulmonologist.    CHASIDY Doss, PGY-4  Emergency Medicine      Please excuse any current medical areas as this was dictated using MMAda[employer] technology.                                     Clinical Impression:  Final diagnoses:  [R07.9] Chest pain  [T88.7XXA] Medication side effect (Primary)  [M79.10] Muscle pain          ED Disposition Condition    Discharge Stable          ED Prescriptions       Medication Sig Dispense Start Date End Date Auth. Provider    HYDROcodone-acetaminophen (NORCO) 5-325 mg per tablet Take 1 tablet by mouth every 8 (eight) hours as needed for Pain. 6 tablet 9/7/2024 -- Annabel Doss MD    capsicum 0.075% (ZOSTRIX) 0.075 % topical cream Apply topically 3 (three) times daily as needed (for msucle spasms). 57 g 9/7/2024 -- Annabel Doss MD    nystatin (MYCOSTATIN) cream Apply topically 2 (two) times daily as needed for Dry Skin. 15 g 9/7/2024 -- Annabel Doss MD          Follow-up Information       Follow up With Specialties Details Why Contact Info Additional Information    Bridget Cantu MD Internal Medicine  For follow up of this visit [address] [facility]  7th [address]  [facility] [address]  [phone]       Marcus  [facility] - Emergency Dept Emergency Medicine  Please return to the emergency department if you begin to experience fever, chills, worsening pain, difficulty moving your arms and legs, or if you have any other concerns. [address] [facility] [address]  [phone] 1st floor             Annabel Doss MD  Resident  09/07/24 1391

## 2024-09-08 NOTE — DISCHARGE INSTRUCTIONS
You were seen in the emergency department for muscle pain.  It is likely it is due to your medication.  You were given Norco 5 mg and Robaxin 500 mg which significantly helped her muscle pain.  Due to your age, you will not be given a prescription for Robaxin however you have been given a prescription for Norco to be taken every 8 hours as needed for your pain.  Please remember you can take Tylenol for your pain as well.  Please make sure to follow up with your pulmonary hypertension doctor in clinic next week.

## 2024-09-09 ENCOUNTER — PATIENT OUTREACH (OUTPATIENT)
Dept: EMERGENCY MEDICINE | Facility: HOSPITAL | Age: 74
End: 2024-09-09

## 2024-09-10 ENCOUNTER — TELEPHONE (OUTPATIENT)
Dept: TRANSPLANT | Facility: CLINIC | Age: 74
End: 2024-09-10
Payer: MEDICARE
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
## 2024-09-13 ENCOUNTER — HOSPITAL ENCOUNTER (OUTPATIENT)
Dept: RADIOLOGY | Facility: HOSPITAL | Age: 74
Discharge: HOME | End: 2024-09-13
Attending: INTERNAL MEDICINE
Payer: MEDICARE

## 2024-09-13 ENCOUNTER — TELEPHONE (OUTPATIENT)
Dept: INTERNAL MEDICINE | Facility: CLINIC | Age: 74
End: 2024-09-13
Payer: MEDICARE

## 2024-09-13 ENCOUNTER — PATIENT MESSAGE (OUTPATIENT)
Dept: INTERNAL MEDICINE | Facility: CLINIC | Age: 74
End: 2024-09-13
Payer: MEDICARE

## 2024-09-13 DIAGNOSIS — M79.10 MYALGIA: Primary | ICD-10-CM

## 2024-09-13 DIAGNOSIS — R60.9 EDEMA, UNSPECIFIED TYPE: Primary | ICD-10-CM

## 2024-09-13 DIAGNOSIS — I27.9 CHRONIC PULMONARY HEART DISEASE: Primary | ICD-10-CM

## 2024-09-13 DIAGNOSIS — R60.9 EDEMA, UNSPECIFIED TYPE: ICD-10-CM

## 2024-09-13 DIAGNOSIS — R71.8 OTHER ABNORMALITY OF RED BLOOD CELLS: ICD-10-CM

## 2024-09-13 DIAGNOSIS — R73.9 HYPERGLYCEMIA, UNSPECIFIED: ICD-10-CM

## 2024-09-13 DIAGNOSIS — E53.8 DEFICIENCY OF OTHER SPECIFIED B GROUP VITAMINS: ICD-10-CM

## 2024-09-13 PROCEDURE — 93970 EXTREMITY STUDY: CPT | Mod: TC

## 2024-09-13 PROCEDURE — 93970 EXTREMITY STUDY: CPT | Mod: 26,,, | Performed by: RADIOLOGY

## 2024-09-14 NOTE — TELEPHONE ENCOUNTER
I spoke to her - she was just diagnosed with a R peroneal vein DVT after ultrasound today.  She reports R leg is swollen but no discoloration.  SHe has been having significant body pain and difficulty moving over the last month - had normal ESR/CRP and I felt it was new meds adcirca and tyvaso she has been off those with little improvement.  SHe has been off atorvastatin as well.  She denies any SOB.  No black or bloody stool.  Normal BM today.  Will start apixaban tonight - daughter is a nurse and we discussed indications for ED (weakness, SOB, any bleeding).  I advised no advil or aleve - only tylenol for pain while on apixaban.      Please add for 3PM on Monday 9/16/24.  Thanks!  Lab orders are in please ask her to go to the lab while she is waiting, thank you!

## 2024-09-16 ENCOUNTER — LAB VISIT (OUTPATIENT)
Dept: LAB | Facility: HOSPITAL | Age: 74
End: 2024-09-16
Attending: INTERNAL MEDICINE
Payer: MEDICARE

## 2024-09-16 ENCOUNTER — OFFICE VISIT (OUTPATIENT)
Dept: INTERNAL MEDICINE | Facility: CLINIC | Age: 74
End: 2024-09-16
Payer: MEDICARE

## 2024-09-16 VITALS
SYSTOLIC BLOOD PRESSURE: 144 MMHG | HEART RATE: 70 BPM | OXYGEN SATURATION: 97 % | WEIGHT: 198 LBS | HEIGHT: 70 IN | DIASTOLIC BLOOD PRESSURE: 60 MMHG | BODY MASS INDEX: 28.35 KG/M2

## 2024-09-16 DIAGNOSIS — R71.8 OTHER ABNORMALITY OF RED BLOOD CELLS: ICD-10-CM

## 2024-09-16 DIAGNOSIS — R73.9 HYPERGLYCEMIA, UNSPECIFIED: ICD-10-CM

## 2024-09-16 DIAGNOSIS — I82.451 ACUTE DEEP VEIN THROMBOSIS (DVT) OF RIGHT PERONEAL VEIN: ICD-10-CM

## 2024-09-16 DIAGNOSIS — M35.9 CONNECTIVE TISSUE DISEASE: ICD-10-CM

## 2024-09-16 DIAGNOSIS — M79.10 MYALGIA: ICD-10-CM

## 2024-09-16 DIAGNOSIS — S09.90XA INJURY OF HEAD, INITIAL ENCOUNTER: ICD-10-CM

## 2024-09-16 DIAGNOSIS — M79.10 MYALGIA: Primary | ICD-10-CM

## 2024-09-16 DIAGNOSIS — E53.8 DEFICIENCY OF OTHER SPECIFIED B GROUP VITAMINS: ICD-10-CM

## 2024-09-16 LAB
ALBUMIN SERPL BCP-MCNC: 2.7 G/DL (ref 3.5–5.2)
ALP SERPL-CCNC: 167 U/L (ref 55–135)
ALT SERPL W/O P-5'-P-CCNC: 22 U/L (ref 10–44)
ANION GAP SERPL CALC-SCNC: 9 MMOL/L (ref 8–16)
AST SERPL-CCNC: 20 U/L (ref 10–40)
BASOPHILS # BLD AUTO: 0.06 K/UL (ref 0–0.2)
BASOPHILS NFR BLD: 0.5 % (ref 0–1.9)
BILIRUB SERPL-MCNC: 0.3 MG/DL (ref 0.1–1)
BUN SERPL-MCNC: 19 MG/DL (ref 8–23)
CALCIUM SERPL-MCNC: 9.9 MG/DL (ref 8.7–10.5)
CHLORIDE SERPL-SCNC: 103 MMOL/L (ref 95–110)
CK SERPL-CCNC: 42 U/L (ref 20–180)
CO2 SERPL-SCNC: 27 MMOL/L (ref 23–29)
CREAT SERPL-MCNC: 0.8 MG/DL (ref 0.5–1.4)
CRP SERPL-MCNC: 63.6 MG/L (ref 0–8.2)
DIFFERENTIAL METHOD BLD: ABNORMAL
EOSINOPHIL # BLD AUTO: 0.3 K/UL (ref 0–0.5)
EOSINOPHIL NFR BLD: 2.9 % (ref 0–8)
ERYTHROCYTE [DISTWIDTH] IN BLOOD BY AUTOMATED COUNT: 14.5 % (ref 11.5–14.5)
ERYTHROCYTE [SEDIMENTATION RATE] IN BLOOD BY PHOTOMETRIC METHOD: 74 MM/HR (ref 0–36)
EST. GFR  (NO RACE VARIABLE): >60 ML/MIN/1.73 M^2
ESTIMATED AVG GLUCOSE: 120 MG/DL (ref 68–131)
FERRITIN SERPL-MCNC: 185 NG/ML (ref 20–300)
GLUCOSE SERPL-MCNC: 93 MG/DL (ref 70–110)
HBA1C MFR BLD: 5.8 % (ref 4–5.6)
HCT VFR BLD AUTO: 32.6 % (ref 37–48.5)
HGB BLD-MCNC: 10.6 G/DL (ref 12–16)
IMM GRANULOCYTES # BLD AUTO: 0.03 K/UL (ref 0–0.04)
IMM GRANULOCYTES NFR BLD AUTO: 0.3 % (ref 0–0.5)
IRON SERPL-MCNC: 25 UG/DL (ref 30–160)
LYMPHOCYTES # BLD AUTO: 2.3 K/UL (ref 1–4.8)
LYMPHOCYTES NFR BLD: 21 % (ref 18–48)
MCH RBC QN AUTO: 28 PG (ref 27–31)
MCHC RBC AUTO-ENTMCNC: 32.5 G/DL (ref 32–36)
MCV RBC AUTO: 86 FL (ref 82–98)
MONOCYTES # BLD AUTO: 0.6 K/UL (ref 0.3–1)
MONOCYTES NFR BLD: 5.5 % (ref 4–15)
NEUTROPHILS # BLD AUTO: 7.7 K/UL (ref 1.8–7.7)
NEUTROPHILS NFR BLD: 69.8 % (ref 38–73)
NRBC BLD-RTO: 0 /100 WBC
PLATELET # BLD AUTO: 617 K/UL (ref 150–450)
PMV BLD AUTO: 9.3 FL (ref 9.2–12.9)
POTASSIUM SERPL-SCNC: 4.5 MMOL/L (ref 3.5–5.1)
PROT SERPL-MCNC: 7.1 G/DL (ref 6–8.4)
RBC # BLD AUTO: 3.79 M/UL (ref 4–5.4)
SATURATED IRON: 11 % (ref 20–50)
SODIUM SERPL-SCNC: 139 MMOL/L (ref 136–145)
TOTAL IRON BINDING CAPACITY: 218 UG/DL (ref 250–450)
TRANSFERRIN SERPL-MCNC: 147 MG/DL (ref 200–375)
TSH SERPL DL<=0.005 MIU/L-ACNC: 1.63 UIU/ML (ref 0.4–4)
VIT B12 SERPL-MCNC: >2000 PG/ML (ref 210–950)
WBC # BLD AUTO: 11.05 K/UL (ref 3.9–12.7)

## 2024-09-16 PROCEDURE — 86334 IMMUNOFIX E-PHORESIS SERUM: CPT | Performed by: INTERNAL MEDICINE

## 2024-09-16 PROCEDURE — 3061F NEG MICROALBUMINURIA REV: CPT | Mod: CPTII,S$GLB,, | Performed by: INTERNAL MEDICINE

## 2024-09-16 PROCEDURE — 84165 PROTEIN E-PHORESIS SERUM: CPT | Performed by: INTERNAL MEDICINE

## 2024-09-16 PROCEDURE — 84443 ASSAY THYROID STIM HORMONE: CPT | Performed by: INTERNAL MEDICINE

## 2024-09-16 PROCEDURE — 80053 COMPREHEN METABOLIC PANEL: CPT | Performed by: INTERNAL MEDICINE

## 2024-09-16 PROCEDURE — 3288F FALL RISK ASSESSMENT DOCD: CPT | Mod: CPTII,S$GLB,, | Performed by: INTERNAL MEDICINE

## 2024-09-16 PROCEDURE — 3066F NEPHROPATHY DOC TX: CPT | Mod: CPTII,S$GLB,, | Performed by: INTERNAL MEDICINE

## 2024-09-16 PROCEDURE — 99999 PR PBB SHADOW E&M-EST. PATIENT-LVL V: CPT | Mod: PBBFAC,,, | Performed by: INTERNAL MEDICINE

## 2024-09-16 PROCEDURE — 86140 C-REACTIVE PROTEIN: CPT | Performed by: INTERNAL MEDICINE

## 2024-09-16 PROCEDURE — 83036 HEMOGLOBIN GLYCOSYLATED A1C: CPT | Performed by: INTERNAL MEDICINE

## 2024-09-16 PROCEDURE — 82085 ASSAY OF ALDOLASE: CPT | Performed by: INTERNAL MEDICINE

## 2024-09-16 PROCEDURE — 1126F AMNT PAIN NOTED NONE PRSNT: CPT | Mod: CPTII,S$GLB,, | Performed by: INTERNAL MEDICINE

## 2024-09-16 PROCEDURE — 1101F PT FALLS ASSESS-DOCD LE1/YR: CPT | Mod: CPTII,S$GLB,, | Performed by: INTERNAL MEDICINE

## 2024-09-16 PROCEDURE — 83921 ORGANIC ACID SINGLE QUANT: CPT | Performed by: INTERNAL MEDICINE

## 2024-09-16 PROCEDURE — 82607 VITAMIN B-12: CPT | Performed by: INTERNAL MEDICINE

## 2024-09-16 PROCEDURE — 3077F SYST BP >= 140 MM HG: CPT | Mod: CPTII,S$GLB,, | Performed by: INTERNAL MEDICINE

## 2024-09-16 PROCEDURE — 82728 ASSAY OF FERRITIN: CPT | Performed by: INTERNAL MEDICINE

## 2024-09-16 PROCEDURE — 99214 OFFICE O/P EST MOD 30 MIN: CPT | Mod: S$GLB,,, | Performed by: INTERNAL MEDICINE

## 2024-09-16 PROCEDURE — 3044F HG A1C LEVEL LT 7.0%: CPT | Mod: CPTII,S$GLB,, | Performed by: INTERNAL MEDICINE

## 2024-09-16 PROCEDURE — 85025 COMPLETE CBC W/AUTO DIFF WBC: CPT | Performed by: INTERNAL MEDICINE

## 2024-09-16 PROCEDURE — 83540 ASSAY OF IRON: CPT | Performed by: INTERNAL MEDICINE

## 2024-09-16 PROCEDURE — 85652 RBC SED RATE AUTOMATED: CPT | Performed by: INTERNAL MEDICINE

## 2024-09-16 PROCEDURE — 3078F DIAST BP <80 MM HG: CPT | Mod: CPTII,S$GLB,, | Performed by: INTERNAL MEDICINE

## 2024-09-16 PROCEDURE — 82550 ASSAY OF CK (CPK): CPT | Performed by: INTERNAL MEDICINE

## 2024-09-16 NOTE — PROGRESS NOTES
Subjective:       Patient ID: Marleny Guzman is a 74 y.o. female.    Chief Complaint: Follow-up and Knee Pain (right),  DVT    Follow-up  Associated symptoms include fatigue and myalgias. Pertinent negatives include no abdominal pain, chest pain (arm pain or jaw pain), headaches, nausea or vomiting.   Knee Pain      Pt getting worse - off tyvaso and tadalafil for about 2 weeks with little improvement.  She had a fall last week and was diagnosed with DVT due to sitting most of the time due to pain.  SHe is here with her daughter and sh is in a wheelchair.  She started the apixaban on Saturday.  Has some swelling to leg.  She has lost some weight and appetite is poor.  She denies jaw pain or HA.  Has a slight tender spot at vertex of scalp - no visual disturbance.  Review of Systems   Constitutional:  Positive for activity change, appetite change and fatigue.   Respiratory:  Negative for shortness of breath (PND or orthopnea).    Cardiovascular:  Negative for chest pain (arm pain or jaw pain).   Gastrointestinal:  Negative for abdominal pain, diarrhea, nausea and vomiting.   Genitourinary:  Negative for dysuria.   Musculoskeletal:  Positive for myalgias.   Neurological:  Negative for seizures, syncope and headaches.       Objective:      Physical Exam    Assessment:       1. Myalgia    2. Connective tissue disease    3. Injury of head, initial encounter        Plan:   Myalgia  Still concern for PMR - recheck labs today  Connective tissue disease  -     Ambulatory referral/consult to Home Health; Future; Expected date: 09/17/2024    Injury of head, initial encounter  -     CT Head Without Contrast; Future; Expected date: 09/16/2024  DVT  Continue apixaban    Message sent to Rheum about starting prednisone if ESR and CRP are elevated

## 2024-09-17 ENCOUNTER — TELEPHONE (OUTPATIENT)
Dept: OTOLARYNGOLOGY | Facility: CLINIC | Age: 74
End: 2024-09-17
Payer: MEDICARE

## 2024-09-17 ENCOUNTER — TELEPHONE (OUTPATIENT)
Dept: INTERNAL MEDICINE | Facility: CLINIC | Age: 74
End: 2024-09-17
Payer: MEDICARE

## 2024-09-17 LAB
ALBUMIN SERPL ELPH-MCNC: 2.68 G/DL (ref 3.35–5.55)
ALPHA1 GLOB SERPL ELPH-MCNC: 0.44 G/DL (ref 0.17–0.41)
ALPHA2 GLOB SERPL ELPH-MCNC: 1.33 G/DL (ref 0.43–0.99)
B-GLOBULIN SERPL ELPH-MCNC: 0.98 G/DL (ref 0.5–1.1)
GAMMA GLOB SERPL ELPH-MCNC: 0.98 G/DL (ref 0.67–1.58)
PROT SERPL-MCNC: 6.4 G/DL (ref 6–8.4)

## 2024-09-17 RX ORDER — PREDNISONE 20 MG/1
20 TABLET ORAL DAILY
Qty: 30 TABLET | Refills: 0 | Status: SHIPPED | OUTPATIENT
Start: 2024-09-17

## 2024-09-17 NOTE — TELEPHONE ENCOUNTER
Pt with elevated ESR and CRP - suspect PMR - starting prednisone 20mg one daily will re-eval in one week.

## 2024-09-17 NOTE — TELEPHONE ENCOUNTER
1st attempt to reach out to pt to discuss cancelling/rescheduling ENT appt for tomorrow afternoon w/Saeed.  No answer; LVM for call back.  Pt will need to f/u with Dr. Valera for her hoarseness issue; established w/MD.  Pt portal msg to be sent to pt due to failed call attempt.

## 2024-09-18 LAB — ALDOLASE SERPL-CCNC: 11.8 U/L (ref 1.2–7.6)

## 2024-09-18 NOTE — TELEPHONE ENCOUNTER
Spoke w/[facility] per [facility] received.  W/pt, followed-up on recommendation of seeing Dr. Valera per Jose G Tipton PA-C.  Pt acknowledged.  Staff msg sent to Dr. Valera's staff for scheduling of ENT referral to see Dr. Valera.  Pt acknowledged; ENT appt for today cancelled.

## 2024-09-19 LAB — INTERPRETATION SERPL IFE-IMP: NORMAL

## 2024-09-20 LAB — METHYLMALONATE SERPL-SCNC: 0.15 UMOL/L

## 2024-09-21 LAB — PATHOLOGIST INTERPRETATION IFE: NORMAL

## 2024-09-23 ENCOUNTER — TELEPHONE (OUTPATIENT)
Dept: INTERNAL MEDICINE | Facility: CLINIC | Age: 74
End: 2024-09-23
Payer: MEDICARE

## 2024-09-23 ENCOUNTER — HOSPITAL ENCOUNTER (OUTPATIENT)
Dept: RADIOLOGY | Facility: HOSPITAL | Age: 74
Discharge: HOME | End: 2024-09-23
Attending: INTERNAL MEDICINE
Payer: MEDICARE

## 2024-09-23 ENCOUNTER — TELEPHONE (OUTPATIENT)
Dept: INTERNAL MEDICINE | Facility: CLINIC | Age: 74
End: 2024-09-23

## 2024-09-23 DIAGNOSIS — S09.90XA INJURY OF HEAD, INITIAL ENCOUNTER: ICD-10-CM

## 2024-09-23 PROCEDURE — 70450 CT HEAD/BRAIN W/O DYE: CPT | Mod: 26,,, | Performed by: RADIOLOGY

## 2024-09-23 PROCEDURE — 70450 CT HEAD/BRAIN W/O DYE: CPT | Mod: TC

## 2024-09-23 NOTE — TELEPHONE ENCOUNTER
Pt reports 30-40% improvement in pain symptoms since starting prednisone 6 days ago - she has a follow up with Rheumatology on Friday.

## 2024-09-25 ENCOUNTER — OFFICE VISIT (OUTPATIENT)
Dept: UROGYNECOLOGY | Facility: CLINIC | Age: 74
End: 2024-09-25
Payer: MEDICARE

## 2024-09-25 ENCOUNTER — TELEPHONE (OUTPATIENT)
Dept: TRANSPLANT | Facility: CLINIC | Age: 74
End: 2024-09-25
Payer: MEDICARE

## 2024-09-25 VITALS
WEIGHT: 198 LBS | HEIGHT: 70 IN | HEART RATE: 70 BPM | BODY MASS INDEX: 28.35 KG/M2 | SYSTOLIC BLOOD PRESSURE: 139 MMHG | DIASTOLIC BLOOD PRESSURE: 72 MMHG

## 2024-09-25 DIAGNOSIS — N39.41 URGE INCONTINENCE: Primary | ICD-10-CM

## 2024-09-25 DIAGNOSIS — R39.15 URGENCY OF URINATION: ICD-10-CM

## 2024-09-25 LAB — POC RESIDUAL URINE VOLUME: 50 ML (ref 0–100)

## 2024-09-25 PROCEDURE — 3044F HG A1C LEVEL LT 7.0%: CPT | Mod: CPTII,S$GLB,, | Performed by: OBSTETRICS & GYNECOLOGY

## 2024-09-25 PROCEDURE — 99999 PR PBB SHADOW E&M-EST. PATIENT-LVL IV: CPT | Mod: PBBFAC,,, | Performed by: OBSTETRICS & GYNECOLOGY

## 2024-09-25 PROCEDURE — 51798 US URINE CAPACITY MEASURE: CPT | Mod: S$GLB,,, | Performed by: OBSTETRICS & GYNECOLOGY

## 2024-09-25 PROCEDURE — 3066F NEPHROPATHY DOC TX: CPT | Mod: CPTII,S$GLB,, | Performed by: OBSTETRICS & GYNECOLOGY

## 2024-09-25 PROCEDURE — 3061F NEG MICROALBUMINURIA REV: CPT | Mod: CPTII,S$GLB,, | Performed by: OBSTETRICS & GYNECOLOGY

## 2024-09-25 PROCEDURE — 99212 OFFICE O/P EST SF 10 MIN: CPT | Mod: S$GLB,,, | Performed by: OBSTETRICS & GYNECOLOGY

## 2024-09-25 PROCEDURE — 3075F SYST BP GE 130 - 139MM HG: CPT | Mod: CPTII,S$GLB,, | Performed by: OBSTETRICS & GYNECOLOGY

## 2024-09-25 PROCEDURE — 1126F AMNT PAIN NOTED NONE PRSNT: CPT | Mod: CPTII,S$GLB,, | Performed by: OBSTETRICS & GYNECOLOGY

## 2024-09-25 PROCEDURE — 3288F FALL RISK ASSESSMENT DOCD: CPT | Mod: CPTII,S$GLB,, | Performed by: OBSTETRICS & GYNECOLOGY

## 2024-09-25 PROCEDURE — 1101F PT FALLS ASSESS-DOCD LE1/YR: CPT | Mod: CPTII,S$GLB,, | Performed by: OBSTETRICS & GYNECOLOGY

## 2024-09-25 PROCEDURE — 1159F MED LIST DOCD IN RCRD: CPT | Mod: CPTII,S$GLB,, | Performed by: OBSTETRICS & GYNECOLOGY

## 2024-09-25 PROCEDURE — 3078F DIAST BP <80 MM HG: CPT | Mod: CPTII,S$GLB,, | Performed by: OBSTETRICS & GYNECOLOGY

## 2024-09-25 RX ORDER — VIBEGRON 75 MG/1
1 TABLET, FILM COATED ORAL DAILY
Qty: 90 TABLET | Refills: 3 | Status: SHIPPED | OUTPATIENT
Start: 2024-09-25 | End: 2025-09-25

## 2024-09-25 NOTE — TELEPHONE ENCOUNTER
NN called the patient to follow up on her pain issues since stopping Tyvaso and Tadalafil. The patient did go to her PCP for work up for her pain. Patient was advised to the ED by her PCP and was told she had clots in her legs. The patient was started her on Eliquis, Prednisone 20 mg once a day, Hydrocodone PRN for pain. Patient was asked if she had a follow up appointment with her PCP. NN reviewed the patient's upcoming appointment and no PCP appointment was noted. NN asked patient to follow up with PCP. NN asked that patient let us know in early October if she thinks her testing might be affected by her recent DVT/limited mobility. Patient verbalized understanding. Patient stated she would patty her PCP to get an appointment.

## 2024-09-25 NOTE — PROGRESS NOTES
"Chief Complaint   Patient presents with    Follow up        HPI: Patient is a 74 y.o. female  with mixed urinary incontinence presents today for a follow up visit. She was started on Gemtesa and referred to PT at her last visit on 24.   Patient states that she is "much better" since the last visit. She is noticing less urinary urgency and less urinary frequency as well. She is now voiding 3-4 times per day. She is waking 0-1 times over night. She denies urinary incontinence. Finds that her pads are dry. Wearing a thinner pad but thinks she can do without it. Only will change once per day. Drinking more water now, about 4 cups per day.     Reports that she no longer had constipation. Has a daily bowel movement. No longer like niels.     She was contacted by PFPT but she is doing PT for her legs and so will attend possibly later.     No longer having vulvar itching and burning.     She denies loss of urine with coughing, sneezing and laughing.     REVIEW OF SYSTEMS:  A full 14-point ROS was performed and was significant for those  mentioned in the HPI.    The following portions of the patient's history were reviewed and updated as appropriate: allergies, current medications, past medical history, past surgical history and problem list.    PHYSICAL EXAMINATION    Vitals:    24 1339   BP: 139/72   Pulse: 70        General: Healthy in appearance, Well nourished, Affect Normal, NAD.    Office Visit on 2024   Component Date Value Ref Range Status    POC Residual Urine Volume 2024 50  0 - 100 mL Final        ASSESSMENT & PLAN:  Urge incontinence  -     POCT Bladder Scan    Urgency of urination  -     GEMTESA 75 mg Tab; Take 1 tablet (75 mg total) by mouth once daily.  Dispense: 90 tablet; Refill: 3       75 yo with mixed urinary incontinence presented today for a follow up visit to review symptoms on medication. Patient is much better and is going to try to stop wearing pads altogether. " Refills for Gemtesa 75 mg sent to her pharmacy. Hopes to start PT once she complete her current orthopedics PT program. She will return in 6 months for re-evaluation.     All questions were answered today. The patient was encouraged to contact the office as needed with any additional questions or concerns.     Total time spent on visit was 10 minutes.  This includes face to face time and non-face to face time preparing to see the patient (eg, review of tests), Obtaining and/or reviewing separately obtained history, Documenting clinical information in the electronic or other health record, Independently interpreting resultsand communicating results to the patient/family/caregiver, or Care coordination.    Carley Oliva MD

## 2024-09-26 ENCOUNTER — PATIENT OUTREACH (OUTPATIENT)
Dept: ADMINISTRATIVE | Facility: HOSPITAL | Age: 74
End: 2024-09-26
Payer: MEDICARE

## 2024-09-26 VITALS — SYSTOLIC BLOOD PRESSURE: 139 MMHG | DIASTOLIC BLOOD PRESSURE: 72 MMHG

## 2024-10-08 NOTE — TELEPHONE ENCOUNTER
No care due was identified.  [facility] Embedded Care Due Messages. Reference number: [id number].   10/08/2024 2:42:54 PM CDT

## 2024-10-09 ENCOUNTER — PATIENT MESSAGE (OUTPATIENT)
Dept: TRANSPLANT | Facility: CLINIC | Age: 74
End: 2024-10-09
Payer: MEDICARE

## 2024-10-09 ENCOUNTER — TELEPHONE (OUTPATIENT)
Dept: TRANSPLANT | Facility: CLINIC | Age: 74
End: 2024-10-09
Payer: MEDICARE

## 2024-10-09 NOTE — TELEPHONE ENCOUNTER
NN called the patient to see how her mobility is to either move forward with the patient's scheduled walk test next week or to cancel it. Patient did not answer the phone. Voicemail message was left asking for a return call regarding the above question. Message was also sent in the patient portal my chart.

## 2024-10-16 ENCOUNTER — HOSPITAL ENCOUNTER (OUTPATIENT)
Dept: PULMONOLOGY | Facility: CLINIC | Age: 74
Discharge: HOME | End: 2024-10-16
Payer: MEDICARE

## 2024-10-16 ENCOUNTER — HOSPITAL ENCOUNTER (OUTPATIENT)
Dept: CARDIOLOGY | Facility: HOSPITAL | Age: 74
Discharge: HOME | End: 2024-10-16
Payer: MEDICARE

## 2024-10-16 ENCOUNTER — OFFICE VISIT (OUTPATIENT)
Dept: TRANSPLANT | Facility: CLINIC | Age: 74
End: 2024-10-16
Payer: MEDICARE

## 2024-10-16 VITALS
DIASTOLIC BLOOD PRESSURE: 76 MMHG | BODY MASS INDEX: 28.35 KG/M2 | WEIGHT: 198 LBS | HEIGHT: 70 IN | SYSTOLIC BLOOD PRESSURE: 140 MMHG | HEART RATE: 65 BPM

## 2024-10-16 VITALS
HEIGHT: 70 IN | SYSTOLIC BLOOD PRESSURE: 148 MMHG | HEART RATE: 57 BPM | BODY MASS INDEX: 28.88 KG/M2 | DIASTOLIC BLOOD PRESSURE: 60 MMHG | WEIGHT: 201.75 LBS | OXYGEN SATURATION: 98 %

## 2024-10-16 VITALS — BODY MASS INDEX: 28.2 KG/M2 | HEIGHT: 70 IN | WEIGHT: 197 LBS

## 2024-10-16 DIAGNOSIS — I27.9 CHRONIC PULMONARY HEART DISEASE: ICD-10-CM

## 2024-10-16 DIAGNOSIS — I27.20 PULMONARY HYPERTENSION: Primary | ICD-10-CM

## 2024-10-16 DIAGNOSIS — R06.02 SHORTNESS OF BREATH: ICD-10-CM

## 2024-10-16 DIAGNOSIS — J84.9 INTERSTITIAL PULMONARY DISEASE, UNSPECIFIED: ICD-10-CM

## 2024-10-16 LAB
ASCENDING AORTA: 3.01 CM
AV AREA BY CONTINUOUS VTI: 2.8 CM2
AV INDEX (PROSTH): 0.85
AV LVOT MEAN GRADIENT: 3 MMHG
AV LVOT PEAK GRADIENT: 6 MMHG
AV MEAN GRADIENT: 3.8 MMHG
AV PEAK GRADIENT: 6.8 MMHG
AV VALVE AREA BY VELOCITY RATIO: 3.2 CM²
AV VALVE AREA: 2.9 CM2
AV VELOCITY RATIO: 0.92
BSA FOR ECHO PROCEDURE: 2.11 M2
CV ECHO LV RWT: 0.76 CM
DOP CALC AO PEAK VEL: 1.3 M/S
DOP CALC AO VTI: 31.6 CM
DOP CALC LVOT AREA: 3.5 CM2
DOP CALC LVOT DIAMETER: 2.1 CM
DOP CALC LVOT PEAK VEL: 1.2 M/S
DOP CALC LVOT STROKE VOLUME: 93.1 CM3
DOP CALC RVOT AREA: 4.95 CM2
DOP CALC RVOT DIAMETER: 2.51 CM
DOP CALCLVOT PEAK VEL VTI: 26.9 CM
E WAVE DECELERATION TIME: 244.05 MS
E/A RATIO: 0.88
E/E' RATIO: 9.76 M/S
ECHO EF ESTIMATED: 65 %
ECHO LV POSTERIOR WALL: 1.4 CM (ref 0.6–1.1)
FRACTIONAL SHORTENING: 35.1 % (ref 28–44)
INTERVENTRICULAR SEPTUM: 0.9 CM (ref 0.6–1.1)
IVC DIAMETER: 1.19 CM
IVRT: 77.07 MS
LA MAJOR: 5.22 CM
LA MINOR: 5.26 CM
LA WIDTH: 4.24 CM
LEFT ATRIUM SIZE: 3.82 CM
LEFT ATRIUM VOLUME INDEX MOD: 30.7 ML/M2
LEFT ATRIUM VOLUME INDEX: 34.7 ML/M2
LEFT ATRIUM VOLUME MOD: 63.76 ML
LEFT ATRIUM VOLUME: 72.14 CM3
LEFT INTERNAL DIMENSION IN SYSTOLE: 2.4 CM (ref 2.1–4)
LEFT VENTRICLE DIASTOLIC VOLUME INDEX: 27.7 ML/M2
LEFT VENTRICLE DIASTOLIC VOLUME: 57.62 ML
LEFT VENTRICLE MASS INDEX: 66.4 G/M2
LEFT VENTRICLE SYSTOLIC VOLUME INDEX: 9.8 ML/M2
LEFT VENTRICLE SYSTOLIC VOLUME: 20.3 ML
LEFT VENTRICULAR INTERNAL DIMENSION IN DIASTOLE: 3.7 CM (ref 3.5–6)
LEFT VENTRICULAR MASS: 138.2 G
LV LATERAL E/E' RATIO: 10.38
LV SEPTAL E/E' RATIO: 9.22
MV A" WAVE DURATION": 85.63 MS
MV PEAK A VEL: 0.94 M/S
MV PEAK E VEL: 0.83 M/S
OHS CV RV/LV RATIO: 0.86 CM
PISA TR MAX VEL: 3.11 M/S
PULM VEIN A" WAVE DURATION": 85.63 MS
PULM VEIN S/D RATIO: 1.33
PULMONIC VEIN PEAK A VELOCITY: 0.3 M/S
PV PEAK D VEL: 0.54 M/S
PV PEAK S VEL: 0.72 M/S
RA MAJOR: 4.93 CM
RA PRESSURE ESTIMATED: 3 MMHG
RA WIDTH: 3.57 CM
RIGHT ATRIAL AREA: 14.8 CM2
RIGHT VENTRICLE DIASTOLIC BASEL DIMENSION: 3.2 CM
RV TB RVSP: 6 MMHG
RV TISSUE DOPPLER FREE WALL SYSTOLIC VELOCITY 1 (APICAL 4 CHAMBER VIEW): 19.04 CM/S
SINUS: 2.89 CM
STJ: 2.68 CM
TDI LATERAL: 0.08 M/S
TDI SEPTAL: 0.09 M/S
TDI: 0.09 M/S
TR MAX PG: 39 MMHG
TRICUSPID ANNULAR PLANE SYSTOLIC EXCURSION: 3 CM
TV PEAK GRADIENT: 39 MMHG
TV REST PULMONARY ARTERY PRESSURE: 42 MMHG
Z-SCORE OF LEFT VENTRICULAR DIMENSION IN END DIASTOLE: -5.43
Z-SCORE OF LEFT VENTRICULAR DIMENSION IN END SYSTOLE: -3.83

## 2024-10-16 PROCEDURE — 93306 TTE W/DOPPLER COMPLETE: CPT | Mod: 26,,, | Performed by: INTERNAL MEDICINE

## 2024-10-16 PROCEDURE — 1101F PT FALLS ASSESS-DOCD LE1/YR: CPT | Mod: CPTII,S$GLB,,

## 2024-10-16 PROCEDURE — 3061F NEG MICROALBUMINURIA REV: CPT | Mod: CPTII,S$GLB,,

## 2024-10-16 PROCEDURE — 3078F DIAST BP <80 MM HG: CPT | Mod: CPTII,S$GLB,,

## 2024-10-16 PROCEDURE — 3044F HG A1C LEVEL LT 7.0%: CPT | Mod: CPTII,S$GLB,,

## 2024-10-16 PROCEDURE — 3288F FALL RISK ASSESSMENT DOCD: CPT | Mod: CPTII,S$GLB,,

## 2024-10-16 PROCEDURE — 3066F NEPHROPATHY DOC TX: CPT | Mod: CPTII,S$GLB,,

## 2024-10-16 PROCEDURE — 3077F SYST BP >= 140 MM HG: CPT | Mod: CPTII,S$GLB,,

## 2024-10-16 PROCEDURE — 99999 PR PBB SHADOW E&M-EST. PATIENT-LVL V: CPT | Mod: PBBFAC,,,

## 2024-10-16 PROCEDURE — 94618 PULMONARY STRESS TESTING: CPT | Mod: S$GLB,,, | Performed by: INTERNAL MEDICINE

## 2024-10-16 PROCEDURE — 99214 OFFICE O/P EST MOD 30 MIN: CPT | Mod: S$GLB,,,

## 2024-10-16 PROCEDURE — 93306 TTE W/DOPPLER COMPLETE: CPT

## 2024-10-16 PROCEDURE — 1126F AMNT PAIN NOTED NONE PRSNT: CPT | Mod: CPTII,S$GLB,,

## 2024-10-16 RX ORDER — TADALAFIL 20 MG/1
20 TABLET ORAL DAILY
Qty: 60 TABLET | Refills: 11 | Status: SHIPPED | OUTPATIENT
Start: 2024-10-16

## 2024-10-16 NOTE — PROCEDURES
Marleny Guzman is a 74 y.o.   female patient, who presents for a 6 minute walk test ordered by MD Nestor.  The diagnosis is Qualify for Oxygen; Pulmonary Hypertension; Connective Tissue Disease.  The patient's BMI is 28.3 kg/m2.  Predicted distance (lower limit of normal) is 264.16 meters.      Test Results:    The test was completed without stopping.  The total time walked was 360 seconds.  During walking, the patient reported:   (Hip Pain).  The patient used no assistive devices during testing.     10/16/2024---------Distance: 304.8 meters (1000 feet)     O2 Sat % Supplemental Oxygen Heart Rate Blood Pressure Juancarlos Scale   Pre-exercise  (Resting) 98 % Room Air 62 bpm 145/77 mmHg 0   During Exercise 97 % Room Air 72 bpm 150/80 mmHg 1   Post-exercise  (Recovery) 98 % Room Air  62 bpm       Recovery Time: 117 seconds    Performing nurse/tech: Maryann BONILLA      PREVIOUS STUDY:   04/08/2024---------Distance: 354.18 meters (1162 feet)       O2 Sat % Supplemental Oxygen Heart Rate Blood Pressure Juancarlos Scale   Pre-exercise  (Resting) 96 % Room Air 67 bpm 163/70 mmHg 4   During Exercise 95 % Room Air 79 bpm 183/79 mmHg 5-6   Post-exercise  (Recovery) 97 % Room Air  80 bpm           CLINICAL INTERPRETATION:  Six minute walk distance is 304.8 meters (1000 feet) with very light dyspnea.  During exercise, there was no significant desaturation while breathing room air.  Both blood pressure and heart rate remained stable with walking.  The patient reported non-pulmonary symptoms during exercise.  Since the previous study in April 2024, exercise capacity may be somewhat worse.  Based upon age and body mass index, exercise capacity is normal.

## 2024-10-16 NOTE — PROGRESS NOTES
Subjective   Patient ID:  Marleny Guzman is a 74 y.o. female who presents for follow-up for PH    72 YO F w/ positive KEE/ds-DNA, RNA poly III, possible ILD, HTN, HLD.    Initial HPI: She was seen by pulmonlogy for WOOTEN and workup showed positive KEE, and possible ILD (although PFTs more consistent with ILD than her lung imaging). Sees rheumatology as well who noted no stigmata of CTD despite positive KEE. TTE showed elevated PA pressures. Has HTN, HLD.    When talking with her, she says that her shortness of breath goes back a few years.  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  In addition to this she also walks her dogs.  However over the past few years she has noticed progressively worsening shortness of breath.  At present she thinks she can walk approximately 2 blocks before she has to pause because of the shortness of breath.  Denies any associated chest tightness, but does endorse chronic bilateral lower extremity swelling.  With the shortness of breath she also notes palpitations.  Has never had any syncopal episodes, but does note occasional exertional lightheadedness.  Has difficulty lying flat, but not due to shortness of breath but because of a productive cough.    She says that along with the shortness of breath she has also noticed worsening cough over the same time.  The cough is productive, making yellow sputum.  It is continuous, and she can not think of any specific aggravating or alleviating factors.  She has no associated hemoptysis.  Denies any fevers, chills, infectious symptoms, or sick contacts.    As noted above, her medical history is significant for the interstitial lung disease, hypertension, dyslipidemia, and possible pulmonary hypertension.    No family history of heart or lung issues.    She is a former smoker, having smoked for approximately 10 years in the 1980s, averaging 5-6 cigarettes per day.  Also smoked  marijuana in the remote past.  Will significant alcohol use, or recreational drug use.  No history of taking diet medications such as Fen-Phen, or medications for ADHD such as Ritalin or Adderall.  Does endorse however history of asbestos exposure related to work.    10/16/2024: Mrs. Guzman is here for clinic f/u. She has initially started Tyvaso and tadalafil, but experienced severe body pain (has chronic back pain). She was not sure if it was the medication so stopped both. Since then, she was given prednisone and the pain and breathing are better. Mrs. Guzman denies fluid retention, CP, palpitations, pre-syncope, syncope. She does report a fall and has been diangosed with a DVT behind the knee. Had limited mobility, but doing better now. Walked 305m. Does not need supplemental O2.    6MWT   4/8/2024 10/16/2024   6MW     6MWT Status completed without stopping  completed without stopping    Patient Reported Other (Comment)  --    Was O2 used? No  No    6MW Distance walked (feet) 1162 feet  1000 feet    Distance walked (meters) 354.18 meters  304.8 meters    Did patient stop? No  No    Oxygen Saturation 96 %  98 %    Supplemental Oxygen Room Air  Room Air    Heart Rate 67 bpm  62 bpm    Blood Pressure 163/70  145/77    Juancarlos Dyspnea Rating  somewhat heavy  nothing at all    Oxygen Saturation 95 %  97 %    Supplemental Oxygen Room Air  Room Air    Heart Rate 79 bpm  72 bpm    Blood Pressure 183/79  150/80    Juancarlos Dyspnea Rating  heavy  very light    Recovery Time (seconds) 126 seconds  117 seconds    Oxygen Saturation 97 %  98 %    Supplemental Oxygen Room Air  Room Air    Heart Rate 80 bpm  62 bpm        ECHO: 10/16/2024    Left Ventricle: The left ventricle is normal in size. Normal wall thickness. There is concentric remodeling. There is normal systolic function with a visually estimated ejection fraction of 60 - 65%. Grade I diastolic dysfunction.    Right Ventricle: Normal right ventricular cavity size. Wall  thickness is normal. Systolic function is normal.    The left atrium is mildly dilated.    Pulmonary Artery: There is mild pulmonary hypertension. The estimated pulmonary artery systolic pressure is 42 mmHg.    IVC/SVC: Normal venous pressure at 3 mmHg.    TTE 3/25/24    Left Ventricle: The left ventricle is normal in size. Normal wall thickness. There is normal systolic function. Ejection fraction by visual approximation is 65%. There is normal diastolic function.    Right Ventricle: Normal right ventricular cavity size. Wall thickness is normal. Right ventricle wall motion  is normal. Systolic function is normal.    Tricuspid Valve: There is mild regurgitation.    Pulmonary Artery: There is mild pulmonary hypertension. The estimated pulmonary artery systolic pressure is 43 mmHg.    IVC/SVC: Normal venous pressure at 3 mmHg.    RHC: 6/26/2024  RA: 13 RV: 48/ 5/ 9 PA: 52/ 18/ 30 PWP: 13 .   Cardiac output was 4.6 by thermodilution. Cardiac index is 2.2 L/min/m2.   O2 Sat: SVC 62% RA 65% PA 64%.   Pulmonary vascular resistance: 3.7.     CT Chest 4/3/24  Impression:     No imaging evidence of interstitial lung disease.     Stable bilateral lower lobe multi linear subsegmental atelectasis versus scarring.     Stable pulmonary nodules.  Grossly stable mediastinal lymphadenopathy measuring 1.2 cm in the station 4R.    PFTs 4/8/24  FEV1 62%  FVC 67%  DLCO 37%    Review of Systems   Constitutional: Positive for malaise/fatigue. Negative for chills and fever.   HENT:  Negative for hearing loss.    Eyes:  Negative for visual disturbance.   Cardiovascular:  Positive for dyspnea on exertion, near-syncope and palpitations. Negative for irregular heartbeat, leg swelling, orthopnea, paroxysmal nocturnal dyspnea and syncope.   Respiratory:  Positive for cough, shortness of breath, sputum production and wheezing.    Musculoskeletal:  Negative for muscle weakness.   Gastrointestinal:  Negative for diarrhea, nausea and vomiting.  "  Neurological:  Negative for focal weakness.   Psychiatric/Behavioral:  Negative for memory loss.           Objective   BP (!) 148/60   Pulse (!) 57   Ht 5' 10" (1.778 m)   Wt 91.5 kg (201 lb 11.5 oz)   SpO2 98%   BMI 28.94 kg/m²       Physical Exam  Constitutional:       Appearance: Normal appearance.   HENT:      Head: Atraumatic.   Eyes:      Extraocular Movements: Extraocular movements intact.   Cardiovascular:      Rate and Rhythm: Normal rate and regular rhythm.      Pulses: Normal pulses.      Comments: S1, S2 noted. Loud P2. JVP not visible at 45 degrees.  Pulmonary:      Breath sounds: Rales (Bilateral dry crackles in lower lung fields) present.   Abdominal:      Palpations: Abdomen is soft.      Tenderness: There is no abdominal tenderness.   Musculoskeletal:         General: Normal range of motion.      Right lower leg: Edema present.      Left lower leg: Edema present.   Neurological:      General: No focal deficit present.      Mental Status: She is alert and oriented to person, place, and time.          Assessment and Plan     1. Pulmonary hypertension    2. Interstitial pulmonary disease, unspecified    3. Chronic pulmonary heart disease      Plan:  Mrs. Guzman feels subjectively well and feels her pain is controlled. Does not think it was related to the medication and is agreeable to restart. Will start with tadalafil, then add Tyvaso soon after.     Restart tadalafil.     Schedule V/q scan (Lung scan) to complete workup.    Return to clinic in December with labs, walk.    Recommend 2 gram sodium restriction  Encourage physical activity with graded exercise program.  Requested patient to weigh themselves daily, and to notify us if their weight increases by more than 3 lbs in 1 day or 5 lbs in 1 week.    Luz Kee DNP, APRN  [facility]            "

## 2024-10-16 NOTE — PATIENT INSTRUCTIONS
Restart tadalafil.     Schedule V/q scan (Lung scan).    Return to clinic in December with labs, walk.    Recommend 2 gram sodium restriction  Encourage physical activity with graded exercise program.  Requested patient to weigh themselves daily, and to notify us if their weight increases by more than 3 lbs in 1 day or 5 lbs in 1 week.

## 2024-10-17 ENCOUNTER — TELEPHONE (OUTPATIENT)
Dept: INTERNAL MEDICINE | Facility: CLINIC | Age: 74
End: 2024-10-17
Payer: MEDICARE

## 2024-10-17 RX ORDER — PREDNISONE 20 MG/1
20 TABLET ORAL DAILY
Qty: 30 TABLET | Refills: 0 | Status: SHIPPED | OUTPATIENT
Start: 2024-10-17

## 2024-10-24 ENCOUNTER — OFFICE VISIT (OUTPATIENT)
Dept: RHEUMATOLOGY | Facility: CLINIC | Age: 74
End: 2024-10-24
Payer: MEDICARE

## 2024-10-24 ENCOUNTER — PATIENT MESSAGE (OUTPATIENT)
Dept: TRANSPLANT | Facility: CLINIC | Age: 74
End: 2024-10-24
Payer: MEDICARE

## 2024-10-24 ENCOUNTER — TELEPHONE (OUTPATIENT)
Dept: TRANSPLANT | Facility: CLINIC | Age: 74
End: 2024-10-24
Payer: MEDICARE

## 2024-10-24 VITALS
DIASTOLIC BLOOD PRESSURE: 65 MMHG | TEMPERATURE: 99 F | SYSTOLIC BLOOD PRESSURE: 118 MMHG | OXYGEN SATURATION: 97 % | WEIGHT: 200.63 LBS | HEIGHT: 70 IN | RESPIRATION RATE: 18 BRPM | HEART RATE: 70 BPM | BODY MASS INDEX: 28.72 KG/M2

## 2024-10-24 DIAGNOSIS — Q99.8 MYOSITIS ASSOCIATED ANTIBODY POSITIVE: ICD-10-CM

## 2024-10-24 DIAGNOSIS — R76.8 POSITIVE DOUBLE STRANDED DNA ANTIBODY TEST: ICD-10-CM

## 2024-10-24 DIAGNOSIS — R76.8 ANA POSITIVE: Primary | ICD-10-CM

## 2024-10-24 DIAGNOSIS — E66.3 OVERWEIGHT (BMI 25.0-29.9): ICD-10-CM

## 2024-10-24 DIAGNOSIS — I27.20 PULMONARY HYPERTENSION: ICD-10-CM

## 2024-10-24 PROCEDURE — 3066F NEPHROPATHY DOC TX: CPT | Mod: CPTII,S$GLB,, | Performed by: STUDENT IN AN ORGANIZED HEALTH CARE EDUCATION/TRAINING PROGRAM

## 2024-10-24 PROCEDURE — 1126F AMNT PAIN NOTED NONE PRSNT: CPT | Mod: CPTII,S$GLB,, | Performed by: STUDENT IN AN ORGANIZED HEALTH CARE EDUCATION/TRAINING PROGRAM

## 2024-10-24 PROCEDURE — 3061F NEG MICROALBUMINURIA REV: CPT | Mod: CPTII,S$GLB,, | Performed by: STUDENT IN AN ORGANIZED HEALTH CARE EDUCATION/TRAINING PROGRAM

## 2024-10-24 PROCEDURE — 3074F SYST BP LT 130 MM HG: CPT | Mod: CPTII,S$GLB,, | Performed by: STUDENT IN AN ORGANIZED HEALTH CARE EDUCATION/TRAINING PROGRAM

## 2024-10-24 PROCEDURE — 3044F HG A1C LEVEL LT 7.0%: CPT | Mod: CPTII,S$GLB,, | Performed by: STUDENT IN AN ORGANIZED HEALTH CARE EDUCATION/TRAINING PROGRAM

## 2024-10-24 PROCEDURE — 99999 PR PBB SHADOW E&M-EST. PATIENT-LVL IV: CPT | Mod: PBBFAC,,, | Performed by: STUDENT IN AN ORGANIZED HEALTH CARE EDUCATION/TRAINING PROGRAM

## 2024-10-24 PROCEDURE — 1159F MED LIST DOCD IN RCRD: CPT | Mod: CPTII,S$GLB,, | Performed by: STUDENT IN AN ORGANIZED HEALTH CARE EDUCATION/TRAINING PROGRAM

## 2024-10-24 PROCEDURE — 3288F FALL RISK ASSESSMENT DOCD: CPT | Mod: CPTII,S$GLB,, | Performed by: STUDENT IN AN ORGANIZED HEALTH CARE EDUCATION/TRAINING PROGRAM

## 2024-10-24 PROCEDURE — 3078F DIAST BP <80 MM HG: CPT | Mod: CPTII,S$GLB,, | Performed by: STUDENT IN AN ORGANIZED HEALTH CARE EDUCATION/TRAINING PROGRAM

## 2024-10-24 PROCEDURE — 1101F PT FALLS ASSESS-DOCD LE1/YR: CPT | Mod: CPTII,S$GLB,, | Performed by: STUDENT IN AN ORGANIZED HEALTH CARE EDUCATION/TRAINING PROGRAM

## 2024-10-24 PROCEDURE — 99214 OFFICE O/P EST MOD 30 MIN: CPT | Mod: S$GLB,,, | Performed by: STUDENT IN AN ORGANIZED HEALTH CARE EDUCATION/TRAINING PROGRAM

## 2024-10-24 NOTE — TELEPHONE ENCOUNTER
NN called the patient to determine if the patient has started the Tadalafil. Patient did not answer. Voicemail was left asking for patient to call back regarding her Tadalafil.

## 2024-10-25 ENCOUNTER — EXTERNAL HOME HEALTH (OUTPATIENT)
Dept: HOME HEALTH SERVICES | Facility: HOSPITAL | Age: 74
End: 2024-10-25
Payer: MEDICARE

## 2024-10-26 RX ORDER — LORAZEPAM 0.5 MG/1
TABLET ORAL
Qty: 30 TABLET | Refills: 0 | Status: SHIPPED | OUTPATIENT
Start: 2024-10-26

## 2024-10-29 ENCOUNTER — TELEPHONE (OUTPATIENT)
Dept: TRANSPLANT | Facility: CLINIC | Age: 74
End: 2024-10-29
Payer: MEDICARE

## 2024-11-06 ENCOUNTER — TELEPHONE (OUTPATIENT)
Dept: TRANSPLANT | Facility: CLINIC | Age: 74
End: 2024-11-06
Payer: MEDICARE

## 2024-11-06 NOTE — TELEPHONE ENCOUNTER
NN spoke to the patient and she stated so far so good on her retrying the medication Tadalafil. Patient stated she is taking two tablets a day. NN clarified that the patient is currently taking two 20 mg tablets once a day. Patient confirmed. NN explained to the patient that normally we have patient's start with one 20 mg tablet once a day for two weeks. But NN explained that the patient was on this medication before and so she might be ok to stay at the two 20 mg tabs daily. NN explained to the patient that she would confirm with the MD and call patient back for next steps on how much Tadalafil to continue to take. Patient stated she just started the Tadalafil on Monday 11.4.24. NN will discuss with the MD and call patient back.    @415 pm  NN discussed with Dr. Baez the above concern about the patient taking 40 mg of Tadalafil daily. Per Dr. Baez's orders patient should remain on 40 mg Tadalafil daily. Patient was called by NN and told to remain on the 40 mg of Tadalafil but to call if she has any signs/symptoms. Signs and symptoms for Tadalafil gone over with the patient. Patient verbalized understanding.   Updated on POC/ admit.  Wife at bedside,  Will cont to monitor.  Call light in reach.

## 2024-11-18 ENCOUNTER — OFFICE VISIT (OUTPATIENT)
Dept: INTERNAL MEDICINE | Facility: CLINIC | Age: 74
End: 2024-11-18
Payer: MEDICARE

## 2024-11-18 ENCOUNTER — HOSPITAL ENCOUNTER (OUTPATIENT)
Dept: RADIOLOGY | Facility: HOSPITAL | Age: 74
Discharge: HOME | End: 2024-11-18
Attending: INTERNAL MEDICINE
Payer: MEDICARE

## 2024-11-18 VITALS
OXYGEN SATURATION: 96 % | BODY MASS INDEX: 29.75 KG/M2 | HEIGHT: 70 IN | DIASTOLIC BLOOD PRESSURE: 56 MMHG | SYSTOLIC BLOOD PRESSURE: 120 MMHG | HEART RATE: 67 BPM | WEIGHT: 207.81 LBS

## 2024-11-18 DIAGNOSIS — R94.6 ABNORMAL RESULTS OF THYROID FUNCTION STUDIES: ICD-10-CM

## 2024-11-18 DIAGNOSIS — I82.4Y1 DEEP VEIN THROMBOSIS (DVT) OF PROXIMAL VEIN OF RIGHT LOWER EXTREMITY, UNSPECIFIED CHRONICITY: Primary | ICD-10-CM

## 2024-11-18 DIAGNOSIS — I82.4Y1 DEEP VEIN THROMBOSIS (DVT) OF PROXIMAL VEIN OF RIGHT LOWER EXTREMITY, UNSPECIFIED CHRONICITY: ICD-10-CM

## 2024-11-18 DIAGNOSIS — M35.3 PMR (POLYMYALGIA RHEUMATICA): ICD-10-CM

## 2024-11-18 DIAGNOSIS — R60.9 EDEMA, UNSPECIFIED TYPE: ICD-10-CM

## 2024-11-18 DIAGNOSIS — Z23 NEED FOR VACCINATION: ICD-10-CM

## 2024-11-18 PROCEDURE — G0008 ADMIN INFLUENZA VIRUS VAC: HCPCS | Mod: S$GLB,,, | Performed by: INTERNAL MEDICINE

## 2024-11-18 PROCEDURE — 1101F PT FALLS ASSESS-DOCD LE1/YR: CPT | Mod: CPTII,S$GLB,, | Performed by: INTERNAL MEDICINE

## 2024-11-18 PROCEDURE — 1126F AMNT PAIN NOTED NONE PRSNT: CPT | Mod: CPTII,S$GLB,, | Performed by: INTERNAL MEDICINE

## 2024-11-18 PROCEDURE — 3066F NEPHROPATHY DOC TX: CPT | Mod: CPTII,S$GLB,, | Performed by: INTERNAL MEDICINE

## 2024-11-18 PROCEDURE — 99999 PR PBB SHADOW E&M-EST. PATIENT-LVL IV: CPT | Mod: PBBFAC,,, | Performed by: INTERNAL MEDICINE

## 2024-11-18 PROCEDURE — 93970 EXTREMITY STUDY: CPT | Mod: TC

## 2024-11-18 PROCEDURE — 3061F NEG MICROALBUMINURIA REV: CPT | Mod: CPTII,S$GLB,, | Performed by: INTERNAL MEDICINE

## 2024-11-18 PROCEDURE — 3044F HG A1C LEVEL LT 7.0%: CPT | Mod: CPTII,S$GLB,, | Performed by: INTERNAL MEDICINE

## 2024-11-18 PROCEDURE — 3078F DIAST BP <80 MM HG: CPT | Mod: CPTII,S$GLB,, | Performed by: INTERNAL MEDICINE

## 2024-11-18 PROCEDURE — 3074F SYST BP LT 130 MM HG: CPT | Mod: CPTII,S$GLB,, | Performed by: INTERNAL MEDICINE

## 2024-11-18 PROCEDURE — 90653 IIV ADJUVANT VACCINE IM: CPT | Mod: S$GLB,,, | Performed by: INTERNAL MEDICINE

## 2024-11-18 PROCEDURE — 1159F MED LIST DOCD IN RCRD: CPT | Mod: CPTII,S$GLB,, | Performed by: INTERNAL MEDICINE

## 2024-11-18 PROCEDURE — 99214 OFFICE O/P EST MOD 30 MIN: CPT | Mod: S$GLB,,, | Performed by: INTERNAL MEDICINE

## 2024-11-18 PROCEDURE — 3288F FALL RISK ASSESSMENT DOCD: CPT | Mod: CPTII,S$GLB,, | Performed by: INTERNAL MEDICINE

## 2024-11-18 PROCEDURE — 93970 EXTREMITY STUDY: CPT | Mod: 26,,, | Performed by: RADIOLOGY

## 2024-11-18 RX ORDER — PREDNISONE 5 MG/1
TABLET ORAL
Qty: 105 TABLET | Refills: 1 | Status: SHIPPED | OUTPATIENT
Start: 2024-11-18

## 2024-11-19 ENCOUNTER — TELEPHONE (OUTPATIENT)
Dept: TRANSPLANT | Facility: CLINIC | Age: 74
End: 2024-11-19
Payer: MEDICARE

## 2024-11-19 NOTE — TELEPHONE ENCOUNTER
NN received a call from the patient regarding her Tadalafil. The patient started taking 40 mg called the PH clinic and was advised if no side effects to continue to take the 40 mg. Patient told NN today that she was experiencing side effects of lightheadedness, lethargy and blood pressures 90/60. Patient stated so she started to only take 20 mg of the Tadalafil. Patient also stated that she went to see her PCP yesterday and the PCP stopped the patient's Spironolactone due to her low BP. Patient then informed NN that she was planning on increasing her Tadalafil back to 40 mg starting tomorrow. NN asked that in the future the patient call PH clinic to discuss these issues as we are here to help manage these types of concerns. NN explained that our PH patients do sometimes have low BP but as long as their are no symptoms/side effects then that is a fine BP for them. But since she was having symptoms/side effects NN is glad the patient went back to the 20 mg to Tadalafil. NN informed the patient to call PH clinic once she increases her dose of Tadalafil to 40 mg if she has any symptoms/side effects. We might stop or change one of two of the patient's BP meds. Patient verbalized understanding.

## 2024-11-20 ENCOUNTER — TELEPHONE (OUTPATIENT)
Dept: INTERNAL MEDICINE | Facility: CLINIC | Age: 74
End: 2024-11-20
Payer: MEDICARE

## 2024-11-20 DIAGNOSIS — M35.3 PMR (POLYMYALGIA RHEUMATICA): Primary | ICD-10-CM

## 2024-11-20 RX ORDER — POTASSIUM CHLORIDE 750 MG/1
CAPSULE, EXTENDED RELEASE ORAL
Qty: 6 CAPSULE | Refills: 0 | Status: SHIPPED | OUTPATIENT
Start: 2024-11-20

## 2024-11-20 NOTE — PROGRESS NOTES
Subjective:       Patient ID: Marleny Guzman is a 74 y.o. female.    Chief Complaint: Follow-up    Follow-up  Pertinent negatives include no abdominal pain, chest pain (arm pain or jaw pain), headaches, nausea or vomiting.   SHe is doing much better.  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  No CP - mild SOB but stable to improved.  She is back on tyvaso.  She stopped the apixaban after one month unknowingly but she is very active now.  Review of Systems   Respiratory:  Negative for shortness of breath (PND or orthopnea).    Cardiovascular:  Negative for chest pain (arm pain or jaw pain).   Gastrointestinal:  Negative for abdominal pain, diarrhea, nausea and vomiting.   Genitourinary:  Negative for dysuria.   Neurological:  Negative for seizures, syncope and headaches.       Objective:      Physical Exam  Constitutional:       General: She is not in acute distress.     Appearance: She is well-developed.   HENT:      Head: Normocephalic.   Eyes:      Pupils: Pupils are equal, round, and reactive to light.   Neck:      Thyroid: No thyromegaly.      Vascular: No JVD.   Cardiovascular:      Rate and Rhythm: Normal rate and regular rhythm.      Heart sounds: Normal heart sounds. No murmur heard.     No friction rub. No gallop.   Pulmonary:      Effort: Pulmonary effort is normal.      Breath sounds: Normal breath sounds. No wheezing or rales.   Abdominal:      General: Bowel sounds are normal. There is no distension.      Palpations: Abdomen is soft. There is no mass.      Tenderness: There is no abdominal tenderness. There is no guarding or rebound.   Genitourinary:     Rectum: Guaiac result negative.   Musculoskeletal:      Cervical back: Neck supple.   Lymphadenopathy:      Cervical: No cervical adenopathy.   Skin:     General: Skin is warm and dry.   Neurological:      Mental Status: She is alert and oriented to  person, place, and time.      Deep Tendon Reflexes: Reflexes are normal and symmetric.   Psychiatric:         Behavior: Behavior normal.         Thought Content: Thought content normal.         Judgment: Judgment normal.         Assessment:       1. Deep vein thrombosis (DVT) of proximal vein of right lower extremity, unspecified chronicity    2. PMR (polymyalgia rheumatica)    3. Edema, unspecified type    4. Abnormal results of thyroid function studies    5. Need for vaccination        Plan:   Deep vein thrombosis (DVT) of proximal vein of right lower extremity, unspecified chronicity  -     US Lower Extremity Veins Bilateral; Future; Expected date: 11/18/2024  If no DVT no need to restart apixaban  PMR (polymyalgia rheumatica)  -     CBC Auto Differential; Future; Expected date: 11/18/2024  -     Comprehensive Metabolic Panel; Future; Expected date: 11/18/2024  -     TSH; Future; Expected date: 11/18/2024  -     Sedimentation rate; Future; Expected date: 11/18/2024  -     C-Reactive Protein; Future; Expected date: 11/18/2024  Improved decrease prednisone to 17.5mg daily for one month then to 15mg   Edema, unspecified type  -     US Lower Extremity Veins Bilateral; Future; Expected date: 11/18/2024    Abnormal results of thyroid function studies  -     TSH; Future; Expected date: 11/18/2024    Need for vaccination  -     Influenza - Trivalent (Adjuvanted)    Other orders  -     predniSONE (DELTASONE) 5 MG tablet; 3.5 tablets (17.5mg) daily for 30 days then 3 tablets (15mg) for 30 days  Dispense: 105 tablet; Refill: 1

## 2024-11-29 ENCOUNTER — HOSPITAL ENCOUNTER (OUTPATIENT)
Dept: RADIOLOGY | Facility: HOSPITAL | Age: 74
Discharge: HOME | End: 2024-11-29
Payer: MEDICARE

## 2024-11-29 DIAGNOSIS — I27.20 PULMONARY HYPERTENSION: ICD-10-CM

## 2024-11-29 DIAGNOSIS — I27.9 CHRONIC PULMONARY HEART DISEASE: Primary | ICD-10-CM

## 2024-11-29 DIAGNOSIS — I27.9 CHRONIC PULMONARY HEART DISEASE: ICD-10-CM

## 2024-11-29 PROCEDURE — A9540 TC99M MAA: HCPCS

## 2024-11-29 PROCEDURE — 71046 X-RAY EXAM CHEST 2 VIEWS: CPT | Mod: TC

## 2024-11-29 PROCEDURE — 71046 X-RAY EXAM CHEST 2 VIEWS: CPT | Mod: 26,,, | Performed by: RADIOLOGY

## 2024-11-29 PROCEDURE — 78582 LUNG VENTILAT&PERFUS IMAGING: CPT | Mod: TC

## 2024-11-29 PROCEDURE — A9567 TECHNETIUM TC-99M AEROSOL: HCPCS

## 2024-11-29 PROCEDURE — 78582 LUNG VENTILAT&PERFUS IMAGING: CPT | Mod: 26,,, | Performed by: RADIOLOGY

## 2024-11-29 RX ADMIN — KIT FOR THE PREPARATION OF TECHNETIUM TC 99M ALBUMIN AGGREGATED 5 MILLICURIE: 2 INJECTION, POWDER, LYOPHILIZED, FOR SUSPENSION INTRAPERITONEAL; INTRAVENOUS at 01:11

## 2024-11-29 RX ADMIN — KIT FOR THE PREPARATION OF TECHNETIUM TC 99M PENTETATE 34.5 MILLICURIE: 20 INJECTION, POWDER, LYOPHILIZED, FOR SOLUTION INTRAVENOUS; RESPIRATORY (INHALATION) at 01:11

## 2024-12-16 ENCOUNTER — TELEPHONE (OUTPATIENT)
Dept: TRANSPLANT | Facility: CLINIC | Age: 74
End: 2024-12-16
Payer: MEDICARE

## 2024-12-16 NOTE — TELEPHONE ENCOUNTER
Approved today by [employer] 2017 [employer]  Request Reference Number: [id number]. TADALAFIL TAB 20MG is approved through 12/31/2025. Your patient may now fill this prescription and it will be covered.  Authorization Expiration Date: 12/31/2025

## 2024-12-30 ENCOUNTER — OFFICE VISIT (OUTPATIENT)
Dept: INTERNAL MEDICINE | Facility: CLINIC | Age: 74
End: 2024-12-30
Payer: MEDICARE

## 2024-12-30 ENCOUNTER — TELEPHONE (OUTPATIENT)
Dept: TRANSPLANT | Facility: CLINIC | Age: 74
End: 2024-12-30
Payer: MEDICARE

## 2024-12-30 ENCOUNTER — LAB VISIT (OUTPATIENT)
Dept: LAB | Facility: HOSPITAL | Age: 74
End: 2024-12-30
Attending: INTERNAL MEDICINE
Payer: MEDICARE

## 2024-12-30 VITALS — BODY MASS INDEX: 29.96 KG/M2 | HEART RATE: 61 BPM | WEIGHT: 209.31 LBS | HEIGHT: 70 IN | OXYGEN SATURATION: 98 %

## 2024-12-30 DIAGNOSIS — R06.82 TACHYPNEA: ICD-10-CM

## 2024-12-30 DIAGNOSIS — M35.3 PMR (POLYMYALGIA RHEUMATICA): ICD-10-CM

## 2024-12-30 DIAGNOSIS — F41.9 ANXIETY: ICD-10-CM

## 2024-12-30 DIAGNOSIS — Z79.899 POLYPHARMACY: ICD-10-CM

## 2024-12-30 DIAGNOSIS — R60.9 EDEMA, UNSPECIFIED TYPE: Primary | ICD-10-CM

## 2024-12-30 LAB
ALBUMIN SERPL BCP-MCNC: 3.5 G/DL (ref 3.5–5.2)
ALP SERPL-CCNC: 98 U/L (ref 40–150)
ALT SERPL W/O P-5'-P-CCNC: 10 U/L (ref 10–44)
ANION GAP SERPL CALC-SCNC: 9 MMOL/L (ref 8–16)
AST SERPL-CCNC: 17 U/L (ref 10–40)
BASOPHILS # BLD AUTO: 0.04 K/UL (ref 0–0.2)
BASOPHILS NFR BLD: 0.3 % (ref 0–1.9)
BILIRUB SERPL-MCNC: 0.2 MG/DL (ref 0.1–1)
BNP SERPL-MCNC: 88 PG/ML (ref 0–99)
BUN SERPL-MCNC: 13 MG/DL (ref 8–23)
CALCIUM SERPL-MCNC: 9.3 MG/DL (ref 8.7–10.5)
CHLORIDE SERPL-SCNC: 103 MMOL/L (ref 95–110)
CO2 SERPL-SCNC: 28 MMOL/L (ref 23–29)
CREAT SERPL-MCNC: 0.8 MG/DL (ref 0.5–1.4)
CRP SERPL-MCNC: 3.2 MG/L (ref 0–8.2)
DIFFERENTIAL METHOD BLD: ABNORMAL
EOSINOPHIL # BLD AUTO: 0 K/UL (ref 0–0.5)
EOSINOPHIL NFR BLD: 0.3 % (ref 0–8)
ERYTHROCYTE [DISTWIDTH] IN BLOOD BY AUTOMATED COUNT: 15.9 % (ref 11.5–14.5)
ERYTHROCYTE [SEDIMENTATION RATE] IN BLOOD BY PHOTOMETRIC METHOD: 13 MM/HR (ref 0–36)
EST. GFR  (NO RACE VARIABLE): >60 ML/MIN/1.73 M^2
GLUCOSE SERPL-MCNC: 91 MG/DL (ref 70–110)
HCT VFR BLD AUTO: 40.7 % (ref 37–48.5)
HGB BLD-MCNC: 12.7 G/DL (ref 12–16)
IMM GRANULOCYTES # BLD AUTO: 0.03 K/UL (ref 0–0.04)
IMM GRANULOCYTES NFR BLD AUTO: 0.2 % (ref 0–0.5)
LYMPHOCYTES # BLD AUTO: 1.8 K/UL (ref 1–4.8)
LYMPHOCYTES NFR BLD: 12.4 % (ref 18–48)
MAGNESIUM SERPL-MCNC: 1.9 MG/DL (ref 1.6–2.6)
MCH RBC QN AUTO: 28.9 PG (ref 27–31)
MCHC RBC AUTO-ENTMCNC: 31.2 G/DL (ref 32–36)
MCV RBC AUTO: 93 FL (ref 82–98)
MONOCYTES # BLD AUTO: 0.4 K/UL (ref 0.3–1)
MONOCYTES NFR BLD: 2.7 % (ref 4–15)
NEUTROPHILS # BLD AUTO: 12 K/UL (ref 1.8–7.7)
NEUTROPHILS NFR BLD: 84.1 % (ref 38–73)
NRBC BLD-RTO: 0 /100 WBC
PLATELET # BLD AUTO: 366 K/UL (ref 150–450)
PMV BLD AUTO: 10.2 FL (ref 9.2–12.9)
POTASSIUM SERPL-SCNC: 3.5 MMOL/L (ref 3.5–5.1)
PROT SERPL-MCNC: 6.9 G/DL (ref 6–8.4)
RBC # BLD AUTO: 4.39 M/UL (ref 4–5.4)
SODIUM SERPL-SCNC: 140 MMOL/L (ref 136–145)
WBC # BLD AUTO: 14.26 K/UL (ref 3.9–12.7)

## 2024-12-30 PROCEDURE — 85025 COMPLETE CBC W/AUTO DIFF WBC: CPT | Performed by: INTERNAL MEDICINE

## 2024-12-30 PROCEDURE — 99999 PR PBB SHADOW E&M-EST. PATIENT-LVL III: CPT | Mod: PBBFAC,,, | Performed by: INTERNAL MEDICINE

## 2024-12-30 PROCEDURE — 1126F AMNT PAIN NOTED NONE PRSNT: CPT | Mod: CPTII,S$GLB,, | Performed by: INTERNAL MEDICINE

## 2024-12-30 PROCEDURE — 3066F NEPHROPATHY DOC TX: CPT | Mod: CPTII,S$GLB,, | Performed by: INTERNAL MEDICINE

## 2024-12-30 PROCEDURE — 1101F PT FALLS ASSESS-DOCD LE1/YR: CPT | Mod: CPTII,S$GLB,, | Performed by: INTERNAL MEDICINE

## 2024-12-30 PROCEDURE — 1159F MED LIST DOCD IN RCRD: CPT | Mod: CPTII,S$GLB,, | Performed by: INTERNAL MEDICINE

## 2024-12-30 PROCEDURE — 3044F HG A1C LEVEL LT 7.0%: CPT | Mod: CPTII,S$GLB,, | Performed by: INTERNAL MEDICINE

## 2024-12-30 PROCEDURE — 99214 OFFICE O/P EST MOD 30 MIN: CPT | Mod: S$GLB,,, | Performed by: INTERNAL MEDICINE

## 2024-12-30 PROCEDURE — 83735 ASSAY OF MAGNESIUM: CPT | Performed by: INTERNAL MEDICINE

## 2024-12-30 PROCEDURE — 86140 C-REACTIVE PROTEIN: CPT | Performed by: INTERNAL MEDICINE

## 2024-12-30 PROCEDURE — 3061F NEG MICROALBUMINURIA REV: CPT | Mod: CPTII,S$GLB,, | Performed by: INTERNAL MEDICINE

## 2024-12-30 PROCEDURE — 80053 COMPREHEN METABOLIC PANEL: CPT | Performed by: INTERNAL MEDICINE

## 2024-12-30 PROCEDURE — 85652 RBC SED RATE AUTOMATED: CPT | Performed by: INTERNAL MEDICINE

## 2024-12-30 PROCEDURE — 83880 ASSAY OF NATRIURETIC PEPTIDE: CPT | Performed by: INTERNAL MEDICINE

## 2024-12-30 PROCEDURE — 3288F FALL RISK ASSESSMENT DOCD: CPT | Mod: CPTII,S$GLB,, | Performed by: INTERNAL MEDICINE

## 2024-12-30 PROCEDURE — 36415 COLL VENOUS BLD VENIPUNCTURE: CPT | Mod: PO | Performed by: INTERNAL MEDICINE

## 2024-12-30 RX ORDER — ERGOCALCIFEROL 1.25 MG/1
CAPSULE ORAL
Qty: 3 CAPSULE | Refills: 3 | Status: SHIPPED | OUTPATIENT
Start: 2024-12-30

## 2024-12-30 RX ORDER — DOXEPIN 3 MG/1
TABLET, FILM COATED ORAL
Qty: 30 TABLET | Refills: 1 | Status: SHIPPED | OUTPATIENT
Start: 2024-12-30

## 2024-12-30 RX ORDER — LORAZEPAM 0.5 MG/1
TABLET ORAL
Qty: 30 TABLET | Refills: 0 | Status: SHIPPED | OUTPATIENT
Start: 2024-12-30

## 2024-12-30 RX ORDER — PREDNISONE 5 MG/1
TABLET ORAL
Qty: 75 TABLET | Refills: 1 | Status: SHIPPED | OUTPATIENT
Start: 2025-01-18

## 2024-12-30 NOTE — TELEPHONE ENCOUNTER
NN received a message from another [facility] MD office stating that they were running very behind today and the patient would be late to our appointments. NN called the patient and offered for her to still come to the PH clinic today but later then her appointments. Patient stated that she was very tired from being at the other MD appointment and she would like to reschedule her appointments to another day. NN explained to the patient that one of our schedulers would call her to get her appointments back on the schedule. NN told the patient that if she does not receive a call to be scheduled to call NN on Monday 1/6/2024. Patient verbalized understanding of all of the above information. NN then sent message to the schedulers.

## 2024-12-31 ENCOUNTER — HOSPITAL ENCOUNTER (OUTPATIENT)
Dept: RADIOLOGY | Facility: HOSPITAL | Age: 74
Discharge: HOME | End: 2024-12-31
Attending: INTERNAL MEDICINE
Payer: MEDICARE

## 2024-12-31 DIAGNOSIS — R60.9 EDEMA, UNSPECIFIED TYPE: ICD-10-CM

## 2024-12-31 PROCEDURE — 93970 EXTREMITY STUDY: CPT | Mod: 26,,, | Performed by: RADIOLOGY

## 2024-12-31 PROCEDURE — 93970 EXTREMITY STUDY: CPT | Mod: TC,PO

## 2024-12-31 NOTE — PROGRESS NOTES
Subjective:       Patient ID: Marleny Guzman is a 74 y.o. female.    Chief Complaint: Follow-up    Follow-up  Pertinent negatives include no abdominal pain, chest pain (arm pain or jaw pain), headaches, nausea or vomiting.   She is down to 15 mg on the prednisone - still feeling well no pain or weakness or malaise.  No CP or SOB or cough.  She does have LE edema that is new.    Review of Systems   Respiratory:  Negative for shortness of breath (PND or orthopnea).    Cardiovascular:  Negative for chest pain (arm pain or jaw pain).   Gastrointestinal:  Negative for abdominal pain, diarrhea, nausea and vomiting.   Genitourinary:  Negative for dysuria.   Neurological:  Negative for seizures, syncope and headaches.       Objective:      Physical Exam  Constitutional:       General: She is not in acute distress.     Appearance: She is well-developed.   HENT:      Head: Normocephalic.   Eyes:      Pupils: Pupils are equal, round, and reactive to light.   Neck:      Thyroid: No thyromegaly.      Vascular: No JVD.   Cardiovascular:      Rate and Rhythm: Normal rate and regular rhythm.      Heart sounds: Normal heart sounds. No murmur heard.     No friction rub. No gallop.   Pulmonary:      Effort: Pulmonary effort is normal.      Breath sounds: Normal breath sounds. No wheezing or rales.   Abdominal:      General: Bowel sounds are normal. There is no distension.      Palpations: Abdomen is soft. There is no mass.      Tenderness: There is no abdominal tenderness. There is no guarding or rebound.   Musculoskeletal:      Cervical back: Neck supple.   Lymphadenopathy:      Cervical: No cervical adenopathy.   Skin:     General: Skin is warm and dry.   Neurological:      Mental Status: She is alert and oriented to person, place, and time.      Deep Tendon Reflexes: Reflexes are normal and symmetric.   Psychiatric:         Behavior: Behavior normal.         Thought Content: Thought content normal.         Judgment:  Judgment normal.         Assessment:       1. Edema, unspecified type    2. Anxiety        Plan:   Edema, unspecified type  -      Lower Extremity Veins Bilateral; Future; Expected date: 12/30/2024    Anxiety  -     LORazepam (ATIVAN) 0.5 MG tablet; TAKE 1/2 TO 1 TABLET BY MOUTH DAILY AS NEEDED FOR ANXIETY  Dispense: 30 tablet; Refill: 0    Other orders  -     ergocalciferol (ERGOCALCIFEROL) 50,000 unit Cap; TAKE 1 CAPSULE (50,000 UNITS TOTAL) BY MOUTH EVERY 30 DAYS.  Dispense: 3 capsule; Refill: 3  -     doxepin (SILENOR) 3 mg Tab; One tablet at bedtime as needed for insomnia  Dispense: 30 tablet; Refill: 1  -     predniSONE (DELTASONE) 5 MG tablet; 2.5 tablets (12.5mg) daily for 30 days then 2 tablets (10mg) for 30 days  Dispense: 75 tablet; Refill: 1    Continue to decrease prednisone by 2.5mg monthly  Doxepin to help with sleep

## 2025-01-13 ENCOUNTER — HOSPITAL ENCOUNTER (OUTPATIENT)
Dept: PULMONOLOGY | Facility: CLINIC | Age: 75
Discharge: HOME | End: 2025-01-13
Payer: MEDICARE

## 2025-01-13 ENCOUNTER — OFFICE VISIT (OUTPATIENT)
Dept: TRANSPLANT | Facility: CLINIC | Age: 75
End: 2025-01-13
Payer: MEDICARE

## 2025-01-13 ENCOUNTER — LAB VISIT (OUTPATIENT)
Dept: LAB | Facility: HOSPITAL | Age: 75
End: 2025-01-13
Attending: INTERNAL MEDICINE
Payer: MEDICARE

## 2025-01-13 VITALS
DIASTOLIC BLOOD PRESSURE: 72 MMHG | WEIGHT: 208.13 LBS | SYSTOLIC BLOOD PRESSURE: 153 MMHG | HEIGHT: 71 IN | BODY MASS INDEX: 29.8 KG/M2 | HEIGHT: 70 IN | HEART RATE: 68 BPM | WEIGHT: 208.13 LBS | BODY MASS INDEX: 29.14 KG/M2

## 2025-01-13 DIAGNOSIS — I10 PRIMARY HYPERTENSION: ICD-10-CM

## 2025-01-13 DIAGNOSIS — J84.9 INTERSTITIAL PULMONARY DISEASE, UNSPECIFIED: ICD-10-CM

## 2025-01-13 DIAGNOSIS — Z79.899 POLYPHARMACY: ICD-10-CM

## 2025-01-13 DIAGNOSIS — R76.8 ANA POSITIVE: ICD-10-CM

## 2025-01-13 DIAGNOSIS — I27.20 PULMONARY HYPERTENSION: Primary | ICD-10-CM

## 2025-01-13 DIAGNOSIS — G47.33 OSA (OBSTRUCTIVE SLEEP APNEA): ICD-10-CM

## 2025-01-13 DIAGNOSIS — I27.9 CHRONIC PULMONARY HEART DISEASE: ICD-10-CM

## 2025-01-13 DIAGNOSIS — R06.82 TACHYPNEA: ICD-10-CM

## 2025-01-13 LAB
ALBUMIN SERPL BCP-MCNC: 3.4 G/DL (ref 3.5–5.2)
ALP SERPL-CCNC: 90 U/L (ref 40–150)
ALT SERPL W/O P-5'-P-CCNC: 13 U/L (ref 10–44)
ANION GAP SERPL CALC-SCNC: 8 MMOL/L (ref 8–16)
AST SERPL-CCNC: 14 U/L (ref 10–40)
BASOPHILS # BLD AUTO: 0.05 K/UL (ref 0–0.2)
BASOPHILS NFR BLD: 0.4 % (ref 0–1.9)
BILIRUB SERPL-MCNC: 0.3 MG/DL (ref 0.1–1)
BNP SERPL-MCNC: 37 PG/ML (ref 0–99)
BUN SERPL-MCNC: 19 MG/DL (ref 8–23)
CALCIUM SERPL-MCNC: 9.1 MG/DL (ref 8.7–10.5)
CHLORIDE SERPL-SCNC: 106 MMOL/L (ref 95–110)
CO2 SERPL-SCNC: 28 MMOL/L (ref 23–29)
CREAT SERPL-MCNC: 0.8 MG/DL (ref 0.5–1.4)
DIFFERENTIAL METHOD BLD: ABNORMAL
EOSINOPHIL # BLD AUTO: 0.1 K/UL (ref 0–0.5)
EOSINOPHIL NFR BLD: 0.8 % (ref 0–8)
ERYTHROCYTE [DISTWIDTH] IN BLOOD BY AUTOMATED COUNT: 14.8 % (ref 11.5–14.5)
EST. GFR  (NO RACE VARIABLE): >60 ML/MIN/1.73 M^2
GLUCOSE SERPL-MCNC: 106 MG/DL (ref 70–110)
HCT VFR BLD AUTO: 39 % (ref 37–48.5)
HGB BLD-MCNC: 12.1 G/DL (ref 12–16)
IMM GRANULOCYTES # BLD AUTO: 0.04 K/UL (ref 0–0.04)
IMM GRANULOCYTES NFR BLD AUTO: 0.3 % (ref 0–0.5)
LYMPHOCYTES # BLD AUTO: 3.1 K/UL (ref 1–4.8)
LYMPHOCYTES NFR BLD: 23.2 % (ref 18–48)
MAGNESIUM SERPL-MCNC: 1.9 MG/DL (ref 1.6–2.6)
MCH RBC QN AUTO: 28.9 PG (ref 27–31)
MCHC RBC AUTO-ENTMCNC: 31 G/DL (ref 32–36)
MCV RBC AUTO: 93 FL (ref 82–98)
MONOCYTES # BLD AUTO: 0.6 K/UL (ref 0.3–1)
MONOCYTES NFR BLD: 4.8 % (ref 4–15)
NEUTROPHILS # BLD AUTO: 9.5 K/UL (ref 1.8–7.7)
NEUTROPHILS NFR BLD: 70.5 % (ref 38–73)
NRBC BLD-RTO: 0 /100 WBC
PLATELET # BLD AUTO: 311 K/UL (ref 150–450)
PMV BLD AUTO: 9.7 FL (ref 9.2–12.9)
POTASSIUM SERPL-SCNC: 3.4 MMOL/L (ref 3.5–5.1)
PROT SERPL-MCNC: 6.4 G/DL (ref 6–8.4)
RBC # BLD AUTO: 4.19 M/UL (ref 4–5.4)
SODIUM SERPL-SCNC: 142 MMOL/L (ref 136–145)
WBC # BLD AUTO: 13.43 K/UL (ref 3.9–12.7)

## 2025-01-13 PROCEDURE — 94618 PULMONARY STRESS TESTING: CPT | Mod: S$GLB,,, | Performed by: INTERNAL MEDICINE

## 2025-01-13 PROCEDURE — 99214 OFFICE O/P EST MOD 30 MIN: CPT | Mod: S$GLB,,,

## 2025-01-13 PROCEDURE — 99999 PR PBB SHADOW E&M-EST. PATIENT-LVL V: CPT | Mod: PBBFAC,,,

## 2025-01-13 PROCEDURE — 36415 COLL VENOUS BLD VENIPUNCTURE: CPT | Performed by: INTERNAL MEDICINE

## 2025-01-13 PROCEDURE — 1159F MED LIST DOCD IN RCRD: CPT | Mod: CPTII,S$GLB,,

## 2025-01-13 PROCEDURE — 1101F PT FALLS ASSESS-DOCD LE1/YR: CPT | Mod: CPTII,S$GLB,,

## 2025-01-13 PROCEDURE — 1126F AMNT PAIN NOTED NONE PRSNT: CPT | Mod: CPTII,S$GLB,,

## 2025-01-13 PROCEDURE — 80053 COMPREHEN METABOLIC PANEL: CPT | Performed by: INTERNAL MEDICINE

## 2025-01-13 PROCEDURE — 3077F SYST BP >= 140 MM HG: CPT | Mod: CPTII,S$GLB,,

## 2025-01-13 PROCEDURE — 83735 ASSAY OF MAGNESIUM: CPT | Performed by: INTERNAL MEDICINE

## 2025-01-13 PROCEDURE — 85025 COMPLETE CBC W/AUTO DIFF WBC: CPT | Performed by: INTERNAL MEDICINE

## 2025-01-13 PROCEDURE — 83880 ASSAY OF NATRIURETIC PEPTIDE: CPT | Performed by: INTERNAL MEDICINE

## 2025-01-13 PROCEDURE — 3288F FALL RISK ASSESSMENT DOCD: CPT | Mod: CPTII,S$GLB,,

## 2025-01-13 PROCEDURE — 3078F DIAST BP <80 MM HG: CPT | Mod: CPTII,S$GLB,,

## 2025-01-13 NOTE — PROGRESS NOTES
Subjective   Patient ID:  Marleny Guzman is a 74 y.o. female who presents for follow-up for PH    72 YO F w/ positive KEE/ds-DNA, RNA poly III, possible ILD, HTN, HLD.    Initial HPI: She was seen by pulmonlogy for WOOTEN and workup showed positive KEE, and possible ILD (although PFTs more consistent with ILD than her lung imaging). Sees rheumatology as well who noted no stigmata of CTD despite positive KEE. TTE showed elevated PA pressures. Has HTN, HLD.    When talking with her, she says that her shortness of breath goes back a few years.  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  In addition to this she also walks her dogs.  However over the past few years she has noticed progressively worsening shortness of breath.  At present she thinks she can walk approximately 2 blocks before she has to pause because of the shortness of breath.  Denies any associated chest tightness, but does endorse chronic bilateral lower extremity swelling.  With the shortness of breath she also notes palpitations.  Has never had any syncopal episodes, but does note occasional exertional lightheadedness.  Has difficulty lying flat, but not due to shortness of breath but because of a productive cough.    She says that along with the shortness of breath she has also noticed worsening cough over the same time.  The cough is productive, making yellow sputum.  It is continuous, and she can not think of any specific aggravating or alleviating factors.  She has no associated hemoptysis.  Denies any fevers, chills, infectious symptoms, or sick contacts.    As noted above, her medical history is significant for the interstitial lung disease, hypertension, dyslipidemia, and possible pulmonary hypertension.    No family history of heart or lung issues.    She is a former smoker, having smoked for approximately 10 years in the 1980s, averaging 5-6 cigarettes per day.  Also smoked  "marijuana in the remote past.  Will significant alcohol use, or recreational drug use.  No history of taking diet medications such as Fen-Phen, or medications for ADHD such as Ritalin or Adderall.  Does endorse however history of asbestos exposure related to work.    1/13/2025: Mrs. Guzman is here for clinic f/u. At our last visit we had discussed restarting tadalafil and Tyvaso. She restarted tadalafil, but is adamant about not restarting Tyvaso.  She still believes her body aches were related to the medication.   Overall, she reports doing well. Walk is stable. Does not qualify for supplementao oxygen. She endorses that her "balance is off and she gets a little dizzy with exertion.  She denies fluid retention, CP, palpitations, pre-syncope, syncope.     6MWT   10/16/2024 1/13/2025   6MW     6MWT Status completed without stopping  completed without stopping    Patient Reported --  Dyspnea;Lightheadedness    Was O2 used? No  No    6MW Distance walked (feet) 1000 feet  1100 feet    Distance walked (meters) 304.8 meters  335.28 meters    Did patient stop? No  No    Oxygen Saturation 98 %  97 %    Supplemental Oxygen Room Air  Room Air    Heart Rate 62 bpm  74 bpm    Blood Pressure 145/77  157/68    Juancarlos Dyspnea Rating  nothing at all  heavy    Oxygen Saturation 97 %  95 %    Supplemental Oxygen Room Air  Room Air    Heart Rate 72 bpm  81 bpm    Blood Pressure 150/80  185/63    Juancarlos Dyspnea Rating  very light  very heavy    Recovery Time (seconds) 117 seconds  52 seconds    Oxygen Saturation 98 %  95 %    Supplemental Oxygen Room Air  Room Air    Heart Rate 62 bpm  77 bpm          ECHO: 10/16/2024    Left Ventricle: The left ventricle is normal in size. Normal wall thickness. There is concentric remodeling. There is normal systolic function with a visually estimated ejection fraction of 60 - 65%. Grade I diastolic dysfunction.    Right Ventricle: Normal right ventricular cavity size. Wall thickness is normal. Systolic " function is normal.    The left atrium is mildly dilated.    Pulmonary Artery: There is mild pulmonary hypertension. The estimated pulmonary artery systolic pressure is 42 mmHg.    IVC/SVC: Normal venous pressure at 3 mmHg.    TTE 3/25/24    Left Ventricle: The left ventricle is normal in size. Normal wall thickness. There is normal systolic function. Ejection fraction by visual approximation is 65%. There is normal diastolic function.    Right Ventricle: Normal right ventricular cavity size. Wall thickness is normal. Right ventricle wall motion  is normal. Systolic function is normal.    Tricuspid Valve: There is mild regurgitation.    Pulmonary Artery: There is mild pulmonary hypertension. The estimated pulmonary artery systolic pressure is 43 mmHg.    IVC/SVC: Normal venous pressure at 3 mmHg.    RHC: 6/26/2024  RA: 13 RV: 48/ 5/ 9 PA: 52/ 18/ 30 PWP: 13 .   Cardiac output was 4.6 by thermodilution. Cardiac index is 2.2 L/min/m2.   O2 Sat: SVC 62% RA 65% PA 64%.   Pulmonary vascular resistance: 3.7.     CT Chest 4/3/24  Impression:     No imaging evidence of interstitial lung disease.     Stable bilateral lower lobe multi linear subsegmental atelectasis versus scarring.     Stable pulmonary nodules.  Grossly stable mediastinal lymphadenopathy measuring 1.2 cm in the station 4R.    PFTs 4/8/24  FEV1 62%  FVC 67%  DLCO 37%    V/Q: negative    Review of Systems   Constitutional: Positive for malaise/fatigue. Negative for chills and fever.   HENT:  Negative for hearing loss.    Eyes:  Negative for visual disturbance.   Cardiovascular:  Positive for dyspnea on exertion and palpitations. Negative for irregular heartbeat, leg swelling, orthopnea, paroxysmal nocturnal dyspnea and syncope.   Respiratory:  Positive for cough, shortness of breath, sputum production and wheezing.    Musculoskeletal:  Negative for muscle weakness.   Gastrointestinal:  Negative for diarrhea, nausea and vomiting.   Neurological:  Positive for  "loss of balance. Negative for focal weakness.   Psychiatric/Behavioral:  Negative for memory loss.           Objective   BP (!) 153/72 (BP Location: Left arm, Patient Position: Sitting)   Pulse 68   Ht 5' 11" (1.803 m)   Wt 94.4 kg (208 lb 1.8 oz)   BMI 29.03 kg/m²       Physical Exam  Constitutional:       Appearance: Normal appearance.   HENT:      Head: Atraumatic.   Eyes:      Extraocular Movements: Extraocular movements intact.   Cardiovascular:      Rate and Rhythm: Normal rate and regular rhythm.      Pulses: Normal pulses.      Comments: S1, S2 noted. Loud P2. JVP not visible at 45 degrees.  Pulmonary:      Breath sounds: Rales (Bilateral dry crackles in lower lung fields) present.   Abdominal:      Palpations: Abdomen is soft.      Tenderness: There is no abdominal tenderness.   Musculoskeletal:         General: Normal range of motion.      Right lower leg: Edema present.      Left lower leg: Edema present.   Neurological:      General: No focal deficit present.      Mental Status: She is alert and oriented to person, place, and time.         WHO Group: 1 Functional Class: II  REVEAL Score: 3 (Low Risk)    Assessment and Plan   Marleny Guzman was seen today for pulmonary hypertension.    Diagnoses and all orders for this visit:    Pulmonary hypertension  Mrs. Guzman has mild PAH per last RHC. She is currently on monotherapy. REVEAL score is low risk.   Based on pulm and rheum notes, she does not have ILD. Will treat as WHO Group 1. Patient t/f Tyvaso. No medication changes at this time. Will plan for RHC and likely add Opsumit, if indicated.    -     Case Request-Cath Lab: INSERTION, CATHETER, RIGHT HEART  -     Comprehensive Metabolic Panel; Future  -     CBC Auto Differential; Future  -     Complete PFT with bronchodilator; Future  -     CT Chest Without Contrast; Future  -     Ambulatory referral/consult to Sleep Disorders; Future    Interstitial pulmonary disease, unspecified  Per pulmonology - " "7/2024  Restriction on PFTs and bronchiectasis on imaging. No obvious reticulations or fibrotic changes that would be more typical for ILD related to autoimmune disease.   She has also had a history of severe sinusitis so her lung findings could be related to indolent infectious process.     Has not had a regular f/u with pulm. Will obtain updated PFTs and CT CHEST    -     CBC Auto Differential; Future    KEE positive  Follows with rheumatology - 10/2024   "At this time patient with positive- anti-dsdna, KEE and RNA polymerase III but no evidence of active CTD   - On CT chest report -- no evidence of ILD   - Pulm: "  Restriction on PFTs and bronchiectasis on imaging. No obvious reticulations or fibrotic changes that would be more typical for ILD related to autoimmune disease.   She has also had a history of severe sinusitis so her lung findings could be related to indolent infectious process.    I do think that prior to initiating immunosuppressant medication we should consider bronchoscopy with BAL to rule out an indolent infection.   For now will trend CK and aldolase and inflammatory markers  - not starting immunosuppression   - updating blood work"    ALVAREZ (obstructive sleep apnea)  Appears she had a sleep study in 2014. Mrs. Guzman says she does not wear CPAP, instead she has an electric bed with HOB raised.    Ordered new sleep consult.    -     Ambulatory referral/consult to Sleep Disorders; Future    Primary hypertension  Elevated in clinic. Managed by PCP.    Instructions  No medication changes today.    Schedule Pulmonary Function Tests, CT Chest, locally.    Schedule sleep medicine appointment.    Will discuss scheduling right heart cath after above testing is complete.    Recommend 2 gram sodium restriction   Encourage physical activity with graded exercise program.  Requested patient to weigh themselves daily, and to notify us if their weight increases by more than 3 lbs in 1 day or 5 lbs in 1 " week.      Luz Kee DNP, APRN  [facility]

## 2025-01-13 NOTE — PATIENT INSTRUCTIONS
No medication changes today.    Schedule Pulmonary Function Tests, CT Chest, locally.    Schedule sleep medicine appointment.    Will discuss scheduling right heart cath after above testing is complete.    Recommend 2 gram sodium restriction   Encourage physical activity with graded exercise program.  Requested patient to weigh themselves daily, and to notify us if their weight increases by more than 3 lbs in 1 day or 5 lbs in 1 week.

## 2025-01-29 ENCOUNTER — TELEPHONE (OUTPATIENT)
Dept: TRANSPLANT | Facility: CLINIC | Age: 75
End: 2025-01-29
Payer: MEDICARE

## 2025-01-29 NOTE — TELEPHONE ENCOUNTER
NN received a message that patient needed appointments scheduled. NN called patient left a voicemail asking for a return call with NN direct call back number.

## 2025-01-31 ENCOUNTER — TELEPHONE (OUTPATIENT)
Dept: TRANSPLANT | Facility: CLINIC | Age: 75
End: 2025-01-31
Payer: MEDICARE

## 2025-01-31 NOTE — TELEPHONE ENCOUNTER
NN returned patient's call. Patient asked if she could get scheduled fro her sleep study. NN explained that the patient needed to call and get an appointment scheduled. NN stated that the patient has a referral in the system for the patient to get into the sleep disorders clinic. NN gave patient call back number for NN. Patient verbalized understanding of all of the above information.

## 2025-02-18 ENCOUNTER — PATIENT MESSAGE (OUTPATIENT)
Dept: TRANSPLANT | Facility: CLINIC | Age: 75
End: 2025-02-18
Payer: MEDICARE

## 2025-02-21 DIAGNOSIS — I27.20 PULMONARY HYPERTENSION: Primary | ICD-10-CM

## 2025-03-06 ENCOUNTER — TELEPHONE (OUTPATIENT)
Dept: TRANSPLANT | Facility: CLINIC | Age: 75
End: 2025-03-06
Payer: MEDICARE

## 2025-03-06 NOTE — TELEPHONE ENCOUNTER
----- Message from MAZIN Bauer sent at 2/28/2025 10:03 AM CST -----  Regarding: FW: RHC and labs needed  Good morning,Please call patient, schedule RHC and labs (stat CBC &CMP).  ----- Message -----  From: Lizette Porter RN  Sent: 2/21/2025   9:28 AM CST  To: [facility] Transplant Schedulers  Subject: RHC and labs needed                              Good morning,Please call the patient and schedule RHC and labs (stat CBC &CMP).Thank you!!

## 2025-03-11 RX ORDER — TADALAFIL 20 MG/1
20 TABLET ORAL DAILY
Qty: 60 TABLET | Refills: 11 | Status: SHIPPED | OUTPATIENT
Start: 2025-03-11

## 2025-03-13 ENCOUNTER — PATIENT MESSAGE (OUTPATIENT)
Dept: TRANSPLANT | Facility: CLINIC | Age: 75
End: 2025-03-13
Payer: MEDICARE

## 2025-03-21 ENCOUNTER — HOSPITAL ENCOUNTER (OUTPATIENT)
Facility: HOSPITAL | Age: 75
Discharge: HOME | End: 2025-03-21
Attending: INTERNAL MEDICINE | Admitting: INTERNAL MEDICINE
Payer: MEDICARE

## 2025-03-21 VITALS
RESPIRATION RATE: 18 BRPM | TEMPERATURE: 98 F | HEART RATE: 61 BPM | BODY MASS INDEX: 29.26 KG/M2 | WEIGHT: 204.38 LBS | DIASTOLIC BLOOD PRESSURE: 75 MMHG | OXYGEN SATURATION: 98 % | HEIGHT: 70 IN | SYSTOLIC BLOOD PRESSURE: 163 MMHG

## 2025-03-21 DIAGNOSIS — I27.20 PULMONARY HYPERTENSION: ICD-10-CM

## 2025-03-21 DIAGNOSIS — I27.20 PULMONARY HTN: ICD-10-CM

## 2025-03-21 PROCEDURE — 93451 RIGHT HEART CATH: CPT | Mod: 26,,, | Performed by: INTERNAL MEDICINE

## 2025-03-21 PROCEDURE — C1751 CATH, INF, PER/CENT/MIDLINE: HCPCS | Performed by: INTERNAL MEDICINE

## 2025-03-21 PROCEDURE — 63600175 PHARM REV CODE 636 W HCPCS: Performed by: INTERNAL MEDICINE

## 2025-03-21 PROCEDURE — C1894 INTRO/SHEATH, NON-LASER: HCPCS | Performed by: INTERNAL MEDICINE

## 2025-03-21 PROCEDURE — 93451 RIGHT HEART CATH: CPT | Performed by: INTERNAL MEDICINE

## 2025-03-21 RX ORDER — LIDOCAINE HYDROCHLORIDE 20 MG/ML
INJECTION, SOLUTION INFILTRATION; PERINEURAL
Status: DISCONTINUED | OUTPATIENT
Start: 2025-03-21 | End: 2025-03-21 | Stop reason: HOSPADM

## 2025-03-21 NOTE — Clinical Note
The PA catheter was repositioned to the main pulmonary artery. Hemodynamics were performed. O2 saturation was measured at 68%.

## 2025-03-21 NOTE — H&P
Subjective:      Marleny Guzman is a 74 y.o. female with pulmonary HTN who is here for a RHC.    Past Medical History:   Diagnosis Date    Abnormal Pap smear of vagina 07/20/2016    Anemia     Cataract     Chronic bilateral low back pain without sciatica 03/13/2018    Depression with anxiety     Hypertension     Osteoarthritis 06/20/2013    Pulmonary hypertension     Recurrent upper respiratory infection (URI)     Right rotator cuff tear     Sleep apnea     Cipap machine at home    Urinary incontinence     Leaks urine with laughing/ coughing/ sneezing     Past Surgical History:   Procedure Laterality Date    CATARACT EXTRACTION  06/18/2012    OS    CATARACT EXTRACTION  07/16/2012    OD    CERVIX SURGERY      [place]    COLONOSCOPY      COLONOSCOPY N/A 03/04/2016    Procedure: COLONOSCOPY;  Surgeon: Tenzin Thakkar MD;  Location: [facility] (4TH FLR);  Service: Endoscopy;  Laterality: N/A;    COLONOSCOPY Left 01/25/2021    Procedure: COLONOSCOPY;  Surgeon: Wayne Naranjo MD;  Location: [facility];  Service: Endoscopy;  Laterality: Left;    COLONOSCOPY N/A 06/19/2023    Procedure: COLONOSCOPY;  Surgeon: Tenzin Thakkar MD;  Location: [facility] (2ND FLR);  Service: Endoscopy;  Laterality: N/A;  lung issues  referral Dr [id number] portal-  6/13/23- no answer for precall- ks    CYSTOSCOPY N/A 09/12/2022    Procedure: CYSTOSCOPY;  Surgeon: Bianka Aragon MD;  Location: [facility] OR 1ST FLR;  Service: Urology;  Laterality: N/A;    ENDOBRONCHIAL ULTRASOUND N/A 8/6/2024    Procedure: ENDOBRONCHIAL ULTRASOUND (EBUS);  Surgeon: Praneeth Tracy MD;  Location: [facility] OR 2ND FLR;  Service: Pulmonary;  Laterality: N/A;    ESOPHAGOGASTRODUODENOSCOPY Left 01/25/2021    Procedure: EGD (ESOPHAGOGASTRODUODENOSCOPY);  Surgeon: Wayne Naranjo MD;  Location: [facility];  Service: Endoscopy;  Laterality: Left;    EYE SURGERY Bilateral     cataract    HYSTERECTOMY  05/12/2015    LARYNGOSCOPY N/A 9/28/2023     Procedure: Suspension microlaryngoscopy with bilateral vocal fold injection augmentation - Restylane;  Surgeon: Andre Valera MD;  Location: [facility] OR;  Service: ENT;  Laterality: N/A;  Microscope, telescopes, tower, injector, Restylane    LEEP      LUMBAR EPIDURAL INJECTION      OOPHORECTOMY      TX REMOVAL OF OVARY/TUBE(S)  05/12/2015    RETROGRADE PYELOGRAPHY Bilateral 09/12/2022    Procedure: PYELOGRAM, RETROGRADE;  Surgeon: Bianka Aragon MD;  Location: [facility] OR 1ST FLR;  Service: Urology;  Laterality: Bilateral;    RIGHT HEART CATHETERIZATION Right 6/26/2024    Procedure: INSERTION, CATHETER, RIGHT HEART;  Surgeon: Bishop Baez MD;  Location: [facility] CATH LAB;  Service: Cardiology;  Laterality: Right;    ROBOT-ASSISTED REPAIR OF VENTRAL HERNIA USING DA ASPEN XI N/A 11/29/2021    Procedure: XI ROBOTIC REPAIR, HERNIA, VENTRAL w/ possible mesh;  Surgeon: Roney Jackson MD;  Location: [facility] OR 2ND FLR;  Service: General;  Laterality: N/A;  Anesthesia Block    SALPINGECTOMY Left     SHOULDER SURGERY Right     SINUS SURGERY           Other significant clinical information:  Review of patient's allergies indicates:   Allergen Reactions    Flowers     Grass pollen-ken grass standard     House dust       Review of Systems   Constitutional: Positive for malaise/fatigue. Negative for chills and fever.   HENT:  Negative for hearing loss.    Eyes:  Negative for visual disturbance.   Cardiovascular:  Positive for dyspnea on exertion and palpitations. Negative for irregular heartbeat, leg swelling, orthopnea, paroxysmal nocturnal dyspnea and syncope.   Respiratory:  Positive for cough, shortness of breath, sputum production and wheezing.    Musculoskeletal:  Negative for muscle weakness.   Gastrointestinal:  Negative for diarrhea, nausea and vomiting.   Neurological:  Positive for loss of balance. Negative for focal weakness.   Psychiatric/Behavioral:  Negative for memory loss.       Physical  Exam  Constitutional:       Appearance: Normal appearance.   HENT:      Head: Atraumatic.   Eyes:      Extraocular Movements: Extraocular movements intact.   Cardiovascular:      Rate and Rhythm: Normal rate and regular rhythm.      Pulses: Normal pulses.      Comments: S1, S2 noted. Loud P2.  Pulmonary:      Breath sounds: Rales present  Abdominal:      Palpations: Abdomen is soft.      Tenderness: There is no abdominal tenderness.   Musculoskeletal:         General: Normal range of motion.      Right lower leg: Edema present.      Left lower leg: Edema present.   Neurological:      General: No focal deficit present.      Mental Status: She is alert and oriented to person, place, and time.       Assessment:   Plan for a RHC to assess her hemodynamics.   Access via the right IJ  7 Fr venous sheath  Risks and benefits of the procedure were explained to the patient. All questions were answered.  Consents were signed and in the chart.    Tri Troncoso MD

## 2025-03-21 NOTE — NURSING
Patient discharged per MD orders. Instructions given on medications, wound care, activity, signs of infection, when to call MD, and follow up appointments. Pt and daughter verbalized understanding. Patient ambulated off of unit; refused wheelchair. Patient's daughter accompanied her.

## 2025-03-21 NOTE — NURSING
Patient arrived to room. Admit assessment completed. Plan of care discussed with patient. Call light in reach.

## 2025-03-21 NOTE — DISCHARGE INSTRUCTIONS

## 2025-03-21 NOTE — DISCHARGE SUMMARY
[facility] Lab  Discharge Note  Short Stay    Procedure(s) (LRB):  INSERTION, CATHETER, RIGHT HEART (Right)      OUTCOME: Patient tolerated treatment/procedure well without complication and is now ready for discharge.    DISPOSITION: Home or Self Care       Medication List        ASK your doctor about these medications      aspirin 81 MG Chew  TAKE 1 TABLET BY MOUTH EVERY DAY     clobetasol 0.05% 0.05 % Oint  Commonly known as: TEMOVATE  Apply topically 2 (two) times daily.     doxepin 3 mg Tab  Commonly known as: SILENOR  One tablet at bedtime as needed for insomnia     ergocalciferol 50,000 unit Cap  Commonly known as: ERGOCALCIFEROL  TAKE 1 CAPSULE (50,000 UNITS TOTAL) BY MOUTH EVERY 30 DAYS.     EScitalopram oxalate 20 MG tablet  Commonly known as: LEXAPRO  Take 1 tablet (20 mg total) by mouth once daily.     estradioL 0.01 % (0.1 mg/gram) vaginal cream  Commonly known as: ESTRACE  Use pea-sized amount vaginally nightly for 2 weeks, then 2-3x/week     GEMTESA 75 mg Tab  Generic drug: vibegron  Take 1 tablet (75 mg total) by mouth once daily.     irbesartan-hydrochlorothiazide 300-12.5 mg per tablet  Commonly known as: AVALIDE  TAKE 1 TABLET BY MOUTH EVERY DAY     loratadine 10 mg tablet  Commonly known as: CLARITIN  Take 1 tablet (10 mg total) by mouth once daily.     LORazepam 0.5 MG tablet  Commonly known as: ATIVAN  TAKE 1/2 TO 1 TABLET BY MOUTH DAILY AS NEEDED FOR ANXIETY     metoprolol succinate 50 MG 24 hr tablet  Commonly known as: TOPROL-XL  Take 1 tablet (50 mg total) by mouth once daily.     nystatin cream  Commonly known as: MYCOSTATIN  Apply topically 2 (two) times daily.     predniSONE 5 MG tablet  Commonly known as: DELTASONE  2.5 tablets (12.5mg) daily for 30 days then 2 tablets (10mg) for 30 days     tadalafil 20 mg Tab  Commonly known as: ADCIRCA  Take 1 tablet (20 mg total) by mouth once daily. Take 1 tablet (20 mg) by mouth once daily for 2 weeks, then increase to 40 mg, once daily.                 FINAL DIAGNOSIS:  <principal problem not specified>    FOLLOWUP: In clinic    DISCHARGE INSTRUCTIONS:        Cardiac diet    Activity as tolerated    Tri Troncoso MD

## 2025-03-21 NOTE — NURSING
Received report from MAZIN Licea. Patient s/p RHC, AAOx4. VSS, no c/o pain or discomfort at this time, resp even and unlabored. Gauze/tegaderm dressing to RIJ is CDI. No active bleeding. No hematoma noted. Post procedure protocol reviewed with patient and patient's daughter. Understanding verbalized. Daughter at bedside. Nurse call bell within reach.

## 2025-03-21 NOTE — BRIEF OP NOTE
[facility] Lab  Brief Operative Note  Cardiology    SUMMARY     Surgery Date: 3/21/2025     Surgeons and Role:     * Tri Troncoso MD - Primary    Assisting Surgeon: None    Pre-op Diagnosis:  Pulmonary hypertension [I27.20]    Post-op Diagnosis: Post-Op Diagnosis Codes:     * Pulmonary hypertension [I27.20]    Procedure Performed: RHC    Description of the findings of the procedure: RHC performed via the right IJ. Patient tolerated the procedure well. BP during her procedure was elevated 163/65 mm of Hg. Normal left and right side filling pressures (RA= 11 mm of Hg, PCWP=15 mm of Hg). Mild pre-capillary pulmonary HTN  (PA=40/17 mm of Hg, PA mean=24 mm of Hg). Low cardiac index and output  (Hiren: CI=2.4 L/min/min, CO=5.1 L/min, TD; CI=2.2 L/min/m2, CO=4.6 L/min).    Estimated Blood Loss: < 10 cc    Plan:   - Routine post-cath care.   - Mild precapillary PH on tadalafil. In view of her elevated BP during her procedure, recommend to monitor her BP at home for the next week and call us with results. Patient states that her BP is normal at home.       Tir Troncoso MD

## 2025-03-24 DIAGNOSIS — Z00.00 ENCOUNTER FOR MEDICARE ANNUAL WELLNESS EXAM: ICD-10-CM

## 2025-03-26 ENCOUNTER — OFFICE VISIT (OUTPATIENT)
Dept: UROGYNECOLOGY | Facility: CLINIC | Age: 75
End: 2025-03-26
Payer: MEDICARE

## 2025-03-26 VITALS — SYSTOLIC BLOOD PRESSURE: 138 MMHG | HEART RATE: 84 BPM | DIASTOLIC BLOOD PRESSURE: 64 MMHG

## 2025-03-26 DIAGNOSIS — N39.41 URGE INCONTINENCE: Primary | ICD-10-CM

## 2025-03-26 DIAGNOSIS — K59.04 CHRONIC IDIOPATHIC CONSTIPATION: ICD-10-CM

## 2025-03-26 LAB — POC RESIDUAL URINE VOLUME: 0 ML (ref 0–100)

## 2025-03-26 PROCEDURE — 1126F AMNT PAIN NOTED NONE PRSNT: CPT | Mod: CPTII,S$GLB,, | Performed by: OBSTETRICS & GYNECOLOGY

## 2025-03-26 PROCEDURE — 3288F FALL RISK ASSESSMENT DOCD: CPT | Mod: CPTII,S$GLB,, | Performed by: OBSTETRICS & GYNECOLOGY

## 2025-03-26 PROCEDURE — 99999 PR PBB SHADOW E&M-EST. PATIENT-LVL III: CPT | Mod: PBBFAC,,, | Performed by: OBSTETRICS & GYNECOLOGY

## 2025-03-26 PROCEDURE — 3078F DIAST BP <80 MM HG: CPT | Mod: CPTII,S$GLB,, | Performed by: OBSTETRICS & GYNECOLOGY

## 2025-03-26 PROCEDURE — 3075F SYST BP GE 130 - 139MM HG: CPT | Mod: CPTII,S$GLB,, | Performed by: OBSTETRICS & GYNECOLOGY

## 2025-03-26 PROCEDURE — 1159F MED LIST DOCD IN RCRD: CPT | Mod: CPTII,S$GLB,, | Performed by: OBSTETRICS & GYNECOLOGY

## 2025-03-26 PROCEDURE — 99212 OFFICE O/P EST SF 10 MIN: CPT | Mod: S$GLB,,, | Performed by: OBSTETRICS & GYNECOLOGY

## 2025-03-26 PROCEDURE — 51798 US URINE CAPACITY MEASURE: CPT | Mod: S$GLB,,, | Performed by: OBSTETRICS & GYNECOLOGY

## 2025-03-26 PROCEDURE — 1101F PT FALLS ASSESS-DOCD LE1/YR: CPT | Mod: CPTII,S$GLB,, | Performed by: OBSTETRICS & GYNECOLOGY

## 2025-03-26 NOTE — PROGRESS NOTES
"Chief Complaint   Patient presents with    follow up        HPI: Patient is a 74 y.o. female  with mixed urinary incontinence presents today for a follow up visit. She was last seen on 24 after initiating Gemtesa on 24. Had been referred to PT but as she was already in PT for an another problem and deferred PFPT.     Reports that her symptoms remain well controlled. She does not wake typically overnight. If she wakes it may only be once. She is now voiding 3-4 times per day. She is waking 0-1 times over night. She may sometimes experience some urinary incontinence.  Occurs in the early morning when she should be getting up.   Still wearing a pad but they are mostly dry.   She still leaks urine with a cough and sneeze. She is not bothered by it and it does not occur often.     Drinking more water now, about 4 cups per day. Has a cup of coffee in the morning.     Reports that she still has some constipation with "niels" in the morning but then has another bowel movement in the afternoon it will be normal.       REVIEW OF SYSTEMS:  A full 14-point ROS was performed and was significant for those  mentioned in the HPI.    The following portions of the patient's history were reviewed and updated as appropriate: allergies, current medications, past medical history, past surgical history and problem list.    PHYSICAL EXAMINATION    Vitals:    25 1352   BP: 138/64   Pulse: 84          General: Healthy in appearance, Well nourished, Affect Normal, NAD.    Office Visit on 2025   Component Date Value Ref Range Status    POC Residual Urine Volume 2025 0  0 - 100 mL Final        ASSESSMENT & PLAN:    Urge incontinence  -     POCT Bladder Scan    Chronic idiopathic constipation         75 yo with mixed urinary incontinence presented today for a follow up visit to review symptoms on medication. Patient's symptoms remain better. Does not require refills for the Gemtesa yet. Discussed PFPT however " patient is not currently bothered by the stress urinary incontinence enough to pursue it.   For her constipation recommended that she add a tbsp of Benefiber to her morning coffee and see if it helps.  She will return in 6 months for re-evaluation.     All questions were answered today. The patient was encouraged to contact the office as needed with any additional questions or concerns.     Total time spent on visit was 11 minutes.  This includes face to face time and non-face to face time preparing to see the patient (eg, review of tests), Obtaining and/or reviewing separately obtained history, Documenting clinical information in the electronic or other health record, Independently interpreting resultsand communicating results to the patient/family/caregiver, or Care coordination.    Carley Oliva MD

## 2025-03-28 DIAGNOSIS — F41.9 ANXIETY: ICD-10-CM

## 2025-03-28 NOTE — TELEPHONE ENCOUNTER
Care Due:                  Date            Visit Type   Department     Provider  --------------------------------------------------------------------------------                                EP -                              PRIMARY      [facility] INTERNAL  Last Visit: 12-      CARE ([facility])   MEDICINE       Bridget Cantu                              EP -                              PRIMARY      [facility] INTERNAL  Next Visit: 04-      CARE ([facility])   MEDICINE       Bridget Cantu                                                            Last  Test          Frequency    Reason                     Performed    Due Date  --------------------------------------------------------------------------------    Vitamin D...  12 months..  ergocalciferol...........  02- 02-    [employer] Embedded Care Due Messages. Reference number: [id number].   3/28/2025 5:33:16 PM CDT

## 2025-04-03 RX ORDER — LORAZEPAM 0.5 MG/1
TABLET ORAL
Qty: 30 TABLET | Refills: 0 | Status: SHIPPED | OUTPATIENT
Start: 2025-04-03

## 2025-04-08 ENCOUNTER — PATIENT MESSAGE (OUTPATIENT)
Dept: OTOLARYNGOLOGY | Facility: CLINIC | Age: 75
End: 2025-04-08
Payer: MEDICARE

## 2025-04-09 ENCOUNTER — LAB VISIT (OUTPATIENT)
Dept: LAB | Facility: HOSPITAL | Age: 75
End: 2025-04-09
Attending: INTERNAL MEDICINE
Payer: MEDICARE

## 2025-04-09 ENCOUNTER — OFFICE VISIT (OUTPATIENT)
Dept: INTERNAL MEDICINE | Facility: CLINIC | Age: 75
End: 2025-04-09
Payer: MEDICARE

## 2025-04-09 VITALS — BODY MASS INDEX: 29.35 KG/M2 | WEIGHT: 205 LBS | HEIGHT: 70 IN | OXYGEN SATURATION: 95 % | HEART RATE: 71 BPM

## 2025-04-09 DIAGNOSIS — R41.3 MEMORY LOSS: ICD-10-CM

## 2025-04-09 DIAGNOSIS — F41.9 ANXIETY: Chronic | ICD-10-CM

## 2025-04-09 DIAGNOSIS — M35.3 PMR (POLYMYALGIA RHEUMATICA): Primary | ICD-10-CM

## 2025-04-09 DIAGNOSIS — R39.15 URGENCY OF URINATION: ICD-10-CM

## 2025-04-09 DIAGNOSIS — Z01.419 NORMAL GYNECOLOGIC EXAMINATION: ICD-10-CM

## 2025-04-09 DIAGNOSIS — R60.9 EDEMA, UNSPECIFIED TYPE: ICD-10-CM

## 2025-04-09 DIAGNOSIS — Z12.31 SCREENING MAMMOGRAM, ENCOUNTER FOR: ICD-10-CM

## 2025-04-09 DIAGNOSIS — M35.3 PMR (POLYMYALGIA RHEUMATICA): ICD-10-CM

## 2025-04-09 DIAGNOSIS — G47.33 OSA (OBSTRUCTIVE SLEEP APNEA): ICD-10-CM

## 2025-04-09 DIAGNOSIS — I10 ESSENTIAL HYPERTENSION: ICD-10-CM

## 2025-04-09 DIAGNOSIS — F32.A DEPRESSION, UNSPECIFIED DEPRESSION TYPE: Chronic | ICD-10-CM

## 2025-04-09 LAB
CRP SERPL-MCNC: 8.5 MG/L
ERYTHROCYTE [SEDIMENTATION RATE] IN BLOOD BY PHOTOMETRIC METHOD: 18 MM/HR
T PALLIDUM IGG+IGM SER QL: NORMAL

## 2025-04-09 PROCEDURE — 85652 RBC SED RATE AUTOMATED: CPT

## 2025-04-09 PROCEDURE — 86140 C-REACTIVE PROTEIN: CPT

## 2025-04-09 PROCEDURE — 83921 ORGANIC ACID SINGLE QUANT: CPT

## 2025-04-09 PROCEDURE — 36415 COLL VENOUS BLD VENIPUNCTURE: CPT | Mod: PO

## 2025-04-09 PROCEDURE — 99999 PR PBB SHADOW E&M-EST. PATIENT-LVL IV: CPT | Mod: PBBFAC,,, | Performed by: INTERNAL MEDICINE

## 2025-04-09 PROCEDURE — 86593 SYPHILIS TEST NON-TREP QUANT: CPT

## 2025-04-09 RX ORDER — DICLOFENAC SODIUM 50 MG/1
50 TABLET, DELAYED RELEASE ORAL 2 TIMES DAILY
Qty: 20 TABLET | Refills: 0 | Status: SHIPPED | OUTPATIENT
Start: 2025-04-09

## 2025-04-09 RX ORDER — ESCITALOPRAM OXALATE 20 MG/1
20 TABLET ORAL DAILY
Qty: 90 TABLET | Refills: 3 | Status: SHIPPED | OUTPATIENT
Start: 2025-04-09

## 2025-04-09 RX ORDER — VIBEGRON 75 MG/1
1 TABLET, FILM COATED ORAL DAILY
Qty: 90 TABLET | Refills: 3 | Status: SHIPPED | OUTPATIENT
Start: 2025-04-09 | End: 2026-04-09

## 2025-04-09 RX ORDER — IRBESARTAN AND HYDROCHLOROTHIAZIDE 300; 12.5 MG/1; MG/1
1 TABLET, FILM COATED ORAL DAILY
Qty: 90 TABLET | Refills: 3 | Status: SHIPPED | OUTPATIENT
Start: 2025-04-09

## 2025-04-09 RX ORDER — METOPROLOL SUCCINATE 50 MG/1
50 TABLET, EXTENDED RELEASE ORAL DAILY
Qty: 90 TABLET | Refills: 3 | Status: SHIPPED | OUTPATIENT
Start: 2025-04-09

## 2025-04-10 ENCOUNTER — RESULTS FOLLOW-UP (OUTPATIENT)
Dept: INTERNAL MEDICINE | Facility: CLINIC | Age: 75
End: 2025-04-10

## 2025-04-11 NOTE — PROGRESS NOTES
Subjective:       Patient ID: Marleny Guzman is a 74 y.o. female.    Chief Complaint: Follow-up, Shoulder Pain, and Sciatica (right)    Follow-up  Pertinent negatives include no abdominal pain, chest pain (arm pain or jaw pain), headaches, nausea or vomiting.   Shoulder Pain   Pertinent negatives include no headaches.    She is doing better overall - no arthralgia or myalgia.  She is having some R sided sciatica.  No CP mild SOB.SHe is off prednisone for about 3-4 weeks. Pt with fatigue, snoring and daytime sleepiness.  Review of Systems   Respiratory:  Negative for shortness of breath (PND or orthopnea).    Cardiovascular:  Negative for chest pain (arm pain or jaw pain).   Gastrointestinal:  Negative for abdominal pain, diarrhea, nausea and vomiting.   Genitourinary:  Negative for dysuria.   Neurological:  Negative for seizures, syncope and headaches.       Objective:      Physical Exam  Constitutional:       General: She is not in acute distress.     Appearance: She is well-developed.   HENT:      Head: Normocephalic.   Eyes:      Pupils: Pupils are equal, round, and reactive to light.   Neck:      Thyroid: No thyromegaly.      Vascular: No JVD.   Cardiovascular:      Rate and Rhythm: Normal rate and regular rhythm.      Heart sounds: Normal heart sounds. No murmur heard.     No friction rub. No gallop.   Pulmonary:      Effort: Pulmonary effort is normal.      Breath sounds: Normal breath sounds. No wheezing or rales.   Abdominal:      General: Bowel sounds are normal. There is no distension.      Palpations: Abdomen is soft. There is no mass.      Tenderness: There is no abdominal tenderness. There is no guarding or rebound.   Musculoskeletal:      Cervical back: Neck supple.      Comments: 5/5 strength in BLE, 1+reflexes at knees and ankles bilaterally, pos SLR on the right side, easily walks on heels and toes    Lymphadenopathy:      Cervical: No cervical adenopathy.   Skin:     General: Skin is warm  and dry.   Neurological:      Mental Status: She is alert and oriented to person, place, and time.      Deep Tendon Reflexes: Reflexes are normal and symmetric.   Psychiatric:         Behavior: Behavior normal.         Thought Content: Thought content normal.         Judgment: Judgment normal.         Assessment:       1. PMR (polymyalgia rheumatica)    2. Anxiety    3. Depression, unspecified depression type    4. Urgency of urination    5. Essential hypertension    6. Normal gynecologic examination    7. Screening mammogram, encounter for    8. ALVAREZ (obstructive sleep apnea)    9. Memory loss    10. Edema, unspecified type        Plan:   PMR (polymyalgia rheumatica)  -     Sedimentation rate; Future; Expected date: 04/09/2025  -     C-Reactive Protein; Future; Expected date: 04/09/2025    Anxiety  -     EScitalopram oxalate (LEXAPRO) 20 MG tablet; Take 1 tablet (20 mg total) by mouth once daily.  Dispense: 90 tablet; Refill: 3    Depression, unspecified depression type  -     EScitalopram oxalate (LEXAPRO) 20 MG tablet; Take 1 tablet (20 mg total) by mouth once daily.  Dispense: 90 tablet; Refill: 3    Urgency of urination  -     GEMTESA 75 mg Tab; Take 1 tablet (75 mg total) by mouth once daily.  Dispense: 90 tablet; Refill: 3    Essential hypertension  -     metoprolol succinate (TOPROL-XL) 50 MG 24 hr tablet; Take 1 tablet (50 mg total) by mouth once daily.  Dispense: 90 tablet; Refill: 3    Normal gynecologic examination  -     Ambulatory referral/consult to Gynecology; Future; Expected date: 04/16/2025    Screening mammogram, encounter for  -     Mammo Digital Screening Bilat w/ Sky (XPD); Future; Expected date: 10/09/2025    ALVAREZ (obstructive sleep apnea)  -     Ambulatory referral/consult to Sleep Disorders; Future; Expected date: 04/16/2025    Memory loss  -     Vitamin B12; Future; Expected date: 05/09/2025  -     Methylmalonic Acid, Serum; Future; Expected date: 04/09/2025  -     Treponema Pallidium  Antibodies IgG, IgM; Future; Expected date: 04/09/2025    Edema, unspecified type  -     US Lower Extremity Veins Bilateral; Future; Expected date: 04/09/2025    Other orders  -     irbesartan-hydrochlorothiazide (AVALIDE) 300-12.5 mg per tablet; Take 1 tablet by mouth once daily.  Dispense: 90 tablet; Refill: 3  -     diclofenac (VOLTAREN) 50 MG EC tablet; Take 1 tablet (50 mg total) by mouth 2 (two) times daily.  Dispense: 20 tablet; Refill: 0      Suspect memory is affected by significant ALVAREZ..  Short term diclofenac for her back

## 2025-04-14 ENCOUNTER — HOSPITAL ENCOUNTER (OUTPATIENT)
Dept: RADIOLOGY | Facility: HOSPITAL | Age: 75
Discharge: HOME | End: 2025-04-14
Attending: INTERNAL MEDICINE
Payer: MEDICARE

## 2025-04-14 DIAGNOSIS — R60.9 EDEMA, UNSPECIFIED TYPE: ICD-10-CM

## 2025-04-14 LAB — W METHYLMALONIC ACID: 0.27 UMOL/L

## 2025-04-14 PROCEDURE — 93970 EXTREMITY STUDY: CPT | Mod: 26,,, | Performed by: RADIOLOGY

## 2025-04-14 PROCEDURE — 93970 EXTREMITY STUDY: CPT | Mod: TC,PO

## 2025-04-28 ENCOUNTER — PATIENT MESSAGE (OUTPATIENT)
Dept: TRANSPLANT | Facility: CLINIC | Age: 75
End: 2025-04-28
Payer: MEDICARE

## 2025-05-11 ENCOUNTER — PATIENT MESSAGE (OUTPATIENT)
Dept: OPTOMETRY | Facility: CLINIC | Age: 75
End: 2025-05-11
Payer: MEDICARE

## 2025-06-25 ENCOUNTER — PATIENT MESSAGE (OUTPATIENT)
Dept: TRANSPLANT | Facility: CLINIC | Age: 75
End: 2025-06-25
Payer: MEDICARE

## 2025-07-08 ENCOUNTER — TELEPHONE (OUTPATIENT)
Dept: INTERNAL MEDICINE | Facility: CLINIC | Age: 75
End: 2025-07-08
Payer: MEDICARE

## 2025-07-08 NOTE — TELEPHONE ENCOUNTER
Copied from CRM #[id number]. Topic: General Inquiry - Return Call  >> Jul 8, 2025  3:22 PM Trena wrote:  Patient is returning a phone call.    Who left a message for the patient: Dr Cantu's office    Does patient know what this is regarding:  yes    Would you like a call back, or a response through your [facility] portal?:   phone    Best call back number: [phone]    Comments: patient has been offered first available until the middle of August, but refused, requested medical assistant to call her back. Thank you.

## 2025-07-15 ENCOUNTER — OFFICE VISIT (OUTPATIENT)
Dept: INTERNAL MEDICINE | Facility: CLINIC | Age: 75
End: 2025-07-15
Payer: MEDICARE

## 2025-07-15 ENCOUNTER — TELEPHONE (OUTPATIENT)
Dept: TRANSPLANT | Facility: CLINIC | Age: 75
End: 2025-07-15
Payer: MEDICARE

## 2025-07-15 ENCOUNTER — TELEPHONE (OUTPATIENT)
Dept: INTERNAL MEDICINE | Facility: CLINIC | Age: 75
End: 2025-07-15
Payer: MEDICARE

## 2025-07-15 VITALS
HEART RATE: 59 BPM | DIASTOLIC BLOOD PRESSURE: 54 MMHG | OXYGEN SATURATION: 97 % | HEIGHT: 70 IN | WEIGHT: 215.5 LBS | BODY MASS INDEX: 30.85 KG/M2 | SYSTOLIC BLOOD PRESSURE: 138 MMHG

## 2025-07-15 DIAGNOSIS — R60.9 CHRONIC EDEMA: ICD-10-CM

## 2025-07-15 DIAGNOSIS — M79.641 RIGHT HAND PAIN: ICD-10-CM

## 2025-07-15 DIAGNOSIS — I27.20 PULMONARY HYPERTENSION: ICD-10-CM

## 2025-07-15 DIAGNOSIS — E55.9 MILD VITAMIN D DEFICIENCY: ICD-10-CM

## 2025-07-15 DIAGNOSIS — Z79.899 POLYPHARMACY: Primary | ICD-10-CM

## 2025-07-15 DIAGNOSIS — I27.9 CHRONIC PULMONARY HEART DISEASE: ICD-10-CM

## 2025-07-15 DIAGNOSIS — Z01.419 NORMAL GYNECOLOGIC EXAMINATION: ICD-10-CM

## 2025-07-15 DIAGNOSIS — E78.5 HYPERLIPIDEMIA, UNSPECIFIED HYPERLIPIDEMIA TYPE: ICD-10-CM

## 2025-07-15 DIAGNOSIS — R06.82 TACHYPNEA: ICD-10-CM

## 2025-07-15 DIAGNOSIS — I10 PRIMARY HYPERTENSION: Primary | ICD-10-CM

## 2025-07-15 DIAGNOSIS — R73.9 HYPERGLYCEMIA: ICD-10-CM

## 2025-07-15 PROCEDURE — 3078F DIAST BP <80 MM HG: CPT | Mod: CPTII,S$GLB,, | Performed by: INTERNAL MEDICINE

## 2025-07-15 PROCEDURE — 3075F SYST BP GE 130 - 139MM HG: CPT | Mod: CPTII,S$GLB,, | Performed by: INTERNAL MEDICINE

## 2025-07-15 PROCEDURE — G2211 COMPLEX E/M VISIT ADD ON: HCPCS | Mod: S$GLB,,, | Performed by: INTERNAL MEDICINE

## 2025-07-15 PROCEDURE — 3288F FALL RISK ASSESSMENT DOCD: CPT | Mod: CPTII,S$GLB,, | Performed by: INTERNAL MEDICINE

## 2025-07-15 PROCEDURE — 99214 OFFICE O/P EST MOD 30 MIN: CPT | Mod: S$GLB,,, | Performed by: INTERNAL MEDICINE

## 2025-07-15 PROCEDURE — 1126F AMNT PAIN NOTED NONE PRSNT: CPT | Mod: CPTII,S$GLB,, | Performed by: INTERNAL MEDICINE

## 2025-07-15 PROCEDURE — 1101F PT FALLS ASSESS-DOCD LE1/YR: CPT | Mod: CPTII,S$GLB,, | Performed by: INTERNAL MEDICINE

## 2025-07-15 PROCEDURE — 99999 PR PBB SHADOW E&M-EST. PATIENT-LVL V: CPT | Mod: PBBFAC,,, | Performed by: INTERNAL MEDICINE

## 2025-07-18 ENCOUNTER — HOSPITAL ENCOUNTER (OUTPATIENT)
Dept: RADIOLOGY | Facility: HOSPITAL | Age: 75
Discharge: HOME | End: 2025-07-18
Attending: INTERNAL MEDICINE
Payer: MEDICARE

## 2025-07-18 DIAGNOSIS — R60.9 CHRONIC EDEMA: ICD-10-CM

## 2025-07-18 PROCEDURE — 93970 EXTREMITY STUDY: CPT | Mod: 26,,, | Performed by: RADIOLOGY

## 2025-07-18 PROCEDURE — 93970 EXTREMITY STUDY: CPT | Mod: TC

## 2025-07-28 ENCOUNTER — HOSPITAL ENCOUNTER (OUTPATIENT)
Dept: RADIOLOGY | Facility: HOSPITAL | Age: 75
Discharge: HOME | End: 2025-07-28
Attending: INTERNAL MEDICINE
Payer: MEDICARE

## 2025-07-28 VITALS — HEIGHT: 70 IN | BODY MASS INDEX: 30.78 KG/M2 | WEIGHT: 215 LBS

## 2025-07-28 DIAGNOSIS — Z12.31 SCREENING MAMMOGRAM, ENCOUNTER FOR: ICD-10-CM

## 2025-07-28 PROCEDURE — 77063 BREAST TOMOSYNTHESIS BI: CPT | Mod: TC,PO

## 2025-07-28 PROCEDURE — 77067 SCR MAMMO BI INCL CAD: CPT | Mod: 26,,, | Performed by: RADIOLOGY

## 2025-07-28 PROCEDURE — 77063 BREAST TOMOSYNTHESIS BI: CPT | Mod: 26,,, | Performed by: RADIOLOGY

## 2025-07-30 ENCOUNTER — LAB VISIT (OUTPATIENT)
Dept: LAB | Facility: HOSPITAL | Age: 75
End: 2025-07-30
Attending: INTERNAL MEDICINE
Payer: MEDICARE

## 2025-07-30 ENCOUNTER — SOCIAL WORK (OUTPATIENT)
Dept: TRANSPLANT | Facility: CLINIC | Age: 75
End: 2025-07-30
Payer: MEDICARE

## 2025-07-30 ENCOUNTER — OFFICE VISIT (OUTPATIENT)
Dept: TRANSPLANT | Facility: CLINIC | Age: 75
End: 2025-07-30
Payer: MEDICARE

## 2025-07-30 ENCOUNTER — OFFICE VISIT (OUTPATIENT)
Dept: CARDIOLOGY | Facility: CLINIC | Age: 75
End: 2025-07-30
Payer: MEDICARE

## 2025-07-30 ENCOUNTER — HOSPITAL ENCOUNTER (OUTPATIENT)
Dept: PULMONOLOGY | Facility: CLINIC | Age: 75
Discharge: HOME | End: 2025-07-30
Payer: MEDICARE

## 2025-07-30 VITALS
SYSTOLIC BLOOD PRESSURE: 162 MMHG | WEIGHT: 205 LBS | HEART RATE: 61 BPM | OXYGEN SATURATION: 95 % | HEIGHT: 70 IN | HEIGHT: 70 IN | DIASTOLIC BLOOD PRESSURE: 71 MMHG | WEIGHT: 205.69 LBS | BODY MASS INDEX: 29.35 KG/M2 | BODY MASS INDEX: 29.45 KG/M2

## 2025-07-30 VITALS
DIASTOLIC BLOOD PRESSURE: 64 MMHG | WEIGHT: 205.5 LBS | SYSTOLIC BLOOD PRESSURE: 148 MMHG | HEIGHT: 70 IN | BODY MASS INDEX: 29.42 KG/M2 | HEART RATE: 64 BPM

## 2025-07-30 DIAGNOSIS — E66.3 OVERWEIGHT (BMI 25.0-29.9): ICD-10-CM

## 2025-07-30 DIAGNOSIS — Z79.899 POLYPHARMACY: ICD-10-CM

## 2025-07-30 DIAGNOSIS — R60.0 EDEMA OF BOTH LOWER EXTREMITIES: Primary | ICD-10-CM

## 2025-07-30 DIAGNOSIS — R76.8 ANA POSITIVE: ICD-10-CM

## 2025-07-30 DIAGNOSIS — I27.9 CHRONIC PULMONARY HEART DISEASE: ICD-10-CM

## 2025-07-30 DIAGNOSIS — Z87.891 FORMER SMOKER: ICD-10-CM

## 2025-07-30 DIAGNOSIS — R60.9 CHRONIC EDEMA: ICD-10-CM

## 2025-07-30 DIAGNOSIS — I89.0 LYMPHEDEMA OF BOTH LOWER EXTREMITIES: ICD-10-CM

## 2025-07-30 DIAGNOSIS — I10 PRIMARY HYPERTENSION: ICD-10-CM

## 2025-07-30 DIAGNOSIS — J84.9 INTERSTITIAL PULMONARY DISEASE, UNSPECIFIED: ICD-10-CM

## 2025-07-30 DIAGNOSIS — R06.82 TACHYPNEA: ICD-10-CM

## 2025-07-30 DIAGNOSIS — I27.20 PULMONARY HYPERTENSION: ICD-10-CM

## 2025-07-30 DIAGNOSIS — I27.20 PULMONARY HYPERTENSION: Primary | ICD-10-CM

## 2025-07-30 LAB
ABSOLUTE EOSINOPHIL (OHS): 0.65 K/UL
ABSOLUTE MONOCYTE (OHS): 0.54 K/UL (ref 0.3–1)
ABSOLUTE NEUTROPHIL COUNT (OHS): 4.26 K/UL (ref 1.8–7.7)
ALBUMIN SERPL BCP-MCNC: 3.6 G/DL (ref 3.5–5.2)
ALP SERPL-CCNC: 138 UNIT/L (ref 40–150)
ALT SERPL W/O P-5'-P-CCNC: 17 UNIT/L (ref 0–55)
ANION GAP (OHS): 7 MMOL/L (ref 8–16)
AST SERPL-CCNC: 23 UNIT/L (ref 0–50)
BASOPHILS # BLD AUTO: 0.07 K/UL
BASOPHILS NFR BLD AUTO: 0.9 %
BILIRUB SERPL-MCNC: 0.3 MG/DL (ref 0.1–1)
BUN SERPL-MCNC: 15 MG/DL (ref 8–23)
CALCIUM SERPL-MCNC: 9.3 MG/DL (ref 8.7–10.5)
CHLORIDE SERPL-SCNC: 105 MMOL/L (ref 95–110)
CO2 SERPL-SCNC: 30 MMOL/L (ref 23–29)
CREAT SERPL-MCNC: 0.9 MG/DL (ref 0.5–1.4)
ERYTHROCYTE [DISTWIDTH] IN BLOOD BY AUTOMATED COUNT: 14.8 % (ref 11.5–14.5)
GFR SERPLBLD CREATININE-BSD FMLA CKD-EPI: >60 ML/MIN/1.73/M2
GLUCOSE SERPL-MCNC: 92 MG/DL (ref 70–110)
HCT VFR BLD AUTO: 35.4 % (ref 37–48.5)
HGB BLD-MCNC: 11.7 GM/DL (ref 12–16)
IMM GRANULOCYTES # BLD AUTO: 0.01 K/UL (ref 0–0.04)
IMM GRANULOCYTES NFR BLD AUTO: 0.1 % (ref 0–0.5)
LYMPHOCYTES # BLD AUTO: 2.65 K/UL (ref 1–4.8)
MAGNESIUM SERPL-MCNC: 2.8 MG/DL (ref 1.6–2.6)
MCH RBC QN AUTO: 28.5 PG (ref 27–31)
MCHC RBC AUTO-ENTMCNC: 33.1 G/DL (ref 32–36)
MCV RBC AUTO: 86 FL (ref 82–98)
NT-PROBNP SERPL-MCNC: 270 PG/ML
NUCLEATED RBC (/100WBC) (OHS): 0 /100 WBC
PLATELET # BLD AUTO: 284 K/UL (ref 150–450)
PMV BLD AUTO: 10.3 FL (ref 9.2–12.9)
POTASSIUM SERPL-SCNC: 3.9 MMOL/L (ref 3.5–5.1)
PROT SERPL-MCNC: 6.8 GM/DL (ref 6–8.4)
RBC # BLD AUTO: 4.11 M/UL (ref 4–5.4)
RELATIVE EOSINOPHIL (OHS): 7.9 %
RELATIVE LYMPHOCYTE (OHS): 32.4 % (ref 18–48)
RELATIVE MONOCYTE (OHS): 6.6 % (ref 4–15)
RELATIVE NEUTROPHIL (OHS): 52.1 % (ref 38–73)
SODIUM SERPL-SCNC: 142 MMOL/L (ref 136–145)
WBC # BLD AUTO: 8.18 K/UL (ref 3.9–12.7)

## 2025-07-30 PROCEDURE — 1126F AMNT PAIN NOTED NONE PRSNT: CPT | Mod: CPTII,S$GLB,, | Performed by: INTERNAL MEDICINE

## 2025-07-30 PROCEDURE — 99999 PR PBB SHADOW E&M-EST. PATIENT-LVL IV: CPT | Mod: PBBFAC,,, | Performed by: INTERNAL MEDICINE

## 2025-07-30 PROCEDURE — 3288F FALL RISK ASSESSMENT DOCD: CPT | Mod: CPTII,S$GLB,, | Performed by: INTERNAL MEDICINE

## 2025-07-30 PROCEDURE — 1159F MED LIST DOCD IN RCRD: CPT | Mod: CPTII,S$GLB,, | Performed by: INTERNAL MEDICINE

## 2025-07-30 PROCEDURE — 1101F PT FALLS ASSESS-DOCD LE1/YR: CPT | Mod: CPTII,S$GLB,, | Performed by: INTERNAL MEDICINE

## 2025-07-30 PROCEDURE — 83880 ASSAY OF NATRIURETIC PEPTIDE: CPT

## 2025-07-30 PROCEDURE — 83735 ASSAY OF MAGNESIUM: CPT

## 2025-07-30 PROCEDURE — 99215 OFFICE O/P EST HI 40 MIN: CPT | Mod: S$GLB,,, | Performed by: INTERNAL MEDICINE

## 2025-07-30 PROCEDURE — 36415 COLL VENOUS BLD VENIPUNCTURE: CPT

## 2025-07-30 PROCEDURE — 1160F RVW MEDS BY RX/DR IN RCRD: CPT | Mod: CPTII,S$GLB,, | Performed by: INTERNAL MEDICINE

## 2025-07-30 PROCEDURE — 99205 OFFICE O/P NEW HI 60 MIN: CPT | Mod: S$GLB,,, | Performed by: INTERNAL MEDICINE

## 2025-07-30 PROCEDURE — 3078F DIAST BP <80 MM HG: CPT | Mod: CPTII,S$GLB,, | Performed by: INTERNAL MEDICINE

## 2025-07-30 PROCEDURE — 85025 COMPLETE CBC W/AUTO DIFF WBC: CPT

## 2025-07-30 PROCEDURE — 84075 ASSAY ALKALINE PHOSPHATASE: CPT

## 2025-07-30 PROCEDURE — 3077F SYST BP >= 140 MM HG: CPT | Mod: CPTII,S$GLB,, | Performed by: INTERNAL MEDICINE

## 2025-07-30 NOTE — PATIENT INSTRUCTIONS
Assessment/Plan:  Marleny Guzman is a 75 y.o. female with Pulmonary HTN, HTN, ALVAREZ (not on CPAP), overweight, former smoker, who presents for an initial appointment.    BLE edema- Due to BLE lymphedema, venous insufficiency, and dependent edema from pt sitting most of the time. Check BLE venous reflux study and arterial ultrasound. Pt presents with findings consistent with moderate lymphedema.  Refer to lymphedema clinic.  Recommend wearing graduated compression hose.  Limit sodium intake to 2000 mg daily.  Limit volume intake to 1.5 L daily.  Elevate legs when resting.    2. Pulmonary HTN- Stable. Continue current medications.     3. ALVAREZ- Stable. Continue to monitor.     4. Overweight- Encourage diet, exercise, and weight loss.     Follow up in 5 months

## 2025-07-30 NOTE — PROGRESS NOTES
[facility]      Chief Complaint   Patient presents with    Edema     chronic       Patient ID: Marleny Guzman is a 75 y.o. female with Pulmonary HTN, HTN, ALVAREZ (on CPAP), overweight, former smoker, who presents for an initial appointment. Pertinent history/events are as follows:     -Pt kindly referred by Dr. Cantu for evaluation of chronic edema    HPI:  Mrs. Guzman reports leg/ankle swelling for several years. States legs/ankles are always swollen. She has no claudication or tissue loss. Formerly smoked half a pack a day for 20 years. Quit in 1984. BLE Venous Ultrasound on 7/18/2025 showed no evidence of acute deep venous thrombosis in the visualized venous segments of the bilateral lower extremities.     Past Medical History:   Diagnosis Date    Abnormal Pap smear of vagina 07/20/2016    Anemia     Cataract     Chronic bilateral low back pain without sciatica 03/13/2018    Depression with anxiety     Hypertension     Osteoarthritis 06/20/2013    Pulmonary hypertension     Recurrent upper respiratory infection (URI)     Right rotator cuff tear     Sleep apnea     Cipap machine at home    Urinary incontinence     Leaks urine with laughing/ coughing/ sneezing     Past Surgical History:   Procedure Laterality Date    CATARACT EXTRACTION  06/18/2012    OS    CATARACT EXTRACTION  07/16/2012    OD    CERVIX SURGERY      [place]    COLONOSCOPY      COLONOSCOPY N/A 03/04/2016    Procedure: COLONOSCOPY;  Surgeon: Tenzin Thakkar MD;  Location: [facility] (4TH FLR);  Service: Endoscopy;  Laterality: N/A;    COLONOSCOPY Left 01/25/2021    Procedure: COLONOSCOPY;  Surgeon: Wayne Naranjo MD;  Location: [facility];  Service: Endoscopy;  Laterality: Left;    COLONOSCOPY N/A 06/19/2023    Procedure: COLONOSCOPY;  Surgeon: Tenzin Thakkar MD;  Location: [facility] (2ND FLR);  Service: Endoscopy;  Laterality: N/A;  lung issues  referral Dr [id number] portal-GT  6/13/23- no answer for  precall- ks    CYSTOSCOPY N/A 09/12/2022    Procedure: CYSTOSCOPY;  Surgeon: Bianka Aragon MD;  [facility];  Service: Urology;  Laterality: N/A;    ENDOBRONCHIAL ULTRASOUND N/A 8/6/2024    Procedure: ENDOBRONCHIAL ULTRASOUND (EBUS);  Surgeon: Praneeth Tracy MD;  Location: [facility] OR 2ND FLR;  Service: Pulmonary;  Laterality: N/A;    ESOPHAGOGASTRODUODENOSCOPY Left 01/25/2021    Procedure: EGD (ESOPHAGOGASTRODUODENOSCOPY);  Surgeon: Wayne Naranjo MD;  Location: [facility] ENDO;  Service: Endoscopy;  Laterality: Left;    EYE SURGERY Bilateral     cataract    HYSTERECTOMY  05/12/2015    LARYNGOSCOPY N/A 9/28/2023    Procedure: Suspension microlaryngoscopy with bilateral vocal fold injection augmentation - Restylane;  Surgeon: Andre Valera MD;  Location: [facility] OR;  Service: ENT;  Laterality: N/A;  Microscope, telescopes, tower, injector, Restylane    LEEP      LUMBAR EPIDURAL INJECTION      OOPHORECTOMY      RI REMOVAL OF OVARY/TUBE(S)  05/12/2015    RETROGRADE PYELOGRAPHY Bilateral 09/12/2022    Procedure: PYELOGRAM, RETROGRADE;  Surgeon: Bianka Aragon MD;  [address];  Service: Urology;  Laterality: Bilateral;    RIGHT HEART CATHETERIZATION Right 6/26/2024    Procedure: INSERTION, CATHETER, RIGHT HEART;  Surgeon: Bishop Baez MD;  Location: [facility] CATH LAB;  Service: Cardiology;  Laterality: Right;    RIGHT HEART CATHETERIZATION Right 3/21/2025    Procedure: INSERTION, CATHETER, RIGHT HEART;  Surgeon: Tri Troncoso MD;  Location: [facility] CATH LAB;  Service: Cardiology;  Laterality: Right;    ROBOT-ASSISTED REPAIR OF VENTRAL HERNIA USING DA ASPEN XI N/A 11/29/2021    Procedure: XI ROBOTIC REPAIR, HERNIA, VENTRAL w/ possible mesh;  Surgeon: Roney Jackson MD;  Location: [facility] OR 2ND FLR;  Service: General;  Laterality: N/A;  Anesthesia Block    SALPINGECTOMY Left     SHOULDER SURGERY Right     SINUS SURGERY       Social History[1]  Family History   Problem  "Relation Name Age of Onset    Cancer Mother          THYROID CANCER    Stroke Mother      Hypertension Father      Cataracts Father      Heart disease Sister Mary     Pacemaker/defibrilator Sister Mary     Hypertension Sister Mary     Deep vein thrombosis Sister Mary     Heart disease Sister Oksana         CABG    Osteoarthritis Sister Oksana     Edema Sister Oksana     Eczema Sister Ines     Hypertension Sister Ines     Breast cancer Sister Ines 72    Anuerysm Sister Shirlita     Drug abuse Sister Shirlita     Hypertension Sister Shirlita     No Known Problems Sister Pat     Cancer Sister Halima         Cancer cells or "growth in her stomach"    No Known Problems Sister Bre     No Known Problems Brother Jitendra     Depression Maternal Grandmother      Ulcers Maternal Grandfather          Legs    No Known Problems Paternal Grandmother      No Known Problems Paternal Grandfather      Hypertension Daughter      Other Daughter          Heart palpitations    Cancer Maternal Aunt      Breast cancer Cousin      ADD / ADHD Neg Hx      Alcohol abuse Neg Hx      Anxiety disorder Neg Hx      Bipolar disorder Neg Hx      Dementia Neg Hx      OCD Neg Hx      Paranoid behavior Neg Hx      Physical abuse Neg Hx      Schizophrenia Neg Hx      Seizures Neg Hx      Sexual abuse Neg Hx      Amblyopia Neg Hx      Blindness Neg Hx      Glaucoma Neg Hx      Macular degeneration Neg Hx      Retinal detachment Neg Hx      Strabismus Neg Hx      Anesthesia problems Neg Hx         Review of patient's allergies indicates:   Allergen Reactions    Flowers     Grass pollen-june grass standard     House dust        Medication List with Changes/Refills   Current Medications    ASPIRIN 81 MG CHEW    TAKE 1 TABLET BY MOUTH EVERY DAY    CLOBETASOL 0.05% (TEMOVATE) 0.05 % OINT    Apply topically 2 (two) times daily.    DICLOFENAC (VOLTAREN) 50 MG EC TABLET    Take 1 tablet (50 mg total) by mouth 2 (two) times daily.    " "ERGOCALCIFEROL (ERGOCALCIFEROL) 50,000 UNIT CAP    TAKE 1 CAPSULE (50,000 UNITS TOTAL) BY MOUTH EVERY 30 DAYS.    ESCITALOPRAM OXALATE (LEXAPRO) 20 MG TABLET    Take 1 tablet (20 mg total) by mouth once daily.    ESTRADIOL (ESTRACE) 0.01 % (0.1 MG/GRAM) VAGINAL CREAM    Use pea-sized amount vaginally nightly for 2 weeks, then 2-3x/week    GEMTESA 75 MG TAB    Take 1 tablet (75 mg total) by mouth once daily.    IRBESARTAN-HYDROCHLOROTHIAZIDE (AVALIDE) 300-12.5 MG PER TABLET    Take 1 tablet by mouth once daily.    LORATADINE (CLARITIN) 10 MG TABLET    Take 1 tablet (10 mg total) by mouth once daily.    LORAZEPAM (ATIVAN) 0.5 MG TABLET    TAKE 1/2 TO 1 TABLET BY MOUTH DAILY AS NEEDED FOR ANXIETY    METOPROLOL SUCCINATE (TOPROL-XL) 50 MG 24 HR TABLET    Take 1 tablet (50 mg total) by mouth once daily.    NYSTATIN (MYCOSTATIN) CREAM    Apply topically 2 (two) times daily.    TADALAFIL (ADCIRCA) 20 MG TAB    Take 1 tablet (20 mg total) by mouth once daily. Take 1 tablet (20 mg) by mouth once daily for 2 weeks, then increase to 40 mg, once daily.       Review of Systems  Constitution: Denies chills, fever, and sweats.  HENT: Denies headaches or blurry vision.  Cardiovascular: Denies chest pain or irregular heart beat.  Respiratory: Denies cough or shortness of breath.  Gastrointestinal: Denies abdominal pain, nausea, or vomiting.  Musculoskeletal: Positive for leg swelling.  Neurological: Denies dizziness or focal weakness.  Psychiatric/Behavioral: Normal mental status.  Hematologic/Lymphatic: Denies bleeding problem or easy bruising/bleeding.  Skin: Denies rash or suspicious lesions    Physical Examination  BP (!) 148/64 (Patient Position: Sitting)   Pulse 64   Ht 5' 10" (1.778 m)   Wt 93.2 kg (205 lb 7.5 oz)   BMI 29.48 kg/m²     Constitutional: No acute distress, conversant  HEENT: Sclera anicteric, Pupils equal, round and reactive to light, extraocular motions intact, Oropharynx clear  Neck: No JVD, no carotid " bruits  Cardiovascular: regular rate and rhythm, no murmur, rubs or gallops, normal S1/S2  Pulmonary: Clear to auscultation bilaterally  Abdominal: Abdomen soft, nontender, nondistended, positive bowel sounds  Extremities: BLE's with 1 pitting edema, prominent varicose veins, and changes consistent with lymphedema (L>R)   Pulses:  Carotid pulses are 2+ on the right side, and 2+ on the left side.  Radial pulses are 2+ on the right side, and 2+ on the left side.   Femoral pulses are 2+ on the right side, and 2+ on the left side.  Popliteal pulses are 2+ on the right side, and 2+ on the left side.   Dorsalis pedis pulses are 2+ on the right side, and 2+ on the left side.   Posterior tibial pulses are 2+ on the right side, and 2+ on the left side.    Skin: No ecchymosis, erythema, or ulcers  Psych: Alert and oriented x 3, appropriate affect  Neuro: CNII-XII intact, no focal deficits    Labs:  Most Recent Data  CBC:   Lab Results   Component Value Date    WBC 12.06 03/21/2025    HGB 12.6 03/21/2025    HCT 39.6 03/21/2025     03/21/2025    MCV 89 03/21/2025    RDW 14.7 (H) 03/21/2025     BMP:   Lab Results   Component Value Date     03/21/2025    K 3.7 03/21/2025     03/21/2025    CO2 30 (H) 03/21/2025    BUN 21 03/21/2025    CREATININE 0.8 03/21/2025    GLU 59 (L) 03/21/2025    CALCIUM 9.3 03/21/2025    MG 1.9 01/13/2025    PHOS 4.0 06/10/2021     LFTS;   Lab Results   Component Value Date    PROT 6.8 03/21/2025    ALBUMIN 3.4 (L) 03/21/2025    BILITOT 0.3 03/21/2025    AST 17 03/21/2025    ALKPHOS 115 03/21/2025    ALT 14 03/21/2025     COAGS:   Lab Results   Component Value Date    INR 1.0 06/26/2024     FLP:   Lab Results   Component Value Date    CHOL 103 (L) 02/07/2024    HDL 47 02/07/2024    LDLCALC 47.4 (L) 02/07/2024    TRIG 43 02/07/2024    CHOLHDL 45.6 02/07/2024     CARDIAC:   Lab Results   Component Value Date    TROPONINI 0.007 02/03/2024    BNP 37 01/13/2025       Imaging:    EKG  9/7/2024:  Sinus rhythm with Premature atrial complexes  Septal infarct ,age undetermined     BLE Venous Ultrasound 7/18/2025:  No evidence of acute deep venous thrombosis in the visualized venous segments of the bilateral lower extremities.     Echo 10/16/2024:    Left Ventricle: The left ventricle is normal in size. Normal wall thickness. There is concentric remodeling. There is normal systolic function with a visually estimated ejection fraction of 60 - 65%. Grade I diastolic dysfunction.    Right Ventricle: Normal right ventricular cavity size. Wall thickness is normal. Systolic function is normal.    The left atrium is mildly dilated.    Pulmonary Artery: There is mild pulmonary hypertension. The estimated pulmonary artery systolic pressure is 42 mmHg.    IVC/SVC: Normal venous pressure at 3 mmHg.    TTE 3/25/24    Left Ventricle: The left ventricle is normal in size. Normal wall thickness. There is normal systolic function. Ejection fraction by visual approximation is 65%. There is normal diastolic function.    Right Ventricle: Normal right ventricular cavity size. Wall thickness is normal. Right ventricle wall motion  is normal. Systolic function is normal.    Tricuspid Valve: There is mild regurgitation.    Pulmonary Artery: There is mild pulmonary hypertension. The estimated pulmonary artery systolic pressure is 43 mmHg.    IVC/SVC: Normal venous pressure at 3 mmHg.     RHC: 6/26/2024  RA: 13 RV: 48/ 5/ 9 PA: 52/ 18/ 30 PWP: 13 .   Cardiac output was 4.6 by thermodilution. Cardiac index is 2.2 L/min/m2.   O2 Sat: SVC 62% RA 65% PA 64%.   Pulmonary vascular resistance: 3.7.      CT Chest 4/3/24  Impression:   No imaging evidence of interstitial lung disease.   Stable bilateral lower lobe multi linear subsegmental atelectasis versus scarring.   Stable pulmonary nodules.  Grossly stable mediastinal lymphadenopathy measuring 1.2 cm in the station 4R.     PFTs 4/8/24  FEV1 62%  FVC 67%  DLCO 37%     V/Q:  negative    Assessment/Plan:  Marleny Guzman is a 75 y.o. female with Pulmonary HTN, HTN, ALVAREZ (not on CPAP), overweight, former smoker, who presents for an initial appointment.    BLE edema- Due to BLE lymphedema, venous insufficiency, and dependent edema from pt sitting most of the time. Check BLE venous reflux study and arterial ultrasound. Pt presents with findings consistent with moderate lymphedema.  Refer to lymphedema clinic.  Recommend wearing graduated compression hose.  Limit sodium intake to 2000 mg daily.  Limit volume intake to 1.5 L daily.  Elevate legs when resting.    2. Pulmonary HTN- Stable. Continue current medications.     3. ALVAREZ- Stable. Continue to monitor.     4. Overweight- Encourage diet, exercise, and weight loss.     Follow up in 5 months    Total duration of face to face visit time 30 minutes.  Total time spent counseling greater than fifty percent of total visit time.  Counseling included discussion regarding imaging findings, diagnosis, possibilities, treatment options, risks and benefits.  The patient had many questions regarding the options and long-term effects.    Roney Jacobsen MD, PhD  Interventional Cardiology         [1]   Social History  Socioeconomic History    Marital status:    Occupational History    Occupation: Disability     Comment: Depression     Employer: DISABLED   Tobacco Use    Smoking status: Former     Current packs/day: 0.00     Average packs/day: 0.5 packs/day for 22.0 years (11.0 ttl pk-yrs)     Types: Cigarettes     Start date: 1975     Quit date: 1997     Years since quittin.5     Passive exposure: Past    Smokeless tobacco: Never    Tobacco comments:     Some wheezing since COVID-19 infection-2020   Substance and Sexual Activity    Alcohol use: Not Currently    Drug use: No    Sexual activity: Not Currently     Partners: Male   Other Topics Concern    Caffeine Use Yes    Financial Status: Disabled Yes    Firearms: Does  patient have access to a firearm? No    Home situation: lives alone Yes    Spirituality: Active Participation Yes    Education: Unfinished college Yes    Spirituality: Organized Gnosticism Yes    Legal: Arrest history No   Social History Narrative    Marleny currently does operate an automobile.    Lives with daughter. Feels safe in her home.      Social Drivers of Health     Financial Resource Strain: Low Risk  (3/17/2022)    Overall Financial Resource Strain (CARDIA)     Difficulty of Paying Living Expenses: Not hard at all   Food Insecurity: No Food Insecurity (3/17/2022)    Hunger Vital Sign     Worried About Running Out of Food in the Last Year: Never true     Ran Out of Food in the Last Year: Never true   Transportation Needs: No Transportation Needs (3/17/2022)    PRAPARE - Transportation     Lack of Transportation (Medical): No     Lack of Transportation (Non-Medical): No   Physical Activity: Inactive (3/17/2022)    Exercise Vital Sign     Days of Exercise per Week: 0 days     Minutes of Exercise per Session: 0 min   Stress: Stress Concern Present (3/17/2022)    [employer] - Occupational Stress Questionnaire     Feeling of Stress : To some extent   Housing Stability: Low Risk  (3/17/2022)    Housing Stability Vital Sign     Unable to Pay for Housing in the Last Year: No     Number of Places Lived in the Last Year: 1     Unstable Housing in the Last Year: No

## 2025-07-30 NOTE — PROGRESS NOTES
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 Reports doing well. WHO FC II symptoms. She was on dual therapy including Tadalafil and Tyvaso however she is no longer on Tyvaso due to severe side effects. She completed her 6MWT today and walked 1100 ft with minimal dyspnea.       6 MWT done today  07/30/2025---------Distance: 335.28 meters (1100 feet)       O2 Sat % Supplemental Oxygen Heart Rate Blood Pressure Juancarlos Scale   Pre-exercise  (Resting) 95 % Room Air 60 bpm 175/80 mmHg 5-6   During Exercise 92 % Room Air 79 bpm 173/75 mmHg 5-6   Post-exercise  (Recovery) 95 % Room Air  68 bpm          Recovery Time: 83 seconds       RHC done on 3/21/2025  Normal left and right side filling pressures (RA= 11 mm of Hg, PCWP=15 mm of Hg). Mild pre-capillary pulmonary HTN  (PA=40/17 mm of Hg, PA mean=24 mm of Hg). Low cardiac index and output  (Hiren: CI=2.4 L/min/min, CO=5.1 L/min, TD; CI=2.2 L/min/m2, CO=4.6 L/min).    Mild precapillary PH on tadalafil. In view of her elevated BP during her procedure, recommend to monitor her BP at home for the next week and call us with results. Patient states that her BP is normal at home.    2D Echo with CFD done on 10/16/2024   Left Ventricle: The left ventricle is normal in size. Normal wall thickness. There is concentric remodeling. There is normal systolic function with a visually estimated ejection fraction of 60 - 65%. Grade I diastolic dysfunction.    Right Ventricle: Normal right ventricular cavity size. Wall thickness is normal. Systolic function is normal.    The left atrium is mildly dilated.    Pulmonary Artery: There is mild pulmonary hypertension. The estimated pulmonary artery systolic pressure is 42 mmHg.    IVC/SVC: Normal venous pressure at 3 mmHg.    Past Medical History:   Diagnosis Date    Abnormal Pap smear of vagina 07/20/2016    Anemia     Cataract      Chronic bilateral low back pain without sciatica 03/13/2018    Depression with anxiety     Hypertension     Osteoarthritis 06/20/2013    Pulmonary hypertension     Recurrent upper respiratory infection (URI)     Right rotator cuff tear     Sleep apnea     Cipap machine at home    Urinary incontinence     Leaks urine with laughing/ coughing/ sneezing     Past Surgical History:   Procedure Laterality Date    CATARACT EXTRACTION  06/18/2012    OS    CATARACT EXTRACTION  07/16/2012    OD    CERVIX SURGERY      [place]    COLONOSCOPY      COLONOSCOPY N/A 03/04/2016    Procedure: COLONOSCOPY;  Surgeon: Tenzin Thakkar MD;  Location: [facility] ENDO (4TH FLR);  Service: Endoscopy;  Laterality: N/A;    COLONOSCOPY Left 01/25/2021    Procedure: COLONOSCOPY;  Surgeon: Wayne Naranjo MD;  Location: [facility] ENDO;  Service: Endoscopy;  Laterality: Left;    COLONOSCOPY N/A 06/19/2023    Procedure: COLONOSCOPY;  Surgeon: Tenzin Thakkar MD;  Location: [facility] ENDO (2ND FLR);  Service: Endoscopy;  Laterality: N/A;  lung issues  referral Dr [id number] portal-  6/13/23- no answer for precall- ks    CYSTOSCOPY N/A 09/12/2022    Procedure: CYSTOSCOPY;  Surgeon: Bianka Aragon MD;  Location: [facility] OR 1ST FLR;  Service: Urology;  Laterality: N/A;    ENDOBRONCHIAL ULTRASOUND N/A 8/6/2024    Procedure: ENDOBRONCHIAL ULTRASOUND (EBUS);  Surgeon: Praneeth Tracy MD;  Location: [facility] OR 2ND FLR;  Service: Pulmonary;  Laterality: N/A;    ESOPHAGOGASTRODUODENOSCOPY Left 01/25/2021    Procedure: EGD (ESOPHAGOGASTRODUODENOSCOPY);  Surgeon: Wayne Naranjo MD;  Location: [facility];  Service: Endoscopy;  Laterality: Left;    EYE SURGERY Bilateral     cataract    HYSTERECTOMY  05/12/2015    LARYNGOSCOPY N/A 9/28/2023    Procedure: Suspension microlaryngoscopy with bilateral vocal fold injection augmentation - Restylane;  Surgeon: Andre Valera MD;  Location: [facility];  Service: ENT;  Laterality: N/A;  Microscope,  telescopes, tower, injector, Restylane    LEEP      LUMBAR EPIDURAL INJECTION      OOPHORECTOMY      IN REMOVAL OF OVARY/TUBE(S)  2015    RETROGRADE PYELOGRAPHY Bilateral 2022    Procedure: PYELOGRAM, RETROGRADE;  Surgeon: Bianka Aragon MD;  Location: [facility] OR 1ST FLR;  Service: Urology;  Laterality: Bilateral;    RIGHT HEART CATHETERIZATION Right 2024    Procedure: INSERTION, CATHETER, RIGHT HEART;  Surgeon: Bishop Baez MD;  Location: [facility] CATH LAB;  Service: Cardiology;  Laterality: Right;    RIGHT HEART CATHETERIZATION Right 3/21/2025    Procedure: INSERTION, CATHETER, RIGHT HEART;  Surgeon: Tri Troncoso MD;  Location: [facility] CATH LAB;  Service: Cardiology;  Laterality: Right;    ROBOT-ASSISTED REPAIR OF VENTRAL HERNIA USING DA ASPEN XI N/A 2021    Procedure: XI ROBOTIC REPAIR, HERNIA, VENTRAL w/ possible mesh;  Surgeon: Roney Jackson MD;  Location: [facility] OR 2ND [facility];  Service: General;  Laterality: N/A;  Anesthesia Block    SALPINGECTOMY Left     SHOULDER SURGERY Right     SINUS SURGERY       OB History          3    Para   1    Term   1            AB   2    Living   1         SAB   0    IAB        Ectopic   1    Multiple        Live Births                   Review of Systems   Constitutional: Negative. Negative for chills, decreased appetite, diaphoresis, fever, malaise/fatigue, night sweats, weight gain and weight loss.   Eyes: Negative.    Cardiovascular:  Positive for dyspnea on exertion. Negative for chest pain, claudication, cyanosis, irregular heartbeat, leg swelling, near-syncope, orthopnea, palpitations, paroxysmal nocturnal dyspnea and syncope.   Respiratory:  Negative for cough, hemoptysis and shortness of breath.    Endocrine: Negative.    Hematologic/Lymphatic: Negative.    Skin:  Negative for color change, dry skin and nail changes.   Musculoskeletal: Negative.    Gastrointestinal: Negative.    Genitourinary: Negative.    Neurological:   "Negative for weakness.       Objective:   Blood pressure (!) 162/71, pulse 61, height 5' 10" (1.778 m), weight 93.3 kg (205 lb 11 oz), SpO2 95%.body mass index is 29.51 kg/m².  Physical Exam  Vitals and nursing note reviewed.   Constitutional:       Appearance: She is well-developed.   HENT:      Head: Normocephalic.   Eyes:      Pupils: Pupils are equal, round, and reactive to light.   Neck:      Vascular: No JVD.   Cardiovascular:      Rate and Rhythm: Normal rate and regular rhythm.      Chest Wall: PMI is not displaced.      Pulses: Intact distal pulses.      Heart sounds: Normal heart sounds. No murmur heard.     No friction rub. No gallop.   Pulmonary:      Effort: Pulmonary effort is normal.      Breath sounds: Normal breath sounds. No wheezing or rales.   Abdominal:      General: Bowel sounds are normal.      Palpations: Abdomen is soft.   Musculoskeletal:      Cervical back: Neck supple.   Neurological:      Mental Status: She is alert and oriented to person, place, and time.       Labs:    Chemistry        Component Value Date/Time     03/21/2025 0748    K 3.7 03/21/2025 0748     03/21/2025 0748    CO2 30 (H) 03/21/2025 0748    BUN 21 03/21/2025 0748    CREATININE 0.8 03/21/2025 0748    GLU 59 (L) 03/21/2025 0748        Component Value Date/Time    CALCIUM 9.3 03/21/2025 0748    ALKPHOS 115 03/21/2025 0748    AST 17 03/21/2025 0748    ALT 14 03/21/2025 0748    BILITOT 0.3 03/21/2025 0748    ESTGFRAFRICA >60.0 07/25/2022 0956    EGFRNONAA >60.0 07/25/2022 0956          Magnesium   Date Value Ref Range Status   01/13/2025 1.9 1.6 - 2.6 mg/dL Final     Lab Results   Component Value Date    WBC 12.06 03/21/2025    HGB 12.6 03/21/2025    HCT 39.6 03/21/2025     03/21/2025     Lab Results   Component Value Date    INR 1.0 06/26/2024    INR 1.2 01/24/2021    INR 1.3 01/17/2021     BNP   Date Value Ref Range Status   01/13/2025 37 0 - 99 pg/mL Final     Comment:     Values of less than 100 pg/ml " are consistent with non-CHF populations.   12/30/2024 88 0 - 99 pg/mL Final     Comment:     Values of less than 100 pg/ml are consistent with non-CHF populations.   10/16/2024 21 0 - 99 pg/mL Final     Comment:     Values of less than 100 pg/ml are consistent with non-CHF populations.     LD   Date Value Ref Range Status   10/10/2020 131 110 - 260 U/L Final     Comment:     Results are increased in hemolyzed samples.   10/10/2020 131 110 - 260 U/L Final     Comment:     Results are increased in hemolyzed samples.       Assessment:      1. Pulmonary hypertension    2. Interstitial pulmonary disease, unspecified    3. Chronic pulmonary heart disease    4. Overweight (BMI 25.0-29.9)    5. KEE positive        Plan:   WHO group 1/3 (ILD PH (mild) with WHO FC 2 symptoms.    Doing well on Tadalafil. Unfortunately she did not tolerate Tyvaso. We had a long discussion about the risks and benefits of pulmonary vasodilators. Since her PH is mild and her symptoms are stable, in my opinion it is reasonable to continue monotherapy ( ie Tadalfil) for now and continue to closely monitor her symptoms. Should her symptoms worsen or if her PA pressures increase on next hemodynamic assessment then would consider alternatives including Yutrepia.   Recommend 2 gram sodium restriction and 1500cc fluid restriction.  Encourage physical activity with graded exercise program.  Requested patient to weigh themselves daily, and to notify us if their weight increases by more than 3 lbs in 1 day or 5 lbs in 1 week.   RTC in 3 months with 6 MWT and labs.      Tri Troncoso MD

## 2025-07-30 NOTE — PROGRESS NOTES
Pt presents in clinic for follow up appointment with Dr. Troncoso.  SW introduced self to pt and explained role of SW in HTS clinic.  Pt verbalized understanding.  Pt reports coping well with her pulmonary hypertension and denies additional needs or concerns at this time. SW remains available.

## 2025-07-30 NOTE — PROCEDURES
Marleny Guzman is a 75 y.o.   female patient, who presents for a 6 minute walk test ordered by MD Karyna.  The diagnosis is Qualify for Oxygen; Connective Tissue Disease; Pulmonary Hypertension.  The patient's BMI is 29.4 kg/m2.  Predicted distance (lower limit of normal) is 251.46 meters.      Test Results:    The test was completed without stopping.  The total time walked was 360 seconds.  During walking, the patient reported:  Dyspnea.  The patient used no assistive devices during testing.     07/30/2025---------Distance: 335.28 meters (1100 feet)     O2 Sat % Supplemental Oxygen Heart Rate Blood Pressure Juancarlos Scale   Pre-exercise  (Resting) 95 % Room Air 60 bpm 175/80 mmHg 5-6   During Exercise 92 % Room Air 79 bpm 173/75 mmHg 5-6   Post-exercise  (Recovery) 95 % Room Air  68 bpm       Recovery Time: 83 seconds    Performing nurse/tech: Jd CARTWRIGHT      PREVIOUS STUDY:   01/13/2025---------Distance: 335.28 meters (1100 feet)       O2 Sat % Supplemental Oxygen Heart Rate Blood Pressure Juancarlos Scale   Pre-exercise  (Resting) 97 % Room Air 74 bpm 157/68 mmHg 5-6   During Exercise 95 % Room Air 81 bpm 185/63 mmHg 7-8   Post-exercise  (Recovery) 95 % Room Air  77 bpm           CLINICAL INTERPRETATION:  Six minute walk distance is 335.28 meters (1100 feet) with heavy dyspnea.  During exercise, there was desaturation while breathing room air.  Both blood pressure and heart rate remained stable with walking.  Hypertension was present prior to exercise.  The patient did not report non-pulmonary symptoms during exercise.  Since the previous study in January 2025, exercise capacity is unchanged.  Based upon age and body mass index, exercise capacity is normal.

## 2025-08-01 NOTE — PROGRESS NOTES
Subjective:       Patient ID: Marleny Guzman is a 75 y.o. female.    Chief Complaint: Follow-up    Follow-up  Pertinent negatives include no abdominal pain, chest pain (arm pain or jaw pain), headaches, nausea or vomiting.    Pt is doing well except for LE edema - she did have a DVT last year.  No CP or SOB. 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  New paresthesia of the right hand.   Review of Systems   Respiratory:  Negative for shortness of breath (PND or orthopnea).    Cardiovascular:  Positive for leg swelling. Negative for chest pain (arm pain or jaw pain).   Gastrointestinal:  Negative for abdominal pain, diarrhea, nausea and vomiting.   Genitourinary:  Negative for dysuria.   Neurological:  Negative for seizures, syncope and headaches.       Objective:      Physical Exam  Constitutional:       General: She is not in acute distress.     Appearance: She is well-developed.   HENT:      Head: Normocephalic.   Eyes:      Pupils: Pupils are equal, round, and reactive to light.   Neck:      Thyroid: No thyromegaly.      Vascular: No JVD.   Cardiovascular:      Rate and Rhythm: Normal rate and regular rhythm.      Heart sounds: Normal heart sounds. No murmur heard.     No friction rub. No gallop.   Pulmonary:      Effort: Pulmonary effort is normal.      Breath sounds: Normal breath sounds. No wheezing or rales.   Abdominal:      General: Bowel sounds are normal. There is no distension.      Palpations: Abdomen is soft. There is no mass.      Tenderness: There is no abdominal tenderness. There is no guarding or rebound.   Musculoskeletal:         General: Swelling (2+ bilaterally) present.      Cervical back: Neck supple.   Lymphadenopathy:      Cervical: No cervical adenopathy.   Skin:     General: Skin is warm and dry.   Neurological:      Mental Status: She is alert and oriented to person, place, and time.      Deep Tendon Reflexes: Reflexes are normal and symmetric.   Psychiatric:         Behavior:  Behavior normal.         Thought Content: Thought content normal.         Judgment: Judgment normal.         Assessment:       1. Primary hypertension    2. Pulmonary hypertension    3. Normal gynecologic examination    4. Hyperlipidemia, unspecified hyperlipidemia type    5. Hyperglycemia    6. Mild vitamin D deficiency    7. Right hand pain    8. Chronic edema        Plan:   Primary hypertension  -     CBC Auto Differential; Future; Expected date: 07/15/2025  -     Comprehensive Metabolic Panel; Future; Expected date: 07/15/2025  -     TSH; Future; Expected date: 07/15/2025    Pulmonary hypertension  -     Ambulatory referral/consult to Transplant, Heart; Future; Expected date: 07/22/2025  -     Ambulatory referral/consult to Rheumatology; Future; Expected date: 07/22/2025    Normal gynecologic examination  -     Ambulatory referral/consult to Gynecology; Future; Expected date: 07/22/2025    Hyperlipidemia, unspecified hyperlipidemia type  -     Lipid Panel; Future; Expected date: 07/15/2025    Hyperglycemia  -     Hemoglobin A1C; Future; Expected date: 07/15/2025    Mild vitamin D deficiency  -     Vitamin D; Future; Expected date: 07/15/2025    Right hand pain  -     EMG W/ ULTRASOUND AND NERVE CONDUCTION TEST 1 Extremity; Future    Chronic edema  -     US Lower Extremity Veins Bilateral; Future; Expected date: 07/15/2025  -     Ambulatory referral/consult to Vascular Medicine; Future; Expected date: 07/22/2025

## 2025-08-04 ENCOUNTER — HOSPITAL ENCOUNTER (OUTPATIENT)
Dept: CARDIOLOGY | Facility: HOSPITAL | Age: 75
Discharge: HOME | End: 2025-08-04
Attending: INTERNAL MEDICINE
Payer: MEDICARE

## 2025-08-04 DIAGNOSIS — R60.0 EDEMA OF BOTH LOWER EXTREMITIES: ICD-10-CM

## 2025-08-04 LAB
LEFT GREAT SAPHENOUS DISTAL THIGH DIA: 0.41 CM
LEFT GREAT SAPHENOUS JUNCTION DIA: 0.65 CM
LEFT GREAT SAPHENOUS KNEE DIA: 0.36 CM
LEFT GREAT SAPHENOUS MIDDLE THIGH DIA: 0.44 CM
LEFT GREAT SAPHENOUS PROXIMAL CALF DIA: 0.23 CM
LEFT SMALL SAPHENOUS KNEE DIA: 0.21 CM
LEFT SMALL SAPHENOUS SPJ DIA: 0.27 CM
RIGHT GREAT SAPHENOUS DISTAL THIGH DIA: 0.41 CM
RIGHT GREAT SAPHENOUS JUNCTION DIA: 0.6 CM
RIGHT GREAT SAPHENOUS KNEE DIA: 0.29 CM
RIGHT GREAT SAPHENOUS KNEE REFLUX: 4316 MS
RIGHT GREAT SAPHENOUS MIDDLE THIGH DIA: 0.45 CM
RIGHT SMALL SAPHENOUS KNEE DIA: 0.43 CM
RIGHT SMALL SAPHENOUS SPJ DIA: 0.36 CM

## 2025-08-04 PROCEDURE — 93970 EXTREMITY STUDY: CPT | Mod: TC

## 2025-08-04 PROCEDURE — 93970 EXTREMITY STUDY: CPT | Mod: 26,,, | Performed by: INTERNAL MEDICINE

## 2025-08-08 DIAGNOSIS — R06.82 TACHYPNEA: Primary | ICD-10-CM

## 2025-08-13 ENCOUNTER — HOSPITAL ENCOUNTER (OUTPATIENT)
Dept: CARDIOLOGY | Facility: HOSPITAL | Age: 75
Discharge: HOME | End: 2025-08-13
Attending: INTERNAL MEDICINE
Payer: MEDICARE

## 2025-08-13 DIAGNOSIS — R60.0 EDEMA OF BOTH LOWER EXTREMITIES: ICD-10-CM

## 2025-08-13 LAB
LEFT ANT TIBIAL SYS PSV: 98 CM/S
LEFT CFA PSV: 182 CM/S
LEFT EXTERNAL ILIAC PSV: 140 CM/S
LEFT PERONEAL SYS PSV: 93 CM/S
LEFT POPLITEAL PSV: 98 CM/S
LEFT POST TIBIAL SYS PSV: 103 CM/S
LEFT PROFUNDA SYS PSV: 126 CM/S
LEFT SUPER FEMORAL DIST SYS PSV: 144 CM/S
LEFT SUPER FEMORAL MID SYS PSV: 128 CM/S
LEFT SUPER FEMORAL OSTIAL SYS PSV: 144 CM/S
LEFT SUPER FEMORAL PROX SYS PSV: 164 CM/S
LEFT TIB/PER TRUNK SYS PSV: 119 CM/S
OHS CV LEFT LOWER EXTREMITY ABI (NO CALC): 1.22
OHS CV RIGHT ABI LOWER EXTREMITY (NO CALC): 1.06
RIGHT ANT TIBIAL SYS PSV: 137 CM/S
RIGHT CFA PSV: 150 CM/S
RIGHT EXTERNAL ILLIAC PSV: 182 CM/S
RIGHT PERONEAL SYS PSV: 87 CM/S
RIGHT POPLITEAL PSV: 116 CM/S
RIGHT POST TIBIAL SYS PSV: 133 CM/S
RIGHT PROFUNDA SYS PSV: 115 CM/S
RIGHT SUPER FEMORAL DIST SYS PSV: 131 CM/S
RIGHT SUPER FEMORAL MID SYS PSV: 183 CM/S
RIGHT SUPER FEMORAL OSTIAL SYS PSV: 146 CM/S
RIGHT SUPER FEMORAL PROX SYS PSV: 146 CM/S
RIGHT TIB/PER TRUNK SYS PSV: 116 CM/S

## 2025-08-13 PROCEDURE — 93925 LOWER EXTREMITY STUDY: CPT

## 2025-08-13 PROCEDURE — 93925 LOWER EXTREMITY STUDY: CPT | Mod: 26,,, | Performed by: INTERNAL MEDICINE

## (undated) DEVICE — SHEATH INTRODUCER 7FR 11CM

## (undated) DEVICE — TUBE SET SINGLE LUMEN FILTERED

## (undated) DEVICE — GOWN SURGICAL X-LARGE

## (undated) DEVICE — COVER LIGHT HANDLE

## (undated) DEVICE — Device

## (undated) DEVICE — BANDAGE ADHESIVE

## (undated) DEVICE — CATH POLLACK OPEN-END FLEXI-TI

## (undated) DEVICE — LINE INJECTION CLEARACIL 25.4

## (undated) DEVICE — NDL ORO-TRACHEAL INJECTION

## (undated) DEVICE — BOWL STERILE LARGE 32OZ

## (undated) DEVICE — TRAY CATH LAB OMC

## (undated) DEVICE — SUT GUT PL. 4-0 27 FS-2

## (undated) DEVICE — TROCAR ENDOPATH XCEL 5MM 7.5CM

## (undated) DEVICE — TRAY CYSTO BASIN

## (undated) DEVICE — UNDERGLOVE BIOGEL PI SZ 6.5 LF

## (undated) DEVICE — BLADE SURG CARBON STEEL SZ11

## (undated) DEVICE — ADHESIVE DERMABOND ADVANCED

## (undated) DEVICE — UNDERGLOVES BIOGEL PI SIZE 7.5

## (undated) DEVICE — SYR 10CC LUER LOCK

## (undated) DEVICE — COVER PROBE US 5.5X58L NON LTX

## (undated) DEVICE — SYR SLIP TIP 20CC

## (undated) DEVICE — DRAPE THREE-QTR REINF 53X77IN

## (undated) DEVICE — LUBRICANT SURGILUBE 2 OZ

## (undated) DEVICE — GOWN POLY REINF BRTH SLV XL

## (undated) DEVICE — KIT MICROINTRODUCE MINI 5X10CM

## (undated) DEVICE — PACK ECLIPSE SET-UP W/O DRAPE

## (undated) DEVICE — DRAPE ARM DAVINCI XI

## (undated) DEVICE — TRAY ENT 4/CS

## (undated) DEVICE — BAG LINGEMAN DRAIN UROLOGY

## (undated) DEVICE — ELECTRODE REM PLYHSV RETURN 9

## (undated) DEVICE — KIT ANTIFOG W/SPONG & FLUID

## (undated) DEVICE — UNDERGLOVES BIOGEL PI SZ 7 LF

## (undated) DEVICE — TRANSDUCER ADULT DISP

## (undated) DEVICE — SOL IRR NACL .9% 3000ML

## (undated) DEVICE — SET IRR URLGY 2LINE UNIV SPIKE

## (undated) DEVICE — DRAPE SCOPE PILLOW WARMER

## (undated) DEVICE — OBTURATOR BLADELESS 8MM XI CLR

## (undated) DEVICE — SEAL UNIVERSAL 5MM-8MM XI

## (undated) DEVICE — TRAY MINOR GEN SURG

## (undated) DEVICE — SEE MEDLINE ITEM 157117

## (undated) DEVICE — DRAPE COLUMN DAVINCI XI

## (undated) DEVICE — SYS LABEL CORRECT MED

## (undated) DEVICE — SPONGE COTTON TRAY 4X4IN

## (undated) DEVICE — ALCOHOL BLEND 95%

## (undated) DEVICE — CONTAINER SPECIMEN OR STER 4OZ

## (undated) DEVICE — GEL RESTYLANE INJ GEL LIDO 1ML

## (undated) DEVICE — NDL ASPIRATING VIZISHOT 20-40M

## (undated) DEVICE — GUIDE WIRE MOTION .035 X 150CM

## (undated) DEVICE — CATH BRONCHOSCOPE F/BF

## (undated) DEVICE — CATH SWAN GANZ STND 7FR

## (undated) DEVICE — ADAPTER SWIVEL

## (undated) DEVICE — SET CO-SET CLOSED INJ

## (undated) DEVICE — PACK CYSTO

## (undated) DEVICE — TUBING SUC UNIV W/CONN 12FT

## (undated) DEVICE — PENCIL GOLF STERILE